# Patient Record
Sex: FEMALE | Race: WHITE | NOT HISPANIC OR LATINO | URBAN - METROPOLITAN AREA
[De-identification: names, ages, dates, MRNs, and addresses within clinical notes are randomized per-mention and may not be internally consistent; named-entity substitution may affect disease eponyms.]

---

## 2017-01-23 ENCOUNTER — EMERGENCY (EMERGENCY)
Facility: HOSPITAL | Age: 82
LOS: 1 days | Discharge: HOME | End: 2017-01-23
Attending: EMERGENCY MEDICINE | Admitting: EMERGENCY MEDICINE
Payer: MEDICARE

## 2017-01-23 VITALS
HEART RATE: 89 BPM | RESPIRATION RATE: 18 BRPM | OXYGEN SATURATION: 100 % | SYSTOLIC BLOOD PRESSURE: 183 MMHG | DIASTOLIC BLOOD PRESSURE: 113 MMHG | TEMPERATURE: 98 F

## 2017-01-23 DIAGNOSIS — Z84.89 FAMILY HISTORY OF OTHER SPECIFIED CONDITIONS: Chronic | ICD-10-CM

## 2017-01-23 DIAGNOSIS — Z95.0 PRESENCE OF CARDIAC PACEMAKER: Chronic | ICD-10-CM

## 2017-01-23 DIAGNOSIS — T14.8 OTHER INJURY OF UNSPECIFIED BODY REGION: Chronic | ICD-10-CM

## 2017-01-23 DIAGNOSIS — H26.9 UNSPECIFIED CATARACT: Chronic | ICD-10-CM

## 2017-01-23 DIAGNOSIS — Z90.710 ACQUIRED ABSENCE OF BOTH CERVIX AND UTERUS: Chronic | ICD-10-CM

## 2017-01-23 DIAGNOSIS — Z82.8 FAMILY HISTORY OF OTHER DISABILITIES AND CHRONIC DISEASES LEADING TO DISABLEMENT, NOT ELSEWHERE CLASSIFIED: Chronic | ICD-10-CM

## 2017-01-23 PROCEDURE — G0168: CPT

## 2017-01-23 PROCEDURE — 70450 CT HEAD/BRAIN W/O DYE: CPT | Mod: 26

## 2017-01-23 PROCEDURE — 99284 EMERGENCY DEPT VISIT MOD MDM: CPT | Mod: 25,GC

## 2017-01-23 PROCEDURE — 70486 CT MAXILLOFACIAL W/O DYE: CPT | Mod: 26

## 2017-01-23 RX ORDER — TETANUS TOXOID, REDUCED DIPHTHERIA TOXOID AND ACELLULAR PERTUSSIS VACCINE, ADSORBED 5; 2.5; 8; 8; 2.5 [IU]/.5ML; [IU]/.5ML; UG/.5ML; UG/.5ML; UG/.5ML
0.5 SUSPENSION INTRAMUSCULAR ONCE
Qty: 0 | Refills: 0 | Status: COMPLETED | OUTPATIENT
Start: 2017-01-23 | End: 2017-01-23

## 2017-01-23 RX ORDER — ACETAMINOPHEN 500 MG
650 TABLET ORAL ONCE
Qty: 0 | Refills: 0 | Status: COMPLETED | OUTPATIENT
Start: 2017-01-23 | End: 2017-01-23

## 2017-01-23 RX ADMIN — Medication 650 MILLIGRAM(S): at 08:22

## 2017-01-23 RX ADMIN — TETANUS TOXOID, REDUCED DIPHTHERIA TOXOID AND ACELLULAR PERTUSSIS VACCINE, ADSORBED 0.5 MILLILITER(S): 5; 2.5; 8; 8; 2.5 SUSPENSION INTRAMUSCULAR at 08:01

## 2017-01-23 NOTE — ED PROVIDER NOTE - PSH
Cardiac pacemaker  placed 2009  Cataract  b/l lense implant  FHx: cholecystectomy  1993 laparoscopic  FHx: total knee replacement  left 2012  Fracture  ORIF right ankle 1986  S/P JAY-BSO  40 years ago

## 2017-01-23 NOTE — ED PROVIDER NOTE - PMH
Atrial fibrillation  dx 7/09 on coumadin  HLD (hyperlipidemia)    HTN (hypertension)    OA (osteoarthritis)    YOVANI on CPAP    Thyroid nodule  followed by endocrinologist  UTI (urinary tract infection)  frequent last was 1/15

## 2017-01-23 NOTE — ED ADULT NURSE NOTE - OBJECTIVE STATEMENT
Patient woke up in the morning and she rolled over from the bed, however she denies hitting the head. Patient hit her nose in the night stand. Denies LOC dizziness. Denies double vision, blurry vision. Patient on Coumadin. Patient has an abrasion on the bridge of her nose active bleeding, able to stop it slowly by applying pressure and ice.

## 2017-01-23 NOTE — ED ADULT TRIAGE NOTE - CHIEF COMPLAINT QUOTE
Pt sp fall. Fell off bed and hit nose on night stand. Denies LOC, dizziness, HA, vision changes. Currently on coumadin. Abrasion with some active bleeding & swelling observed to bridge of nose.

## 2017-01-23 NOTE — ED PROVIDER NOTE - CARE PLAN
Principal Discharge DX:	Head trauma  Instructions for follow-up, activity and diet:	1) Please follow-up with your primary care doctor in the next 5-7 days.  Please call tomorrow for an appointment.  If you cannot follow-up with your primary care doctor please return to the ED for any urgent issues.  2) You were given a copy of the tests performed today.  Please bring the results with you and review them with your primary care doctor.  3) If you have any worsening of symptoms or any other concerns please return to the ED immediately.  4) Please continue taking your home medications as directed.  Secondary Diagnosis:	Abrasion

## 2017-01-23 NOTE — ED PROVIDER NOTE - PLAN OF CARE
1) Please follow-up with your primary care doctor in the next 5-7 days.  Please call tomorrow for an appointment.  If you cannot follow-up with your primary care doctor please return to the ED for any urgent issues.  2) You were given a copy of the tests performed today.  Please bring the results with you and review them with your primary care doctor.  3) If you have any worsening of symptoms or any other concerns please return to the ED immediately.  4) Please continue taking your home medications as directed.

## 2017-01-23 NOTE — ED PROVIDER NOTE - PHYSICAL EXAMINATION
nasal bridge lac, localized swelling/ecchymosis and ttp. No intranasal bleeding. No other facial ttp. no spinal ttp, neck FROM. Steady unassisted gait. FROM all extremities.

## 2017-01-23 NOTE — ED PROVIDER NOTE - ATTENDING CONTRIBUTION TO CARE
83F PMh afib on coumadin, HTN, ppm p/w nasal lac s/p rolling and hitting face in bed, minimal localized pain otherwise asymptomatic. Vitals wnl, exam notable for nasal lac.  Low suspicion for intracranial injury but given age and on coumadin will cth and maxillofacial. Adacel, dermabond. Pt does not want pain med. reassess, likely outpt f/u.

## 2017-01-23 NOTE — ED PROVIDER NOTE - OBJECTIVE STATEMENT
84 yo female w/ pmh of afib (on coumadin) presents after hitting her face.  Pt states that she was rolling over in bed and hit her face on the dresser next to her bed.  did NOT fall off the bed.  Denies neck pain, fevers, chills, blurry vision, headaches, nausea/vomiting, chest pain before, during or after incident. 82 yo female w/ pmh of afib (on coumadin) presents after hitting her face.  Pt states that she was rolling over in bed and hit her face on the dresser next to her bed.  did NOT fall off the bed.  Denies neck pain, fevers, chills, blurry vision, headaches, nausea/vomiting, chest pain before, during or after incident. No LOC. Has minimal nasal pain, no HA, back pain, or other MSK pain. Unknown last tetanus.  Uses nasal cpap at night, occasionally doesn't use it w/o any issues.

## 2017-06-19 ENCOUNTER — APPOINTMENT (OUTPATIENT)
Dept: CT IMAGING | Facility: IMAGING CENTER | Age: 82
End: 2017-06-19

## 2017-06-19 ENCOUNTER — OUTPATIENT (OUTPATIENT)
Dept: OUTPATIENT SERVICES | Facility: HOSPITAL | Age: 82
LOS: 1 days | End: 2017-06-19
Payer: MEDICARE

## 2017-06-19 DIAGNOSIS — H26.9 UNSPECIFIED CATARACT: Chronic | ICD-10-CM

## 2017-06-19 DIAGNOSIS — Z90.710 ACQUIRED ABSENCE OF BOTH CERVIX AND UTERUS: Chronic | ICD-10-CM

## 2017-06-19 DIAGNOSIS — Z95.0 PRESENCE OF CARDIAC PACEMAKER: Chronic | ICD-10-CM

## 2017-06-19 DIAGNOSIS — Z82.8 FAMILY HISTORY OF OTHER DISABILITIES AND CHRONIC DISEASES LEADING TO DISABLEMENT, NOT ELSEWHERE CLASSIFIED: Chronic | ICD-10-CM

## 2017-06-19 DIAGNOSIS — Z84.89 FAMILY HISTORY OF OTHER SPECIFIED CONDITIONS: Chronic | ICD-10-CM

## 2017-06-19 DIAGNOSIS — Z00.8 ENCOUNTER FOR OTHER GENERAL EXAMINATION: ICD-10-CM

## 2017-06-19 DIAGNOSIS — T14.8 OTHER INJURY OF UNSPECIFIED BODY REGION: Chronic | ICD-10-CM

## 2017-06-19 PROCEDURE — 74170 CT ABD WO CNTRST FLWD CNTRST: CPT

## 2017-06-19 PROCEDURE — 82565 ASSAY OF CREATININE: CPT

## 2017-07-17 ENCOUNTER — APPOINTMENT (OUTPATIENT)
Dept: VASCULAR SURGERY | Facility: CLINIC | Age: 82
End: 2017-07-17

## 2017-07-17 VITALS
BODY MASS INDEX: 30.73 KG/M2 | HEIGHT: 64 IN | TEMPERATURE: 98 F | HEART RATE: 92 BPM | SYSTOLIC BLOOD PRESSURE: 146 MMHG | WEIGHT: 180 LBS | DIASTOLIC BLOOD PRESSURE: 90 MMHG

## 2017-07-19 ENCOUNTER — APPOINTMENT (OUTPATIENT)
Dept: CT IMAGING | Facility: IMAGING CENTER | Age: 82
End: 2017-07-19

## 2017-07-19 ENCOUNTER — OUTPATIENT (OUTPATIENT)
Dept: OUTPATIENT SERVICES | Facility: HOSPITAL | Age: 82
LOS: 1 days | End: 2017-07-19
Payer: MEDICARE

## 2017-07-19 DIAGNOSIS — Z84.89 FAMILY HISTORY OF OTHER SPECIFIED CONDITIONS: Chronic | ICD-10-CM

## 2017-07-19 DIAGNOSIS — Z90.710 ACQUIRED ABSENCE OF BOTH CERVIX AND UTERUS: Chronic | ICD-10-CM

## 2017-07-19 DIAGNOSIS — Z00.8 ENCOUNTER FOR OTHER GENERAL EXAMINATION: ICD-10-CM

## 2017-07-19 DIAGNOSIS — T14.8 OTHER INJURY OF UNSPECIFIED BODY REGION: Chronic | ICD-10-CM

## 2017-07-19 DIAGNOSIS — H26.9 UNSPECIFIED CATARACT: Chronic | ICD-10-CM

## 2017-07-19 DIAGNOSIS — Z95.0 PRESENCE OF CARDIAC PACEMAKER: Chronic | ICD-10-CM

## 2017-07-19 DIAGNOSIS — Z82.8 FAMILY HISTORY OF OTHER DISABILITIES AND CHRONIC DISEASES LEADING TO DISABLEMENT, NOT ELSEWHERE CLASSIFIED: Chronic | ICD-10-CM

## 2017-07-19 PROCEDURE — 71250 CT THORAX DX C-: CPT

## 2017-07-21 ENCOUNTER — APPOINTMENT (OUTPATIENT)
Dept: VASCULAR SURGERY | Facility: CLINIC | Age: 82
End: 2017-07-21

## 2017-11-15 ENCOUNTER — OUTPATIENT (OUTPATIENT)
Dept: OUTPATIENT SERVICES | Facility: HOSPITAL | Age: 82
LOS: 1 days | End: 2017-11-15
Payer: MEDICARE

## 2017-11-15 ENCOUNTER — APPOINTMENT (OUTPATIENT)
Dept: CT IMAGING | Facility: IMAGING CENTER | Age: 82
End: 2017-11-15
Payer: MEDICARE

## 2017-11-15 DIAGNOSIS — Z84.89 FAMILY HISTORY OF OTHER SPECIFIED CONDITIONS: Chronic | ICD-10-CM

## 2017-11-15 DIAGNOSIS — Z00.8 ENCOUNTER FOR OTHER GENERAL EXAMINATION: ICD-10-CM

## 2017-11-15 DIAGNOSIS — Z82.8 FAMILY HISTORY OF OTHER DISABILITIES AND CHRONIC DISEASES LEADING TO DISABLEMENT, NOT ELSEWHERE CLASSIFIED: Chronic | ICD-10-CM

## 2017-11-15 DIAGNOSIS — Z90.710 ACQUIRED ABSENCE OF BOTH CERVIX AND UTERUS: Chronic | ICD-10-CM

## 2017-11-15 DIAGNOSIS — H26.9 UNSPECIFIED CATARACT: Chronic | ICD-10-CM

## 2017-11-15 DIAGNOSIS — Z95.0 PRESENCE OF CARDIAC PACEMAKER: Chronic | ICD-10-CM

## 2017-11-15 DIAGNOSIS — T14.8 OTHER INJURY OF UNSPECIFIED BODY REGION: Chronic | ICD-10-CM

## 2017-11-15 PROCEDURE — 71250 CT THORAX DX C-: CPT

## 2017-11-15 PROCEDURE — 71250 CT THORAX DX C-: CPT | Mod: 26

## 2018-02-07 ENCOUNTER — EMERGENCY (EMERGENCY)
Facility: HOSPITAL | Age: 83
LOS: 1 days | Discharge: HOME | End: 2018-02-07
Attending: EMERGENCY MEDICINE | Admitting: EMERGENCY MEDICINE
Payer: MEDICARE

## 2018-02-07 VITALS
DIASTOLIC BLOOD PRESSURE: 100 MMHG | RESPIRATION RATE: 18 BRPM | TEMPERATURE: 98 F | SYSTOLIC BLOOD PRESSURE: 156 MMHG | OXYGEN SATURATION: 100 % | HEART RATE: 60 BPM

## 2018-02-07 DIAGNOSIS — H26.9 UNSPECIFIED CATARACT: Chronic | ICD-10-CM

## 2018-02-07 DIAGNOSIS — T14.8 OTHER INJURY OF UNSPECIFIED BODY REGION: Chronic | ICD-10-CM

## 2018-02-07 DIAGNOSIS — Z95.0 PRESENCE OF CARDIAC PACEMAKER: Chronic | ICD-10-CM

## 2018-02-07 DIAGNOSIS — Z84.89 FAMILY HISTORY OF OTHER SPECIFIED CONDITIONS: Chronic | ICD-10-CM

## 2018-02-07 DIAGNOSIS — Z90.710 ACQUIRED ABSENCE OF BOTH CERVIX AND UTERUS: Chronic | ICD-10-CM

## 2018-02-07 DIAGNOSIS — Z82.8 FAMILY HISTORY OF OTHER DISABILITIES AND CHRONIC DISEASES LEADING TO DISABLEMENT, NOT ELSEWHERE CLASSIFIED: Chronic | ICD-10-CM

## 2018-02-07 LAB
ALBUMIN SERPL ELPH-MCNC: 3.9 G/DL — SIGNIFICANT CHANGE UP (ref 3.3–5)
ALP SERPL-CCNC: 68 U/L — SIGNIFICANT CHANGE UP (ref 40–120)
ALT FLD-CCNC: 15 U/L — SIGNIFICANT CHANGE UP (ref 4–33)
APPEARANCE UR: CLEAR — SIGNIFICANT CHANGE UP
AST SERPL-CCNC: 18 U/L — SIGNIFICANT CHANGE UP (ref 4–32)
BASOPHILS # BLD AUTO: 0.06 K/UL — SIGNIFICANT CHANGE UP (ref 0–0.2)
BASOPHILS NFR BLD AUTO: 0.5 % — SIGNIFICANT CHANGE UP (ref 0–2)
BILIRUB SERPL-MCNC: 1.6 MG/DL — HIGH (ref 0.2–1.2)
BILIRUB UR-MCNC: NEGATIVE — SIGNIFICANT CHANGE UP
BLOOD UR QL VISUAL: NEGATIVE — SIGNIFICANT CHANGE UP
BUN SERPL-MCNC: 17 MG/DL — SIGNIFICANT CHANGE UP (ref 7–23)
CALCIUM SERPL-MCNC: 8.9 MG/DL — SIGNIFICANT CHANGE UP (ref 8.4–10.5)
CHLORIDE SERPL-SCNC: 100 MMOL/L — SIGNIFICANT CHANGE UP (ref 98–107)
CO2 SERPL-SCNC: 26 MMOL/L — SIGNIFICANT CHANGE UP (ref 22–31)
COLOR SPEC: YELLOW — SIGNIFICANT CHANGE UP
CREAT SERPL-MCNC: 0.76 MG/DL — SIGNIFICANT CHANGE UP (ref 0.5–1.3)
EOSINOPHIL # BLD AUTO: 0.06 K/UL — SIGNIFICANT CHANGE UP (ref 0–0.5)
EOSINOPHIL NFR BLD AUTO: 0.5 % — SIGNIFICANT CHANGE UP (ref 0–6)
GLUCOSE SERPL-MCNC: 115 MG/DL — HIGH (ref 70–99)
GLUCOSE UR-MCNC: NEGATIVE — SIGNIFICANT CHANGE UP
HCT VFR BLD CALC: 48.2 % — HIGH (ref 34.5–45)
HGB BLD-MCNC: 15.6 G/DL — HIGH (ref 11.5–15.5)
HYALINE CASTS # UR AUTO: SIGNIFICANT CHANGE UP (ref 0–?)
IMM GRANULOCYTES # BLD AUTO: 0.07 # — SIGNIFICANT CHANGE UP
IMM GRANULOCYTES NFR BLD AUTO: 0.6 % — SIGNIFICANT CHANGE UP (ref 0–1.5)
KETONES UR-MCNC: NEGATIVE — SIGNIFICANT CHANGE UP
LEUKOCYTE ESTERASE UR-ACNC: NEGATIVE — SIGNIFICANT CHANGE UP
LYMPHOCYTES # BLD AUTO: 1.1 K/UL — SIGNIFICANT CHANGE UP (ref 1–3.3)
LYMPHOCYTES # BLD AUTO: 9.7 % — LOW (ref 13–44)
MCHC RBC-ENTMCNC: 27.6 PG — SIGNIFICANT CHANGE UP (ref 27–34)
MCHC RBC-ENTMCNC: 32.4 % — SIGNIFICANT CHANGE UP (ref 32–36)
MCV RBC AUTO: 85.2 FL — SIGNIFICANT CHANGE UP (ref 80–100)
MONOCYTES # BLD AUTO: 0.63 K/UL — SIGNIFICANT CHANGE UP (ref 0–0.9)
MONOCYTES NFR BLD AUTO: 5.6 % — SIGNIFICANT CHANGE UP (ref 2–14)
MUCOUS THREADS # UR AUTO: SIGNIFICANT CHANGE UP
NEUTROPHILS # BLD AUTO: 9.37 K/UL — HIGH (ref 1.8–7.4)
NEUTROPHILS NFR BLD AUTO: 83.1 % — HIGH (ref 43–77)
NITRITE UR-MCNC: NEGATIVE — SIGNIFICANT CHANGE UP
NRBC # FLD: 0 — SIGNIFICANT CHANGE UP
PH UR: 7 — SIGNIFICANT CHANGE UP (ref 4.6–8)
PLATELET # BLD AUTO: 332 K/UL — SIGNIFICANT CHANGE UP (ref 150–400)
PMV BLD: 11.8 FL — SIGNIFICANT CHANGE UP (ref 7–13)
POTASSIUM SERPL-MCNC: 3.8 MMOL/L — SIGNIFICANT CHANGE UP (ref 3.5–5.3)
POTASSIUM SERPL-SCNC: 3.8 MMOL/L — SIGNIFICANT CHANGE UP (ref 3.5–5.3)
PROT SERPL-MCNC: 6.5 G/DL — SIGNIFICANT CHANGE UP (ref 6–8.3)
PROT UR-MCNC: 100 MG/DL — HIGH
RBC # BLD: 5.66 M/UL — HIGH (ref 3.8–5.2)
RBC # FLD: 16.2 % — HIGH (ref 10.3–14.5)
RBC CASTS # UR COMP ASSIST: SIGNIFICANT CHANGE UP (ref 0–?)
SODIUM SERPL-SCNC: 140 MMOL/L — SIGNIFICANT CHANGE UP (ref 135–145)
SP GR SPEC: 1.02 — SIGNIFICANT CHANGE UP (ref 1–1.04)
SQUAMOUS # UR AUTO: SIGNIFICANT CHANGE UP
UROBILINOGEN FLD QL: NORMAL MG/DL — SIGNIFICANT CHANGE UP
WBC # BLD: 11.29 K/UL — HIGH (ref 3.8–10.5)
WBC # FLD AUTO: 11.29 K/UL — HIGH (ref 3.8–10.5)
WBC UR QL: SIGNIFICANT CHANGE UP (ref 0–?)

## 2018-02-07 PROCEDURE — 99283 EMERGENCY DEPT VISIT LOW MDM: CPT

## 2018-02-07 RX ORDER — ACETAMINOPHEN 500 MG
975 TABLET ORAL ONCE
Qty: 0 | Refills: 0 | Status: COMPLETED | OUTPATIENT
Start: 2018-02-07 | End: 2018-02-07

## 2018-02-07 RX ORDER — DIAZEPAM 5 MG
1 TABLET ORAL
Qty: 10 | Refills: 0 | OUTPATIENT
Start: 2018-02-07

## 2018-02-07 RX ADMIN — Medication 975 MILLIGRAM(S): at 11:57

## 2018-02-07 NOTE — ED PROVIDER NOTE - CARE PLAN
Assessment and plan of treatment:	Seen in ED for non-traumatic back pain, no spinal tenderness. Limit further injury, over exertion, and rest affected area. Take Tylenol 650 mg every 4 hours or 975-1000 mg every 6 hours as needed for mild to moderate pain. Take Valium 5mg one tab every 8 hours as needed for severe pain. NEVER COMBINE WITH ALCOHOL. NEVER DRINK AND DRIVE ON VALIUM IT WILL CAUSE ACCIDENTS. Please continue all home medications as directed. See your regular doctor within 72 hours for follow-up care. Return to ER for new or worsening symptoms. Principal Discharge DX:	Back pain  Assessment and plan of treatment:	Seen in ED for non-traumatic back pain, no spinal tenderness. Limit further injury, over exertion, and rest affected area. Take Tylenol 650 mg every 4 hours or 975-1000 mg every 6 hours as needed for mild to moderate pain. Take Valium 5mg one tab every 8 hours as needed for severe pain. NEVER COMBINE WITH ALCOHOL. NEVER DRINK AND DRIVE ON VALIUM IT WILL CAUSE ACCIDENTS. Please continue all home medications as directed. See your regular doctor within 72 hours for follow-up care. Return to ER for new or worsening symptoms.

## 2018-02-07 NOTE — ED ADULT TRIAGE NOTE - CHIEF COMPLAINT QUOTE
patient presents with lower lumbar back pain that began last night, neg numbness, neg CVA tenderness pt does have a hx of back pain. neg radiation to abd or flank. denies urinary s/s.

## 2018-02-07 NOTE — ED PROVIDER NOTE - MUSCULOSKELETAL NECK EXAM
requires 1 person assistance to sit up in bed 2/2 pain/no deformity, pain or tenderness. no restriction of movement

## 2018-02-07 NOTE — ED PROVIDER NOTE - PLAN OF CARE
Seen in ED for non-traumatic back pain, no spinal tenderness. Limit further injury, over exertion, and rest affected area. Take Tylenol 650 mg every 4 hours or 975-1000 mg every 6 hours as needed for mild to moderate pain. Take Valium 5mg one tab every 8 hours as needed for severe pain. NEVER COMBINE WITH ALCOHOL. NEVER DRINK AND DRIVE ON VALIUM IT WILL CAUSE ACCIDENTS. Please continue all home medications as directed. See your regular doctor within 72 hours for follow-up care. Return to ER for new or worsening symptoms.

## 2018-02-07 NOTE — ED PROVIDER NOTE - MUSCULOSKELETAL MINIMAL EXAM
atraumatic/RANGE OF MOTION LIMITED/no muscle tenderness/neck supple/motor intact/neg spinal tenderness, diffuse lower spasm, intact saddle and lower extremity sensation/MUSCLE SPASMS

## 2018-02-07 NOTE — ED PROVIDER NOTE - OBJECTIVE STATEMENT
11:17 Tree att: 84F 11:17 Tree att: 84F h/o htn, hld, afib ppm on coumadin, mac degen c/o atraumatic low back pain x 1 day. Yesterday evening patient noted gradual onset low back pain, diffuse, spasm, non-rad, worse with movement, ok if supine. Patient denies fall, trauma, h/o slipped disc or back surgery. Patient denies fever, dm, hiv, ivdu, invasive spinal procedures, saddle anesthesia, urinary or fecal incontinence. NKDA

## 2018-02-07 NOTE — SOCIAL WORK INITIAL EVALUATION ADULT - PLAN
Met with patient at bedside in ED.   Patient is an 84 year old, White, Gnosticism,  female.  Patient is alert and oriented x4.  Patient lives in  with spouse and daughter.  Patient uses cane to ambulate when she is outside home.  Patient reports she is independent with ADL's and declined need for community resources at this time.  Patient awaiting x-ray, DC plan pending results.  SW to follow.

## 2018-02-07 NOTE — ED PROVIDER NOTE - PROGRESS NOTE DETAILS
Tree att: Patient smiling, "I feel much better." Dispo pending cmp and ua result. Tree att: Patient smiling, "I feel much better." Ambulate to and from bathroom without assistance. Dispo pending cmp and ua result. Tree att: [facility]. Results, dc instructions, return precautions, and need for follow-up reviewed with patient. Stable dc home.

## 2018-02-08 ENCOUNTER — EMERGENCY (EMERGENCY)
Facility: HOSPITAL | Age: 83
LOS: 1 days | Discharge: HOME | End: 2018-02-08
Attending: EMERGENCY MEDICINE | Admitting: EMERGENCY MEDICINE
Payer: MEDICARE

## 2018-02-08 VITALS
DIASTOLIC BLOOD PRESSURE: 82 MMHG | SYSTOLIC BLOOD PRESSURE: 148 MMHG | HEART RATE: 84 BPM | OXYGEN SATURATION: 98 % | RESPIRATION RATE: 18 BRPM | TEMPERATURE: 98 F

## 2018-02-08 VITALS
SYSTOLIC BLOOD PRESSURE: 161 MMHG | HEART RATE: 89 BPM | RESPIRATION RATE: 18 BRPM | DIASTOLIC BLOOD PRESSURE: 85 MMHG | OXYGEN SATURATION: 97 % | TEMPERATURE: 98 F

## 2018-02-08 DIAGNOSIS — Z82.8 FAMILY HISTORY OF OTHER DISABILITIES AND CHRONIC DISEASES LEADING TO DISABLEMENT, NOT ELSEWHERE CLASSIFIED: Chronic | ICD-10-CM

## 2018-02-08 DIAGNOSIS — Z90.710 ACQUIRED ABSENCE OF BOTH CERVIX AND UTERUS: Chronic | ICD-10-CM

## 2018-02-08 DIAGNOSIS — H26.9 UNSPECIFIED CATARACT: Chronic | ICD-10-CM

## 2018-02-08 DIAGNOSIS — Z84.89 FAMILY HISTORY OF OTHER SPECIFIED CONDITIONS: Chronic | ICD-10-CM

## 2018-02-08 DIAGNOSIS — Z95.0 PRESENCE OF CARDIAC PACEMAKER: Chronic | ICD-10-CM

## 2018-02-08 DIAGNOSIS — T14.8 OTHER INJURY OF UNSPECIFIED BODY REGION: Chronic | ICD-10-CM

## 2018-02-08 LAB
ALBUMIN SERPL ELPH-MCNC: 3.9 G/DL — SIGNIFICANT CHANGE UP (ref 3.3–5)
ALP SERPL-CCNC: 71 U/L — SIGNIFICANT CHANGE UP (ref 40–120)
ALT FLD-CCNC: 15 U/L RC — SIGNIFICANT CHANGE UP (ref 10–45)
ANION GAP SERPL CALC-SCNC: 15 MMOL/L — SIGNIFICANT CHANGE UP (ref 5–17)
APTT BLD: 54.2 SEC — HIGH (ref 27.5–37.4)
AST SERPL-CCNC: 37 U/L — SIGNIFICANT CHANGE UP (ref 10–40)
BASOPHILS # BLD AUTO: 0 K/UL — SIGNIFICANT CHANGE UP (ref 0–0.2)
BASOPHILS NFR BLD AUTO: 0.2 % — SIGNIFICANT CHANGE UP (ref 0–2)
BILIRUB SERPL-MCNC: 2 MG/DL — HIGH (ref 0.2–1.2)
BUN SERPL-MCNC: 24 MG/DL — HIGH (ref 7–23)
CALCIUM SERPL-MCNC: 9.8 MG/DL — SIGNIFICANT CHANGE UP (ref 8.4–10.5)
CHLORIDE SERPL-SCNC: 99 MMOL/L — SIGNIFICANT CHANGE UP (ref 96–108)
CO2 SERPL-SCNC: 23 MMOL/L — SIGNIFICANT CHANGE UP (ref 22–31)
CREAT SERPL-MCNC: 0.91 MG/DL — SIGNIFICANT CHANGE UP (ref 0.5–1.3)
EOSINOPHIL # BLD AUTO: 0.2 K/UL — SIGNIFICANT CHANGE UP (ref 0–0.5)
EOSINOPHIL NFR BLD AUTO: 0.9 % — SIGNIFICANT CHANGE UP (ref 0–6)
GLUCOSE SERPL-MCNC: 124 MG/DL — HIGH (ref 70–99)
HCT VFR BLD CALC: 52 % — HIGH (ref 34.5–45)
HGB BLD-MCNC: 17.4 G/DL — HIGH (ref 11.5–15.5)
INR BLD: 3.38 RATIO — HIGH (ref 0.88–1.16)
LYMPHOCYTES # BLD AUTO: 1.7 K/UL — SIGNIFICANT CHANGE UP (ref 1–3.3)
LYMPHOCYTES # BLD AUTO: 9 % — LOW (ref 13–44)
MCHC RBC-ENTMCNC: 29.1 PG — SIGNIFICANT CHANGE UP (ref 27–34)
MCHC RBC-ENTMCNC: 33.4 GM/DL — SIGNIFICANT CHANGE UP (ref 32–36)
MCV RBC AUTO: 87.1 FL — SIGNIFICANT CHANGE UP (ref 80–100)
MONOCYTES # BLD AUTO: 1.2 K/UL — HIGH (ref 0–0.9)
MONOCYTES NFR BLD AUTO: 6.2 % — SIGNIFICANT CHANGE UP (ref 2–14)
NEUTROPHILS # BLD AUTO: 15.8 K/UL — HIGH (ref 1.8–7.4)
NEUTROPHILS NFR BLD AUTO: 83.7 % — HIGH (ref 43–77)
PLATELET # BLD AUTO: 369 K/UL — SIGNIFICANT CHANGE UP (ref 150–400)
POTASSIUM SERPL-MCNC: 5.5 MMOL/L — HIGH (ref 3.5–5.3)
POTASSIUM SERPL-SCNC: 5.5 MMOL/L — HIGH (ref 3.5–5.3)
PROT SERPL-MCNC: 7.4 G/DL — SIGNIFICANT CHANGE UP (ref 6–8.3)
PROTHROM AB SERPL-ACNC: 37.8 SEC — HIGH (ref 9.8–12.7)
RAPID RVP RESULT: SIGNIFICANT CHANGE UP
RBC # BLD: 5.97 M/UL — HIGH (ref 3.8–5.2)
RBC # FLD: 14.6 % — HIGH (ref 10.3–14.5)
SODIUM SERPL-SCNC: 137 MMOL/L — SIGNIFICANT CHANGE UP (ref 135–145)
SPECIMEN SOURCE: SIGNIFICANT CHANGE UP
WBC # BLD: 18.9 K/UL — HIGH (ref 3.8–10.5)
WBC # FLD AUTO: 18.9 K/UL — HIGH (ref 3.8–10.5)

## 2018-02-08 PROCEDURE — 85610 PROTHROMBIN TIME: CPT

## 2018-02-08 PROCEDURE — 87633 RESP VIRUS 12-25 TARGETS: CPT

## 2018-02-08 PROCEDURE — 72131 CT LUMBAR SPINE W/O DYE: CPT | Mod: 26

## 2018-02-08 PROCEDURE — 71045 X-RAY EXAM CHEST 1 VIEW: CPT | Mod: 26

## 2018-02-08 PROCEDURE — 99284 EMERGENCY DEPT VISIT MOD MDM: CPT | Mod: GC

## 2018-02-08 PROCEDURE — 99284 EMERGENCY DEPT VISIT MOD MDM: CPT | Mod: 25

## 2018-02-08 PROCEDURE — 71045 X-RAY EXAM CHEST 1 VIEW: CPT

## 2018-02-08 PROCEDURE — 85652 RBC SED RATE AUTOMATED: CPT

## 2018-02-08 PROCEDURE — 87581 M.PNEUMON DNA AMP PROBE: CPT

## 2018-02-08 PROCEDURE — 74176 CT ABD & PELVIS W/O CONTRAST: CPT

## 2018-02-08 PROCEDURE — 74176 CT ABD & PELVIS W/O CONTRAST: CPT | Mod: 26

## 2018-02-08 PROCEDURE — 87486 CHLMYD PNEUM DNA AMP PROBE: CPT

## 2018-02-08 PROCEDURE — 96374 THER/PROPH/DIAG INJ IV PUSH: CPT

## 2018-02-08 PROCEDURE — 87798 DETECT AGENT NOS DNA AMP: CPT

## 2018-02-08 PROCEDURE — 80053 COMPREHEN METABOLIC PANEL: CPT

## 2018-02-08 PROCEDURE — 85730 THROMBOPLASTIN TIME PARTIAL: CPT

## 2018-02-08 PROCEDURE — 85027 COMPLETE CBC AUTOMATED: CPT

## 2018-02-08 RX ORDER — LIDOCAINE 4 G/100G
1 CREAM TOPICAL ONCE
Qty: 0 | Refills: 0 | Status: COMPLETED | OUTPATIENT
Start: 2018-02-08 | End: 2018-02-08

## 2018-02-08 RX ORDER — ACETAMINOPHEN 500 MG
1000 TABLET ORAL ONCE
Qty: 0 | Refills: 0 | Status: COMPLETED | OUTPATIENT
Start: 2018-02-08 | End: 2018-02-08

## 2018-02-08 RX ORDER — TRAMADOL HYDROCHLORIDE 50 MG/1
50 TABLET ORAL ONCE
Qty: 0 | Refills: 0 | Status: COMPLETED | OUTPATIENT
Start: 2018-02-08 | End: 2018-02-08

## 2018-02-08 RX ORDER — CYCLOBENZAPRINE HYDROCHLORIDE 10 MG/1
10 TABLET, FILM COATED ORAL ONCE
Qty: 0 | Refills: 0 | Status: COMPLETED | OUTPATIENT
Start: 2018-02-08 | End: 2018-02-08

## 2018-02-08 RX ORDER — CYCLOBENZAPRINE HYDROCHLORIDE 10 MG/1
1 TABLET, FILM COATED ORAL
Qty: 10 | Refills: 0 | OUTPATIENT
Start: 2018-02-08 | End: 2018-02-12

## 2018-02-08 RX ORDER — SODIUM CHLORIDE 9 MG/ML
500 INJECTION INTRAMUSCULAR; INTRAVENOUS; SUBCUTANEOUS ONCE
Qty: 0 | Refills: 0 | Status: COMPLETED | OUTPATIENT
Start: 2018-02-08 | End: 2018-02-08

## 2018-02-08 RX ADMIN — Medication 400 MILLIGRAM(S): at 20:14

## 2018-02-08 RX ADMIN — Medication 1000 MILLIGRAM(S): at 21:04

## 2018-02-08 RX ADMIN — LIDOCAINE 1 PATCH: 4 CREAM TOPICAL at 20:14

## 2018-02-08 RX ADMIN — SODIUM CHLORIDE 500 MILLILITER(S): 9 INJECTION INTRAMUSCULAR; INTRAVENOUS; SUBCUTANEOUS at 21:03

## 2018-02-08 RX ADMIN — LIDOCAINE 1 PATCH: 4 CREAM TOPICAL at 21:04

## 2018-02-08 RX ADMIN — CYCLOBENZAPRINE HYDROCHLORIDE 10 MILLIGRAM(S): 10 TABLET, FILM COATED ORAL at 21:04

## 2018-02-08 NOTE — ED ADULT NURSE NOTE - OBJECTIVE STATEMENT
84 y.o female c c/o general back pain. denies injury. pain x 2 days. Pt on coumadin-pacemaker in place. denies pain down legs. denies hematuria/dysuria. family at bedside. No loss or urine/bowels. denies cp/sob.

## 2018-02-08 NOTE — ED PROVIDER NOTE - MEDICAL DECISION MAKING DETAILS
Possible ongoing MSK pain, however history of fluctuating pain possibly concerning for renal colic or occult fracture of lumbar spine, no evidence of acute spinal cord emergency or aortic catastrophe, plan for labs, pain control, CT A/P and re-evaluate.

## 2018-02-08 NOTE — ED ADULT NURSE REASSESSMENT NOTE - NS ED NURSE REASSESS COMMENT FT1
Received pt on way to ct scan. Waiting for pt to return to do blood, line and meds. Will continue to monitor closely. Family aware

## 2018-02-08 NOTE — ED ADULT NURSE REASSESSMENT NOTE - NS ED NURSE REASSESS COMMENT FT1
pt remains stable, no signs of acute distress noted at this moment. Still complains of pain. Barcode not working for scanning, so medication picked up manually.

## 2018-02-08 NOTE — ED PROVIDER NOTE - ATTENDING CONTRIBUTION TO CARE
Patient seen and evaluated with resident/NP/PA, however HPI, ROS, PE and MDM as documented authored by myself unless otherwise noted - Juan Gramajo MD

## 2018-02-08 NOTE — ED PROVIDER NOTE - CONSTITUTIONAL, MLM
normal... Well appearing, well nourished, awake, alert, oriented to person, place, time/situation and in no apparent distress, uncomfortable appearing

## 2018-02-08 NOTE — ED PROVIDER NOTE - PHYSICAL EXAMINATION
MSK:  Diffusely tender to palpation throughout lumbar area, most prominently in L lumbar area, negative SLR testing bilaterally

## 2018-02-08 NOTE — ED PROVIDER NOTE - OBJECTIVE STATEMENT
Patient presenting c/o lower back pain.  Began three days ago, mild, yesterday worsened - seen at [facility] ED and discharged with tylenol and valium.  Today continued to worsen despite medications at home.  Reporting pain is throughout the lower back but worst on L side, non radiating.  Throughout day had episodes of "coming in spasms".  No loss of bowel or bladder continence, no fevers or chills, no radiation to leg, no numbness, tingling and weakness, no trauma.

## 2018-02-08 NOTE — ED ADULT TRIAGE NOTE - CHIEF COMPLAINT QUOTE
middle and lower back pain x Tuesday  seen at [facility] yesterday, told to f/u outpatient with ortho middle and lower back pain x Tuesday, atraumatic  seen at [facility] yesterday, told to f/u outpatient with ortho

## 2018-02-08 NOTE — ED PROVIDER NOTE - PROGRESS NOTE DETAILS
Ji Cheek, PGY2: ESR negative, no midline tenderness. Very low suspicion of epidural abscess. Pt's pain improved, ambulating now without assistance. RVP negative. UA negative yesterday. Pending CXR, if negative, discharge with PCP follow up

## 2018-02-09 LAB — BACTERIA UR CULT: SIGNIFICANT CHANGE UP

## 2018-02-14 ENCOUNTER — EMERGENCY (EMERGENCY)
Facility: HOSPITAL | Age: 83
LOS: 1 days | Discharge: HOME | End: 2018-02-14
Attending: EMERGENCY MEDICINE | Admitting: EMERGENCY MEDICINE
Payer: MEDICARE

## 2018-02-14 VITALS
SYSTOLIC BLOOD PRESSURE: 155 MMHG | DIASTOLIC BLOOD PRESSURE: 72 MMHG | TEMPERATURE: 97 F | RESPIRATION RATE: 18 BRPM | OXYGEN SATURATION: 98 % | HEART RATE: 72 BPM

## 2018-02-14 VITALS
TEMPERATURE: 97 F | SYSTOLIC BLOOD PRESSURE: 178 MMHG | HEART RATE: 103 BPM | DIASTOLIC BLOOD PRESSURE: 89 MMHG | RESPIRATION RATE: 18 BRPM | OXYGEN SATURATION: 95 %

## 2018-02-14 DIAGNOSIS — Z84.89 FAMILY HISTORY OF OTHER SPECIFIED CONDITIONS: Chronic | ICD-10-CM

## 2018-02-14 DIAGNOSIS — Z82.8 FAMILY HISTORY OF OTHER DISABILITIES AND CHRONIC DISEASES LEADING TO DISABLEMENT, NOT ELSEWHERE CLASSIFIED: Chronic | ICD-10-CM

## 2018-02-14 DIAGNOSIS — T14.8 OTHER INJURY OF UNSPECIFIED BODY REGION: Chronic | ICD-10-CM

## 2018-02-14 DIAGNOSIS — Z90.710 ACQUIRED ABSENCE OF BOTH CERVIX AND UTERUS: Chronic | ICD-10-CM

## 2018-02-14 DIAGNOSIS — H26.9 UNSPECIFIED CATARACT: Chronic | ICD-10-CM

## 2018-02-14 DIAGNOSIS — Z95.0 PRESENCE OF CARDIAC PACEMAKER: Chronic | ICD-10-CM

## 2018-02-14 LAB
ALBUMIN SERPL ELPH-MCNC: 3.6 G/DL — SIGNIFICANT CHANGE UP (ref 3.3–5)
ALP SERPL-CCNC: 87 U/L — SIGNIFICANT CHANGE UP (ref 40–120)
ALT FLD-CCNC: 31 U/L RC — SIGNIFICANT CHANGE UP (ref 10–45)
ANION GAP SERPL CALC-SCNC: 13 MMOL/L — SIGNIFICANT CHANGE UP (ref 5–17)
APTT BLD: 53.1 SEC — HIGH (ref 27.5–37.4)
AST SERPL-CCNC: 33 U/L — SIGNIFICANT CHANGE UP (ref 10–40)
BASOPHILS # BLD AUTO: 0.1 K/UL — SIGNIFICANT CHANGE UP (ref 0–0.2)
BASOPHILS NFR BLD AUTO: 0.8 % — SIGNIFICANT CHANGE UP (ref 0–2)
BILIRUB SERPL-MCNC: 0.7 MG/DL — SIGNIFICANT CHANGE UP (ref 0.2–1.2)
BUN SERPL-MCNC: 41 MG/DL — HIGH (ref 7–23)
CALCIUM SERPL-MCNC: 9.2 MG/DL — SIGNIFICANT CHANGE UP (ref 8.4–10.5)
CHLORIDE SERPL-SCNC: 99 MMOL/L — SIGNIFICANT CHANGE UP (ref 96–108)
CO2 SERPL-SCNC: 27 MMOL/L — SIGNIFICANT CHANGE UP (ref 22–31)
CREAT SERPL-MCNC: 0.81 MG/DL — SIGNIFICANT CHANGE UP (ref 0.5–1.3)
EOSINOPHIL # BLD AUTO: 0.3 K/UL — SIGNIFICANT CHANGE UP (ref 0–0.5)
EOSINOPHIL NFR BLD AUTO: 2.6 % — SIGNIFICANT CHANGE UP (ref 0–6)
GLUCOSE SERPL-MCNC: 100 MG/DL — HIGH (ref 70–99)
HCT VFR BLD CALC: 48.7 % — HIGH (ref 34.5–45)
HGB BLD-MCNC: 15.6 G/DL — HIGH (ref 11.5–15.5)
INR BLD: 5.95 RATIO — CRITICAL HIGH (ref 0.88–1.16)
LYMPHOCYTES # BLD AUTO: 17.6 % — SIGNIFICANT CHANGE UP (ref 13–44)
LYMPHOCYTES # BLD AUTO: 2.3 K/UL — SIGNIFICANT CHANGE UP (ref 1–3.3)
MCHC RBC-ENTMCNC: 28.4 PG — SIGNIFICANT CHANGE UP (ref 27–34)
MCHC RBC-ENTMCNC: 32.1 GM/DL — SIGNIFICANT CHANGE UP (ref 32–36)
MCV RBC AUTO: 88.7 FL — SIGNIFICANT CHANGE UP (ref 80–100)
MONOCYTES # BLD AUTO: 1 K/UL — HIGH (ref 0–0.9)
MONOCYTES NFR BLD AUTO: 7.5 % — SIGNIFICANT CHANGE UP (ref 2–14)
NEUTROPHILS # BLD AUTO: 9.2 K/UL — HIGH (ref 1.8–7.4)
NEUTROPHILS NFR BLD AUTO: 71.5 % — SIGNIFICANT CHANGE UP (ref 43–77)
PLATELET # BLD AUTO: 361 K/UL — SIGNIFICANT CHANGE UP (ref 150–400)
POTASSIUM SERPL-MCNC: 4.7 MMOL/L — SIGNIFICANT CHANGE UP (ref 3.5–5.3)
POTASSIUM SERPL-SCNC: 4.7 MMOL/L — SIGNIFICANT CHANGE UP (ref 3.5–5.3)
PROT SERPL-MCNC: 6.8 G/DL — SIGNIFICANT CHANGE UP (ref 6–8.3)
PROTHROM AB SERPL-ACNC: 66.6 SEC — HIGH (ref 9.8–12.7)
RBC # BLD: 5.49 M/UL — HIGH (ref 3.8–5.2)
RBC # FLD: 14.3 % — SIGNIFICANT CHANGE UP (ref 10.3–14.5)
SODIUM SERPL-SCNC: 139 MMOL/L — SIGNIFICANT CHANGE UP (ref 135–145)
WBC # BLD: 12.9 K/UL — HIGH (ref 3.8–10.5)
WBC # FLD AUTO: 12.9 K/UL — HIGH (ref 3.8–10.5)

## 2018-02-14 PROCEDURE — 80053 COMPREHEN METABOLIC PANEL: CPT

## 2018-02-14 PROCEDURE — 99284 EMERGENCY DEPT VISIT MOD MDM: CPT | Mod: GC

## 2018-02-14 PROCEDURE — 85610 PROTHROMBIN TIME: CPT

## 2018-02-14 PROCEDURE — 99283 EMERGENCY DEPT VISIT LOW MDM: CPT

## 2018-02-14 PROCEDURE — 85027 COMPLETE CBC AUTOMATED: CPT

## 2018-02-14 PROCEDURE — 85730 THROMBOPLASTIN TIME PARTIAL: CPT

## 2018-02-14 RX ORDER — OXYCODONE HYDROCHLORIDE 5 MG/1
5 TABLET ORAL ONCE
Qty: 0 | Refills: 0 | Status: DISCONTINUED | OUTPATIENT
Start: 2018-02-14 | End: 2018-02-14

## 2018-02-14 RX ADMIN — OXYCODONE HYDROCHLORIDE 5 MILLIGRAM(S): 5 TABLET ORAL at 06:36

## 2018-02-14 NOTE — ED PROVIDER NOTE - MEDICAL DECISION MAKING DETAILS
84 y.o. female pw supra therapeutic INR 7.5 from blood draw yesterday. No obvious source of bleeding. Sp steroid injection last week but no evidence of epidural hematoma based on history or physical, ambulatory in ED. Will check cbc and compare hb to ED visit 3 days ago, INR check and possible treat with vitamin k. Likely dc home. 84 y.o. female pw supra therapeutic INR 7.5 from blood draw yesterday. No obvious source of bleeding. Sp steroid injection last week but no evidence of epidural hematoma based on history or physical, ambulatory in ED. Will check cbc and compare hb to ED visit 3 days ago, INR check and possible treat with vitamin k. Likely dc home.    Candace ARMSTRONG: 85 y/o female with hx of HTN, A fib on Coumadin and recent diagnosis of T12 vertebral compression fracture sent here for elevated INR. patient was called by her PMD given her INR of 7.5 and was asked to come to the ER. Patient takes 5 mg of Coumadin daily. Denies hematemesis, melena, BRBPR hematuria, lightheaededness, abd pain/distention, HA or truama or syncope. Exam shows a female in NAD with clear lungs and cardiac with irregularly irregular rhythm. Abd soft and nontender. No focal weakness. Normal skin. No midline cervical or lumbar spine tenderness. Consider abnormally elevated INR from medication interaction or noncompliance. No signs or symptoms of acute bleeding. Plan CMP and Coags and reassess.

## 2018-02-14 NOTE — ED PROVIDER NOTE - PROGRESS NOTE DETAILS
Candace ARMSTRONG: Given INR is less than 9 and patient is not bleeding, she will be asked to withhold her next dose and repeat INR with her PMD.

## 2018-02-14 NOTE — ED ADULT TRIAGE NOTE - CHIEF COMPLAINT QUOTE
pt states "my DR just called and told me my INR was 7.2 from blood drawn yesterday. On coumadin for afib/PPM. also on Percocet from recent back sx and he thought it may be an interaction" pt denies any complaints, no bleeding.

## 2018-02-14 NOTE — ED PROVIDER NOTE - NS ED ROS FT
ROS: denies HA, weakness, dizziness, fevers/chills, nausea/vomiting, chest pain, SOB, diaphoresis, abdominal pain, neck pain, dysuria/hematuria, or rash

## 2018-02-14 NOTE — ED PROVIDER NOTE - PHYSICAL EXAMINATION
Gen: Well appearing, NAD  Head: NCAT  HEENT: MMM, Normal conjunctiva  Lung: CTAB, no rales, rhonchi or wheezing  CV: RRR, no murmurs, rubs or gallops  Abd: soft, NTND, no rebound or guarding  MSK: No CVA tenderness. No edema, no visible deformities  Neuro: No focal neurologic deficits.   Skin: Warm and dry, no evidence of rash  Psych: normal mood and affect, A&Ox3

## 2018-02-14 NOTE — ED PROVIDER NOTE - OBJECTIVE STATEMENT
84 y.o. female hx of a-fib on coumadin, recent diagnosis of T12 spinal fracture sp steroid injection last week sent in by PCP for supra therapeutic INR 7.5 from blood drawn yesterday. Pt denies any symptoms including any bleeding. Pt states back pain at baseline, relived by percocet, last dose 8pm. Denies any weakness, ambulatory, no numbness or tingling to legs. No falls or trauma.

## 2018-02-14 NOTE — ED PROVIDER NOTE - ATTENDING CONTRIBUTION TO CARE
Attending MD Maria:  I personally have seen and examined this patient.  Resident note reviewed and agree on plan of care and except where noted.  See MDM for details.

## 2018-02-14 NOTE — ED ADULT NURSE NOTE - OBJECTIVE STATEMENT
84 year old female alert and oriented x 4 came to the ED c/o elevated INR.  Patient is on Coumadin for afib and said she has been taking percocet for a t-12 (non-traumatic) fracture 3 times per day, last dose 1900 on 2/13/18.  Patient said she has not had a BM for the past few days.  Patient said she was called this morning that her INR was 7.2.  Patient denies; chest pain, shortness of breath, nausea, vomiting, blood in stool or urine, epistaxis.  Patient able to ambulate without difficulty and able to move all 4 extremities.  Patient's abdomen is soft and non-tender.  IV established in the left AC #22.  Safety ensured.

## 2018-03-05 ENCOUNTER — APPOINTMENT (OUTPATIENT)
Dept: ORTHOPEDIC SURGERY | Facility: CLINIC | Age: 83
End: 2018-03-05

## 2018-03-17 NOTE — ED PROVIDER NOTE - TOBACCO USE
HISTORY:

chest pain, palpitations  



COMPARISON:

Comparison is made with 05/22/2017



TECHNIQUE:

Chest PA and lateral



FINDINGS:



LUNGS:

No active pulmonary disease.



PLEURA:

No significant pleural effusion identified. No pneumothorax apparent.



CARDIOVASCULAR:

Normal.



OSSEOUS STRUCTURES:

No significant abnormalities.



VISUALIZED UPPER ABDOMEN:

Normal.



OTHER FINDINGS:

None.



IMPRESSION:

No active disease. Never smoker

## 2018-04-13 ENCOUNTER — APPOINTMENT (OUTPATIENT)
Dept: ORTHOPEDIC SURGERY | Facility: CLINIC | Age: 83
End: 2018-04-13
Payer: MEDICARE

## 2018-04-13 VITALS — HEIGHT: 64 IN | BODY MASS INDEX: 30.73 KG/M2 | WEIGHT: 180 LBS

## 2018-04-13 DIAGNOSIS — M47.816 SPONDYLOSIS W/OUT MYELOPATHY OR RADICULOPATHY, LUMBAR REGION: ICD-10-CM

## 2018-04-13 PROCEDURE — 99214 OFFICE O/P EST MOD 30 MIN: CPT

## 2018-04-13 PROCEDURE — 72100 X-RAY EXAM L-S SPINE 2/3 VWS: CPT

## 2018-04-13 PROCEDURE — 72070 X-RAY EXAM THORAC SPINE 2VWS: CPT

## 2018-04-20 ENCOUNTER — OUTPATIENT (OUTPATIENT)
Dept: OUTPATIENT SERVICES | Facility: HOSPITAL | Age: 83
LOS: 1 days | End: 2018-04-20
Payer: MEDICARE

## 2018-04-20 ENCOUNTER — APPOINTMENT (OUTPATIENT)
Dept: RADIOLOGY | Facility: IMAGING CENTER | Age: 83
End: 2018-04-20
Payer: MEDICARE

## 2018-04-20 DIAGNOSIS — H26.9 UNSPECIFIED CATARACT: Chronic | ICD-10-CM

## 2018-04-20 DIAGNOSIS — Z95.0 PRESENCE OF CARDIAC PACEMAKER: Chronic | ICD-10-CM

## 2018-04-20 DIAGNOSIS — Z00.8 ENCOUNTER FOR OTHER GENERAL EXAMINATION: ICD-10-CM

## 2018-04-20 DIAGNOSIS — T14.8 OTHER INJURY OF UNSPECIFIED BODY REGION: Chronic | ICD-10-CM

## 2018-04-20 DIAGNOSIS — Z90.710 ACQUIRED ABSENCE OF BOTH CERVIX AND UTERUS: Chronic | ICD-10-CM

## 2018-04-20 DIAGNOSIS — Z84.89 FAMILY HISTORY OF OTHER SPECIFIED CONDITIONS: Chronic | ICD-10-CM

## 2018-04-20 DIAGNOSIS — Z82.8 FAMILY HISTORY OF OTHER DISABILITIES AND CHRONIC DISEASES LEADING TO DISABLEMENT, NOT ELSEWHERE CLASSIFIED: Chronic | ICD-10-CM

## 2018-04-20 PROCEDURE — 77080 DXA BONE DENSITY AXIAL: CPT | Mod: 26

## 2018-04-20 PROCEDURE — 77080 DXA BONE DENSITY AXIAL: CPT

## 2018-06-22 ENCOUNTER — APPOINTMENT (OUTPATIENT)
Dept: CT IMAGING | Facility: IMAGING CENTER | Age: 83
End: 2018-06-22
Payer: MEDICARE

## 2018-06-22 ENCOUNTER — OUTPATIENT (OUTPATIENT)
Dept: OUTPATIENT SERVICES | Facility: HOSPITAL | Age: 83
LOS: 1 days | End: 2018-06-22
Payer: MEDICARE

## 2018-06-22 DIAGNOSIS — Z95.0 PRESENCE OF CARDIAC PACEMAKER: Chronic | ICD-10-CM

## 2018-06-22 DIAGNOSIS — Z90.710 ACQUIRED ABSENCE OF BOTH CERVIX AND UTERUS: Chronic | ICD-10-CM

## 2018-06-22 DIAGNOSIS — Z82.8 FAMILY HISTORY OF OTHER DISABILITIES AND CHRONIC DISEASES LEADING TO DISABLEMENT, NOT ELSEWHERE CLASSIFIED: Chronic | ICD-10-CM

## 2018-06-22 DIAGNOSIS — Z84.89 FAMILY HISTORY OF OTHER SPECIFIED CONDITIONS: Chronic | ICD-10-CM

## 2018-06-22 DIAGNOSIS — Z00.8 ENCOUNTER FOR OTHER GENERAL EXAMINATION: ICD-10-CM

## 2018-06-22 DIAGNOSIS — H26.9 UNSPECIFIED CATARACT: Chronic | ICD-10-CM

## 2018-06-22 DIAGNOSIS — T14.8 OTHER INJURY OF UNSPECIFIED BODY REGION: Chronic | ICD-10-CM

## 2018-06-22 PROCEDURE — 72192 CT PELVIS W/O DYE: CPT | Mod: 26

## 2018-06-22 PROCEDURE — 72131 CT LUMBAR SPINE W/O DYE: CPT | Mod: 26

## 2018-06-22 PROCEDURE — 72131 CT LUMBAR SPINE W/O DYE: CPT

## 2018-06-22 PROCEDURE — 72192 CT PELVIS W/O DYE: CPT

## 2018-06-29 ENCOUNTER — APPOINTMENT (OUTPATIENT)
Dept: VASCULAR SURGERY | Facility: CLINIC | Age: 83
End: 2018-06-29
Payer: MEDICARE

## 2018-06-29 VITALS
HEART RATE: 83 BPM | SYSTOLIC BLOOD PRESSURE: 138 MMHG | DIASTOLIC BLOOD PRESSURE: 97 MMHG | BODY MASS INDEX: 30.73 KG/M2 | HEIGHT: 64 IN | TEMPERATURE: 97.6 F | WEIGHT: 180 LBS

## 2018-06-29 PROCEDURE — 99213 OFFICE O/P EST LOW 20 MIN: CPT

## 2018-07-13 ENCOUNTER — APPOINTMENT (OUTPATIENT)
Dept: ORTHOPEDIC SURGERY | Facility: CLINIC | Age: 83
End: 2018-07-13

## 2018-07-20 ENCOUNTER — EMERGENCY (EMERGENCY)
Facility: HOSPITAL | Age: 83
LOS: 1 days | Discharge: HOME | End: 2018-07-20
Attending: EMERGENCY MEDICINE | Admitting: EMERGENCY MEDICINE
Payer: MEDICARE

## 2018-07-20 VITALS
SYSTOLIC BLOOD PRESSURE: 153 MMHG | DIASTOLIC BLOOD PRESSURE: 87 MMHG | HEART RATE: 79 BPM | RESPIRATION RATE: 14 BRPM | OXYGEN SATURATION: 98 % | TEMPERATURE: 98 F

## 2018-07-20 DIAGNOSIS — H26.9 UNSPECIFIED CATARACT: Chronic | ICD-10-CM

## 2018-07-20 DIAGNOSIS — Z82.8 FAMILY HISTORY OF OTHER DISABILITIES AND CHRONIC DISEASES LEADING TO DISABLEMENT, NOT ELSEWHERE CLASSIFIED: Chronic | ICD-10-CM

## 2018-07-20 DIAGNOSIS — Z84.89 FAMILY HISTORY OF OTHER SPECIFIED CONDITIONS: Chronic | ICD-10-CM

## 2018-07-20 DIAGNOSIS — Z90.710 ACQUIRED ABSENCE OF BOTH CERVIX AND UTERUS: Chronic | ICD-10-CM

## 2018-07-20 DIAGNOSIS — T14.8 OTHER INJURY OF UNSPECIFIED BODY REGION: Chronic | ICD-10-CM

## 2018-07-20 DIAGNOSIS — Z95.0 PRESENCE OF CARDIAC PACEMAKER: Chronic | ICD-10-CM

## 2018-07-20 LAB
ALBUMIN SERPL ELPH-MCNC: 3.8 G/DL — SIGNIFICANT CHANGE UP (ref 3.3–5)
ALP SERPL-CCNC: 88 U/L — SIGNIFICANT CHANGE UP (ref 40–120)
ALT FLD-CCNC: 16 U/L — SIGNIFICANT CHANGE UP (ref 4–33)
APTT BLD: 58.1 SEC — HIGH (ref 27.5–37.4)
AST SERPL-CCNC: 24 U/L — SIGNIFICANT CHANGE UP (ref 4–32)
BASOPHILS # BLD AUTO: 0.05 K/UL — SIGNIFICANT CHANGE UP (ref 0–0.2)
BASOPHILS NFR BLD AUTO: 0.5 % — SIGNIFICANT CHANGE UP (ref 0–2)
BILIRUB SERPL-MCNC: 1.5 MG/DL — HIGH (ref 0.2–1.2)
BUN SERPL-MCNC: 16 MG/DL — SIGNIFICANT CHANGE UP (ref 7–23)
CALCIUM SERPL-MCNC: 8.5 MG/DL — SIGNIFICANT CHANGE UP (ref 8.4–10.5)
CHLORIDE SERPL-SCNC: 91 MMOL/L — LOW (ref 98–107)
CO2 SERPL-SCNC: 24 MMOL/L — SIGNIFICANT CHANGE UP (ref 22–31)
CREAT SERPL-MCNC: 0.68 MG/DL — SIGNIFICANT CHANGE UP (ref 0.5–1.3)
EOSINOPHIL # BLD AUTO: 0.17 K/UL — SIGNIFICANT CHANGE UP (ref 0–0.5)
EOSINOPHIL NFR BLD AUTO: 1.5 % — SIGNIFICANT CHANGE UP (ref 0–6)
GLUCOSE SERPL-MCNC: 100 MG/DL — HIGH (ref 70–99)
HCT VFR BLD CALC: 47.9 % — HIGH (ref 34.5–45)
HGB BLD-MCNC: 15.7 G/DL — HIGH (ref 11.5–15.5)
IMM GRANULOCYTES # BLD AUTO: 0.08 # — SIGNIFICANT CHANGE UP
IMM GRANULOCYTES NFR BLD AUTO: 0.7 % — SIGNIFICANT CHANGE UP (ref 0–1.5)
INR BLD: 2.86 — HIGH (ref 0.88–1.17)
LYMPHOCYTES # BLD AUTO: 1.23 K/UL — SIGNIFICANT CHANGE UP (ref 1–3.3)
LYMPHOCYTES # BLD AUTO: 11.2 % — LOW (ref 13–44)
MCHC RBC-ENTMCNC: 27.6 PG — SIGNIFICANT CHANGE UP (ref 27–34)
MCHC RBC-ENTMCNC: 32.8 % — SIGNIFICANT CHANGE UP (ref 32–36)
MCV RBC AUTO: 84.2 FL — SIGNIFICANT CHANGE UP (ref 80–100)
MONOCYTES # BLD AUTO: 0.78 K/UL — SIGNIFICANT CHANGE UP (ref 0–0.9)
MONOCYTES NFR BLD AUTO: 7.1 % — SIGNIFICANT CHANGE UP (ref 2–14)
NEUTROPHILS # BLD AUTO: 8.68 K/UL — HIGH (ref 1.8–7.4)
NEUTROPHILS NFR BLD AUTO: 79 % — HIGH (ref 43–77)
NRBC # FLD: 0 — SIGNIFICANT CHANGE UP
PLATELET # BLD AUTO: 325 K/UL — SIGNIFICANT CHANGE UP (ref 150–400)
PMV BLD: 11.6 FL — SIGNIFICANT CHANGE UP (ref 7–13)
POTASSIUM SERPL-MCNC: 3.7 MMOL/L — SIGNIFICANT CHANGE UP (ref 3.5–5.3)
POTASSIUM SERPL-SCNC: 3.7 MMOL/L — SIGNIFICANT CHANGE UP (ref 3.5–5.3)
PROT SERPL-MCNC: 7.1 G/DL — SIGNIFICANT CHANGE UP (ref 6–8.3)
PROTHROM AB SERPL-ACNC: 32.4 SEC — HIGH (ref 9.8–13.1)
RBC # BLD: 5.69 M/UL — HIGH (ref 3.8–5.2)
RBC # FLD: 15.2 % — HIGH (ref 10.3–14.5)
SODIUM SERPL-SCNC: 132 MMOL/L — LOW (ref 135–145)
WBC # BLD: 10.99 K/UL — HIGH (ref 3.8–10.5)
WBC # FLD AUTO: 10.99 K/UL — HIGH (ref 3.8–10.5)

## 2018-07-20 PROCEDURE — 99283 EMERGENCY DEPT VISIT LOW MDM: CPT | Mod: GC

## 2018-07-20 RX ORDER — FUROSEMIDE 40 MG
1 TABLET ORAL
Qty: 7 | Refills: 0
Start: 2018-07-20 | End: 2018-07-26

## 2018-07-20 NOTE — ED PROVIDER NOTE - CARE PLAN
Principal Discharge DX:	Lower extremity edema Principal Discharge DX:	Lower extremity edema  Assessment and plan of treatment:	You were seen in the emergency department for leg swelling. Please follow up with your cardiologist next week, towards the end of the week. Start taking lasix 40 milligrams once a day, instead of 20 milligrams once a day. Return to the emergency department immediately if you experience shortness of breath, fever, swelling of one leg more than the other, or any other concerning symptoms.

## 2018-07-20 NOTE — ED PROVIDER NOTE - OBJECTIVE STATEMENT
84f w hx afib on coumadin, ppm, HTN, HLD, YOVANI, here c/o 1 mo intermittent LE edema. Pt states that has worsened in last week, saw new cardiologist ~1wk ago who started on lasix 20 daily. Has noted mild but not significant improvement since. Denies LE pain, no fever, no recent travel. No SOB or cp. Also c/o gen weakness. States did not have labs prior to being started on lasix, and is unsure whether lasix has been causing more urination.

## 2018-07-20 NOTE — ED ADULT NURSE NOTE - OBJECTIVE STATEMENT
Received pt in bed A and O x 3  in NAD, family at bedside. as per patient " I am having swelling in my legs". denies SOB with swelling, reports chronic back pain with position change. Lung sounds clear, VS confirmed, evaluated by MD and resident at bedside, orders noted and completed.      IV 18G ro right arm. Received pt in bed A and O x 3  in NAD, family at bedside. as per patient " I am having swelling in my legs". denies SOB with swelling, reports chronic back pain with position change. Lung sounds clear, VS confirmed, evaluated by MD and resident at bedside, orders noted and completed.      Pt reports taking Percocet 5-325 1 hour PTA for back pain.     IV 18G ro right arm.

## 2018-07-20 NOTE — ED PROVIDER NOTE - PLAN OF CARE
You were seen in the emergency department for leg swelling. Please follow up with your cardiologist next week, towards the end of the week. Start taking lasix 40 milligrams once a day, instead of 20 milligrams once a day. Return to the emergency department immediately if you experience shortness of breath, fever, swelling of one leg more than the other, or any other concerning symptoms.

## 2018-07-20 NOTE — ED PROVIDER NOTE - PROGRESS NOTE DETAILS
Elizabeth Goldberger PGY-2: labs unrem, pt feeling well. Called cardiologist Gaby who started pt on lasix recently - stated pt has normal ef, believes swelling may be 2/2 venous insufficiency. Agreed w plan to increase dose of lasix to 40 daily, and will follow up w pt next wk. Pt and family comfortable w plan, stable for dc Elizabeth Goldberger PGY-2: labs unrem, pt feeling well. Called cardiologist Gaby who started pt on lasix recently (initiation had been delayed as pt had reported sulfa allergy, so had initially wanted to diurese w ethacrynic acid, however given expense agreed to try lasix, which pt has been tolerating well). Stated pt has normal EF on recent echo, believes swelling may be 2/2 venous insufficiency. Agreed w plan to increase dose of lasix to 40 daily, and will follow up w pt next wk. Pt and family comfortable w plan, stable for dc

## 2018-07-20 NOTE — ED ADULT TRIAGE NOTE - CHIEF COMPLAINT QUOTE
pt c/o b/l leg swelling x 2 weeks worsening today. pt on 20 lasix hx htn, afib on coumadin. denies chest pain

## 2018-07-31 ENCOUNTER — APPOINTMENT (OUTPATIENT)
Dept: CARDIOLOGY | Facility: CLINIC | Age: 83
End: 2018-07-31

## 2018-07-31 ENCOUNTER — APPOINTMENT (OUTPATIENT)
Dept: VASCULAR SURGERY | Facility: CLINIC | Age: 83
End: 2018-07-31
Payer: MEDICARE

## 2018-07-31 VITALS
WEIGHT: 180 LBS | HEART RATE: 81 BPM | HEIGHT: 64 IN | DIASTOLIC BLOOD PRESSURE: 82 MMHG | SYSTOLIC BLOOD PRESSURE: 147 MMHG | BODY MASS INDEX: 30.73 KG/M2 | TEMPERATURE: 97.6 F

## 2018-07-31 PROCEDURE — 99213 OFFICE O/P EST LOW 20 MIN: CPT

## 2018-07-31 RX ORDER — CYCLOBENZAPRINE HYDROCHLORIDE 10 MG/1
10 TABLET, FILM COATED ORAL
Qty: 10 | Refills: 0 | Status: COMPLETED | COMMUNITY
Start: 2018-02-08 | End: 2018-07-31

## 2018-08-07 ENCOUNTER — APPOINTMENT (OUTPATIENT)
Dept: VASCULAR SURGERY | Facility: CLINIC | Age: 83
End: 2018-08-07
Payer: MEDICARE

## 2018-08-07 VITALS
HEART RATE: 88 BPM | WEIGHT: 171 LBS | TEMPERATURE: 97.5 F | BODY MASS INDEX: 29.19 KG/M2 | SYSTOLIC BLOOD PRESSURE: 157 MMHG | DIASTOLIC BLOOD PRESSURE: 75 MMHG | HEIGHT: 64 IN

## 2018-08-07 PROCEDURE — 99213 OFFICE O/P EST LOW 20 MIN: CPT

## 2018-08-07 PROCEDURE — 93979 VASCULAR STUDY: CPT

## 2018-08-27 ENCOUNTER — APPOINTMENT (OUTPATIENT)
Dept: VASCULAR SURGERY | Facility: CLINIC | Age: 83
End: 2018-08-27
Payer: MEDICARE

## 2018-08-27 VITALS
SYSTOLIC BLOOD PRESSURE: 118 MMHG | DIASTOLIC BLOOD PRESSURE: 75 MMHG | BODY MASS INDEX: 29.19 KG/M2 | HEIGHT: 64 IN | WEIGHT: 171 LBS | HEART RATE: 86 BPM

## 2018-08-27 DIAGNOSIS — L85.3 XEROSIS CUTIS: ICD-10-CM

## 2018-08-27 PROCEDURE — 99213 OFFICE O/P EST LOW 20 MIN: CPT

## 2019-02-04 ENCOUNTER — OUTPATIENT (OUTPATIENT)
Dept: OUTPATIENT SERVICES | Facility: HOSPITAL | Age: 84
LOS: 1 days | End: 2019-02-04
Payer: MEDICARE

## 2019-02-04 ENCOUNTER — APPOINTMENT (OUTPATIENT)
Dept: ULTRASOUND IMAGING | Facility: IMAGING CENTER | Age: 84
End: 2019-02-04
Payer: MEDICARE

## 2019-02-04 ENCOUNTER — APPOINTMENT (OUTPATIENT)
Dept: MAMMOGRAPHY | Facility: IMAGING CENTER | Age: 84
End: 2019-02-04
Payer: MEDICARE

## 2019-02-04 DIAGNOSIS — T14.8 OTHER INJURY OF UNSPECIFIED BODY REGION: Chronic | ICD-10-CM

## 2019-02-04 DIAGNOSIS — H26.9 UNSPECIFIED CATARACT: Chronic | ICD-10-CM

## 2019-02-04 DIAGNOSIS — Z84.89 FAMILY HISTORY OF OTHER SPECIFIED CONDITIONS: Chronic | ICD-10-CM

## 2019-02-04 DIAGNOSIS — Z95.0 PRESENCE OF CARDIAC PACEMAKER: Chronic | ICD-10-CM

## 2019-02-04 DIAGNOSIS — Z00.8 ENCOUNTER FOR OTHER GENERAL EXAMINATION: ICD-10-CM

## 2019-02-04 DIAGNOSIS — Z82.8 FAMILY HISTORY OF OTHER DISABILITIES AND CHRONIC DISEASES LEADING TO DISABLEMENT, NOT ELSEWHERE CLASSIFIED: Chronic | ICD-10-CM

## 2019-02-04 DIAGNOSIS — Z90.710 ACQUIRED ABSENCE OF BOTH CERVIX AND UTERUS: Chronic | ICD-10-CM

## 2019-02-04 PROCEDURE — 77067 SCR MAMMO BI INCL CAD: CPT | Mod: 26

## 2019-02-04 PROCEDURE — 77063 BREAST TOMOSYNTHESIS BI: CPT | Mod: 26

## 2019-02-04 PROCEDURE — 77063 BREAST TOMOSYNTHESIS BI: CPT

## 2019-02-04 PROCEDURE — 77067 SCR MAMMO BI INCL CAD: CPT

## 2019-02-04 PROCEDURE — 76641 ULTRASOUND BREAST COMPLETE: CPT | Mod: 26,50

## 2019-02-04 PROCEDURE — 76641 ULTRASOUND BREAST COMPLETE: CPT

## 2019-02-17 ENCOUNTER — INPATIENT (INPATIENT)
Facility: HOSPITAL | Age: 84
LOS: 1 days | Discharge: HOME | End: 2019-02-19
Attending: HOSPITALIST | Admitting: HOSPITALIST
Payer: MEDICARE

## 2019-02-17 VITALS
RESPIRATION RATE: 16 BRPM | TEMPERATURE: 98 F | OXYGEN SATURATION: 100 % | DIASTOLIC BLOOD PRESSURE: 98 MMHG | HEART RATE: 72 BPM | SYSTOLIC BLOOD PRESSURE: 184 MMHG

## 2019-02-17 DIAGNOSIS — Z82.8 FAMILY HISTORY OF OTHER DISABILITIES AND CHRONIC DISEASES LEADING TO DISABLEMENT, NOT ELSEWHERE CLASSIFIED: Chronic | ICD-10-CM

## 2019-02-17 DIAGNOSIS — T14.8 OTHER INJURY OF UNSPECIFIED BODY REGION: Chronic | ICD-10-CM

## 2019-02-17 DIAGNOSIS — H26.9 UNSPECIFIED CATARACT: Chronic | ICD-10-CM

## 2019-02-17 DIAGNOSIS — Z90.710 ACQUIRED ABSENCE OF BOTH CERVIX AND UTERUS: Chronic | ICD-10-CM

## 2019-02-17 DIAGNOSIS — Z84.89 FAMILY HISTORY OF OTHER SPECIFIED CONDITIONS: Chronic | ICD-10-CM

## 2019-02-17 DIAGNOSIS — Z95.0 PRESENCE OF CARDIAC PACEMAKER: Chronic | ICD-10-CM

## 2019-02-17 LAB
ALBUMIN SERPL ELPH-MCNC: 4 G/DL — SIGNIFICANT CHANGE UP (ref 3.3–5)
ALP SERPL-CCNC: 59 U/L — SIGNIFICANT CHANGE UP (ref 40–120)
ALT FLD-CCNC: 10 U/L — SIGNIFICANT CHANGE UP (ref 4–33)
ANION GAP SERPL CALC-SCNC: 15 MMO/L — HIGH (ref 7–14)
APTT BLD: 44.6 SEC — HIGH (ref 27.5–36.3)
AST SERPL-CCNC: 22 U/L — SIGNIFICANT CHANGE UP (ref 4–32)
BASOPHILS # BLD AUTO: 0.09 K/UL — SIGNIFICANT CHANGE UP (ref 0–0.2)
BASOPHILS NFR BLD AUTO: 0.9 % — SIGNIFICANT CHANGE UP (ref 0–2)
BILIRUB SERPL-MCNC: 1.7 MG/DL — HIGH (ref 0.2–1.2)
BUN SERPL-MCNC: 30 MG/DL — HIGH (ref 7–23)
CALCIUM SERPL-MCNC: 8.7 MG/DL — SIGNIFICANT CHANGE UP (ref 8.4–10.5)
CHLORIDE SERPL-SCNC: 101 MMOL/L — SIGNIFICANT CHANGE UP (ref 98–107)
CO2 SERPL-SCNC: 24 MMOL/L — SIGNIFICANT CHANGE UP (ref 22–31)
CREAT SERPL-MCNC: 0.98 MG/DL — SIGNIFICANT CHANGE UP (ref 0.5–1.3)
EOSINOPHIL # BLD AUTO: 0.23 K/UL — SIGNIFICANT CHANGE UP (ref 0–0.5)
EOSINOPHIL NFR BLD AUTO: 2.3 % — SIGNIFICANT CHANGE UP (ref 0–6)
GLUCOSE SERPL-MCNC: 95 MG/DL — SIGNIFICANT CHANGE UP (ref 70–99)
HCT VFR BLD CALC: 48.6 % — HIGH (ref 34.5–45)
HGB BLD-MCNC: 15.2 G/DL — SIGNIFICANT CHANGE UP (ref 11.5–15.5)
IMM GRANULOCYTES NFR BLD AUTO: 0.5 % — SIGNIFICANT CHANGE UP (ref 0–1.5)
INR BLD: 2.31 — HIGH (ref 0.88–1.17)
LYMPHOCYTES # BLD AUTO: 1.84 K/UL — SIGNIFICANT CHANGE UP (ref 1–3.3)
LYMPHOCYTES # BLD AUTO: 18.1 % — SIGNIFICANT CHANGE UP (ref 13–44)
MCHC RBC-ENTMCNC: 27.7 PG — SIGNIFICANT CHANGE UP (ref 27–34)
MCHC RBC-ENTMCNC: 31.3 % — LOW (ref 32–36)
MCV RBC AUTO: 88.5 FL — SIGNIFICANT CHANGE UP (ref 80–100)
MONOCYTES # BLD AUTO: 0.75 K/UL — SIGNIFICANT CHANGE UP (ref 0–0.9)
MONOCYTES NFR BLD AUTO: 7.4 % — SIGNIFICANT CHANGE UP (ref 2–14)
NEUTROPHILS # BLD AUTO: 7.22 K/UL — SIGNIFICANT CHANGE UP (ref 1.8–7.4)
NEUTROPHILS NFR BLD AUTO: 70.8 % — SIGNIFICANT CHANGE UP (ref 43–77)
NRBC # FLD: 0 K/UL — LOW (ref 25–125)
PLATELET # BLD AUTO: 384 K/UL — SIGNIFICANT CHANGE UP (ref 150–400)
PMV BLD: 11.8 FL — SIGNIFICANT CHANGE UP (ref 7–13)
POTASSIUM SERPL-MCNC: 4 MMOL/L — SIGNIFICANT CHANGE UP (ref 3.5–5.3)
POTASSIUM SERPL-SCNC: 4 MMOL/L — SIGNIFICANT CHANGE UP (ref 3.5–5.3)
PROT SERPL-MCNC: 6.6 G/DL — SIGNIFICANT CHANGE UP (ref 6–8.3)
PROTHROM AB SERPL-ACNC: 26.3 SEC — HIGH (ref 9.8–13.1)
RBC # BLD: 5.49 M/UL — HIGH (ref 3.8–5.2)
RBC # FLD: 15.1 % — HIGH (ref 10.3–14.5)
SODIUM SERPL-SCNC: 140 MMOL/L — SIGNIFICANT CHANGE UP (ref 135–145)
TROPONIN T, HIGH SENSITIVITY: 6 NG/L — SIGNIFICANT CHANGE UP (ref ?–14)
TROPONIN T, HIGH SENSITIVITY: 9 NG/L — SIGNIFICANT CHANGE UP (ref ?–14)
WBC # BLD: 10.18 K/UL — SIGNIFICANT CHANGE UP (ref 3.8–10.5)
WBC # FLD AUTO: 10.18 K/UL — SIGNIFICANT CHANGE UP (ref 3.8–10.5)

## 2019-02-17 PROCEDURE — 70496 CT ANGIOGRAPHY HEAD: CPT | Mod: 26

## 2019-02-17 PROCEDURE — 70498 CT ANGIOGRAPHY NECK: CPT | Mod: 26

## 2019-02-17 PROCEDURE — 70450 CT HEAD/BRAIN W/O DYE: CPT | Mod: 26,59

## 2019-02-17 RX ORDER — FUROSEMIDE 40 MG
40 TABLET ORAL DAILY
Qty: 0 | Refills: 0 | Status: DISCONTINUED | OUTPATIENT
Start: 2019-02-17 | End: 2019-02-19

## 2019-02-17 RX ORDER — LOSARTAN POTASSIUM 100 MG/1
100 TABLET, FILM COATED ORAL DAILY
Qty: 0 | Refills: 0 | Status: DISCONTINUED | OUTPATIENT
Start: 2019-02-17 | End: 2019-02-19

## 2019-02-17 RX ORDER — ATORVASTATIN CALCIUM 80 MG/1
10 TABLET, FILM COATED ORAL AT BEDTIME
Qty: 0 | Refills: 0 | Status: DISCONTINUED | OUTPATIENT
Start: 2019-02-17 | End: 2019-02-19

## 2019-02-17 RX ORDER — ATENOLOL 25 MG/1
100 TABLET ORAL DAILY
Qty: 0 | Refills: 0 | Status: DISCONTINUED | OUTPATIENT
Start: 2019-02-17 | End: 2019-02-19

## 2019-02-17 RX ORDER — ACETAMINOPHEN 500 MG
650 TABLET ORAL ONCE
Qty: 0 | Refills: 0 | Status: COMPLETED | OUTPATIENT
Start: 2019-02-17 | End: 2019-02-17

## 2019-02-17 RX ADMIN — Medication 650 MILLIGRAM(S): at 23:13

## 2019-02-17 RX ADMIN — LOSARTAN POTASSIUM 100 MILLIGRAM(S): 100 TABLET, FILM COATED ORAL at 23:12

## 2019-02-17 RX ADMIN — ATORVASTATIN CALCIUM 10 MILLIGRAM(S): 80 TABLET, FILM COATED ORAL at 23:13

## 2019-02-17 NOTE — CONSULT NOTE ADULT - SUBJECTIVE AND OBJECTIVE BOX
DAGMAR [id number] is a 85y old  Female who presents with a chief complaint of     HPI: 84 y/o female with past medical history of YOVANI on CPAP (inconsistent use), HTN, HLD, Cardiac Pacemaker, Atrial fibrillation on Coumadin, prior T12 fracture (2017), EBV infection (2018) presents to the ED with complaints of numbness/tingling. Patient states she woke up on 2/17 AM in her usual state of health when around 11am, she started to experience left arm numbness/tingling which then travelled to her right arm, then both of her feet, and then her lips and nose. Patient states this feeling of numbness/tingling persisted until arrival to the ED and was unaccompanied by any weakness, blurry vision, coordination issue, or slurred speech. Patient rated the initial symptoms as 9/10 which have now improved to 6/10 and only involve the left hand, right foot, lips and nose.       MEDICATIONS  (STANDING):    MEDICATIONS  (PRN):    PAST MEDICAL & SURGICAL HISTORY:  OA (osteoarthritis)  YOVANI on CPAP  Thyroid nodule: followed by endocrinologist  UTI (urinary tract infection): frequent last was 1/15  HLD (hyperlipidemia)  HTN (hypertension)  Atrial fibrillation: dx 7/09 on coumadin  Cardiac pacemaker: placed 2009  Fracture: ORIF right ankle 1986  FHx: cholecystectomy: 1993 laparoscopic  S/P JAY-BSO: 40 years ago  Cataract: b/l lense implant  FHx: total knee replacement: left 2012    FAMILY HISTORY:  No pertinent family history in first degree relatives    Allergies    sulfa drugs (Unknown)    Intolerances    OxyContin (Sedation/Somnol; Drowsiness)      SHx - No smoking, No ETOH, No drug abuse      Review of Systems:  CONSTITUTIONAL:   HEENT:  No visual loss, blurred vision, double vision.  No hearing loss, sneezing, congestion, runny nose or sore throat.  SKIN:  No rash or itching.  CARDIOVASCULAR:  No chest pain, chest pressure or chest discomfort. No palpitations or edema.  RESPIRATORY:  No shortness of breath, cough or sputum.  GASTROINTESTINAL:  No anorexia, nausea, vomiting or diarrhea. No abdominal pain.  GENITOURINARY:  NO dysuria. No increased frequency. No retention. No incontinence.  NEUROLOGICAL:  See HPI  MUSCULOSKELETAL:  No muscle, back pain, joint pain or stiffness.  HEMATOLOGIC:  No anemia, bleeding or bruising.  PSYCHIATRIC:    ENDOCRINOLOGIC:  No reports of sweating, cold or heat intolerance. No polyuria or polydipsia.        Vital Signs Last 24 Hrs  T(C): 36.8 (17 Feb 2019 18:42), Max: 36.8 (17 Feb 2019 17:30)  T(F): 98.2 (17 Feb 2019 18:42), Max: 98.2 (17 Feb 2019 17:30)  HR: 82 (17 Feb 2019 18:42) (72 - 82)  BP: 153/85 (17 Feb 2019 18:42) (153/85 - 184/98)  BP(mean): --  RR: 16 (17 Feb 2019 18:42) (16 - 16)  SpO2: 99% (17 Feb 2019 18:42) (99% - 100%)    General Exam:   General appearance: No acute distress      Neurological Exam:  Mental Status: Orientated to self, date and place.  Attention intact.  No dysarthria. Speech fluent.  Cranial Nerves:   PERRL, EOMI, VFF, no nystagmus.    CN V1-3 intact to light touch .  No facial asymmetry.  Tongue, uvula and palate midline.  Sternocleidomastoid and Trapezius intact bilaterally.    Motor:   Tone: normal.                  Strength:     [] Upper extremity                      Delt       Bicep    Tricep                                                  R         5/5        5/5        5/5       5/5                                               L          5/5        5/5        5/5       5/5  [] Lower extremity                       HF          KE          KF        DF         PF                                               R        5/5        5/5        5/5       5/5       5/5                                               L         5/5        5/5       5/5       5/5        5/5  Pronator drift: +Mild LUE drift              Dysmetria: +Mild dysmetria to L FNF testing   Tremor: No resting, postural or action tremor.  No myoclonus.    Sensation: intact to light touch, pinprick, vibration and proprioception    Deep Tendon Reflexes:     Biceps          Triceps      BR        Patellar        Ankle         Babinski                                         R       2+                   2+           2+            2+               1+           downgoing                                         L        2+                  2+           2+            0               0            Equivocal    Gait: Deferred    Other:    02-17    140  |  101  |  30<H>  ----------------------------<  95  4.0   |  24  |  0.98    Ca    8.7      17 Feb 2019 18:00    TPro  6.6  /  Alb  4.0  /  TBili  1.7<H>  /  DBili  x   /  AST  22  /  ALT  10  /  AlkPhos  59  02-17                            15.2   10.18 )-----------( 384      ( 17 Feb 2019 18:00 )             48.6       Radiology    CT: < from: CT Head No Cont (02.17.19 @ 17:43) >  IMPRESSION:   No acute intracranial hemorrhage, extra-axial fluid collection,   hydrocephalus, mass effect or midline shift.    < end of copied text >    MRI  EKG:  tele:  TTE:  EEG:

## 2019-02-17 NOTE — ED PROVIDER NOTE - ATTENDING CONTRIBUTION TO CARE
Attending MD Jackson.  Agree with above.  PT is an 84 yo female with phx of afib on warfarin, pacer, HTN, HLD, CHF who presents to ED with complaint of L hand and arm ‘numbness’ that began around 10 am today and seemed to progress to L arm, R arm, bilateral feet and nose and chin.  Pt is calm in NAD on arrival.  States that with these sxs she also noted she was having a hard time walking.  When asked if she felt weak she endorses ‘clumsiness’ in LUE/LLE ‘because of the numbness’.  On exam pt is neurologically intact with CNII-XII intact, equal bilateral UE and LE strength with isolated neuro deficits found of LUE pronator drift, mild dysmetria of LUE on finger to nose.  Negative Romberg, mildly tremulous but otherwise steady gait.  Pt’s sxs c/w likely resolving stroke sxs as she agrees her ambulation is more steady in ED and her L hand feels ‘a little less clumsy’.

## 2019-02-17 NOTE — ED ADULT NURSE REASSESSMENT NOTE - NS ED NURSE REASSESS COMMENT FT1
Received report from daytime RN. pt resting comfortably in stretcher, denies pain at this time, appears in nad, vss, neuro at bedside, will ctm

## 2019-02-17 NOTE — ED CDU PROVIDER INITIAL DAY NOTE - OBJECTIVE STATEMENT
86 yo F, nonsmoker with PMH of A-fib on coumadin, s/p pacemaker 2009 , HTN, HLD, presents to ER c/o tingling sensation in upper and lower extremity, nose and lip this morning after eating breakfast, no head injury. Pt states she was sitting down after breakfast, noted tingling sensation in left arm then right arm, then went to both feet, and nose & lip. Reports symptoms improved spontaneously but returned, called her Nursing company and advised to go to the ER. Denies any fever, chills, headache, dizziness, blurry vision, n/v/d/c, neck pain, chest pain, sob, abdominal pain, back pain, dysuria, leg swelling, recent travel, or any other complaints.   -In ED, CT head neg finding, seen by neurology, recommends CT angio of head, and repeat CT head in 24 hours. 84 yo F, nonsmoker with PMH of A-fib on coumadin, s/p pacemaker 2009, CHF, HTN, HLD, presents to ER c/o tingling sensation in upper and lower extremity, nose and lip this morning after eating breakfast, no head injury. Pt states she was sitting down after breakfast, noted tingling sensation in left arm then right arm, then went to both feet, and nose & lip. Reports symptoms improved spontaneously but returned, called her Nursing company and advised to go to the ER. Denies any fever, chills, headache, dizziness, blurry vision, n/v/d/c, neck pain, chest pain, sob, abdominal pain, back pain, dysuria, leg swelling, recent travel, or any other complaints.   -In ED, CT head neg finding, seen by neurology, recommends CT angio of head, and repeat CT head in 24 hours.

## 2019-02-17 NOTE — ED ADULT NURSE NOTE - OBJECTIVE STATEMENT
Pt received in #21, aaox3 with c/o numbness and tingling sensation to the left side of her face, LUE and LLE since ~1000 this morning. Pt without slurred speech, facial droop or arm drift. Pt denies any other complaints including but not limited to cp, sob, nausea, vomiting, dizziness or diaphoresis. Of note, pt is on coumadin for a-fib.

## 2019-02-17 NOTE — ED CDU PROVIDER INITIAL DAY NOTE - ATTENDING CONTRIBUTION TO CARE
Attending MD Jackson.  Agree with above.  PT is an 84 yo female with phx of afib on warfarin, pacer, HTN, HLD, CHF who presents to ED with complaint of L hand and arm ‘numbness’ that began around 10 am today and seemed to progress to L arm, R arm, bilateral feet and nose and chin.  Pt is calm in NAD on arrival.  States that with these sxs she also noted she was having a hard time walking.  When asked if she felt weak she endorses ‘clumsiness’ in LUE/LLE ‘because of the numbness’.  On exam pt is neurologically intact with CNII-XII intact, equal bilateral UE and LE strength with isolated neuro deficits found of LUE pronator drift, mild dysmetria of LUE on finger to nose.  Negative Romberg, mildly tremulous but otherwise steady gait.  Pt’s sxs c/w likely resolving stroke sxs as she agrees her ambulation is more steady in ED and her L hand feels ‘a little less clumsy’.  CT head non-actionable.  Neurology recommending CTA now and CTA in 24 hrs for reassessment given improving stroke-like symptoms and presence of pacer limiting ability to perform MRI.  Stable for transfer of care to CDU observation unit.

## 2019-02-17 NOTE — ED ADULT NURSE NOTE - NSIMPLEMENTINTERV_GEN_ALL_ED
Implemented All Universal Safety Interventions:  [place] to call system. Call bell, personal items and telephone within reach. Instruct patient to call for assistance. Room bathroom lighting operational. Non-slip footwear when patient is off stretcher. Physically safe environment: no spills, clutter or unnecessary equipment. Stretcher in lowest position, wheels locked, appropriate side rails in place.

## 2019-02-17 NOTE — ED ADULT TRIAGE NOTE - CHIEF COMPLAINT QUOTE
pt amb w/ cane to triage c/o L arm numbness on and off since 1pm associated w/ face tingling, and difficulty ambulating, called insurance nurse advised to come to ED for r/o stroke, last known normal as per pt 10am, no neuro deficits noted on presentation, MD Muller called to triage for stroke eval, no code stroke called as per MD

## 2019-02-17 NOTE — CONSULT NOTE ADULT - ATTENDING COMMENTS
Patient examined, chart reviewed and case was presented on rounds. Agree with above Hx and PE. CT shows no acute changes. Unable to have MRI due to PPM.  Sx localize to a lesion at the cranial-cervical junction although there are no objective neurological signs.  Agree with repeat CT head and would add C-spine as well. Will follow clinically. Check serum calcium and magnesium. .

## 2019-02-17 NOTE — ED PROVIDER NOTE - CARDIAC, MLM
Normal rate, regular rhythm.  Heart sounds S1, S2.  No murmurs, rubs or gallops. Normal rate, irregularly irregular rhythm.  Heart sounds S1, S2.  No murmurs, rubs or gallops.

## 2019-02-17 NOTE — ED PROVIDER NOTE - PROGRESS NOTE DETAILS
ge menchaca-called to triage to eval patient for stroke code. Patient states she has had numbness to left arm, left leg, nose and mouth since 10am this morning. no weakness, speech difficulties, ha, cp, sob. Never had this before. Is on coumadin. Exam with ao3 well appearing woman, speech clear, cn2-12 intact, 5/5 strength in all extremities, sensation grossly intact, f-n nl. fsg in progress, no code stroke called given time of onset > 6 hours.

## 2019-02-17 NOTE — ED CDU PROVIDER INITIAL DAY NOTE - PROGRESS NOTE DETAILS
trop 9; repeat trop sent, repeat EKG ordered. Pending CTA head. Will continue to monitor and reassess. repeat trop 6. CTA head performed. Pending read. Will continue to monitor and reassess.

## 2019-02-17 NOTE — CONSULT NOTE ADULT - ASSESSMENT
84 y/o female with past medical history of YOVANI on CPAP (inconsistent use), HTN, HLD, Cardiac pacemaker, Atrial fibrillation on Coumadin, prior T12 fracture (2017), EBV infection (2018) presents to the ED with complaints of numbness/tingling. Patient states she woke up on 2/17 AM in her usual state of health when around 11am, she started to experience left arm numbness/tingling which then travelled to her right arm, then both of her feet, and then her lips and nose. Patient states this feeling of numbness/tingling persisted until arrival to the ED and was unaccompanied by any weakness, blurry vision, coordination issue, or slurred speech. Patient rated the initial symptoms as 9/10 which have now improved to 6/10 and only involve the left hand, right foot, lips and nose. Neurologic exam demonstrates LUE mild drift with mild dysmetria to L FNF. CT head demonstrates no acute pathology. Labs WNL.       Impression: L hemisensory a/w L sided drift possibly 2/2 R brain dysfunction vs L Thalamic dysfunction     Recommendations:  [] Pending discussion with Neurology Attending 86 y/o female with past medical history of YOVANI on CPAP (inconsistent use), HTN, HLD, Cardiac pacemaker, Atrial fibrillation on Coumadin, prior T12 fracture (2017), EBV infection (2018) presents to the ED with complaints of numbness/tingling. Patient states she woke up on 2/17 AM in her usual state of health when around 11am, she started to experience left arm numbness/tingling which then travelled to her right arm, then both of her feet, and then her lips and nose. Patient states this feeling of numbness/tingling persisted until arrival to the ED and was unaccompanied by any weakness, blurry vision, coordination issue, or slurred speech. Patient rated the initial symptoms as 9/10 which have now improved to 6/10 and only involve the left hand, right foot, lips and nose. Neurologic exam demonstrates LUE mild drift with mild dysmetria to L FNF. CT head demonstrates no acute pathology. Labs WNL.       Impression: L hemisensory a/w L sided drift possibly 2/2 R brain dysfunction vs L Thalamic dysfunction     Recommendations:  [] CTA Head/Neck w/ contrast now  [] Repeat CT head in 24 hours to observe for evolution of possible infarct      Plan discussed with Neurology Attending.

## 2019-02-17 NOTE — ED CDU PROVIDER INITIAL DAY NOTE - PMH
Atrial fibrillation  dx 7/09 on coumadin  HLD (hyperlipidemia)    HTN (hypertension)    OA (osteoarthritis)    YOVANI on CPAP    Thyroid nodule  followed by endocrinologist  UTI (urinary tract infection)  frequent last was 1/15 done

## 2019-02-17 NOTE — ED PROVIDER NOTE - OBJECTIVE STATEMENT
85F w hx afib on coumadin, ppm, HTN, HLD, YOVANI p/w tingling and numbness. 85F w hx afib on coumadin, ppm, HTN, HLD, YOVANI p/w tingling and numbness on left side since about 11am. Patient states tingling started in left upper arm and to her b/l feet and around her lips. Patient also noted some gait instability secondary to tingling to her feet. Symptoms have improved but still feeling some tingling of her hand. denies any slurred speech, weakness, dizziness.

## 2019-02-17 NOTE — ED ADULT NURSE NOTE - NSSISCREENINGQ2_ED_A_ED
YOB: 1969  Location: [facility]  Location Address: [address] [place] [address], [facility] [address], [address] [place] [address]  Location Phone: [phone]    Chief Complaint   Patient presents with   • Sinus Problem       History of Present Illness  Maddie Daly is a 49 y.o. male.  Maddie Daly is here for follow up of ENT complaints. The patient has had problems with left facial swelling  The symptoms are localized to the left side. The patient has had mild symptoms. The symptoms have been present for the last several months The symptoms are aggravated by  no identifiable factors. The symptoms are improved by no identifiable factors.  He reports that a few months ago he started having left facial swelling was treated with 3 antibiotics, steroid pack.  He reports sometimes he wakes up and may have fallen or possible hit something while he was sleeping.  CT scan images reviewed                         Past Medical History:   Diagnosis Date   • Allergic rhinitis    • Anxiety    • Back injury    • GERD (gastroesophageal reflux disease)    • High cholesterol    • Hyperlipidemia    • Hypertension    • Panic attack        Past Surgical History:   Procedure Laterality Date   • LUMBAR LAMINECTOMY     • LYMPHADENECTOMY         Outpatient Prescriptions Marked as Taking for the 18 encounter (Office Visit) with BERKLEY Haro   Medication Sig Dispense Refill   • aspirin 325 MG tablet Take 325 mg by mouth.     • cetirizine-pseudoephedrine (ZyrTEC-D) 5-120 MG per 12 hr tablet Take  by mouth.     • cyclobenzaprine (FLEXERIL) 10 MG tablet Take 10 mg by mouth.     • FLUoxetine (PROzac) 20 MG capsule Take 20 mg by mouth Daily.     • fluticasone (FLONASE) 50 MCG/ACT nasal spray into each nostril.     • guaiFENesin (MUCINEX) 600 MG 12 hr tablet Take 600 mg by mouth.     • hydrochlorothiazide (HYDRODIURIL) 12.5 MG tablet Take 12.5 mg by mouth Daily.     • lidocaine (LIDODERM) 5 % Place  on the skin.     • lisinopril  (PRINIVIL,ZESTRIL) 40 MG tablet Take 40 mg by mouth Daily.     • LORazepam (ATIVAN) 0.5 MG tablet Take 0.5 mg by mouth.     • methylphenidate (RITALIN) 20 MG tablet Take 20 mg by mouth.     • pantoprazole (PROTONIX) 40 MG EC tablet Take 40 mg by mouth Daily.     • pravastatin (PRAVACHOL) 40 MG tablet Take 40 mg by mouth Daily.     • propranolol (INDERAL) 20 MG tablet Take 20 mg by mouth.     • traZODone (DESYREL) 150 MG tablet Take 150 mg by mouth Every Night.     • [DISCONTINUED] aspirin 81 MG chewable tablet Chew 81 mg Daily.         Morphine and Morphine and related    Family History   Problem Relation Age of Onset   • No Known Problems Mother    • No Known Problems Father    • Heart attack Maternal Uncle    • Heart attack Paternal Uncle        Social History     Social History   • Marital status:      Spouse name: N/A   • Number of children: N/A   • Years of education: N/A     Occupational History   • Not on file.     Social History Main Topics   • Smoking status: Former Smoker   • Smokeless tobacco: Never Used      Comment: quit in 2008   • Alcohol use 3.6 oz/week     6 Cans of beer per week      Comment: per day   • Drug use: Yes     Special: Marijuana      Comment: HAS USED TODAY   • Sexual activity: Not on file     Other Topics Concern   • Not on file     Social History Narrative       Review of Systems   Constitutional: Negative.    HENT:        SEE HPI   Eyes: Negative.    Respiratory: Negative.    Cardiovascular: Negative.    Gastrointestinal: Negative.    Endocrine: Negative.    Genitourinary: Negative.    Musculoskeletal: Negative.    Skin: Negative.    Allergic/Immunologic: Negative.    Neurological: Negative.    Hematological: Negative.    Psychiatric/Behavioral: Negative.        Vitals:    02/19/18 1441   BP: 141/86   Pulse: 82   Temp: 97.8 °F (36.6 °C)       Body mass index is 33.34 kg/(m^2).    Objective     Physical Exam  CONSTITUTIONAL: well nourished, alert, oriented, in no acute  distress     COMMUNICATION AND VOICE: able to communicate normally, normal voice quality    HEAD: normocephalic, no lesions, atraumatic, no tenderness, no masses     FACE: very mild left cheek fullness - no obvious mass, erythema or nodule     SALIVARY GLANDS: parotid glands with no tenderness, no swelling, no masses, submandibular glands with normal size, nontender    EYES: ocular motility normal, eyelids normal, orbits normal, no proptosis, conjunctiva normal , pupils equal, round     EARS:  Hearing: response to conversational voice normal bilaterally   External Ears: auricles without lesions  Otoscopic: tympanic membrane appearance normal, no lesions, no perforation, normal mobility, no fluid    NOSE:  External Nose: structure normal, no tenderness on palpation, no nasal discharge, no lesions, no evidence of trauma, nostrils patent   Intranasal Exam: nasal mucosa normal, vestibule within normal limits, inferior turbinate normal, nasal septum midline       ORAL:  Lips: upper and lower lips without lesion   Teeth: dentition within normal limits for age   Gums: gingivae healthy   Oral Mucosa: oral mucosa normal, no mucosal lesions   Floor of Mouth: Warthin’s duct patent, mucosa normal  Tongue: lingual mucosa normal without lesions, normal tongue mobility   Palate: soft and hard palates with normal mucosa and structure  Oropharynx: oropharyngeal mucosa normal    NECK: neck appearance normal, no mass,  noted without erythema or tenderness    THYROID: no overt thyromegaly, no tenderness, nodules or mass present on palpation, position midline     LYMPH NODES: no lymphadenopathy    CHEST/RESPIRATORY: respiratory effort normal    CARDIOVASCULAR:  extremities without cyanosis or edema      NEUROLOGIC/PSYCHIATRIC: oriented to time, place and person, mood normal, affect appropriate, CN II-XII intact grossly    Assessment/Plan   Maddie was seen today for sinus problem.    Diagnoses and all orders for this visit:    Left facial  swelling    Allergic rhinitis, unspecified chronicity, unspecified seasonality, unspecified trigger    Mucous retention cyst of maxillary sinus    Other orders  -     MethylPREDNISolone (MEDROL) 4 MG tablet; Take as directed on package instructions.      * Surgery not found *  No orders of the defined types were placed in this encounter.    Return if symptoms worsen or fail to improve.       Patient Instructions   No worrisome etiology appreciated - suspect more of a dependent edema  Do not sleep on affected side.      Call for problems or worsening symptoms         No

## 2019-02-18 DIAGNOSIS — I63.81 OTHER CEREBRAL INFARCTION DUE TO OCCLUSION OR STENOSIS OF SMALL ARTERY: ICD-10-CM

## 2019-02-18 LAB
APTT BLD: 48.3 SEC — HIGH (ref 27.5–36.3)
INR BLD: 2.42 — HIGH (ref 0.88–1.17)
PROTHROM AB SERPL-ACNC: 28.4 SEC — HIGH (ref 9.8–13.1)

## 2019-02-18 PROCEDURE — 99223 1ST HOSP IP/OBS HIGH 75: CPT

## 2019-02-18 PROCEDURE — 70450 CT HEAD/BRAIN W/O DYE: CPT | Mod: 26

## 2019-02-18 PROCEDURE — 72125 CT NECK SPINE W/O DYE: CPT | Mod: 26

## 2019-02-18 RX ORDER — ACETAMINOPHEN 500 MG
650 TABLET ORAL ONCE
Qty: 0 | Refills: 0 | Status: COMPLETED | OUTPATIENT
Start: 2019-02-18 | End: 2019-02-18

## 2019-02-18 RX ORDER — WARFARIN SODIUM 2.5 MG/1
5 TABLET ORAL ONCE
Qty: 0 | Refills: 0 | Status: COMPLETED | OUTPATIENT
Start: 2019-02-18 | End: 2019-02-18

## 2019-02-18 RX ORDER — ONDANSETRON 8 MG/1
4 TABLET, FILM COATED ORAL ONCE
Qty: 0 | Refills: 0 | Status: COMPLETED | OUTPATIENT
Start: 2019-02-18 | End: 2019-02-18

## 2019-02-18 RX ADMIN — WARFARIN SODIUM 5 MILLIGRAM(S): 2.5 TABLET ORAL at 02:07

## 2019-02-18 RX ADMIN — Medication 40 MILLIGRAM(S): at 06:41

## 2019-02-18 RX ADMIN — ATENOLOL 100 MILLIGRAM(S): 25 TABLET ORAL at 06:41

## 2019-02-18 RX ADMIN — Medication 650 MILLIGRAM(S): at 10:30

## 2019-02-18 RX ADMIN — Medication 650 MILLIGRAM(S): at 23:06

## 2019-02-18 RX ADMIN — ONDANSETRON 4 MILLIGRAM(S): 8 TABLET, FILM COATED ORAL at 01:46

## 2019-02-18 RX ADMIN — LOSARTAN POTASSIUM 100 MILLIGRAM(S): 100 TABLET, FILM COATED ORAL at 21:36

## 2019-02-18 RX ADMIN — Medication 650 MILLIGRAM(S): at 09:39

## 2019-02-18 RX ADMIN — ATORVASTATIN CALCIUM 10 MILLIGRAM(S): 80 TABLET, FILM COATED ORAL at 21:36

## 2019-02-18 RX ADMIN — Medication 650 MILLIGRAM(S): at 23:36

## 2019-02-18 RX ADMIN — Medication 650 MILLIGRAM(S): at 00:30

## 2019-02-18 NOTE — ED CDU PROVIDER SUBSEQUENT DAY NOTE - PROGRESS NOTE DETAILS
RASHAUN Mina: Patient seen at bedside in [facility].  VSS.  Patient resting comfortably however does note left hand numbness but otherwise neuro exam intact with no defecits and able to ambulate to bathroom without assistance RASHAUN Mina: Patient seen at bedside in NAD.  VSS.  Patient resting comfortably however does note left hand numbness but otherwise neuro exam intact with no defecits and able to ambulate to bathroom without assistance. Neuro attending Dr. Sheikh evaluated patient at bedside who recommended CT C-Spine with CTH to be done at 5pm. will touch base with neuro after CT results. No acute events over tele. Will continue to monitor Pt stable, family requested RN to evaluate L ear area. Small superspecial appearing nodule behind L ear. No mastoiditis, cellulitis or drainable collection, pt states she is already seeing a dermatologist and is planning to see a surgeon outpatient. No actionable ED intervention. RASHAUN Mina: called CT as patient has not gone for 5pm scheduled CT. CT tech stated she will put in for transport now CT shows lacunar infarct.  Neuro made aware who recommends admission.  Pt accepted to Dr. Liu.  Tele pa text paged at this time.

## 2019-02-18 NOTE — ED CDU PROVIDER SUBSEQUENT DAY NOTE - MEDICAL DECISION MAKING DETAILS
86 yo female with PMH of afib on warfarin, pacer, HTN, HLD, CHF who presents to ED with complaint of tingling sensation in upper and lower extremities, nose, and lip. Seen by neuro, CT head neg acute finding, sent to CDU for CT angio head, and repeat CT head in 24 hours. Plan: CTA head, tele, and repeat CT head in 24 hrs.

## 2019-02-18 NOTE — ED CDU PROVIDER SUBSEQUENT DAY NOTE - HISTORY
86 yo F, nonsmoker with PMH of A-fib on coumadin, s/p pacemaker 2009, CHF, HTN, HLD, presents to ER c/o tingling sensation in upper and lower extremity, nose and lip this morning after eating breakfast, no head injury. CT head, neg acute finding. Sent to CDU for CTA and repeat CT Head in 24 hours.

## 2019-02-18 NOTE — ED CDU PROVIDER DISPOSITION NOTE - CLINICAL COURSE
Patient is a 84 yo F with hx of afib on Coumadin, s/p PM, HTN, hyperlipidemia, YOVANI here for tingling sensation in lower extremities, radiating up. Symptoms improved but returned. Patient seen by neuro, wanted a repeat CT Head in 24 hours. CT Head, CTA Head and Neck negative. Ambulatory with a cane. Repeat CT Head showed a lacunar infarct.  Neuro made aware who recommends admission.  Pt accepted to Dr. Liu.

## 2019-02-19 ENCOUNTER — TRANSCRIPTION ENCOUNTER (OUTPATIENT)
Age: 84
End: 2019-02-19

## 2019-02-19 VITALS
RESPIRATION RATE: 16 BRPM | HEART RATE: 65 BPM | TEMPERATURE: 98 F | DIASTOLIC BLOOD PRESSURE: 58 MMHG | OXYGEN SATURATION: 99 % | SYSTOLIC BLOOD PRESSURE: 145 MMHG

## 2019-02-19 DIAGNOSIS — I48.91 UNSPECIFIED ATRIAL FIBRILLATION: ICD-10-CM

## 2019-02-19 DIAGNOSIS — I10 ESSENTIAL (PRIMARY) HYPERTENSION: ICD-10-CM

## 2019-02-19 DIAGNOSIS — I63.81 OTHER CEREBRAL INFARCTION DUE TO OCCLUSION OR STENOSIS OF SMALL ARTERY: ICD-10-CM

## 2019-02-19 DIAGNOSIS — G47.33 OBSTRUCTIVE SLEEP APNEA (ADULT) (PEDIATRIC): ICD-10-CM

## 2019-02-19 LAB
ALBUMIN SERPL ELPH-MCNC: 3.8 G/DL — SIGNIFICANT CHANGE UP (ref 3.3–5)
ALP SERPL-CCNC: 57 U/L — SIGNIFICANT CHANGE UP (ref 40–120)
ALT FLD-CCNC: 15 U/L — SIGNIFICANT CHANGE UP (ref 4–33)
ANION GAP SERPL CALC-SCNC: 13 MMO/L — SIGNIFICANT CHANGE UP (ref 7–14)
AST SERPL-CCNC: 19 U/L — SIGNIFICANT CHANGE UP (ref 4–32)
BASOPHILS # BLD AUTO: 0.08 K/UL — SIGNIFICANT CHANGE UP (ref 0–0.2)
BASOPHILS NFR BLD AUTO: 0.7 % — SIGNIFICANT CHANGE UP (ref 0–2)
BILIRUB SERPL-MCNC: 2.4 MG/DL — HIGH (ref 0.2–1.2)
BUN SERPL-MCNC: 26 MG/DL — HIGH (ref 7–23)
CALCIUM SERPL-MCNC: 9 MG/DL — SIGNIFICANT CHANGE UP (ref 8.4–10.5)
CHLORIDE SERPL-SCNC: 100 MMOL/L — SIGNIFICANT CHANGE UP (ref 98–107)
CHOLEST SERPL-MCNC: 114 MG/DL — LOW (ref 120–199)
CO2 SERPL-SCNC: 28 MMOL/L — SIGNIFICANT CHANGE UP (ref 22–31)
CREAT SERPL-MCNC: 0.95 MG/DL — SIGNIFICANT CHANGE UP (ref 0.5–1.3)
EOSINOPHIL # BLD AUTO: 0.27 K/UL — SIGNIFICANT CHANGE UP (ref 0–0.5)
EOSINOPHIL NFR BLD AUTO: 2.4 % — SIGNIFICANT CHANGE UP (ref 0–6)
GLUCOSE SERPL-MCNC: 96 MG/DL — SIGNIFICANT CHANGE UP (ref 70–99)
HCT VFR BLD CALC: 49 % — HIGH (ref 34.5–45)
HDLC SERPL-MCNC: 51 MG/DL — SIGNIFICANT CHANGE UP (ref 45–65)
HGB BLD-MCNC: 15.1 G/DL — SIGNIFICANT CHANGE UP (ref 11.5–15.5)
IMM GRANULOCYTES NFR BLD AUTO: 0.5 % — SIGNIFICANT CHANGE UP (ref 0–1.5)
LIPID PNL WITH DIRECT LDL SERPL: 64 MG/DL — SIGNIFICANT CHANGE UP
LYMPHOCYTES # BLD AUTO: 18.3 % — SIGNIFICANT CHANGE UP (ref 13–44)
LYMPHOCYTES # BLD AUTO: 2.09 K/UL — SIGNIFICANT CHANGE UP (ref 1–3.3)
MAGNESIUM SERPL-MCNC: 1.7 MG/DL — SIGNIFICANT CHANGE UP (ref 1.6–2.6)
MCHC RBC-ENTMCNC: 27.3 PG — SIGNIFICANT CHANGE UP (ref 27–34)
MCHC RBC-ENTMCNC: 30.8 % — LOW (ref 32–36)
MCV RBC AUTO: 88.6 FL — SIGNIFICANT CHANGE UP (ref 80–100)
MONOCYTES # BLD AUTO: 0.84 K/UL — SIGNIFICANT CHANGE UP (ref 0–0.9)
MONOCYTES NFR BLD AUTO: 7.4 % — SIGNIFICANT CHANGE UP (ref 2–14)
NEUTROPHILS # BLD AUTO: 8.08 K/UL — HIGH (ref 1.8–7.4)
NEUTROPHILS NFR BLD AUTO: 70.7 % — SIGNIFICANT CHANGE UP (ref 43–77)
NRBC # FLD: 0 K/UL — LOW (ref 25–125)
PHOSPHATE SERPL-MCNC: 3.4 MG/DL — SIGNIFICANT CHANGE UP (ref 2.5–4.5)
PLATELET # BLD AUTO: 347 K/UL — SIGNIFICANT CHANGE UP (ref 150–400)
PMV BLD: 12.2 FL — SIGNIFICANT CHANGE UP (ref 7–13)
POTASSIUM SERPL-MCNC: 3.7 MMOL/L — SIGNIFICANT CHANGE UP (ref 3.5–5.3)
POTASSIUM SERPL-SCNC: 3.7 MMOL/L — SIGNIFICANT CHANGE UP (ref 3.5–5.3)
PROT SERPL-MCNC: 6.5 G/DL — SIGNIFICANT CHANGE UP (ref 6–8.3)
RBC # BLD: 5.53 M/UL — HIGH (ref 3.8–5.2)
RBC # FLD: 14.9 % — HIGH (ref 10.3–14.5)
SODIUM SERPL-SCNC: 141 MMOL/L — SIGNIFICANT CHANGE UP (ref 135–145)
TRIGL SERPL-MCNC: 69 MG/DL — SIGNIFICANT CHANGE UP (ref 10–149)
TSH SERPL-MCNC: 2.99 UIU/ML — SIGNIFICANT CHANGE UP (ref 0.27–4.2)
WBC # BLD: 11.42 K/UL — HIGH (ref 3.8–10.5)
WBC # FLD AUTO: 11.42 K/UL — HIGH (ref 3.8–10.5)

## 2019-02-19 PROCEDURE — 99233 SBSQ HOSP IP/OBS HIGH 50: CPT

## 2019-02-19 PROCEDURE — 99239 HOSP IP/OBS DSCHRG MGMT >30: CPT

## 2019-02-19 RX ORDER — ASPIRIN/CALCIUM CARB/MAGNESIUM 324 MG
81 TABLET ORAL DAILY
Qty: 0 | Refills: 0 | Status: DISCONTINUED | OUTPATIENT
Start: 2019-02-19 | End: 2019-02-19

## 2019-02-19 RX ORDER — ATORVASTATIN CALCIUM 80 MG/1
40 TABLET, FILM COATED ORAL AT BEDTIME
Qty: 0 | Refills: 0 | Status: DISCONTINUED | OUTPATIENT
Start: 2019-02-19 | End: 2019-02-19

## 2019-02-19 RX ORDER — WARFARIN SODIUM 2.5 MG/1
5 TABLET ORAL ONCE
Qty: 0 | Refills: 0 | Status: COMPLETED | OUTPATIENT
Start: 2019-02-19 | End: 2019-02-19

## 2019-02-19 RX ORDER — MULTIVIT-MIN/FERROUS GLUCONATE 9 MG/15 ML
2 LIQUID (ML) ORAL
Qty: 0 | Refills: 0 | COMMUNITY

## 2019-02-19 RX ORDER — ACETAMINOPHEN 500 MG
650 TABLET ORAL EVERY 6 HOURS
Qty: 0 | Refills: 0 | Status: DISCONTINUED | OUTPATIENT
Start: 2019-02-19 | End: 2019-02-19

## 2019-02-19 RX ORDER — ESTRADIOL 4 UG/1
0 INSERT VAGINAL
Qty: 0 | Refills: 0 | COMMUNITY

## 2019-02-19 RX ORDER — WARFARIN SODIUM 2.5 MG/1
5 TABLET ORAL ONCE
Qty: 0 | Refills: 0 | Status: DISCONTINUED | OUTPATIENT
Start: 2019-02-19 | End: 2019-02-19

## 2019-02-19 RX ADMIN — ATENOLOL 100 MILLIGRAM(S): 25 TABLET ORAL at 06:21

## 2019-02-19 RX ADMIN — Medication 81 MILLIGRAM(S): at 12:40

## 2019-02-19 RX ADMIN — Medication 40 MILLIGRAM(S): at 06:21

## 2019-02-19 RX ADMIN — Medication 650 MILLIGRAM(S): at 10:04

## 2019-02-19 RX ADMIN — Medication 650 MILLIGRAM(S): at 09:05

## 2019-02-19 RX ADMIN — WARFARIN SODIUM 5 MILLIGRAM(S): 2.5 TABLET ORAL at 01:38

## 2019-02-19 NOTE — PROGRESS NOTE ADULT - PROBLEM SELECTOR PLAN 1
- CT brain concerning for possible lacunar infarcts in the basal regions, internal capsules and thalami  - Monitor on telemetry, serial EKGs  - need neuro follow up for further recs  -start ASA 81mg, increase lipitor to 40 mg hs  -TTE w/ bubble study  -cont dose coumadin   -A1c 5.3, lipid panel reviewed (LDL 64, HDL 51, chol 114)  -pt can't have MRI due to PPM  - Neuro checks Q4H  - PT/OT consult - CT brain concerning for possible lacunar infarcts in the basal regions, internal capsules and thalami  -CTA brain/neck no hemodynamically significant stenosis   - Monitor on telemetry, serial EKGs  - need neuro follow up for further recs  -start ASA 81mg, increase lipitor to 40 mg hs  -TTE w/ bubble study  -cont dose coumadin   -A1c 5.3, lipid panel reviewed (LDL 64, HDL 51, chol 114)  -pt can't have MRI due to PPM  - Neuro checks Q4H  - PT/OT consult

## 2019-02-19 NOTE — PHYSICAL THERAPY INITIAL EVALUATION ADULT - ADDITIONAL COMMENTS
Pt reports that she lives in a private house with  and daughter with ~3STE; (+)1 handrail; and a flight of stairs to negotiate to bedroom; (+)1 handrail. Prior to hospital admission pt was completely independent and used either no assistive device with ambulation or used single axis cane or rollator PRN. Pt denies any recent falls.    Pt left comfortable in bed, NAD, all lines intact, all precautions maintained, with call bell in reach, daughter @bedside, and RN aware of PT evaluation.

## 2019-02-19 NOTE — PROGRESS NOTE ADULT - ATTENDING COMMENTS
agree with above; ROS otherwise negative
dispo: d/c home today as she's cleared by neurology for discharge home today  Time spent on discharge 32 minutes coordinating discharge plan and discussing with patient and family.

## 2019-02-19 NOTE — PROGRESS NOTE ADULT - ASSESSMENT
86 y/o female with past medical history of YOVANI on CPAP (inconsistent use), HTN, HLD, Cardiac pacemaker, Atrial fibrillation on Coumadin, prior T12 fracture (2017), EBV infection (2018) presents to the ED with complaints of numbness/tingling. Patient states she woke up on 2/17 AM in her usual state of health when around 11am, she started to experience left arm numbness/tingling which then travelled to her right arm, then both of her feet, and then her lips and nose. Patient states this feeling of numbness/tingling persisted until arrival to the ED and was unaccompanied by any weakness, blurry vision, coordination issue, or slurred speech. Patient rated the initial symptoms as 9/10 which have now improved to 6/10 and only involve the left hand, right foot, lips and nose. Neurologic exam demonstrates LUE mild drift with mild dysmetria to L FNF. CT head demonstrates no acute pathology. Labs WNL.   Impression: diagnosis remains uncertain, localization is uncertain due to bilateral nature of symptoms, can assume she may have had small infarct in one side or perhaps bilateral, remains unproven would not benefit from further neurovascular intervention  Follow up with Stroke BP Linda Nam, [address] [facility], [address], [phone] 84 y/o female with past medical history of YOVANI on CPAP (inconsistent use), HTN, HLD, Cardiac pacemaker, Atrial fibrillation on Coumadin, prior T12 fracture (2017), EBV infection (2018) presents to the ED with complaints of numbness/tingling. Patient states she woke up on 2/17 AM in her usual state of health when around 11am, she started to experience left arm numbness/tingling which then travelled to her right arm, then both of her feet, and then her lips and nose. Patient states this feeling of numbness/tingling persisted until arrival to the ED and was unaccompanied by any weakness, blurry vision, coordination issue, or slurred speech. Patient rated the initial symptoms as 9/10 which have now improved to 6/10 and only involve the left hand, right foot, lips and nose. Neurologic exam demonstrates LUE mild drift with mild dysmetria to L FNF, which subsequently resolved. CT head demonstrates no acute pathology. Labs WNL.   Impression: diagnosis remains uncertain, localization is uncertain due to bilateral nature of symptoms, can assume she may have had small infarct on one side or perhaps bilateral thalamus. A peripheral localization can still be considered, such as peripheral nerve entrapments, polyradiculopathy, sensory neuronopathy etc., but all diagnoses are difficult to prove, and given that she's essentially intact, probably there's little benefit to further w/u. From neurovascular standpoint, cleared for discharge. Follow up with Stroke BP Linda Nam, [address] [facility], [address], [phone]

## 2019-02-19 NOTE — DISCHARGE NOTE ADULT - PATIENT PORTAL LINK FT
You can access the [facility] Patient Portal, offered by [facility], by registering with the following website: [email or web address]

## 2019-02-19 NOTE — PROGRESS NOTE ADULT - ASSESSMENT
86 yo F PMH of A-fib on coumadin, s/p pacemaker 2009, CHF, HTN, HLD, YOVANI presents with tingling sensation in upper and lower extremities, nose and lips x 1 day.

## 2019-02-19 NOTE — DISCHARGE NOTE ADULT - HOSPITAL COURSE
84 yo F PMH of A-fib on coumadin, s/p pacemaker 2009, CHF, HTN, HLD, YOVANI presents with tingling sensation in upper and lower extremities, nose and lips x 1 day. Numbness L>R arm. Since presentation her numbness has improved. She denies any focal weakness, no gait abnormality- walks with a cane at baseline and unchanged. No change in her vision. She does complain of headache, posterior and neck region, non throbbing, no nausea.     Hospital Course: CTH: No acute intracranial hemorrhage, extra-axial fluid collection, hydrocephalus, mass effect or midline shift. CTA H/N: CT brain: No abnormal areas of enhancement. CT angiography neck: No evidence of hemodynamically significant stenosis by NASCET criteria. No evidence of vascular dissection. CT angiography brain: No major vessel occlusion or proximal stenosis by NASCET criteria. No evidence of aneurysm or other vascular malformation.    Neurology consult was called: CT shows no acute changes. Unable to have MRI due to PPM.  Sx localize to a lesion at the cranial-cervical junction although there are no objective neurological signs.  Agree with repeat CT head and would add C-spine as well. Will follow clinically. Check serum calcium and magnesium.     Repeat CTH/CSpine: CT Head:  No CT evidence of acute intracranial hemorrhage, mass effect or acute territorial infarct. Vague hypoattenuating foci in the basal regions, internal capsules and thalami which may reflect lacunar infarcts; these could be better characterized with MRI. CT Cervical Spine: CT evidence of cervical spine fracture or traumatic malalignment. Multilevel degenerative changes as discussed above. Mild to moderate spinal canal stenosis at C4-C5 and C5-C6.  Moderate to severe right-sided neural foraminal narrowing at C4-C5 through C6-C7.    Case discussed with Dr. Durant, unable to have MRI due to PPM, per neurology follow up no need for ASA or TTE, outpt follow up in neuro clinic, Pt medically cleared for discharge to home with outpt follow up noted above. 86 yo F PMH of A-fib on coumadin, s/p pacemaker 2009, CHF, HTN, HLD, YOVANI presents with tingling sensation in upper and lower extremities, nose and lips x 1 day. Numbness L>R arm. Since presentation her numbness has improved. She denies any focal weakness, no gait abnormality- walks with a cane at baseline and unchanged. No change in her vision. She does complain of headache, posterior and neck region, non throbbing, no nausea.     Hospital Course: CTH: No acute intracranial hemorrhage, extra-axial fluid collection, hydrocephalus, mass effect or midline shift. CTA H/N: CT brain: No abnormal areas of enhancement. CT angiography neck: No evidence of hemodynamically significant stenosis by NASCET criteria. No evidence of vascular dissection. CT angiography brain: No major vessel occlusion or proximal stenosis by NASCET criteria. No evidence of aneurysm or other vascular malformation.    Neurology consult was called: CT shows no acute changes. Unable to have MRI due to PPM.  Sx localize to a lesion at the cranial-cervical junction although there are no objective neurological signs.  Agree with repeat CT head and would add C-spine as well. Will follow clinically. Check serum calcium and magnesium.     Repeat CTH/CSpine: CT Head:  No CT evidence of acute intracranial hemorrhage, mass effect or acute territorial infarct. Vague hypoattenuating foci in the basal regions, internal capsules and thalami which may reflect lacunar infarcts; these could be better characterized with MRI. CT Cervical Spine: CT evidence of cervical spine fracture or traumatic malalignment. Multilevel degenerative changes as discussed above. Mild to moderate spinal canal stenosis at C4-C5 and C5-C6.  Moderate to severe right-sided neural foraminal narrowing at C4-C5 through C6-C7.    Case discussed with Dr. Durant, unable to have MRI due to PPM, per neurology suspect subacute lacunar infarct, no need for ASA or TTE, outpt follow up in neuro clinic, Pt medically cleared for discharge to home with outpt follow up noted above. 86 yo F PMH of A-fib on coumadin, s/p pacemaker 2009, CHF, HTN, HLD, YOVANI presents with tingling sensation in upper and lower extremities, nose and lips x 1 day. Numbness L>R arm. Since presentation her numbness has improved. She denies any focal weakness, no gait abnormality- walks with a cane at baseline and unchanged. No change in her vision. She does complain of headache, posterior and neck region, non throbbing, no nausea.     Hospital Course: CTH: No acute intracranial hemorrhage, extra-axial fluid collection, hydrocephalus, mass effect or midline shift. CTA H/N: CT brain: No abnormal areas of enhancement. CT angiography neck: No evidence of hemodynamically significant stenosis by NASCET criteria. No evidence of vascular dissection. CT angiography brain: No major vessel occlusion or proximal stenosis by NASCET criteria. No evidence of aneurysm or other vascular malformation.    Neurology consult was called: CT shows no acute changes. Unable to have MRI due to PPM.  Sx localize to a lesion at the cranial-cervical junction although there are no objective neurological signs.  Agree with repeat CT head and would add C-spine as well. Will follow clinically. Check serum calcium and magnesium.     Repeat CTH/CSpine: CT Head:  No CT evidence of acute intracranial hemorrhage, mass effect or acute territorial infarct. Vague hypoattenuating foci in the basal regions, internal capsules and thalami which may reflect lacunar infarcts; these could be better characterized with MRI. CT Cervical Spine: CT evidence of cervical spine fracture or traumatic malalignment. Multilevel degenerative changes as discussed above. Mild to moderate spinal canal stenosis at C4-C5 and C5-C6.  Moderate to severe right-sided neural foraminal narrowing at C4-C5 through C6-C7.    Neuro follow up: diagnosis remains uncertain, localization is uncertain due to bilateral nature of symptoms, can assume she may have had small infarct in one side or perhaps bilateral, remains unproven would not benefit from further neurovascular intervention, Follow up with Stroke BP Linda Nam, [address] [facility], [address], [phone]    Case discussed with Dr. Durant, unable to have MRI due to PPM, per neuro no need for ASA or TTE, outpt follow up in neuro clinic, Pt medically cleared for discharge to home with outpt follow up noted above.

## 2019-02-19 NOTE — PROGRESS NOTE ADULT - SUBJECTIVE AND OBJECTIVE BOX
Neurology Follow up note    Patient is a 85y old  Female who presents with a chief complaint of Numbness, r/o stroke (19 Feb 2019 14:54)  Subjective:Interval History - No events overnight    Objective:   Vital Signs Last 24 Hrs  T(C): 36.4 (19 Feb 2019 13:23), Max: 36.8 (18 Feb 2019 17:14)  T(F): 97.6 (19 Feb 2019 13:23), Max: 98.3 (18 Feb 2019 17:14)  HR: 74 (19 Feb 2019 13:23) (73 - 85)  BP: 137/75 (19 Feb 2019 13:23) (134/95 - 148/79)  BP(mean): --  RR: 15 (19 Feb 2019 13:23) (15 - 18)  SpO2: 100% (19 Feb 2019 13:23) (97% - 100%)    General appearance: No acute distress    Neurological Exam:  Mental Status: Orientated to self, date and place.  Attention intact.  No dysarthria. Speech fluent.  Cranial Nerves:   PERRL, EOMI, VFF, no nystagmus.    CN V1-3 intact to light touch .  No facial asymmetry.  Tongue, uvula and palate midline.  Sternocleidomastoid and Trapezius intact bilaterally.    Motor:   Tone: normal.                  Strength:     [] Upper extremity                      Delt       Bicep    Tricep                                                  R         5/5        5/5        5/5       5/5                                               L          5/5        5/5        5/5       5/5  [] Lower extremity                       HF          KE          KF        DF         PF                                               R        5/5        5/5        5/5       5/5       5/5                                               L         5/5        5/5       5/5       5/5        5/5  Pronator drift: no drift             Dysmetria: none  Tremor: No resting, postural or action tremor.  No myoclonus.    Sensation: intact to light touch  Deep Tendon Reflexes:     Biceps          Triceps      BR        Patellar        Ankle         Babinski                                         R       2+                   2+           2+            2+               1+           downgoing                                         L        2+                  2+           2+            0               0            Equivocal    Gait: Deferred    Other:  02-19    141  |  100  |  26<H>  ----------------------------<  96  3.7   |  28  |  0.95    Ca    9.0      19 Feb 2019 06:30  Phos  3.4     02-19  Mg     1.7     02-19    TPro  6.5  /  Alb  3.8  /  TBili  2.4<H>  /  DBili  x   /  AST  19  /  ALT  15  /  AlkPhos  57  02-19    LIVER FUNCTIONS - ( 19 Feb 2019 06:30 )  Alb: 3.8 g/dL / Pro: 6.5 g/dL / ALK PHOS: 57 u/L / ALT: 15 u/L / AST: 19 u/L / GGT: x                                 15.1   11.42 )-----------( 347      ( 19 Feb 2019 06:30 )             49.0     MEDICATIONS  (STANDING):  ATENolol  Tablet 100 milliGRAM(s) Oral daily  atorvastatin 40 milliGRAM(s) Oral at bedtime  furosemide    Tablet 40 milliGRAM(s) Oral daily  losartan 100 milliGRAM(s) Oral daily  warfarin 5 milliGRAM(s) Oral once    MEDICATIONS  (PRN):  acetaminophen   Tablet .. 650 milliGRAM(s) Oral every 6 hours PRN Mild Pain (1 - 3), Moderate Pain (4 - 6), Severe Pain (7 - 10) Neurology Follow up note    Patient is a 85y old  Female who presents with a chief complaint of Numbness, r/o stroke (19 Feb 2019 14:54)  Subjective:Interval History - No events overnight    Objective:   Vital Signs Last 24 Hrs  T(C): 36.4 (19 Feb 2019 13:23), Max: 36.8 (18 Feb 2019 17:14)  T(F): 97.6 (19 Feb 2019 13:23), Max: 98.3 (18 Feb 2019 17:14)  HR: 74 (19 Feb 2019 13:23) (73 - 85)andhe where he and he is a will be ahe is than it microvolt l day for neck of the nose it is lying  feet he started yelling low multiple in a testing because he didn't want to don and review of a commercial felt and a T.: The commercial presumably readily available probably due to a low multiple and sure you'll find initial C. and he'll see what he has visual images like it actually it's just so he is a has been  BP: 137/75 (19 Feb 2019 13:23) (134/95 - 148/79)  BP(mean): --  RR: 15 (19 Feb 2019 13:23) (15 - 18)  SpO2: 100% (19 Feb 2019 13:23) (97% - 100%)    General appearance: No acute distress    Neurological Exam:  Mental Status: Orientated to self, date and place.  Attention intact.  No dysarthria. Speech fluent.  Cranial Nerves:   PERRL, EOMI, VFF, no nystagmus.    CN V1-3 intact to light touch .  No facial asymmetry.  Tongue, uvula and palate midline.  Sternocleidomastoid and Trapezius intact bilaterally.    Motor:   Tone: normal.                  Strength:     [] Upper extremity                      Delt       Bicep    Tricep                                                  R         5/5        5/5        5/5       5/5                                               L          5/5        5/5        5/5       5/5  [] Lower extremity                       HF          KE          KF        DF         PF                                               R        5/5        5/5        5/5       5/5       5/5                                               L         5/5        5/5       5/5       5/5        5/5  Pronator drift: no drift             Dysmetria: none  Tremor: No resting, postural or action tremor.  No myoclonus.    Sensation: intact to light touch  Deep Tendon Reflexes:     Biceps          Triceps      BR        Patellar        Ankle         Babinski                                         R       2+                   2+           2+            2+               1+           downgoing                                         L        2+                  2+           2+            0               0            Equivocal    Gait: Deferred    Other:  02-19    141  |  100  |  26<H>  ----------------------------<  96  3.7   |  28  |  0.95    Ca    9.0      19 Feb 2019 06:30  Phos  3.4     02-19  Mg     1.7     02-19    TPro  6.5  /  Alb  3.8  /  TBili  2.4<H>  /  DBili  x   /  AST  19  /  ALT  15  /  AlkPhos  57  02-19    LIVER FUNCTIONS - ( 19 Feb 2019 06:30 )  Alb: 3.8 g/dL / Pro: 6.5 g/dL / ALK PHOS: 57 u/L / ALT: 15 u/L / AST: 19 u/L / GGT: x                                 15.1   11.42 )-----------( 347      ( 19 Feb 2019 06:30 )             49.0     MEDICATIONS  (STANDING):  ATENolol  Tablet 100 milliGRAM(s) Oral daily  atorvastatin 40 milliGRAM(s) Oral at bedtime  furosemide    Tablet 40 milliGRAM(s) Oral daily  losartan 100 milliGRAM(s) Oral daily  warfarin 5 milliGRAM(s) Oral once    MEDICATIONS  (PRN):  acetaminophen   Tablet .. 650 milliGRAM(s) Oral every 6 hours PRN Mild Pain (1 - 3), Moderate Pain (4 - 6), Severe Pain (7 - 10)

## 2019-02-19 NOTE — PHYSICAL THERAPY INITIAL EVALUATION ADULT - GAIT DEVIATIONS NOTED, PT EVAL
decreased step length/decreased stride length/decreased jaciel decreased step length/decreased stride length/decreased jaciel/slight left lateral lean

## 2019-02-19 NOTE — H&P ADULT - NSHPLABSRESULTS_GEN_ALL_CORE
.  LABS:                         15.2   10.18 )-----------( 384      ( 17 Feb 2019 18:00 )             48.6     02-17    140  |  101  |  30<H>  ----------------------------<  95  4.0   |  24  |  0.98    Ca    8.7      17 Feb 2019 18:00    TPro  6.6  /  Alb  4.0  /  TBili  1.7<H>  /  DBili  x   /  AST  22  /  ALT  10  /  AlkPhos  59  02-17    PT/INR - ( 18 Feb 2019 23:07 )   PT: 28.4 SEC;   INR: 2.42          PTT - ( 18 Feb 2019 23:07 )  PTT:48.3 SEC      EKG reviewed personally: A fib 79 bpm         RADIOLOGY, EKG & ADDITIONAL TESTS: Reviewed.

## 2019-02-19 NOTE — PHYSICAL THERAPY INITIAL EVALUATION ADULT - PERTINENT HX OF CURRENT PROBLEM, REHAB EVAL
84 yo F PMH of A-fib on coumadin, s/p pacemaker 2009, CHF, HTN, HLD, YOVANI presents with tingling sensation in upper and lower extremities, nose and lips x 1 day.

## 2019-02-19 NOTE — DISCHARGE NOTE ADULT - CARE PROVIDER_API CALL
Neurology Clinic,   Phone: [phone]  Fax: (   )    -  Follow Up Time: Neurology Clinic,   Phone: [phone]  Fax: (   )    -  Follow Up Time:     Saul De La Rosa (MD)  Cardiovascular Disease; Internal Medicine  [address], [address]  [place] [address]  Phone: [phone]  Fax: [phone]  Follow Up Time:

## 2019-02-19 NOTE — DISCHARGE NOTE ADULT - PLAN OF CARE
Prevent recurrent stroke. Ensure compliance with your medications including Coumadin for Atrial Fibrillation, maintain INR 2-3. Follow up with Neurologist / NP Linda Nam [phone]. Please call for an appointment within 1-2 weeks of discharge. Ensure compliance with your medications to reduce your risk of stroke. Continue coumadin to maintain INR between 2-3. Follow up with your Cardiologist Dr. De La Rosa for further monitoring in 1-2 weeks. Please call to arrange appointment. Ensure compliance with Lovastatin at bedtime. Follow up with your primary care physician for further monitoring in 1-2 weeks. Please call to arrange appointment. Low cholesterol diet. Salt restriction. Ensure compliance with your medications to maintain goal /80mmhg. Ensure compliance with your CPAP nightly. Follow up with your primary care physician for further monitoring in 1-2 weeks. Please call to arrange appointment. Follow up with your electrophysiologist Dr. Field, appointment arranged next month. Ensure compliance with your medications. Follow up with Neurologist / NP Linda Nam [phone], [address], [address] Please call for an appointment within 1-2 weeks of discharge. Ensure compliance with your medications to reduce your risk of stroke.

## 2019-02-19 NOTE — DISCHARGE NOTE ADULT - THE PATIENT HAS
Pt was able to tolerate Pressure support trial 16/5 40%, for 3 hours, and tachypnea and agitation, set back to CMV.4/7/2017  Lilly Mauro     no difficulties

## 2019-02-19 NOTE — DISCHARGE NOTE ADULT - MEDICATION SUMMARY - MEDICATIONS TO TAKE
I will START or STAY ON the medications listed below when I get home from the hospital:    losartan 100 mg oral tablet  -- 1 tab(s) by mouth once a day  -- Indication: For High blood pressure    warfarin 5 mg oral tablet  -- 1 tab(s) by mouth once a day (at bedtime)  -- Indication: For Atrial fibrillation, unspecified type    lovastatin 20 mg oral tablet  -- 1 tab(s) by mouth once a day  -- Indication: For High cholesterol    atenolol 100 mg oral tablet  -- 1 tab(s) by mouth once a day  -- Indication: For High blood pressure    alendronate 70 mg oral tablet  -- 1 tab(s) by mouth once a week  -- Indication: For OSteoporosis    Lasix 40 mg oral tablet  -- 1 tab(s) by mouth once a day   -- Avoid prolonged or excessive exposure to direct and/or artificial sunlight while taking this medication.  It is very important that you take or use this exactly as directed.  Do not skip doses or discontinue unless directed by your doctor.  It may be advisable to drink a full glass orange juice or eat a banana daily while taking this medication.    -- Indication: For Heart Failure    potassium chloride 20 mEq oral tablet, extended release  -- 1 tab(s) by mouth 2 times a day  -- Indication: For Potassium supplement while on Lasix    Calcium 600+D 600 mg-200 intl units oral tablet  -- 1 tab(s) by mouth once a day  -- Indication: For Supplement I will START or STAY ON the medications listed below when I get home from the hospital:    losartan 100 mg oral tablet  -- 1 tab(s) by mouth once a day  -- Indication: For High blood pressure    warfarin 5 mg oral tablet  -- 1 tab(s) by mouth once a day (at bedtime)  -- Indication: For Atrial fibrillation, unspecified type    lovastatin 20 mg oral tablet  -- 1 tab(s) by mouth once a day  -- Indication: For High cholesterol    atenolol 100 mg oral tablet  -- 1 tab(s) by mouth once a day  -- Indication: For High blood pressure    alendronate 70 mg oral tablet  -- 1 tab(s) by mouth once a week  -- Indication: For OSteopenia    Lasix 40 mg oral tablet  -- 1 tab(s) by mouth once a day   -- Avoid prolonged or excessive exposure to direct and/or artificial sunlight while taking this medication.  It is very important that you take or use this exactly as directed.  Do not skip doses or discontinue unless directed by your doctor.  It may be advisable to drink a full glass orange juice or eat a banana daily while taking this medication.    -- Indication: For Heart Failure    potassium chloride 20 mEq oral tablet, extended release  -- 1 tab(s) by mouth 2 times a day  -- Indication: For Potassium supplement while on Lasix    Calcium 600+D 600 mg-200 intl units oral tablet  -- 1 tab(s) by mouth once a day  -- Indication: For Supplement

## 2019-02-19 NOTE — H&P ADULT - PROBLEM SELECTOR PLAN 1
- Monitor on telemetry, serial EKGs  - HgbA1C, TSH, lipid profile, CBC, CMP in AM  - House neurology following  - Cannot obtain MRI as pt has ppm   - TTE with bubble study ordered   - Continue with ASA 81 mg PO daily and Atorvastatin 80 mg PO QHS  - Elevate head of the bed to 30 degree  - Neuro checks Q4H  - PT/OT consult - CT concerning for possible lacunar infarcts in the basal regions, internal capsules and thalami  - Monitor on telemetry, serial EKGs  - HgbA1C, TSH, lipid profile, CBC, CMP in AM  - House neurology following  - Cannot obtain MRI as pt has ppm   - TTE with bubble study ordered   - Continue with ASA 81 mg PO daily and Atorvastatin 80 mg PO QHS  - Elevate head of the bed to 30 degree  - Neuro checks Q4H  - PT/OT consult - CT concerning for possible lacunar infarcts in the basal regions, internal capsules and thalami  - Monitor on telemetry, serial EKGs  - HgbA1C, TSH, lipid profile, CBC, CMP in AM  - House neurology following  - Cannot obtain MRI as pt has ppm   - Continue with ASA 81 mg PO daily and Atorvastatin 80 mg PO QHS  - Elevate head of the bed to 30 degree  - Neuro checks Q4H  - PT/OT consult - CT concerning for possible lacunar infarcts in the basal regions, internal capsules and thalami  - Monitor on telemetry, serial EKGs  - HgbA1C, TSH, lipid profile, CBC, CMP in AM  - f/u neurology consult in AM regarding further recs   - Cannot obtain MRI as pt has ppm   - Elevate head of the bed to 30 degree  - Neuro checks Q4H  - PT/OT consult

## 2019-02-19 NOTE — PHYSICAL THERAPY INITIAL EVALUATION ADULT - CRITERIA FOR SKILLED THERAPEUTIC INTERVENTIONS
therapy frequency/anticipated equipment needs at discharge/predicted duration of therapy intervention/anticipated discharge recommendation/impairments found

## 2019-02-19 NOTE — PHYSICAL THERAPY INITIAL EVALUATION ADULT - ACTIVE RANGE OF MOTION EXAMINATION, REHAB EVAL
bilateral lower extremity Active ROM was WNL (within normal limits)/po. upper extremity Active ROM was WNL (within normal limits)

## 2019-02-19 NOTE — DISCHARGE NOTE ADULT - CARE PLAN
Principal Discharge DX:	Lacunar infarction  Goal:	Prevent recurrent stroke. Ensure compliance with your medications including Coumadin for Atrial Fibrillation, maintain INR 2-3.  Assessment and plan of treatment:	Follow up with Neurologist / NP Linda Nam [phone]. Please call for an appointment within 1-2 weeks of discharge. Ensure compliance with your medications to reduce your risk of stroke.  Secondary Diagnosis:	Atrial fibrillation  Goal:	Continue coumadin to maintain INR between 2-3.  Assessment and plan of treatment:	Follow up with your Cardiologist Dr. De La Rosa for further monitoring in 1-2 weeks. Please call to arrange appointment.  Secondary Diagnosis:	HLD (hyperlipidemia)  Goal:	Ensure compliance with Lovastatin at bedtime.  Assessment and plan of treatment:	Follow up with your primary care physician for further monitoring in 1-2 weeks. Please call to arrange appointment. Low cholesterol diet. Salt restriction.  Secondary Diagnosis:	Essential hypertension  Goal:	Ensure compliance with your medications to maintain goal /80mmhg.  Assessment and plan of treatment:	Follow up with your primary care physician for further monitoring in 1-2 weeks. Please call to arrange appointment. Low cholesterol diet. Salt restriction.  Secondary Diagnosis:	YOVANI on CPAP  Goal:	Ensure compliance with your CPAP nightly.  Assessment and plan of treatment:	Follow up with your primary care physician for further monitoring in 1-2 weeks. Please call to arrange appointment.  Secondary Diagnosis:	Pacemaker  Assessment and plan of treatment:	Follow up with your electrophysiologist Dr. Field, appointment arranged next month. Principal Discharge DX:	Lacunar infarction  Goal:	Prevent recurrent stroke. Ensure compliance with your medications including Coumadin for Atrial Fibrillation, maintain INR 2-3.  Assessment and plan of treatment:	Follow up with Neurologist / NP Linda Nam [phone]. Please call for an appointment within 1-2 weeks of discharge. Ensure compliance with your medications to reduce your risk of stroke.  Secondary Diagnosis:	Atrial fibrillation  Goal:	Continue coumadin to maintain INR between 2-3.  Assessment and plan of treatment:	Follow up with your Cardiologist Dr. De La Rosa for further monitoring in 1-2 weeks. Please call to arrange appointment.  Secondary Diagnosis:	HLD (hyperlipidemia)  Goal:	Ensure compliance with Lovastatin at bedtime.  Assessment and plan of treatment:	Follow up with your primary care physician for further monitoring in 1-2 weeks. Please call to arrange appointment. Low cholesterol diet. Salt restriction.  Secondary Diagnosis:	Essential hypertension  Goal:	Ensure compliance with your medications to maintain goal /80mmhg.  Assessment and plan of treatment:	Follow up with your primary care physician for further monitoring in 1-2 weeks. Please call to arrange appointment. Low cholesterol diet. Salt restriction.  Secondary Diagnosis:	YOVANI on CPAP  Goal:	Ensure compliance with your CPAP nightly.  Assessment and plan of treatment:	Follow up with your primary care physician for further monitoring in 1-2 weeks. Please call to arrange appointment.  Secondary Diagnosis:	Pacemaker  Assessment and plan of treatment:	Follow up with your electrophysiologist Dr. Field, appointment arranged next month.  Secondary Diagnosis:	Osteopenia  Goal:	Ensure compliance with your medications.  Assessment and plan of treatment:	Follow up with your primary care physician for further monitoring in 1-2 weeks. Please call to arrange appointment. Principal Discharge DX:	Lacunar infarction  Goal:	Prevent recurrent stroke. Ensure compliance with your medications including Coumadin for Atrial Fibrillation, maintain INR 2-3.  Assessment and plan of treatment:	Follow up with Neurologist / NP Linda Nam [phone], [address] [place] [address], [address] Please call for an appointment within 1-2 weeks of discharge. Ensure compliance with your medications to reduce your risk of stroke.  Secondary Diagnosis:	Atrial fibrillation  Goal:	Continue coumadin to maintain INR between 2-3.  Assessment and plan of treatment:	Follow up with your Cardiologist Dr. De La Rosa for further monitoring in 1-2 weeks. Please call to arrange appointment.  Secondary Diagnosis:	HLD (hyperlipidemia)  Goal:	Ensure compliance with Lovastatin at bedtime.  Assessment and plan of treatment:	Follow up with your primary care physician for further monitoring in 1-2 weeks. Please call to arrange appointment. Low cholesterol diet. Salt restriction.  Secondary Diagnosis:	Essential hypertension  Goal:	Ensure compliance with your medications to maintain goal /80mmhg.  Assessment and plan of treatment:	Follow up with your primary care physician for further monitoring in 1-2 weeks. Please call to arrange appointment. Low cholesterol diet. Salt restriction.  Secondary Diagnosis:	YOVANI on CPAP  Goal:	Ensure compliance with your CPAP nightly.  Assessment and plan of treatment:	Follow up with your primary care physician for further monitoring in 1-2 weeks. Please call to arrange appointment.  Secondary Diagnosis:	Pacemaker  Assessment and plan of treatment:	Follow up with your electrophysiologist Dr. Field, appointment arranged next month.  Secondary Diagnosis:	Osteopenia  Goal:	Ensure compliance with your medications.  Assessment and plan of treatment:	Follow up with your primary care physician for further monitoring in 1-2 weeks. Please call to arrange appointment.

## 2019-02-19 NOTE — PHYSICAL THERAPY INITIAL EVALUATION ADULT - DIAGNOSIS, PT EVAL
Pt admitted to r/o CVA;  No CT evidence of acute intracranial hemorrhage, mass effect or acute territorial infarct.  Vague hypoattenuating foci in the basal  regions, internal capsules and thalami which may reflect lacunar; CT spine (-)fx; pt presents with decreased balance and decreased visual tracking

## 2019-02-19 NOTE — DISCHARGE NOTE ADULT - PROVIDER TOKENS
FREE:[LAST:[[facility]],PHONE:[phone]],FAX:[(   )    -]] FREE:[LAST:[Neurology Clinic],PHONE:[phone]],FAX:[(   )    -]],PROVIDER:[TOKEN:[[id number]]]

## 2019-02-19 NOTE — H&P ADULT - NSHPPHYSICALEXAM_GEN_ALL_CORE
T(C): 36.6 (02-18-19 @ 21:27), Max: 36.8 (02-18-19 @ 17:14)  HR: 73 (02-18-19 @ 21:27) (73 - 92)  BP: 134/95 (02-18-19 @ 21:27) (134/95 - 147/83)  RR: 17 (02-18-19 @ 21:27) (16 - 18)  SpO2: 97% (02-18-19 @ 21:27) (97% - 99%)    GENERAL: No acute distress, well-developed  HEAD:  Atraumatic, Normocephalic  ENT: EOMI, PERRLA, conjunctiva and sclera clear, Neck supple, No JVD, moist mucosa, no pharyngeal erythema, no tonsillar enlargement or exudate  CHEST/LUNG: Clear to auscultation bilaterally; No wheeze, equal breath sounds bilaterally   HEART: Regular rate and rhythm; No murmurs, rubs, or gallops  ABDOMEN: Soft, Nontender, Nondistended; Bowel sounds present, no organomegaly  EXTREMITIES:  2+ Peripheral Pulses, No clubbing, cyanosis, or edema  PSYCH: AAOx3, normal affect, normal behavior   NEUROLOGY: non-focal, cranial nerves intact, 5/5 strength in all extremities, normal sensation throughout   SKIN: Normal color, No rashes or lesions

## 2019-02-19 NOTE — H&P ADULT - NSHPREVIEWOFSYSTEMS_GEN_ALL_CORE
REVIEW OF SYSTEMS:    CONSTITUTIONAL: No weakness, fevers or chills, no weight loss  EYES/ENT: No visual changes;  No dysphagia or odynophagia, no tinnitus  NECK: No pain or stiffness  RESPIRATORY: No cough, wheezing, hemoptysis; No shortness of breath  CARDIOVASCULAR: No chest pain or palpitations; No lower extremity edema  GASTROINTESTINAL: No abdominal or epigastric pain. No nausea, vomiting, or hematemesis; No diarrhea or constipation. No melena or hematochezia.  MUSCULOSKELETAL: No joint pain, swelling, erythema or warmth, no back pain  GENITOURINARY: No dysuria, frequency or hematuria, no suprapubic pain  NEUROLOGICAL: + numbness, no weakness, + headache, no syncope, no gait abnormalities   SKIN: No itching, burning, rashes, or lesions   All other review of systems is negative unless indicated above.

## 2019-02-19 NOTE — H&P ADULT - HISTORY OF PRESENT ILLNESS
86 yo F PMH of A-fib on coumadin, s/p pacemaker 2009, CHF, HTN, HLD, YOVANI presents with tingling sensation in upper and lower extremities, nose and lips x 1 day. Numbness L>R arm. Since presentation her numbness has improved. She denies any focal weakness, no gait abnormality- walks with a cane at baseline and unchanged. No change in her vision. She does complain of headache, posterior and neck region, non throbbing, no nausea. She denies any chest pain, shortness of breath or palpitations. No recent illness ,no fevers or chills, no cough, no GI or  symptoms.    VS: 134/95  73  97.9  17  97% on RA

## 2019-02-19 NOTE — PROGRESS NOTE ADULT - SUBJECTIVE AND OBJECTIVE BOX
Chen Durant MD  Pager [phone]    CHIEF COMPLAINT: Patient is a 85y old  female who presents with a chief complaint of Numbness, r/o stroke (19 Feb 2019 00:18)      SUBJECTIVE / OVERNIGHT EVENTS:  pt still c/o numbness/tingling of her lips and nose, left hand , no focal weakness or dysarthria    MEDICATIONS  (STANDING):  aspirin  chewable 81 milliGRAM(s) Oral daily  ATENolol  Tablet 100 milliGRAM(s) Oral daily  atorvastatin 40 milliGRAM(s) Oral at bedtime  furosemide    Tablet 40 milliGRAM(s) Oral daily  losartan 100 milliGRAM(s) Oral daily  warfarin 5 milliGRAM(s) Oral once    MEDICATIONS  (PRN):  acetaminophen   Tablet .. 650 milliGRAM(s) Oral every 6 hours PRN Mild Pain (1 - 3), Moderate Pain (4 - 6), Severe Pain (7 - 10)      VITALS:  T(F): 97.6 (02-19-19 @ 13:23), Max: 98.3 (02-18-19 @ 17:14)  HR: 74 (02-19-19 @ 13:23) (73 - 85)  BP: 137/75 (02-19-19 @ 13:23) (134/95 - 148/79)  RR: 15 (02-19-19 @ 13:23) (15 - 18)  SpO2: 100% (02-19-19 @ 13:23)      CAPILLARY BLOOD GLUCOSE    Output     I&O's Summary  T(F): 97.6 (02-19-19 @ 13:23), Max: 98.3 (02-18-19 @ 17:14)  HR: 74 (02-19-19 @ 13:23) (73 - 85)  BP: 137/75 (02-19-19 @ 13:23) (134/95 - 148/79)  RR: 15 (02-19-19 @ 13:23) (15 - 18)  SpO2: 100% (02-19-19 @ 13:23)    PHYSICAL EXAM:  GENERAL: NAD, well-developed  HEAD:  Atraumatic, Normocephalic  EYES: EOMI, PERRLA, conjunctiva and sclera clear  NECK: Supple, No JVD  CHEST/LUNG: Clear to auscultation bilaterally; No wheeze  HEART: Regular rate and rhythm; No murmurs, rubs, or gallops  ABDOMEN: Soft, Nontender, Nondistended; Bowel sounds present  EXTREMITIES:  2+ Peripheral Pulses, No clubbing, cyanosis, or edema  PSYCH: AAOx3  NEUROLOGY: strength 5/5 in all extremities, numbness/tingling left hand and lips/nose  SKIN: No rashes or lesions    LABS:              15.1                 141  | 28   | 26           11.42 >-----------< 347     ------------------------< 96                    49.0                 3.7  | 100  | 0.95                                         Ca 9.0   Mg 1.7   Ph 3.4         TPro  6.5  /  Alb  3.8      TBili  2.4  /  DBili  x         AST  19  /  ALT  15            AlkPhos  57      INR: 2.42<H>;    PT: 28.4 SEC<H>;    PTT: 48.3 SEC<H>      MICROBIOLOGY:    RADIOLOGY & ADDITIONAL TESTS:    Imaging Personally Reviewed:  < from: CT Head No Cont (02.18.19 @ 17:56) >  IMPRESSION:   CT Head:  No CT evidence of acute intracranial hemorrhage, mass effect or   acute territorial infarct.  Vague hypoattenuating foci in the basal   regions, internal capsules and thalami which may reflect lacunar   infarcts; these could be better characterized with MRI.    CT Cervical Spine: CT evidence of cervical spine fracture or traumatic   malalignment.  Multilevel degenerative changes as discussed above.  Mild   to moderate spinal canal stenosis at C4-C5 and C5-C6.  Moderate to severe   right-sided neural foraminal narrowing at C4-C5 through C6-C7.    < from: CT Angio Neck w/ IV Cont (02.17.19 @ 23:57) >  IMPRESSION:     CT brain: No abnormal areasof enhancement.    CT angiography neck: No evidence of hemodynamically significant stenosis   by NASCET criteria. No evidence of vascular dissection.    CT angiography brain: No major vessel occlusion or proximal stenosis by   NASCET criteria. No evidence of aneurysm or other vascular malformation.      [ ] Consultant(s) Notes Reviewed:  [x ] Care Discussed with Consultants/Other Providers: neurology resident, TTE w/ bubble study, will review CT imaging and give further input

## 2019-02-20 NOTE — OCCUPATIONAL THERAPY INITIAL EVALUATION ADULT - PERTINENT HX OF CURRENT PROBLEM, REHAB EVAL
Pt is an 85 year old female with hx of AFib s/p pacemaker 2009, CHF, HTN, HLD, & YOVANI, who presented to [facility] on 2/18/19 with tingling sensation in upper and lower extremities, nose and lips x 1 day. CT Head No Contrast on 2/18/19 displayed no evidence of acute intracranial hemorrhage, mass effect or acute territorial infarct.

## 2019-02-22 ENCOUNTER — OTHER (OUTPATIENT)
Age: 84
End: 2019-02-22

## 2019-02-25 ENCOUNTER — OTHER (OUTPATIENT)
Age: 84
End: 2019-02-25

## 2019-02-25 ENCOUNTER — APPOINTMENT (OUTPATIENT)
Dept: NEUROLOGY | Facility: CLINIC | Age: 84
End: 2019-02-25
Payer: MEDICARE

## 2019-02-25 VITALS
HEIGHT: 64 IN | HEART RATE: 87 BPM | DIASTOLIC BLOOD PRESSURE: 84 MMHG | WEIGHT: 170 LBS | BODY MASS INDEX: 29.02 KG/M2 | SYSTOLIC BLOOD PRESSURE: 145 MMHG

## 2019-02-25 DIAGNOSIS — G43.009 MIGRAINE W/OUT AURA, NOT INTRACTABLE, W/OUT STATUS MIGRAINOSUS: ICD-10-CM

## 2019-02-25 PROCEDURE — 99495 TRANSJ CARE MGMT MOD F2F 14D: CPT

## 2019-03-04 ENCOUNTER — APPOINTMENT (OUTPATIENT)
Dept: OPHTHALMOLOGY | Facility: CLINIC | Age: 84
End: 2019-03-04
Payer: MEDICARE

## 2019-03-04 PROCEDURE — 99203 OFFICE O/P NEW LOW 30 MIN: CPT

## 2019-03-13 ENCOUNTER — APPOINTMENT (OUTPATIENT)
Dept: OPHTHALMOLOGY | Facility: CLINIC | Age: 84
End: 2019-03-13

## 2019-04-01 ENCOUNTER — APPOINTMENT (OUTPATIENT)
Dept: OPHTHALMOLOGY | Facility: CLINIC | Age: 84
End: 2019-04-01

## 2019-04-21 ENCOUNTER — EMERGENCY (EMERGENCY)
Facility: HOSPITAL | Age: 84
LOS: 1 days | Discharge: HOME | End: 2019-04-21
Attending: EMERGENCY MEDICINE | Admitting: EMERGENCY MEDICINE
Payer: MEDICARE

## 2019-04-21 VITALS
DIASTOLIC BLOOD PRESSURE: 91 MMHG | HEART RATE: 84 BPM | RESPIRATION RATE: 18 BRPM | OXYGEN SATURATION: 100 % | SYSTOLIC BLOOD PRESSURE: 153 MMHG | TEMPERATURE: 98 F

## 2019-04-21 VITALS
RESPIRATION RATE: 18 BRPM | OXYGEN SATURATION: 100 % | HEART RATE: 58 BPM | TEMPERATURE: 98 F | SYSTOLIC BLOOD PRESSURE: 151 MMHG | DIASTOLIC BLOOD PRESSURE: 101 MMHG

## 2019-04-21 DIAGNOSIS — Z90.710 ACQUIRED ABSENCE OF BOTH CERVIX AND UTERUS: Chronic | ICD-10-CM

## 2019-04-21 DIAGNOSIS — Z95.0 PRESENCE OF CARDIAC PACEMAKER: Chronic | ICD-10-CM

## 2019-04-21 DIAGNOSIS — T14.8 OTHER INJURY OF UNSPECIFIED BODY REGION: Chronic | ICD-10-CM

## 2019-04-21 DIAGNOSIS — Z82.8 FAMILY HISTORY OF OTHER DISABILITIES AND CHRONIC DISEASES LEADING TO DISABLEMENT, NOT ELSEWHERE CLASSIFIED: Chronic | ICD-10-CM

## 2019-04-21 DIAGNOSIS — H26.9 UNSPECIFIED CATARACT: Chronic | ICD-10-CM

## 2019-04-21 DIAGNOSIS — Z84.89 FAMILY HISTORY OF OTHER SPECIFIED CONDITIONS: Chronic | ICD-10-CM

## 2019-04-21 LAB
ALBUMIN SERPL ELPH-MCNC: 4 G/DL — SIGNIFICANT CHANGE UP (ref 3.3–5)
ALP SERPL-CCNC: 58 U/L — SIGNIFICANT CHANGE UP (ref 40–120)
ALT FLD-CCNC: 12 U/L — SIGNIFICANT CHANGE UP (ref 4–33)
ANION GAP SERPL CALC-SCNC: 18 MMO/L — HIGH (ref 7–14)
AST SERPL-CCNC: 23 U/L — SIGNIFICANT CHANGE UP (ref 4–32)
BASE EXCESS BLDV CALC-SCNC: 5.7 MMOL/L — SIGNIFICANT CHANGE UP
BASOPHILS # BLD AUTO: 0.09 K/UL — SIGNIFICANT CHANGE UP (ref 0–0.2)
BASOPHILS NFR BLD AUTO: 0.8 % — SIGNIFICANT CHANGE UP (ref 0–2)
BILIRUB SERPL-MCNC: 1.6 MG/DL — HIGH (ref 0.2–1.2)
BLOOD GAS VENOUS - CREATININE: 0.93 MG/DL — SIGNIFICANT CHANGE UP (ref 0.5–1.3)
BUN SERPL-MCNC: 26 MG/DL — HIGH (ref 7–23)
CALCIUM SERPL-MCNC: 9.1 MG/DL — SIGNIFICANT CHANGE UP (ref 8.4–10.5)
CHLORIDE BLDV-SCNC: 104 MMOL/L — SIGNIFICANT CHANGE UP (ref 96–108)
CHLORIDE SERPL-SCNC: 101 MMOL/L — SIGNIFICANT CHANGE UP (ref 98–107)
CO2 SERPL-SCNC: 24 MMOL/L — SIGNIFICANT CHANGE UP (ref 22–31)
CREAT SERPL-MCNC: 0.97 MG/DL — SIGNIFICANT CHANGE UP (ref 0.5–1.3)
EOSINOPHIL # BLD AUTO: 0.21 K/UL — SIGNIFICANT CHANGE UP (ref 0–0.5)
EOSINOPHIL NFR BLD AUTO: 1.8 % — SIGNIFICANT CHANGE UP (ref 0–6)
GAS PNL BLDV: 139 MMOL/L — SIGNIFICANT CHANGE UP (ref 136–146)
GLUCOSE BLDV-MCNC: 105 — HIGH (ref 70–99)
GLUCOSE SERPL-MCNC: 102 MG/DL — HIGH (ref 70–99)
HCO3 BLDV-SCNC: 27 MMOL/L — SIGNIFICANT CHANGE UP (ref 20–27)
HCT VFR BLD CALC: 48.7 % — HIGH (ref 34.5–45)
HCT VFR BLDV CALC: 47.8 % — HIGH (ref 34.5–45)
HGB BLD-MCNC: 15.5 G/DL — SIGNIFICANT CHANGE UP (ref 11.5–15.5)
HGB BLDV-MCNC: 15.6 G/DL — HIGH (ref 11.5–15.5)
IMM GRANULOCYTES NFR BLD AUTO: 0.6 % — SIGNIFICANT CHANGE UP (ref 0–1.5)
INR BLD: 1.93 — HIGH (ref 0.88–1.17)
LACTATE BLDV-MCNC: 1.6 MMOL/L — SIGNIFICANT CHANGE UP (ref 0.5–2)
LYMPHOCYTES # BLD AUTO: 1.66 K/UL — SIGNIFICANT CHANGE UP (ref 1–3.3)
LYMPHOCYTES # BLD AUTO: 14.2 % — SIGNIFICANT CHANGE UP (ref 13–44)
MAGNESIUM SERPL-MCNC: 1.9 MG/DL — SIGNIFICANT CHANGE UP (ref 1.6–2.6)
MCHC RBC-ENTMCNC: 28.2 PG — SIGNIFICANT CHANGE UP (ref 27–34)
MCHC RBC-ENTMCNC: 31.8 % — LOW (ref 32–36)
MCV RBC AUTO: 88.5 FL — SIGNIFICANT CHANGE UP (ref 80–100)
MONOCYTES # BLD AUTO: 0.81 K/UL — SIGNIFICANT CHANGE UP (ref 0–0.9)
MONOCYTES NFR BLD AUTO: 6.9 % — SIGNIFICANT CHANGE UP (ref 2–14)
NEUTROPHILS # BLD AUTO: 8.82 K/UL — HIGH (ref 1.8–7.4)
NEUTROPHILS NFR BLD AUTO: 75.7 % — SIGNIFICANT CHANGE UP (ref 43–77)
NRBC # FLD: 0 K/UL — SIGNIFICANT CHANGE UP (ref 0–0)
PCO2 BLDV: 52 MMHG — HIGH (ref 41–51)
PH BLDV: 7.39 PH — SIGNIFICANT CHANGE UP (ref 7.32–7.43)
PLATELET # BLD AUTO: 385 K/UL — SIGNIFICANT CHANGE UP (ref 150–400)
PMV BLD: 12.2 FL — SIGNIFICANT CHANGE UP (ref 7–13)
PO2 BLDV: 26 MMHG — LOW (ref 35–40)
POTASSIUM BLDV-SCNC: 3.7 MMOL/L — SIGNIFICANT CHANGE UP (ref 3.4–4.5)
POTASSIUM SERPL-MCNC: 4.1 MMOL/L — SIGNIFICANT CHANGE UP (ref 3.5–5.3)
POTASSIUM SERPL-SCNC: 4.1 MMOL/L — SIGNIFICANT CHANGE UP (ref 3.5–5.3)
PROT SERPL-MCNC: 6.9 G/DL — SIGNIFICANT CHANGE UP (ref 6–8.3)
PROTHROM AB SERPL-ACNC: 21.9 SEC — HIGH (ref 9.8–13.1)
RBC # BLD: 5.5 M/UL — HIGH (ref 3.8–5.2)
RBC # FLD: 15.1 % — HIGH (ref 10.3–14.5)
SAO2 % BLDV: 39.5 % — LOW (ref 60–85)
SODIUM SERPL-SCNC: 143 MMOL/L — SIGNIFICANT CHANGE UP (ref 135–145)
TROPONIN T, HIGH SENSITIVITY: 8 NG/L — SIGNIFICANT CHANGE UP (ref ?–14)
TROPONIN T, HIGH SENSITIVITY: 8 NG/L — SIGNIFICANT CHANGE UP (ref ?–14)
WBC # BLD: 11.66 K/UL — HIGH (ref 3.8–10.5)
WBC # FLD AUTO: 11.66 K/UL — HIGH (ref 3.8–10.5)

## 2019-04-21 PROCEDURE — 93010 ELECTROCARDIOGRAM REPORT: CPT

## 2019-04-21 PROCEDURE — 99284 EMERGENCY DEPT VISIT MOD MDM: CPT | Mod: 25,GC

## 2019-04-21 PROCEDURE — 70450 CT HEAD/BRAIN W/O DYE: CPT | Mod: 26

## 2019-04-21 RX ORDER — ATENOLOL 25 MG/1
100 TABLET ORAL ONCE
Qty: 0 | Refills: 0 | Status: COMPLETED | OUTPATIENT
Start: 2019-04-21 | End: 2019-04-21

## 2019-04-21 RX ADMIN — ATENOLOL 100 MILLIGRAM(S): 25 TABLET ORAL at 13:04

## 2019-04-21 NOTE — ED ADULT TRIAGE NOTE - CHIEF COMPLAINT QUOTE
pt had stroke in feb which started with tingling and numbness and yesterday developed tingling in left hand, pt still having tingling in left hand  pt was switched  to Eliquis mar 22  [facility] doc called for eval of poss. stroke / pt not eligible for stroke study/ requires main

## 2019-04-21 NOTE — CONSULT NOTE ADULT - ASSESSMENT
85y Female PMH YOVANI on CPAP (inconsistent use), HTN, HLD, CHF w/ MRI compatible pacemaker, Atrial fibrillation on eliquis here w/ L sided numbness, tingling. CTA H/N done 2/18 unremarkable, CT head w/o contrast with hypoattenuation in bilateral thalami possibly consistent with infarction. Had similar symptoms on former admission on 2/17/19    Recs: 85y Female PMH YOVANI on CPAP (inconsistent use), HTN, HLD, CHF w/ MRI compatible pacemaker, Atrial fibrillation on eliquis here w/ L sided numbness, tingling. NIHSS 1, MRS 0. CTA H/N done 2/18 unremarkable, CT head w/o contrast with hypoattenuation in bilateral thalami possibly consistent with infarction. Had similar symptoms on former admission on 2/17/19, currently mostly resolved. L arm sensory numbness possibly 2/2 R brain deficit, etiology unknown; possibly 2/2 small vessel disease. Would repeat CT head w/o contrast to assess for bleed/additional infarct; otherwise patient is on maximal medical therapy for stroke and is stable for follow up outpatient.     Recs:  CT head w/o contrast  If negative, follow up with [facility] outpatient for MRI head w/o contrast

## 2019-04-21 NOTE — ED PROVIDER NOTE - ATTENDING CONTRIBUTION TO CARE
84 yo F PMH of A-fib on eliquis, s/p pacemaker 2009, CHF, HTN, HLD, YOVANI presents with tingling sensation in LUE, perioral & L sided toes since yesterday afternoon. now only having pins/needles L forearm & L hand. this last occurred in february when pt was admitted & ct a h/n showed  Vague hypoattenuating foci in the basal regions, internal capsules and thalami which may reflect lacunar infarcts. Seen by Dr Libman at that time. ROS negative for: fever, chest pain, SOB, Nausea, vomiting, diarrhea, abdominal pain, dysuria, focal areas of weakness On exam: VSS lungs, heart, pulses, abdomen, neuro, extremities, skin, all normal on exam. IMP: Paresthesias reminiscent of prior stroke. No focal neuro deficits. Plan: labs EKG CT head neuro consult

## 2019-04-21 NOTE — ED PROVIDER NOTE - CLINICAL SUMMARY MEDICAL DECISION MAKING FREE TEXT BOX
L forearm & hand tingling since yesterday afternnon. CT h from 2months ago showed. Vague hypoattenuating foci in the basal regions, internal capsules and thalami which may reflect lacunar infarcts; these could be better characterized with MRI. will get ct head & consult neuro

## 2019-04-21 NOTE — ED PROVIDER NOTE - PROGRESS NOTE DETAILS
neuro rec outpt mri which should be compatible w/ new ppm. dr [facility] will call pt to make appt neuro discussed case with attending and recommends dc home and outpt mri which should be compatible w/ new ppm. dr [facility] will call pt to make appt

## 2019-04-21 NOTE — CONSULT NOTE ADULT - SUBJECTIVE AND OBJECTIVE BOX
Neurology Consult    85y Female PMH YOVANI on CPAP (inconsistent use), HTN, HLD, CHF w/ MRI compatible pacemaker, Atrial fibrillation on eliquis here w/ L sided numbness, tingling.     Had similar symptoms on former admission on 2/17/19. Patient following with [facility].     REVIEW OF SYSTEMS:  As above    MEDICATIONS    PMH: OA (osteoarthritis)  YOVANI on CPAP  Thyroid nodule  UTI (urinary tract infection)  HLD (hyperlipidemia)  HTN (hypertension)  Atrial fibrillation       PSH: Cardiac pacemaker  Fracture  FHx: cholecystectomy  S/P JAY-BSO  Cataract  FHx: total knee replacement      FAMILY HISTORY:    No history of dementia, strokes, or seizures     SOCIAL HISTORY:  No history of tobacco or alcohol use     Allergies    sulfa drugs (Unknown)    Intolerances    OxyContin (Sedation/Somnol; Drowsiness)      Vital Signs Last 24 Hrs  T(C): 36.4 (21 Apr 2019 11:46), Max: 36.5 (21 Apr 2019 11:21)  T(F): 97.6 (21 Apr 2019 11:46), Max: 97.7 (21 Apr 2019 11:21)  HR: 90 (21 Apr 2019 11:46) (58 - 90)  BP: 161/86 (21 Apr 2019 11:46) (151/101 - 161/86)  BP(mean): --  RR: 18 (21 Apr 2019 11:46) (18 - 18)  SpO2: 99% (21 Apr 2019 11:46) (99% - 100%)    Neurological Examination:    Mental Status: Patient is alert, awake, oriented X3. Patient is fluent, no dysarthria, no aphasia. Follows commands well and able to answer questions appropriately. Mood and affect normal.    Cranial Nerves: PERRL, EOMI, visual field intact, V1-V3 intact, no gross facial asymmetry, tongue/uvula midline    Motor Exam: No drift  Right upper extremity: 5/5  Left upper extremity: 5/5  Right lower extremity: 5/5  Left lower extremity: 5/5    Normal bulk/tone    Sensory: Intact to light touch and pinprick bilaterally. No extinction    Coordination: Finger to nose intact bilaterally     Gait: Normal      Reflexes: Bilateral 2+ Biceps, Brachial, Patellar, Ankle  Plantar: Down bilateral    GENERAL: No acute distress  HEENT:  Normocephalic, atraumatic  EXTREMITIES: No edema, clubbing, cyanosis  MUSCULOSKELETAL: Normal range of motion  SKIN: No rashes    LABS:  CBC Full  -  ( 21 Apr 2019 12:13 )  WBC Count : 11.66 K/uL  RBC Count : 5.50 M/uL  Hemoglobin : 15.5 g/dL  Hematocrit : 48.7 %  Platelet Count - Automated : 385 K/uL  Mean Cell Volume : 88.5 fL  Mean Cell Hemoglobin : 28.2 pg  Mean Cell Hemoglobin Concentration : 31.8 %  Auto Neutrophil # : 8.82 K/uL  Auto Lymphocyte # : 1.66 K/uL  Auto Monocyte # : 0.81 K/uL  Auto Eosinophil # : 0.21 K/uL  Auto Basophil # : 0.09 K/uL  Auto Neutrophil % : 75.7 %  Auto Lymphocyte % : 14.2 %  Auto Monocyte % : 6.9 %  Auto Eosinophil % : 1.8 %  Auto Basophil % : 0.8 % Neurology Consult    85y Female PMH YOVANI on CPAP (inconsistent use), HTN, HLD, CHF w/ MRI compatible pacemaker, Atrial fibrillation on eliquis here w/ L sided numbness, tingling. LKW 4/20 1400. Was sitting and watching TV when she experienced tingling in L arm(fingers to elbow), had similar sensations on 2/17 during prior admission but current event not as severe. Denies weakness, headache, vision changes, dysarthria, dysphagia. No recent illnesses. Currently symptoms feel improved from prior.      Patient following with [facility].     REVIEW OF SYSTEMS:  As above    MEDICATIONS    PMH: OA (osteoarthritis)  YOVANI on CPAP  Thyroid nodule  UTI (urinary tract infection)  HLD (hyperlipidemia)  HTN (hypertension)  Atrial fibrillation       PSH: Cardiac pacemaker  Fracture  FHx: cholecystectomy  S/P JAY-BSO  Cataract  FHx: total knee replacement      FAMILY HISTORY:    No history of dementia, strokes, or seizures     SOCIAL HISTORY:  No history of tobacco or alcohol use     Allergies    sulfa drugs (Unknown)    Intolerances    OxyContin (Sedation/Somnol; Drowsiness)      Vital Signs Last 24 Hrs  T(C): 36.4 (21 Apr 2019 11:46), Max: 36.5 (21 Apr 2019 11:21)  T(F): 97.6 (21 Apr 2019 11:46), Max: 97.7 (21 Apr 2019 11:21)  HR: 90 (21 Apr 2019 11:46) (58 - 90)  BP: 161/86 (21 Apr 2019 11:46) (151/101 - 161/86)  BP(mean): --  RR: 18 (21 Apr 2019 11:46) (18 - 18)  SpO2: 99% (21 Apr 2019 11:46) (99% - 100%)    Neurological Examination:    Mental Status: Patient is alert, awake, oriented X3. Patient is fluent, no dysarthria, no aphasia. Follows commands well and able to answer questions appropriately. Mood and affect normal.    Cranial Nerves: EOMI, visual field intact, V1-V3 intact, no gross facial asymmetry, tongue/uvula midline    Motor Exam: No drift  Right upper extremity: 5/5  Left upper extremity: 5/5  Right lower extremity: 5/5  Left lower extremity: 5/5    Normal bulk/tone    Sensory: Intact to light touch bilaterally. No extinction    Coordination: Finger to nose intact bilaterally     Reflexes: Bilateral 2+ Biceps, Brachial, +0 Patellar, Ankle  Plantar: Down bilateral    GENERAL: No acute distress  HEENT:  Normocephalic, atraumatic  EXTREMITIES: No edema, clubbing, cyanosis  MUSCULOSKELETAL: Normal range of motion  SKIN: No rashes    LABS:  CBC Full  -  ( 21 Apr 2019 12:13 )  WBC Count : 11.66 K/uL  RBC Count : 5.50 M/uL  Hemoglobin : 15.5 g/dL  Hematocrit : 48.7 %  Platelet Count - Automated : 385 K/uL  Mean Cell Volume : 88.5 fL  Mean Cell Hemoglobin : 28.2 pg  Mean Cell Hemoglobin Concentration : 31.8 %  Auto Neutrophil # : 8.82 K/uL  Auto Lymphocyte # : 1.66 K/uL  Auto Monocyte # : 0.81 K/uL  Auto Eosinophil # : 0.21 K/uL  Auto Basophil # : 0.09 K/uL  Auto Neutrophil % : 75.7 %  Auto Lymphocyte % : 14.2 %  Auto Monocyte % : 6.9 %  Auto Eosinophil % : 1.8 %  Auto Basophil % : 0.8 %

## 2019-04-21 NOTE — ED ADULT NURSE NOTE - OBJECTIVE STATEMENT
84 yo c/o of tinling in the left hand, left hand and toes since last night. at present, pt denies tingling. pt states the tingling was the precursor to last stroke on 2/17/19. pmh A-fib, osteoarthritis, stroke, HTN, HLD, UTI. pt has new pacemaker placed on 3/25/19. site is clean, no bleeding, no redness. pt AOx4, ambulatory and uses cane when walking outside. pt appears calm, comfortable. pt denies pain, NVD. physical: PERLLA, sensation intact, no neuro deficits on all extremities. lung sounds clear bilateral.

## 2019-04-21 NOTE — ED PROVIDER NOTE - OBJECTIVE STATEMENT
84 yo F PMH of A-fib on eliquis, s/p pacemaker 2009, CHF, HTN, HLD, YOVANI presents with tingling sensation in LUE, perioral & L sided toes since yesterday afternoon. now only having pins/needles L forearm & L hand. this last occurred in february when pt was admitted & ct a h/n showed  Vague hypoattenuating foci in the basal regions, internal capsules and thalami which may reflect lacunar infarcts. ROS negative for: fever, chest pain, SOB, Nausea, vomiting, diarrhea, abdominal pain, dysuria, focal areas of weakness 84 yo F PMH of A-fib on eliquis, s/p pacemaker 2009, CHF, HTN, HLD, YOVANI presents with tingling sensation in LUE, perioral & L sided toes since yesterday afternoon. now only having pins/needles L forearm & L hand. this last occurred in february when pt was admitted & ct a h/n showed  Vague hypoattenuating foci in the basal regions, internal capsules and thalami which may reflect lacunar infarcts. Seen by Dr Libman at that time. ROS negative for: fever, chest pain, SOB, Nausea, vomiting, diarrhea, abdominal pain, dysuria, focal areas of weakness 86 yo F PMH of A-fib on eliquis, s/p pacemaker 2009, CHF, HTN, HLD, YOVANI presents with tingling sensation in LUE, perioral & L sided toes since yesterday afternoon. now only having pins/needles L forearm & L hand. this last occurred in february when pt was admitted & ct a h/n showed  Vague hypoattenuating foci in the basal regions, internal capsules and thalami which may reflect lacunar infarcts. Seen by Dr Libman at that time and saw Dr Bocanegra after dc.  ROS negative for: fever, chest pain, SOB, Nausea, vomiting, diarrhea, abdominal pain, dysuria, focal areas of weakness

## 2019-04-21 NOTE — ED ADULT NURSE REASSESSMENT NOTE - NS ED NURSE REASSESS COMMENT FT1
pt c/o pf slight tingling in left arm and hand. pt denies pain, nausea, vomiting. pt comfortable, calm. will continue to monitor.

## 2019-04-21 NOTE — ED PROVIDER NOTE - CARE PROVIDER_API CALL
Gordo Quiroz (DO)  Neurology; Vascular Neurology  [address]  [place] [address]  Phone: [phone]  Fax: [phone]  Follow Up Time:     Schuyler Bocanegra)  Neurology  [address], [address]  [place], [place] [id number]  Phone: [phone]  Fax: [phone]  Follow Up Time:

## 2019-04-21 NOTE — ED PROVIDER NOTE - NSFOLLOWUPINSTRUCTIONS_ED_ALL_ED_FT
FOLLOW UP WITH PMD  & NEUROLOGIST DR ANTHONY & LOTTIE WITHIN 1-2DAYS, CALL TO MAKE APPOINTMENT  COME BACK TO ED IF YOUR CONDITION WORSENS OR IF YOU DEVELOP FEVER GREATER THAN 100.4F, CHEST PAIN,  SHORTNESS OF BREATH, AREAS OF WEAKNESS  OR ANY OTHER SYMPTOMS CONCERNING TO YOU  TAKE TYLENOL (ACETAMINOPHEN) 650 MG EVERY 6 HOURS AS NEEDED FOR PAIN

## 2019-04-21 NOTE — ED PROVIDER NOTE - PHYSICAL EXAMINATION
CN 2-12 intact, normal finger to nose & heel to shin; no dysdiadochokinesis; equal & normal strength & sensation in b/l UE/LE; full active & passive ROM in all extremeties,  no pronator drift, normal patellar reflex, normal gait,

## 2019-04-29 ENCOUNTER — INPATIENT (INPATIENT)
Facility: HOSPITAL | Age: 84
LOS: 3 days | Discharge: NURSING, REHAB OR LONG-TERM CARE | End: 2019-05-03
Attending: INTERNAL MEDICINE | Admitting: INTERNAL MEDICINE
Payer: MEDICARE

## 2019-04-29 VITALS
OXYGEN SATURATION: 100 % | DIASTOLIC BLOOD PRESSURE: 92 MMHG | HEART RATE: 63 BPM | RESPIRATION RATE: 16 BRPM | TEMPERATURE: 98 F | SYSTOLIC BLOOD PRESSURE: 140 MMHG

## 2019-04-29 DIAGNOSIS — T14.8 OTHER INJURY OF UNSPECIFIED BODY REGION: Chronic | ICD-10-CM

## 2019-04-29 DIAGNOSIS — H26.9 UNSPECIFIED CATARACT: Chronic | ICD-10-CM

## 2019-04-29 DIAGNOSIS — Z84.89 FAMILY HISTORY OF OTHER SPECIFIED CONDITIONS: Chronic | ICD-10-CM

## 2019-04-29 DIAGNOSIS — Z90.710 ACQUIRED ABSENCE OF BOTH CERVIX AND UTERUS: Chronic | ICD-10-CM

## 2019-04-29 DIAGNOSIS — Z95.0 PRESENCE OF CARDIAC PACEMAKER: Chronic | ICD-10-CM

## 2019-04-29 DIAGNOSIS — Z82.8 FAMILY HISTORY OF OTHER DISABILITIES AND CHRONIC DISEASES LEADING TO DISABLEMENT, NOT ELSEWHERE CLASSIFIED: Chronic | ICD-10-CM

## 2019-04-29 DIAGNOSIS — I48.91 UNSPECIFIED ATRIAL FIBRILLATION: ICD-10-CM

## 2019-04-29 LAB
ALBUMIN SERPL ELPH-MCNC: 3.7 G/DL — SIGNIFICANT CHANGE UP (ref 3.3–5)
ALP SERPL-CCNC: 44 U/L — SIGNIFICANT CHANGE UP (ref 40–120)
ALT FLD-CCNC: 15 U/L — SIGNIFICANT CHANGE UP (ref 4–33)
ANION GAP SERPL CALC-SCNC: 15 MMO/L — HIGH (ref 7–14)
APPEARANCE UR: CLEAR — SIGNIFICANT CHANGE UP
APTT BLD: 40 SEC — HIGH (ref 27.5–36.3)
AST SERPL-CCNC: SIGNIFICANT CHANGE UP U/L (ref 4–32)
BASE EXCESS BLDV CALC-SCNC: -1.2 MMOL/L — SIGNIFICANT CHANGE UP
BASE EXCESS BLDV CALC-SCNC: 2.6 MMOL/L — SIGNIFICANT CHANGE UP
BASOPHILS # BLD AUTO: 0.09 K/UL — SIGNIFICANT CHANGE UP (ref 0–0.2)
BASOPHILS NFR BLD AUTO: 0.4 % — SIGNIFICANT CHANGE UP (ref 0–2)
BILIRUB SERPL-MCNC: 1.8 MG/DL — HIGH (ref 0.2–1.2)
BILIRUB UR-MCNC: NEGATIVE — SIGNIFICANT CHANGE UP
BLOOD GAS VENOUS - CREATININE: 0.74 MG/DL — SIGNIFICANT CHANGE UP (ref 0.5–1.3)
BLOOD GAS VENOUS - CREATININE: 0.81 MG/DL — SIGNIFICANT CHANGE UP (ref 0.5–1.3)
BLOOD UR QL VISUAL: NEGATIVE — SIGNIFICANT CHANGE UP
BUN SERPL-MCNC: 24 MG/DL — HIGH (ref 7–23)
CALCIUM SERPL-MCNC: 9.1 MG/DL — SIGNIFICANT CHANGE UP (ref 8.4–10.5)
CHLORIDE BLDV-SCNC: 103 MMOL/L — SIGNIFICANT CHANGE UP (ref 96–108)
CHLORIDE BLDV-SCNC: 108 MMOL/L — SIGNIFICANT CHANGE UP (ref 96–108)
CHLORIDE SERPL-SCNC: 99 MMOL/L — SIGNIFICANT CHANGE UP (ref 98–107)
CO2 SERPL-SCNC: 20 MMOL/L — LOW (ref 22–31)
COLOR SPEC: SIGNIFICANT CHANGE UP
CREAT SERPL-MCNC: 0.83 MG/DL — SIGNIFICANT CHANGE UP (ref 0.5–1.3)
EOSINOPHIL # BLD AUTO: 0.02 K/UL — SIGNIFICANT CHANGE UP (ref 0–0.5)
EOSINOPHIL NFR BLD AUTO: 0.1 % — SIGNIFICANT CHANGE UP (ref 0–6)
GAS PNL BLDV: 135 MMOL/L — LOW (ref 136–146)
GAS PNL BLDV: 136 MMOL/L — SIGNIFICANT CHANGE UP (ref 136–146)
GLUCOSE BLDV-MCNC: 120 — HIGH (ref 70–99)
GLUCOSE BLDV-MCNC: 122 — HIGH (ref 70–99)
GLUCOSE SERPL-MCNC: 129 MG/DL — HIGH (ref 70–99)
GLUCOSE UR-MCNC: NEGATIVE — SIGNIFICANT CHANGE UP
HCO3 BLDV-SCNC: 24 MMOL/L — SIGNIFICANT CHANGE UP (ref 20–27)
HCO3 BLDV-SCNC: 25 MMOL/L — SIGNIFICANT CHANGE UP (ref 20–27)
HCT VFR BLD CALC: 48.2 % — HIGH (ref 34.5–45)
HCT VFR BLDV CALC: 42.1 % — SIGNIFICANT CHANGE UP (ref 34.5–45)
HCT VFR BLDV CALC: 46.9 % — HIGH (ref 34.5–45)
HGB BLD-MCNC: 15.3 G/DL — SIGNIFICANT CHANGE UP (ref 11.5–15.5)
HGB BLDV-MCNC: 13.7 G/DL — SIGNIFICANT CHANGE UP (ref 11.5–15.5)
HGB BLDV-MCNC: 15.3 G/DL — SIGNIFICANT CHANGE UP (ref 11.5–15.5)
IMM GRANULOCYTES NFR BLD AUTO: 0.6 % — SIGNIFICANT CHANGE UP (ref 0–1.5)
INR BLD: 1.5 — HIGH (ref 0.88–1.17)
KETONES UR-MCNC: NEGATIVE — SIGNIFICANT CHANGE UP
LACTATE BLDV-MCNC: 1.4 MMOL/L — SIGNIFICANT CHANGE UP (ref 0.5–2)
LACTATE BLDV-MCNC: 2.4 MMOL/L — HIGH (ref 0.5–2)
LEUKOCYTE ESTERASE UR-ACNC: NEGATIVE — SIGNIFICANT CHANGE UP
LYMPHOCYTES # BLD AUTO: 1.29 K/UL — SIGNIFICANT CHANGE UP (ref 1–3.3)
LYMPHOCYTES # BLD AUTO: 5.6 % — LOW (ref 13–44)
MCHC RBC-ENTMCNC: 27.8 PG — SIGNIFICANT CHANGE UP (ref 27–34)
MCHC RBC-ENTMCNC: 31.7 % — LOW (ref 32–36)
MCV RBC AUTO: 87.5 FL — SIGNIFICANT CHANGE UP (ref 80–100)
MONOCYTES # BLD AUTO: 1.04 K/UL — HIGH (ref 0–0.9)
MONOCYTES NFR BLD AUTO: 4.5 % — SIGNIFICANT CHANGE UP (ref 2–14)
NEUTROPHILS # BLD AUTO: 20.41 K/UL — HIGH (ref 1.8–7.4)
NEUTROPHILS NFR BLD AUTO: 88.8 % — HIGH (ref 43–77)
NITRITE UR-MCNC: NEGATIVE — SIGNIFICANT CHANGE UP
NRBC # FLD: 0 K/UL — SIGNIFICANT CHANGE UP (ref 0–0)
NT-PROBNP SERPL-SCNC: 3545 PG/ML — SIGNIFICANT CHANGE UP
PCO2 BLDV: 35 MMHG — LOW (ref 41–51)
PCO2 BLDV: 45 MMHG — SIGNIFICANT CHANGE UP (ref 41–51)
PH BLDV: 7.4 PH — SIGNIFICANT CHANGE UP (ref 7.32–7.43)
PH BLDV: 7.42 PH — SIGNIFICANT CHANGE UP (ref 7.32–7.43)
PH UR: 6 — SIGNIFICANT CHANGE UP (ref 5–8)
PLATELET # BLD AUTO: 369 K/UL — SIGNIFICANT CHANGE UP (ref 150–400)
PMV BLD: 12.5 FL — SIGNIFICANT CHANGE UP (ref 7–13)
PO2 BLDV: 24 MMHG — LOW (ref 35–40)
PO2 BLDV: 59 MMHG — HIGH (ref 35–40)
POTASSIUM BLDV-SCNC: 4 MMOL/L — SIGNIFICANT CHANGE UP (ref 3.4–4.5)
POTASSIUM BLDV-SCNC: 4.4 MMOL/L — SIGNIFICANT CHANGE UP (ref 3.4–4.5)
POTASSIUM SERPL-MCNC: SIGNIFICANT CHANGE UP MMOL/L (ref 3.5–5.3)
POTASSIUM SERPL-SCNC: SIGNIFICANT CHANGE UP MMOL/L (ref 3.5–5.3)
PROT SERPL-MCNC: SIGNIFICANT CHANGE UP G/DL (ref 6–8.3)
PROT UR-MCNC: NEGATIVE — SIGNIFICANT CHANGE UP
PROTHROM AB SERPL-ACNC: 16.9 SEC — HIGH (ref 9.8–13.1)
RBC # BLD: 5.51 M/UL — HIGH (ref 3.8–5.2)
RBC # FLD: 15.6 % — HIGH (ref 10.3–14.5)
SAO2 % BLDV: 34.8 % — LOW (ref 60–85)
SAO2 % BLDV: 89.3 % — HIGH (ref 60–85)
SODIUM SERPL-SCNC: 134 MMOL/L — LOW (ref 135–145)
SP GR SPEC: 1.01 — SIGNIFICANT CHANGE UP (ref 1–1.04)
TROPONIN T, HIGH SENSITIVITY: < 6 NG/L — SIGNIFICANT CHANGE UP (ref ?–14)
UROBILINOGEN FLD QL: NORMAL — SIGNIFICANT CHANGE UP
WBC # BLD: 22.98 K/UL — HIGH (ref 3.8–10.5)
WBC # FLD AUTO: 22.98 K/UL — HIGH (ref 3.8–10.5)

## 2019-04-29 PROCEDURE — 70450 CT HEAD/BRAIN W/O DYE: CPT | Mod: 26

## 2019-04-29 PROCEDURE — 73020 X-RAY EXAM OF SHOULDER: CPT | Mod: 26,LT,59

## 2019-04-29 PROCEDURE — 71045 X-RAY EXAM CHEST 1 VIEW: CPT | Mod: 26

## 2019-04-29 PROCEDURE — 73562 X-RAY EXAM OF KNEE 3: CPT | Mod: 26,LT

## 2019-04-29 PROCEDURE — 72125 CT NECK SPINE W/O DYE: CPT | Mod: 26

## 2019-04-29 PROCEDURE — 73070 X-RAY EXAM OF ELBOW: CPT | Mod: 26,LT

## 2019-04-29 PROCEDURE — 73060 X-RAY EXAM OF HUMERUS: CPT | Mod: 26,LT

## 2019-04-29 PROCEDURE — 73030 X-RAY EXAM OF SHOULDER: CPT | Mod: 26,LT

## 2019-04-29 RX ORDER — DILTIAZEM HCL 120 MG
10 CAPSULE, EXT RELEASE 24 HR ORAL ONCE
Qty: 0 | Refills: 0 | Status: COMPLETED | OUTPATIENT
Start: 2019-04-29 | End: 2019-04-29

## 2019-04-29 RX ORDER — SODIUM CHLORIDE 9 MG/ML
1000 INJECTION INTRAMUSCULAR; INTRAVENOUS; SUBCUTANEOUS ONCE
Qty: 0 | Refills: 0 | Status: COMPLETED | OUTPATIENT
Start: 2019-04-29 | End: 2019-04-29

## 2019-04-29 RX ORDER — OXYCODONE AND ACETAMINOPHEN 5; 325 MG/1; MG/1
1 TABLET ORAL ONCE
Qty: 0 | Refills: 0 | Status: DISCONTINUED | OUTPATIENT
Start: 2019-04-29 | End: 2019-04-29

## 2019-04-29 RX ORDER — TETANUS TOXOID, REDUCED DIPHTHERIA TOXOID AND ACELLULAR PERTUSSIS VACCINE, ADSORBED 5; 2.5; 8; 8; 2.5 [IU]/.5ML; [IU]/.5ML; UG/.5ML; UG/.5ML; UG/.5ML
0.5 SUSPENSION INTRAMUSCULAR ONCE
Qty: 0 | Refills: 0 | Status: COMPLETED | OUTPATIENT
Start: 2019-04-29 | End: 2019-04-29

## 2019-04-29 RX ORDER — POTASSIUM CHLORIDE 20 MEQ
1 PACKET (EA) ORAL
Qty: 0 | Refills: 0 | COMMUNITY

## 2019-04-29 RX ORDER — DILTIAZEM HCL 120 MG
30 CAPSULE, EXT RELEASE 24 HR ORAL ONCE
Qty: 0 | Refills: 0 | Status: COMPLETED | OUTPATIENT
Start: 2019-04-29 | End: 2019-04-29

## 2019-04-29 RX ADMIN — Medication 30 MILLIGRAM(S): at 17:41

## 2019-04-29 RX ADMIN — Medication 10 MILLIGRAM(S): at 15:42

## 2019-04-29 RX ADMIN — TETANUS TOXOID, REDUCED DIPHTHERIA TOXOID AND ACELLULAR PERTUSSIS VACCINE, ADSORBED 0.5 MILLILITER(S): 5; 2.5; 8; 8; 2.5 SUSPENSION INTRAMUSCULAR at 12:26

## 2019-04-29 RX ADMIN — SODIUM CHLORIDE 500 MILLILITER(S): 9 INJECTION INTRAMUSCULAR; INTRAVENOUS; SUBCUTANEOUS at 20:00

## 2019-04-29 RX ADMIN — OXYCODONE AND ACETAMINOPHEN 1 TABLET(S): 5; 325 TABLET ORAL at 22:00

## 2019-04-29 RX ADMIN — OXYCODONE AND ACETAMINOPHEN 1 TABLET(S): 5; 325 TABLET ORAL at 10:45

## 2019-04-29 RX ADMIN — OXYCODONE AND ACETAMINOPHEN 1 TABLET(S): 5; 325 TABLET ORAL at 21:28

## 2019-04-29 RX ADMIN — OXYCODONE AND ACETAMINOPHEN 1 TABLET(S): 5; 325 TABLET ORAL at 11:45

## 2019-04-29 NOTE — ED PROVIDER NOTE - PSH
Cardiac pacemaker  placed 2009  Cataract  b/l lense implant  FHx: cholecystectomy  1993 laparoscopic  FHx: total knee replacement  left 2012  Fracture  ORIF right ankle 1986  S/P JYA-BSO  40 years ago

## 2019-04-29 NOTE — ED ADULT NURSE NOTE - INTERVENTIONS DEFINITIONS
Non-slip footwear when patient is off stretcher/Instruct patient to call for assistance/[place] to call system

## 2019-04-29 NOTE — ED ADULT NURSE NOTE - NSIMPLEMENTINTERV_GEN_ALL_ED
Implemented All Universal Safety Interventions:  [place] to call system. Call bell, personal items and telephone within reach. Instruct patient to call for assistance. Room bathroom lighting operational. Non-slip footwear when patient is off stretcher. Physically safe environment: no spills, clutter or unnecessary equipment. Stretcher in lowest position, wheels locked, appropriate side rails in place. Specific interventions were implemented:

## 2019-04-29 NOTE — ED ADULT TRIAGE NOTE - CHIEF COMPLAINT QUOTE
pt comes to ED by EMS for a fall a trip and fall. pt did not hit her head. pt fell on chest pt is having L shoulder pain. pt is on blood thinners. pt has a pacemaker inserted March 25 th. pt denies any CP

## 2019-04-29 NOTE — CONSULT NOTE ADULT - SUBJECTIVE AND OBJECTIVE BOX
HPI: Patient is an 85 year old female with PMH of A. Fib (on Eliquis), HTN, CHF, +pacemaker, spinal compression fracture, and osteopenia presents to the ED complaining of pain to left shoulder s/p mechanical fall prior to arrival. Pt states she was going to restroom, tripped and fell. She landed directly on the left shoulder, now unable to lift it due to pain; denies associated weakness, numbness, or tingling sensation. Patient also reports pain in the left knee. States she is able to ambulate, but with pain. Patient has a hx of left TKA with Dr. Cornell, 2-3 years prior at [facility].  Patient denies pain anywhere else.  Pt denies blunt head injury, headache, dizziness, blurry vision, weakness, numbness or tingling sensation. Last tetanus within the past 10 years.  Ortho consulted after patient was found to have left humeral head fx.       HEALTH ISSUES - PROBLEM Dx:        PAST MEDICAL & SURGICAL HISTORY:  OA (osteoarthritis)  YOVANI on CPAP  Thyroid nodule: followed by endocrinologist  UTI (urinary tract infection): frequent last was 1/15  HLD (hyperlipidemia)  HTN (hypertension)  Atrial fibrillation: dx 7/09 on coumadin  Cardiac pacemaker: placed 2009  Fracture: ORIF right ankle 1986  FHx: cholecystectomy: 1993 laparoscopic  S/P JAY-BSO: 40 years ago  Cataract: b/l lense implant  FHx: total knee replacement: left 2012    MEDICATIONS  (STANDING):    Allergies    sulfa drugs (Unknown)    Intolerances    OxyContin (Sedation/Somnol; Drowsiness)          Vital Signs Last 24 Hrs  T(C): 36.2 (04-29-19 @ 11:01), Max: 36.6 (04-29-19 @ 09:18)  T(F): 97.1 (04-29-19 @ 11:01), Max: 97.8 (04-29-19 @ 09:18)  HR: 109 (04-29-19 @ 14:15) (63 - 109)  BP: 109/72 (04-29-19 @ 14:15) (109/72 - 140/92)  BP(mean): --  RR: 16 (04-29-19 @ 14:15) (16 - 16)  SpO2: 100% (04-29-19 @ 14:15) (100% - 100%)    Imaging: XR  < from: Xray Knee 3 Views, Left (04.29.19 @ 12:10) >  IMPRESSION:  Infrapatellar soft tissue swelling. No tracking gas collections or   inadvertent radiopaque foreign bodies.    Intact and aligned unconstrained left total knee prosthesis. No   periprosthetic fractures. Nonspecific thin crescentic lucent zone at bone   metal interface of lateral femoral component. No additional suspicious   periprosthetic lucencies.    Generalized osteopenia otherwise no discrete lytic or blastic lesions.    No tracking gas collections or gross radiographic evidence for myelitis.      < end of copied text >    < from: Xray Shoulder 2 Views, Left (04.29.19 @ 12:09) >  IMPRESSION:  Acute impacted proximal left humeral surgical neck fracture with   associated internal rotation and slight posterior subluxation of humeral   head relative to glenoid otherwise no mickey glenohumeral dislocation.     Preserved intact and aligned AC joint.     Generalized osteopenia otherwise no discrete lytic or blastic lesions.    Left chest wall dual-lead cardiac pacing device present.      < end of copied text >      Physical Exam  Gen: NAD  Left LE: Abrasion and ecchymosis over left knee, moderate swelling. Able to SLR, Neg log roll, Negative Heel strike, TTP along lateral joint line; PROM 0-90degrees with pain; +EHL/FHL/TA/GS function, DP/PT pulses intact, compartments soft. Calf soft, nontender.  Left shoulder: Left UE in sling, limited ROM secondary to pain                      Median/ Ulnar/Radial nerve function intact motor and sensory                      Radial pulse 2+     Bacitracin and Ace wrap compressive dressing applied to Left knee.

## 2019-04-29 NOTE — ED ADULT NURSE NOTE - OBJECTIVE STATEMENT
Pt arrived to room 9, ambulatory, accompanied by daughter. Pt is A&Ox4, pleasant, calm and cooperative. Pt complain of localized  left shoulder pain caused by a mechanical fall this morning witnessed by pedestrians. Pt has a history A-fib currently on Eliquis, osteopenia, HTN, pacemaker, and gallbladder removal. Pt denies feeling n/v/d, and dizziness. As per MD Urias, no IV access needed or blood drawn at time. Pt placed on cardiac monitor. will continue to monitor. Pt arrived to room 9, ambulatory, accompanied by daughter. Pt is A&Ox4, pleasant, calm and cooperative. Pt complain of localized  left shoulder pain and left knee abrasion caused by a mechanical fall this morning witnessed by pedestrians. Pt has a history A-fib currently on Eliquis, osteopenia, HTN, pacemaker, and gallbladder removal. Pt denies feeling n/v/d, and dizziness. As per MD Urias, no IV access needed or blood drawn at time. Pt placed on cardiac monitor. will continue to monitor.

## 2019-04-29 NOTE — ED PROVIDER NOTE - ATTENDING CONTRIBUTION TO CARE
fabiola: PMH includes pacemaker; on eliquis; macular degeneration; knee replacement.    pt states  she tripped over an object today and fell to left shoulder and left knee. able to get up and walk but c/o severe pain to shoulder. denies head or neck trauma or pain. did not pass out.   exam: good rom to lower ext including knees. there is an abrasion to left knee. good rom rt upper ext. no pain or evidence trauma to head or neck.  RUE: nl wrist, forearm and elbow exam. There is tenderness generalized left shoudler region. pacemaker region not tender.   impression: possible fx left shoulder.   recc: xray. given hx eliquis usage will also scan head even though there is no hx of head trauma. TDAP.

## 2019-04-29 NOTE — ED PROVIDER NOTE - PROGRESS NOTE DETAILS
RASHAUN Higuera: Pt reassessed; returns from radiology, noted to be be in rapid A. Fib on the monitor at a rate of 112-130's. Pt states she now feels short of breath; had been experiencing dyspnea on exertion for the past couple months; denies chest pain. Will control rate with diltiazem, and initiate a cardiac work up. RASHAUN Higuera: stable on monitor. A. Fib, rate controlled in the 80's. fabiola: trip and fall; humeral neck fx. seen by orthopedics and pt was to be discharged. While in ED her afib became rapid with rates 120-150 and pt given diltiazem. On further questioning, family notes 2 months of increasing RIDDLE and new onset orthopnea. CXR pending (unable to tolerate initially due to shoudler pain; will be reattempted). labs drawn and wbc was 23,000. No clinical evidence infection. Serum lactate drawn, us sent. CXR is pending. HR in 80s. To be admitted for CRAO/CHF.

## 2019-04-29 NOTE — ED PROVIDER NOTE - CLINICAL SUMMARY MEDICAL DECISION MAKING FREE TEXT BOX
85 year old female with pain to left shoulder s/p fall, ROM limited due to pain, r/o acute fractures/dislocation. Neck pain without any cervical midline tenderness, on Eliquis, will obtain Head CT/C-spine CT w/o con stat. Right knee abrasion- TUD, wound care, Left knee x-ray stat. Reassess.

## 2019-04-29 NOTE — ED PROVIDER NOTE - OBJECTIVE STATEMENT
85 year old female with PMH of NAZ Du (on Eliquis), HTN, CHF, +pacemaker, spinal compression fracture, and osteopenia presents to the ED complaining of pain to left shoulder s/p mechanical prior to arrival. Pt states she was going to restroom, tripped and 85 year old female with PMH of A. Ana Laura (on Eliquis), HTN, CHF, +pacemaker, spinal compression fracture, and osteopenia presents to the ED complaining of pain to left shoulder s/p mechanical fall prior to arrival. Pt states she was going to restroom, tripped and fell, was feeling physically well prior without any prodromal symptoms. She landed directly on the left shoulder, now unable to lift it due to pain; denies associated weakness, numbness, or tingling sensation. Pt complains of mild neck pain as well, although she related it to her chronic arthritis without any difference today. Also has sore soreness to right knee, although weight bearing. Pt denies blunt head injury, headache, dizziness, blurry vision, weakness, numbness or tingling sensation. Pt denies any other complaints. Last tetanus within the past 10 years.

## 2019-04-30 DIAGNOSIS — Z29.9 ENCOUNTER FOR PROPHYLACTIC MEASURES, UNSPECIFIED: ICD-10-CM

## 2019-04-30 DIAGNOSIS — I10 ESSENTIAL (PRIMARY) HYPERTENSION: ICD-10-CM

## 2019-04-30 DIAGNOSIS — E78.5 HYPERLIPIDEMIA, UNSPECIFIED: ICD-10-CM
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
## 2019-04-30 DIAGNOSIS — I48.91 UNSPECIFIED ATRIAL FIBRILLATION: ICD-10-CM

## 2019-04-30 DIAGNOSIS — I50.9 HEART FAILURE, UNSPECIFIED: ICD-10-CM

## 2019-04-30 DIAGNOSIS — D72.829 ELEVATED WHITE BLOOD CELL COUNT, UNSPECIFIED: ICD-10-CM

## 2019-04-30 DIAGNOSIS — S42.213A UNSPECIFIED DISPLACED FRACTURE OF SURGICAL NECK OF UNSPECIFIED HUMERUS, INITIAL ENCOUNTER FOR CLOSED FRACTURE: ICD-10-CM

## 2019-04-30 LAB
ANION GAP SERPL CALC-SCNC: 14 MMO/L — SIGNIFICANT CHANGE UP (ref 7–14)
ANION GAP SERPL CALC-SCNC: 15 MMO/L — HIGH (ref 7–14)
BASOPHILS # BLD AUTO: 0.06 K/UL — SIGNIFICANT CHANGE UP (ref 0–0.2)
BASOPHILS NFR BLD AUTO: 0.4 % — SIGNIFICANT CHANGE UP (ref 0–2)
BUN SERPL-MCNC: 24 MG/DL — HIGH (ref 7–23)
BUN SERPL-MCNC: 27 MG/DL — HIGH (ref 7–23)
CALCIUM SERPL-MCNC: 8.2 MG/DL — LOW (ref 8.4–10.5)
CALCIUM SERPL-MCNC: 8.5 MG/DL — SIGNIFICANT CHANGE UP (ref 8.4–10.5)
CHLORIDE SERPL-SCNC: 102 MMOL/L — SIGNIFICANT CHANGE UP (ref 98–107)
CHLORIDE SERPL-SCNC: 104 MMOL/L — SIGNIFICANT CHANGE UP (ref 98–107)
CHOLEST SERPL-MCNC: 102 MG/DL — LOW (ref 120–199)
CK MB BLD-MCNC: 1.69 NG/ML — SIGNIFICANT CHANGE UP (ref 1–4.7)
CK MB BLD-MCNC: SIGNIFICANT CHANGE UP (ref 0–2.5)
CK SERPL-CCNC: 29 U/L — SIGNIFICANT CHANGE UP (ref 25–170)
CO2 SERPL-SCNC: 21 MMOL/L — LOW (ref 22–31)
CO2 SERPL-SCNC: 21 MMOL/L — LOW (ref 22–31)
CREAT SERPL-MCNC: 0.88 MG/DL — SIGNIFICANT CHANGE UP (ref 0.5–1.3)
CREAT SERPL-MCNC: 0.95 MG/DL — SIGNIFICANT CHANGE UP (ref 0.5–1.3)
EOSINOPHIL # BLD AUTO: 0.04 K/UL — SIGNIFICANT CHANGE UP (ref 0–0.5)
EOSINOPHIL NFR BLD AUTO: 0.2 % — SIGNIFICANT CHANGE UP (ref 0–6)
GLUCOSE SERPL-MCNC: 114 MG/DL — HIGH (ref 70–99)
GLUCOSE SERPL-MCNC: 129 MG/DL — HIGH (ref 70–99)
HBA1C BLD-MCNC: 5.2 % — SIGNIFICANT CHANGE UP (ref 4–5.6)
HCT VFR BLD CALC: 41.1 % — SIGNIFICANT CHANGE UP (ref 34.5–45)
HDLC SERPL-MCNC: 49 MG/DL — SIGNIFICANT CHANGE UP (ref 45–65)
HGB BLD-MCNC: 13.2 G/DL — SIGNIFICANT CHANGE UP (ref 11.5–15.5)
IMM GRANULOCYTES NFR BLD AUTO: 0.6 % — SIGNIFICANT CHANGE UP (ref 0–1.5)
LIPID PNL WITH DIRECT LDL SERPL: 52 MG/DL — SIGNIFICANT CHANGE UP
LYMPHOCYTES # BLD AUTO: 1.57 K/UL — SIGNIFICANT CHANGE UP (ref 1–3.3)
LYMPHOCYTES # BLD AUTO: 9.2 % — LOW (ref 13–44)
MAGNESIUM SERPL-MCNC: 1.6 MG/DL — SIGNIFICANT CHANGE UP (ref 1.6–2.6)
MAGNESIUM SERPL-MCNC: 1.7 MG/DL — SIGNIFICANT CHANGE UP (ref 1.6–2.6)
MCHC RBC-ENTMCNC: 27.9 PG — SIGNIFICANT CHANGE UP (ref 27–34)
MCHC RBC-ENTMCNC: 32.1 % — SIGNIFICANT CHANGE UP (ref 32–36)
MCV RBC AUTO: 86.9 FL — SIGNIFICANT CHANGE UP (ref 80–100)
MONOCYTES # BLD AUTO: 1.36 K/UL — HIGH (ref 0–0.9)
MONOCYTES NFR BLD AUTO: 8 % — SIGNIFICANT CHANGE UP (ref 2–14)
NEUTROPHILS # BLD AUTO: 13.88 K/UL — HIGH (ref 1.8–7.4)
NEUTROPHILS NFR BLD AUTO: 81.6 % — HIGH (ref 43–77)
NRBC # FLD: 0.02 K/UL — SIGNIFICANT CHANGE UP (ref 0–0)
PHOSPHATE SERPL-MCNC: 4.4 MG/DL — SIGNIFICANT CHANGE UP (ref 2.5–4.5)
PHOSPHATE SERPL-MCNC: 4.5 MG/DL — SIGNIFICANT CHANGE UP (ref 2.5–4.5)
PLATELET # BLD AUTO: 365 K/UL — SIGNIFICANT CHANGE UP (ref 150–400)
PMV BLD: 12.6 FL — SIGNIFICANT CHANGE UP (ref 7–13)
POTASSIUM SERPL-MCNC: 4.3 MMOL/L — SIGNIFICANT CHANGE UP (ref 3.5–5.3)
POTASSIUM SERPL-MCNC: 4.3 MMOL/L — SIGNIFICANT CHANGE UP (ref 3.5–5.3)
POTASSIUM SERPL-SCNC: 4.3 MMOL/L — SIGNIFICANT CHANGE UP (ref 3.5–5.3)
POTASSIUM SERPL-SCNC: 4.3 MMOL/L — SIGNIFICANT CHANGE UP (ref 3.5–5.3)
RBC # BLD: 4.73 M/UL — SIGNIFICANT CHANGE UP (ref 3.8–5.2)
RBC # FLD: 15.5 % — HIGH (ref 10.3–14.5)
SODIUM SERPL-SCNC: 138 MMOL/L — SIGNIFICANT CHANGE UP (ref 135–145)
SODIUM SERPL-SCNC: 139 MMOL/L — SIGNIFICANT CHANGE UP (ref 135–145)
TRIGL SERPL-MCNC: 69 MG/DL — SIGNIFICANT CHANGE UP (ref 10–149)
TROPONIN T, HIGH SENSITIVITY: 9 NG/L — SIGNIFICANT CHANGE UP (ref ?–14)
TSH SERPL-MCNC: 1.76 UIU/ML — SIGNIFICANT CHANGE UP (ref 0.27–4.2)
WBC # BLD: 17.02 K/UL — HIGH (ref 3.8–10.5)
WBC # FLD AUTO: 17.02 K/UL — HIGH (ref 3.8–10.5)

## 2019-04-30 PROCEDURE — 99223 1ST HOSP IP/OBS HIGH 75: CPT

## 2019-04-30 RX ORDER — OXYCODONE HYDROCHLORIDE 5 MG/1
5 TABLET ORAL EVERY 6 HOURS
Qty: 0 | Refills: 0 | Status: DISCONTINUED | OUTPATIENT
Start: 2019-04-30 | End: 2019-05-03

## 2019-04-30 RX ORDER — SODIUM CHLORIDE 9 MG/ML
3 INJECTION INTRAMUSCULAR; INTRAVENOUS; SUBCUTANEOUS EVERY 8 HOURS
Qty: 0 | Refills: 0 | Status: DISCONTINUED | OUTPATIENT
Start: 2019-04-30 | End: 2019-05-03

## 2019-04-30 RX ORDER — SENNA PLUS 8.6 MG/1
2 TABLET ORAL AT BEDTIME
Qty: 0 | Refills: 0 | Status: DISCONTINUED | OUTPATIENT
Start: 2019-04-30 | End: 2019-05-03

## 2019-04-30 RX ORDER — LOSARTAN POTASSIUM 100 MG/1
100 TABLET, FILM COATED ORAL DAILY
Qty: 0 | Refills: 0 | Status: DISCONTINUED | OUTPATIENT
Start: 2019-04-30 | End: 2019-05-03

## 2019-04-30 RX ORDER — ACETAMINOPHEN 500 MG
650 TABLET ORAL EVERY 6 HOURS
Qty: 0 | Refills: 0 | Status: DISCONTINUED | OUTPATIENT
Start: 2019-04-30 | End: 2019-05-03

## 2019-04-30 RX ORDER — FUROSEMIDE 40 MG
40 TABLET ORAL EVERY 24 HOURS
Qty: 0 | Refills: 0 | Status: DISCONTINUED | OUTPATIENT
Start: 2019-04-30 | End: 2019-05-03

## 2019-04-30 RX ORDER — DOCUSATE SODIUM 100 MG
100 CAPSULE ORAL
Qty: 0 | Refills: 0 | Status: DISCONTINUED | OUTPATIENT
Start: 2019-04-30 | End: 2019-05-03

## 2019-04-30 RX ORDER — APIXABAN 2.5 MG/1
5 TABLET, FILM COATED ORAL EVERY 12 HOURS
Qty: 0 | Refills: 0 | Status: DISCONTINUED | OUTPATIENT
Start: 2019-04-30 | End: 2019-05-03

## 2019-04-30 RX ORDER — ATENOLOL 25 MG/1
100 TABLET ORAL DAILY
Qty: 0 | Refills: 0 | Status: DISCONTINUED | OUTPATIENT
Start: 2019-04-30 | End: 2019-05-03

## 2019-04-30 RX ORDER — ATORVASTATIN CALCIUM 80 MG/1
10 TABLET, FILM COATED ORAL AT BEDTIME
Qty: 0 | Refills: 0 | Status: DISCONTINUED | OUTPATIENT
Start: 2019-04-30 | End: 2019-05-03

## 2019-04-30 RX ADMIN — OXYCODONE HYDROCHLORIDE 5 MILLIGRAM(S): 5 TABLET ORAL at 21:07

## 2019-04-30 RX ADMIN — Medication 650 MILLIGRAM(S): at 16:25

## 2019-04-30 RX ADMIN — Medication 650 MILLIGRAM(S): at 15:25

## 2019-04-30 RX ADMIN — SODIUM CHLORIDE 3 MILLILITER(S): 9 INJECTION INTRAMUSCULAR; INTRAVENOUS; SUBCUTANEOUS at 22:18

## 2019-04-30 RX ADMIN — OXYCODONE HYDROCHLORIDE 5 MILLIGRAM(S): 5 TABLET ORAL at 11:39

## 2019-04-30 RX ADMIN — Medication 650 MILLIGRAM(S): at 22:23

## 2019-04-30 RX ADMIN — SODIUM CHLORIDE 3 MILLILITER(S): 9 INJECTION INTRAMUSCULAR; INTRAVENOUS; SUBCUTANEOUS at 05:35

## 2019-04-30 RX ADMIN — ATORVASTATIN CALCIUM 10 MILLIGRAM(S): 80 TABLET, FILM COATED ORAL at 22:23

## 2019-04-30 RX ADMIN — OXYCODONE HYDROCHLORIDE 5 MILLIGRAM(S): 5 TABLET ORAL at 22:00

## 2019-04-30 RX ADMIN — OXYCODONE HYDROCHLORIDE 5 MILLIGRAM(S): 5 TABLET ORAL at 05:30

## 2019-04-30 RX ADMIN — Medication 650 MILLIGRAM(S): at 23:10

## 2019-04-30 RX ADMIN — Medication 1 TABLET(S): at 12:44

## 2019-04-30 RX ADMIN — APIXABAN 5 MILLIGRAM(S): 2.5 TABLET, FILM COATED ORAL at 05:45

## 2019-04-30 RX ADMIN — OXYCODONE HYDROCHLORIDE 5 MILLIGRAM(S): 5 TABLET ORAL at 11:09

## 2019-04-30 RX ADMIN — OXYCODONE HYDROCHLORIDE 5 MILLIGRAM(S): 5 TABLET ORAL at 04:38

## 2019-04-30 RX ADMIN — SODIUM CHLORIDE 3 MILLILITER(S): 9 INJECTION INTRAMUSCULAR; INTRAVENOUS; SUBCUTANEOUS at 15:02

## 2019-04-30 RX ADMIN — ATENOLOL 100 MILLIGRAM(S): 25 TABLET ORAL at 05:35

## 2019-04-30 RX ADMIN — LOSARTAN POTASSIUM 100 MILLIGRAM(S): 100 TABLET, FILM COATED ORAL at 05:45

## 2019-04-30 RX ADMIN — Medication 40 MILLIGRAM(S): at 12:44

## 2019-04-30 RX ADMIN — APIXABAN 5 MILLIGRAM(S): 2.5 TABLET, FILM COATED ORAL at 18:32

## 2019-04-30 NOTE — H&P ADULT - NEGATIVE GASTROINTESTINAL SYMPTOMS
no constipation/no diarrhea/no vomiting/no nausea no abdominal pain/no constipation/no diarrhea/no nausea/no vomiting

## 2019-04-30 NOTE — ED ADULT NURSE REASSESSMENT NOTE - NS ED NURSE REASSESS COMMENT FT1
Pt requested to take home medication Eliquis. RASHAUN Walter made aware and as per provider, ok to self medicate.
Pt states to be feeling weak. Denies dizziness and feeling of faint. Vitals taken and documented, RASHAUN Walter made aware. Diltiazem administered as provider order.
received report from  rigoberto CHAVEZ. Pt is a/o x 3. Pt complaining of left shoulder pain and knee pain. MAR contacted. no complaints of chest pain, headache, nausea, diziness, vomiting, SOB, fever, chills verbilized. Pt has 22g iv placed to top right hand with no redness or swelling noted with NS running. Awaiting further orders. Will continue to monitor.
break RN: pt awake and alert. breathing unlabored. denies new/worsening symptoms. tele PA aware HR intermittently goes up to 120s-130s, at bedside for eval. pt denies any symptoms.

## 2019-04-30 NOTE — H&P ADULT - PROBLEM SELECTOR PLAN 2
PT consult  Pain control - Patient noted to have poorly controlled AFib, required diltiazem 10mg IVP and 30mg PO with improved rate control but now again in the low 100s-110s, likely has elevated rates 2/2 pain from her fracture/knee effusion  - Patient states that she used to be on diltiazem but was taken off recently, unclear why the medication was stopped but she might benefit from restarting diltiazem if her rates remain poorly controlled despite adequate pain control, will need to clarify with cardiology in AM  - c/w apixaban for anticoagulation, CHADSVASC score of 7  - c/w atenolol for now

## 2019-04-30 NOTE — H&P ADULT - NEGATIVE NEUROLOGICAL SYMPTOMS
no hemiparesis/no syncope/no vertigo/no loss of sensation/no headache/no confusion/no weakness/no paresthesias

## 2019-04-30 NOTE — H&P ADULT - NEGATIVE GENERAL GENITOURINARY SYMPTOMS
no hematuria/no renal colic/no flank pain L normal urinary frequency/no flank pain L/no renal colic/no hematuria/no flank pain R

## 2019-04-30 NOTE — H&P ADULT - PROBLEM SELECTOR PLAN 4
cont statin Monitor WBC  no signs of active infection  Blood cultures/Abx if patient spikes a fever or other complaints - Suspect leukocytosis is reactionary 2/2 fracture/pain, no signs or symptoms of an acute infection currently  - Monitor WBC  - Will hold off on abx for now  Blood cultures/Abx if patient spikes a fever or other complaints

## 2019-04-30 NOTE — H&P ADULT - HISTORY OF PRESENT ILLNESS
84 y/o F with hx of afib on eliquis, HTN, CHF, +PPM, spinal fracture, CVA [Feb 2019], osteopenia presents with fall. Patient states she was in a parking lot of [employer] and was trying to walk to the bathroom by herself. She then tripped on concrete that was on the lot and she fell on L knee and L shoulder. She states its difficult raise L arm secondary to pain. denies head trauma. She states she was doing well prior to this episodes. Also endorses SOB, RIDDLE and orthopnea x months which has not worsen lately. Denies fever, chills, cough, LOC, chest pain, abdominal pain, nausea, vomiting, melena, hematochezia, LE edema or dysuria. 86 y/o F with hx of afib on eliquis, HTN, CHF, +PPM, spinal fracture, CVA [Feb 2019], osteopenia presents with fall. Patient states she was in a parking lot of [employer] and was trying to walk to the bathroom by herself. She then tripped on concrete and she fell on L knee and L shoulder. She states its difficult raise L arm secondary to pain. Denies head trauma or LOC. She states she was doing well prior to this episodes. Also endorses SOB, RIDDLE and orthopnea x months which has not worsen lately. Denies fever, chills, cough, LOC, chest pain, abdominal pain, nausea, vomiting, melena, hematochezia, LE edema or dysuria. 86 y/o F with hx of afib on eliquis, HTN, CHF, +PPM, spinal fracture, CVA [Feb 2019, no residual defecits], osteopenia, and macular degeneration of b/l eyes presents with fall. Patient states she was in a parking lot of [employer] and was trying to walk to the bathroom by herself. She then tripped on concrete and she fell on L knee and L shoulder, denies any associated chest pain, palpitations, LOC, or dizziness. She denies any head strike when she fell. She states its difficult raise L arm secondary to pain. She states she was doing well prior to this event. Also states that she has been having SOB, RIDDLE, and orthopnea x months which has not worsen lately, said that these symptoms come and go. Denies fever, chills, cough, LOC, chest pain, abdominal pain, nausea, vomiting, melena, hematochezia, LE edema or dysuria.      On arrival to the ED, her vitals were T 97.8, P 63, /92, RR 16, O2 sat 100% RA. Her lab work was significant for leukocytosis and an elevated pro-BNP (unclear baseline). She had a CXR that showed clear lungs and she had L shoulder xrays that showed a L surgical neck fracture. She also had L knee xray that showed a L infrapatellar soft tissue swelling. She also had a CT Head/Cervical spine that showed a R thalamus subacute vs chronic infarct but otherwise no other acute findings. She was seen by orthopedics and had her L humerus fracture placed in a sling. She was also noted to have AFib with RVR to 140s in the ED and required diltiazem 10 IVP and 30mg PO in the ED. She was then admitted to medicine on telemetry.     Of note, patient's  had a MI about 2 days ago and is currently in the MICU at [facility].

## 2019-04-30 NOTE — H&P ADULT - NSICDXPASTMEDICALHX_GEN_ALL_CORE_FT
PAST MEDICAL HISTORY:  Atrial fibrillation dx 7/09 on coumadin    HLD (hyperlipidemia)     HTN (hypertension)     OA (osteoarthritis)     YOVANI on CPAP     Thyroid nodule followed by endocrinologist    UTI (urinary tract infection) frequent last was 1/15 PAST MEDICAL HISTORY:  Atrial fibrillation dx 7/09 on coumadin    Cerebrovascular accident (CVA)     HLD (hyperlipidemia)     HTN (hypertension)     OA (osteoarthritis)     YOVANI on CPAP     Thyroid nodule followed by endocrinologist    UTI (urinary tract infection) frequent last was 1/15

## 2019-04-30 NOTE — H&P ADULT - NSHPLABSRESULTS_GEN_ALL_CORE
EKG: afibat 92 TWI in AVR                          15.3   22.98 )-----------( 369      ( 29 Apr 2019 15:43 )             48.2     04-29    134<L>  |  99  |  24<H>  ----------------------------<  129<H>  Test not performed SPECIMEN GROSSLY HEMOLYZED   |  20<L>  |  0.83    Ca    9.1      29 Apr 2019 15:43    TPro  Test not performed SPECIMEN GROSSLY HEMOLYZED  /  Alb  3.7  /  TBili  1.8<H>  /  DBili  x   /  AST  Test not performed SPECIMEN GROSSLY HEMOLYZED  /  ALT  15  /  AlkPhos  44  04-29    Troponin T, High Sensitivity: < 6: Hemolysis in this sample could falsely lower troponin T  results. Interpret with caution  ---------------------***PLEASE NOTE***----------------------  Rapid changes upward or downward in high-sensitivity  troponin levels strongly suggest acute myocardial injury.  Hemolysis may falsely lower results. Renal impairment may  increase results.    Normal: <6 - 14 ng/L  Indeterminate: 15 - 51 ng/L  Elevated: >51 ng/L    Please see "[email or web address] on the [employer]  intranet for more information. ng/L (04.29.19 @ 15:43) EKG: afib at 92 TWI in AVR                          15.3   22.98 )-----------( 369      ( 29 Apr 2019 15:43 )             48.2     04-29    134<L>  |  99  |  24<H>  ----------------------------<  129<H>  Test not performed SPECIMEN GROSSLY HEMOLYZED   |  20<L>  |  0.83    Ca    9.1      29 Apr 2019 15:43    TPro  Test not performed SPECIMEN GROSSLY HEMOLYZED  /  Alb  3.7  /  TBili  1.8<H>  /  DBili  x   /  AST  Test not performed SPECIMEN GROSSLY HEMOLYZED  /  ALT  15  /  AlkPhos  44  04-29    Troponin T, High Sensitivity: < 6: Hemolysis in this sample could falsely lower troponin T  results. Interpret with caution  ---------------------***PLEASE NOTE***----------------------  Rapid changes upward or downward in high-sensitivity  troponin levels strongly suggest acute myocardial injury.  Hemolysis may falsely lower results. Renal impairment may  increase results.    Normal: <6 - 14 ng/L  Indeterminate: 15 - 51 ng/L  Elevated: >51 ng/L    Please see "[email or web address]" on the [employer]  intranet for more information. ng/L (04.29.19 @ 15:43) LABS and ADDITIONAL STUDIES:               15.3   22.98 )-----------( 369      ( 29 Apr 2019 15:43 )             48.2     04-29    134<L>  |  99  |  24<H>  ----------------------------<  129<H>  Test not performed SPECIMEN GROSSLY HEMOLYZED   |  20<L>  |  0.83    Ca    9.1      29 Apr 2019 15:43    Blood Gas Venous Comprehensive (04.29.19 @ 17:21)    Blood Gas Venous - Potassium: 4.0 mmol/L    TPro  Test not performed SPECIMEN GROSSLY HEMOLYZED  /  Alb  3.7  /  TBili  1.8<H>  /  DBili  x   /  AST  Test not performed SPECIMEN GROSSLY HEMOLYZED  /  ALT  15  /  AlkPhos  44  04-29    Troponin T, High Sensitivity: < 6: Hemolysis in this sample could falsely lower troponin T  results. Interpret with caution    EKG: afib at 92 with 2 paced beats, nl axis, QTc 487, no significant ST-T wave changes    < from: Xray Chest 1 View AP/PA (04.29.19 @ 17:02) >    ******PRELIMINARY REPORT******            INTERPRETATION:  no emergentfindings    < end of copied text >    < from: Xray Elbow/Shoulder Axillary/Humerus View, Left (04.29.19 @ 14:33) >    IMPRESSION:  Redemonstrated impacted proximal left humeral surgical neck fracture. No   associated glenohumeral dislocation.    No fractures in the remaining imaged regions.    Preserved AC and elbow joint spaces.    Generalized osteopenia otherwise no discrete lytic or blastic lesions.    < end of copied text >    < from: Xray Knee 3 Views, Left (04.29.19 @ 12:10) >    IMPRESSION:  Infrapatellar soft tissue swelling. No tracking gas collections or   inadvertent radiopaque foreign bodies.    Intact and aligned unconstrained left total knee prosthesis. No   periprosthetic fractures. Nonspecific thin crescentic lucent zone at bone   metal interface of lateral femoral component. No additional suspicious   periprosthetic lucencies.    Generalized osteopenia otherwise no discrete lytic or blastic lesions.    No tracking gas collections or gross radiographic evidence for myelitis.    < end of copied text >

## 2019-04-30 NOTE — PHYSICAL THERAPY INITIAL EVALUATION ADULT - PERTINENT HX OF CURRENT PROBLEM, REHAB EVAL
Patient presented with left shoulder pain after sustaining a fall in a parking lot after tripping over an object

## 2019-04-30 NOTE — H&P ADULT - RS GEN PE MLT RESP DETAILS PC
airway patent/good air movement/breath sounds equal breath sounds equal/airway patent/no rales/no rhonchi/good air movement/no wheezes/clear to auscultation bilaterally/respirations non-labored

## 2019-04-30 NOTE — CONSULT NOTE ADULT - SUBJECTIVE AND OBJECTIVE BOX
Patient is a 85y old  Female who presents with a chief complaint of fall (2019 15:40)      HPI:  84 y/o F with hx of afib on eliquis, HTN, CHF, +PPM, spinal fracture, CVA [2019, no residual defecits], osteopenia, and macular degeneration of b/l eyes presents with fall. Patient states she was in a parking lot of [employer] and was trying to walk to the bathroom by herself. She then tripped on concrete and she fell on L knee and L shoulder, denies any associated chest pain, palpitations, LOC, or dizziness. She denies any head strike when she fell. She states its difficult raise L arm secondary to pain. She states she was doing well prior to this event. Also states that she has been having SOB, RIDDLE, and orthopnea x months which has not worsen lately, said that these symptoms come and go. Denies fever, chills, cough, LOC, chest pain, abdominal pain, nausea, vomiting, melena, hematochezia, LE edema or dysuria.      On arrival to the ED, her vitals were T 97.8, P 63, /92, RR 16, O2 sat 100% RA. Her lab work was significant for leukocytosis and an elevated pro-BNP (unclear baseline). She had a CXR that showed clear lungs and she had L shoulder xrays that showed a L surgical neck fracture. She also had L knee xray that showed a L infrapatellar soft tissue swelling. She also had a CT Head/Cervical spine that showed a R thalamus subacute vs chronic infarct but otherwise no other acute findings. She was seen by orthopedics and had her L humerus fracture placed in a sling. She was also noted to have AFib with RVR to 140s in the ED and required diltiazem 10 IVP and 30mg PO in the ED. She was then admitted to medicine on telemetry.     Of note, patient's  had a MI about 2 days ago and is currently in the MICU at [facility]. (2019 01:40)    Pt has been afebrile thus far.  WBC on admission 22.9 --> 17.0.  Off abx currently.       REVIEW OF SYSTEMS:    CONSTITUTIONAL: No fever, weight loss, or fatigue  EYES: No eye pain, visual disturbances, or discharge  ENMT:  No sore throat  NECK: No pain or stiffness  RESPIRATORY: No cough, wheezing, chills or hemoptysis; No shortness of breath  CARDIOVASCULAR: No chest pain, palpitations, dizziness, or leg swelling  GASTROINTESTINAL: No abdominal or epigastric pain. No nausea, vomiting, or hematemesis; No diarrhea or constipation. No melena or hematochezia.  GENITOURINARY: No dysuria, frequency, hematuria, or incontinence  NEUROLOGICAL: No headaches, memory loss, loss of strength, numbness, or tremors  SKIN: No itching, burning, rashes, or lesions   LYMPH NODES: No enlarged glands  MUSCULOSKELETAL: No joint pain or swelling; No muscle, back, or extremity pain      PAST MEDICAL & SURGICAL HISTORY:  Cerebrovascular accident (CVA)  OA (osteoarthritis)  YOVANI on CPAP  Thyroid nodule: followed by endocrinologist  UTI (urinary tract infection): frequent last was 1/15  HLD (hyperlipidemia)  HTN (hypertension)  Atrial fibrillation: dx  on coumadin  Cardiac pacemaker: placed   Fracture: ORIF right ankle   FHx: cholecystectomy:  laparoscopic  S/P JAY-BSO: 40 years ago  Cataract: b/l lense implant  FHx: total knee replacement: left       Allergies    sulfa drugs (Unknown)    Intolerances    OxyContin (Sedation/Somnol; Drowsiness)      FAMILY HISTORY:  No pertinent family history in first degree relatives      SOCIAL HISTORY:        MEDICATIONS  (STANDING):  apixaban 5 milliGRAM(s) Oral every 12 hours  ATENolol  Tablet 100 milliGRAM(s) Oral daily  atorvastatin 10 milliGRAM(s) Oral at bedtime  calcium carbonate 1250 mG  + Vitamin D (OsCal 500 + D) 1 Tablet(s) Oral daily  docusate sodium 100 milliGRAM(s) Oral two times a day  furosemide    Tablet 40 milliGRAM(s) Oral every 24 hours  losartan 100 milliGRAM(s) Oral daily  senna 2 Tablet(s) Oral at bedtime  sodium chloride 0.9% lock flush 3 milliLiter(s) IV Push every 8 hours    MEDICATIONS  (PRN):  acetaminophen   Tablet .. 650 milliGRAM(s) Oral every 6 hours PRN Mild Pain (1 - 3), Moderate Pain (4 - 6), Severe Pain (7 - 10)  oxyCODONE    IR 5 milliGRAM(s) Oral every 6 hours PRN Severe Pain (7 - 10)      Vital Signs Last 24 Hrs  T(C): 36.5 (2019 15:17), Max: 36.7 (2019 16:55)  T(F): 97.7 (2019 15:17), Max: 98 (2019 16:55)  HR: 82 (2019 15:17) (75 - 105)  BP: 146/76 (2019 15:17) (104/54 - 149/70)  BP(mean): --  RR: 17 (2019 15:17) (17 - 21)  SpO2: 100% (2019 15:17) (96% - 100%)    PHYSICAL EXAM:    GENERAL: NAD, well-groomed  HEAD:  Atraumatic, Normocephalic  EYES: EOMI, PERRLA, conjunctiva and sclera clear  ENMT: No tonsillar erythema, exudates, or enlargement; Moist mucous membranes  NECK: Supple, No JVD  CHEST/LUNG: Clear to percussion bilaterally; No rales, rhonchi, wheezing, or rubs  HEART: Regular rate and rhythm; No murmurs, rubs, or gallops  ABDOMEN: Soft, Nontender, Nondistended; Bowel sounds present  EXTREMITIES:  2+ Peripheral Pulses, No clubbing, cyanosis, or edema  LYMPH: No lymphadenopathy noted  SKIN: No rashes or lesions    LABS:  CBC Full  -  ( 2019 06:55 )  WBC Count : 17.02 K/uL  RBC Count : 4.73 M/uL  Hemoglobin : 13.2 g/dL  Hematocrit : 41.1 %  Platelet Count - Automated : 365 K/uL  Mean Cell Volume : 86.9 fL  Mean Cell Hemoglobin : 27.9 pg  Mean Cell Hemoglobin Concentration : 32.1 %  Auto Neutrophil # : 13.88 K/uL  Auto Lymphocyte # : 1.57 K/uL  Auto Monocyte # : 1.36 K/uL  Auto Eosinophil # : 0.04 K/uL  Auto Basophil # : 0.06 K/uL  Auto Neutrophil % : 81.6 %  Auto Lymphocyte % : 9.2 %  Auto Monocyte % : 8.0 %  Auto Eosinophil % : 0.2 %  Auto Basophil % : 0.4 %          138  |  102  |  27<H>  ----------------------------<  129<H>  4.3   |  21<L>  |  0.95    Ca    8.5      2019 06:55  Phos  4.4     -  Mg     1.7         TPro  Test not performed SPECIMEN GROSSLY HEMOLYZED  /  Alb  3.7  /  TBili  1.8<H>  /  DBili  x   /  AST  Test not performed SPECIMEN GROSSLY HEMOLYZED  /  ALT  15  /  AlkPhos  44        LIVER FUNCTIONS - ( 2019 15:43 )  Alb: 3.7 g/dL / Pro: Test not performed SPECIMEN GROSSLY HEMOLYZED g/dL / ALK PHOS: 44 u/L / ALT: 15 u/L / AST: Test not performed SPECIMEN GROSSLY HEMOLYZED u/L / GGT: x                               MICROBIOLOGY:        Urinalysis Basic - ( 2019 18:45 )    Color: LIGHT YELLOW / Appearance: CLEAR / S.011 / pH: 6.0  Gluc: NEGATIVE / Ketone: NEGATIVE  / Bili: NEGATIVE / Urobili: NORMAL   Blood: NEGATIVE / Protein: NEGATIVE / Nitrite: NEGATIVE   Leuk Esterase: NEGATIVE / RBC: x / WBC x   Sq Epi: x / Non Sq Epi: x / Bacteria: x                RADIOLOGY:    < from: Xray Chest 1 View AP/PA (19 @ 17:02) >  FINDINGS:    Left chest wall pacemaker. The heart is enlarged. The lungs are clear.   There is no pleural effusion or congestion to indicate CHF. The   visualized bones and soft tissues show no acute findings.      IMPRESSION: Cardiomegaly with clear lungs.    < end of copied text >          < from: Xray Shoulder Axillary View, Left (19 @ 14:33) >  IMPRESSION:  Redemonstrated impacted proximal left humeral surgical neck fracture. No   associated glenohumeral dislocation.    No fractures in the remaining imaged regions.    Preserved AC and elbow joint spaces.    Generalized osteopenia otherwise no discrete lytic or blastic lesions.    < end of copied text >          < from: Xray Elbow AP + Lateral, Left (19 @ 14:32) >    IMPRESSION:  Redemonstrated impacted proximal left humeral surgical neck fracture. No   associated glenohumeral dislocation.    No fractures in the remaining imaged regions.    Preserved AC and elbow joint spaces.    Generalized osteopenia otherwise no discrete lytic or blastic lesions.    < end of copied text >          < from: Xray Humerus, Left (04.29.19 @ 14:32) >  IMPRESSION:  Redemonstrated impacted proximal left humeral surgical neck fracture. No   associated glenohumeral dislocation.    No fractures in the remaining imaged regions.    Preserved AC and elbow joint spaces.    Generalized osteopenia otherwise no discrete lytic or blastic lesions.    < end of copied text >          < from: Xray Knee 3 Views, Left (19 @ 12:10) >  IMPRESSION:  Infrapatellar soft tissue swelling. No tracking gas collections or   inadvertent radiopaque foreign bodies.    Intact and aligned unconstrained left total knee prosthesis. No   periprosthetic fractures. Nonspecific thin crescentic lucent zone at bone   metal interface of lateral femoral component. No additional suspicious   periprosthetic lucencies.    Generalized osteopenia otherwise no discrete lytic or blastic lesions.    No tracking gas collections or gross radiographic evidence for myelitis.    < end of copied text > Patient is a 85y old  Female who presents with a chief complaint of fall (2019 15:40)      HPI:  84 y/o F with hx of afib on eliquis, HTN, CHF, +PPM, spinal fracture, CVA [2019, no residual defecits], osteopenia, and macular degeneration of b/l eyes presents with fall. Patient states she was in a parking lot of [employer] and was trying to walk to the bathroom by herself. She then tripped on concrete and she fell on L knee and L shoulder, denies any associated chest pain, palpitations, LOC, or dizziness. She denies any head strike when she fell. She states its difficult raise L arm secondary to pain. She states she was doing well prior to this event. Also states that she has been having SOB, RIDDLE, and orthopnea x months which has not worsen lately, said that these symptoms come and go. Denies fever, chills, cough, LOC, chest pain, abdominal pain, nausea, vomiting, melena, hematochezia, LE edema or dysuria.      On arrival to the ED, her vitals were T 97.8, P 63, /92, RR 16, O2 sat 100% RA. Her lab work was significant for leukocytosis and an elevated pro-BNP (unclear baseline). She had a CXR that showed clear lungs and she had L shoulder xrays that showed a L surgical neck fracture. She also had L knee xray that showed a L infrapatellar soft tissue swelling. She also had a CT Head/Cervical spine that showed a R thalamus subacute vs chronic infarct but otherwise no other acute findings. She was seen by orthopedics and had her L humerus fracture placed in a sling. She was also noted to have AFib with RVR to 140s in the ED and required diltiazem 10 IVP and 30mg PO in the ED. She was then admitted to medicine on telemetry.     Of note, patient's  had a MI about 2 days ago and is currently in the MICU at [facility]. (2019 01:40)    Pt has been afebrile thus far.  WBC on admission 22.9 --> 17.0.  Off abx currently.  Denies recent infectious issues, no recent virus, no recent fevers.  Denies cough/congestion/abd pain/HA/diarrhea/dysuria.  C/o pain, bruising and swelling over L knee, where she fell.  ID consult called to r/o underlying infectious issues that may be causing her leukocytosis.        REVIEW OF SYSTEMS:    CONSTITUTIONAL: feeling tired  EYES: No eye pain, visual disturbances, or discharge  ENMT:  No sore throat  NECK: No pain or stiffness  RESPIRATORY: No cough, wheezing, chills or hemoptysis; No shortness of breath  CARDIOVASCULAR: No chest pain, palpitations, dizziness, or leg swelling  GASTROINTESTINAL: No abdominal or epigastric pain. No nausea, vomiting, or hematemesis; No diarrhea or constipation. No melena or hematochezia.  GENITOURINARY: No dysuria, frequency, hematuria, or incontinence  NEUROLOGICAL: No headaches, memory loss, loss of strength, numbness, or tremors  SKIN: No itching, burning, rashes, or lesions   LYMPH NODES: No enlarged glands  MUSCULOSKELETAL: Left knee swelling/bruising.  L arm in a sling      PAST MEDICAL & SURGICAL HISTORY:  Cerebrovascular accident (CVA)  OA (osteoarthritis)  YOVANI on CPAP  Thyroid nodule: followed by endocrinologist  UTI (urinary tract infection): frequent last was 1/15  HLD (hyperlipidemia)  HTN (hypertension)  Atrial fibrillation: dx  on coumadin  Cardiac pacemaker: placed   Fracture: ORIF right ankle   FHx: cholecystectomy:  laparoscopic  S/P JAY-BSO: 40 years ago  Cataract: b/l lense implant  FHx: total knee replacement: left       Allergies    sulfa drugs (Unknown)    Intolerances    OxyContin (Sedation/Somnol; Drowsiness)      FAMILY HISTORY:  No pertinent family history in first degree relatives      SOCIAL HISTORY:  Denies smoking, alcohol or drug use    	Lives with daughter and     	Ambulates with the assistance of a cane      MEDICATIONS  (STANDING):  apixaban 5 milliGRAM(s) Oral every 12 hours  ATENolol  Tablet 100 milliGRAM(s) Oral daily  atorvastatin 10 milliGRAM(s) Oral at bedtime  calcium carbonate 1250 mG  + Vitamin D (OsCal 500 + D) 1 Tablet(s) Oral daily  docusate sodium 100 milliGRAM(s) Oral two times a day  furosemide    Tablet 40 milliGRAM(s) Oral every 24 hours  losartan 100 milliGRAM(s) Oral daily  senna 2 Tablet(s) Oral at bedtime  sodium chloride 0.9% lock flush 3 milliLiter(s) IV Push every 8 hours    MEDICATIONS  (PRN):  acetaminophen   Tablet .. 650 milliGRAM(s) Oral every 6 hours PRN Mild Pain (1 - 3), Moderate Pain (4 - 6), Severe Pain (7 - 10)  oxyCODONE    IR 5 milliGRAM(s) Oral every 6 hours PRN Severe Pain (7 - 10)      Vital Signs Last 24 Hrs  T(C): 36.5 (2019 15:17), Max: 36.7 (2019 16:55)  T(F): 97.7 (2019 15:17), Max: 98 (2019 16:55)  HR: 82 (2019 15:17) (75 - 105)  BP: 146/76 (2019 15:17) (104/54 - 149/70)  BP(mean): --  RR: 17 (2019 15:17) (17 - 21)  SpO2: 100% (2019 15:17) (96% - 100%)    PHYSICAL EXAM:    GENERAL: NAD, well-groomed  HEAD:  Atraumatic, Normocephalic  EYES: EOMI, PERRLA, conjunctiva and sclera clear  ENMT: No tonsillar erythema, exudates, or enlargement; Moist mucous membranes  NECK: Supple, No JVD  CHEST/LUNG: Clear to percussion bilaterally; No rales, rhonchi, wheezing, or rubs  HEART: Regular rate and rhythm; No murmurs, rubs, or gallops  ABDOMEN: Soft, Nontender, Nondistended; Bowel sounds present  EXTREMITIES:  2+ Peripheral Pulses, No clubbing, cyanosis, or edema  LYMPH: No lymphadenopathy noted  SKIN: No rashes or lesions    LABS:  CBC Full  -  ( 2019 06:55 )  WBC Count : 17.02 K/uL  RBC Count : 4.73 M/uL  Hemoglobin : 13.2 g/dL  Hematocrit : 41.1 %  Platelet Count - Automated : 365 K/uL  Mean Cell Volume : 86.9 fL  Mean Cell Hemoglobin : 27.9 pg  Mean Cell Hemoglobin Concentration : 32.1 %  Auto Neutrophil # : 13.88 K/uL  Auto Lymphocyte # : 1.57 K/uL  Auto Monocyte # : 1.36 K/uL  Auto Eosinophil # : 0.04 K/uL  Auto Basophil # : 0.06 K/uL  Auto Neutrophil % : 81.6 %  Auto Lymphocyte % : 9.2 %  Auto Monocyte % : 8.0 %  Auto Eosinophil % : 0.2 %  Auto Basophil % : 0.4 %          138  |  102  |  27<H>  ----------------------------<  129<H>  4.3   |  21<L>  |  0.95    Ca    8.5      2019 06:55  Phos  4.4       Mg     1.7         TPro  Test not performed SPECIMEN GROSSLY HEMOLYZED  /  Alb  3.7  /  TBili  1.8<H>  /  DBili  x   /  AST  Test not performed SPECIMEN GROSSLY HEMOLYZED  /  ALT  15  /  AlkPhos  44        LIVER FUNCTIONS - ( 2019 15:43 )  Alb: 3.7 g/dL / Pro: Test not performed SPECIMEN GROSSLY HEMOLYZED g/dL / ALK PHOS: 44 u/L / ALT: 15 u/L / AST: Test not performed SPECIMEN GROSSLY HEMOLYZED u/L / GGT: x                               MICROBIOLOGY:        Urinalysis Basic - ( 2019 18:45 )    Color: LIGHT YELLOW / Appearance: CLEAR / S.011 / pH: 6.0  Gluc: NEGATIVE / Ketone: NEGATIVE  / Bili: NEGATIVE / Urobili: NORMAL   Blood: NEGATIVE / Protein: NEGATIVE / Nitrite: NEGATIVE   Leuk Esterase: NEGATIVE / RBC: x / WBC x   Sq Epi: x / Non Sq Epi: x / Bacteria: x                RADIOLOGY:    < from: Xray Chest 1 View AP/PA (19 @ 17:02) >  FINDINGS:    Left chest wall pacemaker. The heart is enlarged. The lungs are clear.   There is no pleural effusion or congestion to indicate CHF. The   visualized bones and soft tissues show no acute findings.      IMPRESSION: Cardiomegaly with clear lungs.    < end of copied text >          < from: Xray Shoulder Axillary View, Left (19 @ 14:33) >  IMPRESSION:  Redemonstrated impacted proximal left humeral surgical neck fracture. No   associated glenohumeral dislocation.    No fractures in the remaining imaged regions.    Preserved AC and elbow joint spaces.    Generalized osteopenia otherwise no discrete lytic or blastic lesions.    < end of copied text >          < from: Xray Elbow AP + Lateral, Left (19 @ 14:32) >    IMPRESSION:  Redemonstrated impacted proximal left humeral surgical neck fracture. No   associated glenohumeral dislocation.    No fractures in the remaining imaged regions.    Preserved AC and elbow joint spaces.    Generalized osteopenia otherwise no discrete lytic or blastic lesions.    < end of copied text >          < from: Xray Humerus, Left (19 @ 14:32) >  IMPRESSION:  Redemonstrated impacted proximal left humeral surgical neck fracture. No   associated glenohumeral dislocation.    No fractures in the remaining imaged regions.    Preserved AC and elbow joint spaces.    Generalized osteopenia otherwise no discrete lytic or blastic lesions.    < end of copied text >          < from: Xray Knee 3 Views, Left (19 @ 12:10) >  IMPRESSION:  Infrapatellar soft tissue swelling. No tracking gas collections or   inadvertent radiopaque foreign bodies.    Intact and aligned unconstrained left total knee prosthesis. No   periprosthetic fractures. Nonspecific thin crescentic lucent zone at bone   metal interface of lateral femoral component. No additional suspicious   periprosthetic lucencies.    Generalized osteopenia otherwise no discrete lytic or blastic lesions.    No tracking gas collections or gross radiographic evidence for myelitis.    < end of copied text >

## 2019-04-30 NOTE — H&P ADULT - NSICDXPASTSURGICALHX_GEN_ALL_CORE_FT
PAST SURGICAL HISTORY:  Cardiac pacemaker placed 2009    Cataract b/l lense implant    FHx: cholecystectomy 1993 laparoscopic    FHx: total knee replacement left 2012    Fracture ORIF right ankle 1986    S/P JAY-BSO 40 years ago

## 2019-04-30 NOTE — CONSULT NOTE ADULT - SUBJECTIVE AND OBJECTIVE BOX
HISTORY OF PRESENT ILLNESS: HPI:    85 y.o. female, know to our office, with PMHX of afib on eliquis, HTN, CHF, +PPM, spinal fracture, CVA [Feb 2019, no residual defecits], osteopenia, and macular degeneration of b/l eyes presents with fall. Patient states she was in a parking lot of [employer] and was trying to walk across the parking lot, did not see bumper and fell. On fall, she kit L knee and L shoulder. On recent work up in our office, her echo 12/14/19 noted with severe b/l enlargement, normal lv fx and moderate pericardial effusion, not changed from echo on 6/29/18    he denies any head strike when she fell. She states its difficult raise L arm secondary to pain. She states she was doing well prior to this event. Also states that she has been having SOB, RIDDLE, and orthopnea x months which has not worsen lately, said that these symptoms come and go. Denies fever, chills, cough, LOC, chest pain, abdominal pain, nausea, vomiting, melena, hematochezia, LE edema or dysuria.      On arrival to the ED, her vitals were T 97.8, P 63, /92, RR 16, O2 sat 100% RA. Her lab work was significant for leukocytosis and an elevated pro-BNP (unclear baseline). She had a CXR that showed clear lungs and she had L shoulder xrays that showed a L surgical neck fracture. She also had L knee xray that showed a L infrapatellar soft tissue swelling. She also had a CT Head/Cervical spine that showed a R thalamus subacute vs chronic infarct but otherwise no other acute findings. She was seen by orthopedics and had her L humerus fracture placed in a sling. She was also noted to have AFib with RVR to 140s in the ED and required diltiazem 10 IVP and 30mg PO in the ED. She was then admitted to medicine on telemetry.     Of note, patient's  had a MI about 2 days ago and is currently in the MICU at [facility]. (30 Apr 2019 01:40)      PAST MEDICAL & SURGICAL HISTORY:  Cerebrovascular accident (CVA)  OA (osteoarthritis)  YOVANI on CPAP  Thyroid nodule: followed by endocrinologist  UTI (urinary tract infection): frequent last was 1/15  HLD (hyperlipidemia)  HTN (hypertension)  Atrial fibrillation: dx 7/09 on coumadin  Cardiac pacemaker: placed 2009  Fracture: ORIF right ankle 1986  FHx: cholecystectomy: 1993 laparoscopic  S/P JAY-BSO: 40 years ago  Cataract: b/l lense implant  FHx: total knee replacement: left 2012          MEDICATIONS:  MEDICATIONS  (STANDING):  apixaban 5 milliGRAM(s) Oral every 12 hours  ATENolol  Tablet 100 milliGRAM(s) Oral daily  atorvastatin 10 milliGRAM(s) Oral at bedtime  calcium carbonate 1250 mG  + Vitamin D (OsCal 500 + D) 1 Tablet(s) Oral daily  docusate sodium 100 milliGRAM(s) Oral two times a day  furosemide    Tablet 40 milliGRAM(s) Oral every 24 hours  losartan 100 milliGRAM(s) Oral daily  senna 2 Tablet(s) Oral at bedtime  sodium chloride 0.9% lock flush 3 milliLiter(s) IV Push every 8 hours      Allergies    sulfa drugs (Unknown)    Intolerances    OxyContin (Sedation/Somnol; Drowsiness)      FAMILY HISTORY:  No pertinent family history in first degree relatives    Non-contributary for premature coronary disease or sudden cardiac death    SOCIAL HISTORY:    [ ] Non-smoker  [ ] Smoker  [ ] Alcohol      REVIEW OF SYSTEMS:  [ ]chest pain  [  ]shortness of breath  [  ]palpitations  [  ]syncope  [ ]near syncope [ ]upper extremity weakness   [ ] lower extremity weakness  [  ]diplopia  [  ]altered mental status   [  ]fevers  [ ]chills [ ]nausea  [ ]vomitting  [  ]dysphagia    [ ]abdominal pain  [ ]melena  [ ]BRBPR    [  ]epistaxis  [  ]rash    [ ]lower extremity edema        [ ] All others negative	  [ ] Unable to obtain      LABS:	 	    CARDIAC MARKERS:  CARDIAC MARKERS ( 30 Apr 2019 01:56 )  x     / x     / 29 u/L / 1.69 ng/mL / x                                  13.2   17.02 )-----------( 365      ( 30 Apr 2019 06:55 )             41.1     Hb Trend: 13.2<--, 15.3<--    04-30    138  |  102  |  27<H>  ----------------------------<  129<H>  4.3   |  21<L>  |  0.95    Ca    8.5      30 Apr 2019 06:55  Phos  4.4     04-30  Mg     1.7     04-30    TPro  Test not performed SPECIMEN GROSSLY HEMOLYZED  /  Alb  3.7  /  TBili  1.8<H>  /  DBili  x   /  AST  Test not performed SPECIMEN GROSSLY HEMOLYZED  /  ALT  15  /  AlkPhos  44  04-29    Creatinine Trend: 0.95<--, 0.88<--, 0.83<--, 0.97<--    Coags:      proBNP: Serum Pro-Brain Natriuretic Peptide: 3545 pg/mL (04-29 @ 15:43)    Lipid Profile:   HgA1c: Hemoglobin A1C, Whole Blood: 5.2 % (04-30 @ 06:55)    TSH: Thyroid Stimulating Hormone, Serum: 1.76 uIU/mL (04-30 @ 06:55)    PHYSICAL EXAM:  T(C): 36.4 (04-30-19 @ 10:06), Max: 36.7 (04-29-19 @ 16:55)  HR: 75 (04-30-19 @ 10:06) (75 - 137)  BP: 104/54 (04-30-19 @ 10:06) (104/54 - 149/70)  RR: 18 (04-30-19 @ 10:06) (16 - 22)  SpO2: 99% (04-30-19 @ 10:06) (96% - 100%)  Wt(kg): --  77.2    I&O's Summary      Gen: Appears well in NAD  HEENT:  (-)icterus (-)pallor  CV: N S1 S2 1/6 DMITRIY (+)2 Pulses B/l  Resp:  Clear to auscultation B/L, normal effort  GI: (+) BS Soft, NT, ND  Lymph:  (-)Edema, (-)obvious lymphadenopathy  Skin: Warm to touch, Normal turgor  Psych: Appropriate mood and affect    TELEMETRY: 	NSR       ECG:  	Afib     RADIOLOGY:         CXR: < from: Xray Chest 1 View AP/PA (04.29.19 @ 17:02) >  IMPRESSION: Cardiomegaly with clear lungs.    MELODY COLMENARES M.D., RADIOLOGIST RESIDENT  This document has been electronically signed.  DOROTHEA GONZALES M.D., ATTENDING RADIOLOGIST  This document has been electronically signed. Apr 30 2019  7:47AM      < end of copied text >    < from: Xray Humerus, Left (04.29.19 @ 14:32) >  IMPRESSION:  Redemonstrated impacted proximal left humeral surgical neck fracture. No   associated glenohumeral dislocation.    No fractures in the remaining imaged regions.    Preserved AC and elbow joint spaces.    Generalized osteopenia otherwise no discrete lytic or blastic lesions.    JESSICA QUILES M.D., ATTENDING RADIOLOGIST  This document has been electronically signed. Apr 29 2019  4:51PM    < end of copied text >        ASSESSMENT/PLAN: 	    85 y.o. female, known to our office, with PMHX HISTORY OF PRESENT ILLNESS: HPI:    85 y.o. female, know to our office, with PMHX of afib on eliquis, HTN, CHF, +PPM w/generator change in November 2018 , spinal fracture, CVA [Feb 2019, no residual deficits], osteopenia, and macular degeneration of b/l eyes presents with fall. Patient states she was in a parking lot of [employer] and was trying to walk across the parking lot, did not see bumper and fell. On fall, she kit L knee and L shoulder. On recent work up in our office, her echo 12/14/19 noted with severe b/l enlargement, normal lv fx and moderate pericardial effusion without evidence of tamponade, not changed from echo on 6/29/18. She denies syncope or hitting her head. She endorses progressive SOB, RIDDLE, and orthopnea x months which has not worsen lately, said that these symptoms come and go. Diagnostics in ED included CXR with clear lungs, L shoulder xrays that showed a L surgical neck fracture. She also had L knee xray that showed a L infrapatellar soft tissue swelling. She also had a CT Head/Cervical spine that showed a R thalamus subacute vs chronic infarct but otherwise no other acute findings. Noted to have Afib w/RVR up to 140, given Cardizem 10 mg IV x1, and po 30 mg, HR now 70-90. Denies chest pain, fever, chills, cough, LOC, abdominal pain, N/V, melena, hematochezia, LE edema or dysuria.        PAST MEDICAL & SURGICAL HISTORY:  Cerebrovascular accident (CVA)  OA (osteoarthritis)  YOVANI on CPAP  Thyroid nodule: followed by endocrinologist  UTI (urinary tract infection): frequent last was 1/15  HLD (hyperlipidemia)  HTN (hypertension)  Atrial fibrillation: dx 7/09 on coumadin  Cardiac pacemaker: placed 2009  Fracture: ORIF right ankle 1986  FHx: cholecystectomy: 1993 laparoscopic  S/P JAY-BSO: 40 years ago  Cataract: b/l lense implant  FHx: total knee replacement: left 2012      MEDICATIONS:  MEDICATIONS  (STANDING):  apixaban 5 milliGRAM(s) Oral every 12 hours  ATENolol  Tablet 100 milliGRAM(s) Oral daily  atorvastatin 10 milliGRAM(s) Oral at bedtime  calcium carbonate 1250 mG  + Vitamin D (OsCal 500 + D) 1 Tablet(s) Oral daily  docusate sodium 100 milliGRAM(s) Oral two times a day  furosemide    Tablet 40 milliGRAM(s) Oral every 24 hours  losartan 100 milliGRAM(s) Oral daily  senna 2 Tablet(s) Oral at bedtime  sodium chloride 0.9% lock flush 3 milliLiter(s) IV Push every 8 hours      Allergies    sulfa drugs (Unknown)    Intolerances    OxyContin (Sedation/Somnol; Drowsiness)      FAMILY HISTORY:  No pertinent family history in first degree relatives    Non-contributary for premature coronary disease or sudden cardiac death    SOCIAL HISTORY:    [X ] Non-smoker  [ ] Smoker  [ ] Alcohol      REVIEW OF SYSTEMS:  [ ]chest pain  [  ]shortness of breath  [  ]palpitations  [ X ]syncope without LOC   [ ]near syncope [ ]upper extremity weakness   [ ] lower extremity weakness  [  ]diplopia  [  ]altered mental status   [  ]fevers  [ ]chills [ ]nausea  [ ]vomitting  [  ]dysphagia    [ ]abdominal pain  [ ]melena  [ ]BRBPR    [  ]epistaxis  [  ]rash    [ ]lower extremity edema        [ ] All others negative	  [ ] Unable to obtain      LABS:	 	    CARDIAC MARKERS:  CARDIAC MARKERS ( 30 Apr 2019 01:56 )  x     / x     / 29 u/L / 1.69 ng/mL / x                                  13.2   17.02 )-----------( 365      ( 30 Apr 2019 06:55 )             41.1     Hb Trend: 13.2<--, 15.3<--    04-30    138  |  102  |  27<H>  ----------------------------<  129<H>  4.3   |  21<L>  |  0.95    Ca    8.5      30 Apr 2019 06:55  Phos  4.4     04-30  Mg     1.7     04-30    TPro  Test not performed SPECIMEN GROSSLY HEMOLYZED  /  Alb  3.7  /  TBili  1.8<H>  /  DBili  x   /  AST  Test not performed SPECIMEN GROSSLY HEMOLYZED  /  ALT  15  /  AlkPhos  44  04-29    Creatinine Trend: 0.95<--, 0.88<--, 0.83<--, 0.97<--    Coags:      proBNP: Serum Pro-Brain Natriuretic Peptide: 3545 pg/mL (04-29 @ 15:43)    Lipid Profile:   HgA1c: Hemoglobin A1C, Whole Blood: 5.2 % (04-30 @ 06:55)    TSH: Thyroid Stimulating Hormone, Serum: 1.76 uIU/mL (04-30 @ 06:55)    PHYSICAL EXAM:  T(C): 36.4 (04-30-19 @ 10:06), Max: 36.7 (04-29-19 @ 16:55)  HR: 75 (04-30-19 @ 10:06) (75 - 137)  BP: 104/54 (04-30-19 @ 10:06) (104/54 - 149/70)  RR: 18 (04-30-19 @ 10:06) (16 - 22)  SpO2: 99% (04-30-19 @ 10:06) (96% - 100%)  Wt(kg): --  77.2    I&O's Summary      Gen: Appears well in NAD  HEENT:  (-)icterus (-)pallor  CV: N S1 S2 1/6 DMITRIY (+)2 Pulses B/l  Resp:  Clear to auscultation B/L, normal effort  GI: (+) BS Soft, NT, ND  Lymph:  (-)Edema, (-)obvious lymphadenopathy  Skin: Warm to touch, Normal turgor  Psych: Appropriate mood and affect    TELEMETRY: 	NSR       ECG:  	Afib     RADIOLOGY:         CXR: < from: Xray Chest 1 View AP/PA (04.29.19 @ 17:02) >  IMPRESSION: Cardiomegaly with clear lungs.    MELODY COLMENARES M.D., RADIOLOGIST RESIDENT  This document has been electronically signed.  DOROTHEA GONZALES M.D., ATTENDING RADIOLOGIST  This document has been electronically signed. Apr 30 2019  7:47AM      < end of copied text >    < from: Xray Humerus, Left (04.29.19 @ 14:32) >  IMPRESSION:  Redemonstrated impacted proximal left humeral surgical neck fracture. No   associated glenohumeral dislocation.    No fractures in the remaining imaged regions.    Preserved AC and elbow joint spaces.    Generalized osteopenia otherwise no discrete lytic or blastic lesions.    JESSICA QUILES M.D., ATTENDING RADIOLOGIST  This document has been electronically signed. Apr 29 2019  4:51PM    < end of copied text >        ASSESSMENT/PLAN: 	    85 y.o. female, known to our office, with PMHX HISTORY OF PRESENT ILLNESS: HPI:    85 y.o. female, know to our office, with PMHX of afib on eliquis, HTN, CHF, +PPM w/generator change in November 2018 , spinal fracture, CVA [Feb 2019, no residual deficits], osteopenia, and macular degeneration of b/l eyes presents with fall. Patient states she was in a parking lot of [employer] and was trying to walk across the parking lot, did not see bumper and fell. On fall, she kit L knee and L shoulder. On recent work up in our office, her echo 12/14/19 noted with severe b/l enlargement, normal lv fx and moderate pericardial effusion without evidence of tamponade, not changed from echo on 6/29/18. She denies syncope or hitting her head. She endorses progressive SOB, RIDDLE, and orthopnea x months which has not worsen lately, said that these symptoms come and go. Diagnostics in ED included CXR with clear lungs, L shoulder xrays that showed a L surgical neck fracture. She also had L knee xray that showed a L infrapatellar soft tissue swelling. She also had a CT Head/Cervical spine that showed a R thalamus subacute vs chronic infarct but otherwise no other acute findings. Noted to have Afib w/RVR up to 140, given Cardizem 10 mg IV x1, and po 30 mg, HR now 70-90. Denies chest pain, fever, chills, cough, LOC, abdominal pain, N/V, melena, hematochezia, LE edema or dysuria, sick contacts or recent travel.       PAST MEDICAL & SURGICAL HISTORY:  Cerebrovascular accident (CVA)  OA (osteoarthritis)  YOVANI on CPAP  Thyroid nodule: followed by endocrinologist  UTI (urinary tract infection): frequent last was 1/15  HLD (hyperlipidemia)  HTN (hypertension)  Atrial fibrillation: dx 7/09 on coumadin  Cardiac pacemaker: placed 2009  Fracture: ORIF right ankle 1986  FHx: cholecystectomy: 1993 laparoscopic  S/P JAY-BSO: 40 years ago  Cataract: b/l lense implant  FHx: total knee replacement: left 2012      MEDICATIONS:  MEDICATIONS  (STANDING):  apixaban 5 milliGRAM(s) Oral every 12 hours  ATENolol  Tablet 100 milliGRAM(s) Oral daily  atorvastatin 10 milliGRAM(s) Oral at bedtime  calcium carbonate 1250 mG  + Vitamin D (OsCal 500 + D) 1 Tablet(s) Oral daily  docusate sodium 100 milliGRAM(s) Oral two times a day  furosemide    Tablet 40 milliGRAM(s) Oral every 24 hours  losartan 100 milliGRAM(s) Oral daily  senna 2 Tablet(s) Oral at bedtime  sodium chloride 0.9% lock flush 3 milliLiter(s) IV Push every 8 hours    Allergies    sulfa drugs (Unknown)    Intolerances    OxyContin (Sedation/Somnol; Drowsiness)    FAMILY HISTORY:  No pertinent family history in first degree relatives    Non-contributary for premature coronary disease or sudden cardiac death    SOCIAL HISTORY:    [X ] Non-smoker  [ ] Smoker  [ ] Alcohol      REVIEW OF SYSTEMS:  [ ]chest pain  [  ]shortness of breath  [  ]palpitations  [ X ]syncope/Fall without LOC   [ ]near syncope [X ] Left upper extremity weakness / pain   [ ] lower extremity weakness  [  ]diplopia  [  ]altered mental status   [  ]fevers  [ ]chills [ ]nausea  [ ]vomitting  [  ]dysphagia    [ ]abdominal pain  [ ]melena  [ ]BRBPR    [  ]epistaxis  [  ]rash    [ ] lower extremity edema      [X ] All others negative	  [ ] Unable to obtain    LABS:	 	    CARDIAC MARKERS:  CARDIAC MARKERS ( 30 Apr 2019 01:56 )  x     / x     / 29 u/L / 1.69 ng/mL / x                            13.2   17.02 )-----------( 365      ( 30 Apr 2019 06:55 )             41.1     Hb Trend: 13.2<--, 15.3<--    04-30    138  |  102  |  27<H>  ----------------------------<  129<H>  4.3   |  21<L>  |  0.95    Ca    8.5      30 Apr 2019 06:55  Phos  4.4     04-30  Mg     1.7     04-30    TPro  Test not performed SPECIMEN GROSSLY HEMOLYZED  /  Alb  3.7  /  TBili  1.8<H>  /  DBili  x   /  AST  Test not performed SPECIMEN GROSSLY HEMOLYZED  /  ALT  15  /  AlkPhos  44  04-29    Creatinine Trend: 0.95<--, 0.88<--, 0.83<--, 0.97<--    Coags:      proBNP: Serum Pro-Brain Natriuretic Peptide: 3545 pg/mL (04-29 @ 15:43)    Lipid Profile:   HgA1c: Hemoglobin A1C, Whole Blood: 5.2 % (04-30 @ 06:55)    TSH: Thyroid Stimulating Hormone, Serum: 1.76 uIU/mL (04-30 @ 06:55)    PHYSICAL EXAM:  T(C): 36.4 (04-30-19 @ 10:06), Max: 36.7 (04-29-19 @ 16:55)  HR: 75 (04-30-19 @ 10:06) (75 - 137)  BP: 104/54 (04-30-19 @ 10:06) (104/54 - 149/70)  RR: 18 (04-30-19 @ 10:06) (16 - 22)  SpO2: 99% (04-30-19 @ 10:06) (96% - 100%)  Wt(kg): --  77.2    I&O's Summary      Gen: Appears well in NAD  HEENT:  (-)icterus (-)pallor  CV: N S1 S2 1/6 DMITRIY (+)2 Pulses B/l  Resp:  Clear to auscultation B/L, normal effort  GI: (+) BS Soft, NT, ND  Lymph:  (-)Edema, (-)obvious lymphadenopathy, LUE in sling   Skin: Warm to touch, Normal turgor  Psych: Appropriate mood and affect    TELEMETRY: 	NSR       ECG:  	Afib     RADIOLOGY:         CXR: < from: Xray Chest 1 View AP/PA (04.29.19 @ 17:02) >  IMPRESSION: Cardiomegaly with clear lungs.    MELODY COLMENARES M.D., RADIOLOGIST RESIDENT  This document has been electronically signed.  DOROTHEA GONZALES M.D., ATTENDING RADIOLOGIST  This document has been electronically signed. Apr 30 2019  7:47AM      < end of copied text >    < from: Xray Humerus, Left (04.29.19 @ 14:32) >  IMPRESSION:  Redemonstrated impacted proximal left humeral surgical neck fracture. No   associated glenohumeral dislocation.    No fractures in the remaining imaged regions.    Preserved AC and elbow joint spaces.    Generalized osteopenia otherwise no discrete lytic or blastic lesions.    JESSICA QUILES M.D., ATTENDING RADIOLOGIST  This document has been electronically signed. Apr 29 2019  4:51PM    < end of copied text >        ASSESSMENT/PLAN: 	    85 y.o. female, know to our office, with PMHX of afib on eliquis, HTN, CHF, +PPM w/generator change in November 2018 , spinal fracture, CVA [Feb 2019, no residual deficits], osteopenia, and macular degeneration of b/l eyes presents with fall. Consult is for syncope.    -- Doubt ACS 2/ negative Trop HS 6 --> 9 HISTORY OF PRESENT ILLNESS: HPI:    85 y.o. female, know to our office, with PMHX of afib on eliquis, HTN, CHF, +PPM w/generator change in November 2018 , spinal fracture, CVA [Feb 2019, no residual deficits], osteopenia, and macular degeneration of b/l eyes presents with fall. Patient states she was in a parking lot of [employer] and was trying to walk across the parking lot, did not see bumper and fell. On fall, she hit her L knee and L shoulder. On recent work up in our office, her echo 12/14/19 noted with severe b/l enlargement, normal lv fx and moderate pericardial effusion without evidence of tamponade, not changed from echo on 6/29/18. She denies syncope or hitting her head. She endorses progressive SOB, RIDDLE, and orthopnea x months which has not worsen lately, said that these symptoms come and go. Diagnostics in ED included CXR with clear lungs, L shoulder xrays that showed a L surgical neck fracture. She also had L knee xray that showed a L infrapatellar soft tissue swelling. She also had a CT Head/Cervical spine that showed a R thalamus subacute vs chronic infarct but otherwise no other acute findings. Noted to have Afib w/RVR up to 140, given Cardizem 10 mg IV x1, and po 30 mg, HR now 70-90. Denies chest pain, fever, chills, cough, LOC, abdominal pain, N/V, melena, hematochezia, LE edema or dysuria, sick contacts or recent travel.       PAST MEDICAL & SURGICAL HISTORY:  Cerebrovascular accident (CVA)  OA (osteoarthritis)  YOVANI on CPAP  Thyroid nodule: followed by endocrinologist  UTI (urinary tract infection): frequent last was 1/15  HLD (hyperlipidemia)  HTN (hypertension)  Atrial fibrillation: dx 7/09 on coumadin  Cardiac pacemaker: placed 2009  Fracture: ORIF right ankle 1986  FHx: cholecystectomy: 1993 laparoscopic  S/P JAY-BSO: 40 years ago  Cataract: b/l lense implant  FHx: total knee replacement: left 2012      MEDICATIONS:  MEDICATIONS  (STANDING):  apixaban 5 milliGRAM(s) Oral every 12 hours  ATENolol  Tablet 100 milliGRAM(s) Oral daily  atorvastatin 10 milliGRAM(s) Oral at bedtime  calcium carbonate 1250 mG  + Vitamin D (OsCal 500 + D) 1 Tablet(s) Oral daily  docusate sodium 100 milliGRAM(s) Oral two times a day  furosemide    Tablet 40 milliGRAM(s) Oral every 24 hours  losartan 100 milliGRAM(s) Oral daily  senna 2 Tablet(s) Oral at bedtime  sodium chloride 0.9% lock flush 3 milliLiter(s) IV Push every 8 hours    Allergies    sulfa drugs (Unknown)    Intolerances    OxyContin (Sedation/Somnol; Drowsiness)    FAMILY HISTORY:  No pertinent family history in first degree relatives    Non-contributary for premature coronary disease or sudden cardiac death    SOCIAL HISTORY:    [X ] Non-smoker  [ ] Smoker  [ ] Alcohol      REVIEW OF SYSTEMS:  [ ]chest pain  [  ]shortness of breath  [  ]palpitations  [ X ]syncope/Fall without LOC   [ ]near syncope [X ] Left upper extremity weakness / pain   [ ] lower extremity weakness  [  ]diplopia  [  ]altered mental status   [  ]fevers  [ ]chills [ ]nausea  [ ]vomitting  [  ]dysphagia    [ ]abdominal pain  [ ]melena  [ ]BRBPR    [  ]epistaxis  [  ]rash    [ ] lower extremity edema      [X ] All others negative	  [ ] Unable to obtain    LABS:	 	    CARDIAC MARKERS:  CARDIAC MARKERS ( 30 Apr 2019 01:56 )  x     / x     / 29 u/L / 1.69 ng/mL / x                            13.2   17.02 )-----------( 365      ( 30 Apr 2019 06:55 )             41.1     Hb Trend: 13.2<--, 15.3<--    04-30    138  |  102  |  27<H>  ----------------------------<  129<H>  4.3   |  21<L>  |  0.95    Ca    8.5      30 Apr 2019 06:55  Phos  4.4     04-30  Mg     1.7     04-30    TPro  Test not performed SPECIMEN GROSSLY HEMOLYZED  /  Alb  3.7  /  TBili  1.8<H>  /  DBili  x   /  AST  Test not performed SPECIMEN GROSSLY HEMOLYZED  /  ALT  15  /  AlkPhos  44  04-29    Creatinine Trend: 0.95<--, 0.88<--, 0.83<--, 0.97<--    Coags:      proBNP: Serum Pro-Brain Natriuretic Peptide: 3545 pg/mL (04-29 @ 15:43)    Lipid Profile:   HgA1c: Hemoglobin A1C, Whole Blood: 5.2 % (04-30 @ 06:55)    TSH: Thyroid Stimulating Hormone, Serum: 1.76 uIU/mL (04-30 @ 06:55)    PHYSICAL EXAM:  T(C): 36.4 (04-30-19 @ 10:06), Max: 36.7 (04-29-19 @ 16:55)  HR: 75 (04-30-19 @ 10:06) (75 - 137)  BP: 104/54 (04-30-19 @ 10:06) (104/54 - 149/70)  RR: 18 (04-30-19 @ 10:06) (16 - 22)  SpO2: 99% (04-30-19 @ 10:06) (96% - 100%)  Wt(kg): --  77.2    I&O's Summary      Gen: Appears well in NAD  HEENT:  (-)icterus (-)pallor  CV: N S1 S2 1/6 DMITRIY (+)2 Pulses B/l  Resp:  Clear to auscultation B/L, normal effort  GI: (+) BS Soft, NT, ND  Lymph:  (-)Edema, (-)obvious lymphadenopathy, LUE in sling   Skin: Warm to touch, Normal turgor  Psych: Appropriate mood and affect    TELEMETRY: 	NSR       ECG:  	Afib     RADIOLOGY:         CXR: < from: Xray Chest 1 View AP/PA (04.29.19 @ 17:02) >  IMPRESSION: Cardiomegaly with clear lungs.    MELODY COLMENARES M.D., RADIOLOGIST RESIDENT  This document has been electronically signed.  DOROTHEA GONZALES M.D., ATTENDING RADIOLOGIST  This document has been electronically signed. Apr 30 2019  7:47AM      < end of copied text >    < from: Xray Shoulder Axillary View, Left (04.29.19 @ 14:33) >  IMPRESSION:  Redemonstrated impacted proximal left humeral surgical neck fracture. No   associated glenohumeral dislocation.    No fractures in the remaining imaged regions.    Preserved AC and elbow joint spaces.    Generalized osteopenia otherwise no discrete lytic or blastic lesions.      JESSICA QUILES M.D., ATTENDING RADIOLOGIST  This document has been electronically signed. Apr 29 2019  4:51PM      < end of copied text >    < from: Xray Humerus, Left (04.29.19 @ 14:32) >  IMPRESSION:  Redemonstrated impacted proximal left humeral surgical neck fracture. No   associated glenohumeral dislocation.    No fractures in the remaining imaged regions.    Preserved AC and elbow joint spaces.    Generalized osteopenia otherwise no discrete lytic or blastic lesions.    JESSICA QUILES M.D., ATTENDING RADIOLOGIST  This document has been electronically signed. Apr 29 2019  4:51PM    < end of copied text >    < from: CT Cervical Spine No Cont (04.29.19 @ 11:22) >  FINDINGS:  Head CT:  There has been no interval change.    The ventricles and sulci are prominent, consistent with age-appropriate   line loss. Right thalamic subacute versus chronic infarction is seen.   There is no intraparenchymal hematoma, mass effect or midline shift. No   abnormal extra-axial fluid collections or hemorrhages are present.   Chronic right medial parietal and occipital lobe gliosis and volume loss   is unchanged.    The calvarium is intact. The visualized intraorbital compartment,   paranasal sinuses and tympanomastoid cavities appear free of acute   disease.      Cervical spine CT:  Alignment is maintained. Vertebral bodies are normal in height, without   evidence of fracture or dislocation. Prevertebral soft tissues are within   normal limits without soft tissue swelling or hematoma.    There is multilevel degenerative spondylosis.    Evaluation of the soft tissues demonstrates mild bilateral   atherosclerotic disease at the carotid bifurcations. Heterogeneity of the   thyroid gland is noted. Please clinically correlate and consider   dedicated additional imaging for further evaluation if clinically   indicated.    The visualized lung apices are within normal limits.    IMPRESSION:  No intracranial hemorrhage.  No cervical spine fracture.    If the patient has neurologic symptoms which are new and persistent,   consider follow up MRI if there are no contraindications for such.    IRWIN SLOAN M.D., ATTENDING RADIOLOGIST  This document has been electronically signed. Apr 29 2019 11:37AM    < end of copied text >      ASSESSMENT/PLAN: 	    85 y.o. female, know to our office, with PMHX of afib on eliquis, HTN, CHF, +PPM w/generator change in November 2018 , spinal fracture, CVA [Feb 2019, no residual deficits], osteopenia, and macular degeneration of b/l eyes presents with fall. Consult is for syncope.    -- Doubt ACS 2/ negative Trop HS 6 --> 9   -- Recent echo in my office as noted above  -- f/u Repeat echo eval LV function, r/o valve abnormality, eval mod pericardial effusion   -- Continue Eliquis for Afib  -- Xray Shoulder/Humerus/Knee as noted above   -- CT H/C/S as noted above, negative for bleed or cervical spine fx   -- Ortho note appreciated, continue care per team, sling to Left UE   -- Further cardiac work up pending above HISTORY OF PRESENT ILLNESS: HPI:    85 y.o. female, know to our office, with PMHX of afib on eliquis, HTN, CHF, +PPM w/generator change in November 2018 , spinal fracture, CVA [Feb 2019, no residual deficits], osteopenia, and macular degeneration of b/l eyes presents with fall. Patient states she was in a parking lot of [employer] and was trying to walk across the parking lot, did not see bumper and fell. On fall, she hit her L knee and L shoulder. On recent work up in our office, her echo 12/14/19 noted with severe b/l enlargement, normal lv fx and moderate pericardial effusion without evidence of tamponade, not changed from echo on 6/29/18. She denies syncope or hitting her head. She endorses progressive SOB, RIDDLE, and orthopnea x months which has not worsen lately, said that these symptoms come and go. Diagnostics in ED included CXR with clear lungs, L shoulder xrays that showed a L surgical neck fracture. She also had L knee xray that showed a L infrapatellar soft tissue swelling. She also had a CT Head/Cervical spine that showed a R thalamus subacute vs chronic infarct but otherwise no other acute findings. Noted to have Afib w/RVR up to 140, given Cardizem 10 mg IV x1, and po 30 mg, HR now 70-90. Denies chest pain, fever, chills, cough, LOC, abdominal pain, N/V, melena, hematochezia, LE edema or dysuria, sick contacts or recent travel.       PAST MEDICAL & SURGICAL HISTORY:  Cerebrovascular accident (CVA)  OA (osteoarthritis)  YOVANI on CPAP  Thyroid nodule: followed by endocrinologist  UTI (urinary tract infection): frequent last was 1/15  HLD (hyperlipidemia)  HTN (hypertension)  Atrial fibrillation: dx 7/09 on coumadin  Cardiac pacemaker: placed 2009  Fracture: ORIF right ankle 1986  FHx: cholecystectomy: 1993 laparoscopic  S/P JAY-BSO: 40 years ago  Cataract: b/l lense implant  FHx: total knee replacement: left 2012      MEDICATIONS:  MEDICATIONS  (STANDING):  apixaban 5 milliGRAM(s) Oral every 12 hours  ATENolol  Tablet 100 milliGRAM(s) Oral daily  atorvastatin 10 milliGRAM(s) Oral at bedtime  calcium carbonate 1250 mG  + Vitamin D (OsCal 500 + D) 1 Tablet(s) Oral daily  docusate sodium 100 milliGRAM(s) Oral two times a day  furosemide    Tablet 40 milliGRAM(s) Oral every 24 hours  losartan 100 milliGRAM(s) Oral daily  senna 2 Tablet(s) Oral at bedtime  sodium chloride 0.9% lock flush 3 milliLiter(s) IV Push every 8 hours    Allergies    sulfa drugs (Unknown)    Intolerances    OxyContin (Sedation/Somnol; Drowsiness)    FAMILY HISTORY:  No pertinent family history in first degree relatives    Non-contributary for premature coronary disease or sudden cardiac death    SOCIAL HISTORY:    [X ] Non-smoker  [ ] Smoker  [ ] Alcohol      REVIEW OF SYSTEMS:  [ ]chest pain  [  ]shortness of breath  [  ]palpitations  [ X ]syncope/Fall without LOC   [ ]near syncope [X ] Left upper extremity weakness / pain   [ ] lower extremity weakness  [  ]diplopia  [  ]altered mental status   [  ]fevers  [ ]chills [ ]nausea  [ ]vomitting  [  ]dysphagia    [ ]abdominal pain  [ ]melena  [ ]BRBPR    [  ]epistaxis  [  ]rash    [ ] lower extremity edema      [X ] All others negative	  [ ] Unable to obtain    LABS:	 	    CARDIAC MARKERS:  CARDIAC MARKERS ( 30 Apr 2019 01:56 )  x     / x     / 29 u/L / 1.69 ng/mL / x                            13.2   17.02 )-----------( 365      ( 30 Apr 2019 06:55 )             41.1     Hb Trend: 13.2<--, 15.3<--    04-30    138  |  102  |  27<H>  ----------------------------<  129<H>  4.3   |  21<L>  |  0.95    Ca    8.5      30 Apr 2019 06:55  Phos  4.4     04-30  Mg     1.7     04-30    TPro  Test not performed SPECIMEN GROSSLY HEMOLYZED  /  Alb  3.7  /  TBili  1.8<H>  /  DBili  x   /  AST  Test not performed SPECIMEN GROSSLY HEMOLYZED  /  ALT  15  /  AlkPhos  44  04-29    Creatinine Trend: 0.95<--, 0.88<--, 0.83<--, 0.97<--    Coags:      proBNP: Serum Pro-Brain Natriuretic Peptide: 3545 pg/mL (04-29 @ 15:43)    Lipid Profile:   HgA1c: Hemoglobin A1C, Whole Blood: 5.2 % (04-30 @ 06:55)    TSH: Thyroid Stimulating Hormone, Serum: 1.76 uIU/mL (04-30 @ 06:55)    PHYSICAL EXAM:  T(C): 36.4 (04-30-19 @ 10:06), Max: 36.7 (04-29-19 @ 16:55)  HR: 75 (04-30-19 @ 10:06) (75 - 137)  BP: 104/54 (04-30-19 @ 10:06) (104/54 - 149/70)  RR: 18 (04-30-19 @ 10:06) (16 - 22)  SpO2: 99% (04-30-19 @ 10:06) (96% - 100%)  Wt(kg): --  77.2    I&O's Summary      Gen: Appears well in NAD  HEENT:  (-)icterus (-)pallor  CV: N S1 S2 1/6 DMITRIY (+)2 Pulses B/l  Resp:  Clear to auscultation B/L, normal effort  GI: (+) BS Soft, NT, ND  Lymph:  (-)Edema, (-)obvious lymphadenopathy, LUE in sling   Skin: Warm to touch, Normal turgor  Psych: Appropriate mood and affect    TELEMETRY: 	NSR       ECG:  	Afib     RADIOLOGY:         CXR: < from: Xray Chest 1 View AP/PA (04.29.19 @ 17:02) >  IMPRESSION: Cardiomegaly with clear lungs.    MELODY COLMENARES M.D., RADIOLOGIST RESIDENT  This document has been electronically signed.  DOROTHEA GONZALES M.D., ATTENDING RADIOLOGIST  This document has been electronically signed. Apr 30 2019  7:47AM      < end of copied text >    < from: Xray Shoulder Axillary View, Left (04.29.19 @ 14:33) >  IMPRESSION:  Redemonstrated impacted proximal left humeral surgical neck fracture. No   associated glenohumeral dislocation.    No fractures in the remaining imaged regions.    Preserved AC and elbow joint spaces.    Generalized osteopenia otherwise no discrete lytic or blastic lesions.      JESSICA QUILES M.D., ATTENDING RADIOLOGIST  This document has been electronically signed. Apr 29 2019  4:51PM      < end of copied text >    < from: Xray Humerus, Left (04.29.19 @ 14:32) >  IMPRESSION:  Redemonstrated impacted proximal left humeral surgical neck fracture. No   associated glenohumeral dislocation.    No fractures in the remaining imaged regions.    Preserved AC and elbow joint spaces.    Generalized osteopenia otherwise no discrete lytic or blastic lesions.    JESSICA QUILES M.D., ATTENDING RADIOLOGIST  This document has been electronically signed. Apr 29 2019  4:51PM    < end of copied text >    < from: CT Cervical Spine No Cont (04.29.19 @ 11:22) >  FINDINGS:  Head CT:  There has been no interval change.    The ventricles and sulci are prominent, consistent with age-appropriate   line loss. Right thalamic subacute versus chronic infarction is seen.   There is no intraparenchymal hematoma, mass effect or midline shift. No   abnormal extra-axial fluid collections or hemorrhages are present.   Chronic right medial parietal and occipital lobe gliosis and volume loss   is unchanged.    The calvarium is intact. The visualized intraorbital compartment,   paranasal sinuses and tympanomastoid cavities appear free of acute   disease.      Cervical spine CT:  Alignment is maintained. Vertebral bodies are normal in height, without   evidence of fracture or dislocation. Prevertebral soft tissues are within   normal limits without soft tissue swelling or hematoma.    There is multilevel degenerative spondylosis.    Evaluation of the soft tissues demonstrates mild bilateral   atherosclerotic disease at the carotid bifurcations. Heterogeneity of the   thyroid gland is noted. Please clinically correlate and consider   dedicated additional imaging for further evaluation if clinically   indicated.    The visualized lung apices are within normal limits.    IMPRESSION:  No intracranial hemorrhage.  No cervical spine fracture.    If the patient has neurologic symptoms which are new and persistent,   consider follow up MRI if there are no contraindications for such.    IRWIN SLOAN M.D., ATTENDING RADIOLOGIST  This document has been electronically signed. Apr 29 2019 11:37AM    < end of copied text >      ASSESSMENT/PLAN: 	    85 y.o. female, know to our office, with PMHX of afib on eliquis, HTN, CHF, +PPM w/generator change in November 2018 , spinal fracture, CVA [Feb 2019, no residual deficits], osteopenia, and macular degeneration of b/l eyes presents with fall. Consult is for syncope.    -- Doubt ACS 2/2 negative Trop HS 6 --> 9   -- Recent echo in my office as noted above  -- f/u Repeat echo eval LV function, r/o valve abnormality, eval mod pericardial effusion   -- Continue Eliquis for Afib  -- Xray Shoulder/Humerus/Knee as noted above   -- CT H/C/S as noted above, negative for bleed or cervical spine fx   -- Ortho note appreciated, continue care per team, sling to Left UE   -- Further cardiac work up pending above

## 2019-04-30 NOTE — PATIENT PROFILE ADULT - VISION (WITH CORRECTIVE LENSES IF THE PATIENT USUALLY WEARS THEM):
Pt. has macular degeneration/Partially impaired: cannot see medication labels or newsprint, but can see obstacles in path, and the surrounding layout; can count fingers at arm's length

## 2019-04-30 NOTE — H&P ADULT - PROBLEM SELECTOR PLAN 5
cont atenolol symptoms are chronic and symptoms has not worsen recently  cards consult in AM  Echo ordered - Patient with c/o RIDDLE/SOB and orthopnea with physical examination showing her to have 1+ pitting edema, states that her symptoms have been chronic and are not progressing  - likely symptoms of chronic HF, has elevated pro-BNP but might be chronically elevated  - Chest clear to auscultation and CXR clear  - Will c/w home lasix 40mg PO daily, will check a TTE  - Consider cards consult in AM

## 2019-04-30 NOTE — H&P ADULT - NSHPSOCIALHISTORY_GEN_ALL_CORE
Denies smoking, alcohol or drug use    Lives with daughter and  Denies smoking, alcohol or drug use    Lives with daughter and     Ambulates with the assistance of a cane

## 2019-04-30 NOTE — H&P ADULT - NSHPPHYSICALEXAM_GEN_ALL_CORE
Vital Signs Last 24 Hrs  T(C): 36.6 (30 Apr 2019 04:06), Max: 36.7 (29 Apr 2019 16:55)  T(F): 97.8 (30 Apr 2019 04:06), Max: 98 (29 Apr 2019 16:55)  HR: 96 (30 Apr 2019 04:06) (63 - 137)  BP: 149/70 (30 Apr 2019 04:06) (105/69 - 149/70)  BP(mean): --  RR: 18 (30 Apr 2019 04:06) (16 - 22)  SpO2: 98% (30 Apr 2019 04:06) (96% - 100%)

## 2019-04-30 NOTE — H&P ADULT - NEGATIVE MUSCULOSKELETAL SYMPTOMS
no arthralgia/no joint swelling no arthritis/no myalgia/no stiffness/no neck pain/no joint swelling/no back pain

## 2019-04-30 NOTE — H&P ADULT - PROBLEM SELECTOR PLAN 3
Ortho consult appreciated. Patient to remain in sling, nonweightbearing LUE.    Follow up with Dr. Delaney in office in 1-2 weeks. - Mechanical fall 2/2 tripping on a concrete embarkment due to poor eyesight from her macular degeneration  - PT consult  - Pain control  - Fall precautions

## 2019-04-30 NOTE — CONSULT NOTE ADULT - ASSESSMENT
A/P: 85y Female with Left proximal humerus fx s/p mechanical fall    1. XR left shoulder reviewed- no dislocation   2. Patient to remain in sling, nonweightbearing LUE.   3. XR left knee reviewed- no periprosthetic fx; Patient to follow up with Dr. Cornell in 1-2 weeks if pain persists. Compressive Ace dressing applied for swelling. WBAT LLE.   4. Pain control  5. Above discussed with patient who is in agreement with the plan.   6. Patient advised to follow up with Dr. Delaney in office in 1-2 weeks.
84 y/o F with hx of afib on eliquis, HTN, CHF, +PPM, spinal fracture, CVA [Feb 2019, no residual defecits], osteopenia, and macular degeneration of b/l eyes presents with fall. Patient states she was in a parking lot of [employer] and was trying to walk to the bathroom by herself. She then tripped on concrete and she fell on L knee and L shoulder, denies any associated chest pain, palpitations, LOC, or dizziness. She denies any head strike when she fell. She states its difficult raise L arm secondary to pain. She states she was doing well prior to this event. Also states that she has been having SOB, RIDDLE, and orthopnea x months which has not worsen lately, said that these symptoms come and go. Denies fever, chills, cough, LOC, chest pain, abdominal pain, nausea, vomiting, melena, hematochezia, LE edema or dysuria.      On arrival to the ED, her vitals were T 97.8, P 63, /92, RR 16, O2 sat 100% RA. Her lab work was significant for leukocytosis and an elevated pro-BNP (unclear baseline). She had a CXR that showed clear lungs and she had L shoulder xrays that showed a L surgical neck fracture. She also had L knee xray that showed a L infrapatellar soft tissue swelling. She also had a CT Head/Cervical spine that showed a R thalamus subacute vs chronic infarct but otherwise no other acute findings. She was seen by orthopedics and had her L humerus fracture placed in a sling. She was also noted to have AFib with RVR to 140s in the ED and required diltiazem 10 IVP and 30mg PO in the ED. She was then admitted to medicine on telemetry.     Of note, patient's  had a MI about 2 days ago and is currently in the MICU at [facility]. (30 Apr 2019 01:40)    Pt has been afebrile thus far.  WBC on admission 22.9 --> 17.0.  Off abx currently.  Denies recent infectious issues, no recent virus, no recent fevers.  Denies cough/congestion/abd pain/HA/diarrhea/dysuria.  C/o pain, bruising and swelling over L knee, where she fell.  ID consult called to r/o underlying infectious issues that may be causing her leukocytosis.        Recommend:    Leukocytosis:    -  Suspect reactive from recent fall.  Repeat WBC trending down.  No cough/dysuria/abd pain or other localizing symptoms.  UA (-) nit/LE, cxr without evidence for pna.  She does have bruising and swelling over L knee, appears superficial.  Will continue to monitor.  Currently no erythema, cellulitis or SOI.      - Monitor off abx.      - Trend WBC.  If pt spikes fever > 100.4, send blood cx x 2      Will follow,    Noreen Charles  [phone]

## 2019-04-30 NOTE — H&P ADULT - MUSCULOSKELETAL COMMENTS
L shoulder pain and L knee pain L arm in a sling. L knee ROM limited secondary to pain L arm in a sling. L knee ROM limited secondary to pain, L knee slightly swollen when compared to R knee

## 2019-04-30 NOTE — H&P ADULT - NEUROLOGICAL DETAILS
responds to verbal commands/cranial nerves intact/alert and oriented x 3 alert and oriented x 3/sensation intact/normal strength/responds to verbal commands

## 2019-04-30 NOTE — H&P ADULT - PROBLEM SELECTOR PLAN 1
Admit to tele  check cbc, bmp, a1c, flp, tsh,t rend CE  Restart atenolol  Echo ordered  [id number] score of   cont eliquis Admit to tele  check cbc, bmp, a1c, flp, tsh,t rend CE  cont atenolol   Echo ordered  [id number] score of 7  cont eliquis Admit to tele  check cbc, bmp, a1c, flp, tsh,t rend CE  cont atenolol   Echo ordered  [id number] score of 7  cont [place]  Discussed with Dr. Steele - L humerus surgical neck fracture 2/2 fall, currently in a sling  - Ortho consult appreciated. Patient to remain in sling, nonweightbearing LUE.    Follow up with Dr. Delaney in office in 1-2 weeks.  - Pain control with tylenol/oxy prn, bowel regimen while on opioids  - PT eval

## 2019-04-30 NOTE — H&P ADULT - ASSESSMENT
86 y/o F with hx of afib on eliquis, HTN, CHF, +PPM, spinal fracture, CVA [Feb 2019], osteopenia presents with fall. 86 y/o F with hx of afib on eliquis, HTN, CHF, +PPM, spinal fracture, CVA [Feb 2019, no residual defecits], osteopenia, and macular degeneration of b/l eyes presents with fall. Now with a L humerus fracture and AFib with RVR.

## 2019-05-01 LAB
ANION GAP SERPL CALC-SCNC: 12 MMO/L — SIGNIFICANT CHANGE UP (ref 7–14)
BACTERIA UR CULT: SIGNIFICANT CHANGE UP
BASOPHILS # BLD AUTO: 0.09 K/UL — SIGNIFICANT CHANGE UP (ref 0–0.2)
BASOPHILS NFR BLD AUTO: 0.8 % — SIGNIFICANT CHANGE UP (ref 0–2)
BUN SERPL-MCNC: 27 MG/DL — HIGH (ref 7–23)
CALCIUM SERPL-MCNC: 8.5 MG/DL — SIGNIFICANT CHANGE UP (ref 8.4–10.5)
CHLORIDE SERPL-SCNC: 102 MMOL/L — SIGNIFICANT CHANGE UP (ref 98–107)
CO2 SERPL-SCNC: 25 MMOL/L — SIGNIFICANT CHANGE UP (ref 22–31)
CREAT SERPL-MCNC: 1.03 MG/DL — SIGNIFICANT CHANGE UP (ref 0.5–1.3)
EOSINOPHIL # BLD AUTO: 0.23 K/UL — SIGNIFICANT CHANGE UP (ref 0–0.5)
EOSINOPHIL NFR BLD AUTO: 2 % — SIGNIFICANT CHANGE UP (ref 0–6)
GLUCOSE SERPL-MCNC: 104 MG/DL — HIGH (ref 70–99)
HCT VFR BLD CALC: 37.8 % — SIGNIFICANT CHANGE UP (ref 34.5–45)
HGB BLD-MCNC: 12.2 G/DL — SIGNIFICANT CHANGE UP (ref 11.5–15.5)
IMM GRANULOCYTES NFR BLD AUTO: 0.8 % — SIGNIFICANT CHANGE UP (ref 0–1.5)
LYMPHOCYTES # BLD AUTO: 1.68 K/UL — SIGNIFICANT CHANGE UP (ref 1–3.3)
LYMPHOCYTES # BLD AUTO: 14.7 % — SIGNIFICANT CHANGE UP (ref 13–44)
MAGNESIUM SERPL-MCNC: 1.6 MG/DL — SIGNIFICANT CHANGE UP (ref 1.6–2.6)
MCHC RBC-ENTMCNC: 28.2 PG — SIGNIFICANT CHANGE UP (ref 27–34)
MCHC RBC-ENTMCNC: 32.3 % — SIGNIFICANT CHANGE UP (ref 32–36)
MCV RBC AUTO: 87.5 FL — SIGNIFICANT CHANGE UP (ref 80–100)
MONOCYTES # BLD AUTO: 0.91 K/UL — HIGH (ref 0–0.9)
MONOCYTES NFR BLD AUTO: 8 % — SIGNIFICANT CHANGE UP (ref 2–14)
NEUTROPHILS # BLD AUTO: 8.42 K/UL — HIGH (ref 1.8–7.4)
NEUTROPHILS NFR BLD AUTO: 73.7 % — SIGNIFICANT CHANGE UP (ref 43–77)
NRBC # FLD: 0 K/UL — SIGNIFICANT CHANGE UP (ref 0–0)
PLATELET # BLD AUTO: 307 K/UL — SIGNIFICANT CHANGE UP (ref 150–400)
PMV BLD: 12.3 FL — SIGNIFICANT CHANGE UP (ref 7–13)
POTASSIUM SERPL-MCNC: 3.6 MMOL/L — SIGNIFICANT CHANGE UP (ref 3.5–5.3)
POTASSIUM SERPL-SCNC: 3.6 MMOL/L — SIGNIFICANT CHANGE UP (ref 3.5–5.3)
RBC # BLD: 4.32 M/UL — SIGNIFICANT CHANGE UP (ref 3.8–5.2)
RBC # FLD: 15.2 % — HIGH (ref 10.3–14.5)
SODIUM SERPL-SCNC: 139 MMOL/L — SIGNIFICANT CHANGE UP (ref 135–145)
SPECIMEN SOURCE: SIGNIFICANT CHANGE UP
SPECIMEN SOURCE: SIGNIFICANT CHANGE UP
WBC # BLD: 11.42 K/UL — HIGH (ref 3.8–10.5)
WBC # FLD AUTO: 11.42 K/UL — HIGH (ref 3.8–10.5)

## 2019-05-01 PROCEDURE — 93280 PM DEVICE PROGR EVAL DUAL: CPT | Mod: 26

## 2019-05-01 RX ADMIN — Medication 40 MILLIGRAM(S): at 12:59

## 2019-05-01 RX ADMIN — Medication 650 MILLIGRAM(S): at 22:50

## 2019-05-01 RX ADMIN — SODIUM CHLORIDE 3 MILLILITER(S): 9 INJECTION INTRAMUSCULAR; INTRAVENOUS; SUBCUTANEOUS at 06:29

## 2019-05-01 RX ADMIN — OXYCODONE HYDROCHLORIDE 5 MILLIGRAM(S): 5 TABLET ORAL at 19:45

## 2019-05-01 RX ADMIN — ATORVASTATIN CALCIUM 10 MILLIGRAM(S): 80 TABLET, FILM COATED ORAL at 21:59

## 2019-05-01 RX ADMIN — Medication 650 MILLIGRAM(S): at 15:49

## 2019-05-01 RX ADMIN — APIXABAN 5 MILLIGRAM(S): 2.5 TABLET, FILM COATED ORAL at 18:48

## 2019-05-01 RX ADMIN — Medication 650 MILLIGRAM(S): at 07:21

## 2019-05-01 RX ADMIN — SODIUM CHLORIDE 3 MILLILITER(S): 9 INJECTION INTRAMUSCULAR; INTRAVENOUS; SUBCUTANEOUS at 14:42

## 2019-05-01 RX ADMIN — OXYCODONE HYDROCHLORIDE 5 MILLIGRAM(S): 5 TABLET ORAL at 13:10

## 2019-05-01 RX ADMIN — ATENOLOL 100 MILLIGRAM(S): 25 TABLET ORAL at 06:27

## 2019-05-01 RX ADMIN — OXYCODONE HYDROCHLORIDE 5 MILLIGRAM(S): 5 TABLET ORAL at 06:26

## 2019-05-01 RX ADMIN — OXYCODONE HYDROCHLORIDE 5 MILLIGRAM(S): 5 TABLET ORAL at 07:21

## 2019-05-01 RX ADMIN — LOSARTAN POTASSIUM 100 MILLIGRAM(S): 100 TABLET, FILM COATED ORAL at 06:27

## 2019-05-01 RX ADMIN — SODIUM CHLORIDE 3 MILLILITER(S): 9 INJECTION INTRAMUSCULAR; INTRAVENOUS; SUBCUTANEOUS at 22:00

## 2019-05-01 RX ADMIN — Medication 1 TABLET(S): at 12:59

## 2019-05-01 RX ADMIN — Medication 650 MILLIGRAM(S): at 06:26

## 2019-05-01 RX ADMIN — APIXABAN 5 MILLIGRAM(S): 2.5 TABLET, FILM COATED ORAL at 06:26

## 2019-05-01 RX ADMIN — Medication 650 MILLIGRAM(S): at 22:00

## 2019-05-01 RX ADMIN — Medication 650 MILLIGRAM(S): at 16:49

## 2019-05-01 RX ADMIN — Medication 100 MILLIGRAM(S): at 06:27

## 2019-05-01 RX ADMIN — OXYCODONE HYDROCHLORIDE 5 MILLIGRAM(S): 5 TABLET ORAL at 12:31

## 2019-05-01 RX ADMIN — OXYCODONE HYDROCHLORIDE 5 MILLIGRAM(S): 5 TABLET ORAL at 18:46

## 2019-05-01 NOTE — PROGRESS NOTE ADULT - SUBJECTIVE AND OBJECTIVE BOX
INTERVAL HPI/OVERNIGHT EVENTS: Daughter and Son IN Law in room . I feel fine.   Vital Signs Last 24 Hrs  T(C): 36.6 (01 May 2019 13:00), Max: 36.8 (01 May 2019 06:25)  T(F): 97.8 (01 May 2019 13:00), Max: 98.3 (01 May 2019 06:25)  HR: 89 (01 May 2019 13:00) (80 - 89)  BP: 132/69 (01 May 2019 13:00) (90/48 - 139/70)  BP(mean): --  RR: 18 (01 May 2019 13:00) (18 - 18)  SpO2: 97% (01 May 2019 13:00) (96% - 98%)  I&O's Summary    MEDICATIONS  (STANDING):  apixaban 5 milliGRAM(s) Oral every 12 hours  ATENolol  Tablet 100 milliGRAM(s) Oral daily  atorvastatin 10 milliGRAM(s) Oral at bedtime  calcium carbonate 1250 mG  + Vitamin D (OsCal 500 + D) 1 Tablet(s) Oral daily  docusate sodium 100 milliGRAM(s) Oral two times a day  furosemide    Tablet 40 milliGRAM(s) Oral every 24 hours  losartan 100 milliGRAM(s) Oral daily  senna 2 Tablet(s) Oral at bedtime  sodium chloride 0.9% lock flush 3 milliLiter(s) IV Push every 8 hours    MEDICATIONS  (PRN):  acetaminophen   Tablet .. 650 milliGRAM(s) Oral every 6 hours PRN Mild Pain (1 - 3), Moderate Pain (4 - 6), Severe Pain (7 - 10)  oxyCODONE    IR 5 milliGRAM(s) Oral every 6 hours PRN Severe Pain (7 - 10)    LABS:                        12.2   11.42 )-----------( 307      ( 01 May 2019 06:51 )             37.8     05-    139  |  102  |  27<H>  ----------------------------<  104<H>  3.6   |  25  |  1.03    Ca    8.5      01 May 2019 06:51  Phos  4.4     04-30  Mg     1.6     05-        Urinalysis Basic - ( 2019 18:45 )    Color: LIGHT YELLOW / Appearance: CLEAR / S.011 / pH: 6.0  Gluc: NEGATIVE / Ketone: NEGATIVE  / Bili: NEGATIVE / Urobili: NORMAL   Blood: NEGATIVE / Protein: NEGATIVE / Nitrite: NEGATIVE   Leuk Esterase: NEGATIVE / RBC: x / WBC x   Sq Epi: x / Non Sq Epi: x / Bacteria: x      CAPILLARY BLOOD GLUCOSE            Urinalysis Basic - ( 2019 18:45 )    Color: LIGHT YELLOW / Appearance: CLEAR / S.011 / pH: 6.0  Gluc: NEGATIVE / Ketone: NEGATIVE  / Bili: NEGATIVE / Urobili: NORMAL   Blood: NEGATIVE / Protein: NEGATIVE / Nitrite: NEGATIVE   Leuk Esterase: NEGATIVE / RBC: x / WBC x   Sq Epi: x / Non Sq Epi: x / Bacteria: x      REVIEW OF SYSTEMS:  CONSTITUTIONAL: No fever, weight loss, or fatigue  EYES: No eye pain, visual disturbances, or discharge  ENMT:  No difficulty hearing, tinnitus, vertigo; No sinus or throat pain    RESPIRATORY: No cough, wheezing, chills or hemoptysis; No shortness of breath  CARDIOVASCULAR: No chest pain, palpitations, dizziness, or leg swelling  GASTROINTESTINAL: No abdominal or epigastric pain. No nausea, vomiting, or hematemesis; No diarrhea or constipation. No melena or hematochezia.  GENITOURINARY: No dysuria, frequency, hematuria, or incontinence  NEUROLOGICAL: No headaches, memory loss, loss of strength, numbness, or tremors    Consultant(s) Notes Reviewed:  [x ] YES  [ ] NO    PHYSICAL EXAM:  GENERAL: NAD, well-groomed, well-developed,not in any distress ,  HEAD:  Atraumatic, Normocephalic  EYES: EOMI, PERRLA, conjunctiva and sclera clear  ENMT: No tonsillar erythema, exudates, or enlargement; Moist mucous membranes, Good dentition, No lesions  NECK: Supple, No JVD, Normal thyroid  NERVOUS SYSTEM:  Alert & Oriented X3, No focal deficit   CHEST/LUNG: Good air entry bilateral with no  rales, rhonchi, wheezing, or rubs  HEART: Regular rate and rhythm; No murmurs, rubs, or gallops  ABDOMEN: Soft, Nontender, Nondistended; Bowel sounds present  EXTREMITIES:  2+ Peripheral Pulses, No clubbing, cyanosis, or edema  left arm in sling     Care Discussed with Consultants/Other Providers [ x] YES  [ ] NO

## 2019-05-01 NOTE — PROGRESS NOTE ADULT - ASSESSMENT
86 y/o F with hx of afib on eliquis, HTN, CHF, +PPM, spinal fracture, CVA [Feb 2019, no residual defecits], osteopenia, and macular degeneration of b/l eyes presents with fall. Now with a L humerus fracture and AFib with RVR.     Problem/Plan - 1:  ·  Problem: Humerus surgical neck fracture. Plan: - L humerus surgical neck fracture 2/2 fall, currently in a sling  - Ortho consult appreciated. Patient to remain in sling, nonweightbearing LUE.    Follow up with Dr. Delaney in office in 1-2 weeks.  - Pain control with tylenol/oxy prn, bowel regimen while on opioids  - PT eval.     Problem/Plan - 2:  ·  Problem: Atrial fibrillation with rapid ventricular response. Plan: - PPM interrogated . EP helping .  -Patient noted to have poorly controlled AFib, required diltiazem 10mg IVP and 30mg PO with improved rate control but now again in the low 100s-110s, likely has elevated rates 2/2 pain from her fracture/knee effusion  - Patient states that she used to be on diltiazem but was taken off recently, unclear why the medication was stopped but she might benefit from restarting diltiazem if her rates remain poorly controlled despite adequate pain control, will need to clarify with cardiology in AM  - c/w apixaban for anticoagulation, CHADSVASC score of 7  - c/w atenolol for no     Problem/Plan - 3:  ·  Problem: Fall. Plan: - Mechanical fall 2/2 tripping on a concrete embarkment due to poor eyesight from her macular degeneration  - PT consult  - Pain control  - Fall precautions.     Problem/Plan - 4:  ·  Problem: Leukocytosis, unspecified type. Plan: - Resolving . iD helping.   -Suspect leukocytosis is reactionary 2/2 fracture/pain, no signs or symptoms of an acute infection currently  - Monitor WBC  - Will hold off on abx for now  Blood cultures/Abx if patient spikes a fever or other complaints.     Problem/Plan - 5:  ·  Problem: Congestive heart failure.  Plan: - Patient with c/o RIDDLE/SOB and orthopnea with physical examination showing her to have 1+ pitting edema, states that her symptoms have been chronic and are not progressing  - likely symptoms of chronic HF, has elevated pro-BNP but might be chronically elevated  - Chest clear to auscultation and CXR clear  - Will c/w home lasix 40mg PO daily, will check a TTE-Cardiology helping.      Problem/Plan - 6:  Problem: HLD (hyperlipidemia). Plan: - cont statin.     Problem/Plan - 7:  ·  Problem: HTN (hypertension).  Plan: - cont atenolol and losartan with hold parameters.     Problem/Plan - 8:  ·  Problem: Need for prophylactic measure.  Plan: - cont eliquis  - Fall Precautions.

## 2019-05-01 NOTE — PROGRESS NOTE ADULT - SUBJECTIVE AND OBJECTIVE BOX
ELECTROPHYSIOLOGY  Device Interrogation Performed                                  Date/Time: 19 4:40 pm  :  DORIS dual chamber PM     Model: Karina XT DR             Mode: AAI-DDD   Rate:                                                                   Total V pacin.5%                                                           Atrial Lead:  P wave amplitude: 0.6        mv          Impedence:  513     Ohms      Threshold:  not done    V@    ms      Ventricular Lead(s):  RV Lead: R wave amplitude: 10.6    mv          Impedence:  665    Ohms      Threshold:  2.25   V@0.40    ms   LV Lead:  R wave amplitude:      mv          Impedence:      Ohms      Threshold:     V@     ms     Battery Status: X                    Good                MARA                     EOL    Underlying Rhythm:     Atrial fibrillation with VR 60s-110s    Events/Observation:  5 episodes of afib with RVR since 19 with VR up to 170s-180s     Impression/Plan:  Normal PPM function.   Normal sensing and pacing via iterative testing. Good battery status. Excellent threshold capture.  No reprogramming. No events corresponding to this admission

## 2019-05-01 NOTE — PROGRESS NOTE ADULT - SUBJECTIVE AND OBJECTIVE BOX
S: Patient denies chest pain or shortness of breath.   Review of systems otherwise (-)  	    MEDICATIONS  (STANDING):  apixaban 5 milliGRAM(s) Oral every 12 hours  ATENolol  Tablet 100 milliGRAM(s) Oral daily  atorvastatin 10 milliGRAM(s) Oral at bedtime  calcium carbonate 1250 mG  + Vitamin D (OsCal 500 + D) 1 Tablet(s) Oral daily  docusate sodium 100 milliGRAM(s) Oral two times a day  furosemide    Tablet 40 milliGRAM(s) Oral every 24 hours  losartan 100 milliGRAM(s) Oral daily  senna 2 Tablet(s) Oral at bedtime  sodium chloride 0.9% lock flush 3 milliLiter(s) IV Push every 8 hours      LABS:	 	                          12.2   11.42 )-----------( 307      ( 01 May 2019 06:51 )             37.8     Hemoglobin: 12.2 g/dL (05-01 @ 06:51)  Hemoglobin: 13.2 g/dL (04-30 @ 06:55)  Hemoglobin: 15.3 g/dL (04-29 @ 15:43)    05-01    139  |  102  |  27<H>  ----------------------------<  104<H>  3.6   |  25  |  1.03    Ca    8.5      01 May 2019 06:51  Phos  4.4     04-30  Mg     1.6     05-01    TPro  Test not performed SPECIMEN GROSSLY HEMOLYZED  /  Alb  3.7  /  TBili  1.8<H>  /  DBili  x   /  AST  Test not performed SPECIMEN GROSSLY HEMOLYZED  /  ALT  15  /  AlkPhos  44  04-29    Creatinine Trend: 1.03<--, 0.95<--, 0.88<--, 0.83<--, 0.97<--    PHYSICAL EXAM:  T(C): 36.6 (05-01-19 @ 13:00), Max: 36.9 (04-30-19 @ 16:33)  HR: 89 (05-01-19 @ 13:00) (76 - 89)  BP: 132/69 (05-01-19 @ 13:00) (90/48 - 146/76)  RR: 18 (05-01-19 @ 13:00) (17 - 18)  SpO2: 97% (05-01-19 @ 13:00) (96% - 100%)  Wt(kg): --  I&O's Summary        Gen: Appears well in NAD  HEENT:  (-)icterus (-)pallor  CV: N S1 S2 1/6 DMITRIY (+)2 Pulses B/l  Resp:  Clear to auscultation B/L, normal effort  GI: (+) BS Soft, NT, ND  Lymph:  Trace B/L LE pitting edema, (-)obvious lymphadenopathy  Skin: Warm to touch, Normal turgor  Psych: Appropriate mood and affect      TELEMETRY: Afib 80s,  on demand	    ECG:  	Afib     RADIOLOGY:         CXR: < from: Xray Chest 1 View AP/PA (04.29.19 @ 17:02) >  IMPRESSION: Cardiomegaly with clear lungs.    MELODY COLMENARES M.D., RADIOLOGIST RESIDENT  This document has been electronically signed.  DOROTHEA GONZALES M.D., ATTENDING RADIOLOGIST  This document has been electronically signed. Apr 30 2019  7:47AM      < end of copied text >    < from: Xray Shoulder Axillary View, Left (04.29.19 @ 14:33) >  IMPRESSION:  Redemonstrated impacted proximal left humeral surgical neck fracture. No   associated glenohumeral dislocation.    No fractures in the remaining imaged regions.    Preserved AC and elbow joint spaces.    Generalized osteopenia otherwise no discrete lytic or blastic lesions.      JESSICA QUILES M.D., ATTENDING RADIOLOGIST  This document has been electronically signed. Apr 29 2019  4:51PM      < end of copied text >    < from: Xray Humerus, Left (04.29.19 @ 14:32) >  IMPRESSION:  Redemonstrated impacted proximal left humeral surgical neck fracture. No   associated glenohumeral dislocation.    No fractures in the remaining imaged regions.    Preserved AC and elbow joint spaces.    Generalized osteopenia otherwise no discrete lytic or blastic lesions.    JESSICA QUILES M.D., ATTENDING RADIOLOGIST  This document has been electronically signed. Apr 29 2019  4:51PM    < end of copied text >    < from: CT Cervical Spine No Cont (04.29.19 @ 11:22) >  FINDINGS:  Head CT:  There has been no interval change.    The ventricles and sulci are prominent, consistent with age-appropriate   line loss. Right thalamic subacute versus chronic infarction is seen.   There is no intraparenchymal hematoma, mass effect or midline shift. No   abnormal extra-axial fluid collections or hemorrhages are present.   Chronic right medial parietal and occipital lobe gliosis and volume loss   is unchanged.    The calvarium is intact. The visualized intraorbital compartment,   paranasal sinuses and tympanomastoid cavities appear free of acute   disease.      Cervical spine CT:  Alignment is maintained. Vertebral bodies are normal in height, without   evidence of fracture or dislocation. Prevertebral soft tissues are within   normal limits without soft tissue swelling or hematoma.    There is multilevel degenerative spondylosis.    Evaluation of the soft tissues demonstrates mild bilateral   atherosclerotic disease at the carotid bifurcations. Heterogeneity of the   thyroid gland is noted. Please clinically correlate and consider   dedicated additional imaging for further evaluation if clinically   indicated.    The visualized lung apices are within normal limits.    IMPRESSION:  No intracranial hemorrhage.  No cervical spine fracture.    If the patient has neurologic symptoms which are new and persistent,   consider follow up MRI if there are no contraindications for such.        ASSESSMENT/PLAN: 85 y.o. female, know to our office, with PMHX of afib on eliquis, HTN, CHF, +PPM w/generator change in November 2018 , spinal fracture, CVA [Feb 2019, no residual deficits], osteopenia, and macular degeneration of b/l eyes admitted with fall, no syncope.    -- Doubt ACS 2/2 negative Trop HS 6 --> 9   -- Recent echo in my office as noted above  -- Awaiting Repeat echo to monitor previous moderate pericardial effusion  -- Continue Eliquis for Afib  -- CT H/C/S as noted above, negative for bleed or cervical spine fx   -- Ortho note appreciated, continue care per team, sling to Left UE for Left proximal humerus fx  -- EP to interrogate PPM  -- Will check BLE dopplers for noted swelling  -- Further cardiac work up pending above

## 2019-05-01 NOTE — CONSULT NOTE ADULT - SUBJECTIVE AND OBJECTIVE BOX
HPI:  85 year old female with PMH AF on Eliquis, PPM (recent generator change last month at [facility]), HTN, 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  She denies CP, SOB, Palps, dizziness, or LOC.  She is found with a humerus fracture.     PAST MEDICAL & SURGICAL HISTORY:  Cerebrovascular accident (CVA)  OA (osteoarthritis)  YOVANI on CPAP  Thyroid nodule: followed by endocrinologist  UTI (urinary tract infection): frequent last was 1/15  HLD (hyperlipidemia)  HTN (hypertension)  Atrial fibrillation: dx 7/09 on coumadin  Cardiac pacemaker: placed 2009  Fracture: ORIF right ankle 1986  FHx: cholecystectomy: 1993 laparoscopic  S/P JAY-BSO: 40 years ago  Cataract: b/l lense implant  FHx: total knee replacement: left 2012      MEDICATIONS  (STANDING):  apixaban 5 milliGRAM(s) Oral every 12 hours  ATENolol  Tablet 100 milliGRAM(s) Oral daily  atorvastatin 10 milliGRAM(s) Oral at bedtime  calcium carbonate 1250 mG  + Vitamin D (OsCal 500 + D) 1 Tablet(s) Oral daily  docusate sodium 100 milliGRAM(s) Oral two times a day  furosemide    Tablet 40 milliGRAM(s) Oral every 24 hours  losartan 100 milliGRAM(s) Oral daily  senna 2 Tablet(s) Oral at bedtime  sodium chloride 0.9% lock flush 3 milliLiter(s) IV Push every 8 hours    Allergies  sulfa drugs (Unknown)    Intolerances  OxyContin (Sedation/Somnol; Drowsiness)    FAMILY HISTORY:  No pertinent family history in first degree relatives  Noncontributory for premature coronary disease or sudden cardiac death    SOCIAL HISTORY:    [x ] Non-smoker  [ ] Smoker  [ ] Alcohol      REVIEW OF SYSTEMS:  [ ]chest pain  [  ]shortness of breath  [  ]palpitations  [  ]syncope  [ ]near syncope [ ]upper extremity weakness   [ ] lower extremity weakness  [  ]diplopia  [  ]altered mental status   [  ]fevers  [ ]chills [ ]nausea  [ ]vomitting  [  ]dysphagia    [ ]abdominal pain  [ ]melena  [ ]BRBPR    [  ]epistaxis  [  ]rash  [ ]lower extremity edema      [x ] All others negative	  [ ] Unable to obtain    PHYSICAL EXAM:  T(C): 36.6 (05-01-19 @ 13:00), Max: 36.9 (04-30-19 @ 16:33)  HR: 89 (05-01-19 @ 13:00) (76 - 89)  BP: 132/69 (05-01-19 @ 13:00) (90/48 - 139/70)  RR: 18 (05-01-19 @ 13:00) (18 - 18)  SpO2: 97% (05-01-19 @ 13:00) (96% - 98%)  	  HEENT:   Normal oral mucosa, PERRL, EOMI	  Lymphatic: No lymphadenopathy , no edema  Cardiovascular:S1S2 Irreg No JVD, No murmurs , Peripheral pulses palpable 2+ bilaterally  Respiratory: Lungs clear to auscultation, normal effort 	  Gastrointestinal:  Soft, Non-tender, + BS	  Skin: No rashes, No ecchymoses, No cyanosis, warm to touch    DIAGNOSTIC DATA:    TELEMETRY:  Afib 80s,  on demand    ECG:  	AF 76 NS ST-T changes    Echo: < from: Transthoracic Echocardiogram w/ Doppler (06.30.08 @ 11:00) >  Conclusions:  1. Endocardium not well visualized; grossly normal left  ventricular function.  Normal LV internal dimensions and  wall thicknesses.  2. Normal right ventricular sizeand systolic function.  3. Mild mitral regurgitation.  4. Moderate tricuspid regurgitation. Estimated pulmonary  artery systolic pressure equals 52 mm Hg, assuming right  atrial pressure equals 10  mm Hg, consistent with moderate  pulmonary hypertension.  ------------------------------------------------------------------------  Confirmed on  6/30/2008 - 10:49:04 by Sherif Garvin M.D.    < end of copied text >      	  LABS:	 	                      12.2   11.42 )-----------( 307      ( 01 May 2019 06:51 )             37.8     139  |  102  |  27<H>  ----------------------------<  104<H>  3.6   |  25  |  1.03    Ca    8.5      01 May 2019 06:51  Phos  4.4     04-30  Mg     1.6     05-01    TPro  Test not performed SPECIMEN GROSSLY HEMOLYZED  /  Alb  3.7  /  TBili  1.8<H>  /  DBili  x   /  AST  Test not performed SPECIMEN GROSSLY HEMOLYZED  /  ALT  15  /  AlkPhos  44  04-29      ASSESSMENT/PLAN: 85 year old female with PMH AF on Eliquis, PPM (recent generator change last month at [facility]), HTN, 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     --Interrogate PPM  --check orthostatics  --f/u Echo  --Cont lifelong AC for chronic AF  --Vrates stable on Atenolol HPI:  85 year old female with PMH AF on Eliquis, PPM (recent generator change last month at [facility]), HTN, 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  She denies CP, SOB, Palps, dizziness, or LOC.  She is found with a humerus fracture.     PAST MEDICAL & SURGICAL HISTORY:  Cerebrovascular accident (CVA)  OA (osteoarthritis)  YOVANI on CPAP  Thyroid nodule: followed by endocrinologist  UTI (urinary tract infection): frequent last was 1/15  HLD (hyperlipidemia)  HTN (hypertension)  Atrial fibrillation: dx 7/09 on coumadin  Cardiac pacemaker: placed 2009  Fracture: ORIF right ankle 1986  FHx: cholecystectomy: 1993 laparoscopic  S/P JAY-BSO: 40 years ago  Cataract: b/l lense implant  FHx: total knee replacement: left 2012      MEDICATIONS  (STANDING):  apixaban 5 milliGRAM(s) Oral every 12 hours  ATENolol  Tablet 100 milliGRAM(s) Oral daily  atorvastatin 10 milliGRAM(s) Oral at bedtime  calcium carbonate 1250 mG  + Vitamin D (OsCal 500 + D) 1 Tablet(s) Oral daily  docusate sodium 100 milliGRAM(s) Oral two times a day  furosemide    Tablet 40 milliGRAM(s) Oral every 24 hours  losartan 100 milliGRAM(s) Oral daily  senna 2 Tablet(s) Oral at bedtime  sodium chloride 0.9% lock flush 3 milliLiter(s) IV Push every 8 hours    Allergies  sulfa drugs (Unknown)    Intolerances  OxyContin (Sedation/Somnol; Drowsiness)    FAMILY HISTORY:  No pertinent family history in first degree relatives  Noncontributory for premature coronary disease or sudden cardiac death    SOCIAL HISTORY:    [x ] Non-smoker  [ ] Smoker  [ ] Alcohol      REVIEW OF SYSTEMS:  [ ]chest pain  [  ]shortness of breath  [  ]palpitations  [  ]syncope  [ ]near syncope [ ]upper extremity weakness   [ ] lower extremity weakness  [  ]diplopia  [  ]altered mental status   [  ]fevers  [ ]chills [ ]nausea  [ ]vomitting  [  ]dysphagia    [ ]abdominal pain  [ ]melena  [ ]BRBPR    [  ]epistaxis  [  ]rash  [ ]lower extremity edema      [x ] All others negative	  [ ] Unable to obtain    PHYSICAL EXAM:  T(C): 36.6 (05-01-19 @ 13:00), Max: 36.9 (04-30-19 @ 16:33)  HR: 89 (05-01-19 @ 13:00) (76 - 89)  BP: 132/69 (05-01-19 @ 13:00) (90/48 - 139/70)  RR: 18 (05-01-19 @ 13:00) (18 - 18)  SpO2: 97% (05-01-19 @ 13:00) (96% - 98%)  	  HEENT:   Normal oral mucosa, PERRL, EOMI	  Lymphatic: No lymphadenopathy , no edema  Cardiovascular:S1S2 Irreg No JVD, No murmurs , Peripheral pulses palpable 2+ bilaterally  Respiratory: Lungs clear to auscultation, normal effort 	  Gastrointestinal:  Soft, Non-tender, + BS	  Skin: No rashes, No ecchymoses, No cyanosis, warm to touch    DIAGNOSTIC DATA:    TELEMETRY:  Afib 80s,  on demand    ECG:  	AF 76 NS ST-T changes    Echo: < from: Transthoracic Echocardiogram w/ Doppler (06.30.08 @ 11:00) >  Conclusions:  1. Endocardium not well visualized; grossly normal left  ventricular function.  Normal LV internal dimensions and  wall thicknesses.  2. Normal right ventricular sizeand systolic function.  3. Mild mitral regurgitation.  4. Moderate tricuspid regurgitation. Estimated pulmonary  artery systolic pressure equals 52 mm Hg, assuming right  atrial pressure equals 10  mm Hg, consistent with moderate  pulmonary hypertension.  ------------------------------------------------------------------------  Confirmed on  6/30/2008 - 10:49:04 by Sherif Garvin M.D.    < end of copied text >      	  LABS:	 	                      12.2   11.42 )-----------( 307      ( 01 May 2019 06:51 )             37.8     139  |  102  |  27<H>  ----------------------------<  104<H>  3.6   |  25  |  1.03    Ca    8.5      01 May 2019 06:51  Phos  4.4     04-30  Mg     1.6     05-01    TPro  Test not performed SPECIMEN GROSSLY HEMOLYZED  /  Alb  3.7  /  TBili  1.8<H>  /  DBili  x   /  AST  Test not performed SPECIMEN GROSSLY HEMOLYZED  /  ALT  15  /  AlkPhos  44  04-29      ASSESSMENT/PLAN: 85 year old female with PMH AF on Eliquis, PPM (recent generator change last month at [facility]), HTN, 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     --Interrogate PPM (normal)  --check orthostatics  --f/u Echo (normal LVEF in 12/2018)  --Cont lifelong AC for chronic AF  --Vrates stable on Atenolol

## 2019-05-01 NOTE — PROGRESS NOTE ADULT - ASSESSMENT
86 y/o F with hx of afib on eliquis, HTN, CHF, +PPM, spinal fracture, CVA [Feb 2019, no residual defecits], osteopenia, and macular degeneration of b/l eyes presents with fall. Patient states she was in a parking lot of [employer] and was trying to walk to the bathroom by herself. She then tripped on concrete and she fell on L knee and L shoulder, denies any associated chest pain, palpitations, LOC, or dizziness. She denies any head strike when she fell. She states its difficult raise L arm secondary to pain. She states she was doing well prior to this event. Also states that she has been having SOB, RIDDLE, and orthopnea x months which has not worsen lately, said that these symptoms come and go. Denies fever, chills, cough, LOC, chest pain, abdominal pain, nausea, vomiting, melena, hematochezia, LE edema or dysuria.      On arrival to the ED, her vitals were T 97.8, P 63, /92, RR 16, O2 sat 100% RA. Her lab work was significant for leukocytosis and an elevated pro-BNP (unclear baseline). She had a CXR that showed clear lungs and she had L shoulder xrays that showed a L surgical neck fracture. She also had L knee xray that showed a L infrapatellar soft tissue swelling. She also had a CT Head/Cervical spine that showed a R thalamus subacute vs chronic infarct but otherwise no other acute findings. She was seen by orthopedics and had her L humerus fracture placed in a sling. She was also noted to have AFib with RVR to 140s in the ED and required diltiazem 10 IVP and 30mg PO in the ED. She was then admitted to medicine on telemetry.     Of note, patient's  had a MI about 2 days ago and is currently in the MICU at [facility]. (30 Apr 2019 01:40)    Pt has been afebrile thus far.  WBC on admission 22.9 --> 17.0.  Off abx currently.  Denies recent infectious issues, no recent virus, no recent fevers.  Denies cough/congestion/abd pain/HA/diarrhea/dysuria.  C/o pain, bruising and swelling over L knee, where she fell.  ID consult called to r/o underlying infectious issues that may be causing her leukocytosis.        Recommend:    Leukocytosis:    -  Suspect reactive from recent fall.  Repeat WBC trending down.  No cough/dysuria/abd pain or other localizing symptoms.  UA (-) nit/LE, cxr without evidence for pna.  She does have bruising and swelling over L knee, appears superficial.  Will continue to monitor.  Currently no erythema, cellulitis or SOI.      - Monitor off abx.      - WBC is trending down, likely reactive response.   If pt spikes fever > 100.4, send blood cx x 2      Will follow,    Noreen Charles  [phone]

## 2019-05-01 NOTE — CONSULT NOTE ADULT - ATTENDING COMMENTS
EP ATTENDING    Patient seen and examined. Agree with above. Given normal PPM function and normal LVEF in 12/2018 no further inpatient EP workup needed. May repeat echo to assess for degree of pericardial effusion - but I will defer this to cardiology. F/U with [facility] after discharge as scheduled.
Patient seen and examined.  Agree with above.   -admitted with fall, no syncope  -check tte to evaluate known moderate pericardial effusion    Leroy Rice MD

## 2019-05-01 NOTE — PROGRESS NOTE ADULT - ATTENDING COMMENTS
Patient seen and examined.  Agree with above.   -check tte to eval pericardial effusion    Leroy Rice MD

## 2019-05-02 PROBLEM — I63.9 CEREBRAL INFARCTION, UNSPECIFIED: Chronic | Status: ACTIVE | Noted: 2019-04-30

## 2019-05-02 LAB
ANION GAP SERPL CALC-SCNC: 13 MMO/L — SIGNIFICANT CHANGE UP (ref 7–14)
BUN SERPL-MCNC: 31 MG/DL — HIGH (ref 7–23)
CALCIUM SERPL-MCNC: 8.6 MG/DL — SIGNIFICANT CHANGE UP (ref 8.4–10.5)
CHLORIDE SERPL-SCNC: 101 MMOL/L — SIGNIFICANT CHANGE UP (ref 98–107)
CO2 SERPL-SCNC: 25 MMOL/L — SIGNIFICANT CHANGE UP (ref 22–31)
CREAT SERPL-MCNC: 1.11 MG/DL — SIGNIFICANT CHANGE UP (ref 0.5–1.3)
GLUCOSE SERPL-MCNC: 97 MG/DL — SIGNIFICANT CHANGE UP (ref 70–99)
HCT VFR BLD CALC: 38.5 % — SIGNIFICANT CHANGE UP (ref 34.5–45)
HGB BLD-MCNC: 11.9 G/DL — SIGNIFICANT CHANGE UP (ref 11.5–15.5)
MAGNESIUM SERPL-MCNC: 1.8 MG/DL — SIGNIFICANT CHANGE UP (ref 1.6–2.6)
MCHC RBC-ENTMCNC: 27.5 PG — SIGNIFICANT CHANGE UP (ref 27–34)
MCHC RBC-ENTMCNC: 30.9 % — LOW (ref 32–36)
MCV RBC AUTO: 88.9 FL — SIGNIFICANT CHANGE UP (ref 80–100)
NRBC # FLD: 0 K/UL — SIGNIFICANT CHANGE UP (ref 0–0)
PLATELET # BLD AUTO: 345 K/UL — SIGNIFICANT CHANGE UP (ref 150–400)
PMV BLD: 12.2 FL — SIGNIFICANT CHANGE UP (ref 7–13)
POTASSIUM SERPL-MCNC: 3.4 MMOL/L — LOW (ref 3.5–5.3)
POTASSIUM SERPL-SCNC: 3.4 MMOL/L — LOW (ref 3.5–5.3)
RBC # BLD: 4.33 M/UL — SIGNIFICANT CHANGE UP (ref 3.8–5.2)
RBC # FLD: 15.2 % — HIGH (ref 10.3–14.5)
SODIUM SERPL-SCNC: 139 MMOL/L — SIGNIFICANT CHANGE UP (ref 135–145)
SPECIMEN SOURCE: SIGNIFICANT CHANGE UP
WBC # BLD: 12.96 K/UL — HIGH (ref 3.8–10.5)
WBC # FLD AUTO: 12.96 K/UL — HIGH (ref 3.8–10.5)

## 2019-05-02 PROCEDURE — 93970 EXTREMITY STUDY: CPT | Mod: 26

## 2019-05-02 PROCEDURE — 93306 TTE W/DOPPLER COMPLETE: CPT | Mod: 26

## 2019-05-02 RX ORDER — POTASSIUM CHLORIDE 20 MEQ
40 PACKET (EA) ORAL ONCE
Qty: 0 | Refills: 0 | Status: COMPLETED | OUTPATIENT
Start: 2019-05-02 | End: 2019-05-02

## 2019-05-02 RX ADMIN — Medication 650 MILLIGRAM(S): at 13:50

## 2019-05-02 RX ADMIN — Medication 100 MILLIGRAM(S): at 06:51

## 2019-05-02 RX ADMIN — OXYCODONE HYDROCHLORIDE 5 MILLIGRAM(S): 5 TABLET ORAL at 18:15

## 2019-05-02 RX ADMIN — APIXABAN 5 MILLIGRAM(S): 2.5 TABLET, FILM COATED ORAL at 17:19

## 2019-05-02 RX ADMIN — LOSARTAN POTASSIUM 100 MILLIGRAM(S): 100 TABLET, FILM COATED ORAL at 06:50

## 2019-05-02 RX ADMIN — OXYCODONE HYDROCHLORIDE 5 MILLIGRAM(S): 5 TABLET ORAL at 09:56

## 2019-05-02 RX ADMIN — APIXABAN 5 MILLIGRAM(S): 2.5 TABLET, FILM COATED ORAL at 06:50

## 2019-05-02 RX ADMIN — Medication 650 MILLIGRAM(S): at 21:18

## 2019-05-02 RX ADMIN — Medication 40 MILLIGRAM(S): at 13:28

## 2019-05-02 RX ADMIN — ATORVASTATIN CALCIUM 10 MILLIGRAM(S): 80 TABLET, FILM COATED ORAL at 21:18

## 2019-05-02 RX ADMIN — Medication 100 MILLIGRAM(S): at 17:19

## 2019-05-02 RX ADMIN — OXYCODONE HYDROCHLORIDE 5 MILLIGRAM(S): 5 TABLET ORAL at 17:19

## 2019-05-02 RX ADMIN — Medication 1 TABLET(S): at 13:28

## 2019-05-02 RX ADMIN — Medication 40 MILLIEQUIVALENT(S): at 13:28

## 2019-05-02 RX ADMIN — OXYCODONE HYDROCHLORIDE 5 MILLIGRAM(S): 5 TABLET ORAL at 01:26

## 2019-05-02 RX ADMIN — OXYCODONE HYDROCHLORIDE 5 MILLIGRAM(S): 5 TABLET ORAL at 12:31

## 2019-05-02 RX ADMIN — SENNA PLUS 2 TABLET(S): 8.6 TABLET ORAL at 21:18

## 2019-05-02 RX ADMIN — SODIUM CHLORIDE 3 MILLILITER(S): 9 INJECTION INTRAMUSCULAR; INTRAVENOUS; SUBCUTANEOUS at 14:10

## 2019-05-02 RX ADMIN — Medication 650 MILLIGRAM(S): at 12:54

## 2019-05-02 RX ADMIN — Medication 650 MILLIGRAM(S): at 22:16

## 2019-05-02 RX ADMIN — ATENOLOL 100 MILLIGRAM(S): 25 TABLET ORAL at 06:50

## 2019-05-02 RX ADMIN — SODIUM CHLORIDE 3 MILLILITER(S): 9 INJECTION INTRAMUSCULAR; INTRAVENOUS; SUBCUTANEOUS at 21:11

## 2019-05-02 RX ADMIN — SODIUM CHLORIDE 3 MILLILITER(S): 9 INJECTION INTRAMUSCULAR; INTRAVENOUS; SUBCUTANEOUS at 06:50

## 2019-05-02 RX ADMIN — OXYCODONE HYDROCHLORIDE 5 MILLIGRAM(S): 5 TABLET ORAL at 02:15

## 2019-05-02 NOTE — PROGRESS NOTE ADULT - SUBJECTIVE AND OBJECTIVE BOX
S: Still with left arm pain. Denies chest pain or shortness of breath.   Review of systems otherwise (-)  	    MEDICATIONS  (STANDING):  apixaban 5 milliGRAM(s) Oral every 12 hours  ATENolol  Tablet 100 milliGRAM(s) Oral daily  atorvastatin 10 milliGRAM(s) Oral at bedtime  calcium carbonate 1250 mG  + Vitamin D (OsCal 500 + D) 1 Tablet(s) Oral daily  docusate sodium 100 milliGRAM(s) Oral two times a day  furosemide    Tablet 40 milliGRAM(s) Oral every 24 hours  losartan 100 milliGRAM(s) Oral daily  senna 2 Tablet(s) Oral at bedtime  sodium chloride 0.9% lock flush 3 milliLiter(s) IV Push every 8 hours    MEDICATIONS  (PRN):  acetaminophen   Tablet .. 650 milliGRAM(s) Oral every 6 hours PRN Mild Pain (1 - 3), Moderate Pain (4 - 6), Severe Pain (7 - 10)  oxyCODONE    IR 5 milliGRAM(s) Oral every 6 hours PRN Severe Pain (7 - 10)      LABS:                            11.9   12.96 )-----------( 345      ( 02 May 2019 07:00 )             38.5     Hemoglobin: 11.9 g/dL (05-02 @ 07:00)  Hemoglobin: 12.2 g/dL (05-01 @ 06:51)  Hemoglobin: 13.2 g/dL (04-30 @ 06:55)  Hemoglobin: 15.3 g/dL (04-29 @ 15:43)    05-02    139  |  101  |  31<H>  ----------------------------<  97  3.4<L>   |  25  |  1.11    Ca    8.6      02 May 2019 07:00  Mg     1.8     05-02      Creatinine Trend: 1.11<--, 1.03<--, 0.95<--, 0.88<--, 0.83<--, 0.97<--     CARDIAC MARKERS ( 30 Apr 2019 01:56 )  x     / x     / 29 u/L / 1.69 ng/mL / x            PHYSICAL EXAM  Vital Signs Last 24 Hrs  T(C): 36.7 (02 May 2019 06:49), Max: 36.7 (01 May 2019 18:42)  T(F): 98 (02 May 2019 06:49), Max: 98 (01 May 2019 18:42)  HR: 88 (02 May 2019 06:49) (82 - 89)  BP: 129/89 (02 May 2019 06:49) (129/89 - 149/66)  BP(mean): --  RR: 18 (02 May 2019 06:49) (18 - 18)  SpO2: 99% (02 May 2019 06:49) (97% - 99%)      Gen: Appears well in NAD  HEENT:  (-)icterus (-)pallor  CV: N S1 S2 1/6 DMITRIY (+)2 Pulses B/l  Resp:  Clear to auscultation B/L, normal effort  GI: (+) BS Soft, NT, ND  Lymph:  Trace B/L LE pitting edema, (-)obvious lymphadenopathy  Skin: Warm to touch, Normal turgor  Psych: Appropriate mood and affect      TELEMETRY: Afib 80-90,  on demand	    ECG:  	Afib     RADIOLOGY:         CXR: < from: Xray Chest 1 View AP/PA (04.29.19 @ 17:02) >  IMPRESSION: Cardiomegaly with clear lungs.    MELODY COLMENARES M.D., RADIOLOGIST RESIDENT  This document has been electronically signed.  DOROTHEA GONZALES M.D., ATTENDING RADIOLOGIST  This document has been electronically signed. Apr 30 2019  7:47AM      < end of copied text >    < from: Xray Shoulder Axillary View, Left (04.29.19 @ 14:33) >  IMPRESSION:  Redemonstrated impacted proximal left humeral surgical neck fracture. No   associated glenohumeral dislocation.    No fractures in the remaining imaged regions.    Preserved AC and elbow joint spaces.    Generalized osteopenia otherwise no discrete lytic or blastic lesions.      JESSICA QUILES M.D., ATTENDING RADIOLOGIST  This document has been electronically signed. Apr 29 2019  4:51PM      < end of copied text >    < from: Xray Humerus, Left (04.29.19 @ 14:32) >  IMPRESSION:  Redemonstrated impacted proximal left humeral surgical neck fracture. No   associated glenohumeral dislocation.    No fractures in the remaining imaged regions.    Preserved AC and elbow joint spaces.    Generalized osteopenia otherwise no discrete lytic or blastic lesions.    JESSICA QUILES M.D., ATTENDING RADIOLOGIST  This document has been electronically signed. Apr 29 2019  4:51PM    < end of copied text >    < from: CT Cervical Spine No Cont (04.29.19 @ 11:22) >  FINDINGS:  Head CT:  There has been no interval change.    The ventricles and sulci are prominent, consistent with age-appropriate   line loss. Right thalamic subacute versus chronic infarction is seen.   There is no intraparenchymal hematoma, mass effect or midline shift. No   abnormal extra-axial fluid collections or hemorrhages are present.   Chronic right medial parietal and occipital lobe gliosis and volume loss   is unchanged.    The calvarium is intact. The visualized intraorbital compartment,   paranasal sinuses and tympanomastoid cavities appear free of acute   disease.      Cervical spine CT:  Alignment is maintained. Vertebral bodies are normal in height, without   evidence of fracture or dislocation. Prevertebral soft tissues are within   normal limits without soft tissue swelling or hematoma.    There is multilevel degenerative spondylosis.    Evaluation of the soft tissues demonstrates mild bilateral   atherosclerotic disease at the carotid bifurcations. Heterogeneity of the   thyroid gland is noted. Please clinically correlate and consider   dedicated additional imaging for further evaluation if clinically   indicated.    The visualized lung apices are within normal limits.    IMPRESSION:  No intracranial hemorrhage.  No cervical spine fracture.    If the patient has neurologic symptoms which are new and persistent,   consider follow up MRI if there are no contraindications for such.        ASSESSMENT/PLAN: 85 y.o. female, know to our office, with PMHX of afib on eliquis, HTN, CHF, +PPM w/generator change in November 2018 , spinal fracture, CVA [Feb 2019, no residual deficits], osteopenia, and macular degeneration of b/l eyes admitted with fall, no syncope.    -- Doubt ACS 2/2 negative Trop HS 6 --> 9   -- Recent echo in my office as noted above  -- Continue Eliquis for Afib  -- CT H/C/S as noted above, negative for bleed or cervical spine fx   -- Ortho note appreciated, continue care per team, sling to Left UE for Left proximal humerus fx  -- S/p PPM interrogation - normal function, underlying afib with rvr only events noted  -- EP note appreciated  -- LE dopplers pending  -- Awaiting Repeat echo to monitor previous moderate pericardial effusion  -- Further cardiac work up pending above

## 2019-05-02 NOTE — PROGRESS NOTE ADULT - SUBJECTIVE AND OBJECTIVE BOX
INTERVAL HPI/OVERNIGHT EVENTS: Seen and examined with daughter in Vascular room. Going to have Echo now.   Vital Signs Last 24 Hrs  T(C): 36.7 (02 May 2019 06:49), Max: 36.7 (01 May 2019 18:42)  T(F): 98 (02 May 2019 06:49), Max: 98 (01 May 2019 18:42)  HR: 88 (02 May 2019 06:49) (82 - 89)  BP: 129/89 (02 May 2019 06:49) (129/89 - 149/66)  BP(mean): --  RR: 18 (02 May 2019 06:49) (18 - 18)  SpO2: 99% (02 May 2019 06:49) (97% - 99%)  I&O's Summary    MEDICATIONS  (STANDING):  apixaban 5 milliGRAM(s) Oral every 12 hours  ATENolol  Tablet 100 milliGRAM(s) Oral daily  atorvastatin 10 milliGRAM(s) Oral at bedtime  calcium carbonate 1250 mG  + Vitamin D (OsCal 500 + D) 1 Tablet(s) Oral daily  docusate sodium 100 milliGRAM(s) Oral two times a day  furosemide    Tablet 40 milliGRAM(s) Oral every 24 hours  losartan 100 milliGRAM(s) Oral daily  potassium chloride    Tablet ER 40 milliEquivalent(s) Oral once  senna 2 Tablet(s) Oral at bedtime  sodium chloride 0.9% lock flush 3 milliLiter(s) IV Push every 8 hours    MEDICATIONS  (PRN):  acetaminophen   Tablet .. 650 milliGRAM(s) Oral every 6 hours PRN Mild Pain (1 - 3), Moderate Pain (4 - 6), Severe Pain (7 - 10)  oxyCODONE    IR 5 milliGRAM(s) Oral every 6 hours PRN Severe Pain (7 - 10)    LABS:                        11.9   12.96 )-----------( 345      ( 02 May 2019 07:00 )             38.5     05-02    139  |  101  |  31<H>  ----------------------------<  97  3.4<L>   |  25  |  1.11    Ca    8.6      02 May 2019 07:00  Mg     1.8     05-02          CAPILLARY BLOOD GLUCOSE              REVIEW OF SYSTEMS:  CONSTITUTIONAL: No fever, weight loss, or fatigue  EYES: No eye pain, visual disturbances, or discharge  RESPIRATORY: No cough, wheezing, chills or hemoptysis; No shortness of breath  CARDIOVASCULAR: No chest pain, palpitations, dizziness, or leg swelling  GASTROINTESTINAL: No abdominal or epigastric pain. No nausea, vomiting, or hematemesis; No diarrhea or constipation. No melena or hematochezia.  GENITOURINARY: No dysuria, frequency, hematuria, or incontinence  NEUROLOGICAL: No headaches, memory loss, loss of strength, numbness, or tremors    Consultant(s) Notes Reviewed:  [x ] YES  [ ] NO    PHYSICAL EXAM:  GENERAL: NAD, well-groomed, well-developed,not in any distress ,  HEAD:  Atraumatic, Normocephalic  EYES: EOMI, PERRLA, conjunctiva and sclera clear  ENMT: No tonsillar erythema, exudates, or enlargement; Moist mucous membranes, Good dentition, No lesions  NECK: Supple, No JVD, Normal thyroid  NERVOUS SYSTEM:  Alert & Oriented X3, No focal deficit   CHEST/LUNG: Good air entry bilateral with no  rales, rhonchi, wheezing, or rubs  HEART: Regular rate and rhythm; No murmurs, rubs, or gallops  ABDOMEN: Soft, Nontender, Nondistended; Bowel sounds present  EXTREMITIES:  2+ Peripheral Pulses, No clubbing, cyanosis, or edema  left arm in sling.     Care Discussed with Consultants/Other Providers [ x] YES  [ ] NO

## 2019-05-02 NOTE — PROGRESS NOTE ADULT - ASSESSMENT
86 y/o F with hx of afib on eliquis, HTN, CHF, +PPM, spinal fracture, CVA [Feb 2019, no residual defecits], osteopenia, and macular degeneration of b/l eyes presents with fall. Patient states she was in a parking lot of [employer] and was trying to walk to the bathroom by herself. She then tripped on concrete and she fell on L knee and L shoulder, denies any associated chest pain, palpitations, LOC, or dizziness. She denies any head strike when she fell. She states its difficult raise L arm secondary to pain. She states she was doing well prior to this event. Also states that she has been having SOB, RIDDLE, and orthopnea x months which has not worsen lately, said that these symptoms come and go. Denies fever, chills, cough, LOC, chest pain, abdominal pain, nausea, vomiting, melena, hematochezia, LE edema or dysuria.      On arrival to the ED, her vitals were T 97.8, P 63, /92, RR 16, O2 sat 100% RA. Her lab work was significant for leukocytosis and an elevated pro-BNP (unclear baseline). She had a CXR that showed clear lungs and she had L shoulder xrays that showed a L surgical neck fracture. She also had L knee xray that showed a L infrapatellar soft tissue swelling. She also had a CT Head/Cervical spine that showed a R thalamus subacute vs chronic infarct but otherwise no other acute findings. She was seen by orthopedics and had her L humerus fracture placed in a sling. She was also noted to have AFib with RVR to 140s in the ED and required diltiazem 10 IVP and 30mg PO in the ED. She was then admitted to medicine on telemetry.     Of note, patient's  had a MI about 2 days ago and is currently in the MICU at [facility]. (30 Apr 2019 01:40)    Pt has been afebrile thus far.  WBC on admission 22.9 --> 17.0.  Off abx currently.  Denies recent infectious issues, no recent virus, no recent fevers.  Denies cough/congestion/abd pain/HA/diarrhea/dysuria.  C/o pain, bruising and swelling over L knee, where she fell.  ID consult called to r/o underlying infectious issues that may be causing her leukocytosis.        Recommend:    Leukocytosis:    -  Suspect reactive from recent fall.  Repeat WBC trending down.  No cough/dysuria/abd pain or other localizing symptoms.  UA (-) nit/LE, cxr without evidence for pna.  She does have bruising and swelling over L knee, appears superficial.  Will continue to monitor.  Currently no erythema, cellulitis or SOI.      - Monitor off abx.      - WBC is trending down, likely reactive response.   If pt spikes fever > 100.4, send blood cx x 2      Will follow,    Noreen Charles  [phone] 84 y/o F with hx of afib on eliquis, HTN, CHF, +PPM, spinal fracture, CVA [Feb 2019, no residual defecits], osteopenia, and macular degeneration of b/l eyes presents with fall. Patient states she was in a parking lot of [employer] and was trying to walk to the bathroom by herself. She then tripped on concrete and she fell on L knee and L shoulder, denies any associated chest pain, palpitations, LOC, or dizziness. She denies any head strike when she fell. She states its difficult raise L arm secondary to pain. She states she was doing well prior to this event. Also states that she has been having SOB, RIDDLE, and orthopnea x months which has not worsen lately, said that these symptoms come and go. Denies fever, chills, cough, LOC, chest pain, abdominal pain, nausea, vomiting, melena, hematochezia, LE edema or dysuria.      On arrival to the ED, her vitals were T 97.8, P 63, /92, RR 16, O2 sat 100% RA. Her lab work was significant for leukocytosis and an elevated pro-BNP (unclear baseline). She had a CXR that showed clear lungs and she had L shoulder xrays that showed a L surgical neck fracture. She also had L knee xray that showed a L infrapatellar soft tissue swelling. She also had a CT Head/Cervical spine that showed a R thalamus subacute vs chronic infarct but otherwise no other acute findings. She was seen by orthopedics and had her L humerus fracture placed in a sling. She was also noted to have AFib with RVR to 140s in the ED and required diltiazem 10 IVP and 30mg PO in the ED. She was then admitted to medicine on telemetry.     Of note, patient's  had a MI about 2 days ago and is currently in the MICU at [facility]. (30 Apr 2019 01:40)    Pt has been afebrile thus far.  WBC on admission 22.9 --> 17.0.  Off abx currently.  Denies recent infectious issues, no recent virus, no recent fevers.  Denies cough/congestion/abd pain/HA/diarrhea/dysuria.  C/o pain, bruising and swelling over L knee, where she fell.  ID consult called to r/o underlying infectious issues that may be causing her leukocytosis.        Recommend:    Leukocytosis:    -  Suspect reactive from recent fall.  Repeat WBC trending down.  No cough/dysuria/abd pain or other localizing symptoms.  UA (-) nit/LE, cxr without evidence for pna.  She does have bruising and swelling over L knee, appears superficial and is improving.   Will continue to monitor.  Currently no erythema, cellulitis or SOI.      - Monitor off abx.      - WBC overall improved, likely reactive response.   If pt spikes fever > 100.4, send blood cx x 2      Will follow,    Noreen Charles  [phone]

## 2019-05-02 NOTE — PROGRESS NOTE ADULT - SUBJECTIVE AND OBJECTIVE BOX
Infectious Diseases progress note:    Subjective: NAD, Afebrile.  Feels better.  No cough/sore throat/cp/sob/abd pain/diarrhea/dysuria.      ROS:  CONSTITUTIONAL:  No fever, chills, rigors  CARDIOVASCULAR:  No chest pain or palpitations  RESPIRATORY:   No SOB, cough, dyspnea on exertion.  No wheezing  GASTROINTESTINAL:  No abd pain, N/V, diarrhea/constipation  EXTREMITIES:  No swelling or joint pain  GENITOURINARY:  No burning on urination, increased frequency or urgency.  No flank pain  NEUROLOGIC:  No HA, visual disturbances  SKIN: No rashes    Allergies    sulfa drugs (Unknown)    Intolerances    OxyContin (Sedation/Somnol; Drowsiness)      ANTIBIOTICS/RELEVANT:  antimicrobials    immunologic:    OTHER:  acetaminophen   Tablet .. 650 milliGRAM(s) Oral every 6 hours PRN  apixaban 5 milliGRAM(s) Oral every 12 hours  ATENolol  Tablet 100 milliGRAM(s) Oral daily  atorvastatin 10 milliGRAM(s) Oral at bedtime  calcium carbonate 1250 mG  + Vitamin D (OsCal 500 + D) 1 Tablet(s) Oral daily  docusate sodium 100 milliGRAM(s) Oral two times a day  furosemide    Tablet 40 milliGRAM(s) Oral every 24 hours  losartan 100 milliGRAM(s) Oral daily  oxyCODONE    IR 5 milliGRAM(s) Oral every 6 hours PRN  senna 2 Tablet(s) Oral at bedtime  sodium chloride 0.9% lock flush 3 milliLiter(s) IV Push every 8 hours      Objective:  Vital Signs Last 24 Hrs  T(C): 36.7 (02 May 2019 13:27), Max: 36.7 (01 May 2019 18:42)  T(F): 98 (02 May 2019 13:27), Max: 98 (01 May 2019 18:42)  HR: 80 (02 May 2019 13:27) (80 - 89)  BP: 111/91 (02 May 2019 13:27) (111/91 - 149/66)  BP(mean): --  RR: 18 (02 May 2019 13:27) (18 - 18)  SpO2: 100% (02 May 2019 13:27) (97% - 100%)    PHYSICAL EXAM:  Constitutional:NAD  Eyes:SHAYNA, EOMI  Ear/Nose/Throat: no thrush, mucositis.  Moist mucous membranes	  Neck:no JVD, no lymphadenopathy, supple  Respiratory: CTA po  Cardiovascular: S1S2 RRR, no murmurs  Gastrointestinal:soft, nontender,  nondistended (+) BS  Extremities:no e/e/c.  L knee with bruising.  No SOI  Skin:  no rashes, open wounds or ulcerations        LABS:                        11.9   12.96 )-----------( 345      ( 02 May 2019 07:00 )             38.5     05-02    139  |  101  |  31<H>  ----------------------------<  97  3.4<L>   |  25  |  1.11    Ca    8.6      02 May 2019 07:00  Mg     1.8     05-02              MICROBIOLOGY:    Culture - Blood (05.01.19 @ 06:40)    Culture - Blood:   NO ORGANISMS ISOLATED  NO ORGANISMS ISOLATED AT 24 HOURS    Specimen Source: BLOOD    Culture - Blood (04.30.19 @ 20:03)    Culture - Blood:   NO ORGANISMS ISOLATED  NO ORGANISMS ISOLATED AT 24 HOURS    Specimen Source: BLOOD    Culture - Urine (04.29.19 @ 20:41)    Culture - Urine:   NO GROWTH AT 24 HOURS    Specimen Source: URINE MIDSTREAM          RADIOLOGY & ADDITIONAL STUDIES:      < from: Xray Chest 1 View AP/PA (04.29.19 @ 17:02) >  FINDINGS:    Left chest wall pacemaker. The heart is enlarged. The lungs are clear.   There is no pleural effusion or congestion to indicate CHF. The   visualized bones and soft tissues show no acute findings.      IMPRESSION: Cardiomegaly with clear lungs.    < end of copied text >

## 2019-05-02 NOTE — PROGRESS NOTE ADULT - ASSESSMENT
86 y/o F with hx of afib on eliquis, HTN, CHF, +PPM, spinal fracture, CVA [Feb 2019, no residual defecits], osteopenia, and macular degeneration of b/l eyes presents with fall. Now with a L humerus fracture and AFib with RVR.     Problem/Plan - 1:  ·  Problem: Humerus surgical neck fracture. Plan: - L humerus surgical neck fracture 2/2 fall, currently in a sling  - Ortho consult appreciated. Patient to remain in sling, nonweightbearing LUE.    Follow up with Dr. Delaney in office in 1-2 weeks.  - Pain control with tylenol/oxy prn, bowel regimen while on opioids  - PT eval.     Problem/Plan - 2:  ·  Problem: Atrial fibrillation with rapid ventricular response. Plan: - PPM interrogated . EP helping .  -Patient noted to have poorly controlled AFib, required diltiazem 10mg IVP and 30mg PO with improved rate control but now again in the low 100s-110s, likely has elevated rates 2/2 pain from her fracture/knee effusion  - Patient states that she used to be on diltiazem but was taken off recently, unclear why the medication was stopped but she might benefit from restarting diltiazem if her rates remain poorly controlled despite adequate pain control, will need to clarify with cardiology in AM  - c/w apixaban for anticoagulation, CHADSVASC score of 7  - c/w atenolol for no     Problem/Plan - 3:  ·  Problem: Fall. Plan: - Mechanical fall 2/2 tripping on a concrete embarkment due to poor eyesight from her macular degeneration  - PT consult  - Pain control  - Fall precautions.     Problem/Plan - 4:  ·  Problem: Leukocytosis, unspecified type. Plan: - Resolving . iD helping.   -Suspect leukocytosis is reactionary 2/2 fracture/pain, no signs or symptoms of an acute infection currently  - Monitor WBC  - Will hold off on abx for now  Blood cultures/Abx if patient spikes a fever or other complaints.     Problem/Plan - 5:  ·  Problem: Congestive heart failure.  Plan: - Patient with c/o RIDDLE/SOB and orthopnea with physical examination showing her to have 1+ pitting edema, states that her symptoms have been chronic and are not progressing  - likely symptoms of chronic HF, has elevated pro-BNP but might be chronically elevated  - Chest clear to auscultation and CXR clear  - Will c/w home lasix 40mg PO daily, Awaiting TTE-Cardiology helping.      Problem/Plan - 6:  Problem: HLD (hyperlipidemia). Plan: - cont statin.     Problem/Plan - 7:  ·  Problem: HTN (hypertension).  Plan: - cont atenolol and losartan with hold parameters.     Problem/Plan - 8:  ·  Problem: Need for prophylactic measure.  Plan: - cont eliquis  - Fall Precautions.    PT consulted and daughter/Pt  wants her to go to [facility] .

## 2019-05-02 NOTE — PROGRESS NOTE ADULT - SUBJECTIVE AND OBJECTIVE BOX
SUBJECTIVE:  no cp or sob    MEDICATIONS  (STANDING):  apixaban 5 milliGRAM(s) Oral every 12 hours  ATENolol  Tablet 100 milliGRAM(s) Oral daily  atorvastatin 10 milliGRAM(s) Oral at bedtime  calcium carbonate 1250 mG  + Vitamin D (OsCal 500 + D) 1 Tablet(s) Oral daily  docusate sodium 100 milliGRAM(s) Oral two times a day  furosemide    Tablet 40 milliGRAM(s) Oral every 24 hours  losartan 100 milliGRAM(s) Oral daily  senna 2 Tablet(s) Oral at bedtime  sodium chloride 0.9% lock flush 3 milliLiter(s) IV Push every 8 hours    MEDICATIONS  (PRN):  acetaminophen   Tablet .. 650 milliGRAM(s) Oral every 6 hours PRN Mild Pain (1 - 3), Moderate Pain (4 - 6), Severe Pain (7 - 10)  oxyCODONE    IR 5 milliGRAM(s) Oral every 6 hours PRN Severe Pain (7 - 10)      LABS:                            11.9   12.96 )-----------( 345      ( 02 May 2019 07:00 )             38.5       05-02    139  |  101  |  31<H>  ----------------------------<  97  3.4<L>   |  25  |  1.11    Ca    8.6      02 May 2019 07:00  Mg     1.8     05-02      PHYSICAL EXAM:  Vital Signs Last 24 Hrs  T(C): 36.7 (02 May 2019 13:27), Max: 36.7 (01 May 2019 18:42)  T(F): 98 (02 May 2019 13:27), Max: 98 (01 May 2019 18:42)  HR: 80 (02 May 2019 13:27) (80 - 89)  BP: 111/91 (02 May 2019 13:27) (111/91 - 149/66)  BP(mean): --  RR: 18 (02 May 2019 13:27) (18 - 18)  SpO2: 100% (02 May 2019 13:27) (97% - 100%)    Cardiovascular:  S1S2 RRR, No JVD  Respiratory: Lungs clear to auscultation, normal effort  Gastrointestinal: Abdomen soft, ND, NT, +BS  Skin: Warm, dry, intact. No rash.  Musculoskeletal: Normal ROM, normal strength  Ext: No C/C/E B/L LE    DIAGNOSTIC DATA  TELEMETRY: AF       ASSESSMENT AND PLAN:  85 year old female with PMH AF on Eliquis, PPM (recent generator change last month at [place]), HTN, 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     --Cont lifelong AC for chronic AF  --Vrates stable on Atenolol  --Given normal PPM function and normal LVEF in 12/2018 no further inpatient EP workup needed. May repeat echo to assess for degree of pericardial effusion - but I will defer this to cardiology. F/U with Gaby after discharge as scheduled.

## 2019-05-03 ENCOUNTER — TRANSCRIPTION ENCOUNTER (OUTPATIENT)
Age: 84
End: 2019-05-03

## 2019-05-03 VITALS
HEART RATE: 87 BPM | SYSTOLIC BLOOD PRESSURE: 139 MMHG | RESPIRATION RATE: 17 BRPM | DIASTOLIC BLOOD PRESSURE: 64 MMHG | OXYGEN SATURATION: 98 % | TEMPERATURE: 98 F

## 2019-05-03 LAB
ANION GAP SERPL CALC-SCNC: 11 MMO/L — SIGNIFICANT CHANGE UP (ref 7–14)
BUN SERPL-MCNC: 33 MG/DL — HIGH (ref 7–23)
CALCIUM SERPL-MCNC: 8.5 MG/DL — SIGNIFICANT CHANGE UP (ref 8.4–10.5)
CHLORIDE SERPL-SCNC: 102 MMOL/L — SIGNIFICANT CHANGE UP (ref 98–107)
CO2 SERPL-SCNC: 28 MMOL/L — SIGNIFICANT CHANGE UP (ref 22–31)
CREAT SERPL-MCNC: 1.16 MG/DL — SIGNIFICANT CHANGE UP (ref 0.5–1.3)
GLUCOSE SERPL-MCNC: 97 MG/DL — SIGNIFICANT CHANGE UP (ref 70–99)
HCT VFR BLD CALC: 37.5 % — SIGNIFICANT CHANGE UP (ref 34.5–45)
HGB BLD-MCNC: 11.9 G/DL — SIGNIFICANT CHANGE UP (ref 11.5–15.5)
MAGNESIUM SERPL-MCNC: 1.8 MG/DL — SIGNIFICANT CHANGE UP (ref 1.6–2.6)
MCHC RBC-ENTMCNC: 28.1 PG — SIGNIFICANT CHANGE UP (ref 27–34)
MCHC RBC-ENTMCNC: 31.7 % — LOW (ref 32–36)
MCV RBC AUTO: 88.4 FL — SIGNIFICANT CHANGE UP (ref 80–100)
NRBC # FLD: 0 K/UL — SIGNIFICANT CHANGE UP (ref 0–0)
PLATELET # BLD AUTO: 338 K/UL — SIGNIFICANT CHANGE UP (ref 150–400)
PMV BLD: 12.3 FL — SIGNIFICANT CHANGE UP (ref 7–13)
POTASSIUM SERPL-MCNC: 3.7 MMOL/L — SIGNIFICANT CHANGE UP (ref 3.5–5.3)
POTASSIUM SERPL-SCNC: 3.7 MMOL/L — SIGNIFICANT CHANGE UP (ref 3.5–5.3)
RBC # BLD: 4.24 M/UL — SIGNIFICANT CHANGE UP (ref 3.8–5.2)
RBC # FLD: 15.2 % — HIGH (ref 10.3–14.5)
SODIUM SERPL-SCNC: 141 MMOL/L — SIGNIFICANT CHANGE UP (ref 135–145)
WBC # BLD: 12.17 K/UL — HIGH (ref 3.8–10.5)
WBC # FLD AUTO: 12.17 K/UL — HIGH (ref 3.8–10.5)

## 2019-05-03 RX ADMIN — SODIUM CHLORIDE 3 MILLILITER(S): 9 INJECTION INTRAMUSCULAR; INTRAVENOUS; SUBCUTANEOUS at 06:14

## 2019-05-03 RX ADMIN — ATENOLOL 100 MILLIGRAM(S): 25 TABLET ORAL at 06:47

## 2019-05-03 RX ADMIN — APIXABAN 5 MILLIGRAM(S): 2.5 TABLET, FILM COATED ORAL at 06:47

## 2019-05-03 RX ADMIN — OXYCODONE HYDROCHLORIDE 5 MILLIGRAM(S): 5 TABLET ORAL at 02:19

## 2019-05-03 RX ADMIN — Medication 100 MILLIGRAM(S): at 06:47

## 2019-05-03 RX ADMIN — Medication 650 MILLIGRAM(S): at 11:44

## 2019-05-03 RX ADMIN — Medication 1 TABLET(S): at 13:11

## 2019-05-03 RX ADMIN — LOSARTAN POTASSIUM 100 MILLIGRAM(S): 100 TABLET, FILM COATED ORAL at 06:47

## 2019-05-03 RX ADMIN — OXYCODONE HYDROCHLORIDE 5 MILLIGRAM(S): 5 TABLET ORAL at 00:21

## 2019-05-03 NOTE — PROGRESS NOTE ADULT - SUBJECTIVE AND OBJECTIVE BOX
INTERVAL HPI/OVERNIGHT EVENTS: Seen and examined with daughter in room . No new concerns.   Vital Signs Last 24 Hrs  T(C): 36.7 (03 May 2019 05:30), Max: 36.8 (02 May 2019 17:18)  T(F): 98 (03 May 2019 05:30), Max: 98.2 (02 May 2019 17:18)  HR: 87 (03 May 2019 05:30) (71 - 87)  BP: 139/64 (03 May 2019 05:30) (111/91 - 139/64)  BP(mean): --  RR: 17 (03 May 2019 05:30) (16 - 18)  SpO2: 98% (03 May 2019 05:30) (97% - 100%)  I&O's Summary    MEDICATIONS  (STANDING):  apixaban 5 milliGRAM(s) Oral every 12 hours  ATENolol  Tablet 100 milliGRAM(s) Oral daily  atorvastatin 10 milliGRAM(s) Oral at bedtime  calcium carbonate 1250 mG  + Vitamin D (OsCal 500 + D) 1 Tablet(s) Oral daily  docusate sodium 100 milliGRAM(s) Oral two times a day  furosemide    Tablet 40 milliGRAM(s) Oral every 24 hours  losartan 100 milliGRAM(s) Oral daily  senna 2 Tablet(s) Oral at bedtime  sodium chloride 0.9% lock flush 3 milliLiter(s) IV Push every 8 hours    MEDICATIONS  (PRN):  acetaminophen   Tablet .. 650 milliGRAM(s) Oral every 6 hours PRN Mild Pain (1 - 3), Moderate Pain (4 - 6), Severe Pain (7 - 10)  oxyCODONE    IR 5 milliGRAM(s) Oral every 6 hours PRN Severe Pain (7 - 10)    LABS:                        11.9   12.17 )-----------( 338      ( 03 May 2019 05:48 )             37.5     05-03    141  |  102  |  33<H>  ----------------------------<  97  3.7   |  28  |  1.16    Ca    8.5      03 May 2019 05:46  Mg     1.8     05-03          CAPILLARY BLOOD GLUCOSE              REVIEW OF SYSTEMS:  CONSTITUTIONAL: No fever, weight loss, or fatigue  EYES: No eye pain, visual disturbances, or discharge  ENMT:  No difficulty hearing, tinnitus, vertigo; No sinus or throat pain  NECK: No pain or stiffness  RESPIRATORY: No cough, wheezing, chills or hemoptysis; No shortness of breath  CARDIOVASCULAR: No chest pain, palpitations, dizziness, or leg swelling  GASTROINTESTINAL: No abdominal or epigastric pain. No nausea, vomiting, or hematemesis; No diarrhea or constipation. No melena or hematochezia.  GENITOURINARY: No dysuria, frequency, hematuria, or incontinence  NEUROLOGICAL: No headaches, memory loss, loss of strength, numbness, or tremors    Consultant(s) Notes Reviewed:  [x ] YES  [ ] NO    PHYSICAL EXAM:  GENERAL: NAD, well-groomed, well-developed,not in any distress ,  HEAD:  Atraumatic, Normocephalic  EYES: EOMI, PERRLA, conjunctiva and sclera clear  NECK: Supple, No JVD, Normal thyroid  NERVOUS SYSTEM:  Alert & Oriented X3, No focal deficit   CHEST/LUNG: Good air entry bilateral with no  rales, rhonchi, wheezing, or rubs  HEART: Regular rate and rhythm; No murmurs, rubs, or gallops  ABDOMEN: Soft, Nontender, Nondistended; Bowel sounds present  EXTREMITIES:  2+ Peripheral Pulses, No clubbing, cyanosis, or edema but left arm in sling     Care Discussed with Consultants/Other Providers [ x] YES  [ ] NO

## 2019-05-03 NOTE — DISCHARGE NOTE PROVIDER - CARE PROVIDER_API CALL
Leroy Rice (MD)  Cardiovascular Disease; Internal Medicine; Interventional Cardiology  [address] [place] [address]  Phone: [phone]  Fax: [phone]  Follow Up Time:     Appointment:,   Follow up with Dr. Delaney in office in 1-2 weeks.  Phone: (   )    -  Fax: (   )    -  Follow Up Time:

## 2019-05-03 NOTE — DISCHARGE NOTE NURSING/CASE MANAGEMENT/SOCIAL WORK - NSDCPEPT PROEDHF_GEN_ALL_CORE
Low salt diet/Activities as tolerated/Monitor weight daily/Call primary care provider for follow up after discharge/Report signs and symptoms to primary care provider

## 2019-05-03 NOTE — PROGRESS NOTE ADULT - SUBJECTIVE AND OBJECTIVE BOX
EP ATTENDING    tele: stable AF, no events    no palpitations, no syncope, no angina    acetaminophen   Tablet .. 650 milliGRAM(s) Oral every 6 hours PRN  apixaban 5 milliGRAM(s) Oral every 12 hours  ATENolol  Tablet 100 milliGRAM(s) Oral daily  atorvastatin 10 milliGRAM(s) Oral at bedtime  calcium carbonate 1250 mG  + Vitamin D (OsCal 500 + D) 1 Tablet(s) Oral daily  docusate sodium 100 milliGRAM(s) Oral two times a day  furosemide    Tablet 40 milliGRAM(s) Oral every 24 hours  losartan 100 milliGRAM(s) Oral daily  oxyCODONE    IR 5 milliGRAM(s) Oral every 6 hours PRN  senna 2 Tablet(s) Oral at bedtime  sodium chloride 0.9% lock flush 3 milliLiter(s) IV Push every 8 hours                            11.9   12.17 )-----------( 338      ( 03 May 2019 05:48 )             37.5       05-03    141  |  102  |  33<H>  ----------------------------<  97  3.7   |  28  |  1.16    Ca    8.5      03 May 2019 05:46  Mg     1.8     05-03      T(C): 36.7 (05-03-19 @ 05:30), Max: 36.8 (05-02-19 @ 17:18)  HR: 87 (05-03-19 @ 05:30) (71 - 87)  BP: 139/64 (05-03-19 @ 05:30) (111/91 - 139/64)  RR: 17 (05-03-19 @ 05:30) (16 - 18)  SpO2: 98% (05-03-19 @ 05:30) (97% - 100%)  Wt(kg): --    no JVD  IRR, no murmurs  CTAB  soft nt/nd  no c/c/e    device interrogation: normal, no events      A/P) 85 year old female with PMH AF on Eliquis, PPM (recent generator change last month at [employer]), HTN, 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     -continue lifelong a/c  -no further inpatient EP workup needed  -f/u with Gaby on May 15th at 12pm

## 2019-05-03 NOTE — DISCHARGE NOTE NURSING/CASE MANAGEMENT/SOCIAL WORK - NSDCPEPTSTRK_GEN_ALL_CORE
Stroke education booklet/Prescribed medications/Risk factors for stroke/Stroke support groups for patients, families, and friends/Stroke warning signs and symptoms/Signs and symptoms of stroke/Call 911 for stroke/Need for follow up after discharge

## 2019-05-03 NOTE — DISCHARGE NOTE NURSING/CASE MANAGEMENT/SOCIAL WORK - NSDCDPATPORTLINK_GEN_ALL_CORE
You can access the [facility] Patient Portal, offered by [facility], by registering with the following website: [email or web address]

## 2019-05-03 NOTE — DISCHARGE NOTE PROVIDER - NSDCCPCAREPLAN_GEN_ALL_CORE_FT
PRINCIPAL DISCHARGE DIAGNOSIS  Diagnosis: Atrial fibrillation, unspecified type  Assessment and Plan of Treatment: PPM interrogated.   -Patient noted to have poorly controlled AFib, required diltiazem 10mg IVP and 30mg PO with improved rate control but now again in the low 100s-110s, likely has elevated rates 2/2 pain from her fracture/knee effusion  - Patient states that she used to be on diltiazem but was taken off recently, unclear why the medication was stopped but she might benefit from restarting diltiazem if her rates remain poorly controlled despite adequate pain control,   - c/w atenolol for now

## 2019-05-03 NOTE — DISCHARGE NOTE PROVIDER - HOSPITAL COURSE
86 y/o F with hx of afib on eliquis, HTN, CHF, +PPM, spinal fracture, CVA [Feb 2019, no residual defecits], osteopenia, and macular degeneration of b/l eyes presents with fall. Now with a L humerus fracture and AFib with RVR.         Problem/Plan - 1:    ·  Problem: Humerus surgical neck fracture. Plan: - L humerus surgical neck fracture secondary to fall, currently in a sling    -  Ortho consult appreciated. Patient to remain in sling, nonweightbearing LUE. Follow up with Dr. Delaney in office in 1-2 weeks.    - Pain control with tylenol/oxy prn, bowel regimen while on opioids         Problem/Plan - 2:    ·  Problem: Atrial fibrillation with rapid ventricular response. Plan: PPM interrogated.     -Patient noted to have poorly controlled AFib, required diltiazem 10mg IVP and 30mg PO with improved rate control but now again in the low 100s-110s, likely has elevated rates 2/2 pain from her fracture/knee effusion    - Patient states that she used to be on diltiazem but was taken off recently, unclear why the medication was stopped but she might benefit from restarting diltiazem if her rates remain poorly controlled despite adequate pain control,     - c/w atenolol for no         Problem/Plan - 3:    ·  Problem: Fall. Plan: - Mechanical fall secondary to tripping on a concrete embarkment due to poor eyesight from her macular degeneration    Rehab placement          Problem/Plan - 4:    ·  Problem: Leukocytosis, unspecified type. Plan: - Resolving . iD helping.     -Suspect leukocytosis is reactionary 2/2 fracture/pain, no signs or symptoms of an acute infection currently    - Monitor WBC    - Will hold off on abx for now    -Blood cultures/Abx if patient spikes a fever or other complaints.         Problem/Plan - 5:    ·  Problem: Congestive heart failure.  Plan: - Patient with c/o RIDDLE/SOB and orthopnea with physical examination showing her to have 1+ pitting edema, states that her symptoms have been chronic and are not progressing    - likely symptoms of chronic HF, has elevated pro-BNP but might be chronically elevated    - Chest clear to auscultation and CXR clear    - Will c/w home lasix 40mg PO daily.          Problem/Plan - 6:    Problem: HLD (hyperlipidemia). Plan: - cont statin.         Problem/Plan - 7:    ·  Problem: HTN (hypertension).  Plan: - cont atenolol and losartan with hold parameters.         Problem/Plan - 8:    ·  Problem: Need for prophylactic measure.  Plan: - cont eliquis    - Fall Precautions.        PT consulted and daughter/Pt  wants her to go to [facility]        5/3: Patient stable for discharge as per Dr. Luis 84 y/o F with hx of afib on eliquis, HTN, CHF, +PPM, spinal fracture, CVA [Feb 2019, no residual defecits], osteopenia, and macular degeneration of b/l eyes presents with fall. Now with a L humerus fracture and AFib with RVR.         Problem/Plan - 1:    ·  Problem: Humerus surgical neck fracture. Plan: - L humerus surgical neck fracture secondary to fall, currently in a sling    -  Ortho consult appreciated. Patient to remain in sling, nonweightbearing LUE. Follow up with Dr. Delaney in office in 1-2 weeks.    - Pain control with tylenol/oxy prn, bowel regimen while on opioids         Problem/Plan - 2:    ·  Problem: Atrial fibrillation with rapid ventricular response. Plan: PPM interrogated.     -Patient noted to have poorly controlled AFib, required diltiazem 10mg IVP and 30mg PO with improved rate control but now again in the low 100s-110s, likely has elevated rates 2/2 pain from her fracture/knee effusion    - Patient states that she used to be on diltiazem but was taken off recently, unclear why the medication was stopped but she might benefit from restarting diltiazem if her rates remain poorly controlled despite adequate pain control,     - c/w atenolol for no         Problem/Plan - 3:    ·  Problem: Fall. Plan: - Mechanical fall secondary to tripping on a concrete embarkment due to poor eyesight from her macular degeneration    Rehab placement          Problem/Plan - 4:    ·  Problem: Leukocytosis, unspecified type. Plan: - Resolving . iD helping.     -Suspect leukocytosis is reactionary 2/2 fracture/pain, no signs or symptoms of an acute infection currently    - Monitor WBC    - Will hold off on abx for now    -Blood cultures/Abx if patient spikes a fever or other complaints.         Problem/Plan - 5:    ·  Problem: Congestive heart failure.  Plan: - Patient with c/o RIDDLE/SOB and orthopnea with physical examination showing her to have 1+ pitting edema, states that her symptoms have been chronic and are not progressing    - likely symptoms of chronic HF, has elevated pro-BNP but might be chronically elevated    - Chest clear to auscultation and CXR clear    - Will c/w home lasix 40mg PO daily.          Problem/Plan - 6:    Problem: HLD (hyperlipidemia). Plan: - cont statin.         Problem/Plan - 7:    ·  Problem: HTN (hypertension).  Plan: - cont atenolol and losartan with hold parameters.         Problem/Plan - 8:    ·  Problem: Need for prophylactic measure.  Plan: - cont eliquis    - Fall Precautions.        Serial TTE as putpatient every few months to monitor her knonw moderate pericardial effusion with outpatient cardiology         PT consulted and daughter/Pt  wants her to go to [facility]        5/3: Patient stable for discharge as per Dr. Luis

## 2019-05-03 NOTE — DISCHARGE NOTE PROVIDER - PROVIDER TOKENS
PROVIDER:[TOKEN:[[id number]]],FREE:[LAST:[Appointment:],PHONE:[(   )    -],FAX:[(   )    -],ADDRESS:[Follow up with Dr. Delaney in office in 1-2 weeks.]]

## 2019-05-03 NOTE — PROGRESS NOTE ADULT - ASSESSMENT
86 y/o F with hx of afib on eliquis, HTN, CHF, +PPM, spinal fracture, CVA [Feb 2019, no residual defecits], osteopenia, and macular degeneration of b/l eyes presents with fall. Now with a L humerus fracture and AFib with RVR.     Problem/Plan - 1:  ·  Problem: Humerus surgical neck fracture. Plan: - L humerus surgical neck fracture 2/2 fall, currently in a sling  - Ortho consult appreciated. Patient to remain in sling, nonweightbearing LUE.    -Follow up with Dr. Delaney in office in 1-2 weeks.  - Pain control with tylenol/oxy prn, bowel regimen while on opioids  - PT eval.     Problem/Plan - 2:  ·  Problem: Atrial fibrillation with rapid ventricular response. Plan: - PPM interrogated . EP helping .  - c/w apixaban for anticoagulation, CHADSVASC score of 7  - c/w atenolol .     Problem/Plan - 3:  ·  Problem: Fall. Plan: - Mechanical fall 2/2 tripping on a concrete embarkment due to poor eyesight from her macular degeneration  - PT consult  - Pain control  - Fall precautions.     Problem/Plan - 4:  ·  Problem: Leukocytosis, unspecified type. Plan: - Resolving . iD helping.   -Suspect leukocytosis is reactionary 2/2 fracture/pain, no signs or symptoms of an acute infection currently  - Monitor WBC  - Will hold off on abx for now  Blood cultures/Abx if patient spikes a fever or other complaints.     Problem/Plan - 5:  ·  Problem: Chronic Diastolic Congestive heart failure.  Plan: - TTE < from: Transthoracic Echocardiogram (05.02.19 @ 12:39) >  CONCLUSIONS:  1. Mitral annular calcification, otherwise normal mitral  valve.  2. Calcified trileaflet aortic valve with normal opening.  3. Normal left ventricular internal dimensions and wall  thicknesses.  4. Endocardium not well visualized; grossly normal left  ventricular systolic function.  5. Normal right ventricular size and function. Device wire  is noted in the right heart.  6. Thickened pericardium with circumferential pericardial  effusion which is  moderate in size posterior to the left  ventricle and lateral to the left heart, the effusion is  small otherwise. No cardiac tamponade.    < end of copied text >  Patient with c/o RIDDLE/SOB and orthopnea with physical examination showing her to have 1+ pitting edema, states that her symptoms have been chronic and are not progressing  - likely symptoms of chronic HF, has elevated pro-BNP but might be chronically elevated  - Chest clear to auscultation and CXR clear  - Will c/w home lasix 40mg PO daily,Cardiology helping.      Problem/Plan - 6:  Problem: HLD (hyperlipidemia). Plan: - cont statin.     Problem/Plan - 7:  ·  Problem: HTN (hypertension).  Plan: - cont atenolol and losartan with hold parameters.     Problem/Plan - 8:  ·  Problem: Chronic Pericardial Effusion .  Plan: - Serial TTE per cardiology.       PT consulted and daughter/Pt  wants her to go to [facility] .

## 2019-05-03 NOTE — PROGRESS NOTE ADULT - ATTENDING COMMENTS
Patient seen and examined.  Agree with above.   -TTE with mild to moderate pericardial effusion  -pt. with no clinical or echo evidence of tamponade  -no further inpatient cardiac workup needed at this time.   -dc planning per primary team    Leroy Rice MD

## 2019-05-03 NOTE — PROGRESS NOTE ADULT - SUBJECTIVE AND OBJECTIVE BOX
S: Denies chest pain or shortness of breath.   Review of systems otherwise (-)  	    MEDICATIONS  (STANDING):  apixaban 5 milliGRAM(s) Oral every 12 hours  ATENolol  Tablet 100 milliGRAM(s) Oral daily  atorvastatin 10 milliGRAM(s) Oral at bedtime  calcium carbonate 1250 mG  + Vitamin D (OsCal 500 + D) 1 Tablet(s) Oral daily  docusate sodium 100 milliGRAM(s) Oral two times a day  furosemide    Tablet 40 milliGRAM(s) Oral every 24 hours  losartan 100 milliGRAM(s) Oral daily  senna 2 Tablet(s) Oral at bedtime  sodium chloride 0.9% lock flush 3 milliLiter(s) IV Push every 8 hours    MEDICATIONS  (PRN):  acetaminophen   Tablet .. 650 milliGRAM(s) Oral every 6 hours PRN Mild Pain (1 - 3), Moderate Pain (4 - 6), Severe Pain (7 - 10)  oxyCODONE    IR 5 milliGRAM(s) Oral every 6 hours PRN Severe Pain (7 - 10)      LABS:                            11.9   12.17 )-----------( 338      ( 03 May 2019 05:48 )             37.5     Hemoglobin: 11.9 g/dL (05-03 @ 05:48)  Hemoglobin: 11.9 g/dL (05-02 @ 07:00)  Hemoglobin: 12.2 g/dL (05-01 @ 06:51)  Hemoglobin: 13.2 g/dL (04-30 @ 06:55)  Hemoglobin: 15.3 g/dL (04-29 @ 15:43)    05-03    141  |  102  |  33<H>  ----------------------------<  97  3.7   |  28  |  1.16    Ca    8.5      03 May 2019 05:46  Mg     1.8     05-03      Creatinine Trend: 1.16<--, 1.11<--, 1.03<--, 0.95<--, 0.88<--, 0.83<--           PHYSICAL EXAM  Vital Signs Last 24 Hrs  T(C): 36.7 (03 May 2019 05:30), Max: 36.8 (02 May 2019 17:18)  T(F): 98 (03 May 2019 05:30), Max: 98.2 (02 May 2019 17:18)  HR: 87 (03 May 2019 05:30) (71 - 87)  BP: 139/64 (03 May 2019 05:30) (111/91 - 139/64)  BP(mean): --  RR: 17 (03 May 2019 05:30) (16 - 18)  SpO2: 98% (03 May 2019 05:30) (97% - 100%)      Gen: Appears well in NAD  HEENT:  (-)icterus (-)pallor  CV: N S1 S2 1/6 DMITRIY (+)2 Pulses B/l  Resp:  Clear to auscultation B/L, normal effort  GI: (+) BS Soft, NT, ND  Lymph:  Trace B/L LE edema, (-)obvious lymphadenopathy  Skin: Warm to touch, Normal turgor  Psych: Appropriate mood and affect      TELEMETRY: Afib 70s-80s,  on demand	    ECG:  	Afib     RADIOLOGY:         CXR: < from: Xray Chest 1 View AP/PA (04.29.19 @ 17:02) >  IMPRESSION: Cardiomegaly with clear lungs.    MLEODY COLMENARES M.D., RADIOLOGIST RESIDENT  This document has been electronically signed.  DOROTHEA GONZALES M.D., ATTENDING RADIOLOGIST  This document has been electronically signed. Apr 30 2019  7:47AM      < end of copied text >    < from: Xray Shoulder Axillary View, Left (04.29.19 @ 14:33) >  IMPRESSION:  Redemonstrated impacted proximal left humeral surgical neck fracture. No   associated glenohumeral dislocation.    No fractures in the remaining imaged regions.    Preserved AC and elbow joint spaces.    Generalized osteopenia otherwise no discrete lytic or blastic lesions.      JESSICA QUILES M.D., ATTENDING RADIOLOGIST  This document has been electronically signed. Apr 29 2019  4:51PM      < end of copied text >    < from: Xray Humerus, Left (04.29.19 @ 14:32) >  IMPRESSION:  Redemonstrated impacted proximal left humeral surgical neck fracture. No   associated glenohumeral dislocation.    No fractures in the remaining imaged regions.    Preserved AC and elbow joint spaces.    Generalized osteopenia otherwise no discrete lytic or blastic lesions.    JESSICA QUILES M.D., ATTENDING RADIOLOGIST  This document has been electronically signed. Apr 29 2019  4:51PM    < end of copied text >    < from: CT Cervical Spine No Cont (04.29.19 @ 11:22) >  FINDINGS:  Head CT:  There has been no interval change.    The ventricles and sulci are prominent, consistent with age-appropriate   line loss. Right thalamic subacute versus chronic infarction is seen.   There is no intraparenchymal hematoma, mass effect or midline shift. No   abnormal extra-axial fluid collections or hemorrhages are present.   Chronic right medial parietal and occipital lobe gliosis and volume loss   is unchanged.    The calvarium is intact. The visualized intraorbital compartment,   paranasal sinuses and tympanomastoid cavities appear free of acute   disease.      Cervical spine CT:  Alignment is maintained. Vertebral bodies are normal in height, without   evidence of fracture or dislocation. Prevertebral soft tissues are within   normal limits without soft tissue swelling or hematoma.    There is multilevel degenerative spondylosis.    Evaluation of the soft tissues demonstrates mild bilateral   atherosclerotic disease at the carotid bifurcations. Heterogeneity of the   thyroid gland is noted. Please clinically correlate and consider   dedicated additional imaging for further evaluation if clinically   indicated.    The visualized lung apices are within normal limits.    IMPRESSION:  No intracranial hemorrhage.  No cervical spine fracture.    If the patient has neurologic symptoms which are new and persistent,   consider follow up MRI if there are no contraindications for such.    < from: Transthoracic Echocardiogram (05.02.19 @ 12:39) >  CONCLUSIONS:  1. Mitral annular calcification, otherwise normal mitral  valve.  2. Calcified trileaflet aortic valve with normal opening.  3. Normal left ventricular internal dimensions and wall  thicknesses.  4. Endocardium not well visualized; grossly normal left  ventricular systolic function.  5. Normal right ventricular size and function. Device wire  is noted in the right heart.  6. Thickened pericardium with circumferential pericardial  effusion which is  moderate in size posterior to the left  ventricle and lateral to the left heart, the effusion is  small otherwise. No cardiac tamponade.    < end of copied text >      ASSESSMENT/PLAN: 85 y.o. female, know to our office, with PMHX of afib on eliquis, HTN, CHF, +PPM w/generator change in November 2018 , spinal fracture, CVA [Feb 2019, no residual deficits], osteopenia, and macular degeneration of b/l eyes admitted with fall, no syncope.    -- Doubt ACS 2/2 negative Trop HS 6 --> 9   -- Recent echo in my office as noted above  -- Continue Eliquis for Afib  -- CT H/C/S as noted above, negative for bleed or cervical spine fx   -- Ortho note appreciated, continue care per team, sling to Left UE for Left proximal humerus fx  -- S/p PPM interrogation - normal function, underlying afib with rvr only events noted  -- EP note appreciated  -- LE dopplers neg DVT  -- TTE noted above - normal LV function, moderate pericardial effusion (as noted before on previous office echo) without echo/clinical evidence of tamponade  -- No further cardiac w/u needed   -- Follow up with outpatient cardiologist for serial TTE to monitor pericardial effusion

## 2019-05-05 LAB — BACTERIA BLD CULT: SIGNIFICANT CHANGE UP

## 2019-05-06 LAB — BACTERIA BLD CULT: SIGNIFICANT CHANGE UP

## 2019-05-09 ENCOUNTER — APPOINTMENT (OUTPATIENT)
Dept: OPHTHALMOLOGY | Facility: CLINIC | Age: 84
End: 2019-05-09

## 2019-05-21 ENCOUNTER — APPOINTMENT (OUTPATIENT)
Dept: NEUROLOGY | Facility: CLINIC | Age: 84
End: 2019-05-21

## 2019-05-23 ENCOUNTER — APPOINTMENT (OUTPATIENT)
Dept: ORTHOPEDIC SURGERY | Facility: CLINIC | Age: 84
End: 2019-05-23
Payer: MEDICARE

## 2019-05-23 PROCEDURE — 99213 OFFICE O/P EST LOW 20 MIN: CPT

## 2019-05-23 PROCEDURE — 73030 X-RAY EXAM OF SHOULDER: CPT | Mod: LT

## 2019-05-23 NOTE — PHYSICAL EXAM
[de-identified] : Patient is WDWN, alert, and in no acute distress. Breathing is unlabored. She is grossly oriented to person, place, and time. \par \par Left Shoulder: \par Inspection/ Palpation: Arm is immobilized in a sling for examination.\par Range of Motion: \par Strength:\par Stability: no joint instability on provocative testing.  [de-identified] : AP, transcapula, and axillary views of the left shoulder were obtained and revealed an impacted proximal left humeral surgical neck fracture with associated internal rotation and slight posterior subluxation of humeral head.

## 2019-05-23 NOTE — HISTORY OF PRESENT ILLNESS
[de-identified] : Pt is an 86 y/o female c/o left arm pain after a fall that occurred on 04/29/2019.  She tripped and fell over a curb in a parking lot.  She fell on her left side.  She was seen at [facility] ED on the same day where xrays were taken and reveled an impacted proximal left humeral surgical neck fracture. She was admitted for 4 days and then discharged to [facility] where she is currently recovering. She had swelling and ecchymosis immediately.  She has mild pain at rest.  It hurts to move her arm.  She is not currently taking anything for pain. Patient has kept the arm immobilized in a sling since the accident. \par \par Patient has a hx of Huseyin-Barr.

## 2019-05-23 NOTE — END OF VISIT
[FreeTextEntry3] : I, Arsalan Higginbotham MD, ordering physician, have read and attest that all the information, medical decision making and discharge instructions within are true and accurate.

## 2019-05-23 NOTE — DISCUSSION/SUMMARY
[de-identified] : Continue with sling immobilization for comfort. \par Gentle range of motion exercises were encouraged, as tolerated. \par NSAIDs as tolerated.\par Follow up in 3 weeks. Xrays upon return.

## 2019-05-23 NOTE — ADDENDUM
[FreeTextEntry1] : I, Marily Bowers wrote this note acting as a scribe for Dr. Arsalan Higginbotham on May 23, 2019.

## 2019-05-24 ENCOUNTER — OUTPATIENT (OUTPATIENT)
Dept: OUTPATIENT SERVICES | Facility: HOSPITAL | Age: 84
LOS: 1 days | End: 2019-05-24
Payer: MEDICARE

## 2019-05-24 DIAGNOSIS — I82.401 ACUTE EMBOLISM AND THROMBOSIS OF UNSPECIFIED DEEP VEINS OF RIGHT LOWER EXTREMITY: ICD-10-CM

## 2019-05-24 DIAGNOSIS — Z84.89 FAMILY HISTORY OF OTHER SPECIFIED CONDITIONS: Chronic | ICD-10-CM

## 2019-05-24 DIAGNOSIS — Z82.8 FAMILY HISTORY OF OTHER DISABILITIES AND CHRONIC DISEASES LEADING TO DISABLEMENT, NOT ELSEWHERE CLASSIFIED: Chronic | ICD-10-CM

## 2019-05-24 DIAGNOSIS — Z95.0 PRESENCE OF CARDIAC PACEMAKER: Chronic | ICD-10-CM

## 2019-05-24 DIAGNOSIS — Z90.710 ACQUIRED ABSENCE OF BOTH CERVIX AND UTERUS: Chronic | ICD-10-CM

## 2019-05-24 DIAGNOSIS — T14.8 OTHER INJURY OF UNSPECIFIED BODY REGION: Chronic | ICD-10-CM

## 2019-05-24 DIAGNOSIS — H26.9 UNSPECIFIED CATARACT: Chronic | ICD-10-CM

## 2019-05-24 PROCEDURE — 93970 EXTREMITY STUDY: CPT | Mod: 26

## 2019-05-24 PROCEDURE — 93970 EXTREMITY STUDY: CPT

## 2019-05-29 ENCOUNTER — EMERGENCY (EMERGENCY)
Facility: HOSPITAL | Age: 84
LOS: 1 days | Discharge: HOME | End: 2019-05-29
Attending: EMERGENCY MEDICINE
Payer: MEDICARE

## 2019-05-29 VITALS
DIASTOLIC BLOOD PRESSURE: 102 MMHG | SYSTOLIC BLOOD PRESSURE: 166 MMHG | HEART RATE: 80 BPM | RESPIRATION RATE: 16 BRPM | OXYGEN SATURATION: 98 %

## 2019-05-29 DIAGNOSIS — T14.8 OTHER INJURY OF UNSPECIFIED BODY REGION: Chronic | ICD-10-CM

## 2019-05-29 DIAGNOSIS — Z95.0 PRESENCE OF CARDIAC PACEMAKER: Chronic | ICD-10-CM

## 2019-05-29 DIAGNOSIS — Z82.8 FAMILY HISTORY OF OTHER DISABILITIES AND CHRONIC DISEASES LEADING TO DISABLEMENT, NOT ELSEWHERE CLASSIFIED: Chronic | ICD-10-CM

## 2019-05-29 DIAGNOSIS — Z84.89 FAMILY HISTORY OF OTHER SPECIFIED CONDITIONS: Chronic | ICD-10-CM

## 2019-05-29 DIAGNOSIS — H26.9 UNSPECIFIED CATARACT: Chronic | ICD-10-CM

## 2019-05-29 DIAGNOSIS — Z90.710 ACQUIRED ABSENCE OF BOTH CERVIX AND UTERUS: Chronic | ICD-10-CM

## 2019-05-29 PROCEDURE — 72125 CT NECK SPINE W/O DYE: CPT | Mod: 26

## 2019-05-29 PROCEDURE — 70450 CT HEAD/BRAIN W/O DYE: CPT | Mod: 26

## 2019-05-29 PROCEDURE — 93010 ELECTROCARDIOGRAM REPORT: CPT

## 2019-05-29 PROCEDURE — 99285 EMERGENCY DEPT VISIT HI MDM: CPT | Mod: GC,25

## 2019-05-29 NOTE — ED PROVIDER NOTE - PHYSICAL EXAMINATION
*GEN:   comfortable, in no acute distress, AOx3  *EYES:   pupils equally round and reactive to light, extra-occular movements intact  *HEENT:   airway patent, moist mucosal membranes  *CV:   regular rate and rhythm  *RESP:   clear to auscultation bilaterally, non-labored  *ABD:   soft, non-tender  *:   no cva/flank tenderness  *MSK:   no MSK tenderness or limited ROM  *SKIN:   dry, intact  *NEURO:   AOx3, cranial nerves intact throughout, strength 5/5, decr sensation L hand, no pronator drift, finger/nose normal, ambulating w/ normal gait

## 2019-05-29 NOTE — ED PROVIDER NOTE - OBJECTIVE STATEMENT
85F w/ afib on eliquis, HTN, CHF, +PPM, spinal fracture, CVA [Feb 2019, no residual defecits], osteopenia, and macular degeneration - sent from rehab for L hand numbness / tingling which is better now that she is in the ED. Has L arm in sling for fracture. Had one episode chest pain several days ago, no other episodes since then.

## 2019-05-29 NOTE — ED PROVIDER NOTE - PROGRESS NOTE DETAILS
Saul Santos PGY2: numbness resolved, pending repeat trop and then will dc back to [facility] rehab Saul Santos PGY2: rpt trop wnl will dc

## 2019-05-29 NOTE — ED ADULT NURSE NOTE - INTEGUMENTARY WDL
"Problem: Depression (Adult,Obstetrics,Pediatric)  Intervention: Monitor/Manage Signs of Depression  Patient visible in milieu, making phone calls. Patient denies SI or SIB. Affect flat, but brightened with interaction. Mood is anxious.Patient endorses much anxiety related to current situation. Patient reports her  cutting off her phone and financial resources has been hard. Patient reports her  has become increasingly \"mean.\" Reports making racial and hurtful comments towards her, as he is . Patient had prn seroquel 50 mg times one and prn trazodone for sleep. No other concerns. Patient discusses returning to treatment facility to get well and not relapse.         " Color consistent with ethnicity/race, warm, dry intact, resilient.

## 2019-05-29 NOTE — ED PROVIDER NOTE - PMH
Atrial fibrillation  dx 7/09 on coumadin  Cerebrovascular accident (CVA)    HLD (hyperlipidemia)    HTN (hypertension)    OA (osteoarthritis)    YOVANI on CPAP    Thyroid nodule  followed by endocrinologist  UTI (urinary tract infection)  frequent last was 1/15

## 2019-05-29 NOTE — ED ADULT NURSE NOTE - NSIMPLEMENTINTERV_GEN_ALL_ED
Implemented All Fall with Harm Risk Interventions:  [place] to call system. Call bell, personal items and telephone within reach. Instruct patient to call for assistance. Room bathroom lighting operational. Non-slip footwear when patient is off stretcher. Physically safe environment: no spills, clutter or unnecessary equipment. Stretcher in lowest position, wheels locked, appropriate side rails in place. Provide visual cue, wrist band, yellow gown, etc. Monitor gait and stability. Monitor for mental status changes and reorient to person, place, and time. Review medications for side effects contributing to fall risk. Reinforce activity limits and safety measures with patient and family. Provide visual clues: red socks.

## 2019-05-29 NOTE — ED PROVIDER NOTE - NSFOLLOWUPCLINICS_GEN_ALL_ED_FT
[facility]  Neurology  [address] [place] [address] [address] [place] [address]  Phone: [phone]  Fax:   Follow Up Time:

## 2019-05-29 NOTE — ED PROVIDER NOTE - NSFOLLOWUPINSTRUCTIONS_ED_ALL_ED_FT
Follow up with your primary care doctor within the next 3-5 days for re-evaluation and continued care.  Follow up with a neurologist within the next week for re-evaluation and continued care.

## 2019-05-29 NOTE — ED ADULT NURSE NOTE - NS ED NURSE LEVEL OF CONSCIOUSNESS ORIENTATION
Spine appears normal, range of motion is not limited, no muscle or joint tenderness.  No reproducible back pain Oriented - self; Oriented - place; Oriented - time

## 2019-05-29 NOTE — ED PROVIDER NOTE - ATTENDING CONTRIBUTION TO CARE
Afebrile. Awake and Alert. Lungs CTA. Heart RRR. Abdomen soft NTND. Neurologic exam: A&O x3, speech clear, GARCÍA, CN II-XII intact, motor strength +5/5 in all extremities excpet LUE which is immobilized in shoulder immobilizer, sensation equal bilaterally to light touch, finger-to-nose normal RUE. LUE in shoulder immobilizer, ecchymosis of mid upper arm, fingers warm to touch, radial pulse +2.    CT Head r/o CVA: Tingling limited to left hand and spares upper arm is not reproducible on exam. Pt is on statin, beta-blocker and Eliquis. Has plans for outpt MRI already.    Chest pain transient a few days ago, non-exertional, will check EKG and troponin, f/u cards outpt.

## 2019-05-29 NOTE — ED ADULT NURSE NOTE - OBJECTIVE STATEMENT
86 yo F arrived to the ed by ambulance from [facility] rehab r/t fx of the l arm; no cast in place; reports "tingling" to the left hand; gross neuro intact on assessment; BEFAST negative; family @ bedside; denies numbness/weakness/pain

## 2019-05-30 VITALS
DIASTOLIC BLOOD PRESSURE: 84 MMHG | RESPIRATION RATE: 18 BRPM | SYSTOLIC BLOOD PRESSURE: 158 MMHG | TEMPERATURE: 98 F | HEART RATE: 83 BPM | OXYGEN SATURATION: 99 %

## 2019-05-30 LAB
ALBUMIN SERPL ELPH-MCNC: 3.4 G/DL — SIGNIFICANT CHANGE UP (ref 3.3–5)
ALP SERPL-CCNC: 77 U/L — SIGNIFICANT CHANGE UP (ref 40–120)
ALT FLD-CCNC: 6 U/L — LOW (ref 10–45)
ANION GAP SERPL CALC-SCNC: 15 MMOL/L — SIGNIFICANT CHANGE UP (ref 5–17)
APTT BLD: 39.4 SEC — HIGH (ref 27.5–36.3)
AST SERPL-CCNC: 12 U/L — SIGNIFICANT CHANGE UP (ref 10–40)
BASOPHILS # BLD AUTO: 0 K/UL — SIGNIFICANT CHANGE UP (ref 0–0.2)
BASOPHILS NFR BLD AUTO: 0.5 % — SIGNIFICANT CHANGE UP (ref 0–2)
BILIRUB SERPL-MCNC: 0.9 MG/DL — SIGNIFICANT CHANGE UP (ref 0.2–1.2)
BUN SERPL-MCNC: 28 MG/DL — HIGH (ref 7–23)
CALCIUM SERPL-MCNC: 8.6 MG/DL — SIGNIFICANT CHANGE UP (ref 8.4–10.5)
CHLORIDE SERPL-SCNC: 101 MMOL/L — SIGNIFICANT CHANGE UP (ref 96–108)
CO2 SERPL-SCNC: 26 MMOL/L — SIGNIFICANT CHANGE UP (ref 22–31)
CREAT SERPL-MCNC: 0.87 MG/DL — SIGNIFICANT CHANGE UP (ref 0.5–1.3)
EOSINOPHIL # BLD AUTO: 0.2 K/UL — SIGNIFICANT CHANGE UP (ref 0–0.5)
EOSINOPHIL NFR BLD AUTO: 2.6 % — SIGNIFICANT CHANGE UP (ref 0–6)
GLUCOSE SERPL-MCNC: 100 MG/DL — HIGH (ref 70–99)
HCT VFR BLD CALC: 39.6 % — SIGNIFICANT CHANGE UP (ref 34.5–45)
HGB BLD-MCNC: 12.8 G/DL — SIGNIFICANT CHANGE UP (ref 11.5–15.5)
INR BLD: 1.62 RATIO — HIGH (ref 0.88–1.16)
LYMPHOCYTES # BLD AUTO: 1.3 K/UL — SIGNIFICANT CHANGE UP (ref 1–3.3)
LYMPHOCYTES # BLD AUTO: 15.5 % — SIGNIFICANT CHANGE UP (ref 13–44)
MAGNESIUM SERPL-MCNC: 1.4 MG/DL — LOW (ref 1.6–2.6)
MCHC RBC-ENTMCNC: 28.7 PG — SIGNIFICANT CHANGE UP (ref 27–34)
MCHC RBC-ENTMCNC: 32.3 GM/DL — SIGNIFICANT CHANGE UP (ref 32–36)
MCV RBC AUTO: 88.9 FL — SIGNIFICANT CHANGE UP (ref 80–100)
MONOCYTES # BLD AUTO: 0.7 K/UL — SIGNIFICANT CHANGE UP (ref 0–0.9)
MONOCYTES NFR BLD AUTO: 8.3 % — SIGNIFICANT CHANGE UP (ref 2–14)
NEUTROPHILS # BLD AUTO: 6.1 K/UL — SIGNIFICANT CHANGE UP (ref 1.8–7.4)
NEUTROPHILS NFR BLD AUTO: 73.1 % — SIGNIFICANT CHANGE UP (ref 43–77)
PLATELET # BLD AUTO: 268 K/UL — SIGNIFICANT CHANGE UP (ref 150–400)
POTASSIUM SERPL-MCNC: 3.3 MMOL/L — LOW (ref 3.5–5.3)
POTASSIUM SERPL-SCNC: 3.3 MMOL/L — LOW (ref 3.5–5.3)
PROT SERPL-MCNC: 5.8 G/DL — LOW (ref 6–8.3)
PROTHROM AB SERPL-ACNC: 18.8 SEC — HIGH (ref 10–12.9)
RBC # BLD: 4.45 M/UL — SIGNIFICANT CHANGE UP (ref 3.8–5.2)
RBC # FLD: 13.7 % — SIGNIFICANT CHANGE UP (ref 10.3–14.5)
SODIUM SERPL-SCNC: 142 MMOL/L — SIGNIFICANT CHANGE UP (ref 135–145)
TROPONIN T, HIGH SENSITIVITY RESULT: 10 NG/L — SIGNIFICANT CHANGE UP (ref 0–51)
TROPONIN T, HIGH SENSITIVITY RESULT: 10 NG/L — SIGNIFICANT CHANGE UP (ref 0–51)
WBC # BLD: 8.4 K/UL — SIGNIFICANT CHANGE UP (ref 3.8–10.5)
WBC # FLD AUTO: 8.4 K/UL — SIGNIFICANT CHANGE UP (ref 3.8–10.5)

## 2019-05-30 PROCEDURE — 83735 ASSAY OF MAGNESIUM: CPT

## 2019-05-30 PROCEDURE — 70450 CT HEAD/BRAIN W/O DYE: CPT

## 2019-05-30 PROCEDURE — 71045 X-RAY EXAM CHEST 1 VIEW: CPT | Mod: 26

## 2019-05-30 PROCEDURE — 85730 THROMBOPLASTIN TIME PARTIAL: CPT

## 2019-05-30 PROCEDURE — 80053 COMPREHEN METABOLIC PANEL: CPT

## 2019-05-30 PROCEDURE — 85027 COMPLETE CBC AUTOMATED: CPT

## 2019-05-30 PROCEDURE — 85610 PROTHROMBIN TIME: CPT

## 2019-05-30 PROCEDURE — 84484 ASSAY OF TROPONIN QUANT: CPT

## 2019-05-30 PROCEDURE — 71045 X-RAY EXAM CHEST 1 VIEW: CPT

## 2019-05-30 PROCEDURE — 99284 EMERGENCY DEPT VISIT MOD MDM: CPT | Mod: 25

## 2019-05-30 PROCEDURE — 93005 ELECTROCARDIOGRAM TRACING: CPT

## 2019-05-30 PROCEDURE — 72125 CT NECK SPINE W/O DYE: CPT

## 2019-05-30 RX ORDER — POTASSIUM CHLORIDE 20 MEQ
40 PACKET (EA) ORAL ONCE
Refills: 0 | Status: COMPLETED | OUTPATIENT
Start: 2019-05-30 | End: 2019-05-30

## 2019-05-30 RX ADMIN — Medication 40 MILLIEQUIVALENT(S): at 02:17

## 2019-06-10 NOTE — ED ADULT NURSE NOTE - NSFALLRSKHRMRISKTYPE_ED_ALL_ED
Incoming refill request for: Tramadol  Per PDMP last dispensed on: 05/10/19  Last seen by: Valdo Bradshaw D.O. on:10/30/18  Future appointment to see PCP on: 11/01/2019  Active medication agreement updated on: 12/13/19  Last Drug Screen Collected on: 12/13/18    Script pended, with a start date of: 06/10/19    PDMP review:    Criteria met. Forwarded to Physician/SAMIR for signature. Dr.Poulson Patiño on call for . coagulation(Bleeding disorder R/T clinical cond/anti-coags)

## 2019-06-11 ENCOUNTER — INPATIENT (INPATIENT)
Facility: HOSPITAL | Age: 84
LOS: 2 days | Discharge: HOME HEALTH | End: 2019-06-14
Attending: INTERNAL MEDICINE | Admitting: INTERNAL MEDICINE
Payer: MEDICARE

## 2019-06-11 VITALS
TEMPERATURE: 98 F | SYSTOLIC BLOOD PRESSURE: 150 MMHG | RESPIRATION RATE: 20 BRPM | HEART RATE: 95 BPM | DIASTOLIC BLOOD PRESSURE: 80 MMHG | OXYGEN SATURATION: 98 %

## 2019-06-11 DIAGNOSIS — Z84.89 FAMILY HISTORY OF OTHER SPECIFIED CONDITIONS: Chronic | ICD-10-CM

## 2019-06-11 DIAGNOSIS — Z90.710 ACQUIRED ABSENCE OF BOTH CERVIX AND UTERUS: Chronic | ICD-10-CM

## 2019-06-11 DIAGNOSIS — I48.91 UNSPECIFIED ATRIAL FIBRILLATION: ICD-10-CM

## 2019-06-11 DIAGNOSIS — H26.9 UNSPECIFIED CATARACT: Chronic | ICD-10-CM

## 2019-06-11 DIAGNOSIS — Z95.0 PRESENCE OF CARDIAC PACEMAKER: Chronic | ICD-10-CM

## 2019-06-11 DIAGNOSIS — Z82.8 FAMILY HISTORY OF OTHER DISABILITIES AND CHRONIC DISEASES LEADING TO DISABLEMENT, NOT ELSEWHERE CLASSIFIED: Chronic | ICD-10-CM

## 2019-06-11 DIAGNOSIS — T14.8 OTHER INJURY OF UNSPECIFIED BODY REGION: Chronic | ICD-10-CM

## 2019-06-11 LAB
ALBUMIN SERPL ELPH-MCNC: 3.7 G/DL — SIGNIFICANT CHANGE UP (ref 3.3–5)
ALP SERPL-CCNC: 78 U/L — SIGNIFICANT CHANGE UP (ref 40–120)
ALT FLD-CCNC: 15 U/L — SIGNIFICANT CHANGE UP (ref 4–33)
ANION GAP SERPL CALC-SCNC: 12 MMO/L — SIGNIFICANT CHANGE UP (ref 7–14)
ANION GAP SERPL CALC-SCNC: 13 MMO/L — SIGNIFICANT CHANGE UP (ref 7–14)
APPEARANCE UR: CLEAR — SIGNIFICANT CHANGE UP
APTT BLD: 38.7 SEC — HIGH (ref 27.5–36.3)
AST SERPL-CCNC: 34 U/L — HIGH (ref 4–32)
BACTERIA # UR AUTO: NEGATIVE — SIGNIFICANT CHANGE UP
BASOPHILS # BLD AUTO: 0.08 K/UL — SIGNIFICANT CHANGE UP (ref 0–0.2)
BASOPHILS NFR BLD AUTO: 0.7 % — SIGNIFICANT CHANGE UP (ref 0–2)
BILIRUB SERPL-MCNC: 0.6 MG/DL — SIGNIFICANT CHANGE UP (ref 0.2–1.2)
BILIRUB UR-MCNC: NEGATIVE — SIGNIFICANT CHANGE UP
BLOOD UR QL VISUAL: NEGATIVE — SIGNIFICANT CHANGE UP
BUN SERPL-MCNC: 26 MG/DL — HIGH (ref 7–23)
BUN SERPL-MCNC: 28 MG/DL — HIGH (ref 7–23)
CALCIUM SERPL-MCNC: 8.9 MG/DL — SIGNIFICANT CHANGE UP (ref 8.4–10.5)
CALCIUM SERPL-MCNC: 9.4 MG/DL — SIGNIFICANT CHANGE UP (ref 8.4–10.5)
CHLORIDE SERPL-SCNC: 100 MMOL/L — SIGNIFICANT CHANGE UP (ref 98–107)
CHLORIDE SERPL-SCNC: 102 MMOL/L — SIGNIFICANT CHANGE UP (ref 98–107)
CO2 SERPL-SCNC: 24 MMOL/L — SIGNIFICANT CHANGE UP (ref 22–31)
CO2 SERPL-SCNC: 29 MMOL/L — SIGNIFICANT CHANGE UP (ref 22–31)
COLOR SPEC: YELLOW — SIGNIFICANT CHANGE UP
CREAT SERPL-MCNC: 0.81 MG/DL — SIGNIFICANT CHANGE UP (ref 0.5–1.3)
CREAT SERPL-MCNC: 0.88 MG/DL — SIGNIFICANT CHANGE UP (ref 0.5–1.3)
EOSINOPHIL # BLD AUTO: 0.2 K/UL — SIGNIFICANT CHANGE UP (ref 0–0.5)
EOSINOPHIL NFR BLD AUTO: 1.7 % — SIGNIFICANT CHANGE UP (ref 0–6)
GLUCOSE SERPL-MCNC: 107 MG/DL — HIGH (ref 70–99)
GLUCOSE SERPL-MCNC: 99 MG/DL — SIGNIFICANT CHANGE UP (ref 70–99)
GLUCOSE UR-MCNC: NEGATIVE — SIGNIFICANT CHANGE UP
HCT VFR BLD CALC: 43.5 % — SIGNIFICANT CHANGE UP (ref 34.5–45)
HGB BLD-MCNC: 13.6 G/DL — SIGNIFICANT CHANGE UP (ref 11.5–15.5)
HYALINE CASTS # UR AUTO: NEGATIVE — SIGNIFICANT CHANGE UP
IMM GRANULOCYTES NFR BLD AUTO: 0.4 % — SIGNIFICANT CHANGE UP (ref 0–1.5)
INR BLD: 1.59 — HIGH (ref 0.88–1.17)
KETONES UR-MCNC: NEGATIVE — SIGNIFICANT CHANGE UP
LEUKOCYTE ESTERASE UR-ACNC: NEGATIVE — SIGNIFICANT CHANGE UP
LYMPHOCYTES # BLD AUTO: 1.41 K/UL — SIGNIFICANT CHANGE UP (ref 1–3.3)
LYMPHOCYTES # BLD AUTO: 12.1 % — LOW (ref 13–44)
MAGNESIUM SERPL-MCNC: 1.6 MG/DL — SIGNIFICANT CHANGE UP (ref 1.6–2.6)
MCHC RBC-ENTMCNC: 27.8 PG — SIGNIFICANT CHANGE UP (ref 27–34)
MCHC RBC-ENTMCNC: 31.3 % — LOW (ref 32–36)
MCV RBC AUTO: 88.8 FL — SIGNIFICANT CHANGE UP (ref 80–100)
MONOCYTES # BLD AUTO: 0.91 K/UL — HIGH (ref 0–0.9)
MONOCYTES NFR BLD AUTO: 7.8 % — SIGNIFICANT CHANGE UP (ref 2–14)
NEUTROPHILS # BLD AUTO: 9.04 K/UL — HIGH (ref 1.8–7.4)
NEUTROPHILS NFR BLD AUTO: 77.3 % — HIGH (ref 43–77)
NITRITE UR-MCNC: NEGATIVE — SIGNIFICANT CHANGE UP
NRBC # FLD: 0 K/UL — SIGNIFICANT CHANGE UP (ref 0–0)
NT-PROBNP SERPL-SCNC: 2343 PG/ML — SIGNIFICANT CHANGE UP
NT-PROBNP SERPL-SCNC: 2782 PG/ML — SIGNIFICANT CHANGE UP
PH UR: 6.5 — SIGNIFICANT CHANGE UP (ref 5–8)
PHOSPHATE SERPL-MCNC: 3.2 MG/DL — SIGNIFICANT CHANGE UP (ref 2.5–4.5)
PLATELET # BLD AUTO: 337 K/UL — SIGNIFICANT CHANGE UP (ref 150–400)
PMV BLD: 12.6 FL — SIGNIFICANT CHANGE UP (ref 7–13)
POTASSIUM SERPL-MCNC: 3.9 MMOL/L — SIGNIFICANT CHANGE UP (ref 3.5–5.3)
POTASSIUM SERPL-MCNC: 4.9 MMOL/L — SIGNIFICANT CHANGE UP (ref 3.5–5.3)
POTASSIUM SERPL-SCNC: 3.9 MMOL/L — SIGNIFICANT CHANGE UP (ref 3.5–5.3)
POTASSIUM SERPL-SCNC: 4.9 MMOL/L — SIGNIFICANT CHANGE UP (ref 3.5–5.3)
PROT SERPL-MCNC: 6.5 G/DL — SIGNIFICANT CHANGE UP (ref 6–8.3)
PROT UR-MCNC: 50 — SIGNIFICANT CHANGE UP
PROTHROM AB SERPL-ACNC: 17.9 SEC — HIGH (ref 9.8–13.1)
RBC # BLD: 4.9 M/UL — SIGNIFICANT CHANGE UP (ref 3.8–5.2)
RBC # FLD: 14.9 % — HIGH (ref 10.3–14.5)
RBC CASTS # UR COMP ASSIST: SIGNIFICANT CHANGE UP (ref 0–?)
SODIUM SERPL-SCNC: 139 MMOL/L — SIGNIFICANT CHANGE UP (ref 135–145)
SODIUM SERPL-SCNC: 141 MMOL/L — SIGNIFICANT CHANGE UP (ref 135–145)
SP GR SPEC: 1.03 — SIGNIFICANT CHANGE UP (ref 1–1.04)
SQUAMOUS # UR AUTO: SIGNIFICANT CHANGE UP
TROPONIN T, HIGH SENSITIVITY: 10 NG/L — SIGNIFICANT CHANGE UP (ref ?–14)
TROPONIN T, HIGH SENSITIVITY: 9 NG/L — SIGNIFICANT CHANGE UP (ref ?–14)
UROBILINOGEN FLD QL: NORMAL — SIGNIFICANT CHANGE UP
WBC # BLD: 11.69 K/UL — HIGH (ref 3.8–10.5)
WBC # FLD AUTO: 11.69 K/UL — HIGH (ref 3.8–10.5)
WBC UR QL: SIGNIFICANT CHANGE UP (ref 0–?)

## 2019-06-11 PROCEDURE — 71045 X-RAY EXAM CHEST 1 VIEW: CPT | Mod: 26

## 2019-06-11 PROCEDURE — 93280 PM DEVICE PROGR EVAL DUAL: CPT | Mod: 26

## 2019-06-11 RX ORDER — ATENOLOL 25 MG/1
100 TABLET ORAL DAILY
Refills: 0 | Status: DISCONTINUED | OUTPATIENT
Start: 2019-06-11 | End: 2019-06-14

## 2019-06-11 RX ORDER — SODIUM CHLORIDE 9 MG/ML
3 INJECTION INTRAMUSCULAR; INTRAVENOUS; SUBCUTANEOUS EVERY 8 HOURS
Refills: 0 | Status: DISCONTINUED | OUTPATIENT
Start: 2019-06-11 | End: 2019-06-14

## 2019-06-11 RX ORDER — DILTIAZEM HCL 120 MG
10 CAPSULE, EXT RELEASE 24 HR ORAL ONCE
Refills: 0 | Status: COMPLETED | OUTPATIENT
Start: 2019-06-11 | End: 2019-06-11

## 2019-06-11 RX ORDER — POTASSIUM CHLORIDE 20 MEQ
20 PACKET (EA) ORAL DAILY
Refills: 0 | Status: DISCONTINUED | OUTPATIENT
Start: 2019-06-11 | End: 2019-06-14

## 2019-06-11 RX ORDER — DOCUSATE SODIUM 100 MG
100 CAPSULE ORAL
Refills: 0 | Status: DISCONTINUED | OUTPATIENT
Start: 2019-06-11 | End: 2019-06-14

## 2019-06-11 RX ORDER — DILTIAZEM HCL 120 MG
60 CAPSULE, EXT RELEASE 24 HR ORAL ONCE
Refills: 0 | Status: COMPLETED | OUTPATIENT
Start: 2019-06-11 | End: 2019-06-11

## 2019-06-11 RX ORDER — ONDANSETRON 8 MG/1
4 TABLET, FILM COATED ORAL ONCE
Refills: 0 | Status: COMPLETED | OUTPATIENT
Start: 2019-06-11 | End: 2019-06-14

## 2019-06-11 RX ORDER — APIXABAN 2.5 MG/1
5 TABLET, FILM COATED ORAL EVERY 12 HOURS
Refills: 0 | Status: DISCONTINUED | OUTPATIENT
Start: 2019-06-11 | End: 2019-06-14

## 2019-06-11 RX ORDER — OXYCODONE AND ACETAMINOPHEN 5; 325 MG/1; MG/1
1 TABLET ORAL ONCE
Refills: 0 | Status: DISCONTINUED | OUTPATIENT
Start: 2019-06-11 | End: 2019-06-11

## 2019-06-11 RX ORDER — FUROSEMIDE 40 MG
40 TABLET ORAL DAILY
Refills: 0 | Status: DISCONTINUED | OUTPATIENT
Start: 2019-06-11 | End: 2019-06-12

## 2019-06-11 RX ORDER — LOSARTAN POTASSIUM 100 MG/1
100 TABLET, FILM COATED ORAL DAILY
Refills: 0 | Status: DISCONTINUED | OUTPATIENT
Start: 2019-06-11 | End: 2019-06-14

## 2019-06-11 RX ORDER — ATORVASTATIN CALCIUM 80 MG/1
10 TABLET, FILM COATED ORAL AT BEDTIME
Refills: 0 | Status: DISCONTINUED | OUTPATIENT
Start: 2019-06-11 | End: 2019-06-14

## 2019-06-11 RX ADMIN — LOSARTAN POTASSIUM 100 MILLIGRAM(S): 100 TABLET, FILM COATED ORAL at 14:33

## 2019-06-11 RX ADMIN — SODIUM CHLORIDE 3 MILLILITER(S): 9 INJECTION INTRAMUSCULAR; INTRAVENOUS; SUBCUTANEOUS at 22:00

## 2019-06-11 RX ADMIN — Medication 20 MILLIEQUIVALENT(S): at 14:32

## 2019-06-11 RX ADMIN — Medication 10 MILLIGRAM(S): at 02:55

## 2019-06-11 RX ADMIN — OXYCODONE AND ACETAMINOPHEN 1 TABLET(S): 5; 325 TABLET ORAL at 23:54

## 2019-06-11 RX ADMIN — Medication 1 TABLET(S): at 14:33

## 2019-06-11 RX ADMIN — ATORVASTATIN CALCIUM 10 MILLIGRAM(S): 80 TABLET, FILM COATED ORAL at 22:02

## 2019-06-11 RX ADMIN — APIXABAN 5 MILLIGRAM(S): 2.5 TABLET, FILM COATED ORAL at 18:06

## 2019-06-11 RX ADMIN — Medication 100 MILLIGRAM(S): at 18:06

## 2019-06-11 RX ADMIN — SODIUM CHLORIDE 3 MILLILITER(S): 9 INJECTION INTRAMUSCULAR; INTRAVENOUS; SUBCUTANEOUS at 14:32

## 2019-06-11 RX ADMIN — Medication 40 MILLIGRAM(S): at 14:33

## 2019-06-11 RX ADMIN — Medication 60 MILLIGRAM(S): at 03:16

## 2019-06-11 NOTE — CONSULT NOTE ADULT - SUBJECTIVE AND OBJECTIVE BOX
85 y.o. female, know to our office, with PMHX of afib on eliquis, HTN, CHF, +PPM w/generator change in 2018 , spinal fracture, CVA [2019, no residual deficits], osteopenia, and macular degeneration of b/l eyes. On recent work up in our office, her echo 19 noted with severe b/l enlargement, normal lv fx and moderate pericardial effusion without evidence of tamponade, not changed from echo on 18.  Recent admission on 2019 for fall s/p syncope. PPM interrogated, no events noted. Repeat echo with preserved LV function, noted effusions small, no evidence of tamponade. Syncope work up otherwise negative, pt was discharged home with outpatient orthopedic follow up. She now presents to ED, with progressive SOB, RIDDLE, for the past couple of days. She states these symptoms come and go. Noted to have Afib w/RVR and 5 to 7 beats of NSVT. Pt given IV Cardizem IV and p.o. in ED.  Denies chest pain, fever, chills, cough, LOC, abdominal pain, N/V, melena, hematochezia, LE edema or dysuria, sick contacts or recent travel. Admitted for management of acute on chronic heart failure and AFib with RVR.     Allergies sulfa and oxycontin    REVIEW OF SYSTEMS:    CONSTITUTIONAL: No weakness, fevers or chills  EYES/ENT: No visual changes;  No vertigo or throat pain   NECK: No pain or stiffness  RESPIRATORY: No cough, wheezing, hemoptysis; No shortness of breath  CARDIOVASCULAR: No chest pain or palpitations  GASTROINTESTINAL: No abdominal or epigastric pain. No nausea, vomiting, or hematemesis; No diarrhea or constipation. No melena or hematochezia.  GENITOURINARY: No dysuria, frequency or hematuria  NEUROLOGICAL: No numbness or weakness  SKIN: No itching, burning, rashes, or lesions   All other review of systems is negative unless indicated above.    Home Medications:   * Patient Currently Takes Medications as of 2019 12:54 documented in Structured Notes  · 	Lasix 40 mg oral tablet: 1 tab(s) orally once a day   · 	atenolol 100 mg oral tablet: 1 tab(s) orally once a day  · 	alendronate 70 mg oral tablet: 1 tab(s) orally once a week  · 	losartan 100 mg oral tablet: 1 tab(s) orally once a day  · 	potassium chloride 20 mEq oral tablet, extended release: 1 tab(s) orally once a day  · 	Eliquis 5 mg oral tablet: 1 tab(s) orally 2 times a day  · 	PreserVision AREDS 2 oral capsule: 1 cap(s) orally once a day  · 	lovastatin 20 mg oral tablet: 1 tab(s) orally once a day  · 	Calcium 600+D 600 mg-200 intl units oral tablet: 1 tab(s) orally once a day    Patient History: Past Medical, Past Surgical, and Family History:  PAST MEDICAL HISTORY:  Atrial fibrillation dx  on coumadin    Cerebrovascular accident (CVA)     HLD (hyperlipidemia)     HTN (hypertension)     OA (osteoarthritis)     YOVANI on CPAP   Thyroid nodule followed by endocrinologist    UTI (urinary tract infection) frequent last was 1/15.     PAST SURGICAL HISTORY:  Cardiac pacemaker placed     Cataract b/l lense implant    FHx: cholecystectomy  laparoscopic    FHx: total knee replacement left 2012    Fracture ORIF right ankle     S/P JAY-BSO 40 years ago.     FAMILY HISTORY:  No pertinent family history in first degree relatives.   No Pertinent Family History in first degree relatives of: Patient.    Tobacco Screening:  · Core Measure Site	No	  · Has the patient used tobacco in the past 30 days?	Patient declined to answer	      pHYSICAL EXAM    General: WN/WD NAD  PERRLA  Neurology: A&Ox3, nonfocal, VANCE x 4  Respiratory: CTA B/L  CV: RRR, S1S2, no murmurs, rubs or gallops  Abdominal: Soft, NT, ND +BS, Last BM  Extremities: EDEMA+    Labs    Lab Results:  CBC  CBC Full  -  ( 2019 02:43 )  WBC Count : 11.69 K/uL  RBC Count : 4.90 M/uL  Hemoglobin : 13.6 g/dL  Hematocrit : 43.5 %  Platelet Count - Automated : 337 K/uL  Mean Cell Volume : 88.8 fL  Mean Cell Hemoglobin : 27.8 pg  Mean Cell Hemoglobin Concentration : 31.3 %  Auto Neutrophil # : 9.04 K/uL  Auto Lymphocyte # : 1.41 K/uL  Auto Monocyte # : 0.91 K/uL  Auto Eosinophil # : 0.20 K/uL  Auto Basophil # : 0.08 K/uL  Auto Neutrophil % : 77.3 %  Auto Lymphocyte % : 12.1 %  Auto Monocyte % : 7.8 %  Auto Eosinophil % : 1.7 %  Auto Basophil % : 0.7 %    .		Differential:	[] Automated		[] Manual  Chemistry                        13.6   11.69 )-----------( 337      ( 2019 02:43 )             43.5     06-    139  |  102  |  28<H>  ----------------------------<  107<H>  4.9   |  24  |  0.88    Ca    8.9      2019 02:43    TPro  6.5  /  Alb  3.7  /  TBili  0.6  /  DBili  x   /  AST  34<H>  /  ALT  15  /  AlkPhos  78  06-11    LIVER FUNCTIONS - ( 2019 02:43 )  Alb: 3.7 g/dL / Pro: 6.5 g/dL / ALK PHOS: 78 u/L / ALT: 15 u/L / AST: 34 u/L / GGT: x           PT/INR - ( 2019 02:43 )   PT: 17.9 SEC;   INR: 1.59          PTT - ( 2019 02:43 )  PTT:38.7 SEC  Urinalysis Basic - ( 2019 15:02 )    Color: YELLOW / Appearance: CLEAR / S.026 / pH: 6.5  Gluc: NEGATIVE / Ketone: NEGATIVE  / Bili: NEGATIVE / Urobili: NORMAL   Blood: NEGATIVE / Protein: 50 / Nitrite: NEGATIVE   Leuk Esterase: NEGATIVE / RBC: 3-5 / WBC 3-5   Sq Epi: FEW / Non Sq Epi: x / Bacteria: NEGATIVE            MICROBIOLOGY/CULTURES:      RADIOLOGY RESULTS: reviewed

## 2019-06-11 NOTE — ED PROVIDER NOTE - CARE PLAN
Principal Discharge DX:	Atrial fibrillation Principal Discharge DX:	Atrial fibrillation  Goal:	Admit

## 2019-06-11 NOTE — H&P ADULT - NSICDXPASTMEDICALHX_GEN_ALL_CORE_FT
PAST MEDICAL HISTORY:  Atrial fibrillation dx 7/09 on coumadin    Cerebrovascular accident (CVA)     HLD (hyperlipidemia)     HTN (hypertension)     OA (osteoarthritis)     YOVANI on CPAP     Thyroid nodule followed by endocrinologist    UTI (urinary tract infection) frequent last was 1/15

## 2019-06-11 NOTE — ED PROVIDER NOTE - PROGRESS NOTE DETAILS
TREVOR: Pt labs unremarkable but on cardiac monitor she has freq PVCs and is experiencing palpitations. Her cardiologist Dr Saul De La Rosa is with [facility]. Has been seen and evald by our cards in past. Given these findings, will admit to hospital for further care and mgmt. Spoke to Dr. leija service and will admit on Tele for 5-7 beats of NSVT ut otherwise stable. Text paged Tele PA.  -Geoff Penn PGY4 EMIM [employer]#[phone]

## 2019-06-11 NOTE — H&P ADULT - NSHPPHYSICALEXAM_GEN_ALL_CORE
Gen: Appears well in NAD  HEENT:  (-)icterus (-)pallor  CV: N S1 S2 1/6 DMITRIY (+)2 Pulses B/l  Resp:  B/L crackles at bases, normal effort  GI: (+) BS Soft, NT, ND  Lymph:  (+)Edema  +2, (-)obvious lymphadenopathy  Skin: Warm to touch, Normal turgor  Psych: Appropriate mood and affect

## 2019-06-11 NOTE — ED ADULT NURSE NOTE - NSIMPLEMENTINTERV_GEN_ALL_ED
Implemented All Fall Risk Interventions:  [place] to call system. Call bell, personal items and telephone within reach. Instruct patient to call for assistance. Room bathroom lighting operational. Non-slip footwear when patient is off stretcher. Physically safe environment: no spills, clutter or unnecessary equipment. Stretcher in lowest position, wheels locked, appropriate side rails in place. Provide visual cue, wrist band, yellow gown, etc. Monitor gait and stability. Monitor for mental status changes and reorient to person, place, and time. Review medications for side effects contributing to fall risk. Reinforce activity limits and safety measures with patient and family.

## 2019-06-11 NOTE — H&P ADULT - ATTENDING COMMENTS
Patient seen and examined with NP Lizzie on 06/11/19.  Agree with above.   -Admitted with signs and symptoms of heart failure  -IV diuresis to keep O > I  -TTE to evaluate LV function  -AC for AF and cva prevention    Leroy Rice MD

## 2019-06-11 NOTE — PROCEDURE NOTE - INTERROGATION NOTE: COMMENTS
Normal sensing pacing excellent threshold capture no reprogramming.  Few episodes of AFib with RVR up to 182 on 6/11 longest duration 8minutes

## 2019-06-11 NOTE — ED PROVIDER NOTE - CLINICAL SUMMARY MEDICAL DECISION MAKING FREE TEXT BOX
Pt is an 86 y/o F w/ a PMHx of a-fib on eliquis and atenolol (D/C of cardizem in past), HFrEF w/ PPM, HTN who p/w shortness of breath intermittent likely 2/2 a-fib with RVR with low suspicion for ACS and low suspicion for PE or heart failure given history and exam and will give cardizem and get CBC, CMP, Trop, proBNP, PT/INR, aPTT, CXR, no ischemic changes on EKG. If labs normal and patient HR controlled with Cardizem will d/c with cardiology follow-up and cardizem prescription.

## 2019-06-11 NOTE — ED ADULT NURSE NOTE - OBJECTIVE STATEMENT
pt a&Ox3, c/o intermittent sob since 12 pm with no relief. pt denies n/v/d, dizziness, headache, cp, abd pain, lightheadedness, fevers, dizziness. pt was discharged from rehab this month post a mechanical fall with a L shoulder fracture. pt has +1 pitting edema in LE bilat, lung sounds clear. pt has hx of CVA, no residual deficits, afib, pacemaker. pt ambulates at home with a cane. pt in rapid afib, MD at bedside, awaiting further orders. 22G Ivplaced in R forearm, labs sent, will continue to monitor.

## 2019-06-11 NOTE — ED ADULT TRIAGE NOTE - CHIEF COMPLAINT QUOTE
"PAOLO York  [facility]  Office:  [phone] call if needed after 5PM  Cell:  [phone], not available after 5PM at this number     Pt. brought to [facility] ED by [facility]. Pt. felt that she was short of breath and was hypertensive at home. Pt. had  lucunar infaction in february, 2019. Pt. has 2 fractures in left arm from a mechanical fall in April 28th, 2019. Pt. appears well.

## 2019-06-11 NOTE — ED PROVIDER NOTE - OBJECTIVE STATEMENT
Pt is an 86 y/o F w/ a PMHx of a-fib on eliquis and atenolol (D/C of cardizem in past), HFrEF w/ PPM, HTN who p/w shortness of breath that started yesterday afternoon while at rest. Patient states intermittent, denies chest pain or discomfort, +associated nausea, but no vomiting, recently discharged from rehab on saturday for a left arm Fx after a fall but no new worsening LE edema, no histroy of DVT/PE.     Patient denies cough, nasal congestion, abdominal pain, nausea, vomiting, recent antibiotic use, diarrhea, dysuria, urinary frequency, new rashes or injuries, headaches, neck stiffness, photophobia, and sick contacts.

## 2019-06-11 NOTE — H&P ADULT - HISTORY OF PRESENT ILLNESS
85 y.o. 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 On recent work up in our office, her echo 12/14/19 noted with severe b/l enlargement, normal lv fx and moderate pericardial effusion without evidence of tamponade, not changed from echo on 6/29/18.  Recent admission on April 2019 for fall s/p syncope. PPM interrogated, no events noted. Repeat echo with preserved LV function, noted effusions small, no evidence of tamponade. Syncope work up otherwise negative, pt was discharged home with outpatient orthopedic follow up. She now presents to ED, with progressive SOB, RIDDLE, for the past couple of days. She states these symptoms come and go. Noted to have Afib w/RVR and 5 to 7 beats of NSVT. Pt given IV Cardizem IV and p.o. in ED.  Denies chest pain, fever, chills, cough, LOC, abdominal pain, N/V, melena, hematochezia, LE edema or dysuria, sick contacts or recent travel. Admitted for management of acute on chronic heart failure and AFib with RVR.

## 2019-06-11 NOTE — ED PROVIDER NOTE - MUSCULOSKELETAL, MLM
Spine appears normal, range of motion is not limited, no muscle or joint tenderness. Patient in left arm sling

## 2019-06-11 NOTE — ED ADULT NURSE NOTE - CHIEF COMPLAINT QUOTE
Pt. brought to [facility] ED by [facility]. Pt. felt that she was short of breath and was hypertensive at home. Pt. had  lucunar infaction in february, 2019. Pt. has 2 fractures in left arm from a mechanical fall in April 28th, 2019. Pt. appears well.

## 2019-06-11 NOTE — H&P ADULT - NSHPLABSRESULTS_GEN_ALL_CORE
IMPRESSION:   Clearlungs.    KEIRA TRAN M.D., RADIOLOGY RESIDENT  This document has been electronically signed.  DOROTHEA GONZALES M.D., ATTENDING RADIOLOGIST  This document has been electronically signed. Jun 11 2019  5:44AM    < end of copied text >    < from: Transthoracic Echocardiogram (05.02.19 @ 12:39) >    CONCLUSIONS:  1. Mitral annular calcification, otherwise normal mitral  valve.  2. Calcified trileaflet aortic valve with normal opening.  3. Normal left ventricular internal dimensions and wall  thicknesses.  4. Endocardium not well visualized; grossly normal left  ventricular systolic function.  5. Normal right ventricular size and function. Device wire  is noted in the right heart.  6. Thickened pericardium with circumferential pericardial  effusion which is  moderate in size posterior to the left  ventricle and lateral to the left heart, the effusion is  small otherwise. No cardiac tamponade.  ------------------------------------------------------------------------  Confirmed on  5/2/2019 - 16:42:15 by Juan Luis Kimble M.D. RPVI    < end of copied text >

## 2019-06-11 NOTE — CONSULT NOTE ADULT - ASSESSMENT
85 y.o. 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 Recently admitted for fall 2/2 syncope. Presents to ED c/o SOB, RIDDLE, LE edema for the past few days. Admitted now for management of acute on chronic heart failure, Afib with RVR.       1 CHF exacerbation  - cw diuresis  - telemonitor  - cards fu  - cw current meds    2 A fib  - cw eliquis  - will monitor    3 HTN  - cw current meds

## 2019-06-11 NOTE — H&P ADULT - ASSESSMENT
85 y.o. 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 Recently admitted for fall 2/2/ syncope. Now presents to ED c/o SOB, RIDDLE, LE edema for the past few days. Admitted now with acute on chronic heart failure.     -- Doubt ACS 2/2 negative Trop HS 10 -- >9  -- Recent echo as noted above   -- Continue diuresis po Lasix daily    -- Continue Eliquis for Afib  -- Keep O>I, trend BMP, replete lytes PRN  -- EP to interrogate PPM for eval of RVR   -- Sling to Left UE for Left proximal humerus fx, pain management PRN   -- further cardiac work up depending on patients clinical status 85 y.o. 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 Recently admitted for fall 2/2/ syncope. Now presents to ED c/o SOB, RIDDLE, LE edema for the past few days. Admitted now with acute on chronic heart failure.     -- Doubt ACS 2/2 negative Trop HS 10 -- >9  -- Recent echo as noted above   -- Continue diuresis po Lasix daily    -- Continue Eliquis for Afib  -- Keep O>I, trend BMP, replete lytes PRN  -- EP to interrogate PPM for eval of RVR, called    -- Sling to Left UE for Left proximal humerus fx, pain management PRN   -- further cardiac work up depending on patients clinical status 85 y.o. 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 Recently admitted for fall 2/2 syncope. Presents to ED c/o SOB, RIDDLE, LE edema for the past few days. Admitted now for management of acute on chronic heart failure, Afib with RVR.     -- Doubt ACS 2/2 negative Trop HS 10 -- >9  -- Recent echo as noted above   -- Continue diuresis po Lasix daily    -- Continue Eliquis for Afib  -- Keep O>I, trend BMP, replete lytes PRN  -- EP to interrogate PPM for eval of RVR, called    -- Sling to Left UE for Left proximal humerus fx, pain management PRN   -- further cardiac work up depending on patients clinical status 85 y.o. 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 Recently admitted for fall 2/2 syncope. Presents to ED c/o SOB, RIDDLE, LE edema for the past few days. Admitted now for management of acute on chronic heart failure, Afib with RVR.     -- Doubt ACS 2/2 negative Trop HS 10 -- >9  -- Recent echo as noted above   -- Continue diuresis po Lasix daily    -- Continue Eliquis for Afib, Atenolol for rate control   -- Keep O>I, trend BMP, replete lytes PRN  -- EP to interrogate PPM for eval of RVR, called    -- Sling to Left UE for Left proximal humerus fx, pain management PRN   -- further cardiac work up depending on patients clinical status

## 2019-06-11 NOTE — ED PROVIDER NOTE - ATTENDING CONTRIBUTION TO CARE
HPI: 86 y/o F w/ a PMHx of a-fib on eliquis and atenolol (D/C of cardizem in past), HFrEF w/ PPM, HTN who p/w shortness of breath that started yesterday afternoon while at rest. Patient states intermittent, denies chest pain or discomfort, +associated nausea, but no vomiting, recently discharged from rehab on saturday for a left arm Fx after a fall but no new worsening LE edema, no histroy of DVT/PE. denies cough, nasal congestion, abdominal pain, nausea, vomiting, recent antibiotic use, diarrhea, dysuria, urinary frequency, new rashes or injuries, headaches, neck stiffness, photophobia, and sick contacts.  EXAM: NAD, heart IRReg rate rhythm, lungs ctab. abd soft nontender, 1+ pitting edema BLE. Pulses palpable, all ext warm and well perfused.   MDM: concern for Afib leading to SOB and weakness. Not well controlled. Denies chest pain. Will obtain labs, imaging, provide meds and reassess. Unlikely PE as pt has been I rehab and has not been immobile and already on eliquis.

## 2019-06-11 NOTE — CHART NOTE - NSCHARTNOTEFT_GEN_A_CORE
PA note  This a 86 yo f PMH CVA 2/19 , FX l 12 , s/p  fall 4/29/19 L arm humeral Fx ,HTN , CHF s/p PPM , a fib on Eliquis, HLD, sleep apnea PW increasing SOB x several hours starting yesterday.  Pt was found to be in a fib with frequent PVC and 5 to 7 beats of NSVT. Pt given IV Cardizem in ED . Pt currently resting comfortably without any complaints. Denies SOB , CP or palpitations .  /67 P 88 R 18 T 97.8   CM a fib rate 80's   PE     Allergies Sulfa Hives    Hrt irregular RR  lungs clear   ext trace edema   TROPS 10, 10 ,SERUM PRO bnp [id number]   Dr Rice aware and to see pt today.    Elisabeth Jenkins PAC

## 2019-06-12 LAB
HCT VFR BLD CALC: 40.4 % — SIGNIFICANT CHANGE UP (ref 34.5–45)
HGB BLD-MCNC: 12.6 G/DL — SIGNIFICANT CHANGE UP (ref 11.5–15.5)
MCHC RBC-ENTMCNC: 27.7 PG — SIGNIFICANT CHANGE UP (ref 27–34)
MCHC RBC-ENTMCNC: 31.2 % — LOW (ref 32–36)
MCV RBC AUTO: 88.8 FL — SIGNIFICANT CHANGE UP (ref 80–100)
NRBC # FLD: 0 K/UL — SIGNIFICANT CHANGE UP (ref 0–0)
PLATELET # BLD AUTO: 304 K/UL — SIGNIFICANT CHANGE UP (ref 150–400)
PMV BLD: 12.2 FL — SIGNIFICANT CHANGE UP (ref 7–13)
RBC # BLD: 4.55 M/UL — SIGNIFICANT CHANGE UP (ref 3.8–5.2)
RBC # FLD: 14.6 % — HIGH (ref 10.3–14.5)
WBC # BLD: 9.11 K/UL — SIGNIFICANT CHANGE UP (ref 3.8–10.5)
WBC # FLD AUTO: 9.11 K/UL — SIGNIFICANT CHANGE UP (ref 3.8–10.5)

## 2019-06-12 PROCEDURE — 73060 X-RAY EXAM OF HUMERUS: CPT | Mod: 26,LT

## 2019-06-12 PROCEDURE — 93010 ELECTROCARDIOGRAM REPORT: CPT

## 2019-06-12 PROCEDURE — 73030 X-RAY EXAM OF SHOULDER: CPT | Mod: 26,LT

## 2019-06-12 RX ORDER — DIBUCAINE 1 %
1 OINTMENT (GRAM) RECTAL THREE TIMES A DAY
Refills: 0 | Status: DISCONTINUED | OUTPATIENT
Start: 2019-06-12 | End: 2019-06-14

## 2019-06-12 RX ORDER — NEOMYCIN/POLYMYXIN B/DEXAMETHA 0.1 %
1 SUSPENSION, DROPS(FINAL DOSAGE FORM)(ML) OPHTHALMIC (EYE)
Refills: 0 | Status: DISCONTINUED | OUTPATIENT
Start: 2019-06-12 | End: 2019-06-14

## 2019-06-12 RX ORDER — KETOTIFEN FUMARATE 0.34 MG/ML
1 SOLUTION OPHTHALMIC
Refills: 0 | Status: COMPLETED | OUTPATIENT
Start: 2019-06-12 | End: 2019-06-13

## 2019-06-12 RX ORDER — PHENYLEPHRINE-SHARK LIVER OIL-MINERAL OIL-PETROLATUM RECTAL OINTMENT
1 OINTMENT (GRAM) RECTAL
Refills: 0 | Status: DISCONTINUED | OUTPATIENT
Start: 2019-06-12 | End: 2019-06-12

## 2019-06-12 RX ORDER — FUROSEMIDE 40 MG
40 TABLET ORAL DAILY
Refills: 0 | Status: DISCONTINUED | OUTPATIENT
Start: 2019-06-12 | End: 2019-06-14

## 2019-06-12 RX ORDER — FUROSEMIDE 40 MG
40 TABLET ORAL DAILY
Refills: 0 | Status: DISCONTINUED | OUTPATIENT
Start: 2019-06-12 | End: 2019-06-12

## 2019-06-12 RX ORDER — OXYCODONE AND ACETAMINOPHEN 5; 325 MG/1; MG/1
1 TABLET ORAL ONCE
Refills: 0 | Status: DISCONTINUED | OUTPATIENT
Start: 2019-06-12 | End: 2019-06-12

## 2019-06-12 RX ADMIN — APIXABAN 5 MILLIGRAM(S): 2.5 TABLET, FILM COATED ORAL at 06:13

## 2019-06-12 RX ADMIN — Medication 1 APPLICATION(S): at 17:45

## 2019-06-12 RX ADMIN — OXYCODONE AND ACETAMINOPHEN 1 TABLET(S): 5; 325 TABLET ORAL at 00:45

## 2019-06-12 RX ADMIN — ATORVASTATIN CALCIUM 10 MILLIGRAM(S): 80 TABLET, FILM COATED ORAL at 22:16

## 2019-06-12 RX ADMIN — OXYCODONE AND ACETAMINOPHEN 1 TABLET(S): 5; 325 TABLET ORAL at 23:00

## 2019-06-12 RX ADMIN — Medication 1 TABLET(S): at 12:12

## 2019-06-12 RX ADMIN — KETOTIFEN FUMARATE 1 DROP(S): 0.34 SOLUTION OPHTHALMIC at 06:34

## 2019-06-12 RX ADMIN — Medication 40 MILLIGRAM(S): at 17:43

## 2019-06-12 RX ADMIN — Medication 100 MILLIGRAM(S): at 17:44

## 2019-06-12 RX ADMIN — Medication 1 APPLICATION(S): at 06:33

## 2019-06-12 RX ADMIN — SODIUM CHLORIDE 3 MILLILITER(S): 9 INJECTION INTRAMUSCULAR; INTRAVENOUS; SUBCUTANEOUS at 15:20

## 2019-06-12 RX ADMIN — SODIUM CHLORIDE 3 MILLILITER(S): 9 INJECTION INTRAMUSCULAR; INTRAVENOUS; SUBCUTANEOUS at 22:08

## 2019-06-12 RX ADMIN — SODIUM CHLORIDE 3 MILLILITER(S): 9 INJECTION INTRAMUSCULAR; INTRAVENOUS; SUBCUTANEOUS at 06:13

## 2019-06-12 RX ADMIN — Medication 40 MILLIGRAM(S): at 06:13

## 2019-06-12 RX ADMIN — OXYCODONE AND ACETAMINOPHEN 1 TABLET(S): 5; 325 TABLET ORAL at 22:16

## 2019-06-12 RX ADMIN — Medication 1 APPLICATION(S): at 12:12

## 2019-06-12 RX ADMIN — Medication 100 MILLIGRAM(S): at 06:13

## 2019-06-12 RX ADMIN — ATENOLOL 100 MILLIGRAM(S): 25 TABLET ORAL at 06:13

## 2019-06-12 RX ADMIN — LOSARTAN POTASSIUM 100 MILLIGRAM(S): 100 TABLET, FILM COATED ORAL at 06:13

## 2019-06-12 RX ADMIN — Medication 20 MILLIEQUIVALENT(S): at 12:12

## 2019-06-12 RX ADMIN — APIXABAN 5 MILLIGRAM(S): 2.5 TABLET, FILM COATED ORAL at 17:44

## 2019-06-12 RX ADMIN — KETOTIFEN FUMARATE 1 DROP(S): 0.34 SOLUTION OPHTHALMIC at 17:45

## 2019-06-12 NOTE — CHART NOTE - NSCHARTNOTEFT_GEN_A_CORE
Pt was previously admitted in April after mechanical fall and sustained L proximal humeral fracture. As per daughter pt supposed to follow up with Ortho Dr. Higginbotham on 6/13 for healing of proximal L humeral fracture. Pt was seen initially on Apr 29th by Dr. Delaney during last admission. As per ortho, films can be repeated either during admission or pt can follow up as outpatient since there would be no surgical intervention indicated for L humeral fracture. Repeat xrays ordered, call ortho once xray films are performed and resulted and they will review films. Pt was previously admitted in April after mechanical fall and sustained L proximal humeral fracture. As per daughter pt supposed to follow up with Ortho Dr. Higginbotham on 6/13 for healing of proximal L humeral fracture. Pt was seen initially on Apr 29th by Dr. Delaney during last admission. As per ortho, films can be repeated either during admission or pt can follow up as outpatient since there would be no surgical intervention indicated for proximal L humeral fracture. Repeat xrays ordered, call ortho once xray films are performed and resulted and they will review films.

## 2019-06-12 NOTE — CHART NOTE - NSCHARTNOTEFT_GEN_A_CORE
Pt has a history of chronic back pain d/t a fracture and states the pain has been getting worse.  She denies any acute changes in her pain or symptoms.  She and her family would like it evaluated on this admission.

## 2019-06-12 NOTE — PROGRESS NOTE ADULT - ASSESSMENT
1 CHF exacerbation  - cw diuresis  - telemonitor  - cards fu  - cw current meds    2 A fib  - cw eliquis  - will monitor    3 HTN- cw current meds

## 2019-06-12 NOTE — PROGRESS NOTE ADULT - ATTENDING COMMENTS
CARDIOLOGY ATTENDING    Patient seen and examined. Agree with above. Recommend lifelong a/c for afib. Continue IV diuresis, and f/u repeat echo to evaluate recent pericardial effusion. If repeat echo confirms LVEF still normal, and that pericardial effusion is no larger, then no further inpatient cardiac workup needed. She can be d/c home once euvolemic.

## 2019-06-12 NOTE — CHART NOTE - NSCHARTNOTEFT_GEN_A_CORE
Pt has documented sulfa allergy in sunrise, confirmed with patient as well as with daughter that pt has been taking her PO lasix at home without any reaction. Will continue with IV lasix as ordered.

## 2019-06-12 NOTE — PROGRESS NOTE ADULT - SUBJECTIVE AND OBJECTIVE BOX
Patient is a 85y old  Female who presents with a chief complaint of Acute on Chronic CHF (2019 16:48)      INTERVAL HPI/OVERNIGHT EVENTS:  T(C): 36.9 (19 @ 17:36), Max: 36.9 (19 @ 17:36)  HR: 78 (19 @ 17:36) (69 - 88)  BP: 169/82 (19 @ 17:36) (146/92 - 169/82)  RR: 16 (19 @ 17:36) (16 - 17)  SpO2: 99% (19 @ 17:36) (97% - 100%)  Wt(kg): --  I&O's Summary      LABS:                        12.6   9.11  )-----------( 304      ( 2019 07:25 )             40.4     -11    141  |  100  |  26<H>  ----------------------------<  99  3.9   |  29  |  0.81    Ca    9.4      2019 18:23  Phos  3.2     -  Mg     1.6     -    TPro  6.5  /  Alb  3.7  /  TBili  0.6  /  DBili  x   /  AST  34<H>  /  ALT  15  /  AlkPhos  78  06-11    PT/INR - ( 2019 02:43 )   PT: 17.9 SEC;   INR: 1.59          PTT - ( 2019 02:43 )  PTT:38.7 SEC  Urinalysis Basic - ( 2019 15:02 )    Color: YELLOW / Appearance: CLEAR / S.026 / pH: 6.5  Gluc: NEGATIVE / Ketone: NEGATIVE  / Bili: NEGATIVE / Urobili: NORMAL   Blood: NEGATIVE / Protein: 50 / Nitrite: NEGATIVE   Leuk Esterase: NEGATIVE / RBC: 3-5 / WBC 3-5   Sq Epi: FEW / Non Sq Epi: x / Bacteria: NEGATIVE      CAPILLARY BLOOD GLUCOSE            Urinalysis Basic - ( 2019 15:02 )    Color: YELLOW / Appearance: CLEAR / S.026 / pH: 6.5  Gluc: NEGATIVE / Ketone: NEGATIVE  / Bili: NEGATIVE / Urobili: NORMAL   Blood: NEGATIVE / Protein: 50 / Nitrite: NEGATIVE   Leuk Esterase: NEGATIVE / RBC: 3-5 / WBC 3-5   Sq Epi: FEW / Non Sq Epi: x / Bacteria: NEGATIVE        MEDICATIONS  (STANDING):  apixaban 5 milliGRAM(s) Oral every 12 hours  ATENolol  Tablet 100 milliGRAM(s) Oral daily  atorvastatin 10 milliGRAM(s) Oral at bedtime  calcium carbonate 1250 mG  + Vitamin D (OsCal 500 + D) 1 Tablet(s) Oral daily  dexamethasone/neomycin/polymyxin Ointment 1 Application(s) Left EYE four times a day  docusate sodium 100 milliGRAM(s) Oral two times a day  furosemide   Injectable 40 milliGRAM(s) IV Push daily  hemorrhoidal Ointment 1 Application(s) Rectal two times a day  ketotifen 0.025% Ophthalmic Solution 1 Drop(s) Both EYES two times a day  losartan 100 milliGRAM(s) Oral daily  ondansetron   Disintegrating Tablet 4 milliGRAM(s) Oral once  potassium chloride    Tablet ER 20 milliEquivalent(s) Oral daily  sodium chloride 0.9% lock flush 3 milliLiter(s) IV Push every 8 hours    MEDICATIONS  (PRN):          PHYSICAL EXAM:  GENERAL: NAD, well-groomed, well-developed  HEAD:  Atraumatic, Normocephalic  CHEST/LUNG: Clear to percussion bilaterally; No rales, rhonchi, wheezing, or rubs  HEART: Regular rate and rhythm; No murmurs, rubs, or gallops  ABDOMEN: Soft, Nontender, Nondistended; Bowel sounds present  EXTREMITIES:  2+ Peripheral Pulses, No clubbing, cyanosis, or edema  LYMPH: No lymphadenopathy noted  SKIN: No rashes or lesions    Care Discussed with Consultants/Other Providers [ ] YES  [ ] NO

## 2019-06-12 NOTE — PROGRESS NOTE ADULT - SUBJECTIVE AND OBJECTIVE BOX
SUBJECTIVE: no pain or SOB      MEDICATIONS  (STANDING):  apixaban 5 milliGRAM(s) Oral every 12 hours  ATENolol  Tablet 100 milliGRAM(s) Oral daily  atorvastatin 10 milliGRAM(s) Oral at bedtime  calcium carbonate 1250 mG  + Vitamin D (OsCal 500 + D) 1 Tablet(s) Oral daily  dexamethasone/neomycin/polymyxin Ointment 1 Application(s) Left EYE four times a day  docusate sodium 100 milliGRAM(s) Oral two times a day  furosemide   Injectable 40 milliGRAM(s) IV Push daily  ketotifen 0.025% Ophthalmic Solution 1 Drop(s) Both EYES two times a day  losartan 100 milliGRAM(s) Oral daily  ondansetron   Disintegrating Tablet 4 milliGRAM(s) Oral once  potassium chloride    Tablet ER 20 milliEquivalent(s) Oral daily  sodium chloride 0.9% lock flush 3 milliLiter(s) IV Push every 8 hours    MEDICATIONS  (PRN):      LABS:                            12.6   9.11  )-----------( 304      ( 12 Jun 2019 07:25 )             40.4     141  |  100  |  26<H>  ----------------------------<  99  3.9   |  29  |  0.81    Ca    9.4      11 Jun 2019 18:23  Phos  3.2     06-11  Mg     1.6     06-11    TPro  6.5  /  Alb  3.7  /  TBili  0.6  /  DBili  x   /  AST  34<H>  /  ALT  15  /  AlkPhos  78  06-11    Serum Pro-Brain Natriuretic Peptide: 2782 pg/mL (06-11 @ 05:19)  Serum Pro-Brain Natriuretic Peptide: 2343 pg/mL (06-11 @ 02:43)      PHYSICAL EXAM:  Vital Signs Last 24 Hrs  T(C): 36.5 (12 Jun 2019 12:06), Max: 36.8 (11 Jun 2019 19:02)  T(F): 97.7 (12 Jun 2019 12:06), Max: 98.2 (11 Jun 2019 19:02)  HR: 69 (12 Jun 2019 12:06) (69 - 88)  BP: 157/74 (12 Jun 2019 12:06) (146/92 - 158/81)--  RR: 17 (12 Jun 2019 12:06) (16 - 17)  SpO2: 99% (12 Jun 2019 12:06) (97% - 100%)    Cardiovascular:  S1S2 RRR, No JVD  Respiratory: Lungs clear to auscultation, normal effort  Gastrointestinal: Abdomen soft, ND, NT, +BS  Skin: Warm, dry, intact. No rash.  Musculoskeletal: Normal ROM, normal strength  Ext: No C/C/E B/L LE    DIAGNOSTIC DATA  TELEMETRY: AF 70s    < from: Transthoracic Echocardiogram (05.02.19 @ 12:39) >  CONCLUSIONS:  1. Mitral annular calcification, otherwise normal mitral  valve.  2. Calcified trileaflet aortic valve with normal opening.  3. Normal left ventricular internal dimensions and wall  thicknesses.  4. Endocardium not well visualized; grossly normal left  ventricular systolic function.  5. Normal right ventricular size and function. Device wire  is noted in the right heart.  6. Thickened pericardium with circumferential pericardial  effusion which is  moderate in size posterior to the left  ventricle and lateral to the left heart, the effusion is  small otherwise. No cardiac tamponade.  ------------------------------------------------------------------------  Confirmed on  5/2/2019 - 16:42:15 by Juan Luis Kimble M.D. RPVI  ------------------------------    < end of copied text >      ASSESSMENT AND PLAN:    85 y.o. 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 Recently admitted for fall 2/2 syncope. Presents to ED c/o SOB, RIDDLE, LE edema for the past few days. Admitted now for management of acute on chronic heart failure, Afib with RVR.     -- ACS ruled out with serial CE  -- Recent echo as noted above   -- Continue diuresis, change to po Lasix daily    --repeat TTE pending  --PPM check with few episodes of AF with RVR  -- Continue lifelong AC with Eliquis  --cont Atenolol  -- Sling to Left UE for Left proximal humerus fx, pain management PRN   -- further cardiac work up depending on patients clinical status

## 2019-06-13 ENCOUNTER — APPOINTMENT (OUTPATIENT)
Dept: ORTHOPEDIC SURGERY | Facility: CLINIC | Age: 84
End: 2019-06-13

## 2019-06-13 LAB
ANION GAP SERPL CALC-SCNC: 16 MMO/L — HIGH (ref 7–14)
BUN SERPL-MCNC: 22 MG/DL — SIGNIFICANT CHANGE UP (ref 7–23)
CALCIUM SERPL-MCNC: 8.4 MG/DL — SIGNIFICANT CHANGE UP (ref 8.4–10.5)
CHLORIDE SERPL-SCNC: 101 MMOL/L — SIGNIFICANT CHANGE UP (ref 98–107)
CO2 SERPL-SCNC: 25 MMOL/L — SIGNIFICANT CHANGE UP (ref 22–31)
CREAT SERPL-MCNC: 0.8 MG/DL — SIGNIFICANT CHANGE UP (ref 0.5–1.3)
GLUCOSE SERPL-MCNC: 93 MG/DL — SIGNIFICANT CHANGE UP (ref 70–99)
HCT VFR BLD CALC: 43.8 % — SIGNIFICANT CHANGE UP (ref 34.5–45)
HGB BLD-MCNC: 13.8 G/DL — SIGNIFICANT CHANGE UP (ref 11.5–15.5)
MAGNESIUM SERPL-MCNC: 1.6 MG/DL — SIGNIFICANT CHANGE UP (ref 1.6–2.6)
MCHC RBC-ENTMCNC: 27.5 PG — SIGNIFICANT CHANGE UP (ref 27–34)
MCHC RBC-ENTMCNC: 31.5 % — LOW (ref 32–36)
MCV RBC AUTO: 87.3 FL — SIGNIFICANT CHANGE UP (ref 80–100)
NRBC # FLD: 0 K/UL — SIGNIFICANT CHANGE UP (ref 0–0)
PLATELET # BLD AUTO: 313 K/UL — SIGNIFICANT CHANGE UP (ref 150–400)
PMV BLD: 12.5 FL — SIGNIFICANT CHANGE UP (ref 7–13)
POTASSIUM SERPL-MCNC: 3.4 MMOL/L — LOW (ref 3.5–5.3)
POTASSIUM SERPL-SCNC: 3.4 MMOL/L — LOW (ref 3.5–5.3)
RBC # BLD: 5.02 M/UL — SIGNIFICANT CHANGE UP (ref 3.8–5.2)
RBC # FLD: 14.9 % — HIGH (ref 10.3–14.5)
SODIUM SERPL-SCNC: 142 MMOL/L — SIGNIFICANT CHANGE UP (ref 135–145)
WBC # BLD: 9.94 K/UL — SIGNIFICANT CHANGE UP (ref 3.8–10.5)
WBC # FLD AUTO: 9.94 K/UL — SIGNIFICANT CHANGE UP (ref 3.8–10.5)

## 2019-06-13 RX ORDER — ONDANSETRON 8 MG/1
4 TABLET, FILM COATED ORAL ONCE
Refills: 0 | Status: COMPLETED | OUTPATIENT
Start: 2019-06-13 | End: 2019-06-13

## 2019-06-13 RX ORDER — DILTIAZEM HCL 120 MG
10 CAPSULE, EXT RELEASE 24 HR ORAL ONCE
Refills: 0 | Status: DISCONTINUED | OUTPATIENT
Start: 2019-06-13 | End: 2019-06-13

## 2019-06-13 RX ORDER — OXYCODONE AND ACETAMINOPHEN 5; 325 MG/1; MG/1
1 TABLET ORAL ONCE
Refills: 0 | Status: DISCONTINUED | OUTPATIENT
Start: 2019-06-13 | End: 2019-06-13

## 2019-06-13 RX ORDER — DIPHENHYDRAMINE HCL 50 MG
25 CAPSULE ORAL ONCE
Refills: 0 | Status: COMPLETED | OUTPATIENT
Start: 2019-06-13 | End: 2019-06-13

## 2019-06-13 RX ORDER — POTASSIUM CHLORIDE 20 MEQ
20 PACKET (EA) ORAL ONCE
Refills: 0 | Status: COMPLETED | OUTPATIENT
Start: 2019-06-13 | End: 2019-06-13

## 2019-06-13 RX ADMIN — SODIUM CHLORIDE 3 MILLILITER(S): 9 INJECTION INTRAMUSCULAR; INTRAVENOUS; SUBCUTANEOUS at 06:20

## 2019-06-13 RX ADMIN — Medication 20 MILLIEQUIVALENT(S): at 11:27

## 2019-06-13 RX ADMIN — Medication 1 APPLICATION(S): at 06:19

## 2019-06-13 RX ADMIN — Medication 20 MILLIEQUIVALENT(S): at 11:28

## 2019-06-13 RX ADMIN — APIXABAN 5 MILLIGRAM(S): 2.5 TABLET, FILM COATED ORAL at 17:10

## 2019-06-13 RX ADMIN — APIXABAN 5 MILLIGRAM(S): 2.5 TABLET, FILM COATED ORAL at 06:20

## 2019-06-13 RX ADMIN — Medication 1 APPLICATION(S): at 00:11

## 2019-06-13 RX ADMIN — ATORVASTATIN CALCIUM 10 MILLIGRAM(S): 80 TABLET, FILM COATED ORAL at 21:18

## 2019-06-13 RX ADMIN — Medication 100 MILLIGRAM(S): at 06:20

## 2019-06-13 RX ADMIN — LOSARTAN POTASSIUM 100 MILLIGRAM(S): 100 TABLET, FILM COATED ORAL at 06:20

## 2019-06-13 RX ADMIN — Medication 1 TABLET(S): at 11:28

## 2019-06-13 RX ADMIN — OXYCODONE AND ACETAMINOPHEN 1 TABLET(S): 5; 325 TABLET ORAL at 05:30

## 2019-06-13 RX ADMIN — Medication 40 MILLIGRAM(S): at 06:19

## 2019-06-13 RX ADMIN — SODIUM CHLORIDE 3 MILLILITER(S): 9 INJECTION INTRAMUSCULAR; INTRAVENOUS; SUBCUTANEOUS at 13:20

## 2019-06-13 RX ADMIN — OXYCODONE AND ACETAMINOPHEN 1 TABLET(S): 5; 325 TABLET ORAL at 04:37

## 2019-06-13 RX ADMIN — KETOTIFEN FUMARATE 1 DROP(S): 0.34 SOLUTION OPHTHALMIC at 17:10

## 2019-06-13 RX ADMIN — Medication 100 MILLIGRAM(S): at 17:10

## 2019-06-13 RX ADMIN — SODIUM CHLORIDE 3 MILLILITER(S): 9 INJECTION INTRAMUSCULAR; INTRAVENOUS; SUBCUTANEOUS at 21:18

## 2019-06-13 RX ADMIN — Medication 25 MILLIGRAM(S): at 21:38

## 2019-06-13 RX ADMIN — Medication 1 APPLICATION(S): at 17:10

## 2019-06-13 RX ADMIN — Medication 1 APPLICATION(S): at 11:28

## 2019-06-13 RX ADMIN — KETOTIFEN FUMARATE 1 DROP(S): 0.34 SOLUTION OPHTHALMIC at 06:19

## 2019-06-13 RX ADMIN — ATENOLOL 100 MILLIGRAM(S): 25 TABLET ORAL at 06:20

## 2019-06-13 NOTE — PROGRESS NOTE ADULT - SUBJECTIVE AND OBJECTIVE BOX
SUBJECTIVE: no pain or SOB. All other review of systems negative.       apixaban 5 milliGRAM(s) Oral every 12 hours  ATENolol  Tablet 100 milliGRAM(s) Oral daily  atorvastatin 10 milliGRAM(s) Oral at bedtime  calcium carbonate 1250 mG  + Vitamin D (OsCal 500 + D) 1 Tablet(s) Oral daily  dexamethasone/neomycin/polymyxin Ointment 1 Application(s) Left EYE four times a day  dibucaine 1% Ointment 1 Application(s) Topical three times a day PRN  docusate sodium 100 milliGRAM(s) Oral two times a day  furosemide   Injectable 40 milliGRAM(s) IV Push daily  ketotifen 0.025% Ophthalmic Solution 1 Drop(s) Both EYES two times a day  losartan 100 milliGRAM(s) Oral daily  ondansetron   Disintegrating Tablet 4 milliGRAM(s) Oral once  potassium chloride    Tablet ER 20 milliEquivalent(s) Oral once  potassium chloride    Tablet ER 20 milliEquivalent(s) Oral daily  sodium chloride 0.9% lock flush 3 milliLiter(s) IV Push every 8 hours                        13.8   9.94  )-----------( 313      ( 13 Jun 2019 06:41 )             43.8     Hemoglobin: 13.8 g/dL (06-13 @ 06:41)  Hemoglobin: 12.6 g/dL (06-12 @ 07:25)  Hemoglobin: 13.6 g/dL (06-11 @ 02:43)    06-13    142  |  101  |  22  ----------------------------<  93  3.4<L>   |  25  |  0.80    Ca    8.4      13 Jun 2019 06:41  Phos  3.2     06-11  Mg     1.6     06-13      Creatinine Trend: 0.80<--, 0.81<--, 0.88<--, 0.91<--, 0.78<--, 0.82<--    T(C): 36.9 (06-13-19 @ 06:15), Max: 36.9 (06-12-19 @ 17:36)  HR: 84 (06-13-19 @ 06:15) (69 - 110)  BP: 177/84 (06-13-19 @ 06:15) (139/68 - 177/84)  RR: 16 (06-13-19 @ 06:15) (16 - 17)  SpO2: 99% (06-13-19 @ 06:15) (98% - 99%)  Wt(kg): --      I&O's Summary    Cardiovascular:  S1S2 RRR, No JVD  Respiratory: Lungs clear to auscultation, normal effort  Gastrointestinal: Abdomen soft, ND, NT, +BS  Skin: Warm, dry, intact. No rash.  Musculoskeletal: Normal ROM, normal strength  Ext: No C/C/E B/L LE    DIAGNOSTIC DATA    TELEMETRY: AF 90's     < from: Transthoracic Echocardiogram (05.02.19 @ 12:39) >  CONCLUSIONS:  1. Mitral annular calcification, otherwise normal mitral  valve.  2. Calcified trileaflet aortic valve with normal opening.  3. Normal left ventricular internal dimensions and wall  thicknesses.  4. Endocardium not well visualized; grossly normal left  ventricular systolic function.  5. Normal right ventricular size and function. Device wire  is noted in the right heart.  6. Thickened pericardium with circumferential pericardial  effusion which is  moderate in size posterior to the left  ventricle and lateral to the left heart, the effusion is  small otherwise. No cardiac tamponade.  ------------------------------------------------------------------------  Confirmed on  5/2/2019 - 16:42:15 by FAY Valdes  ------------------------------    < end of copied text >      ASSESSMENT AND PLAN:    85 y.o. 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 Recently admitted for fall 2/2 syncope. Presents to ED c/o SOB, RIDDLE, LE edema for the past few days. Admitted now for management of acute on chronic heart failure, Afib with RVR.     -- ACS ruled out with serial CE  -- Recent echo as noted above   -- Continue diuresis, change to po Lasix daily starting tomorrow    -- Previous echo as noted above, repeat TTE pending  -- PPM - Few episodes of AFib with RVR up to 182 on 6/11 longest duration 8minutes  -- Continue lifelong AC with Eliquis, cont Atenolol  -- Sling to Left UE for Left proximal humerus fx, pain management PRN   -- further cardiac work up pending above

## 2019-06-13 NOTE — CONSULT NOTE ADULT - SUBJECTIVE AND OBJECTIVE BOX
85y Female consult for chronic L prox humerus fracture. Patient sustained the fracture 6 weeks ago and follows with Dr. Higginbotham who has been managing the fracture nonop in a sling. Patient was admitted for HF exacerbation and AFib with RVR. Denies issues with sling, denies any recent trauma. Denies numbness/tingling of the affected extremity. No other bone or joint complaints.    PAST MEDICAL & SURGICAL HISTORY:  Cerebrovascular accident (CVA)  OA (osteoarthritis)  YOVANI on CPAP  Thyroid nodule: followed by endocrinologist  UTI (urinary tract infection): frequent last was 1/15  HLD (hyperlipidemia)  HTN (hypertension)  Atrial fibrillation: dx 7/09 on coumadin  Cardiac pacemaker: placed 2009  Fracture: ORIF right ankle 1986  FHx: cholecystectomy: 1993 laparoscopic  S/P JAY-BSO: 40 years ago  Cataract: b/l lense implant  FHx: total knee replacement: left 2012    MEDICATIONS  (STANDING):  apixaban 5 milliGRAM(s) Oral every 12 hours  ATENolol  Tablet 100 milliGRAM(s) Oral daily  atorvastatin 10 milliGRAM(s) Oral at bedtime  calcium carbonate 1250 mG  + Vitamin D (OsCal 500 + D) 1 Tablet(s) Oral daily  dexamethasone/neomycin/polymyxin Ointment 1 Application(s) Left EYE four times a day  docusate sodium 100 milliGRAM(s) Oral two times a day  furosemide   Injectable 40 milliGRAM(s) IV Push daily  losartan 100 milliGRAM(s) Oral daily  ondansetron   Disintegrating Tablet 4 milliGRAM(s) Oral once  potassium chloride    Tablet ER 20 milliEquivalent(s) Oral daily  sodium chloride 0.9% lock flush 3 milliLiter(s) IV Push every 8 hours    MEDICATIONS  (PRN):  dibucaine 1% Ointment 1 Application(s) Topical three times a day PRN Hemorrhoid discomfort    OxyContin (Sedation/Somnol; Drowsiness)  sulfa drugs (Hives)      Physical Exam  T(C): 36.7 (06-13-19 @ 18:56), Max: 36.9 (06-13-19 @ 06:15)  HR: 84 (06-13-19 @ 18:56) (80 - 110)  BP: 146/80 (06-13-19 @ 18:56) (113/50 - 177/84)  RR: 16 (06-13-19 @ 18:56) (16 - 18)  SpO2: 99% (06-13-19 @ 18:56) (98% - 99%)  Wt(kg): --    Gen: NAD   LUE: skin intact, sling in place  AIN/PIN/U intact  SILT M/U/R  2+ radial pulses, cap refill < 2s    Imaging  X-ray: L shoulder: minimally displaced proximal humerus fracture     A/P: 84yo Female with 6 week old L proximal humerus fracture  - pain control  - elevate affected extremity  - NWB LUE in sling   - No acute orthopedic intervention  - Can resume management per Dr. Higginbotham on DC

## 2019-06-13 NOTE — CHART NOTE - NSCHARTNOTEFT_GEN_A_CORE
Pt c/o chest tightness with associated HR up to 130's at rest and 150's with ambulation.  Hx Afib on Eliquis and Atenolol.  Pt states she felt like this in the ER and her symptoms were relieved with Cardizem.      ICU Vital Signs Last 24 Hrs  T(C): 36.7 (12 Jun 2019 23:47), Max: 36.9 (12 Jun 2019 17:36)  T(F): 98.1 (12 Jun 2019 23:47), Max: 98.4 (12 Jun 2019 17:36)  HR: 110 (12 Jun 2019 23:47) (69 - 110)  BP: 155/72 (12 Jun 2019 23:47) (147/74 - 169/82)  BP(mean): --  ABP: --  ABP(mean): --  RR: 16 (12 Jun 2019 23:47) (16 - 17)  SpO2: 99% (12 Jun 2019 23:47) (98% - 100%)    A/P:  Oxygen NC PRN  Cardizem 10mg IV now x 1  Continue to monitor HR and symptoms

## 2019-06-13 NOTE — PROGRESS NOTE ADULT - ATTENDING COMMENTS
Patient seen and examined.  Agree with above.   -TTE with normal LV function and moderate pericardial effusion (with no echo or clinical evidence of tamponade)  -no further cardiac workup needed at this time  -dc home when euvolemic    Leroy Rice MD

## 2019-06-14 ENCOUNTER — TRANSCRIPTION ENCOUNTER (OUTPATIENT)
Age: 84
End: 2019-06-14

## 2019-06-14 VITALS
TEMPERATURE: 98 F | HEART RATE: 81 BPM | RESPIRATION RATE: 18 BRPM | DIASTOLIC BLOOD PRESSURE: 74 MMHG | OXYGEN SATURATION: 97 % | SYSTOLIC BLOOD PRESSURE: 119 MMHG

## 2019-06-14 LAB
ANION GAP SERPL CALC-SCNC: 13 MMO/L — SIGNIFICANT CHANGE UP (ref 7–14)
BUN SERPL-MCNC: 24 MG/DL — HIGH (ref 7–23)
CALCIUM SERPL-MCNC: 8.8 MG/DL — SIGNIFICANT CHANGE UP (ref 8.4–10.5)
CHLORIDE SERPL-SCNC: 100 MMOL/L — SIGNIFICANT CHANGE UP (ref 98–107)
CO2 SERPL-SCNC: 27 MMOL/L — SIGNIFICANT CHANGE UP (ref 22–31)
CREAT SERPL-MCNC: 0.81 MG/DL — SIGNIFICANT CHANGE UP (ref 0.5–1.3)
GLUCOSE SERPL-MCNC: 97 MG/DL — SIGNIFICANT CHANGE UP (ref 70–99)
HCT VFR BLD CALC: 45.9 % — HIGH (ref 34.5–45)
HGB BLD-MCNC: 14.1 G/DL — SIGNIFICANT CHANGE UP (ref 11.5–15.5)
MAGNESIUM SERPL-MCNC: 1.6 MG/DL — SIGNIFICANT CHANGE UP (ref 1.6–2.6)
MCHC RBC-ENTMCNC: 26.9 PG — LOW (ref 27–34)
MCHC RBC-ENTMCNC: 30.7 % — LOW (ref 32–36)
MCV RBC AUTO: 87.6 FL — SIGNIFICANT CHANGE UP (ref 80–100)
NRBC # FLD: 0 K/UL — SIGNIFICANT CHANGE UP (ref 0–0)
PLATELET # BLD AUTO: 346 K/UL — SIGNIFICANT CHANGE UP (ref 150–400)
PMV BLD: 12.1 FL — SIGNIFICANT CHANGE UP (ref 7–13)
POTASSIUM SERPL-MCNC: 3.7 MMOL/L — SIGNIFICANT CHANGE UP (ref 3.5–5.3)
POTASSIUM SERPL-SCNC: 3.7 MMOL/L — SIGNIFICANT CHANGE UP (ref 3.5–5.3)
RBC # BLD: 5.24 M/UL — HIGH (ref 3.8–5.2)
RBC # FLD: 14.7 % — HIGH (ref 10.3–14.5)
SODIUM SERPL-SCNC: 140 MMOL/L — SIGNIFICANT CHANGE UP (ref 135–145)
WBC # BLD: 12.07 K/UL — HIGH (ref 3.8–10.5)
WBC # FLD AUTO: 12.07 K/UL — HIGH (ref 3.8–10.5)

## 2019-06-14 PROCEDURE — 93306 TTE W/DOPPLER COMPLETE: CPT | Mod: 26

## 2019-06-14 RX ORDER — ONDANSETRON 8 MG/1
4 TABLET, FILM COATED ORAL EVERY 8 HOURS
Refills: 0 | Status: DISCONTINUED | OUTPATIENT
Start: 2019-06-14 | End: 2019-06-14

## 2019-06-14 RX ORDER — OXYCODONE AND ACETAMINOPHEN 5; 325 MG/1; MG/1
1 TABLET ORAL EVERY 6 HOURS
Refills: 0 | Status: DISCONTINUED | OUTPATIENT
Start: 2019-06-14 | End: 2019-06-14

## 2019-06-14 RX ORDER — POTASSIUM CHLORIDE 20 MEQ
20 PACKET (EA) ORAL ONCE
Refills: 0 | Status: COMPLETED | OUTPATIENT
Start: 2019-06-14 | End: 2019-06-14

## 2019-06-14 RX ORDER — FUROSEMIDE 40 MG
40 TABLET ORAL
Refills: 0 | Status: DISCONTINUED | OUTPATIENT
Start: 2019-06-14 | End: 2019-06-14

## 2019-06-14 RX ORDER — FUROSEMIDE 40 MG
1 TABLET ORAL
Qty: 60 | Refills: 0
Start: 2019-06-14 | End: 2019-07-13

## 2019-06-14 RX ORDER — CALAMINE AND ZINC OXIDE AND PHENOL 160; 10 MG/ML; MG/ML
1 LOTION TOPICAL ONCE
Refills: 0 | Status: COMPLETED | OUTPATIENT
Start: 2019-06-14 | End: 2019-06-14

## 2019-06-14 RX ORDER — DILTIAZEM HCL 120 MG
5 CAPSULE, EXT RELEASE 24 HR ORAL ONCE
Refills: 0 | Status: COMPLETED | OUTPATIENT
Start: 2019-06-14 | End: 2019-06-14

## 2019-06-14 RX ORDER — NEOMYCIN/POLYMYXIN B/DEXAMETHA 0.1 %
1 SUSPENSION, DROPS(FINAL DOSAGE FORM)(ML) OPHTHALMIC (EYE)
Qty: 0 | Refills: 0 | DISCHARGE
Start: 2019-06-14

## 2019-06-14 RX ORDER — MAGNESIUM OXIDE 400 MG ORAL TABLET 241.3 MG
400 TABLET ORAL
Refills: 0 | Status: DISCONTINUED | OUTPATIENT
Start: 2019-06-14 | End: 2019-06-14

## 2019-06-14 RX ORDER — DIBUCAINE 1 %
1 OINTMENT (GRAM) RECTAL
Qty: 0 | Refills: 0 | DISCHARGE
Start: 2019-06-14

## 2019-06-14 RX ADMIN — SODIUM CHLORIDE 3 MILLILITER(S): 9 INJECTION INTRAMUSCULAR; INTRAVENOUS; SUBCUTANEOUS at 05:25

## 2019-06-14 RX ADMIN — CALAMINE AND ZINC OXIDE AND PHENOL 1 APPLICATION(S): 160; 10 LOTION TOPICAL at 01:51

## 2019-06-14 RX ADMIN — OXYCODONE AND ACETAMINOPHEN 1 TABLET(S): 5; 325 TABLET ORAL at 12:40

## 2019-06-14 RX ADMIN — Medication 1 TABLET(S): at 13:04

## 2019-06-14 RX ADMIN — Medication 5 MILLIGRAM(S): at 01:50

## 2019-06-14 RX ADMIN — LOSARTAN POTASSIUM 100 MILLIGRAM(S): 100 TABLET, FILM COATED ORAL at 05:27

## 2019-06-14 RX ADMIN — ONDANSETRON 4 MILLIGRAM(S): 8 TABLET, FILM COATED ORAL at 09:58

## 2019-06-14 RX ADMIN — Medication 100 MILLIGRAM(S): at 05:27

## 2019-06-14 RX ADMIN — ONDANSETRON 4 MILLIGRAM(S): 8 TABLET, FILM COATED ORAL at 00:26

## 2019-06-14 RX ADMIN — MAGNESIUM OXIDE 400 MG ORAL TABLET 400 MILLIGRAM(S): 241.3 TABLET ORAL at 13:04

## 2019-06-14 RX ADMIN — MAGNESIUM OXIDE 400 MG ORAL TABLET 400 MILLIGRAM(S): 241.3 TABLET ORAL at 11:48

## 2019-06-14 RX ADMIN — APIXABAN 5 MILLIGRAM(S): 2.5 TABLET, FILM COATED ORAL at 05:27

## 2019-06-14 RX ADMIN — Medication 20 MILLIEQUIVALENT(S): at 13:05

## 2019-06-14 RX ADMIN — ATENOLOL 100 MILLIGRAM(S): 25 TABLET ORAL at 05:27

## 2019-06-14 RX ADMIN — Medication 1 APPLICATION(S): at 05:27

## 2019-06-14 RX ADMIN — OXYCODONE AND ACETAMINOPHEN 1 TABLET(S): 5; 325 TABLET ORAL at 11:47

## 2019-06-14 RX ADMIN — SODIUM CHLORIDE 3 MILLILITER(S): 9 INJECTION INTRAMUSCULAR; INTRAVENOUS; SUBCUTANEOUS at 13:05

## 2019-06-14 RX ADMIN — Medication 20 MILLIEQUIVALENT(S): at 11:49

## 2019-06-14 RX ADMIN — Medication 1 APPLICATION(S): at 11:49

## 2019-06-14 NOTE — CHART NOTE - NSCHARTNOTEFT_GEN_A_CORE
Notified by nurse that patient's heart rate was 118. Patient was given Cardizem 5 mg IVP. Heart rate decreased to 82. BP before Cardizem was administered was 140/73. After Cardizem administer blood pressure 169/82. Will continue to monitor and will recheck in half an hour.

## 2019-06-14 NOTE — PROGRESS NOTE ADULT - ATTENDING COMMENTS
Patient seen and examined, agree with above assessment and plan as transcribed above.    - cont eliquis  - S/P episode of acute on chronic diastolic CHF, increase to lasix 40 mg PO BID  - Instructed pt for daily weights  - D/C today f/u with Dr. De La Rosa Primary cardio    Herman Shoemaker MD, [facility]  BEEPER [phone]

## 2019-06-14 NOTE — DISCHARGE NOTE NURSING/CASE MANAGEMENT/SOCIAL WORK - NSDCDPATPORTLINK_GEN_ALL_CORE
You can access the Apps [facility] Patient Portal, offered by [facility], by registering with the following website: [email or web address] [place]

## 2019-06-14 NOTE — PROGRESS NOTE ADULT - SUBJECTIVE AND OBJECTIVE BOX
SUBJECTIVE: no pain or SOB. All other review of systems negative.       apixaban 5 milliGRAM(s) Oral every 12 hours  ATENolol  Tablet 100 milliGRAM(s) Oral daily  atorvastatin 10 milliGRAM(s) Oral at bedtime  calcium carbonate 1250 mG  + Vitamin D (OsCal 500 + D) 1 Tablet(s) Oral daily  dexamethasone/neomycin/polymyxin Ointment 1 Application(s) Left EYE four times a day  dibucaine 1% Ointment 1 Application(s) Topical three times a day PRN  docusate sodium 100 milliGRAM(s) Oral two times a day  furosemide    Tablet 40 milliGRAM(s) Oral two times a day  losartan 100 milliGRAM(s) Oral daily  magnesium oxide 400 milliGRAM(s) Oral three times a day with meals  ondansetron   Disintegrating Tablet 4 milliGRAM(s) Oral every 8 hours PRN  oxyCODONE    5 mG/acetaminophen 325 mG 1 Tablet(s) Oral every 6 hours PRN  potassium chloride    Tablet ER 20 milliEquivalent(s) Oral daily  sodium chloride 0.9% lock flush 3 milliLiter(s) IV Push every 8 hours                            14.1   12.07 )-----------( 346      ( 14 Jun 2019 06:00 )             45.9     Hemoglobin: 14.1 g/dL (06-14 @ 06:00)  Hemoglobin: 13.8 g/dL (06-13 @ 06:41)  Hemoglobin: 12.6 g/dL (06-12 @ 07:25)  Hemoglobin: 13.6 g/dL (06-11 @ 02:43)    06-14    140  |  100  |  24<H>  ----------------------------<  97  3.7   |  27  |  0.81    Ca    8.8      14 Jun 2019 06:00  Mg     1.6     06-14      Creatinine Trend: 0.81<--, 0.80<--, 0.81<--, 0.88<--, 0.91<--, 0.78<--    COAGS:           T(C): 36.4 (06-14-19 @ 14:00), Max: 36.7 (06-13-19 @ 18:56)  HR: 81 (06-14-19 @ 14:00) (81 - 115)  BP: 119/74 (06-14-19 @ 14:00) (119/74 - 169/82)  RR: 18 (06-14-19 @ 14:00) (16 - 18)  SpO2: 97% (06-14-19 @ 14:00) (97% - 100%)  Wt(kg): --    I&O's Summary    Cardiovascular:  S1S2 RRR, No JVD  Respiratory: Lungs clear to auscultation, normal effort  Gastrointestinal: Abdomen soft, ND, NT, +BS  Skin: Warm, dry, intact. No rash.  Musculoskeletal: Normal ROM, normal strength  Ext: No C/C/E B/L LE    DIAGNOSTIC DATA    TELEMETRY: AF 90's     < from: Transthoracic Echocardiogram (05.02.19 @ 12:39) >  CONCLUSIONS:  1. Mitral annular calcification, otherwise normal mitral  valve.  2. Calcified trileaflet aortic valve with normal opening.  3. Normal left ventricular internal dimensions and wall  thicknesses.  4. Endocardium not well visualized; grossly normal left  ventricular systolic function.  5. Normal right ventricular size and function. Device wire  is noted in the right heart.  6. Thickened pericardium with circumferential pericardial  effusion which is  moderate in size posterior to the left  ventricle and lateral to the left heart, the effusion is  small otherwise. No cardiac tamponade.  ------------------------------------------------------------------------  Confirmed on  5/2/2019 - 16:42:15 by FAY Valdes  ------------------------------    < end of copied text >      ASSESSMENT AND PLAN:    85 y.o. 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 Recently admitted for fall 2/2 syncope. Presents to ED c/o SOB, RIDDLE, LE edema for the past few days. Admitted now for management of acute on chronic heart failure, Afib with RVR.     -- ACS ruled out with serial CE, recent echo as noted above   -- Continue diuresis, lasix changed to po w/increase in dose to lasix 40 mg BID  -- PPM - Few episodes of AFib with RVR up to 182 on 6/11 longest duration 8minutes  -- Continue lifelong AC with Eliquis, cont Atenolol  -- Sling to Left UE for Left proximal humerus fx, pain management PRN   -- no further cardiac work at this time, pt to be d/c home with outpatient follow up with cardiology Dr. Saul De La Rosa

## 2019-06-14 NOTE — DISCHARGE NOTE PROVIDER - NSDCCPCAREPLAN_GEN_ALL_CORE_FT
PRINCIPAL DISCHARGE DIAGNOSIS  Diagnosis: Acute on chronic diastolic heart failure  Assessment and Plan of Treatment: Low salt diet, fluid restriction to 1500 ml daily, monitor your fluid intake and weight daily, follow up with your Cardiologist in 1 week         SECONDARY DISCHARGE DIAGNOSES  Diagnosis: Atrial fibrillation  Assessment and Plan of Treatment: Continue to take Eliquis for anticoagulation and atenolol for rate control       Diagnosis: Hypertension  Assessment and Plan of Treatment: Low sodium and fat diet, continue anti-hypertensive medications, and follow up with primary care physician.      Diagnosis: Humeral fracture  Assessment and Plan of Treatment: nonweight bearing left upper extremity, continue sling, follwo up with your outpatient orthopedic

## 2019-06-14 NOTE — DISCHARGE NOTE PROVIDER - CARE PROVIDER_API CALL
Saul De La Rosa)  Cardiovascular Disease; Internal Medicine  [address] [facility] [address], [address]  [place] [address]  Phone: [phone]  Fax: [phone]  Follow Up Time: 1 week    Arabella Parra)  Internal Medicine  [address], [place] [id number]  Phone: [phone]  Fax: [phone]  Follow Up Time:

## 2019-06-14 NOTE — DISCHARGE NOTE PROVIDER - HOSPITAL COURSE
85 y.o. 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 Recently admitted for fall 2/2 syncope. Presents to ED c/o SOB, RIDDLE, LE edema for the past few days. Admitted now for management of acute on chronic heart failure, Afib with RVR.         1. ACS ruled out with serial CE    TTE shows grossly normal left ventricular systolic function. Normal right ventricular size and function.  Thickened pericardium with circumferential pericardial    effusion which is  moderate in size posterior to the left ventricle and lateral to the left heart, the effusion is small otherwise. No cardiac tamponade.    -Patient was diuresis and now, lasix changed to po w/increase in dose to lasix 40 mg BID    -PPM interrogation- Few episodes of AFib with RVR up to 182 on 6/11 longest duration 8minutes        2. Afib     -Continue lifelong AC with Eliquis, cont Atenolol    -no further cardiac work at this time, pt to be d/c home with outpatient follow up with cardiology Dr. Saul De La Rosa         3. Left Humeral Fx     -Sling to Left UE for Left proximal humerus fx, pain management PRN, no acute ortho intervention         Patient stable and cleared for d/c home d/w Dr. Shoemaker.

## 2019-06-17 RX ORDER — SILVER SULFADIAZINE 10 MG/G
1 CREAM TOPICAL
Qty: 50 | Refills: 0 | Status: DISCONTINUED | COMMUNITY
Start: 2018-07-31 | End: 2019-06-17

## 2019-06-17 RX ORDER — CEPHALEXIN 250 MG/1
250 CAPSULE ORAL
Qty: 20 | Refills: 0 | Status: DISCONTINUED | COMMUNITY
Start: 2019-03-25 | End: 2019-06-17

## 2019-06-20 RX ORDER — POTASSIUM CHLORIDE 1500 MG/1
20 TABLET, EXTENDED RELEASE ORAL
Qty: 90 | Refills: 0 | Status: DISCONTINUED | COMMUNITY
Start: 2019-02-26 | End: 2019-06-20

## 2019-06-20 RX ORDER — POTASSIUM CHLORIDE 1500 MG/1
20 TABLET, FILM COATED, EXTENDED RELEASE ORAL
Qty: 90 | Refills: 0 | Status: DISCONTINUED | COMMUNITY
Start: 2018-08-17 | End: 2019-06-20

## 2019-06-20 RX ORDER — FUROSEMIDE 40 MG/1
40 TABLET ORAL TWICE DAILY
Refills: 0 | Status: DISCONTINUED | COMMUNITY
Start: 2019-01-30 | End: 2019-06-20

## 2019-06-21 ENCOUNTER — APPOINTMENT (OUTPATIENT)
Age: 84
End: 2019-06-21
Payer: MEDICARE

## 2019-06-21 VITALS
BODY MASS INDEX: 29.3 KG/M2 | RESPIRATION RATE: 16 BRPM | DIASTOLIC BLOOD PRESSURE: 70 MMHG | HEART RATE: 82 BPM | SYSTOLIC BLOOD PRESSURE: 130 MMHG

## 2019-06-21 DIAGNOSIS — I48.0 PAROXYSMAL ATRIAL FIBRILLATION: ICD-10-CM

## 2019-06-21 DIAGNOSIS — M43.16 SPONDYLOLISTHESIS, LUMBAR REGION: ICD-10-CM

## 2019-06-21 PROCEDURE — 99349 HOME/RES VST EST MOD MDM 40: CPT

## 2019-06-21 RX ORDER — POTASSIUM CHLORIDE 750 MG/1
10 TABLET, FILM COATED, EXTENDED RELEASE ORAL
Qty: 30 | Refills: 0 | Status: DISCONTINUED | COMMUNITY
Start: 2019-06-08 | End: 2019-06-21

## 2019-06-21 RX ORDER — ATENOLOL 100 MG/1
100 TABLET ORAL
Qty: 30 | Refills: 0 | Status: DISCONTINUED | COMMUNITY
Start: 2019-06-08 | End: 2019-06-21

## 2019-06-21 NOTE — COUNSELING
[Fall prevention counseling provided] : fall prevention  [Low Salt Diet] : Low salt diet [Adequate lighting] : Adequate lighting [Use recommended devices] : Use recommended devices [None] : None [Needs reinforcement, provided] : Patient needs reinforcement on understanding lifestyle changes and  the steps needed to achieve self management goals and reinforcement was provided

## 2019-06-21 NOTE — HISTORY OF PRESENT ILLNESS
[Family Member] : family member [FreeTextEntry1] : "Hospital Follow up for CHF: Pt is homebound due to weakness, limited endurance. Seen in home for f/u TCM services.  Pt has been off lasix x 2 days, started aldactone  by PCP ^ high costs of ethacrynic acid. Pt d/c from lasix ^ suspecte allergy. Spoke with Dr. Parra on 6/20 re. bloodwork, no underlying pathology per labs. Pt can trial aldactone and monitor complaints of itchiness. Pt with gradual weight gain since d/c off lasix 169.8 at discharge --> 164.8 -->170 lbs today.  Daughter reports some difficulty adhering to low sodium diet-  MOW application in process. Has some SOB with exertion. Denies chest pain, palpitations, N/V, orthopnea.  On percocet for lower back pain which she was given for humerus fracture.   Diagnosed with lumbar spondylosis last year and referred for outpt therapy which patient has been unable to consistently attend ^ new CVA, humerus fx, and recent hospitalization for CHF this year per daughter. [de-identified] : \par Hospital Course: 85 y.o. 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 Recently admitted for fall 2/2 syncope. Presents to ED c/o SOB, RIDDLE, LE edema for the past few days. Admitted now for management of acute on chronic heart failure, Afib with RVR. \par 1. ACS ruled out with serial CE\par TTE shows grossly normal left ventricular systolic function. Normal right ventricular size and function. Thickened pericardium with circumferential pericardial\par effusion which is moderate in size posterior to the left ventricle and lateral to the left heart, the effusion is small otherwise. No cardiac tamponade.\par -Patient was diuresis and now, lasix changed to po w/increase in dose to lasix 40 mg BID\par -PPM interrogation- Few episodes of AFib with RVR up to 182 on 6/11 longest duration 8minutes\par 2. Afib \par -Continue lifelong AC with Eliquis, cont Atenolol\par -no further cardiac work at this time, pt to be d/c home with outpatient follow up with cardiology Dr. Saul De La Rosa \par 3. Left Humeral Fx \par -Sling to Left UE for Left proximal humerus fx, pain management PRN, no acute ortho intervention

## 2019-06-21 NOTE — PHYSICAL EXAM
[No Acute Distress] : no acute distress [Normal Sclera/Conjunctiva] : normal sclera/conjunctiva [PERRL] : pupils equal round and reactive to light [No JVD] : no jugular venous distention [Supple] : supple [No Respiratory Distress] : no respiratory distress  [Clear to Auscultation] : lungs were clear to auscultation bilaterally [No Accessory Muscle Use] : no accessory muscle use [Normal Rate] : normal rate  [Normal S1, S2] : normal S1 and S2 [Regular Rhythm] : with a regular rhythm [No Murmur] : no murmur heard [Pedal Pulses Present] : the pedal pulses are present [Soft] : abdomen soft [Non Tender] : non-tender [Normal Bowel Sounds] : normal bowel sounds [Non-distended] : non-distended [No Focal Deficits] : no focal deficits [Alert and Oriented x3] : oriented to person, place, and time [de-identified] : +1 pedal edema [de-identified] : limited ROM LUE ^ humerus fx

## 2019-06-24 RX ORDER — SPIRONOLACTONE 25 MG/1
25 TABLET ORAL
Qty: 30 | Refills: 0 | Status: DISCONTINUED | COMMUNITY
Start: 2019-06-18 | End: 2019-06-24

## 2019-07-01 ENCOUNTER — APPOINTMENT (OUTPATIENT)
Dept: ORTHOPEDIC SURGERY | Facility: CLINIC | Age: 84
End: 2019-07-01
Payer: MEDICARE

## 2019-07-01 PROCEDURE — 73030 X-RAY EXAM OF SHOULDER: CPT | Mod: LT

## 2019-07-01 PROCEDURE — 99213 OFFICE O/P EST LOW 20 MIN: CPT

## 2019-07-01 NOTE — DISCUSSION/SUMMARY
[de-identified] : The patient wishes to proceed with at-home physical therapy. A script was given. \par Gentle range of motion exercises were encouraged, as tolerated. \par NSAIDs as tolerated.\par Follow up in 3 months. Xrays upon return.

## 2019-07-01 NOTE — ADDENDUM
[FreeTextEntry1] : I, Marily Bowers wrote this note acting as a scribe for Dr. Arsalan Higginbotham on Jul 01, 2019.

## 2019-07-01 NOTE — PHYSICAL EXAM
[de-identified] : Patient is WDWN, alert, and in no acute distress. Breathing is unlabored. She is grossly oriented to person, place, and time. \par \par Left Shoulder: \par Inspection/ Palpation: No tenderness to palpation. \par Range of Motion: Active flexion and extension without pain. \par Strength: forward elevation, internal rotation, external rotation, adduction, and abduction are 5/5. \par Stability: no joint instability on provocative testing.  [de-identified] : AP, transcapula, and axillary views of the left shoulder were obtained and revealed an impacted proximal left humeral surgical neck fracture with associated internal rotation and slight posterior subluxation of humeral head. Fracture is fully healed.

## 2019-07-01 NOTE — HISTORY OF PRESENT ILLNESS
[de-identified] : Pt is an 86 y/o female who presents for a followup visit involving the left arm after a fall that occurred on 04/29/2019.  She tripped and fell over a curb in a parking lot.  She fell on her left side.  She was seen at [facility] ED on the same day where xrays were taken and reveled an impacted proximal left humeral surgical neck fracture. She was admitted for 4 days and then discharged to [facility]. She has since been discharged and is now home. She was treated with a sling which she has d/c use of 1 week ago. It does not hurt to move her arm.  She is not currently taking anything for pain. She would like to be cleared for at-home PT due to ambulatory difficulties from other medical conditions. She presents today with her daughter for repeat xrays. \par \par Patient has a hx of Huseyin-Barr.

## 2019-07-02 ENCOUNTER — APPOINTMENT (OUTPATIENT)
Dept: VASCULAR SURGERY | Facility: CLINIC | Age: 84
End: 2019-07-02
Payer: MEDICARE

## 2019-07-02 VITALS
HEIGHT: 64 IN | HEART RATE: 80 BPM | TEMPERATURE: 97.5 F | SYSTOLIC BLOOD PRESSURE: 137 MMHG | BODY MASS INDEX: 29.02 KG/M2 | DIASTOLIC BLOOD PRESSURE: 76 MMHG | WEIGHT: 170 LBS | OXYGEN SATURATION: 97 %

## 2019-07-02 DIAGNOSIS — R60.0 LOCALIZED EDEMA: ICD-10-CM

## 2019-07-02 PROCEDURE — 99213 OFFICE O/P EST LOW 20 MIN: CPT

## 2019-07-03 ENCOUNTER — APPOINTMENT (OUTPATIENT)
Age: 84
End: 2019-07-03
Payer: MEDICARE

## 2019-07-03 VITALS
OXYGEN SATURATION: 98 % | RESPIRATION RATE: 16 BRPM | SYSTOLIC BLOOD PRESSURE: 130 MMHG | HEART RATE: 80 BPM | DIASTOLIC BLOOD PRESSURE: 90 MMHG

## 2019-07-03 PROCEDURE — 99348 HOME/RES VST EST LOW MDM 30: CPT

## 2019-07-03 RX ORDER — OXYCODONE AND ACETAMINOPHEN 5; 325 MG/1; MG/1
5-325 TABLET ORAL
Refills: 0 | Status: DISCONTINUED | COMMUNITY
Start: 2019-06-20 | End: 2019-07-03

## 2019-07-03 RX ORDER — ETHACRYNIC ACID 25 MG/1
25 TABLET ORAL DAILY
Refills: 0 | Status: DISCONTINUED | COMMUNITY
Start: 2019-06-24 | End: 2019-07-03

## 2019-07-03 RX ORDER — DILTIAZEM HYDROCHLORIDE 180 MG/1
180 CAPSULE, EXTENDED RELEASE ORAL DAILY
Qty: 0 | Refills: 0 | Status: DISCONTINUED | COMMUNITY
Start: 2019-06-21 | End: 2019-07-03

## 2019-07-03 NOTE — HISTORY OF PRESENT ILLNESS
[Family Member] : family member [FreeTextEntry1] : "Follow up for CHF/ weakness: Pt is homebound due to weakness, limited endurance. Seen in home for f/u c/o weakness- daughter unsure if pt should go to ED. Was seen by vascular, ortho and allergist yesterday. Pt felt fine but had difficulty sleeping, had episode of SOB but resolved with no recurrance. Weight has been stable and unchanged on triamterene but remains with pedal edema. Pt is A& O x3,  smiling, comfortable in recliner.  Denies Dizziness, headache, SOB, chest pain, palpitations, N/V/D/dysuria. No changes to PO intake/appetite.  [de-identified] : \par Hospital Course: 85 y.o. 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 Recently admitted for fall 2/2 syncope. Presents to ED c/o SOB, RIDDLE, LE edema for the past few days. Admitted now for management of acute on chronic heart failure, Afib with RVR. \par 1. ACS ruled out with serial CE\par TTE shows grossly normal left ventricular systolic function. Normal right ventricular size and function. Thickened pericardium with circumferential pericardial\par effusion which is moderate in size posterior to the left ventricle and lateral to the left heart, the effusion is small otherwise. No cardiac tamponade.\par -Patient was diuresis and now, lasix changed to po w/increase in dose to lasix 40 mg BID\par -PPM interrogation- Few episodes of AFib with RVR up to 182 on 6/11 longest duration 8minutes\par 2. Afib \par -Continue lifelong AC with Eliquis, cont Atenolol\par -no further cardiac work at this time, pt to be d/c home with outpatient follow up with cardiology Dr. Saul De La Rosa \par 3. Left Humeral Fx \par -Sling to Left UE for Left proximal humerus fx, pain management PRN, no acute ortho intervention

## 2019-07-03 NOTE — PHYSICAL EXAM
[Well-Appearing] : well-appearing [No Acute Distress] : no acute distress [No JVD] : no jugular venous distention [Supple] : supple [No Respiratory Distress] : no respiratory distress  [No Accessory Muscle Use] : no accessory muscle use [Regular Rhythm] : with a regular rhythm [Clear to Auscultation] : lungs were clear to auscultation bilaterally [Normal Rate] : normal rate  [Normal S1, S2] : normal S1 and S2 [No Murmur] : no murmur heard [Pedal Pulses Present] : the pedal pulses are present [Soft] : abdomen soft [Non Tender] : non-tender [Non-distended] : non-distended [Normal Bowel Sounds] : normal bowel sounds [No Rash] : no rash [Alert and Oriented x3] : oriented to person, place, and time [de-identified] : b/l +1 pedal edema [No Focal Deficits] : no focal deficits

## 2019-07-22 ENCOUNTER — NON-APPOINTMENT (OUTPATIENT)
Age: 84
End: 2019-07-22

## 2019-07-22 ENCOUNTER — APPOINTMENT (OUTPATIENT)
Dept: OPHTHALMOLOGY | Facility: CLINIC | Age: 84
End: 2019-07-22
Payer: MEDICARE

## 2019-07-22 PROCEDURE — 66821 AFTER CATARACT LASER SURGERY: CPT | Mod: RT

## 2019-07-25 ENCOUNTER — RESULT CHARGE (OUTPATIENT)
Age: 84
End: 2019-07-25

## 2019-07-26 ENCOUNTER — APPOINTMENT (OUTPATIENT)
Dept: PULMONOLOGY | Facility: CLINIC | Age: 84
End: 2019-07-26
Payer: MEDICARE

## 2019-07-26 VITALS
BODY MASS INDEX: 28 KG/M2 | SYSTOLIC BLOOD PRESSURE: 138 MMHG | DIASTOLIC BLOOD PRESSURE: 79 MMHG | HEART RATE: 79 BPM | WEIGHT: 164 LBS | HEIGHT: 64 IN | OXYGEN SATURATION: 96 % | RESPIRATION RATE: 15 BRPM | TEMPERATURE: 97.3 F

## 2019-07-26 DIAGNOSIS — E78.5 HYPERLIPIDEMIA, UNSPECIFIED: ICD-10-CM

## 2019-07-26 PROCEDURE — 88738 HGB QUANT TRANSCUTANEOUS: CPT

## 2019-07-26 PROCEDURE — 99205 OFFICE O/P NEW HI 60 MIN: CPT | Mod: 25

## 2019-07-26 PROCEDURE — 94060 EVALUATION OF WHEEZING: CPT

## 2019-07-26 PROCEDURE — 94729 DIFFUSING CAPACITY: CPT

## 2019-07-26 PROCEDURE — 94727 GAS DIL/WSHOT DETER LNG VOL: CPT

## 2019-07-28 NOTE — DISCUSSION/SUMMARY
[FreeTextEntry1] : Jesusita is a patient with dyspnea of unclear etiology at this point, rule out deconditioning, rule out stress/anxiety, etc.

## 2019-07-28 NOTE — PHYSICAL EXAM
[General Appearance - Well Developed] : well developed [General Appearance - Well Nourished] : well nourished [Normal Appearance] : normal appearance [Well Groomed] : well groomed [General Appearance - In No Acute Distress] : no acute distress [No Deformities] : no deformities [Normal Conjunctiva] : the conjunctiva exhibited no abnormalities [Eyelids - No Xanthelasma] : the eyelids demonstrated no xanthelasmas [Normal Oropharynx] : normal oropharynx [Heart Sounds] : normal S1 and S2 [Heart Rate And Rhythm] : heart rate and rhythm were normal [Murmurs] : no murmurs present [Respiration, Rhythm And Depth] : normal respiratory rhythm and effort [Exaggerated Use Of Accessory Muscles For Inspiration] : no accessory muscle use [Auscultation Breath Sounds / Voice Sounds] : lungs were clear to auscultation bilaterally [Abdomen Tenderness] : non-tender [Abdomen Soft] : soft [Abdomen Mass (___ Cm)] : no abdominal mass palpated [Petechial Hemorrhages (___cm)] : no petechial hemorrhages [Cyanosis, Localized] : no localized cyanosis [Nail Clubbing] : no clubbing of the fingernails [] : no ischemic changes

## 2019-07-28 NOTE — PROCEDURE
[FreeTextEntry1] : CT of coronary arteries showed areas of basilar atelectasis. Moderate pericardial effusion. Liver cysts.\par \par Lower extremity Doppler showed no evidence of deep venous thrombosis.\par \par Pulmonary function test: Lung volume showed moderate restrictive defect; spirometry showed suggestive evidence of moderate restrictive defect with no improvement post bronchodilator diffusion showed moderate impairment

## 2019-07-28 NOTE — ASSESSMENT
[FreeTextEntry1] : I ordered cardiopulmonary exercise test to elucidate the etiology of her dyspnea. I advised her to return to the office for follow up visit after the cardiopulmonary stress test has been performed.

## 2019-08-08 ENCOUNTER — APPOINTMENT (OUTPATIENT)
Dept: OPHTHALMOLOGY | Facility: CLINIC | Age: 84
End: 2019-08-08
Payer: MEDICARE

## 2019-08-08 ENCOUNTER — NON-APPOINTMENT (OUTPATIENT)
Age: 84
End: 2019-08-08

## 2019-08-08 PROCEDURE — 99024 POSTOP FOLLOW-UP VISIT: CPT

## 2019-08-27 ENCOUNTER — EMERGENCY (EMERGENCY)
Facility: HOSPITAL | Age: 84
LOS: 1 days | Discharge: HOME | End: 2019-08-27
Attending: EMERGENCY MEDICINE | Admitting: EMERGENCY MEDICINE
Payer: MEDICARE

## 2019-08-27 VITALS
OXYGEN SATURATION: 96 % | DIASTOLIC BLOOD PRESSURE: 65 MMHG | HEART RATE: 80 BPM | TEMPERATURE: 98 F | RESPIRATION RATE: 17 BRPM | SYSTOLIC BLOOD PRESSURE: 113 MMHG

## 2019-08-27 DIAGNOSIS — Z82.8 FAMILY HISTORY OF OTHER DISABILITIES AND CHRONIC DISEASES LEADING TO DISABLEMENT, NOT ELSEWHERE CLASSIFIED: Chronic | ICD-10-CM

## 2019-08-27 DIAGNOSIS — T14.8 OTHER INJURY OF UNSPECIFIED BODY REGION: Chronic | ICD-10-CM

## 2019-08-27 DIAGNOSIS — Z90.710 ACQUIRED ABSENCE OF BOTH CERVIX AND UTERUS: Chronic | ICD-10-CM

## 2019-08-27 DIAGNOSIS — Z84.89 FAMILY HISTORY OF OTHER SPECIFIED CONDITIONS: Chronic | ICD-10-CM

## 2019-08-27 DIAGNOSIS — H26.9 UNSPECIFIED CATARACT: Chronic | ICD-10-CM

## 2019-08-27 DIAGNOSIS — Z95.0 PRESENCE OF CARDIAC PACEMAKER: Chronic | ICD-10-CM

## 2019-08-27 PROCEDURE — 93010 ELECTROCARDIOGRAM REPORT: CPT

## 2019-08-27 PROCEDURE — 99285 EMERGENCY DEPT VISIT HI MDM: CPT | Mod: 25,GC

## 2019-08-27 NOTE — ED ADULT TRIAGE NOTE - CHIEF COMPLAINT QUOTE
Pt arrives to ED via EMS from home c/o palpitations and SOB starting at 9pm.   Upon arrival of EMS symptoms had subsided.  Pt has hx of CHF.  Pt denies CP.  EKG in triage.

## 2019-08-28 ENCOUNTER — APPOINTMENT (OUTPATIENT)
Dept: OPHTHALMOLOGY | Facility: CLINIC | Age: 84
End: 2019-08-28

## 2019-08-28 VITALS
RESPIRATION RATE: 16 BRPM | SYSTOLIC BLOOD PRESSURE: 118 MMHG | HEART RATE: 81 BPM | DIASTOLIC BLOOD PRESSURE: 76 MMHG | OXYGEN SATURATION: 98 % | TEMPERATURE: 98 F

## 2019-08-28 LAB
ALBUMIN SERPL ELPH-MCNC: 3.6 G/DL — SIGNIFICANT CHANGE UP (ref 3.3–5)
ALP SERPL-CCNC: 56 U/L — SIGNIFICANT CHANGE UP (ref 40–120)
ALT FLD-CCNC: 22 U/L — SIGNIFICANT CHANGE UP (ref 4–33)
ANION GAP SERPL CALC-SCNC: 16 MMO/L — HIGH (ref 7–14)
AST SERPL-CCNC: 23 U/L — SIGNIFICANT CHANGE UP (ref 4–32)
BASOPHILS # BLD AUTO: 0.09 K/UL — SIGNIFICANT CHANGE UP (ref 0–0.2)
BASOPHILS NFR BLD AUTO: 0.7 % — SIGNIFICANT CHANGE UP (ref 0–2)
BILIRUB SERPL-MCNC: 1.2 MG/DL — SIGNIFICANT CHANGE UP (ref 0.2–1.2)
BUN SERPL-MCNC: 44 MG/DL — HIGH (ref 7–23)
CALCIUM SERPL-MCNC: 9.6 MG/DL — SIGNIFICANT CHANGE UP (ref 8.4–10.5)
CHLORIDE SERPL-SCNC: 99 MMOL/L — SIGNIFICANT CHANGE UP (ref 98–107)
CO2 SERPL-SCNC: 26 MMOL/L — SIGNIFICANT CHANGE UP (ref 22–31)
CREAT SERPL-MCNC: 1.13 MG/DL — SIGNIFICANT CHANGE UP (ref 0.5–1.3)
EOSINOPHIL # BLD AUTO: 0.25 K/UL — SIGNIFICANT CHANGE UP (ref 0–0.5)
EOSINOPHIL NFR BLD AUTO: 1.9 % — SIGNIFICANT CHANGE UP (ref 0–6)
GLUCOSE SERPL-MCNC: 109 MG/DL — HIGH (ref 70–99)
HCT VFR BLD CALC: 42.8 % — SIGNIFICANT CHANGE UP (ref 34.5–45)
HGB BLD-MCNC: 13.9 G/DL — SIGNIFICANT CHANGE UP (ref 11.5–15.5)
IMM GRANULOCYTES NFR BLD AUTO: 0.6 % — SIGNIFICANT CHANGE UP (ref 0–1.5)
LYMPHOCYTES # BLD AUTO: 17.8 % — SIGNIFICANT CHANGE UP (ref 13–44)
LYMPHOCYTES # BLD AUTO: 2.29 K/UL — SIGNIFICANT CHANGE UP (ref 1–3.3)
MAGNESIUM SERPL-MCNC: 1.7 MG/DL — SIGNIFICANT CHANGE UP (ref 1.6–2.6)
MCHC RBC-ENTMCNC: 27.3 PG — SIGNIFICANT CHANGE UP (ref 27–34)
MCHC RBC-ENTMCNC: 32.5 % — SIGNIFICANT CHANGE UP (ref 32–36)
MCV RBC AUTO: 83.9 FL — SIGNIFICANT CHANGE UP (ref 80–100)
MONOCYTES # BLD AUTO: 0.99 K/UL — HIGH (ref 0–0.9)
MONOCYTES NFR BLD AUTO: 7.7 % — SIGNIFICANT CHANGE UP (ref 2–14)
NEUTROPHILS # BLD AUTO: 9.16 K/UL — HIGH (ref 1.8–7.4)
NEUTROPHILS NFR BLD AUTO: 71.3 % — SIGNIFICANT CHANGE UP (ref 43–77)
NRBC # FLD: 0 K/UL — SIGNIFICANT CHANGE UP (ref 0–0)
NT-PROBNP SERPL-SCNC: 716.5 PG/ML — SIGNIFICANT CHANGE UP
PHOSPHATE SERPL-MCNC: 4.7 MG/DL — HIGH (ref 2.5–4.5)
PLATELET # BLD AUTO: 306 K/UL — SIGNIFICANT CHANGE UP (ref 150–400)
PMV BLD: 12 FL — SIGNIFICANT CHANGE UP (ref 7–13)
POTASSIUM SERPL-MCNC: 4.1 MMOL/L — SIGNIFICANT CHANGE UP (ref 3.5–5.3)
POTASSIUM SERPL-SCNC: 4.1 MMOL/L — SIGNIFICANT CHANGE UP (ref 3.5–5.3)
PROT SERPL-MCNC: 6.3 G/DL — SIGNIFICANT CHANGE UP (ref 6–8.3)
RBC # BLD: 5.1 M/UL — SIGNIFICANT CHANGE UP (ref 3.8–5.2)
RBC # FLD: 16 % — HIGH (ref 10.3–14.5)
SODIUM SERPL-SCNC: 141 MMOL/L — SIGNIFICANT CHANGE UP (ref 135–145)
TROPONIN T, HIGH SENSITIVITY: 9 NG/L — SIGNIFICANT CHANGE UP (ref ?–14)
TROPONIN T, HIGH SENSITIVITY: 9 NG/L — SIGNIFICANT CHANGE UP (ref ?–14)
TSH SERPL-MCNC: 2.77 UIU/ML — SIGNIFICANT CHANGE UP (ref 0.27–4.2)
WBC # BLD: 12.86 K/UL — HIGH (ref 3.8–10.5)
WBC # FLD AUTO: 12.86 K/UL — HIGH (ref 3.8–10.5)

## 2019-08-28 PROCEDURE — 71046 X-RAY EXAM CHEST 2 VIEWS: CPT | Mod: 26

## 2019-08-28 RX ORDER — ONDANSETRON 8 MG/1
4 TABLET, FILM COATED ORAL ONCE
Refills: 0 | Status: COMPLETED | OUTPATIENT
Start: 2019-08-28 | End: 2019-08-28

## 2019-08-28 RX ADMIN — ONDANSETRON 4 MILLIGRAM(S): 8 TABLET, FILM COATED ORAL at 05:50

## 2019-08-28 NOTE — ED PROVIDER NOTE - PROGRESS NOTE DETAILS
Spoke with On call cardiologist for Dr Rendon, advised no further inpatient w/u necessary. Ok to d/c and f/u o/p

## 2019-08-28 NOTE — ED PROVIDER NOTE - ATTENDING CONTRIBUTION TO CARE
Afebrile. Awake and Alert. Lungs CTA. Heart RRR. Abdomen soft NTND. CN II-XII grossly intact. Moves all extremities without lateralization.    85 F PMHX of afib on eliquis, HTN, CHF, PPM, CVA c/o palpitations a/w CP and SOB for several months. Pt had cardiac catheterization last week at [place].    r/o ACS  r/o CHF  r/o arrhythmia: afib  r/o electrolyte disturbance

## 2019-08-28 NOTE — ED ADULT NURSE NOTE - NSSUHOSCREENINGYN_ED_ALL_ED
Pt deferred subconj lido injection. Pt was instructed to purchase artificial tears but pt declined. Yes - the patient is able to be screened

## 2019-08-28 NOTE — ED PROVIDER NOTE - PATIENT PORTAL LINK FT
You can access the FollowMyHealth Patient Portal offered by [facility] by registering at the following website: [email or web address]. By joining [facility]’s FollowMyHealth portal, you will also be able to view your health information using other applications (apps) compatible with our system.

## 2019-08-28 NOTE — ED ADULT NURSE NOTE - OBJECTIVE STATEMENT
Break RN: pt received in rm 18, 85Y F Aox3, ambulatory. Pmhx Afib - on eliquis, HTN, pacemaker. Pt endorsing chest pressure associated with sob. Pt reports worse when laying down. Pt daughter reports mothers abd appears more distended than usual. Upon arrival to room pt breathing even and unlabored, pt VS as charted, afib on monitor, pt appears in NAD, MD at bedside for eval, pt IV placed, labs sent as ordered. daughter at bedside, will continue to monitor.

## 2019-08-28 NOTE — ED PROVIDER NOTE - NSFOLLOWUPINSTRUCTIONS_ED_ALL_ED_FT
You were evaluated for palpitations. Please follow up with your Cardiologist for follow up evaluation.

## 2019-08-28 NOTE — ED ADULT NURSE NOTE - NSIMPLEMENTINTERV_GEN_ALL_ED
Implemented All Fall with Harm Risk Interventions:  [place] to call system. Call bell, personal items and telephone within reach. Instruct patient to call for assistance. Room bathroom lighting operational. Non-slip footwear when patient is off stretcher. Physically safe environment: no spills, clutter or unnecessary equipment. Stretcher in lowest position, wheels locked, appropriate side rails in place. Provide visual cue, wrist band, yellow gown, etc. Monitor gait and stability. Monitor for mental status changes and reorient to person, place, and time. Review medications for side effects contributing to fall risk. Reinforce activity limits and safety measures with patient and family. Provide visual clues: red socks.

## 2019-08-28 NOTE — ED PROVIDER NOTE - OBJECTIVE STATEMENT
85 F PMHX of afib on eliquis, HTN, CHF, PPM, CVA [Feb 2019, no residual deficits], osteopenia, and macular degeneration p/w palpitations 85 F PMHX of afib on eliquis, HTN, CHF, PPM, CVA [Feb 2019, no residual deficits], osteopenia, and macular degeneration p/w palpitations. Reports onset of palpitations after recent discharge on sunday from [facility] for heart failure exacerbation. reports she had a cath, didn't need stent, and diuretics were increased. Discharged home on sunday. Reports palpitations have been increasing in frequency and duration. She called her Cardiologist Dr Elsy Bee and was advised to take imdur. Palpitations returned and she presented to the Hospital.   PCP - Dr Easton 85 F PMHX of afib on eliquis, HTN, CHF, PPM, CVA [Feb 2019, no residual deficits], osteopenia, and macular degeneration p/w palpitations. Reports onset of palpitations after recent discharge on sunday from [facility] for heart failure exacerbation. reports she had a cath, didn't need stent, and diuretics were increased. Discharged home on sunday. Reports palpitations have been increasing in frequency and duration. Has been going for months prior to recent hospitalization. She called her Cardiologist Dr Elsy Bee and was advised to take imdur. Palpitations returned and she presented to the Hospital.   PCP - Dr Easton 85 F PMHX of afib on eliquis, HTN, CHF, PPM, CVA [Feb 2019, no residual deficits], osteopenia, and macular degeneration p/w palpitations. Reports onset of palpitations with associated sob and dizziness. Recently discharge on sunday from [facility] for heart failure exacerbation. reports she had a cath, didn't need stent, and diuretics were increased. Discharged home on sunday. Reports palpitations have been increasing in frequency and duration. Has been going for months prior to recent hospitalization. She called her Cardiologist Dr Elsy Bee and was advised to take imdur. Palpitations returned and she presented to the Hospital.   PCP - Dr Easton

## 2019-08-28 NOTE — ED PROVIDER NOTE - CLINICAL SUMMARY MEDICAL DECISION MAKING FREE TEXT BOX
85 F PMHX of afib on eliquis, HTN, CHF, PPM, CVA [Feb 2019, no residual deficits], osteopenia, and macular degeneration p/w palpitations concerning for ACS vs arrhythmia vs electrolyte abnormality

## 2019-08-28 NOTE — ED ADULT NURSE NOTE - CAS EDN DISCHARGE ASSESSMENT
Alert and oriented to person, place and time/No adverse reaction to first time med in ED/Awake/Symptoms improved

## 2019-08-31 ENCOUNTER — EMERGENCY (EMERGENCY)
Facility: HOSPITAL | Age: 84
LOS: 1 days | Discharge: HOME | End: 2019-08-31
Attending: EMERGENCY MEDICINE
Payer: MEDICARE

## 2019-08-31 VITALS
SYSTOLIC BLOOD PRESSURE: 121 MMHG | WEIGHT: 158.07 LBS | OXYGEN SATURATION: 97 % | HEART RATE: 90 BPM | HEIGHT: 63 IN | RESPIRATION RATE: 16 BRPM | TEMPERATURE: 98 F | DIASTOLIC BLOOD PRESSURE: 80 MMHG

## 2019-08-31 DIAGNOSIS — Z95.0 PRESENCE OF CARDIAC PACEMAKER: Chronic | ICD-10-CM

## 2019-08-31 DIAGNOSIS — H26.9 UNSPECIFIED CATARACT: Chronic | ICD-10-CM

## 2019-08-31 DIAGNOSIS — T14.8 OTHER INJURY OF UNSPECIFIED BODY REGION: Chronic | ICD-10-CM

## 2019-08-31 DIAGNOSIS — Z90.710 ACQUIRED ABSENCE OF BOTH CERVIX AND UTERUS: Chronic | ICD-10-CM

## 2019-08-31 DIAGNOSIS — Z82.8 FAMILY HISTORY OF OTHER DISABILITIES AND CHRONIC DISEASES LEADING TO DISABLEMENT, NOT ELSEWHERE CLASSIFIED: Chronic | ICD-10-CM

## 2019-08-31 DIAGNOSIS — Z84.89 FAMILY HISTORY OF OTHER SPECIFIED CONDITIONS: Chronic | ICD-10-CM

## 2019-08-31 LAB
ALBUMIN SERPL ELPH-MCNC: 4 G/DL — SIGNIFICANT CHANGE UP (ref 3.3–5)
ALP SERPL-CCNC: 64 U/L — SIGNIFICANT CHANGE UP (ref 40–120)
ALT FLD-CCNC: 17 U/L — SIGNIFICANT CHANGE UP (ref 10–45)
ANION GAP SERPL CALC-SCNC: 13 MMOL/L — SIGNIFICANT CHANGE UP (ref 5–17)
APTT BLD: 36.5 SEC — HIGH (ref 27.5–36.3)
AST SERPL-CCNC: 16 U/L — SIGNIFICANT CHANGE UP (ref 10–40)
BILIRUB SERPL-MCNC: 1.1 MG/DL — SIGNIFICANT CHANGE UP (ref 0.2–1.2)
BUN SERPL-MCNC: 27 MG/DL — HIGH (ref 7–23)
CALCIUM SERPL-MCNC: 9.8 MG/DL — SIGNIFICANT CHANGE UP (ref 8.4–10.5)
CHLORIDE SERPL-SCNC: 99 MMOL/L — SIGNIFICANT CHANGE UP (ref 96–108)
CO2 SERPL-SCNC: 27 MMOL/L — SIGNIFICANT CHANGE UP (ref 22–31)
CREAT SERPL-MCNC: 0.98 MG/DL — SIGNIFICANT CHANGE UP (ref 0.5–1.3)
GLUCOSE SERPL-MCNC: 95 MG/DL — SIGNIFICANT CHANGE UP (ref 70–99)
HCT VFR BLD CALC: 47.3 % — HIGH (ref 34.5–45)
HGB BLD-MCNC: 15.1 G/DL — SIGNIFICANT CHANGE UP (ref 11.5–15.5)
INR BLD: 1.31 RATIO — HIGH (ref 0.88–1.16)
MAGNESIUM SERPL-MCNC: 2.1 MG/DL — SIGNIFICANT CHANGE UP (ref 1.6–2.6)
MCHC RBC-ENTMCNC: 27.6 PG — SIGNIFICANT CHANGE UP (ref 27–34)
MCHC RBC-ENTMCNC: 31.8 GM/DL — LOW (ref 32–36)
MCV RBC AUTO: 86.7 FL — SIGNIFICANT CHANGE UP (ref 80–100)
NT-PROBNP SERPL-SCNC: 2122 PG/ML — HIGH (ref 0–300)
PLATELET # BLD AUTO: 318 K/UL — SIGNIFICANT CHANGE UP (ref 150–400)
POTASSIUM SERPL-MCNC: 4.2 MMOL/L — SIGNIFICANT CHANGE UP (ref 3.5–5.3)
POTASSIUM SERPL-SCNC: 4.2 MMOL/L — SIGNIFICANT CHANGE UP (ref 3.5–5.3)
PROT SERPL-MCNC: 6.6 G/DL — SIGNIFICANT CHANGE UP (ref 6–8.3)
PROTHROM AB SERPL-ACNC: 15 SEC — HIGH (ref 10–12.9)
RBC # BLD: 5.46 M/UL — HIGH (ref 3.8–5.2)
RBC # FLD: 14.8 % — HIGH (ref 10.3–14.5)
SODIUM SERPL-SCNC: 139 MMOL/L — SIGNIFICANT CHANGE UP (ref 135–145)
TROPONIN T, HIGH SENSITIVITY RESULT: 7 NG/L — SIGNIFICANT CHANGE UP (ref 0–51)
TROPONIN T, HIGH SENSITIVITY RESULT: 7 NG/L — SIGNIFICANT CHANGE UP (ref 0–51)
WBC # BLD: 10.9 K/UL — HIGH (ref 3.8–10.5)
WBC # FLD AUTO: 10.9 K/UL — HIGH (ref 3.8–10.5)

## 2019-08-31 PROCEDURE — 71045 X-RAY EXAM CHEST 1 VIEW: CPT

## 2019-08-31 PROCEDURE — 76942 ECHO GUIDE FOR BIOPSY: CPT | Mod: 26

## 2019-08-31 PROCEDURE — 84484 ASSAY OF TROPONIN QUANT: CPT

## 2019-08-31 PROCEDURE — 85610 PROTHROMBIN TIME: CPT

## 2019-08-31 PROCEDURE — 93308 TTE F-UP OR LMTD: CPT | Mod: 26

## 2019-08-31 PROCEDURE — 76942 ECHO GUIDE FOR BIOPSY: CPT

## 2019-08-31 PROCEDURE — 83735 ASSAY OF MAGNESIUM: CPT

## 2019-08-31 PROCEDURE — 93005 ELECTROCARDIOGRAM TRACING: CPT

## 2019-08-31 PROCEDURE — 93010 ELECTROCARDIOGRAM REPORT: CPT | Mod: GC

## 2019-08-31 PROCEDURE — 36415 COLL VENOUS BLD VENIPUNCTURE: CPT

## 2019-08-31 PROCEDURE — 99285 EMERGENCY DEPT VISIT HI MDM: CPT | Mod: GC

## 2019-08-31 PROCEDURE — 93308 TTE F-UP OR LMTD: CPT

## 2019-08-31 PROCEDURE — 80053 COMPREHEN METABOLIC PANEL: CPT

## 2019-08-31 PROCEDURE — 99284 EMERGENCY DEPT VISIT MOD MDM: CPT | Mod: 25

## 2019-08-31 PROCEDURE — 85027 COMPLETE CBC AUTOMATED: CPT

## 2019-08-31 PROCEDURE — 85730 THROMBOPLASTIN TIME PARTIAL: CPT

## 2019-08-31 PROCEDURE — 71045 X-RAY EXAM CHEST 1 VIEW: CPT | Mod: 26

## 2019-08-31 PROCEDURE — 85379 FIBRIN DEGRADATION QUANT: CPT

## 2019-08-31 PROCEDURE — 83880 ASSAY OF NATRIURETIC PEPTIDE: CPT

## 2019-08-31 NOTE — ED PROCEDURE NOTE - CARDIAC WINDOWS
Parasternal Long/Parasternal Short/Apical 4-Chamber/Sub-Xyphoid View
Patient/Caregiver provided printed discharge information.

## 2019-08-31 NOTE — ED PROVIDER NOTE - PROGRESS NOTE DETAILS
Attempted to contact Dr. Harper for device interrogation as per EP recommendation with no success. Will contact [facility] EP consult for device interrogation.  -Rohit Pa, PGY1 No events on device interrogation. Trop delta unremarkable. Discussed follow up with patient and family who agreed with outpatient follow up Patient informed of ED visit findings, understands plan.  Patient provided with written and further verbal instructions not included in discharge paperwork.  Patient instructed to follow up with their primary care physician in 2-3 days and return for new, worsened, or persistent symptoms

## 2019-08-31 NOTE — ED PROVIDER NOTE - CARE PLAN
Principal Discharge DX:	Dyspnea  Secondary Diagnosis:	Chest pressure Principal Discharge DX:	Dyspnea  Secondary Diagnosis:	Chest pressure  Secondary Diagnosis:	Pericardial effusion

## 2019-08-31 NOTE — ED PROVIDER NOTE - ATTENDING CONTRIBUTION TO CARE
84yo female pmh CHF with PPM, pericardial effusion, HTN, HLD p/w dyspnea and chest pressure at rest lasting hours, self-resolves daily for 8 months. Cath unremarkable 1-2w ago. Has cards appt this week. Exam with irregular rhythm, lungs clear. POC TTE. Labs with trop. CXR. Device interrogation. Pt with no dyspnea or chest pressure in ED. Continue tele monitoring.

## 2019-08-31 NOTE — ED PROVIDER NOTE - PATIENT PORTAL LINK FT
You can access the FollowMyHealth Patient Portal offered by [facility] by registering at the following website: [email or web address]. By joining [facility]’s FollowMyHealth portal, you will also be able to view your health information using other applications (apps) compatible with our system.

## 2019-08-31 NOTE — ED PROVIDER NOTE - NSFOLLOWUPINSTRUCTIONS_ED_ALL_ED_FT
Congestive Heart Failure (CHF)    Congestive heart failure is a chronic condition in which the heart has trouble pumping blood. In some cases of heart failure, fluid may back up into your lungs or you may have swelling (edema) in your lower legs. There are many causes of heart failure including high blood pressure, coronary artery disease, abnormal heart valves, heart muscle disease, lung disease, diabetes, etc. Symptoms include shortness of breath with activity or when lying flat, cough, swelling of the legs, fatigue, or increased urination during the night.     Treatment is aimed at managing the symptoms of heart failure and may include lifestyle changes, medications, or surgical procedures. Take medicines only as directed by your health care provider and do not stop unless instructed to do so. Eat heart-healthy foods with low or no trans/saturated fats, cholesterol and salt. Weigh yourself every day for early recognition of fluid accumulation.    SEEK IMMEDIATE MEDICAL CARE IF YOU HAVE ANY OF THE FOLLOWING SYMPTOMS: shortness of breath, change in mental status, chest pain, lightheadedness/dizziness/fainting, or worsening of symptoms including not being able to conduct normal physical activity.    -Please continue to take current medications as instructed  -Please follow up with your PCP and cardiologist within 3 days

## 2019-08-31 NOTE — ED PROVIDER NOTE - NS ED ROS FT
GENERAL: denies fever, chills  HEENT: denies congestion, dysphagia, lightheadedness   CARDIAC: +chest heaviness, denies chest pain, palpitations  PULM: +SOB both exertional and at rest, denies wheezing, cough  GI: denies abdominal pain, nausea, vomiting, diarrhea, constipation, melena, hematochezia  : denies dysuria, frequency, incontinence, hematuria  NEURO: denies headache, motor weakness, sensory changes  MSK: denies joint or muscle pain  SKIN: denies new rashes, hives  HEME: denies active bleeding, bruising GENERAL: denies fever, chills  HEENT: denies congestion, dysphagia, lightheadedness   CARDIAC: +chest pressure, denies chest pain, palpitations  PULM: +SOB both exertional and at rest, denies wheezing, cough  GI: denies abdominal pain, nausea, vomiting, diarrhea, constipation, melena, hematochezia  : denies dysuria, frequency, incontinence, hematuria  NEURO: denies headache, motor weakness, sensory changes  MSK: denies joint or muscle pain  SKIN: denies new rashes, hives  HEME: denies active bleeding, bruising

## 2019-08-31 NOTE — ED PROVIDER NOTE - OBJECTIVE STATEMENT
84y/o F w/ h/o 86y/o F w/ h/o pericardial effusion, pacemaker, CHF presents to the ED with SOB. Patient states that over the past 8 months she has had shortness of breath which has worsened for the past week and is now at rest. She was at [facility] weeks ago for cardiac cath which was clean. She visited the ED Tuesday for the same complaint but was discharged with instruction for f/u with personal cardiologist Dr. Leyva at [place]. Denies fevers, chills, nausea, vomiting, numbness, tingling, abdominal pain. 84y/o F w/ h/o pericardial effusion, pacemaker, CHF, afib on Eliquis, HTN, HLD presents to the ED with SOB. Patient states that over the past 8 months she has had shortness of breath a/w chest pressure which has worsened for the past week and is now at rest. She was at [facility] 2 weeks ago for cardiac cath that required no intervention. She visited the ED Tuesday for the same complaint but was discharged with instruction for f/u with personal cardiologist Dr. Leyva at [place]. Denies fevers, chills, nausea, vomiting, numbness, tingling, abdominal pain, LE swelling or pain, recent travel. 84y/o F w/ h/o pericardial effusion, pacemaker ([employer]; placed March 25,2019, Serial#[id number], Model [id number]), CHF, Afib on Eliquis, HTN, HLD presents to the ED with SOB. Patient states that over the past 8 months she has had shortness of breath a/w chest pressure which has worsened for the past week and is now at rest. She was at [facility] 2 weeks ago for cardiac cath that required no intervention. She visited the ED Tuesday for the same complaint but was discharged with instruction for f/u with personal cardiologist Dr. Leyva at [place]. Denies fevers, chills, nausea, vomiting, numbness, tingling, abdominal pain, LE swelling or pain, recent travel.

## 2019-08-31 NOTE — ED ADULT NURSE REASSESSMENT NOTE - NS ED NURSE REASSESS COMMENT FT1
report received from day RN Yancy. Pt found resting in semi-calix position, non-labored respirations. Pt on CM with spo2 on room air. MD Cheek at bedside to perform ECHO. MD placed US guided IV, labs obtained and sent. VS documented. Pt left in position of comfort, will reassess

## 2019-08-31 NOTE — ED PROVIDER NOTE - CLINICAL SUMMARY MEDICAL DECISION MAKING FREE TEXT BOX
84y/o F w/ h/o pericardial effusion, pacemaker, CHF presents to the ED with SOB for the past week. Physical exam within normal limits. Will do CXR to r/o pleural effisuions. proBNP, troponin, EKG for cardiac work up and basic labs. 86y/o F w/ h/o pericardial effusion, pacemaker, CHF presents to the ED with SOB for the past week. Physical exam within normal limits. Will do CXR to r/o pleural effusions. proBNP, troponin, EKG for cardiac work up and basic labs.

## 2019-08-31 NOTE — ED PROVIDER NOTE - PHYSICAL EXAMINATION
GENERAL: no acute distress, non-toxic appearing  HEAD: normocephalic, atraumatic  HEENT: normal conjunctiva, oral mucosa moist, neck supple  CARDIAC: regular rate and rhythm, normal S1 and S2,  no appreciable murmurs  PULM: clear to ascultation bilaterally, no crackles, rales, rhonchi, or wheezing  GI: abdomen nondistended, soft, nontender, no guarding or rebound tenderness  : no CVA tenderness  NEURO: alert and oriented x 3, normal speech, PERRLA, EOMI, no focal motor or sensory deficits  MSK: no peripheral edema, no calf tenderness/redness/swelling, no visible deformities  SKIN: well-perfused, extremities warm, no visible rashes  PSYCH: appropriate mood and affect GENERAL: no acute distress, non-toxic appearing  HEAD: normocephalic, atraumatic  HEENT: normal conjunctiva, oral mucosa moist, neck supple  CARDIAC: irregular rhythm, regular rate normal S1 and S2,  no appreciable murmurs  PULM: clear to ascultation bilaterally, no crackles, rales, rhonchi, or wheezing  GI: abdomen nondistended, soft, nontender, no guarding or rebound tenderness  : no CVA tenderness  NEURO: alert and oriented x 3, normal speech, PERRLA, EOMI, no focal motor or sensory deficits  MSK: no peripheral edema, no calf tenderness/redness/swelling, no visible deformities  SKIN: well-perfused, extremities warm, no visible rashes  PSYCH: appropriate mood and affect

## 2019-09-01 VITALS
DIASTOLIC BLOOD PRESSURE: 88 MMHG | SYSTOLIC BLOOD PRESSURE: 141 MMHG | RESPIRATION RATE: 18 BRPM | HEART RATE: 77 BPM | OXYGEN SATURATION: 100 %

## 2019-10-08 ENCOUNTER — OUTPATIENT (OUTPATIENT)
Dept: OUTPATIENT SERVICES | Facility: HOSPITAL | Age: 84
LOS: 1 days | Discharge: HOME | End: 2019-10-08

## 2019-10-08 DIAGNOSIS — Z82.8 FAMILY HISTORY OF OTHER DISABILITIES AND CHRONIC DISEASES LEADING TO DISABLEMENT, NOT ELSEWHERE CLASSIFIED: Chronic | ICD-10-CM

## 2019-10-08 DIAGNOSIS — T14.8 OTHER INJURY OF UNSPECIFIED BODY REGION: Chronic | ICD-10-CM

## 2019-10-08 DIAGNOSIS — H26.9 UNSPECIFIED CATARACT: Chronic | ICD-10-CM

## 2019-10-08 DIAGNOSIS — Z90.710 ACQUIRED ABSENCE OF BOTH CERVIX AND UTERUS: Chronic | ICD-10-CM

## 2019-10-08 DIAGNOSIS — Z95.0 PRESENCE OF CARDIAC PACEMAKER: Chronic | ICD-10-CM

## 2019-10-08 DIAGNOSIS — Z84.89 FAMILY HISTORY OF OTHER SPECIFIED CONDITIONS: Chronic | ICD-10-CM

## 2019-10-10 ENCOUNTER — APPOINTMENT (OUTPATIENT)
Dept: GERIATRICS | Facility: CLINIC | Age: 84
End: 2019-10-10
Payer: MEDICARE

## 2019-10-10 VITALS
DIASTOLIC BLOOD PRESSURE: 64 MMHG | HEART RATE: 60 BPM | BODY MASS INDEX: 27.9 KG/M2 | TEMPERATURE: 97.7 F | WEIGHT: 163.4 LBS | HEIGHT: 64 IN | SYSTOLIC BLOOD PRESSURE: 132 MMHG | OXYGEN SATURATION: 98 % | RESPIRATION RATE: 15 BRPM

## 2019-10-10 DIAGNOSIS — Z79.01 LONG TERM (CURRENT) USE OF ANTICOAGULANTS: ICD-10-CM

## 2019-10-10 DIAGNOSIS — R09.81 NASAL CONGESTION: ICD-10-CM

## 2019-10-10 DIAGNOSIS — R26.81 UNSTEADINESS ON FEET: ICD-10-CM

## 2019-10-10 DIAGNOSIS — M25.551 PAIN IN RIGHT HIP: ICD-10-CM

## 2019-10-10 DIAGNOSIS — K59.09 OTHER CONSTIPATION: ICD-10-CM

## 2019-10-10 DIAGNOSIS — Z96.651 PRESENCE OF RIGHT ARTIFICIAL KNEE JOINT: ICD-10-CM

## 2019-10-10 DIAGNOSIS — Z79.899 OTHER LONG TERM (CURRENT) DRUG THERAPY: ICD-10-CM

## 2019-10-10 DIAGNOSIS — H35.30 UNSPECIFIED MACULAR DEGENERATION: ICD-10-CM

## 2019-10-10 DIAGNOSIS — L97.209 NON-PRESSURE CHRONIC ULCER OF UNSPECIFIED CALF WITH UNSPECIFIED SEVERITY: ICD-10-CM

## 2019-10-10 DIAGNOSIS — N95.2 POSTMENOPAUSAL ATROPHIC VAGINITIS: ICD-10-CM

## 2019-10-10 DIAGNOSIS — N39.0 URINARY TRACT INFECTION, SITE NOT SPECIFIED: ICD-10-CM

## 2019-10-10 DIAGNOSIS — Z87.81 PERSONAL HISTORY OF (HEALED) TRAUMATIC FRACTURE: ICD-10-CM

## 2019-10-10 DIAGNOSIS — S42.212A UNSPECIFIED DISPLACED FRACTURE OF SURGICAL NECK OF LEFT HUMERUS, INITIAL ENCOUNTER FOR CLOSED FRACTURE: ICD-10-CM

## 2019-10-10 DIAGNOSIS — Z96.659 AFTERCARE FOLLOWING JOINT REPLACEMENT SURGERY: ICD-10-CM

## 2019-10-10 DIAGNOSIS — M25.561 PAIN IN RIGHT KNEE: ICD-10-CM

## 2019-10-10 DIAGNOSIS — Z47.1 AFTERCARE FOLLOWING JOINT REPLACEMENT SURGERY: ICD-10-CM

## 2019-10-10 DIAGNOSIS — Z96.652 PRESENCE OF LEFT ARTIFICIAL KNEE JOINT: ICD-10-CM

## 2019-10-10 DIAGNOSIS — M75.42 IMPINGEMENT SYNDROME OF LEFT SHOULDER: ICD-10-CM

## 2019-10-10 DIAGNOSIS — Z74.1 NEED FOR ASSISTANCE WITH PERSONAL CARE: ICD-10-CM

## 2019-10-10 DIAGNOSIS — Z96.659 PRESENCE OF UNSPECIFIED ARTIFICIAL KNEE JOINT: ICD-10-CM

## 2019-10-10 DIAGNOSIS — Z87.440 PERSONAL HISTORY OF URINARY (TRACT) INFECTIONS: ICD-10-CM

## 2019-10-10 PROCEDURE — G0444 DEPRESSION SCREEN ANNUAL: CPT | Mod: 59

## 2019-10-10 PROCEDURE — 99205 OFFICE O/P NEW HI 60 MIN: CPT

## 2019-10-10 RX ORDER — SENNOSIDES 8.6 MG TABLETS 8.6 MG/1
8.6 TABLET ORAL
Qty: 90 | Refills: 3 | Status: ACTIVE | COMMUNITY
Start: 2019-10-10

## 2019-10-10 RX ORDER — ESTRADIOL 0.1 MG/G
0.1 CREAM VAGINAL
Refills: 0 | Status: ACTIVE | COMMUNITY
Start: 2019-10-10

## 2019-10-14 ENCOUNTER — MOBILE ON CALL (OUTPATIENT)
Age: 84
End: 2019-10-14

## 2019-10-19 ENCOUNTER — MOBILE ON CALL (OUTPATIENT)
Age: 84
End: 2019-10-19

## 2019-10-21 ENCOUNTER — APPOINTMENT (OUTPATIENT)
Dept: ORTHOPEDIC SURGERY | Facility: CLINIC | Age: 84
End: 2019-10-21
Payer: MEDICARE

## 2019-10-21 VITALS
WEIGHT: 159 LBS | BODY MASS INDEX: 29.26 KG/M2 | SYSTOLIC BLOOD PRESSURE: 132 MMHG | DIASTOLIC BLOOD PRESSURE: 70 MMHG | HEART RATE: 74 BPM | HEIGHT: 62 IN

## 2019-10-21 DIAGNOSIS — M47.812 SPONDYLOSIS W/OUT MYELOPATHY OR RADICULOPATHY, CERVICAL REGION: ICD-10-CM

## 2019-10-21 DIAGNOSIS — M54.2 CERVICALGIA: ICD-10-CM

## 2019-10-21 PROCEDURE — 72040 X-RAY EXAM NECK SPINE 2-3 VW: CPT

## 2019-10-21 PROCEDURE — 99214 OFFICE O/P EST MOD 30 MIN: CPT

## 2019-10-21 NOTE — DISCUSSION/SUMMARY
[Medication Risks Reviewed] : Medication risks reviewed [de-identified] : She will use moist heat and we discussed activities she needs to avoid which can aggravate her symptoms.  She will increase the dose of Tylenol.  If she has not seen improvement in 2 or 3 weeks we discussed the use of the Medrol dose pack but it has some risks of tachycardia.

## 2019-10-21 NOTE — HISTORY OF PRESENT ILLNESS
[de-identified] : She has a 5-year history of mild intermittent neck pain.  The symptoms became worse a week ago and particularly worse 2 days ago.  She has neck pain only without associated arm pain or neurologic symptoms.  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  She is on Eliquis for that and for atrial fibrillation.  She sustained a fall.  She has not had associated neurologic symptoms and there is no Valsalva effect with this pain.  There have been no changes in her gait or balance.  She is taking Tylenol thousand milligrams twice a day with relief.

## 2019-10-21 NOTE — PHYSICAL EXAM
[de-identified] : On examination her lower extremity reflexes are 1+ and symmetrical with normal motor power and sensation.  Upper extremity neurological exam reveals normal motor power and sensation as well. [de-identified] : AP and lateral x-rays of the cervical spine reveal multilevel degenerative changes.  There is a mild anterior subluxation at the C3-4 level.  There are no destructive changes.

## 2019-10-22 ENCOUNTER — APPOINTMENT (OUTPATIENT)
Dept: GERIATRICS | Facility: CLINIC | Age: 84
End: 2019-10-22
Payer: MEDICARE

## 2019-10-22 VITALS
RESPIRATION RATE: 6 BRPM | HEIGHT: 62 IN | WEIGHT: 162.8 LBS | BODY MASS INDEX: 29.96 KG/M2 | OXYGEN SATURATION: 98 % | SYSTOLIC BLOOD PRESSURE: 162 MMHG | TEMPERATURE: 97.7 F | DIASTOLIC BLOOD PRESSURE: 82 MMHG | HEART RATE: 61 BPM

## 2019-10-22 DIAGNOSIS — R41.89 OTHER SYMPTOMS AND SIGNS INVOLVING COGNITIVE FUNCTIONS AND AWARENESS: ICD-10-CM

## 2019-10-22 DIAGNOSIS — Z86.73 PERSONAL HISTORY OF TRANSIENT ISCHEMIC ATTACK (TIA), AND CEREBRAL INFARCTION W/OUT RESIDUAL DEFICITS: ICD-10-CM

## 2019-10-22 DIAGNOSIS — Z71.89 OTHER SPECIFIED COUNSELING: ICD-10-CM

## 2019-10-22 DIAGNOSIS — Z13.21 ENCOUNTER FOR SCREENING FOR NUTRITIONAL DISORDER: ICD-10-CM

## 2019-10-22 DIAGNOSIS — Z13.29 ENCOUNTER FOR SCREENING FOR OTHER SUSPECTED ENDOCRINE DISORDER: ICD-10-CM

## 2019-10-22 DIAGNOSIS — Z60.2 PROBLEMS RELATED TO LIVING ALONE: ICD-10-CM

## 2019-10-22 PROCEDURE — 99483 ASSMT & CARE PLN PT COG IMP: CPT

## 2019-10-22 RX ORDER — SERTRALINE 25 MG/1
25 TABLET, FILM COATED ORAL
Qty: 30 | Refills: 0 | Status: DISCONTINUED | COMMUNITY
Start: 2019-10-08 | End: 2019-10-22

## 2019-10-22 RX ORDER — TRIAMTERENE 50 MG/1
50 CAPSULE ORAL DAILY
Refills: 0 | Status: DISCONTINUED | COMMUNITY
Start: 2019-07-03 | End: 2019-10-22

## 2019-10-22 RX ORDER — LORAZEPAM 0.5 MG/1
0.5 TABLET ORAL
Qty: 15 | Refills: 0 | Status: DISCONTINUED | COMMUNITY
Start: 2019-10-08 | End: 2019-10-22

## 2019-10-22 SDOH — SOCIAL STABILITY - SOCIAL INSECURITY: PROBLEMS RELATED TO LIVING ALONE: Z60.2

## 2019-11-06 ENCOUNTER — APPOINTMENT (OUTPATIENT)
Dept: PULMONOLOGY | Facility: CLINIC | Age: 84
End: 2019-11-06
Payer: MEDICARE

## 2019-11-06 VITALS
SYSTOLIC BLOOD PRESSURE: 165 MMHG | TEMPERATURE: 98.1 F | OXYGEN SATURATION: 97 % | RESPIRATION RATE: 15 BRPM | DIASTOLIC BLOOD PRESSURE: 82 MMHG | HEART RATE: 60 BPM | HEIGHT: 62 IN | WEIGHT: 161 LBS | BODY MASS INDEX: 29.63 KG/M2

## 2019-11-06 PROCEDURE — 99205 OFFICE O/P NEW HI 60 MIN: CPT

## 2019-11-06 RX ORDER — AMMONIUM LACTATE 12 %
12 CREAM (GRAM) TOPICAL TWICE DAILY
Qty: 280 | Refills: 1 | Status: DISCONTINUED | COMMUNITY
Start: 2018-08-27 | End: 2019-11-06

## 2019-11-06 RX ORDER — L.ACIDOPH/L.BULG/B.BIF/S.THERM 1B-250 MG
TABLET ORAL AT BEDTIME
Refills: 0 | Status: COMPLETED | COMMUNITY
Start: 2019-10-10 | End: 2019-10-10

## 2019-11-06 RX ORDER — DOCUSATE SODIUM - SENNOSIDES 50; 8.6 MG/1; MG/1
TABLET, FILM COATED ORAL
Refills: 0 | Status: ACTIVE | COMMUNITY

## 2019-11-06 NOTE — REVIEW OF SYSTEMS
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  Cervical arthritis [de-identified] : Recently diagnosed, not well controlled

## 2019-11-06 NOTE — ASSESSMENT
[FreeTextEntry1] :  85 year old DAGMAR CONNORS with CVA, CHF, AF, HTN, PPM, anxiety, depression: was referred by geriatric psychiatrist for management of untreated severe obstructive sleep apnea and insomnia. \par \par YOVANI\par The patient has multiple signs and symptoms of sleep-disordered breathing including crowded oropharynx,  fragmented sleep, snoring, and nonrestorative sleep.  She discontinue CPAP therapy 3 years ago due to intolerance after a 5 year trial, and remains untreated.  \par \par The ramifications of YOVANI and its potential therapeutic modalities were discussed with the patient and her daughter.  She was referred to the [facility] for a CPAP titration polysomnographic sleep study. She will follow up by phone 2-weeks after the sleep study if she has not been contacted by then.\par \par INSOMNIA\par After resuming PAP therapy for YOVANI, we may refer patient for cognitive behavioral therapy if her insomnia still persists.

## 2019-11-06 NOTE — REASON FOR VISIT
[Initial Eval - Existing Diagnosis] : an initial evaluation of an existing diagnosis [Sleep Apnea] : sleep apnea [Family Member] : family member [FreeTextEntry2] : insomnia

## 2019-11-06 NOTE — HISTORY OF PRESENT ILLNESS
[Snoring] : snoring [Frequent Nocturnal Awakening] : frequent nocturnal awakening [Awakes Unrefreshed] : awakening unrefreshed [Awakening With Dry Mouth] : awakening with dry mouth [DIS] : DIS [DMS] : DMS [Sleep Onset Latency: ___ minutes] : sleep onset latency of [unfilled] minutes reported [Nocturnal Awakenings: ___] : ~he/she~ typically has [unfilled] nocturnal awakenings [WASO: ___] : Wake time after sleep onset is [unfilled] [ESS: ___] : ESS score [unfilled] [AHI: ___ per hour] : Apnea-hypopnea index:  [unfilled] per hour [Jose L desatuation%: ___] : Jose L desaturation:  [unfilled]% [Date: ___] : the most recent therapeutic polysomnogram was completed [unfilled] [FreeTextEntry1] :  85 year old DAGMAR CONNORS was referred by Dr. Saavedra (geriatric psychiatrist) for management of chronic insomnia and untreated severe obstructive apnea. \par \par COMORBID:  CVA, CHF, AF, HTN, PPM, pericardial window, anxiety, depression, macular degeneration. \par \par INSOMNIA:  Onset of sleep initiation and maintenance insomnia 5-6 months ago when she had a stroke, but worse for 2 months..  She tried several sleep aids which lost effectiveness including:  diphenhydramine, 5-HTP, tryptophan, tylenol PM.  Clonazepam and melatonin were ineffective.  Adverse effect of high blood pressure, involuntary leg movements, and itch with Remeron.  She lies in bed when unable to sleep, and gazes at the clock.\par \par SLEEP:  She typically sleeps 2-3 hours between 11pm-4am, without daytime sleep or unintentional sleep.  She states it takes "hours" to fall asleep, possibly 3-4 hours.  Nocturia awakens her ~ 3 times, and daughter states the patient has reported dyspnea awakening her.  \par \par YOVANI:  The patient brings sleep study reports.  She was diagnosed with severe YOVANI in 2011, utilized CPAP from 2011 until 2016 with AirSense 10 AutoSet set to 8 cmH2O from [facility].  She discontinued CPAP due to intolerance.\par \par Patient is retired, walks around the house with an aide, has a PT visit once weekly. [Witnessed Apneas] : no witnessed sleep apnea [Daytime Somnolence] : no daytime somnolence [Unintentional Sleep while Active] : no unintentional sleep while active [Unintentional Sleep While Inactive] : no unintentional sleep while inactive [Awakes with Headache] : no headache upon awakening [Recent  Weight Gain] : no recent weight gain [Unusual Sleep Behavior] : no unusual sleep behavior [Lower Extremity Discomfort] : no lower extremity discomfort in evening or at bedtime [Nocturnal Oxygen] : The patient does not use nocturnal oxygen

## 2019-11-11 ENCOUNTER — FORM ENCOUNTER (OUTPATIENT)
Age: 84
End: 2019-11-11

## 2019-12-12 ENCOUNTER — APPOINTMENT (OUTPATIENT)
Dept: PULMONOLOGY | Facility: CLINIC | Age: 84
End: 2019-12-12
Payer: MEDICARE

## 2019-12-12 PROCEDURE — 90791 PSYCH DIAGNOSTIC EVALUATION: CPT

## 2019-12-16 ENCOUNTER — APPOINTMENT (OUTPATIENT)
Dept: PULMONOLOGY | Facility: CLINIC | Age: 84
End: 2019-12-16
Payer: MEDICARE

## 2019-12-16 VITALS
SYSTOLIC BLOOD PRESSURE: 120 MMHG | RESPIRATION RATE: 16 BRPM | TEMPERATURE: 97.6 F | OXYGEN SATURATION: 95 % | DIASTOLIC BLOOD PRESSURE: 77 MMHG | HEART RATE: 55 BPM

## 2019-12-16 DIAGNOSIS — G47.33 OBSTRUCTIVE SLEEP APNEA (ADULT) (PEDIATRIC): ICD-10-CM

## 2019-12-16 DIAGNOSIS — G47.37 CENTRAL SLEEP APNEA IN CONDITIONS CLASSIFIED ELSEWHERE: ICD-10-CM

## 2019-12-16 PROCEDURE — 99215 OFFICE O/P EST HI 40 MIN: CPT | Mod: GC

## 2019-12-16 NOTE — ASSESSMENT
[FreeTextEntry1] : 85 year old female with a history of CVA, HFpEF, AF on AC, HTN, PPM, anxiety, depression: was referred by geriatric psychiatrist for management of untreated severe obstructive sleep apnea and insomnia. The patient has a history of severe YOVANI but no diagnostic study available for some time. Given her history of HFpEF, afib, she is also at risk for CSA. The CPAP titration study obtained after the last visit (11/2019) showed very poor sleep efficiency; optimal CPAP level of 8 improved her AHI but given poor sleep efficiency and limited sleep time the study is of limited utility. \par \par #YOVANI\par -Previously diagnosed with severe YOVANI but has been off PAP therapy for several years and is denying any current symptoms of daytime somnolence, nonrestorative sleep at this time. She continues to snore. Her CPAP titration was limited as noted above. Further, there is concern for CSA given her cardiac comorbidities\par -CPAP mask being delivered to patient from last titration study\par -Will arrange home diagnostic study to evaluate severity of YOVANI and/or presence of CSA (given HFpEF, Afib) to determine further therapy-- while an in lab psg would be optimal for this purpose, pt had difficulty going to the lab for the last study and would prefer HST-- \par -If there is significant YOVANI with CSA will restart PAP therapy \par -Daughter will bring in report from last TTE \par -Directions/number to [facility] sleep center provided for patient to  equipment for home study\par \par #INSOMNIA\par -Saw Dr. Ross for CBT and endorses compliance with sleep hygiene techniques\par -Her symptoms have improved with the use Benadryl 50mg recently -- she reports she noted improvement in sleep quality with this medication and reports that she has not experienced any adverse effects. \par -Discussed reducing Benadryl due to potential adverse effects particularly falling while on blood thinner\par -Alternative sleeping aids at this time would also increase risk of falling and she is hesitant to reduce the dose of medication since she reports it is the only measure that has been successful to improve sleep quality.\par -Will readdress the Benadryl at follow up\par \par will f/u after hst for reassessment of sleep disordered breathing.\par \par Follow up after home diagnostic sleep study [Central sleep apnea due to medical condition (G47.37)] : complicated

## 2019-12-16 NOTE — PHYSICAL EXAM
[General Appearance - Well Developed] : well developed [General Appearance - Well Nourished] : well nourished [Normal Conjunctiva] : the conjunctiva exhibited no abnormalities [Normal Oropharynx] : normal oropharynx [Neck Appearance] : the appearance of the neck was normal [Heart Sounds] : normal S1 and S2 [] : no respiratory distress [Respiration, Rhythm And Depth] : normal respiratory rhythm and effort [Auscultation Breath Sounds / Voice Sounds] : lungs were clear to auscultation bilaterally [Abdomen Soft] : soft [Abdomen Tenderness] : non-tender [1+ Pitting] : 1+  pitting [FreeTextEntry1] : irregular, no murmurs

## 2019-12-16 NOTE — REVIEW OF SYSTEMS
[Snoring] : snoring [Edema] : ~T edema was present [Obesity] : obesity [Arthralgias] : arthralgias [Nasal Congestion] : no nasal congestion [Shortness Of Breath] : no shortness of breath [Chest Pain] : no chest pain [Palpitations] : no palpitations [Leg Dysesthesias] : no leg dysesthesias [Anemia] : no anemia [A.M. Headache] : no headache present upon awakening [Thyroid Disease] : no thyroid disease [Sleep Paralysis] : no sleep paralysis [Heartburn] : no heartburn [Nocturia] : no nocturia [de-identified] : as noted in the HPI [FreeTextEntry3] : as noted in the HPI

## 2020-01-10 ENCOUNTER — OUTPATIENT (OUTPATIENT)
Dept: OUTPATIENT SERVICES | Facility: HOSPITAL | Age: 85
LOS: 1 days | End: 2020-01-10
Payer: MEDICARE

## 2020-01-10 ENCOUNTER — APPOINTMENT (OUTPATIENT)
Dept: SLEEP CENTER | Facility: CLINIC | Age: 85
End: 2020-01-10
Payer: MEDICARE

## 2020-01-10 DIAGNOSIS — Z82.8 FAMILY HISTORY OF OTHER DISABILITIES AND CHRONIC DISEASES LEADING TO DISABLEMENT, NOT ELSEWHERE CLASSIFIED: Chronic | ICD-10-CM

## 2020-01-10 DIAGNOSIS — Z95.0 PRESENCE OF CARDIAC PACEMAKER: Chronic | ICD-10-CM

## 2020-01-10 DIAGNOSIS — T14.8 OTHER INJURY OF UNSPECIFIED BODY REGION: Chronic | ICD-10-CM

## 2020-01-10 DIAGNOSIS — Z90.710 ACQUIRED ABSENCE OF BOTH CERVIX AND UTERUS: Chronic | ICD-10-CM

## 2020-01-10 DIAGNOSIS — H26.9 UNSPECIFIED CATARACT: Chronic | ICD-10-CM

## 2020-01-10 DIAGNOSIS — Z84.89 FAMILY HISTORY OF OTHER SPECIFIED CONDITIONS: Chronic | ICD-10-CM

## 2020-01-10 PROCEDURE — 95806 SLEEP STUDY UNATT&RESP EFFT: CPT

## 2020-01-10 PROCEDURE — 95806 SLEEP STUDY UNATT&RESP EFFT: CPT | Mod: 26

## 2020-01-13 ENCOUNTER — APPOINTMENT (OUTPATIENT)
Dept: ORTHOPEDIC SURGERY | Facility: CLINIC | Age: 85
End: 2020-01-13
Payer: MEDICARE

## 2020-01-13 DIAGNOSIS — S42.212D UNSPECIFIED DISPLACED FRACTURE OF SURGICAL NECK OF LEFT HUMERUS, SUBSEQUENT ENCOUNTER FOR FRACTURE WITH ROUTINE HEALING: ICD-10-CM

## 2020-01-13 PROCEDURE — 73030 X-RAY EXAM OF SHOULDER: CPT | Mod: LT

## 2020-01-13 PROCEDURE — 99213 OFFICE O/P EST LOW 20 MIN: CPT

## 2020-01-13 NOTE — ADDENDUM
[FreeTextEntry1] : I, Marily Bowers wrote this note acting as a scribe for Dr. Arsalan Higginbotham on Jan 13, 2020.

## 2020-01-13 NOTE — HISTORY OF PRESENT ILLNESS
[de-identified] : Pt is an 84 y/o female who presents for a followup visit involving the left arm after a fall that occurred on 04/29/2019.  She tripped and fell over a curb in a parking lot.  She fell on her left side.  She was seen at [facility] ED on the same day where xrays were taken and reveled an impacted proximal left humeral surgical neck fracture. She was admitted for 4 days and then discharged to [facility]. She has since been discharged and is now home. She was treated with a sling which she has d/c use of 1 week ago. She presents today at the 9 month cristo. She does not have pain when she tries to move the arm.  She is not currently taking anything for pain.  She presents today with her daughter for repeat xrays. \par Patient has a hx of Huseyin-Barr.

## 2020-01-13 NOTE — PHYSICAL EXAM
[de-identified] : Patient is WDWN, alert, and in no acute distress. Breathing is unlabored. She is grossly oriented to person, place, and time. \par \par Left Shoulder: \par Inspection/ Palpation: No tenderness to palpation. \par Range of Motion: Active flexion and extension without pain. \par Strength: forward elevation, internal rotation, external rotation, adduction, and abduction are 5/5. \par Stability: no joint instability on provocative testing.  [de-identified] : AP, transcapula, and axillary views of the left shoulder were obtained and revealed an impacted proximal left humeral surgical neck fracture with associated internal rotation and slight posterior subluxation of humeral head. Fracture is fully healed.

## 2020-01-13 NOTE — DISCUSSION/SUMMARY
[de-identified] : The patient wishes to proceed with physical therapy. A script was given. \par A script for a walker was provided. \par Topical analgesics as needed. \par NSAIDs as tolerated.\par Follow up in 3-6 months. Xrays upon return.

## 2020-01-15 DIAGNOSIS — G47.33 OBSTRUCTIVE SLEEP APNEA (ADULT) (PEDIATRIC): ICD-10-CM

## 2020-01-16 ENCOUNTER — APPOINTMENT (OUTPATIENT)
Dept: PULMONOLOGY | Facility: CLINIC | Age: 85
End: 2020-01-16

## 2020-01-27 ENCOUNTER — APPOINTMENT (OUTPATIENT)
Dept: GERIATRICS | Facility: CLINIC | Age: 85
End: 2020-01-27
Payer: MEDICARE

## 2020-01-27 VITALS
HEART RATE: 61 BPM | SYSTOLIC BLOOD PRESSURE: 100 MMHG | DIASTOLIC BLOOD PRESSURE: 60 MMHG | BODY MASS INDEX: 30.18 KG/M2 | WEIGHT: 164 LBS | RESPIRATION RATE: 14 BRPM | TEMPERATURE: 97.6 F | OXYGEN SATURATION: 95 % | HEIGHT: 62 IN

## 2020-01-27 DIAGNOSIS — I10 ESSENTIAL (PRIMARY) HYPERTENSION: ICD-10-CM

## 2020-01-27 PROCEDURE — 99214 OFFICE O/P EST MOD 30 MIN: CPT | Mod: GC

## 2020-01-27 RX ORDER — LOSARTAN POTASSIUM 100 MG/1
100 TABLET, FILM COATED ORAL
Qty: 90 | Refills: 0 | Status: DISCONTINUED | COMMUNITY
Start: 2019-01-23 | End: 2020-01-27

## 2020-01-27 RX ORDER — UREA 10 %
50 LOTION (ML) TOPICAL DAILY
Refills: 0 | Status: ACTIVE | COMMUNITY
Start: 2020-01-27

## 2020-01-27 RX ORDER — POTASSIUM CHLORIDE 750 MG/1
10 TABLET, EXTENDED RELEASE ORAL EVERY MORNING
Refills: 0 | Status: DISCONTINUED | COMMUNITY
Start: 2019-08-25 | End: 2020-01-27

## 2020-01-27 RX ORDER — CAMPHOR 0.45 %
25 GEL (GRAM) TOPICAL
Refills: 0 | Status: ACTIVE | COMMUNITY
Start: 2020-01-27

## 2020-01-27 NOTE — HISTORY OF PRESENT ILLNESS
[FreeTextEntry1] : 86 year old female with hx of anxiety/ depression/ chronic insomnia, CHF/ afib, hx of lacunar CVA. \par Accompanied by daughter Karmen\par \par Two weeks ago, she had severe dyspnea and chest pressure and was given IV lasix at [facility]. \par Over the phone recommendations from cardiologist CHF Dr. Warren last Tuesday due to gasping for air and increasing shortness of breath not related to exertion: recommended to increase from lasix 40mg to lasix 80mg; eplerenone has been increased from 25mg to 50mg daily. \par Labs done with pcp last week showing kidney abnormality? Will follow up with PCP to recheck labs. \par \par Overall, reports dyspnea has improved but today complaining of dyspnea while in patient room and stating  "Aware of every breath she takes". No chest pain today.\par \par \par Has been to psychiatrist : had tried lexapro, sertraline, mirtazapine but had bad side effects to medications, and not open to retrying them. \par No longer seeing psychiatrist currently. \par Still grieving over loss of  a year ago because she was at rehab when he passed. family friend passed away a few months ago from CHF. Another family member passed away recently. \par Social work visits once a month. \par Spends day talking to friends on phone mostly; occasionally visits friends. tries to watch television but has macular degeneration which makes it difficult. \par Started Buspirone about 8 months ago. Buspirone 5mg now taking three times a day prn (prescribed by \par PCP: Dr. Arabella Parra) . patient and daughter want to have xanax prescribed, as it has helped in past. \par Has thoughts that she would be better not waking up. Denies suicidal ideation. \par \par Chronic insomnia: takes benadryl  every night along with sleepy time tea. tried melatonin 12 mg dose?? but not effective \par \par Lives with daughter Karmen\par Has aide about 5 days a week x 7 hours. \par Daughter and Aide helps with cooking, cleaning,  laundry. Daughter helps with medication administration \par Patient can toilet self, feeds, and groom self.

## 2020-01-27 NOTE — PHYSICAL EXAM
[General Appearance - Alert] : alert [General Appearance - In No Acute Distress] : in no acute distress [General Appearance - Well-Appearing] : healthy appearing [Sclera] : the sclera and conjunctiva were normal [Extraocular Movements] : extraocular movements were intact [Normal Oral Mucosa] : normal oral mucosa [No Oral Pallor] : no oral pallor [Outer Ear] : the ears and nose were normal in appearance [Hearing Threshold Finger Rub Not Nobles] : hearing was normal [Examination Of The Oral Cavity] : the lips and gums were normal [Oropharynx] : The oropharynx was normal [Neck Appearance] : the appearance of the neck was normal [Jugular Venous Distention Increased] : there was no jugular-venous distention [Respiration, Rhythm And Depth] : normal respiratory rhythm and effort [Exaggerated Use Of Accessory Muscles For Inspiration] : no accessory muscle use [Auscultation Breath Sounds / Voice Sounds] : lungs were clear to auscultation bilaterally [Heart Rate And Rhythm] : heart rate was normal and rhythm regular [Heart Sounds] : normal S1 and S2 [Edema] : there was no peripheral edema [Abdomen Soft] : soft [Abdomen Tenderness] : non-tender [No CVA Tenderness] : no ~M costovertebral angle tenderness [No Spinal Tenderness] : no spinal tenderness [Nail Clubbing] : no clubbing  or cyanosis of the fingernails [Involuntary Movements] : no involuntary movements were seen [] : no rash [No Focal Deficits] : no focal deficits [FreeTextEntry1] : labile mood with tearing easily

## 2020-01-27 NOTE — REVIEW OF SYSTEMS
[Feeling Poorly] : feeling poorly [Shortness Of Breath] : shortness of breath [As Noted in HPI] : as noted in HPI [Anxiety] : anxiety [Depression] : depression [Negative] : Heme/Lymph [FreeTextEntry3] : macular degeneration

## 2020-01-29 ENCOUNTER — APPOINTMENT (OUTPATIENT)
Dept: PULMONOLOGY | Facility: CLINIC | Age: 85
End: 2020-01-29

## 2020-02-05 ENCOUNTER — APPOINTMENT (OUTPATIENT)
Dept: MAMMOGRAPHY | Facility: IMAGING CENTER | Age: 85
End: 2020-02-05
Payer: MEDICARE

## 2020-02-05 ENCOUNTER — OUTPATIENT (OUTPATIENT)
Dept: OUTPATIENT SERVICES | Facility: HOSPITAL | Age: 85
LOS: 1 days | End: 2020-02-05
Payer: MEDICARE

## 2020-02-05 ENCOUNTER — APPOINTMENT (OUTPATIENT)
Dept: ULTRASOUND IMAGING | Facility: IMAGING CENTER | Age: 85
End: 2020-02-05
Payer: MEDICARE

## 2020-02-05 DIAGNOSIS — H26.9 UNSPECIFIED CATARACT: Chronic | ICD-10-CM

## 2020-02-05 DIAGNOSIS — T14.8 OTHER INJURY OF UNSPECIFIED BODY REGION: Chronic | ICD-10-CM

## 2020-02-05 DIAGNOSIS — Z95.0 PRESENCE OF CARDIAC PACEMAKER: Chronic | ICD-10-CM

## 2020-02-05 DIAGNOSIS — Z00.00 ENCOUNTER FOR GENERAL ADULT MEDICAL EXAMINATION WITHOUT ABNORMAL FINDINGS: ICD-10-CM

## 2020-02-05 DIAGNOSIS — Z82.8 FAMILY HISTORY OF OTHER DISABILITIES AND CHRONIC DISEASES LEADING TO DISABLEMENT, NOT ELSEWHERE CLASSIFIED: Chronic | ICD-10-CM

## 2020-02-05 DIAGNOSIS — R92.2 INCONCLUSIVE MAMMOGRAM: ICD-10-CM

## 2020-02-05 DIAGNOSIS — Z90.710 ACQUIRED ABSENCE OF BOTH CERVIX AND UTERUS: Chronic | ICD-10-CM

## 2020-02-05 DIAGNOSIS — Z84.89 FAMILY HISTORY OF OTHER SPECIFIED CONDITIONS: Chronic | ICD-10-CM

## 2020-02-05 PROCEDURE — 76641 ULTRASOUND BREAST COMPLETE: CPT

## 2020-02-05 PROCEDURE — 77063 BREAST TOMOSYNTHESIS BI: CPT

## 2020-02-05 PROCEDURE — 77067 SCR MAMMO BI INCL CAD: CPT | Mod: 26

## 2020-02-05 PROCEDURE — 77063 BREAST TOMOSYNTHESIS BI: CPT | Mod: 26

## 2020-02-05 PROCEDURE — 76641 ULTRASOUND BREAST COMPLETE: CPT | Mod: 26,50

## 2020-02-05 PROCEDURE — 77067 SCR MAMMO BI INCL CAD: CPT

## 2020-02-27 ENCOUNTER — APPOINTMENT (OUTPATIENT)
Dept: PSYCHIATRY | Facility: CLINIC | Age: 85
End: 2020-02-27
Payer: MEDICARE

## 2020-02-27 PROCEDURE — 99205 OFFICE O/P NEW HI 60 MIN: CPT

## 2020-03-04 ENCOUNTER — APPOINTMENT (OUTPATIENT)
Dept: PSYCHIATRY | Facility: CLINIC | Age: 85
End: 2020-03-04
Payer: MEDICARE

## 2020-03-04 PROCEDURE — 99214 OFFICE O/P EST MOD 30 MIN: CPT

## 2020-03-20 NOTE — HISTORY OF PRESENT ILLNESS
[FreeTextEntry1] : 85 year old DAGMAR CONNORS was referred by Dr. Saavedra (geriatric psychiatrist) for management of chronic insomnia and untreated severe obstructive apnea. \par \par COMORBID: CVA, HFpEF, AF, HTN, PPM, pericardial window, anxiety, depression, macular degeneration. \par \par Initial history: Patient previously followed at [facility] for insomnia and YOVANI.  \par INSOMNIA: Onset of sleep initiation and maintenance insomnia 5-6 months ago when she had a stroke, but worse for 2 months. She tried several sleep aids which lost effectiveness including: diphenhydramine, 5-HTP, tryptophan, tylenol PM. Clonazepam and melatonin were ineffective. Adverse effect of high blood pressure, involuntary leg movements, and itch with Remeron. She lies in bed when unable to sleep, and gazes at the clock.\par \par YOVANI: She was diagnosed with severe YOVANI in 2011, utilized CPAP from 2011 until 2016 with AirSense 10 AutoSet set to 8 cmH2O from [facility]. She discontinued CPAP due to intolerance and has not used since. \par \par INTERVAL HISTORY\par During last visit she was referred for CPAP titration study however SE was very poor. AHI improved at 8 but SE was severely reduced; and it was noted that she had difficulty tolerating CPAP. However, she reports the masked used during the study was the most comfortable she has had before. \par \par Her insomnia symptoms have significantly improved since last visit. She has been sleeping well the last week; mostly short latency of less than 20 mins and sleeping mostly through the night. Occasionally waking up once a night but able to fall back asleep. She has been going to sleep later and waking up later (9-11AM). She is sleeping 7-8 hours a night with minimal nighttime awakenings; occasional latency of around 30 minutes. She has been using 50mg Benadryl over the last week which she believes has helped her sleep better. She reports that she has no side effects related to benadryl, including anticholinergic symptoms, unsteadiness or other symptoms. \par \par Sleep related symptoms: snoring, frequent nocturnal awakening, no witnessed sleep apnea, no daytime somnolence, no unintentional sleep while active, no unintentional sleep while inactive, no headache upon awakening, no recent weight gain. \par Insomnia symptoms: DIS, DMS. \par Parasomnia symptoms: no unusual sleep behavior. \par RLS/PMLS symptoms: no lower extremity discomfort in evening or at bedtime. \par    \par Diagnostic Polysomnogram:. Date of most recent diagnostic polysomnogram: 4/12/2011 at outside facility. Apnea-hypopnea index: 32.5 per hour. Jose L desaturation: 82%. \par    \par Therapeutic Polysomnogram:. the most recent therapeutic polysomnogram was completed 7/27/2016 at [facility]. CPAP AHI 6.5 on pressure of 8 cmH2O using [employer] [place] medium nasal pillows mask with chin strap. \par  
day(s)

## 2020-03-25 ENCOUNTER — APPOINTMENT (OUTPATIENT)
Dept: PSYCHIATRY | Facility: CLINIC | Age: 85
End: 2020-03-25
Payer: MEDICARE

## 2020-03-25 PROCEDURE — 99442: CPT

## 2020-04-08 ENCOUNTER — APPOINTMENT (OUTPATIENT)
Dept: PSYCHIATRY | Facility: CLINIC | Age: 85
End: 2020-04-08
Payer: MEDICARE

## 2020-04-08 PROCEDURE — 99442: CPT

## 2020-05-20 ENCOUNTER — APPOINTMENT (OUTPATIENT)
Dept: PSYCHIATRY | Facility: CLINIC | Age: 85
End: 2020-05-20
Payer: MEDICARE

## 2020-05-20 PROCEDURE — 99214 OFFICE O/P EST MOD 30 MIN: CPT | Mod: 95

## 2020-05-20 PROCEDURE — 99442: CPT | Mod: 95

## 2020-05-20 RX ORDER — BUSPIRONE HYDROCHLORIDE 5 MG/1
5 TABLET ORAL
Qty: 30 | Refills: 1 | Status: DISCONTINUED | COMMUNITY
Start: 2020-03-25 | End: 2020-05-20

## 2020-06-03 ENCOUNTER — APPOINTMENT (OUTPATIENT)
Dept: PSYCHIATRY | Facility: CLINIC | Age: 85
End: 2020-06-03

## 2020-06-24 ENCOUNTER — APPOINTMENT (OUTPATIENT)
Dept: PULMONOLOGY | Facility: CLINIC | Age: 85
End: 2020-06-24
Payer: MEDICARE

## 2020-06-24 VITALS
HEART RATE: 60 BPM | TEMPERATURE: 98.2 F | DIASTOLIC BLOOD PRESSURE: 77 MMHG | SYSTOLIC BLOOD PRESSURE: 129 MMHG | OXYGEN SATURATION: 97 %

## 2020-06-24 DIAGNOSIS — F32.9 ANXIETY DISORDER, UNSPECIFIED: ICD-10-CM

## 2020-06-24 DIAGNOSIS — R06.89 OTHER ABNORMALITIES OF BREATHING: ICD-10-CM

## 2020-06-24 DIAGNOSIS — F41.9 ANXIETY DISORDER, UNSPECIFIED: ICD-10-CM

## 2020-06-24 PROCEDURE — 99215 OFFICE O/P EST HI 40 MIN: CPT | Mod: 25

## 2020-06-24 PROCEDURE — 71046 X-RAY EXAM CHEST 2 VIEWS: CPT

## 2020-06-24 NOTE — PHYSICAL EXAM
[No Acute Distress] : no acute distress [Normal Oropharynx] : normal oropharynx [Normal Appearance] : normal appearance [Normal Rate/Rhythm] : normal rate/rhythm [No Neck Mass] : no neck mass [Normal S1, S2] : normal s1, s2 [No Murmurs] : no murmurs [No Resp Distress] : no resp distress [Clear to Auscultation Bilaterally] : clear to auscultation bilaterally [No Abnormalities] : no abnormalities [No Clubbing] : no clubbing [Normal Gait] : normal gait [Benign] : benign [No Edema] : no edema [No Cyanosis] : no cyanosis [FROM] : FROM [No Focal Deficits] : no focal deficits [Normal Color/ Pigmentation] : normal color/ pigmentation [Oriented x3] : oriented x3 [Normal Affect] : normal affect

## 2020-06-25 NOTE — HISTORY OF PRESENT ILLNESS
[TextBox_4] : Jesusita complains of shortness of breath, no chest pain, cough, fever or chills. She would like to get home oxygen

## 2020-06-25 NOTE — REASON FOR VISIT
[Follow-Up] : a follow-up visit [Shortness of Breath] : shortness of breath [TextBox_44] : CHF has been under control, less SOB

## 2020-06-25 NOTE — DISCUSSION/SUMMARY
[FreeTextEntry1] : Jesusita is a patient with shortness of breath of unclear etiology at this point, could be secondary  to deconditioning, rule out anxiety

## 2020-06-25 NOTE — ASSESSMENT
[FreeTextEntry1] : I advised her that her pulmonary evaluation and chest x-ray are completely within normal limits today, and her pulse oximeter is well within normal limits, so there is no indication for starting her on home oxygen at this point. I advised her to followup with her cardiologist.

## 2020-06-25 NOTE — PROCEDURE
[FreeTextEntry1] : Chest x-ray PA and lateral views performed in my office today showed s/p PPM, clear lungs, no evidence of infiltrates or pleural effusions.\par

## 2020-08-12 ENCOUNTER — APPOINTMENT (OUTPATIENT)
Dept: PSYCHIATRY | Facility: CLINIC | Age: 85
End: 2020-08-12
Payer: MEDICARE

## 2020-08-12 PROCEDURE — 99442: CPT | Mod: 95

## 2020-08-24 ENCOUNTER — APPOINTMENT (OUTPATIENT)
Dept: DISASTER EMERGENCY | Facility: CLINIC | Age: 85
End: 2020-08-24

## 2020-08-24 DIAGNOSIS — Z01.818 ENCOUNTER FOR OTHER PREPROCEDURAL EXAMINATION: ICD-10-CM

## 2020-08-25 ENCOUNTER — APPOINTMENT (OUTPATIENT)
Dept: DISASTER EMERGENCY | Facility: CLINIC | Age: 85
End: 2020-08-25

## 2020-08-25 LAB — SARS-COV-2 N GENE NPH QL NAA+PROBE: NOT DETECTED

## 2020-08-28 ENCOUNTER — OUTPATIENT (OUTPATIENT)
Dept: OUTPATIENT SERVICES | Facility: HOSPITAL | Age: 85
LOS: 1 days | End: 2020-08-28
Payer: MEDICARE

## 2020-08-28 ENCOUNTER — APPOINTMENT (OUTPATIENT)
Dept: SLEEP CENTER | Facility: CLINIC | Age: 85
End: 2020-08-28
Payer: MEDICARE

## 2020-08-28 DIAGNOSIS — T14.8 OTHER INJURY OF UNSPECIFIED BODY REGION: Chronic | ICD-10-CM

## 2020-08-28 DIAGNOSIS — Z82.8 FAMILY HISTORY OF OTHER DISABILITIES AND CHRONIC DISEASES LEADING TO DISABLEMENT, NOT ELSEWHERE CLASSIFIED: Chronic | ICD-10-CM

## 2020-08-28 DIAGNOSIS — Z90.710 ACQUIRED ABSENCE OF BOTH CERVIX AND UTERUS: Chronic | ICD-10-CM

## 2020-08-28 DIAGNOSIS — Z95.0 PRESENCE OF CARDIAC PACEMAKER: Chronic | ICD-10-CM

## 2020-08-28 DIAGNOSIS — H26.9 UNSPECIFIED CATARACT: Chronic | ICD-10-CM

## 2020-08-28 DIAGNOSIS — Z84.89 FAMILY HISTORY OF OTHER SPECIFIED CONDITIONS: Chronic | ICD-10-CM

## 2020-08-28 PROCEDURE — 95810 POLYSOM 6/> YRS 4/> PARAM: CPT | Mod: 26

## 2020-08-28 PROCEDURE — 95810 POLYSOM 6/> YRS 4/> PARAM: CPT

## 2020-08-31 DIAGNOSIS — G47.33 OBSTRUCTIVE SLEEP APNEA (ADULT) (PEDIATRIC): ICD-10-CM

## 2020-09-08 ENCOUNTER — APPOINTMENT (OUTPATIENT)
Dept: PSYCHIATRY | Facility: CLINIC | Age: 85
End: 2020-09-08
Payer: MEDICARE

## 2020-09-08 PROCEDURE — 99442: CPT | Mod: 95

## 2020-10-01 NOTE — PATIENT PROFILE ADULT - NSPROSLEEPAIDS_GEN_A_NUR
Called requesting xarelto samples  Lot [id number] bottles placed at the  for pt.  vm left for the pt that samples are ready to   Lida Penn RN  Triage nurse     CPAP

## 2020-12-10 NOTE — OCCUPATIONAL THERAPY INITIAL EVALUATION ADULT - MD ORDER
Occupational Therapy (OT) to evaluate and treat. Bilobed Flap Text: The defect edges were debeveled with a #15 scalpel blade.  Given the location of the defect and the proximity to free margins a bilobe flap was deemed most appropriate.  Using a sterile surgical marker, an appropriate bilobe flap drawn around the defect.    The area thus outlined was incised deep to adipose tissue with a #15 scalpel blade.  The skin margins were undermined to an appropriate distance in all directions utilizing iris scissors.

## 2020-12-14 ENCOUNTER — APPOINTMENT (OUTPATIENT)
Dept: DISASTER EMERGENCY | Facility: CLINIC | Age: 85
End: 2020-12-14

## 2020-12-15 ENCOUNTER — APPOINTMENT (OUTPATIENT)
Dept: PSYCHIATRY | Facility: CLINIC | Age: 85
End: 2020-12-15
Payer: MEDICARE

## 2020-12-15 ENCOUNTER — APPOINTMENT (OUTPATIENT)
Dept: CT IMAGING | Facility: IMAGING CENTER | Age: 85
End: 2020-12-15
Payer: MEDICARE

## 2020-12-15 ENCOUNTER — OUTPATIENT (OUTPATIENT)
Dept: OUTPATIENT SERVICES | Facility: HOSPITAL | Age: 85
LOS: 1 days | End: 2020-12-15
Payer: MEDICARE

## 2020-12-15 DIAGNOSIS — R51.9 HEADACHE, UNSPECIFIED: ICD-10-CM

## 2020-12-15 DIAGNOSIS — Z82.8 FAMILY HISTORY OF OTHER DISABILITIES AND CHRONIC DISEASES LEADING TO DISABLEMENT, NOT ELSEWHERE CLASSIFIED: Chronic | ICD-10-CM

## 2020-12-15 DIAGNOSIS — Z90.710 ACQUIRED ABSENCE OF BOTH CERVIX AND UTERUS: Chronic | ICD-10-CM

## 2020-12-15 DIAGNOSIS — T14.8 OTHER INJURY OF UNSPECIFIED BODY REGION: Chronic | ICD-10-CM

## 2020-12-15 DIAGNOSIS — Z95.0 PRESENCE OF CARDIAC PACEMAKER: Chronic | ICD-10-CM

## 2020-12-15 DIAGNOSIS — Z84.89 FAMILY HISTORY OF OTHER SPECIFIED CONDITIONS: Chronic | ICD-10-CM

## 2020-12-15 DIAGNOSIS — H26.9 UNSPECIFIED CATARACT: Chronic | ICD-10-CM

## 2020-12-15 DIAGNOSIS — R42 DIZZINESS AND GIDDINESS: ICD-10-CM

## 2020-12-15 PROCEDURE — 70450 CT HEAD/BRAIN W/O DYE: CPT

## 2020-12-15 PROCEDURE — 70450 CT HEAD/BRAIN W/O DYE: CPT | Mod: 26

## 2020-12-15 PROCEDURE — 99214 OFFICE O/P EST MOD 30 MIN: CPT | Mod: 95

## 2020-12-18 ENCOUNTER — APPOINTMENT (OUTPATIENT)
Dept: SLEEP CENTER | Facility: CLINIC | Age: 85
End: 2020-12-18

## 2021-01-21 NOTE — ED PROVIDER NOTE - CPE EDP CARDIAC NORM
Returned call and left message for Lita to also double check and make sure that Anjel is keeping the Dexcom maikel open at all times in the background on her phone. Even if she is not looking at the Dexcom, the maikel has to be open in the background to allow for signal transmission continuously. Asked that Lita please check this as part of troubleshooting and if this does not fix the issue, then call back for nurse visit to change sensor and transmitter as otherwise planned.     KARL Puentes   Endocrinology Nurse Practitioner   [address]     - - -

## 2021-03-26 NOTE — ED PROVIDER NOTE - CARDIOVASCULAR NEGATIVE STATEMENT, MLM
Bed: 20  Expected date:   Expected time:   Means of arrival:   Comments:  LANES- Fever/AMS     Malinda Mckeon RN  03/26/21 [id number] no chest pain and no edema.

## 2021-04-06 ENCOUNTER — APPOINTMENT (OUTPATIENT)
Dept: PSYCHIATRY | Facility: CLINIC | Age: 86
End: 2021-04-06

## 2021-05-04 ENCOUNTER — APPOINTMENT (OUTPATIENT)
Dept: PSYCHIATRY | Facility: CLINIC | Age: 86
End: 2021-05-04

## 2021-05-11 ENCOUNTER — APPOINTMENT (OUTPATIENT)
Dept: PSYCHIATRY | Facility: CLINIC | Age: 86
End: 2021-05-11

## 2021-05-12 ENCOUNTER — APPOINTMENT (OUTPATIENT)
Dept: PSYCHIATRY | Facility: CLINIC | Age: 86
End: 2021-05-12
Payer: MEDICARE

## 2021-05-12 DIAGNOSIS — F43.21 ADJUSTMENT DISORDER WITH DEPRESSED MOOD: ICD-10-CM

## 2021-05-12 PROCEDURE — 99442: CPT | Mod: 95

## 2021-05-12 RX ORDER — TRAZODONE HYDROCHLORIDE 50 MG/1
50 TABLET ORAL
Qty: 30 | Refills: 2 | Status: DISCONTINUED | COMMUNITY
Start: 2020-12-15 | End: 2021-05-12

## 2021-06-23 ENCOUNTER — OUTPATIENT (OUTPATIENT)
Dept: OUTPATIENT SERVICES | Facility: HOSPITAL | Age: 86
LOS: 1 days | End: 2021-06-23
Payer: MEDICARE

## 2021-06-23 ENCOUNTER — APPOINTMENT (OUTPATIENT)
Dept: RADIOLOGY | Facility: IMAGING CENTER | Age: 86
End: 2021-06-23
Payer: MEDICARE

## 2021-06-23 DIAGNOSIS — Z82.8 FAMILY HISTORY OF OTHER DISABILITIES AND CHRONIC DISEASES LEADING TO DISABLEMENT, NOT ELSEWHERE CLASSIFIED: Chronic | ICD-10-CM

## 2021-06-23 DIAGNOSIS — H26.9 UNSPECIFIED CATARACT: Chronic | ICD-10-CM

## 2021-06-23 DIAGNOSIS — Z95.0 PRESENCE OF CARDIAC PACEMAKER: Chronic | ICD-10-CM

## 2021-06-23 DIAGNOSIS — Z84.89 FAMILY HISTORY OF OTHER SPECIFIED CONDITIONS: Chronic | ICD-10-CM

## 2021-06-23 DIAGNOSIS — Z90.710 ACQUIRED ABSENCE OF BOTH CERVIX AND UTERUS: Chronic | ICD-10-CM

## 2021-06-23 DIAGNOSIS — T14.8 OTHER INJURY OF UNSPECIFIED BODY REGION: Chronic | ICD-10-CM

## 2021-06-23 DIAGNOSIS — Z00.8 ENCOUNTER FOR OTHER GENERAL EXAMINATION: ICD-10-CM

## 2021-06-23 PROCEDURE — 77080 DXA BONE DENSITY AXIAL: CPT

## 2021-06-23 PROCEDURE — 77080 DXA BONE DENSITY AXIAL: CPT | Mod: 26

## 2021-07-01 NOTE — H&P ADULT - MS EXT PE MLT D E PC
[Normal Growth] : growth [Normal Development] : development  [No Elimination Concerns] : elimination [Continue Regimen] : feeding [No Skin Concerns] : skin [Normal Sleep Pattern] : sleep [None] : no medical problems [Anticipatory Guidance Given] : Anticipatory guidance addressed as per the history of present illness section [Physical Growth and Development] : physical growth and development [Social and Academic Competence] : social and academic competence [Emotional Well-Being] : emotional well-being [Risk Reduction] : risk reduction [Violence and Injury Prevention] : violence and injury prevention [No Vaccines] : no vaccines needed [No Medications] : ~He/She~ is not on any medications [Patient] : patient [Parent/Guardian] : Parent/Guardian [] : The components of the vaccine(s) to be administered today are listed in the plan of care. The disease(s) for which the vaccine(s) are intended to prevent and the risks have been discussed with the caretaker.  The risks are also included in the appropriate vaccination information statements which have been provided to the patient's caregiver.  The caregiver has given consent to vaccinate. [FreeTextEntry1] : Reinforced healthy diet and exercise no cyanosis/no pedal edema/no clubbing no clubbing/no cyanosis/pedal edema

## 2021-08-30 ENCOUNTER — EMERGENCY (EMERGENCY)
Facility: HOSPITAL | Age: 86
LOS: 1 days | Discharge: HOME | End: 2021-08-30
Attending: EMERGENCY MEDICINE
Payer: MEDICARE

## 2021-08-30 VITALS
HEIGHT: 63 IN | OXYGEN SATURATION: 97 % | HEART RATE: 80 BPM | SYSTOLIC BLOOD PRESSURE: 123 MMHG | DIASTOLIC BLOOD PRESSURE: 86 MMHG | RESPIRATION RATE: 18 BRPM | TEMPERATURE: 98 F

## 2021-08-30 DIAGNOSIS — Z82.8 FAMILY HISTORY OF OTHER DISABILITIES AND CHRONIC DISEASES LEADING TO DISABLEMENT, NOT ELSEWHERE CLASSIFIED: Chronic | ICD-10-CM

## 2021-08-30 DIAGNOSIS — Z95.0 PRESENCE OF CARDIAC PACEMAKER: Chronic | ICD-10-CM

## 2021-08-30 DIAGNOSIS — Z84.89 FAMILY HISTORY OF OTHER SPECIFIED CONDITIONS: Chronic | ICD-10-CM

## 2021-08-30 DIAGNOSIS — Z90.710 ACQUIRED ABSENCE OF BOTH CERVIX AND UTERUS: Chronic | ICD-10-CM

## 2021-08-30 DIAGNOSIS — H26.9 UNSPECIFIED CATARACT: Chronic | ICD-10-CM

## 2021-08-30 DIAGNOSIS — T14.8 OTHER INJURY OF UNSPECIFIED BODY REGION: Chronic | ICD-10-CM

## 2021-08-30 PROCEDURE — 99285 EMERGENCY DEPT VISIT HI MDM: CPT

## 2021-08-30 RX ORDER — ACETAMINOPHEN 500 MG
650 TABLET ORAL ONCE
Refills: 0 | Status: COMPLETED | OUTPATIENT
Start: 2021-08-30 | End: 2021-08-30

## 2021-08-30 RX ADMIN — Medication 650 MILLIGRAM(S): at 23:48

## 2021-08-30 RX ADMIN — Medication 30 MILLILITER(S): at 23:49

## 2021-08-30 NOTE — ED PROVIDER NOTE - OBJECTIVE STATEMENT
88 yo F pmhx CVA, CHF, afib on eliquis, pacemaker, cholecystectomy, HTN, HLD, p/w 2 days of worsening abdominal pain. Pain is located in her left upper quadrant of her abdomen with no radiation. She says the pain is constant and worse when laying on her side. The pain is not a/w eating or drinking. She tried gas X with no relief. She denies n/v and her last BM was today and was regular with no blood. She denies f/c cp sob dysuria. 88 yo F pmhx CVA, CHF, afib on eliquis, pacemaker, cholecystectomy, HTN, HLD, p/w 2 days of worsening abdominal pain. Pain is located in her left upper quadrant of her abdomen with no radiation. She says the pain is constant and worse when laying on her side. The pain is not a/w eating or drinking. She tried gas X with no relief. She denies n/v and her last BM was today and was regular with no blood. She denies f/c cp sob dysuria.    Attendinyo female presents with 2 days of epigastric and left upper quadrant pain.  has belching with this.  no exertional symptoms.  no chest pain.  no shortness of breath.  tolerating po.  saw pcp yesterday who gave her pantoprazole.

## 2021-08-30 NOTE — ED PROVIDER NOTE - PATIENT PORTAL LINK FT
You can access the FollowMyHealth Patient Portal offered by [facility] by registering at the following website: [email or web address]. By joining [facility]’s FollowMyHealth portal, you will also be able to view your health information using other applications (apps) compatible with our system.

## 2021-08-30 NOTE — ED PROVIDER NOTE - NS ED ROS FT
GENERAL: No fever, no chills  EYES: no change in vision  HEENT: no trouble swallowing, no trouble speaking  CARDIAC: no chest pain, no palpitations  PULMONARY: no cough, no SOB  GI: + abdominal pain, no nausea, no vomiting, no diarrhea, no constipation  : no dysuria, no frequency, no change in appearance, no odor of urine  SKIN: no rashes  NEURO: no headache, no weakness  MSK: no joint pain

## 2021-08-30 NOTE — ED PROVIDER NOTE - CARE PROVIDER_API CALL
Hope Mccoy)  Urology  [address] [place] [address]  Phone: [phone]  Fax: [phone]  Follow Up Time: 1-3 Days

## 2021-08-31 VITALS
DIASTOLIC BLOOD PRESSURE: 81 MMHG | OXYGEN SATURATION: 99 % | HEART RATE: 80 BPM | SYSTOLIC BLOOD PRESSURE: 134 MMHG | RESPIRATION RATE: 18 BRPM | TEMPERATURE: 98 F

## 2021-08-31 LAB
ALBUMIN SERPL ELPH-MCNC: 3.9 G/DL — SIGNIFICANT CHANGE UP (ref 3.3–5)
ALP SERPL-CCNC: 57 U/L — SIGNIFICANT CHANGE UP (ref 40–120)
ALT FLD-CCNC: 12 U/L — SIGNIFICANT CHANGE UP (ref 10–45)
ANION GAP SERPL CALC-SCNC: 13 MMOL/L — SIGNIFICANT CHANGE UP (ref 5–17)
AST SERPL-CCNC: 16 U/L — SIGNIFICANT CHANGE UP (ref 10–40)
BASOPHILS # BLD AUTO: 0.08 K/UL — SIGNIFICANT CHANGE UP (ref 0–0.2)
BASOPHILS NFR BLD AUTO: 0.6 % — SIGNIFICANT CHANGE UP (ref 0–2)
BILIRUB SERPL-MCNC: 1.5 MG/DL — HIGH (ref 0.2–1.2)
BUN SERPL-MCNC: 30 MG/DL — HIGH (ref 7–23)
CALCIUM SERPL-MCNC: 9.1 MG/DL — SIGNIFICANT CHANGE UP (ref 8.4–10.5)
CHLORIDE SERPL-SCNC: 99 MMOL/L — SIGNIFICANT CHANGE UP (ref 96–108)
CO2 SERPL-SCNC: 23 MMOL/L — SIGNIFICANT CHANGE UP (ref 22–31)
CREAT SERPL-MCNC: 1.06 MG/DL — SIGNIFICANT CHANGE UP (ref 0.5–1.3)
EOSINOPHIL # BLD AUTO: 0.27 K/UL — SIGNIFICANT CHANGE UP (ref 0–0.5)
EOSINOPHIL NFR BLD AUTO: 2.1 % — SIGNIFICANT CHANGE UP (ref 0–6)
GLUCOSE SERPL-MCNC: 104 MG/DL — HIGH (ref 70–99)
HCT VFR BLD CALC: 49.2 % — HIGH (ref 34.5–45)
HGB BLD-MCNC: 15.5 G/DL — SIGNIFICANT CHANGE UP (ref 11.5–15.5)
IMM GRANULOCYTES NFR BLD AUTO: 0.8 % — SIGNIFICANT CHANGE UP (ref 0–1.5)
LIDOCAIN IGE QN: 25 U/L — SIGNIFICANT CHANGE UP (ref 7–60)
LYMPHOCYTES # BLD AUTO: 1.89 K/UL — SIGNIFICANT CHANGE UP (ref 1–3.3)
LYMPHOCYTES # BLD AUTO: 14.5 % — SIGNIFICANT CHANGE UP (ref 13–44)
MCHC RBC-ENTMCNC: 26.7 PG — LOW (ref 27–34)
MCHC RBC-ENTMCNC: 31.5 GM/DL — LOW (ref 32–36)
MCV RBC AUTO: 84.8 FL — SIGNIFICANT CHANGE UP (ref 80–100)
MONOCYTES # BLD AUTO: 0.96 K/UL — HIGH (ref 0–0.9)
MONOCYTES NFR BLD AUTO: 7.4 % — SIGNIFICANT CHANGE UP (ref 2–14)
NEUTROPHILS # BLD AUTO: 9.74 K/UL — HIGH (ref 1.8–7.4)
NEUTROPHILS NFR BLD AUTO: 74.6 % — SIGNIFICANT CHANGE UP (ref 43–77)
NRBC # BLD: 0 /100 WBCS — SIGNIFICANT CHANGE UP (ref 0–0)
PLATELET # BLD AUTO: 300 K/UL — SIGNIFICANT CHANGE UP (ref 150–400)
POTASSIUM SERPL-MCNC: 4.8 MMOL/L — SIGNIFICANT CHANGE UP (ref 3.5–5.3)
POTASSIUM SERPL-SCNC: 4.8 MMOL/L — SIGNIFICANT CHANGE UP (ref 3.5–5.3)
PROT SERPL-MCNC: 6.7 G/DL — SIGNIFICANT CHANGE UP (ref 6–8.3)
RBC # BLD: 5.8 M/UL — HIGH (ref 3.8–5.2)
RBC # FLD: 15.5 % — HIGH (ref 10.3–14.5)
SODIUM SERPL-SCNC: 135 MMOL/L — SIGNIFICANT CHANGE UP (ref 135–145)
TROPONIN T, HIGH SENSITIVITY RESULT: 11 NG/L — SIGNIFICANT CHANGE UP (ref 0–51)
TROPONIN T, HIGH SENSITIVITY RESULT: 12 NG/L — SIGNIFICANT CHANGE UP (ref 0–51)
WBC # BLD: 13.05 K/UL — HIGH (ref 3.8–10.5)
WBC # FLD AUTO: 13.05 K/UL — HIGH (ref 3.8–10.5)

## 2021-08-31 PROCEDURE — 83690 ASSAY OF LIPASE: CPT

## 2021-08-31 PROCEDURE — 85025 COMPLETE CBC W/AUTO DIFF WBC: CPT

## 2021-08-31 PROCEDURE — 99284 EMERGENCY DEPT VISIT MOD MDM: CPT | Mod: 25

## 2021-08-31 PROCEDURE — 80053 COMPREHEN METABOLIC PANEL: CPT

## 2021-08-31 PROCEDURE — 74177 CT ABD & PELVIS W/CONTRAST: CPT | Mod: 26,MA

## 2021-08-31 PROCEDURE — 84484 ASSAY OF TROPONIN QUANT: CPT

## 2021-08-31 PROCEDURE — 74177 CT ABD & PELVIS W/CONTRAST: CPT | Mod: MA

## 2021-08-31 NOTE — ED ADULT NURSE NOTE - NSIMPLEMENTINTERV_GEN_ALL_ED
Implemented All Universal Safety Interventions:  [place] to call system. Call bell, personal items and telephone within reach. Instruct patient to call for assistance. Room bathroom lighting operational. Non-slip footwear when patient is off stretcher. Physically safe environment: no spills, clutter or unnecessary equipment. Stretcher in lowest position, wheels locked, appropriate side rails in place.

## 2021-08-31 NOTE — ED ADULT NURSE NOTE - OBJECTIVE STATEMENT
87 year old female with a PMH CHF, HTN, HLD, afib on Eliquis, comes to the ED c/o LUQ abdominal pain beginning 2 days prior. Pt states pain began acutely, worse with laying on side and deep inspiration. Pt denies n/v/d, f/c, urinary symptoms, denies pain is a/w eating or drinking. Endorses taking gasX with no relief. Pt is a/ox4, VSS, sensory/motor function intact, speaking coherently, follows commands and in NAD at this time. lung sounds are clear and equal b/l with no labored breathing noted. Abdomen is soft, nontender and nondistended. Peripheral pulses are strong and equal b/l with no edema noted. Skin is warm, dry and intact. Pt denies f/c, n/v/d, dizziness/lightheadedness, numbness/tingling/weakness, abdominal pain, back pain, hematuria/dysuria/frequency at this time.

## 2021-09-02 ENCOUNTER — NON-APPOINTMENT (OUTPATIENT)
Age: 86
End: 2021-09-02

## 2021-09-02 ENCOUNTER — APPOINTMENT (OUTPATIENT)
Dept: UROLOGY | Facility: CLINIC | Age: 86
End: 2021-09-02
Payer: MEDICARE

## 2021-09-02 VITALS
WEIGHT: 164 LBS | SYSTOLIC BLOOD PRESSURE: 134 MMHG | OXYGEN SATURATION: 96 % | HEIGHT: 62 IN | TEMPERATURE: 98.1 F | DIASTOLIC BLOOD PRESSURE: 82 MMHG | HEART RATE: 68 BPM | RESPIRATION RATE: 14 BRPM | BODY MASS INDEX: 30.18 KG/M2

## 2021-09-02 DIAGNOSIS — Z86.79 PERSONAL HISTORY OF OTHER DISEASES OF THE CIRCULATORY SYSTEM: ICD-10-CM

## 2021-09-02 PROCEDURE — 99204 OFFICE O/P NEW MOD 45 MIN: CPT

## 2021-09-02 NOTE — HISTORY OF PRESENT ILLNESS
[FreeTextEntry1] : Ms. Dozier is an 87 y.o. F with a history of anxiety, depression, congestive heart failure, atrial fibrillation, and history of lacunar stroke who presents today after abnormal finding on a CT scan.\par \par A CT scan was performed on August 31, 2021 for abdominal pain, which demonstrated a 1.2 cm enhancing soft tissue nodule involving the anterior bladder.  Additionally, there was some thickening in the right posterior lateral bladder wall.  She presents today with her daughter.\par \par Denies gross hematuria. No personal or family history of  malignancy. She is a never smoker. She worked as a  mostly in an office setting, without exposure to rubber products / dyes. She has a history of basal cell CA, but no other personal primary malignancies. \par \par Regarding her urologic history, she reports she did have a history of recurrent UTIs and used to follow with another urologist approximately 8 years ago.  She was started on vaginal estrogen, and since that time has been doing very well and has not had a UTI in several years.  She continues on this twice a week.  She denies any other urinary symptoms, including frequency, urgency, incomplete emptying.  She does report very occasional and mild stress urinary incontinence with heavy coughing.  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  She is on Lasix 20 mg and 40 mg which she alternates daily.  She reports that this is very well controlled.  She has a history of a laparoscopic cholecystectomy, and an open hysterectomy for fibroids.

## 2021-09-02 NOTE — PHYSICAL EXAM
[Normal Appearance] : normal appearance [] : no respiratory distress [Bowel Sounds] : normal bowel sounds [Mood] : the mood was normal [No Palpable Adenopathy] : no palpable adenopathy [FreeTextEntry1] : grossly normal

## 2021-09-02 NOTE — REVIEW OF SYSTEMS
[Eyesight Problems] : eyesight problems [Heart Rate Is Slow] : slow heart rate [Heart Rate Is Fast] : fast heart rate [Negative] : Heme/Lymph [FreeTextEntry6] : bladder mass on CT scan

## 2021-09-06 LAB
APPEARANCE: CLEAR
BACTERIA UR CULT: NORMAL
BACTERIA: NEGATIVE
BILIRUBIN URINE: NEGATIVE
BLOOD URINE: NEGATIVE
COLOR: NORMAL
GLUCOSE QUALITATIVE U: NEGATIVE
HYALINE CASTS: 0 /LPF
KETONES URINE: NEGATIVE
LEUKOCYTE ESTERASE URINE: NEGATIVE
MICROSCOPIC-UA: NORMAL
NITRITE URINE: NEGATIVE
PH URINE: 6
PROTEIN URINE: NEGATIVE
RED BLOOD CELLS URINE: 0 /HPF
SPECIFIC GRAVITY URINE: 1.01
SQUAMOUS EPITHELIAL CELLS: 15 /HPF
UROBILINOGEN URINE: NORMAL
WHITE BLOOD CELLS URINE: 0 /HPF

## 2021-09-07 ENCOUNTER — TRANSCRIPTION ENCOUNTER (OUTPATIENT)
Age: 86
End: 2021-09-07

## 2021-09-08 LAB — URINE CYTOLOGY: NORMAL

## 2021-09-09 ENCOUNTER — APPOINTMENT (OUTPATIENT)
Dept: UROLOGY | Facility: CLINIC | Age: 86
End: 2021-09-09
Payer: MEDICARE

## 2021-09-09 VITALS
HEART RATE: 79 BPM | SYSTOLIC BLOOD PRESSURE: 137 MMHG | OXYGEN SATURATION: 93 % | HEIGHT: 62 IN | DIASTOLIC BLOOD PRESSURE: 85 MMHG | RESPIRATION RATE: 15 BRPM | BODY MASS INDEX: 30.18 KG/M2 | WEIGHT: 164 LBS | TEMPERATURE: 97.8 F

## 2021-09-09 PROCEDURE — 52000 CYSTOURETHROSCOPY: CPT

## 2021-09-10 ENCOUNTER — TRANSCRIPTION ENCOUNTER (OUTPATIENT)
Age: 86
End: 2021-09-10

## 2021-10-01 ENCOUNTER — APPOINTMENT (OUTPATIENT)
Dept: PSYCHIATRY | Facility: CLINIC | Age: 86
End: 2021-10-01

## 2021-10-06 ENCOUNTER — TRANSCRIPTION ENCOUNTER (OUTPATIENT)
Age: 86
End: 2021-10-06

## 2021-10-13 ENCOUNTER — TRANSCRIPTION ENCOUNTER (OUTPATIENT)
Age: 86
End: 2021-10-13

## 2021-11-01 ENCOUNTER — APPOINTMENT (OUTPATIENT)
Dept: PSYCHIATRY | Facility: CLINIC | Age: 86
End: 2021-11-01
Payer: MEDICARE

## 2021-11-01 PROCEDURE — 99214 OFFICE O/P EST MOD 30 MIN: CPT

## 2021-11-01 RX ORDER — LOSARTAN POTASSIUM 100 MG/1
100 TABLET, FILM COATED ORAL DAILY
Refills: 0 | Status: DISCONTINUED | COMMUNITY
Start: 2020-01-27 | End: 2021-11-01

## 2021-11-01 RX ORDER — FUROSEMIDE 20 MG/1
20 TABLET ORAL
Qty: 45 | Refills: 0 | Status: DISCONTINUED | COMMUNITY
Start: 2021-10-13 | End: 2021-11-01

## 2021-11-01 RX ORDER — ATENOLOL 50 MG/1
50 TABLET ORAL DAILY
Refills: 0 | Status: DISCONTINUED | COMMUNITY
Start: 2018-11-05 | End: 2021-11-01

## 2021-11-12 ENCOUNTER — APPOINTMENT (OUTPATIENT)
Dept: UROLOGY | Facility: CLINIC | Age: 86
End: 2021-11-12

## 2021-12-09 NOTE — ED ADULT NURSE NOTE - NSFALLRSKHRMRISKTYPE_ED_ALL_ED
[facility] updated on pt results and plan to return.       Argenis Zheng RN  12/08/21 1953
Pt resting on cot. RR even and unlabored. Family at bedside. Pt given warm blanket. Family asking how much longer for results.      Peter Foss RN  12/08/21 [id number]
bones(Osteoporosis,prev fx,steroid use,metastatic bone ca)/coagulation(Bleeding disorder R/T clinical cond/anti-coags)

## 2022-01-13 ENCOUNTER — APPOINTMENT (OUTPATIENT)
Dept: UROLOGY | Facility: CLINIC | Age: 87
End: 2022-01-13
Payer: MEDICARE

## 2022-01-13 PROCEDURE — 52000 CYSTOURETHROSCOPY: CPT

## 2022-01-14 ENCOUNTER — APPOINTMENT (OUTPATIENT)
Dept: UROLOGY | Facility: CLINIC | Age: 87
End: 2022-01-14

## 2022-02-02 ENCOUNTER — APPOINTMENT (OUTPATIENT)
Dept: PSYCHIATRY | Facility: CLINIC | Age: 87
End: 2022-02-02
Payer: MEDICARE

## 2022-02-02 PROCEDURE — 99442: CPT | Mod: 95

## 2022-02-16 ENCOUNTER — APPOINTMENT (OUTPATIENT)
Dept: RADIOLOGY | Facility: IMAGING CENTER | Age: 87
End: 2022-02-16
Payer: MEDICARE

## 2022-02-16 ENCOUNTER — OUTPATIENT (OUTPATIENT)
Dept: OUTPATIENT SERVICES | Facility: HOSPITAL | Age: 87
LOS: 1 days | End: 2022-02-16
Payer: MEDICARE

## 2022-02-16 ENCOUNTER — APPOINTMENT (OUTPATIENT)
Dept: MAMMOGRAPHY | Facility: IMAGING CENTER | Age: 87
End: 2022-02-16
Payer: MEDICARE

## 2022-02-16 ENCOUNTER — APPOINTMENT (OUTPATIENT)
Dept: ULTRASOUND IMAGING | Facility: IMAGING CENTER | Age: 87
End: 2022-02-16
Payer: MEDICARE

## 2022-02-16 DIAGNOSIS — H26.9 UNSPECIFIED CATARACT: Chronic | ICD-10-CM

## 2022-02-16 DIAGNOSIS — Z90.710 ACQUIRED ABSENCE OF BOTH CERVIX AND UTERUS: Chronic | ICD-10-CM

## 2022-02-16 DIAGNOSIS — Z82.8 FAMILY HISTORY OF OTHER DISABILITIES AND CHRONIC DISEASES LEADING TO DISABLEMENT, NOT ELSEWHERE CLASSIFIED: Chronic | ICD-10-CM

## 2022-02-16 DIAGNOSIS — M81.0 AGE-RELATED OSTEOPOROSIS WITHOUT CURRENT PATHOLOGICAL FRACTURE: ICD-10-CM

## 2022-02-16 DIAGNOSIS — Z95.0 PRESENCE OF CARDIAC PACEMAKER: Chronic | ICD-10-CM

## 2022-02-16 DIAGNOSIS — T14.8 OTHER INJURY OF UNSPECIFIED BODY REGION: Chronic | ICD-10-CM

## 2022-02-16 DIAGNOSIS — Z84.89 FAMILY HISTORY OF OTHER SPECIFIED CONDITIONS: Chronic | ICD-10-CM

## 2022-02-16 DIAGNOSIS — Z00.8 ENCOUNTER FOR OTHER GENERAL EXAMINATION: ICD-10-CM

## 2022-02-16 PROCEDURE — 77063 BREAST TOMOSYNTHESIS BI: CPT

## 2022-02-16 PROCEDURE — 77063 BREAST TOMOSYNTHESIS BI: CPT | Mod: 26

## 2022-02-16 PROCEDURE — 77067 SCR MAMMO BI INCL CAD: CPT | Mod: 26

## 2022-02-16 PROCEDURE — 76641 ULTRASOUND BREAST COMPLETE: CPT | Mod: 26,50

## 2022-02-16 PROCEDURE — 77067 SCR MAMMO BI INCL CAD: CPT

## 2022-02-16 PROCEDURE — 76641 ULTRASOUND BREAST COMPLETE: CPT

## 2022-03-17 ENCOUNTER — APPOINTMENT (OUTPATIENT)
Dept: PLASTIC SURGERY | Facility: CLINIC | Age: 87
End: 2022-03-17
Payer: MEDICARE

## 2022-03-17 DIAGNOSIS — D17.0 BENIGN LIPOMATOUS NEOPLASM OF SKIN AND SUBCUTANEOUS TISSUE OF HEAD, FACE AND NECK: ICD-10-CM

## 2022-03-17 PROCEDURE — 99203 OFFICE O/P NEW LOW 30 MIN: CPT

## 2022-03-17 PROCEDURE — 99213 OFFICE O/P EST LOW 20 MIN: CPT

## 2022-03-17 NOTE — HISTORY OF PRESENT ILLNESS
[FreeTextEntry1] : Patient is an 88-year-old female with PMH CHF, HTN, HLD, Afib on eliquis, macular degeneration, and osteopenia presenting for consultation for right forehead mass. Patient first noticed the mass 10 years ago and reports it has grown significantly in the last year. It has never fluctuated in size and has never fully disappeared. No redness, drainage, itching, pain/tenderness, or ulceration noted. She has not noticed other skin abnormalities on her body, but reports a hx of keloids at surgical sites. She also has a history of skin cancer s/p Mohs on her face, unsure of exact location or year.

## 2022-04-28 ENCOUNTER — NON-APPOINTMENT (OUTPATIENT)
Age: 87
End: 2022-04-28

## 2022-06-17 NOTE — DISCHARGE NOTE NURSING/CASE MANAGEMENT/SOCIAL WORK - NSDCPEFALRISK_GEN_ALL_CORE
Patient information on fall and injury prevention Patient: Lorena Hayden    Procedure: * No procedures listed *       Anesthesia Type:  General    Note:  Disposition: Inpatient   Postop Pain Control: Uneventful            Sign Out: Well controlled pain   PONV: No   Neuro/Psych: Uneventful            Sign Out: Acceptable/Baseline neuro status   Airway/Respiratory: Uneventful            Sign Out: Acceptable/Baseline resp. status   CV/Hemodynamics: Uneventful            Sign Out: Acceptable CV status   Other NRE: NONE   DID A NON-ROUTINE EVENT OCCUR? No           Last vitals:  Vitals:    06/17/22 0605 06/17/22 0821 06/17/22 0830   BP: (!) 142/80 118/79    Pulse: 92  113   Resp: 16 18 21   Temp: 36.3  C (97.4  F) 36.6  C (97.8  F) 36.8  C (98.2  F)   SpO2: 98% 98% 96%       Electronically Signed By: Stefani Rizo MD  June 17, 2022  8:40 AM

## 2022-07-29 ENCOUNTER — APPOINTMENT (OUTPATIENT)
Dept: PSYCHIATRY | Facility: CLINIC | Age: 87
End: 2022-07-29

## 2022-07-29 DIAGNOSIS — G47.00 INSOMNIA, UNSPECIFIED: ICD-10-CM

## 2022-07-29 PROCEDURE — 99214 OFFICE O/P EST MOD 30 MIN: CPT | Mod: 95

## 2022-08-04 PROBLEM — G47.00 INSOMNIA: Status: ACTIVE | Noted: 2020-01-27

## 2022-08-17 NOTE — ED PROVIDER NOTE - MUSCULOSKELETAL NEGATIVE STATEMENT, MLM
"Former patient of Daniel & has not established care with another provider.  Please assign refill request to covering provider per clinic standard process.    Routing refill request to provider for review/approval because:  Patient needs to be seen because it has been more than 1 year since last office visit.  BP not current  Early refill requested.  No PCP    Last Written Prescription Date:  6/6/22  Last Fill Quantity: 180,  # refills: 0   Last office visit provider:  4/9/21     Requested Prescriptions   Pending Prescriptions Disp Refills     carvedilol (COREG) 25 MG tablet [Pharmacy Med Name: CARVEDILOL TABS 25MG] 180 tablet 3     Sig: TAKE 1 TABLET TWICE A DAY WITH MEALS (NEED APPOINTMENT)       Beta-Blockers Protocol Failed - 8/17/2022  3:02 PM        Failed - Blood pressure under 140/90 in past 12 months     BP Readings from Last 3 Encounters:   04/09/21 138/88                 Failed - Recent (12 mo) or future (30 days) visit within the authorizing provider's specialty     Patient has had an office visit with the authorizing provider or a provider within the authorizing providers department within the previous 12 mos or has a future within next 30 days. See \"Patient Info\" tab in inbasket, or \"Choose Columns\" in Meds & Orders section of the refill encounter.              Passed - Patient is age 6 or older        Passed - Medication is active on med list             Armando Del Valle RN 08/17/22 3:02 PM  " no back pain, no gout, no musculoskeletal pain, no neck pain, and no weakness.

## 2022-10-12 ENCOUNTER — EMERGENCY (EMERGENCY)
Facility: HOSPITAL | Age: 87
LOS: 1 days | Discharge: HOME | End: 2022-10-12
Attending: EMERGENCY MEDICINE | Admitting: EMERGENCY MEDICINE

## 2022-10-12 ENCOUNTER — APPOINTMENT (OUTPATIENT)
Dept: CT IMAGING | Facility: IMAGING CENTER | Age: 87
End: 2022-10-12

## 2022-10-12 ENCOUNTER — OUTPATIENT (OUTPATIENT)
Dept: OUTPATIENT SERVICES | Facility: HOSPITAL | Age: 87
LOS: 1 days | End: 2022-10-12
Payer: MEDICARE

## 2022-10-12 VITALS
SYSTOLIC BLOOD PRESSURE: 108 MMHG | TEMPERATURE: 98 F | OXYGEN SATURATION: 100 % | HEART RATE: 74 BPM | DIASTOLIC BLOOD PRESSURE: 50 MMHG | RESPIRATION RATE: 17 BRPM

## 2022-10-12 VITALS
TEMPERATURE: 98 F | DIASTOLIC BLOOD PRESSURE: 77 MMHG | RESPIRATION RATE: 16 BRPM | HEIGHT: 63 IN | OXYGEN SATURATION: 100 % | HEART RATE: 86 BPM | SYSTOLIC BLOOD PRESSURE: 120 MMHG

## 2022-10-12 DIAGNOSIS — Z95.0 PRESENCE OF CARDIAC PACEMAKER: Chronic | ICD-10-CM

## 2022-10-12 DIAGNOSIS — Z84.89 FAMILY HISTORY OF OTHER SPECIFIED CONDITIONS: Chronic | ICD-10-CM

## 2022-10-12 DIAGNOSIS — T14.8 OTHER INJURY OF UNSPECIFIED BODY REGION: Chronic | ICD-10-CM

## 2022-10-12 DIAGNOSIS — N32.89 OTHER SPECIFIED DISORDERS OF BLADDER: ICD-10-CM

## 2022-10-12 DIAGNOSIS — Z82.8 FAMILY HISTORY OF OTHER DISABILITIES AND CHRONIC DISEASES LEADING TO DISABLEMENT, NOT ELSEWHERE CLASSIFIED: Chronic | ICD-10-CM

## 2022-10-12 DIAGNOSIS — Z90.710 ACQUIRED ABSENCE OF BOTH CERVIX AND UTERUS: Chronic | ICD-10-CM

## 2022-10-12 DIAGNOSIS — H26.9 UNSPECIFIED CATARACT: Chronic | ICD-10-CM

## 2022-10-12 PROCEDURE — 74178 CT ABD&PLV WO CNTR FLWD CNTR: CPT | Mod: ME

## 2022-10-12 PROCEDURE — G1004: CPT

## 2022-10-12 PROCEDURE — 99284 EMERGENCY DEPT VISIT MOD MDM: CPT | Mod: GC

## 2022-10-12 PROCEDURE — 93010 ELECTROCARDIOGRAM REPORT: CPT

## 2022-10-12 PROCEDURE — 74178 CT ABD&PLV WO CNTR FLWD CNTR: CPT | Mod: 26,ME

## 2022-10-12 RX ORDER — APIXABAN 2.5 MG/1
5 TABLET, FILM COATED ORAL ONCE
Refills: 0 | Status: COMPLETED | OUTPATIENT
Start: 2022-10-12 | End: 2022-10-12

## 2022-10-12 RX ADMIN — APIXABAN 5 MILLIGRAM(S): 2.5 TABLET, FILM COATED ORAL at 23:48

## 2022-10-12 NOTE — ED PROVIDER NOTE - NSFOLLOWUPINSTRUCTIONS_ED_ALL_ED_FT
You presented to the emergency room today with swelling and discoloration of your left hand, where you had an IV with contrast. It is likely that the swelling was due to extravasation of the contrast into the tissue of your hand. We administered a cold pack and compression, which helped decrease the swelling and discoloration. Please follow up with your PCP about the results of your CT scan that you had performed. If your symptoms worsens, you have increasingly discoloration of your hand, increasing swelling, numbness/weakness of your hand or fingers, decreased strength in your hand, please return back to the emergency department.

## 2022-10-12 NOTE — ED PROVIDER NOTE - PATIENT PORTAL LINK FT
You can access the FollowMyHealth Patient Portal offered by [facility] by registering at the following website: [email or web address]. By joining [employer]’s FollowMyHealth portal, you will also be able to view your health information using other applications (apps) compatible with our system.

## 2022-10-12 NOTE — ED ADULT NURSE NOTE - OBJECTIVE STATEMENT
Pt alert and awake AOx4. Pt came in c/o lower right abd pain and swelling to her L hand as a result of IV Contrast from a CT scan from earlier today. Pt stated that she had has IV cont arts in the past and has never has any issues. Able to palpate radial pules strong and equal. Denies numbness and tingling in the hand. Pt also mentioned the lower right abd pain that began about one month ago, Pt was sent for a CT from PCP to see if diverticulitis was causing the abd pain. Pt denies N/V and stated that she is able to pass gas and stool normally. Upon palpation of the abd pt grimaced in pain when pushing on the LRQ. States that pain is a 5/10 upon palpation. Awaiting plan.

## 2022-10-12 NOTE — ED PROVIDER NOTE - PHYSICAL EXAMINATION
Const: not in acute distress  Eyes: no conjunctival injection  HEENT: Head NCAT, Moist MM.  Neck: Trachea midline.   CVS: +S1/S2, Peripheral pulses 2+ and equal in all extremities.  RESP: Unlabored respiratory effort. Clear to auscultation bilaterally.  GI: Nontender/Nondistended, No CVA tenderness b/l.   MSK: Normocephalic/Atraumatic, No Lower Extremities edema b/l. R hand neurovascularly intact. Swelling with discoloration of skin on R hand.  Skin: Intact.   Neuro: CNs II-XII grossly intact. Motor & Sensation grossly intact.  Psych: Awake, Alert, & Oriented (AAO) x3.

## 2022-10-12 NOTE — ED PROVIDER NOTE - DATE/TIME 3
Lucia GARIBAY [address] [facility] [id number] [phone] Patient: Tamiko Vernon MRN: [id number] :1967 Visit Information Date & Time Provider Department Dept. Phone Encounter #  
 2018  3:40 PM Alejandro Núñez MD [facility] [phone] [id number] Follow-up Instructions Return if symptoms worsen or fail to improve. Upcoming Health Maintenance Date Due  
 BREAST CANCER SCRN MAMMOGRAM 3/2/2017 FOBT Q 1 YEAR AGE 50-75 3/2/2017 PAP AKA CERVICAL CYTOLOGY 7/15/2018 DTaP/Tdap/Td series (2 - Td) 2022 Allergies as of 2018  Review Complete On: 2018 By: Alejandro Núñez MD  
  
 Severity Noted Reaction Type Reactions Imitrex [Sumatriptan Succinate]  2010    Rash Lodine [Etodolac]  2010    Unknown (comments) Maxalt [Rizatriptan]  2010    Rash Current Immunizations  Reviewed on 10/16/2015 Name Date Influenza Vaccine 2013 Influenza Vaccine Janice Nissen) 2017 Influenza Vaccine (Quad) PF 2016, 10/16/2015  1:39 PM  
 Influenza Vaccine PF 2014 Pneumococcal Polysaccharide (PPSV-23) 2015 TDAP Vaccine 2012 Not reviewed this visit You Were Diagnosed With   
  
 Codes Comments Visual changes    -  Primary ICD-10-CM: H53.9 ICD-9-CM: 368.9 Tremor     ICD-10-CM: R25.1 ICD-9-CM: 145. 0 Vitals BP Pulse Temp Resp Height(growth percentile) Weight(growth percentile) 124/74 92 98.7 °F (37.1 °C) (Oral) 18 5' 4\" (1.626 m) 190 lb 6.4 oz (86.4 kg) LMP SpO2 BMI OB Status Smoking Status (LMP Unknown) 98% 32.68 kg/m2 Postmenopausal Former Smoker Vitals History BMI and BSA Data Body Mass Index Body Surface Area  
 32.68 kg/m 2 1.98 m 2 Preferred Pharmacy Pharmacy Name Phone [id number] [address], [address] [phone] Your Updated Medication List  
  
   
This list is accurate as of: 1/29/18  4:24 PM.  Always use your most recent med list.  
  
  
  
  
 ABILIFY PO Take  by mouth. albuterol 90 mcg/actuation inhaler Commonly known as:  PROAIR HFA INHALE 2 PUFFS BY MOUTH EVERY 6 HOURS AS NEEDED FOR WHEEZING  
  
 amitriptyline 75 mg tablet Commonly known as:  ELAVIL Take 150 mg by mouth nightly. buPROPion  mg SR tablet Commonly known as:  Graciela Pilsner Take 150 mg by mouth three (3) times daily. CYMBALTA 60 mg capsule Generic drug:  DULoxetine Take 60 mg by mouth daily. TAKES IN AM  
  
 FLONASE ALLERGY RELIEF 50 mcg/actuation nasal spray Generic drug:  fluticasone INHALE 2 SPRAYS INTO EACH NOSTRIL EVERY DAY  
  
 furosemide 20 mg tablet Commonly known as:  LASIX TAKE 1 TABLET BY MOUTH EVERY OTHER DAY IF NEEDED (NOT DAILY)  
  
 gabapentin 800 mg tablet Commonly known as:  NEURONTIN  
TAKE 1 TABLET THREE TIMES A DAY  
  
 haloperidol 1 mg tablet Commonly known as:  HALDOL Take 2 mg by mouth nightly. metoprolol succinate 100 mg tablet Commonly known as:  TOPROL-XL  
TAKE 1 TABLET BY MOUTH EVERY DAY  
  
 mometasone-formoterol 100-5 mcg/actuation HFA inhaler Commonly known as:  Michael Mayerton Take 2 Puffs by inhalation two (2) times a day. montelukast 10 mg tablet Commonly known as:  SINGULAIR  
TAKE 1 TABLET BY MOUTH EVERY DAY  
  
 * morphine IR 15 mg tablet Commonly known as:  MS IR Take  by mouth three (3) times daily. * morphine CR 30 mg CR tablet Commonly known as:  MS CONTIN  
30 mg three (3) times daily. NexIUM 24HR 20 mg Tbec Generic drug:  esomeprazole magnesium TAKE 20 MG BY MOUTH DAILY (BEFORE BREAKFAST). * Notice: This list has 2 medication(s) that are the same as other medications prescribed for you. Read the directions carefully, and ask your doctor or other care provider to review them with you. Follow-up Instructions Return if symptoms worsen or fail to improve. Patient Instructions PRESCRIPTION REFILL POLICY [facility] Statement to Patients April 1, 2014 In an effort to ensure the large volume of patient prescription refills is processed in the most efficient and expeditious manner, we are asking our patients to assist us by calling your Pharmacy for all prescription refills, this will include also your  Mail Order Pharmacy. The pharmacy will contact our office electronically to continue the refill process. Please do not wait until the last minute to call your pharmacy. We need at least 48 hours (2days) to fill prescriptions. We also encourage you to call your pharmacy before going to  your prescription to make sure it is ready. With regard to controlled substance prescription refill requests (narcotic refills) that need to be picked up at our office, we ask your cooperation by providing us with at least 72 hours (3days) notice that you will need a refill. We will not refill narcotic prescription refill requests after 4:00pm on any weekday, Monday through Thursday, or after 2:00pm on Fridays, or on the weekends. We encourage everyone to explore another way of getting your prescription refill request processed using [employer], our patient web portal through our electronic medical record system. [employer] is an efficient and effective way to communicate your medication request directly to the office and  downloadable as an ozzie on your smart phone . [employer] also features a review functionality that allows you to view your medication list as well as leave messages for your physician. Are you ready to get connected? If so please review the attatched instructions or speak to any of our staff to get you set up right away! Thank you so much for your cooperation. Should you have any questions please contact our Practice Administrator. The Physicians and Staff,  [address] [facility] [address] What is a living will? A living will is a legal form you use to write down the kind of care you want at the end of your life. It is used by the health professionals who will treat you if you aren't able to decide for yourself. If you put your wishes in writing, your loved ones and others will know what kind of care you want. They won't need to guess. This can ease your mind and be helpful to others. A living will is not the same as an estate or property will. An estate will explains what you want to happen with your money and property after you die. Is a living will a legal document? A living will is a legal document. Each state has its own laws about living vickers. If you move to another state, make sure that your living will is legal in the state where you now live. Or you might use a universal form that has been approved by many states. This kind of form can sometimes be completed and stored online. Your electronic copy will then be available wherever you have a connection to the Internet. In most cases, doctors will respect your wishes even if you have a form from a different state. · You don't need an  to complete a living will. But legal advice can be helpful if your state's laws are unclear, your health history is complicated, or your family can't agree on what should be in your living will. · You can change your living will at any time. Some people find that their wishes about end-of-life care change as their health changes. · In addition to making a living will, think about completing a medical power of  form. This form lets you name the person you want to make end-of-life treatment decisions for you (your \"health care agent\") if you're not able to. Many hospitals and nursing homes will give you the forms you need to complete a living will and a medical power of . · Your living will is used only if you can't make or communicate decisions for yourself anymore. If you become able to make decisions again, you can accept or refuse any treatment, no matter what you wrote in your living will. · Your state may offer an online registry. This is a place where you can store your living will online so the doctors and nurses who need to treat you can find it right away. What should you think about when creating a living will? Talk about your end-of-life wishes with your family members and your doctor. Let them know what you want. That way the people making decisions for you won't be surprised by your choices. Think about these questions as you make your living will: · Do you know enough about life support methods that might be used? If not, talk to your doctor so you know what might be done if you can't breathe on your own, your heart stops, or you're unable to swallow. · What things would you still want to be able to do after you receive life-support methods? Would you want to be able to walk? To speak? To eat on your own? To live without the help of machines? · If you have a choice, where do you want to be cared for? In your home? At a hospital or nursing home? · Do you want certain Spiritism practices performed if you become very ill? · If you have a choice at the end of your life, where would you prefer to die? At home? In a hospital or nursing home? Somewhere else? · Would you prefer to be buried or cremated? · Do you want your organs to be donated after you die? What should you do with your living will? · Make sure that your family members and your health care agent have copies of your living will. · Give your doctor a copy of your living will to keep in your medical record. If you have more than one doctor, make sure that each one has a copy. · You may want to put a copy of your living will where it can be easily found. Where can you learn more? Go to http://lane-ashly.info/. Enter C719 in the search box to learn more about \"Learning About Living Kofi. \" Current as of: September 24, 2016 Content Version: 11.4 © 3015-7086 [employer]. Care instructions adapted under license by [employer] (which disclaims liability or warranty for this information). If you have questions about a medical condition or this instruction, always ask your healthcare professional. [address] any warranty or liability for your use of this information. Patient testing was shared on this visit. She will be following up with her eye doctor tomorrow but I found no reasonable links to any visual impairment and the labs and the imaging of her anterior to posterior visual pathway. Is welcome to use her camera phone to record any inadvertent involuntary movements she may have but I did not see any on today's visit. We will pend the revisit. Introducing [facility] & HEALTH SERVICES! Dear Yolanda Amado: 
Thank you for requesting a [employer] account. Our records indicate that you already have an active [employer] account. You can access your account anytime at [email or web address] Did you know that you can access your hospital and ER discharge instructions at any time in [employer]? You can also review all of your test results from your hospital stay or ER visit. Additional Information If you have questions, please visit the Frequently Asked Questions section of the [employer] website at [email or web address] Remember, [employer] is NOT to be used for urgent needs. For medical emergencies, dial 911. Now available from your iPhone and Android! Please provide this summary of care documentation to your next provider. Your primary care clinician is listed as David Wu. If you have any questions after today's visit, please call [phone]. 12-Oct-2022 23:23

## 2022-10-12 NOTE — ED PROVIDER NOTE - CLINICAL SUMMARY MEDICAL DECISION MAKING FREE TEXT BOX
87 yo Female Pmhx Atrial fibrillation dx 7/09 on coumadin, cerebrovascular accident (CVA), HLD (hyperlipidemia), HTN (hypertension), OA (osteoarthritis), YOVANI on CPAP, Thyroid nodule followed by endocrinologist, UTI (urinary tract infection) frequent last was 1/15, presenting with swelling in left hand where IV was placed for CAT scan. Likely 2/2 extravasation. Low suspicion for infectious, compartment syndrome. Will get screening EKG 87 yo Female Pmhx Atrial fibrillation dx 7/09 on coumadin, cerebrovascular accident (CVA), HLD (hyperlipidemia), HTN (hypertension), OA (osteoarthritis), YOVANI on CPAP, Thyroid nodule followed by endocrinologist, UTI (urinary tract infection) frequent last was 1/15, presenting with swelling in left hand where IV was placed for CAT scan. Likely 2/2 extravasation. Will use compression and cold packs to decrease the swelling to ensure no mottling. Low suspicion for infectious, compartment syndrome. Will get screening EKG.

## 2022-10-12 NOTE — ED ADULT TRIAGE NOTE - CHIEF COMPLAINT QUOTE
Pt presents from clinic, c/o abdominal pain for the past month, had CT scan with IV contrast, pt presents with swelling to Lt hand where IV was placed sent to ED for further eval. Pt denies any numbness/tingling to Lt hand, pulses palpable, no further complaints.

## 2022-10-12 NOTE — ED ADULT NURSE NOTE - NSIMPLEMENTINTERV_GEN_ALL_ED
Implemented All Fall with Harm Risk Interventions:  [place] to call system. Call bell, personal items and telephone within reach. Instruct patient to call for assistance. Room bathroom lighting operational. Non-slip footwear when patient is off stretcher. Physically safe environment: no spills, clutter or unnecessary equipment. Stretcher in lowest position, wheels locked, appropriate side rails in place. Provide visual cue, wrist band, yellow gown, etc. Monitor gait and stability. Monitor for mental status changes and reorient to person, place, and time. Review medications for side effects contributing to fall risk. Reinforce activity limits and safety measures with patient and family. Provide visual clues: red socks.

## 2022-10-12 NOTE — ED PROVIDER NOTE - OBJECTIVE STATEMENT
89 yo Female Pmhx Atrial fibrillation dx 7/09 on coumadin, cerebrovascular accident (CVA), HLD (hyperlipidemia), HTN (hypertension), OA (osteoarthritis), YOVANI on CPAP, Thyroid nodule followed by endocrinologist, UTI (urinary tract infection) frequent last was 1/15, presenting with swelling in left hand where IV was placed for CAT scan. Patient received CT scan of abdomen today for concern for RLQ abdominal pain to evaluate for diverticulitis. After patient noticed swelling on soreness in left hand where IV was placed for contrast. Patient denies fever, chills, CP, SOB, and dysuria.

## 2022-10-12 NOTE — ED PROVIDER NOTE - PROGRESS NOTE DETAILS
MD Kary (PGY-1) ACE wrapped hand with cold pack to improve swelling. Will reassess in 20-30 minutes. MD Kary (PGY-1) Patient reassessed. Color discoloration and swelling mostly resolved. MD Kary (PGY-1) Patient reassessed. Still have some blue color to hand. Will continue to administer cold pack.

## 2022-10-12 NOTE — ED PROVIDER NOTE - ATTENDING CONTRIBUTION TO CARE
agree with resident note    "89 yo Female Pmhx Atrial fibrillation dx 7/09 on coumadin, cerebrovascular accident (CVA), HLD (hyperlipidemia), HTN (hypertension), OA (osteoarthritis), YOVANI on CPAP, Thyroid nodule followed by endocrinologist, UTI (urinary tract infection) frequent last was 1/15, presenting with swelling in left hand where IV was placed for CAT scan. Patient received CT scan of abdomen today for concern for RLQ abdominal pain to evaluate for diverticulitis. After patient noticed swelling on soreness in left hand where IV was placed for contrast. Patient denies fever, chills, CP, SOB, and dysuria."    PE:  well appearing; VSS: CTAB/L; s1 s2 no m/r/g ext: left hand: mildly swollen; swelling from hand to proximal to wrist; vasc: 2+ radial pulse    Imp: infilatration of IV; ice packs, elevation, continue to monitor for worsening, extension of swelling

## 2022-10-13 NOTE — PROGRESS NOTE ADULT - PROBLEM/PLAN-1
She is improving with Wellbutrin but she is struggling in the afternoon.  We will increase Wellbutrin XL dose to 300 mg every morning.  Advised her to call if she has any side effects to the increased dose.  She will follow-up in 3 months or sooner if needed.   DISPLAY PLAN FREE TEXT

## 2022-10-18 ENCOUNTER — APPOINTMENT (OUTPATIENT)
Dept: UROLOGY | Facility: CLINIC | Age: 87
End: 2022-10-18

## 2022-10-18 VITALS
WEIGHT: 164 LBS | DIASTOLIC BLOOD PRESSURE: 84 MMHG | HEIGHT: 62 IN | TEMPERATURE: 97.7 F | SYSTOLIC BLOOD PRESSURE: 128 MMHG | HEART RATE: 79 BPM | RESPIRATION RATE: 17 BRPM | OXYGEN SATURATION: 94 % | BODY MASS INDEX: 30.18 KG/M2

## 2022-10-18 DIAGNOSIS — N32.89 OTHER SPECIFIED DISORDERS OF BLADDER: ICD-10-CM

## 2022-10-18 PROCEDURE — 52000 CYSTOURETHROSCOPY: CPT

## 2022-10-27 ENCOUNTER — APPOINTMENT (OUTPATIENT)
Dept: INTERVENTIONAL RADIOLOGY/VASCULAR | Facility: CLINIC | Age: 87
End: 2022-10-27

## 2022-10-27 PROCEDURE — 99202 OFFICE O/P NEW SF 15 MIN: CPT | Mod: 95

## 2022-10-29 ENCOUNTER — RESULT REVIEW (OUTPATIENT)
Age: 87
End: 2022-10-29

## 2022-10-31 NOTE — ED PROVIDER NOTE - CPE EDP SKIN NORM
F/u after MRI  MRI R knee- R knee pain/injury/fall/XR possible lateral tibial plateau fx  Begin wearing knee immobilizer  STAT- US- R calf TTP  R/o DVT  No weight bearing on R leg  Wheelchair  normal...

## 2022-11-02 ENCOUNTER — APPOINTMENT (OUTPATIENT)
Dept: CT IMAGING | Facility: IMAGING CENTER | Age: 87
End: 2022-11-02

## 2022-11-02 ENCOUNTER — OUTPATIENT (OUTPATIENT)
Dept: OUTPATIENT SERVICES | Facility: HOSPITAL | Age: 87
LOS: 1 days | End: 2022-11-02
Payer: MEDICARE

## 2022-11-02 DIAGNOSIS — T14.8 OTHER INJURY OF UNSPECIFIED BODY REGION: Chronic | ICD-10-CM

## 2022-11-02 DIAGNOSIS — Z84.89 FAMILY HISTORY OF OTHER SPECIFIED CONDITIONS: Chronic | ICD-10-CM

## 2022-11-02 DIAGNOSIS — H26.9 UNSPECIFIED CATARACT: Chronic | ICD-10-CM

## 2022-11-02 DIAGNOSIS — Z95.0 PRESENCE OF CARDIAC PACEMAKER: Chronic | ICD-10-CM

## 2022-11-02 DIAGNOSIS — Z90.710 ACQUIRED ABSENCE OF BOTH CERVIX AND UTERUS: Chronic | ICD-10-CM

## 2022-11-02 DIAGNOSIS — M47.817 SPONDYLOSIS WITHOUT MYELOPATHY OR RADICULOPATHY, LUMBOSACRAL REGION: ICD-10-CM

## 2022-11-02 DIAGNOSIS — Z82.8 FAMILY HISTORY OF OTHER DISABILITIES AND CHRONIC DISEASES LEADING TO DISABLEMENT, NOT ELSEWHERE CLASSIFIED: Chronic | ICD-10-CM

## 2022-11-02 PROCEDURE — 72131 CT LUMBAR SPINE W/O DYE: CPT

## 2022-11-02 PROCEDURE — 72131 CT LUMBAR SPINE W/O DYE: CPT | Mod: 26,MH

## 2022-11-12 ENCOUNTER — OUTPATIENT (OUTPATIENT)
Dept: OUTPATIENT SERVICES | Facility: HOSPITAL | Age: 87
LOS: 1 days | End: 2022-11-12
Payer: MEDICARE

## 2022-11-12 DIAGNOSIS — Z11.52 ENCOUNTER FOR SCREENING FOR COVID-19: ICD-10-CM

## 2022-11-12 DIAGNOSIS — Z82.8 FAMILY HISTORY OF OTHER DISABILITIES AND CHRONIC DISEASES LEADING TO DISABLEMENT, NOT ELSEWHERE CLASSIFIED: Chronic | ICD-10-CM

## 2022-11-12 DIAGNOSIS — Z84.89 FAMILY HISTORY OF OTHER SPECIFIED CONDITIONS: Chronic | ICD-10-CM

## 2022-11-12 DIAGNOSIS — T14.8 OTHER INJURY OF UNSPECIFIED BODY REGION: Chronic | ICD-10-CM

## 2022-11-12 DIAGNOSIS — Z90.710 ACQUIRED ABSENCE OF BOTH CERVIX AND UTERUS: Chronic | ICD-10-CM

## 2022-11-12 DIAGNOSIS — H26.9 UNSPECIFIED CATARACT: Chronic | ICD-10-CM

## 2022-11-12 DIAGNOSIS — Z95.0 PRESENCE OF CARDIAC PACEMAKER: Chronic | ICD-10-CM

## 2022-11-12 LAB — SARS-COV-2 RNA SPEC QL NAA+PROBE: SIGNIFICANT CHANGE UP

## 2022-11-12 PROCEDURE — U0005: CPT

## 2022-11-12 PROCEDURE — C9803: CPT

## 2022-11-12 PROCEDURE — U0003: CPT

## 2022-11-15 ENCOUNTER — RESULT REVIEW (OUTPATIENT)
Age: 87
End: 2022-11-15

## 2022-11-15 ENCOUNTER — OUTPATIENT (OUTPATIENT)
Dept: OUTPATIENT SERVICES | Facility: HOSPITAL | Age: 87
LOS: 1 days | End: 2022-11-15
Payer: MEDICARE

## 2022-11-15 ENCOUNTER — TRANSCRIPTION ENCOUNTER (OUTPATIENT)
Age: 87
End: 2022-11-15

## 2022-11-15 VITALS
WEIGHT: 179.9 LBS | DIASTOLIC BLOOD PRESSURE: 71 MMHG | SYSTOLIC BLOOD PRESSURE: 120 MMHG | HEIGHT: 64 IN | TEMPERATURE: 98 F | HEART RATE: 75 BPM | RESPIRATION RATE: 18 BRPM | OXYGEN SATURATION: 98 %

## 2022-11-15 DIAGNOSIS — Z90.710 ACQUIRED ABSENCE OF BOTH CERVIX AND UTERUS: Chronic | ICD-10-CM

## 2022-11-15 DIAGNOSIS — Z84.89 FAMILY HISTORY OF OTHER SPECIFIED CONDITIONS: Chronic | ICD-10-CM

## 2022-11-15 DIAGNOSIS — Z82.8 FAMILY HISTORY OF OTHER DISABILITIES AND CHRONIC DISEASES LEADING TO DISABLEMENT, NOT ELSEWHERE CLASSIFIED: Chronic | ICD-10-CM

## 2022-11-15 DIAGNOSIS — H26.9 UNSPECIFIED CATARACT: Chronic | ICD-10-CM

## 2022-11-15 DIAGNOSIS — Z95.0 PRESENCE OF CARDIAC PACEMAKER: Chronic | ICD-10-CM

## 2022-11-15 DIAGNOSIS — R59.0 LOCALIZED ENLARGED LYMPH NODES: ICD-10-CM

## 2022-11-15 DIAGNOSIS — T14.8 OTHER INJURY OF UNSPECIFIED BODY REGION: Chronic | ICD-10-CM

## 2022-11-15 DIAGNOSIS — R59.1 GENERALIZED ENLARGED LYMPH NODES: ICD-10-CM

## 2022-11-15 LAB
APTT BLD: 30.6 SEC — SIGNIFICANT CHANGE UP (ref 27.5–35.5)
HCT VFR BLD CALC: 44.5 % — SIGNIFICANT CHANGE UP (ref 34.5–45)
HGB BLD-MCNC: 14.5 G/DL — SIGNIFICANT CHANGE UP (ref 11.5–15.5)
INR BLD: 1.14 RATIO — SIGNIFICANT CHANGE UP (ref 0.88–1.16)
MCHC RBC-ENTMCNC: 28.8 PG — SIGNIFICANT CHANGE UP (ref 27–34)
MCHC RBC-ENTMCNC: 32.6 GM/DL — SIGNIFICANT CHANGE UP (ref 32–36)
MCV RBC AUTO: 88.5 FL — SIGNIFICANT CHANGE UP (ref 80–100)
NRBC # BLD: 0 /100 WBCS — SIGNIFICANT CHANGE UP (ref 0–0)
PLATELET # BLD AUTO: 275 K/UL — SIGNIFICANT CHANGE UP (ref 150–400)
PROTHROM AB SERPL-ACNC: 13.3 SEC — SIGNIFICANT CHANGE UP (ref 10.5–13.4)
RBC # BLD: 5.03 M/UL — SIGNIFICANT CHANGE UP (ref 3.8–5.2)
RBC # FLD: 15.1 % — HIGH (ref 10.3–14.5)
WBC # BLD: 20.97 K/UL — HIGH (ref 3.8–10.5)
WBC # FLD AUTO: 20.97 K/UL — HIGH (ref 3.8–10.5)

## 2022-11-15 PROCEDURE — 88305 TISSUE EXAM BY PATHOLOGIST: CPT | Mod: 26

## 2022-11-15 PROCEDURE — 88173 CYTOPATH EVAL FNA REPORT: CPT | Mod: 26

## 2022-11-15 PROCEDURE — 87205 SMEAR GRAM STAIN: CPT

## 2022-11-15 PROCEDURE — 76942 ECHO GUIDE FOR BIOPSY: CPT

## 2022-11-15 PROCEDURE — 88172 CYTP DX EVAL FNA 1ST EA SITE: CPT

## 2022-11-15 PROCEDURE — 88189 FLOWCYTOMETRY/READ 16 & >: CPT

## 2022-11-15 PROCEDURE — 38505 NEEDLE BIOPSY LYMPH NODES: CPT

## 2022-11-15 PROCEDURE — 88185 FLOWCYTOMETRY/TC ADD-ON: CPT

## 2022-11-15 PROCEDURE — 88305 TISSUE EXAM BY PATHOLOGIST: CPT

## 2022-11-15 PROCEDURE — 85610 PROTHROMBIN TIME: CPT

## 2022-11-15 PROCEDURE — 76942 ECHO GUIDE FOR BIOPSY: CPT | Mod: 26

## 2022-11-15 PROCEDURE — 85027 COMPLETE CBC AUTOMATED: CPT

## 2022-11-15 PROCEDURE — 85730 THROMBOPLASTIN TIME PARTIAL: CPT

## 2022-11-15 PROCEDURE — 88173 CYTOPATH EVAL FNA REPORT: CPT

## 2022-11-15 PROCEDURE — 88108 CYTOPATH CONCENTRATE TECH: CPT | Mod: 26,59

## 2022-11-15 RX ORDER — POTASSIUM CHLORIDE 20 MEQ
1 PACKET (EA) ORAL
Qty: 0 | Refills: 0 | DISCHARGE

## 2022-11-15 RX ORDER — ALENDRONATE SODIUM 70 MG/1
1 TABLET ORAL
Qty: 0 | Refills: 0 | DISCHARGE

## 2022-11-15 RX ORDER — ATENOLOL 25 MG/1
1 TABLET ORAL
Qty: 0 | Refills: 0 | DISCHARGE

## 2022-11-15 NOTE — ASU DISCHARGE PLAN (ADULT/PEDIATRIC) - NURSING INSTRUCTIONS
Please feel free to contact us at [phone] if any problems arise. After 6PM, Monday through Friday, on weekends and on holidays, please call [phone] and ask for the radiology resident on call to be paged..

## 2022-11-15 NOTE — ASU DISCHARGE PLAN (ADULT/PEDIATRIC) - NS MD DC FALL RISK RISK
For information on Fall & Injury Prevention, visit: [email or web address] OR  [email or web address] OR  https://www.cdc.gov/steadi/patient.html

## 2022-11-15 NOTE — ASU DISCHARGE PLAN (ADULT/PEDIATRIC) - ASU DC SPECIAL INSTRUCTIONSFT
Biopsy Discharge    Discharge Instructions  - You have had a biopsy of inguinal lymph node.   - You may shower in 24 hours. No soaking or swimming until the site is completely healed.  - Keep the area covered and dry for the next 24 hours.  - Do not perform any heavy lifting for the next few days or until the site is healed.  - You may resume your normal diet.  - You may resume your normal medications   - It is normal to experience some pain over the site for the next few days. You may take Tylenol for that pain. Do not take more frequently than every 6 hours and do not exceed more than 3000mg of Tylenol in a 24 hour period.    Notify your primary physician and/or Interventional Radiology IMMEDIATELY if you experience any of the following       - Fever of 101F or 38C       - Chills or Rigors/ Shakes       - Swelling and/or Redness in the area around the biopsy site       - Worsening Pain       - Blood soaked bandages or worsening bleeding       - Lightheadedness and/or dizziness upon standing       - Chest Pain/ Tightness       - Shortness of Breath       - Difficulty walking    If you have a problem that you believe requires IMMEDIATE attention, please go to your NEAREST Emergency Room. If you believe your problem can safely wait until you speak to a physician, please call Interventional Radiology for any concerns.    Please feel free to contact us at [phone] if any problems arise. After 6PM, Monday through Friday, on weekends and on holidays, please call [phone] and ask for the radiology resident on call to be paged.

## 2022-11-15 NOTE — ASU PATIENT PROFILE, ADULT - VISION (WITH CORRECTIVE LENSES IF THE PATIENT USUALLY WEARS THEM):
patient legally blind/Severely impaired: cannot locate objects without hearing or touching them or patient nonresponsive.

## 2022-11-15 NOTE — PRE PROCEDURE NOTE - PRE PROCEDURE EVALUATION
Interventional Radiology    HPI: 88y Female with PMH of bladder mass, afib, YOVANI, CHF, HTN, CVA found to have lymphadenopathy referred to IR for lynph node biopsy. Patient seen in telehealth visit with Dr. Patten on 10/27/22. Will plan for inguinal lymph node biopsy.     Allergies: Cardizem (Urticaria)  sulfa drugs (Hives)    Medications (Abx/Cardiac/Anticoagulation/Blood Products)      Data:    T(C): --  HR: --  BP: --  RR: --  SpO2: --    Exam  General: No acute distress  Chest: Non labored breathing    Plan: 88y Female presents for Inguinal lymph node biopsy  -Risks/Benefits/alternatives explained with the patient and/or healthcare proxy and witnessed informed consent obtained.

## 2022-11-17 LAB — TM INTERPRETATION: SIGNIFICANT CHANGE UP

## 2022-11-18 LAB — NON-GYNECOLOGICAL CYTOLOGY STUDY: SIGNIFICANT CHANGE UP

## 2022-11-18 NOTE — ED PROVIDER NOTE - CPE EDP EYES NORM
Please follow up with your primary care physician within 2 days. Ensure that you review all lab work results and/or imaging results. If you have any questions about your discharge paperwork please call the Emergency Department.     Return to the Emergency Department for any numbness, tingling, weakness, worsening pain, fever, numbness to your groin, urinary or bowel incontinence or retention, or any new or worsening symptoms.     Thank you for visiting [facility], Department of Emergency Medicine. Please see the entirety of the educational materials provided. Please note that a visit to the emergency department does not substitute ongoing care from a primary medical provider or specialist. Please ensure to follow up as recommended. However, please return to the emergency department immediately if symptoms do not improve as discussed, symptoms worsen, new symptoms develop, difficulty in following up or for any of your concerns or issues. Please note on discharge you are acknowledging understanding and agreement on medical evaluation, management recommendations and follow up recommendations.      normal...

## 2022-11-29 ENCOUNTER — TRANSCRIPTION ENCOUNTER (OUTPATIENT)
Age: 87
End: 2022-11-29

## 2022-12-07 ENCOUNTER — OUTPATIENT (OUTPATIENT)
Dept: OUTPATIENT SERVICES | Facility: HOSPITAL | Age: 87
LOS: 1 days | Discharge: HOME | End: 2022-12-07

## 2022-12-07 DIAGNOSIS — Z82.8 FAMILY HISTORY OF OTHER DISABILITIES AND CHRONIC DISEASES LEADING TO DISABLEMENT, NOT ELSEWHERE CLASSIFIED: Chronic | ICD-10-CM

## 2022-12-07 DIAGNOSIS — T14.8 OTHER INJURY OF UNSPECIFIED BODY REGION: Chronic | ICD-10-CM

## 2022-12-07 DIAGNOSIS — Z90.710 ACQUIRED ABSENCE OF BOTH CERVIX AND UTERUS: Chronic | ICD-10-CM

## 2022-12-07 DIAGNOSIS — H26.9 UNSPECIFIED CATARACT: Chronic | ICD-10-CM

## 2022-12-07 DIAGNOSIS — C85.10 UNSPECIFIED B-CELL LYMPHOMA, UNSPECIFIED SITE: ICD-10-CM

## 2022-12-07 DIAGNOSIS — Z95.0 PRESENCE OF CARDIAC PACEMAKER: Chronic | ICD-10-CM

## 2022-12-07 DIAGNOSIS — Z84.89 FAMILY HISTORY OF OTHER SPECIFIED CONDITIONS: Chronic | ICD-10-CM

## 2022-12-16 ENCOUNTER — APPOINTMENT (OUTPATIENT)
Dept: HEMATOLOGY ONCOLOGY | Facility: CLINIC | Age: 87
End: 2022-12-16

## 2022-12-20 ENCOUNTER — APPOINTMENT (OUTPATIENT)
Dept: HEMATOLOGY ONCOLOGY | Facility: CLINIC | Age: 87
End: 2022-12-20

## 2022-12-20 ENCOUNTER — RESULT REVIEW (OUTPATIENT)
Age: 87
End: 2022-12-20

## 2022-12-20 ENCOUNTER — NON-APPOINTMENT (OUTPATIENT)
Age: 87
End: 2022-12-20

## 2022-12-20 VITALS
HEIGHT: 62.01 IN | BODY MASS INDEX: 33.05 KG/M2 | HEART RATE: 83 BPM | TEMPERATURE: 97.2 F | SYSTOLIC BLOOD PRESSURE: 129 MMHG | DIASTOLIC BLOOD PRESSURE: 78 MMHG | OXYGEN SATURATION: 96 % | WEIGHT: 181.88 LBS | RESPIRATION RATE: 16 BRPM

## 2022-12-20 LAB
BASOPHILS # BLD AUTO: 0.1 K/UL — SIGNIFICANT CHANGE UP (ref 0–0.2)
BASOPHILS NFR BLD AUTO: 1 % — SIGNIFICANT CHANGE UP (ref 0–2)
EOSINOPHIL # BLD AUTO: 0.19 K/UL — SIGNIFICANT CHANGE UP (ref 0–0.5)
EOSINOPHIL NFR BLD AUTO: 1.9 % — SIGNIFICANT CHANGE UP (ref 0–6)
HCT VFR BLD CALC: 45.4 % — HIGH (ref 34.5–45)
HGB BLD-MCNC: 14.5 G/DL — SIGNIFICANT CHANGE UP (ref 11.5–15.5)
IMM GRANULOCYTES NFR BLD AUTO: 0.7 % — SIGNIFICANT CHANGE UP (ref 0–0.9)
LYMPHOCYTES # BLD AUTO: 1.28 K/UL — SIGNIFICANT CHANGE UP (ref 1–3.3)
LYMPHOCYTES # BLD AUTO: 12.7 % — LOW (ref 13–44)
MCHC RBC-ENTMCNC: 28.9 PG — SIGNIFICANT CHANGE UP (ref 27–34)
MCHC RBC-ENTMCNC: 31.9 G/DL — LOW (ref 32–36)
MCV RBC AUTO: 90.6 FL — SIGNIFICANT CHANGE UP (ref 80–100)
MONOCYTES # BLD AUTO: 0.71 K/UL — SIGNIFICANT CHANGE UP (ref 0–0.9)
MONOCYTES NFR BLD AUTO: 7.1 % — SIGNIFICANT CHANGE UP (ref 2–14)
NEUTROPHILS # BLD AUTO: 7.69 K/UL — HIGH (ref 1.8–7.4)
NEUTROPHILS NFR BLD AUTO: 76.6 % — SIGNIFICANT CHANGE UP (ref 43–77)
NRBC # BLD: 0 /100 WBCS — SIGNIFICANT CHANGE UP (ref 0–0)
PLATELET # BLD AUTO: 267 K/UL — SIGNIFICANT CHANGE UP (ref 150–400)
RBC # BLD: 5.01 M/UL — SIGNIFICANT CHANGE UP (ref 3.8–5.2)
RBC # FLD: 14.8 % — HIGH (ref 10.3–14.5)
WBC # BLD: 10.04 K/UL — SIGNIFICANT CHANGE UP (ref 3.8–10.5)
WBC # FLD AUTO: 10.04 K/UL — SIGNIFICANT CHANGE UP (ref 3.8–10.5)

## 2022-12-20 PROCEDURE — 99205 OFFICE O/P NEW HI 60 MIN: CPT | Mod: GC

## 2022-12-21 LAB
ALBUMIN SERPL ELPH-MCNC: 4 G/DL
ALP BLD-CCNC: 60 U/L
ALT SERPL-CCNC: 11 U/L
ANION GAP SERPL CALC-SCNC: 13 MMOL/L
AST SERPL-CCNC: 13 U/L
B2 MICROGLOB SERPL-MCNC: 4.6 MG/L
BILIRUB SERPL-MCNC: 0.8 MG/DL
BUN SERPL-MCNC: 30 MG/DL
CALCIUM SERPL-MCNC: 8.8 MG/DL
CHLORIDE SERPL-SCNC: 101 MMOL/L
CO2 SERPL-SCNC: 26 MMOL/L
CREAT SERPL-MCNC: 0.98 MG/DL
EGFR: 55 ML/MIN/1.73M2
GLUCOSE SERPL-MCNC: 101 MG/DL
HBV CORE IGG+IGM SER QL: NONREACTIVE
HBV SURFACE AB SER QL: NONREACTIVE
HBV SURFACE AG SER QL: NONREACTIVE
HCV AB SER QL: NONREACTIVE
HCV S/CO RATIO: 0.06 S/CO
HIV1+2 AB SPEC QL IA.RAPID: NONREACTIVE
INR PPP: 1.52 RATIO
LDH SERPL-CCNC: 274 U/L
MAGNESIUM SERPL-MCNC: 1.7 MG/DL
PHOSPHATE SERPL-MCNC: 3.8 MG/DL
POTASSIUM SERPL-SCNC: 3.8 MMOL/L
PROT SERPL-MCNC: 6.1 G/DL
PT BLD: 18 SEC
SODIUM SERPL-SCNC: 140 MMOL/L
URATE SERPL-MCNC: 6.3 MG/DL

## 2022-12-22 LAB
EBV DNA SERPL NAA+PROBE-ACNC: NOT DETECTED IU/ML
EBVPCR LOG: NOT DETECTED LOG10IU/ML

## 2022-12-27 NOTE — ASSESSMENT
[FreeTextEntry1] : 88 y/o presents today of initial consultation re  US guided core bx and FNA revealing atypical B cell population\par \par -will plan for PET/CT for staging to aid in biopsy planning\par -given patient's extensive cardiac history will weight risk/benefits after PET/CT to determine safe excisional biopsy to differentiate type of b-cell lymphoma and determine treatment plan, given cardiac history will attempt further diagnosis with core biopsy\par -check cbc, cmp, mag, phos, ldh, uric acid, hepatitis serologies, hiv, immunoglobulins and PB flow cytometry \par -plan d/w patient and patient's daughter \par \par RTO in 2 weeks via TEB

## 2022-12-27 NOTE — END OF VISIT
[FreeTextEntry3] : Patient seen and case discussed with RASHAUN Victoria. I agree with above and have edited the note where needed.\par  [Time Spent: ___ minutes] : I have spent [unfilled] minutes of time on the encounter.

## 2022-12-27 NOTE — PHYSICAL EXAM
[Ambulatory and capable of all self care but unable to carry out any work activities] : Status 2- Ambulatory and capable of all self care but unable to carry out any work activities. Up and about more than 50% of waking hours [Normal] : affect appropriate [de-identified] : mild edema of b/l LE

## 2022-12-27 NOTE — HISTORY OF PRESENT ILLNESS
[de-identified] : Ms. Dozier is a 89 year old female with PMHx of afib, YOVANI, anxiety, HTN, CHF, and CVA presents today for recent inguinal US guided core bx and FNA revealing atypical B cell population. Patient was referred by urologist Dr. Hartmann who has been following patient for bladder mass that is thought to be a benign process\par Patient had an annual CT scan to evaluate bladder mass and had incidental finding of retroperitoneal, aortal caval  adenopathy measuring 1.4 x 1.6 cm, left inguinal adenopathy measuring 1.9 cm x 3.0 cm and inguinal adenopathy measuring 1.4 x 1.8 cm as well as  left eternal iliac lymph node measuring 1.2 x 2.2 cm. Patient was reported to have tenderness to palpation of right abd as well prompting referral to IR for biopsy, s/p inguinal US guided core bx and FNA revealing atypical B cell population.\par \par Currently patient feels well, she denies any pain to abdomen. No fevers/chills. No drenching night sweats. No enlarged/bothersome LN's. No CP/SOB. Normal BM's. No CP/SOB. No recent/recurrent infections. She ambulates at home with no issues.

## 2023-01-01 NOTE — PHYSICAL THERAPY INITIAL EVALUATION ADULT - MANUAL MUSCLE TESTING RESULTS, REHAB EVAL
[facility]    Intensive Care Unit  Daily Progress Note                                     Name: yDlon Tate (Female-Alyson Vizcarra) Gian MRN# [id number]   Parents: Alyson Springer   Date/Time of Birth: 2023 4:53 AM  Date of Admission:   2023         History of Present Illness   , Gestational Age: 30w0d, appropriate for gestational age, 3 lb 2.8 oz (1440 g), female infant born by  due to concern for placental abruption.  Asked by Dr. Duran to care for this infant born at [facility].    The infant was transported to [facility] for further evaluation, monitoring and management of prematurity and respiratory distress and then transferred back to [facility] to ongoing care of issues related to prematurity and respiratory distress. Please see transport notes for further details.     Patient Active Problem List   Diagnosis     infant of 30 completed weeks of gestation    Apnea of prematurity    Respiratory distress syndrome in     VLBW baby (very low birth-weight baby)    Slow feeding in     Prematurity    Placental abruption affecting delivery     Interval History   Stable       Assessment & Plan     Overall Status:    38 day old,  female infant, now at 35w3d PMA with RDS likely related to prematurity which may be exacerbated by placental abruption, With anemia consistent with abruption.     This patient is critically ill with respiratory failure requiring  HFNC to provide CPAP.     Vascular Access:  UVC -    FEN:  Hypoglycemia, hx of CGM study participation    Vitals:    23 0018 23 0030 23 0030   Weight: 2.28 kg (5 lb 0.4 oz) 2.275 kg (5 lb 0.3 oz) 2.345 kg (5 lb 2.7 oz)      Weight change: 0.07 kg (2.5 oz)     IN: 154 mL/kg/day (Goal:160 )  130 kCal/kg/day  Adequate urine and stool    - TF goal 150-160  - MBM/DBM + HMF26 - 150 mL/kg/day over 30 minutes   - NaCl supplementation (2) started 9/10  -  Recheck tonio   - Vit D enough in feeding  - Zn supplementation  - glycerin supps BID    Respiratory: Failure requiring CPAP. CXR c/w RDS. S/P surfactant (LMA, intubated surf x2). Extubated . CPAP 5 decreased to HFNC on . Returned to CPAP -.    Currently:  HFNC 3->2 LPM 21 % (From 4 to 3 lpm on  and weaned to 2 lpm on )    - Diruil started   - Follow serial Xray and gas as needed  - Xray on  with air bronchogram. Xray  - improved expansion  - Intermittent Lasix doses on , , ,     Apnea of prematurity: Intermittent spells, last on  with desat needing mild stim.  - Continue Caffeine until 36 weeks (ongoing spells and respiratory support needed)    Cardiovascular: Hemodynamically stable. Soft murmur.  - Echo - normal with stretch PFO vs ASD - expect follow up prior to discharge (~10/11)  - Obtain CCHD screen per protocol.     Hematology: anemia of prematurity/phlebotomy/abruption. pRBCx1 on admission.  - FeSO4(5) - increase to (7) on 9/10.    - Monitor Hg/ferritin per RD recs ()    Hemoglobin   Date Value Ref Range Status   2023 10.7 (L) 11.1 - 19.6 g/dL Final   2023 11.1 - 19.6 g/dL Final   2023 (L) 15.0 - 24.0 g/dL Final       ID: bilateral eye drainage noted  (left eye 1+ gram + cocci and 3+ WBC)  - h/o Polytrim   - Continue lacrimal duct massage    CNS: Normal HUS   - HUS at 35-36 wks PMA ()  - weekly OFC measurements.      Toxicology: Elevated Lead Level  Elevated maternal lead levels during pregnancy.  Infant lead level 7.1-> 6.3 -> 3.2->2 on  (Normalized).     Ophthalmology: ductal obstruction  - ROP exam :  zone 3, stage 0 follow up 10/9  - Ductal compresses/massages  - Polytrim started 9/10    HCM:  - Screening tests indicated  - [place]- sent prior to pRBC transfusion, 24 hour-borderline aa and abnml hgb after transfusion.  Needs 90 day post transfusion screen  - repeat NMS at  14 days (FA and barts) and 30 days (Less than 2 kg at birth) NORMAL  - Between 4 and 6 months of age, collect the following labs: complete blood count, reticulocyte count, and hemoglobin electrophoresis. Consult with a pediatric hematologist for clinically abnormal results.    - CCHD screen at 24-48 hr and in room air.  - Hearing test at/after 35 weeks corrected gestational age.  - Carseat trial (for infants less 37 weeks or less than 1500 grams)  - OT input.  - Continue standard NICU cares and family education plan.    Immunizations   Up to date  Immunization History   Administered Date(s) Administered    Hepatitis B, Peds 2023        Medications   Current Facility-Administered Medications   Medication    Breast Milk label for barcode scanning 1 Bottle    caffeine citrate (CAFCIT) solution 24 mg    chlorothiazide (DIURIL) suspension 47.5 mg    cyclopentolate (CYCLODRYL) 0.5 % ophthalmic solution 1 drop    ferrous sulfate (RADHA-IN-SOL) oral drops 8.4 mg    glycerin (PEDI-LAX) Suppository 0.25 suppository    sodium chloride ORAL solution 1.1 mEq    sucrose (SWEET-EASE) solution 0.2-2 mL    tetracaine (PONTOCAINE) 0.5 % ophthalmic solution 1 drop    zinc sulfate solution 20.24 mg        Physical Exam   General:  appearing infant in NAD  HEENT: HFNC. Anterior fontanelle soft/open/flat. Respiratory: No increased work of breathing. Normal Respiratory Rate. Lung clear to auscultation bilaterally.   Cardiovascular: Regular Rate and Rhythm. Soft murmur. Capillary refill ~ 2 seconds.  Abdomen: Soft, non-tender.    Musculoskeletal: Active moving all extremities equally   Skin: Pink, well perfused, no skin lesions noted.         Communications   Parents:  Name Home Phone Work Phone Mobile Phone Relationship Lgl MILTON Degroot [phone] Mother       Family lives at  [facility] [facility]   [place] [address]   needed Zee      PCPs:  Infant PCP: Physician No  Ref-Primary    Maternal OB PCP:   Information for the patient's mother:  GianAlyson Saskia Zackery [[id number]]   Aliza Phan       Delivering Provider:  Dr. Leon Diaz    Admission note routed to all.       Health Care Team:  Patient discussed with the care team. A/P, imaging studies, laboratory data, medications and family situation reviewed.    YANI STYLES MD   grossly assessed due to/cardiac issues

## 2023-01-14 ENCOUNTER — APPOINTMENT (OUTPATIENT)
Dept: NUCLEAR MEDICINE | Facility: IMAGING CENTER | Age: 88
End: 2023-01-14
Payer: MEDICARE

## 2023-01-14 ENCOUNTER — OUTPATIENT (OUTPATIENT)
Dept: OUTPATIENT SERVICES | Facility: HOSPITAL | Age: 88
LOS: 1 days | End: 2023-01-14
Payer: MEDICARE

## 2023-01-14 DIAGNOSIS — H26.9 UNSPECIFIED CATARACT: Chronic | ICD-10-CM

## 2023-01-14 DIAGNOSIS — Z82.8 FAMILY HISTORY OF OTHER DISABILITIES AND CHRONIC DISEASES LEADING TO DISABLEMENT, NOT ELSEWHERE CLASSIFIED: Chronic | ICD-10-CM

## 2023-01-14 DIAGNOSIS — D47.9 NEOPLASM OF UNCERTAIN BEHAVIOR OF LYMPHOID, HEMATOPOIETIC AND RELATED TISSUE, UNSPECIFIED: ICD-10-CM

## 2023-01-14 DIAGNOSIS — Z84.89 FAMILY HISTORY OF OTHER SPECIFIED CONDITIONS: Chronic | ICD-10-CM

## 2023-01-14 DIAGNOSIS — Z95.0 PRESENCE OF CARDIAC PACEMAKER: Chronic | ICD-10-CM

## 2023-01-14 DIAGNOSIS — Z90.710 ACQUIRED ABSENCE OF BOTH CERVIX AND UTERUS: Chronic | ICD-10-CM

## 2023-01-14 DIAGNOSIS — T14.8 OTHER INJURY OF UNSPECIFIED BODY REGION: Chronic | ICD-10-CM

## 2023-01-14 PROCEDURE — 78815 PET IMAGE W/CT SKULL-THIGH: CPT | Mod: 26,PI,MH

## 2023-01-14 PROCEDURE — A9552: CPT

## 2023-01-14 PROCEDURE — 78815 PET IMAGE W/CT SKULL-THIGH: CPT

## 2023-01-16 NOTE — ED PROVIDER NOTE - PMH
Atrial fibrillation  dx 7/09 on coumadin  HLD (hyperlipidemia)    HTN (hypertension)    OA (osteoarthritis)    YOVANI on CPAP    Thyroid nodule  followed by endocrinologist  UTI (urinary tract infection)  frequent last was 1/15 Gabapentin Counseling: I discussed with the patient the risks of gabapentin including but not limited to dizziness, somnolence, fatigue and ataxia.

## 2023-01-17 ENCOUNTER — APPOINTMENT (OUTPATIENT)
Dept: HEMATOLOGY ONCOLOGY | Facility: CLINIC | Age: 88
End: 2023-01-17
Payer: MEDICARE

## 2023-01-17 PROCEDURE — 99213 OFFICE O/P EST LOW 20 MIN: CPT | Mod: 95

## 2023-01-18 NOTE — ASSESSMENT
[FreeTextEntry1] : 88 y/o presents today of initial consultation re  US guided core bx and FNA revealing atypical B cell population\par \par -PET/CT read pending, but FDG avid axillary LN, amendable to core biopsy, will repeat to avoid excisional if possible \par -labs WNL, no evidence of involvement by PB flow cytometry \par -plan d/w patient and patient's daughter \par \par RTO in 2 -3 weeks

## 2023-01-18 NOTE — PHYSICAL EXAM
[Ambulatory and capable of all self care but unable to carry out any work activities] : Status 2- Ambulatory and capable of all self care but unable to carry out any work activities. Up and about more than 50% of waking hours [Normal] : affect appropriate [de-identified] : mild edema of b/l LE

## 2023-01-18 NOTE — HISTORY OF PRESENT ILLNESS
[Home] : at home, [unfilled] , at the time of the visit. [Medical Office: ([facility])___] : at the medical office located in  [de-identified] : Ms. Dozier is a 89 year old female with PMHx of afib, YOVANI, anxiety, HTN, CHF, and CVA presents today for recent inguinal US guided core bx and FNA revealing atypical B cell population. Patient was referred by urologist Dr. Hartmann who has been following patient for bladder mass that is thought to be a benign process\par Patient had an annual CT scan to evaluate bladder mass and had incidental finding of retroperitoneal, aortal caval  adenopathy measuring 1.4 x 1.6 cm, left inguinal adenopathy measuring 1.9 cm x 3.0 cm and inguinal adenopathy measuring 1.4 x 1.8 cm as well as  left eternal iliac lymph node measuring 1.2 x 2.2 cm. Patient was reported to have tenderness to palpation of right abd as well prompting referral to IR for biopsy, s/p inguinal US guided core bx and FNA revealing atypical B cell population.\par \par Currently patient feels well, she denies any pain to abdomen. No fevers/chills. No drenching night sweats. No enlarged/bothersome LN's. No CP/SOB. Normal BM's. No CP/SOB. No recent/recurrent infections. She ambulates at home with no issues.  [de-identified] : 1/17/22: Since her last visit, she continues to feel well and has no fevers, chills or night sweats. No weight loss, No noticeable lymphadenopathy.

## 2023-01-23 ENCOUNTER — APPOINTMENT (OUTPATIENT)
Dept: PSYCHIATRY | Facility: CLINIC | Age: 88
End: 2023-01-23
Payer: MEDICARE

## 2023-01-23 DIAGNOSIS — F32.A DEPRESSION, UNSPECIFIED: ICD-10-CM

## 2023-01-23 PROCEDURE — 99214 OFFICE O/P EST MOD 30 MIN: CPT | Mod: 95

## 2023-01-23 NOTE — DISCUSSION/SUMMARY
[FreeTextEntry1] : The patient is a 87 yo woman with anxious predisposition and likely social anxiety which she was able to manage without interference with daily functioning, and was in her usual state of health until about one year when she started feeling anxious and worried about her deteriorating vision and she was hospitalized and was in rehab for arm fracture in 2019 and in May 2019  , and she is experiencing worsening anxiety about health, depressed that she is losing her vision and grieving 's death, and since 2019 she had about 5 hospitalization for shortness of breath, exacerbation of CHF and last hospitalization was in 2020. She also reports chronic insomnia for past one year. She had a couple of med trial, short, did not tolerate and for past 2 months taking Buspar 5 mg TID with slight reduction of sx of anxiety and depression and she is also taking Benadryl for insomnia with god effect.  \par \par 3/4/2020: Patient is tolerating increased dose of buspar, and considering risk of polypharmacy outweighs benefits and also given we have not maximized buspirone dose, discussed patient and daughter not to add Trintillex at this time and instead we will further titrate Buspar, and if either patient is not able to titrate dose adequately due to side effects and of if there is not improvement of sx, may consider switching meds. \par \par 3/25/2020: Patient did not increase Buspar dose as recommended. She reports she is still anxious and depressed and unhappy about losing her vision. Appetite is good, sleep- good with Benadryl and sleepy time tea.  \par \par 2020: Patient reports compliance with Buspar, on 25 mg/day for one week, no side effects, and still taking Benadryl for sleep, sadness and crying is situational- when she thinks about her medical problems, and when she talks to people and listens to music she enjoys and feels better. \par \par 2020: Patient states she was doing well and was happy to have reconnected with an old BF and they were talking regularly for 2 months and 2 days ago he told her he could no longer continue to talk to her and she states she is "feeling devastated", and very depressed and feels "Buspar is not doing anything" and wants something stronger, and both daughter and patient asked for med change,however consider risk of switching to different med and that she had side effects from several meds the daughter was worried about making that change, and patient agrees to increase the dose, and per daughter patient is often forgetting to take HS dose. \par \par 2020: Patient reports feeling better than before, mild sx of depression, related to physical disability- legally blind and continues to take Benadryl 25 mg HS for slee, denies side effects, and reports good effect. \par \par 20: Patient reports she feels Buspar is not helping, daughter states she was doing well overall and she does feel Buspar is helping because she sees her mother "perk up" when she takes Buspar as scheduled, and recent depression is after she felt awkward at a social gathering due to social distancing and her poor eye sight. \par \par 2020: Patient and daughter report patient is doing better with Buspar, continues to have trouble sleeping, patient's daughter called back to report they checked with cardiologist who had no objection to patient trying trazodone. \par \par 2021: Patient and daughter reported patient is doing better and stable with no severe depression or anxiety.  She did not tolerate trazodone, had felt dizzy/had a bout of vertigo.  Patient reportedly is taking Benadryl a few days a week and 5 hydroxytryptophan a few days a week for sleep with good effect for several months without any reported symptoms of serotonin syndrome.\par \par 2021: Patient is in good spirits today and reports she is feeling much better and denies symptoms of depression and anxiety continues to have insomnia but much better than before and reports she is only taking Benadryl occasionally.\par \par 2022: Patient sounded to be in good spirits, denies symptoms of depression and anxiety, states she is taking Benadryl with good effect on sleep, no adverse effects \par \par 2022: Patient remains stable, not depressed or anxious and sleep remains a chronic problem, taking Benadryl for many years, had a few other med trials without benefit. Advised patient about adverse effects of taking Benadryl long term and advise not to take more than what she is already taking. \par \par 2023: Patient's daughter reported that the patient sometimes complains about not feeling so good or feeling depressed and was wondering if patient could take this buspirone 15 mg half a tablet as needed.  Patient's daughter reported that her primary care physician advised patient to only take buspirone 15 mg twice a day.  Patient's daughter reported that they have reduced the dose long time ago.  Patient reported that occasionally she feels down and about the patient and the daughter anticipates she may not need to take extra dose once or twice a week. \par Patient has history of vertigo also the dizziness she is experiencing from buspirone may or may not be directly related to the medication however for most part she has remained stable active and not severely depressed or anxious so we can continue lower dose and monitor how often she is requiring the extra half a tablet and adjust the dose accordingly.

## 2023-01-23 NOTE — HISTORY OF PRESENT ILLNESS
[FreeTextEntry1] : Patient seen with her daughter today.  Patient reported that she is "not too bad".  She states that she is trying to fill things to do in the day and that she is gone to the [employer] and some activities in the neighborhood.  Patient's daughter reports that because the weather is not good that she has not been going out as much.  Patient has an aide 5 days a week and sometimes on weekend when the daughter needs time for herself.  Patient reported that heat can sometimes be annoying but that she is the best so far among the aids that they have had.  Patient's daughter reported that the patient sometimes complains about not feeling so good or feeling depressed and was wondering if patient could take this buspirone 15 mg half a tablet as needed.  Patient's daughter reported that her primary care physician advised patient to only take buspirone 15 mg twice a day.  Patient's daughter reported that they have reduced the dose long time ago.  Patient reported that occasionally she feels down and about the patient and the daughter anticipates she may not need to take extra dose once or twice a week.  Patient is continuing to take Benadryl 25 mg at bedtime.  Patient states a rare occasion she has trouble falling asleep but otherwise she is sleeping better with Benadryl.  Patient denied side effects from Benadryl.  \par Patient has history of vertigo also the dizziness she is experiencing from buspirone may or may not be directly related to the medication however for most part she has remained stable active and not severely depressed or anxious so we can continue lower dose and monitor how often she is requiring the extra half a tablet and adjust the dose accordingly.\par Patient has a bladder mass which she reported was being monitored and recently she has developed some lymph nodes and they are doing a needle biopsy to rule out a lymphoma.

## 2023-01-23 NOTE — PLAN
[Medication education provided] : Medication education provided. [Rationale for medication choices, possible risks/precautions, benefits, alternative treatment choices, and consequences of non-treatment discussed] : Rationale for medication choices, possible risks/precautions, benefits, alternative treatment choices, and consequences of non-treatment discussed with patient/family/caregiver  [FreeTextEntry5] : Psychoeducation and supportive therapy provided discussed rationale for recommended meds, \par Medication: Continue Buspar 15 mg BID, may take 1/2 tab once ad ay as needed (monitor use) \par Patient continues to use over-the-counter Benadryl 25 mg at bedtime for insomnia with good effect and no adverse effects reported.\par Educated patient of importance of being abstinent from drugs and alcohol. \par  Emergency procedures were discussed: pt. educated to call 911 or go to nearest ER for worsening of symptoms/suicidal/homicidal ideation.  \par Continue individual psychotherapy to learn coping skills  \par RTC in 3 months or earlier as needed \par Patient and daughter given opportunity to ask questions and her/his questions were answered and she/ he expressed understanding and agreement with recommendations.  \par \par

## 2023-01-23 NOTE — PHYSICAL EXAM
[Euthymic] : euthymic [Full] : full [Clear] : clear [Linear/Goal Directed] : linear/goal directed [None Reported] : none reported [None] : none [WNL] : within normal limits [Cooperative] : cooperative [de-identified] : Patient legally blind  [de-identified] : No SI/HI

## 2023-01-23 NOTE — REASON FOR VISIT
[Patient preference] : as per patient preference [Telehealth (audio & video) - Individual/Group] : This visit was provided via telehealth using real-time 2-way audio visual technology. [Medical Office: ([facility])___] : The provider was located at the medical office in [unfilled]. [Home] : The patient, [unfilled], was located at home, [unfilled], at the time of the visit. [Family Member] : family member [Verbal consent obtained from patient/other participant(s)] : Verbal consent for telehealth/telephonic services obtained from patient/other participant(s) [Patient with collateral] : Patient with collateral  [FreeTextEntry1] : depression and anxiety

## 2023-01-24 ENCOUNTER — APPOINTMENT (OUTPATIENT)
Dept: ULTRASOUND IMAGING | Facility: IMAGING CENTER | Age: 88
End: 2023-01-24
Payer: MEDICARE

## 2023-01-24 ENCOUNTER — OUTPATIENT (OUTPATIENT)
Dept: OUTPATIENT SERVICES | Facility: HOSPITAL | Age: 88
LOS: 1 days | End: 2023-01-24
Payer: MEDICARE

## 2023-01-24 ENCOUNTER — RESULT REVIEW (OUTPATIENT)
Age: 88
End: 2023-01-24

## 2023-01-24 DIAGNOSIS — T14.8 OTHER INJURY OF UNSPECIFIED BODY REGION: Chronic | ICD-10-CM

## 2023-01-24 DIAGNOSIS — Z82.8 FAMILY HISTORY OF OTHER DISABILITIES AND CHRONIC DISEASES LEADING TO DISABLEMENT, NOT ELSEWHERE CLASSIFIED: Chronic | ICD-10-CM

## 2023-01-24 DIAGNOSIS — N32.89 OTHER SPECIFIED DISORDERS OF BLADDER: ICD-10-CM

## 2023-01-24 DIAGNOSIS — H26.9 UNSPECIFIED CATARACT: Chronic | ICD-10-CM

## 2023-01-24 DIAGNOSIS — Z90.710 ACQUIRED ABSENCE OF BOTH CERVIX AND UTERUS: Chronic | ICD-10-CM

## 2023-01-24 DIAGNOSIS — Z95.0 PRESENCE OF CARDIAC PACEMAKER: Chronic | ICD-10-CM

## 2023-01-24 DIAGNOSIS — Z84.89 FAMILY HISTORY OF OTHER SPECIFIED CONDITIONS: Chronic | ICD-10-CM

## 2023-01-24 PROCEDURE — 88365 INSITU HYBRIDIZATION (FISH): CPT

## 2023-01-24 PROCEDURE — 88172 CYTP DX EVAL FNA 1ST EA SITE: CPT

## 2023-01-24 PROCEDURE — 81264 IGK REARRANGEABN CLONAL POP: CPT

## 2023-01-24 PROCEDURE — 88360 TUMOR IMMUNOHISTOCHEM/MANUAL: CPT

## 2023-01-24 PROCEDURE — 38505 NEEDLE BIOPSY LYMPH NODES: CPT

## 2023-01-24 PROCEDURE — 88307 TISSUE EXAM BY PATHOLOGIST: CPT | Mod: 26

## 2023-01-24 PROCEDURE — 81261 IGH GENE REARRANGE AMP METH: CPT

## 2023-01-24 PROCEDURE — 88341 IMHCHEM/IMCYTCHM EA ADD ANTB: CPT | Mod: 26,59

## 2023-01-24 PROCEDURE — 87205 SMEAR GRAM STAIN: CPT

## 2023-01-24 PROCEDURE — 88305 TISSUE EXAM BY PATHOLOGIST: CPT

## 2023-01-24 PROCEDURE — 88360 TUMOR IMMUNOHISTOCHEM/MANUAL: CPT | Mod: 26

## 2023-01-24 PROCEDURE — 88365 INSITU HYBRIDIZATION (FISH): CPT | Mod: 26,59

## 2023-01-24 PROCEDURE — 76942 ECHO GUIDE FOR BIOPSY: CPT

## 2023-01-24 PROCEDURE — 88341 IMHCHEM/IMCYTCHM EA ADD ANTB: CPT

## 2023-01-24 PROCEDURE — 88184 FLOWCYTOMETRY/ TC 1 MARKER: CPT

## 2023-01-24 PROCEDURE — 88342 IMHCHEM/IMCYTCHM 1ST ANTB: CPT | Mod: 26,59

## 2023-01-24 PROCEDURE — 88189 FLOWCYTOMETRY/READ 16 & >: CPT

## 2023-01-24 PROCEDURE — 88364 INSITU HYBRIDIZATION (FISH): CPT | Mod: 26

## 2023-01-24 PROCEDURE — 88185 FLOWCYTOMETRY/TC ADD-ON: CPT

## 2023-01-24 PROCEDURE — 88275 CYTOGENETICS 100-300: CPT

## 2023-01-24 PROCEDURE — 88342 IMHCHEM/IMCYTCHM 1ST ANTB: CPT

## 2023-01-24 PROCEDURE — 88173 CYTOPATH EVAL FNA REPORT: CPT

## 2023-01-24 PROCEDURE — 38505 NEEDLE BIOPSY LYMPH NODES: CPT | Mod: LT

## 2023-01-24 PROCEDURE — 88173 CYTOPATH EVAL FNA REPORT: CPT | Mod: 26

## 2023-01-24 PROCEDURE — 88273 CYTOGENETICS 10-30: CPT

## 2023-01-24 PROCEDURE — 88108 CYTOPATH CONCENTRATE TECH: CPT | Mod: 26,59

## 2023-01-24 PROCEDURE — 88271 CYTOGENETICS DNA PROBE: CPT

## 2023-01-24 PROCEDURE — 88305 TISSUE EXAM BY PATHOLOGIST: CPT | Mod: 26

## 2023-01-24 PROCEDURE — 88307 TISSUE EXAM BY PATHOLOGIST: CPT

## 2023-01-24 PROCEDURE — 76942 ECHO GUIDE FOR BIOPSY: CPT | Mod: 26

## 2023-01-26 LAB — TM INTERPRETATION: SIGNIFICANT CHANGE UP

## 2023-01-27 LAB — NON-GYNECOLOGICAL CYTOLOGY STUDY: SIGNIFICANT CHANGE UP

## 2023-02-02 ENCOUNTER — RESULT REVIEW (OUTPATIENT)
Age: 88
End: 2023-02-02

## 2023-02-02 ENCOUNTER — APPOINTMENT (OUTPATIENT)
Dept: HEMATOLOGY ONCOLOGY | Facility: CLINIC | Age: 88
End: 2023-02-02
Payer: MEDICARE

## 2023-02-02 VITALS
WEIGHT: 182.98 LBS | SYSTOLIC BLOOD PRESSURE: 122 MMHG | OXYGEN SATURATION: 99 % | HEART RATE: 57 BPM | TEMPERATURE: 96.9 F | DIASTOLIC BLOOD PRESSURE: 84 MMHG | RESPIRATION RATE: 16 BRPM | BODY MASS INDEX: 33.46 KG/M2

## 2023-02-02 LAB
BASOPHILS # BLD AUTO: 0.1 K/UL — SIGNIFICANT CHANGE UP (ref 0–0.2)
BASOPHILS NFR BLD AUTO: 1 % — SIGNIFICANT CHANGE UP (ref 0–2)
EOSINOPHIL # BLD AUTO: 0.24 K/UL — SIGNIFICANT CHANGE UP (ref 0–0.5)
EOSINOPHIL NFR BLD AUTO: 2.3 % — SIGNIFICANT CHANGE UP (ref 0–6)
HCT VFR BLD CALC: 44.3 % — SIGNIFICANT CHANGE UP (ref 34.5–45)
HGB BLD-MCNC: 14.5 G/DL — SIGNIFICANT CHANGE UP (ref 11.5–15.5)
IMM GRANULOCYTES NFR BLD AUTO: 0.8 % — SIGNIFICANT CHANGE UP (ref 0–0.9)
LYMPHOCYTES # BLD AUTO: 1.22 K/UL — SIGNIFICANT CHANGE UP (ref 1–3.3)
LYMPHOCYTES # BLD AUTO: 11.7 % — LOW (ref 13–44)
MCHC RBC-ENTMCNC: 29 PG — SIGNIFICANT CHANGE UP (ref 27–34)
MCHC RBC-ENTMCNC: 32.7 G/DL — SIGNIFICANT CHANGE UP (ref 32–36)
MCV RBC AUTO: 88.6 FL — SIGNIFICANT CHANGE UP (ref 80–100)
MONOCYTES # BLD AUTO: 0.75 K/UL — SIGNIFICANT CHANGE UP (ref 0–0.9)
MONOCYTES NFR BLD AUTO: 7.2 % — SIGNIFICANT CHANGE UP (ref 2–14)
NEUTROPHILS # BLD AUTO: 8.05 K/UL — HIGH (ref 1.8–7.4)
NEUTROPHILS NFR BLD AUTO: 77 % — SIGNIFICANT CHANGE UP (ref 43–77)
NRBC # BLD: 0 /100 WBCS — SIGNIFICANT CHANGE UP (ref 0–0)
PLATELET # BLD AUTO: 256 K/UL — SIGNIFICANT CHANGE UP (ref 150–400)
RBC # BLD: 5 M/UL — SIGNIFICANT CHANGE UP (ref 3.8–5.2)
RBC # FLD: 14 % — SIGNIFICANT CHANGE UP (ref 10.3–14.5)
WBC # BLD: 10.44 K/UL — SIGNIFICANT CHANGE UP (ref 3.8–10.5)
WBC # FLD AUTO: 10.44 K/UL — SIGNIFICANT CHANGE UP (ref 3.8–10.5)

## 2023-02-02 PROCEDURE — 99215 OFFICE O/P EST HI 40 MIN: CPT | Mod: GC

## 2023-02-02 PROCEDURE — G0452: CPT | Mod: 26

## 2023-02-03 LAB
ALBUMIN SERPL ELPH-MCNC: 3.9 G/DL
ALP BLD-CCNC: 57 U/L
ALT SERPL-CCNC: 14 U/L
ANION GAP SERPL CALC-SCNC: 13 MMOL/L
AST SERPL-CCNC: 19 U/L
BILIRUB SERPL-MCNC: 0.8 MG/DL
BUN SERPL-MCNC: 27 MG/DL
CALCIUM SERPL-MCNC: 9.5 MG/DL
CHLORIDE SERPL-SCNC: 100 MMOL/L
CO2 SERPL-SCNC: 26 MMOL/L
CREAT SERPL-MCNC: 1.08 MG/DL
EGFR: 49 ML/MIN/1.73M2
GLUCOSE SERPL-MCNC: 98 MG/DL
LDH SERPL-CCNC: 356 U/L
MAGNESIUM SERPL-MCNC: 1.6 MG/DL
PHOSPHATE SERPL-MCNC: 5.4 MG/DL
POTASSIUM SERPL-SCNC: 4.8 MMOL/L
PROT SERPL-MCNC: 6 G/DL
SODIUM SERPL-SCNC: 140 MMOL/L
URATE SERPL-MCNC: 6.9 MG/DL

## 2023-02-06 NOTE — ASSESSMENT
[FreeTextEntry1] : 89 y/o presents with newly diagnosed follicular lymphoma. Previously US guided core bx and FNA revealing atypical B cell population. s/p  FNA core bx of left axillary lymph node on 1/24/23 , path consistent with follicular lymphoma, grade 1-2, follicular pattern with high proliferation index, Ki67 approx. 60-70%. \par \par Follicular Lymphoma\par -PET/CT 1/14/23: \par Multiple FDG avid lymph nodes above and below the diaphragm, some of which are enlarged. Findings are suspicious for malignancy. Differential diagnosis includes lymphoma and metastatic disease.\par Interval increase in size of the FDG avid soft tissue density extending from the right renal collecting system to the proximal ureter. Limited evaluation of the bladder secondary to physiologic excreted FDG activity and lack of intravenous contrast. These findings may reflect urinary tract lymphoma or urothelial carcinoma.\par Several hypermetabolic foci in the bone marrow, without corresponding abnormalities on CT, are suspicious for malignancy. Differential diagnosis includes bone marrow involvement by lymphoma and metastatic disease.\par - s/p  FNA core bx of left axillary lymph node on 1/24/23 , path consistent with follicular lymphoma, grade 1-2, follicular pattern with high proliferation index, Ki67 approx. 60-70%. \par -labs WNL, no evidence of involvement by PB flow cytometry \par -discussed single agent rituximab given high Ki67 vs. observation- she has had progression of size of LN in 3 months, so will likely need treatment in the future \par -informed consent for rituximab signed, discussed use of rituximab hycela if issues with venous access long term \par \par \par \par RTO in 2 -3 weeks after patient has discussed diagnosis with family and etermined treatment

## 2023-02-06 NOTE — HISTORY OF PRESENT ILLNESS
[de-identified] : Ms. Dozier is a 89 year old female with PMHx of afib, YOVANI, anxiety, HTN, CHF, and CVA presents today for recent inguinal US guided core bx and FNA revealing atypical B cell population. Patient was referred by urologist Dr. Hartmann who has been following patient for bladder mass that is thought to be a benign process\par Patient had an annual CT scan to evaluate bladder mass and had incidental finding of retroperitoneal, aortal caval  adenopathy measuring 1.4 x 1.6 cm, left inguinal adenopathy measuring 1.9 cm x 3.0 cm and inguinal adenopathy measuring 1.4 x 1.8 cm as well as  left eternal iliac lymph node measuring 1.2 x 2.2 cm. Patient was reported to have tenderness to palpation of right abd as well prompting referral to IR for biopsy, s/p inguinal US guided core bx and FNA revealing atypical B cell population.\par \par Currently patient feels well, she denies any pain to abdomen. No fevers/chills. No drenching night sweats. No enlarged/bothersome LN's. No CP/SOB. Normal BM's. No CP/SOB. No recent/recurrent infections. She ambulates at home with no issues.  [de-identified] : 2/2/23: Since her last visit, pt is s/p FNA core bx of left axillary lymph node, path consistent with follicular lymphoma, grade 1-2, follicular pattern with high proliferation index, Ki67 approx. 60-70%.  She continues to feel well and has no fevers, chills or night sweats. No weight loss, No noticeable lymphadenopathy.

## 2023-02-20 NOTE — ED ADULT NURSE NOTE - CHIEF COMPLAINT
Population health record for cervical cancer screening      The patient is a 85y Female complaining of numbness.

## 2023-02-24 ENCOUNTER — OUTPATIENT (OUTPATIENT)
Dept: OUTPATIENT SERVICES | Facility: HOSPITAL | Age: 88
LOS: 1 days | Discharge: HOME | End: 2023-02-24

## 2023-02-24 DIAGNOSIS — C85.10 UNSPECIFIED B-CELL LYMPHOMA, UNSPECIFIED SITE: ICD-10-CM

## 2023-02-24 DIAGNOSIS — Z84.89 FAMILY HISTORY OF OTHER SPECIFIED CONDITIONS: Chronic | ICD-10-CM

## 2023-02-24 DIAGNOSIS — H26.9 UNSPECIFIED CATARACT: Chronic | ICD-10-CM

## 2023-02-24 DIAGNOSIS — Z95.0 PRESENCE OF CARDIAC PACEMAKER: Chronic | ICD-10-CM

## 2023-02-24 DIAGNOSIS — T14.8 OTHER INJURY OF UNSPECIFIED BODY REGION: Chronic | ICD-10-CM

## 2023-02-24 DIAGNOSIS — Z82.8 FAMILY HISTORY OF OTHER DISABILITIES AND CHRONIC DISEASES LEADING TO DISABLEMENT, NOT ELSEWHERE CLASSIFIED: Chronic | ICD-10-CM

## 2023-02-24 DIAGNOSIS — Z90.710 ACQUIRED ABSENCE OF BOTH CERVIX AND UTERUS: Chronic | ICD-10-CM

## 2023-03-08 ENCOUNTER — NON-APPOINTMENT (OUTPATIENT)
Age: 88
End: 2023-03-08

## 2023-03-13 ENCOUNTER — RESULT REVIEW (OUTPATIENT)
Age: 88
End: 2023-03-13

## 2023-03-13 ENCOUNTER — APPOINTMENT (OUTPATIENT)
Dept: HEMATOLOGY ONCOLOGY | Facility: CLINIC | Age: 88
End: 2023-03-13
Payer: MEDICARE

## 2023-03-13 ENCOUNTER — APPOINTMENT (OUTPATIENT)
Dept: HEMATOLOGY ONCOLOGY | Facility: CLINIC | Age: 88
End: 2023-03-13

## 2023-03-13 VITALS
BODY MASS INDEX: 32.82 KG/M2 | WEIGHT: 179.5 LBS | OXYGEN SATURATION: 98 % | RESPIRATION RATE: 16 BRPM | TEMPERATURE: 96.7 F | HEART RATE: 81 BPM

## 2023-03-13 LAB
BASOPHILS # BLD AUTO: 0.09 K/UL — SIGNIFICANT CHANGE UP (ref 0–0.2)
BASOPHILS NFR BLD AUTO: 0.7 % — SIGNIFICANT CHANGE UP (ref 0–2)
EOSINOPHIL # BLD AUTO: 0.22 K/UL — SIGNIFICANT CHANGE UP (ref 0–0.5)
EOSINOPHIL NFR BLD AUTO: 1.8 % — SIGNIFICANT CHANGE UP (ref 0–6)
HCT VFR BLD CALC: 44.4 % — SIGNIFICANT CHANGE UP (ref 34.5–45)
HGB BLD-MCNC: 14.5 G/DL — SIGNIFICANT CHANGE UP (ref 11.5–15.5)
IMM GRANULOCYTES NFR BLD AUTO: 0.4 % — SIGNIFICANT CHANGE UP (ref 0–0.9)
LYMPHOCYTES # BLD AUTO: 1.14 K/UL — SIGNIFICANT CHANGE UP (ref 1–3.3)
LYMPHOCYTES # BLD AUTO: 9.1 % — LOW (ref 13–44)
MCHC RBC-ENTMCNC: 28.8 PG — SIGNIFICANT CHANGE UP (ref 27–34)
MCHC RBC-ENTMCNC: 32.7 G/DL — SIGNIFICANT CHANGE UP (ref 32–36)
MCV RBC AUTO: 88.3 FL — SIGNIFICANT CHANGE UP (ref 80–100)
MONOCYTES # BLD AUTO: 0.72 K/UL — SIGNIFICANT CHANGE UP (ref 0–0.9)
MONOCYTES NFR BLD AUTO: 5.7 % — SIGNIFICANT CHANGE UP (ref 2–14)
NEUTROPHILS # BLD AUTO: 10.35 K/UL — HIGH (ref 1.8–7.4)
NEUTROPHILS NFR BLD AUTO: 82.3 % — HIGH (ref 43–77)
NRBC # BLD: 0 /100 WBCS — SIGNIFICANT CHANGE UP (ref 0–0)
PLATELET # BLD AUTO: 260 K/UL — SIGNIFICANT CHANGE UP (ref 150–400)
RBC # BLD: 5.03 M/UL — SIGNIFICANT CHANGE UP (ref 3.8–5.2)
RBC # FLD: 14.4 % — SIGNIFICANT CHANGE UP (ref 10.3–14.5)
WBC # BLD: 12.57 K/UL — HIGH (ref 3.8–10.5)
WBC # FLD AUTO: 12.57 K/UL — HIGH (ref 3.8–10.5)

## 2023-03-13 PROCEDURE — 99214 OFFICE O/P EST MOD 30 MIN: CPT | Mod: GC

## 2023-03-13 RX ORDER — AMOXICILLIN AND CLAVULANATE POTASSIUM 500; 125 MG/1; MG/1
500-125 TABLET, FILM COATED ORAL
Qty: 14 | Refills: 0 | Status: COMPLETED | COMMUNITY
Start: 2022-11-28

## 2023-03-14 LAB
ALBUMIN SERPL ELPH-MCNC: 3.9 G/DL
ALP BLD-CCNC: 56 U/L
ALT SERPL-CCNC: 11 U/L
ANION GAP SERPL CALC-SCNC: 14 MMOL/L
AST SERPL-CCNC: 15 U/L
BILIRUB SERPL-MCNC: 0.8 MG/DL
BUN SERPL-MCNC: 28 MG/DL
CALCIUM SERPL-MCNC: 9 MG/DL
CHLORIDE SERPL-SCNC: 101 MMOL/L
CO2 SERPL-SCNC: 24 MMOL/L
CREAT SERPL-MCNC: 1.1 MG/DL
EGFR: 48 ML/MIN/1.73M2
GLUCOSE SERPL-MCNC: 123 MG/DL
LDH SERPL-CCNC: 280 U/L
MAGNESIUM SERPL-MCNC: 1.8 MG/DL
PHOSPHATE SERPL-MCNC: 4 MG/DL
POTASSIUM SERPL-SCNC: 4 MMOL/L
PROT SERPL-MCNC: 5.8 G/DL
SODIUM SERPL-SCNC: 139 MMOL/L
URATE SERPL-MCNC: 6.8 MG/DL

## 2023-03-14 NOTE — PHYSICAL EXAM
[Ambulatory and capable of all self care but unable to carry out any work activities] : Status 2- Ambulatory and capable of all self care but unable to carry out any work activities. Up and about more than 50% of waking hours [Normal] : affect appropriate [de-identified] : mild edema of b/l LE

## 2023-03-14 NOTE — ASSESSMENT
[FreeTextEntry1] : 88 y/o presents with newly diagnosed follicular lymphoma. Previously US guided core bx and FNA revealing atypical B cell population. s/p  FNA core bx of left axillary lymph node on 1/24/23 , path consistent with follicular lymphoma, grade 1-2, follicular pattern with high proliferation index, Ki67 approx. 60-70%. \par \par Follicular Lymphoma\par -PET/CT 1/14/23: \par Multiple FDG avid lymph nodes above and below the diaphragm, some of which are enlarged. Findings are suspicious for malignancy. Differential diagnosis includes lymphoma and metastatic disease.\par Interval increase in size of the FDG avid soft tissue density extending from the right renal collecting system to the proximal ureter. Limited evaluation of the bladder secondary to physiologic excreted FDG activity and lack of intravenous contrast. These findings may reflect urinary tract lymphoma or urothelial carcinoma.\par Several hypermetabolic foci in the bone marrow, without corresponding abnormalities on CT, are suspicious for malignancy. Differential diagnosis includes bone marrow involvement by lymphoma and metastatic disease.\par - s/p  FNA core bx of left axillary lymph node on 1/24/23 , path consistent with follicular lymphoma, grade 1-2, follicular pattern with high proliferation index, Ki67 approx. 60-70%. \par -labs WNL, no evidence of involvement by PB flow cytometry \par -discussed single agent rituximab given high Ki67 vs. observation- she has had progression of size of LN in 3 months, so will likely need treatment in the future \par -informed consent for rituximab signed, discussed use of rituximab hycela if issues with venous access long term\par -[place] [facility], is in favor Rituxan weekly x 4  \par -will plan for rituxan x 4 starting in 2 weeks\par \par \par \par RTO in 3 weeks, sooner PRN

## 2023-03-14 NOTE — END OF VISIT
[FreeTextEntry3] : Patient seen and case discussed with RASHAUN Victoria. I agree with above and have edited the note where needed.\par

## 2023-03-14 NOTE — HISTORY OF PRESENT ILLNESS
[de-identified] : Ms. Dozier is a 89 year old female with PMHx of afib, YOVANI, anxiety, HTN, CHF, and CVA presents today for recent inguinal US guided core bx and FNA revealing atypical B cell population. Patient was referred by urologist Dr. Hartmann who has been following patient for bladder mass that is thought to be a benign process\par Patient had an annual CT scan to evaluate bladder mass and had incidental finding of retroperitoneal, aortal caval  adenopathy measuring 1.4 x 1.6 cm, left inguinal adenopathy measuring 1.9 cm x 3.0 cm and inguinal adenopathy measuring 1.4 x 1.8 cm as well as  left eternal iliac lymph node measuring 1.2 x 2.2 cm. Patient was reported to have tenderness to palpation of right abd as well prompting referral to IR for biopsy, s/p inguinal US guided core bx and FNA revealing atypical B cell population.\par \par Currently patient feels well, she denies any pain to abdomen. No fevers/chills. No drenching night sweats. No enlarged/bothersome LN's. No CP/SOB. Normal BM's. No CP/SOB. No recent/recurrent infections. She ambulates at home with no issues.  [de-identified] : 3/13/23:Pt is s/p FNA core bx of left axillary lymph node, path consistent with follicular lymphoma, grade 1-2, follicular pattern with high proliferation index, Ki67 approx. 60-70%.  She continues to feel well and has no fevers, chills or night sweats. No weight loss, No noticeable lymphadenopathy. Pt had a 2nd opinion at [facility] [place] with Dr. Ruiz. Patient is in favor to start treatment.

## 2023-03-27 ENCOUNTER — APPOINTMENT (OUTPATIENT)
Dept: INFUSION THERAPY | Facility: HOSPITAL | Age: 88
End: 2023-03-27

## 2023-03-28 ENCOUNTER — TRANSCRIPTION ENCOUNTER (OUTPATIENT)
Age: 88
End: 2023-03-28

## 2023-04-03 ENCOUNTER — APPOINTMENT (OUTPATIENT)
Dept: HEMATOLOGY ONCOLOGY | Facility: CLINIC | Age: 88
End: 2023-04-03

## 2023-04-03 ENCOUNTER — APPOINTMENT (OUTPATIENT)
Dept: INFUSION THERAPY | Facility: HOSPITAL | Age: 88
End: 2023-04-03

## 2023-04-10 ENCOUNTER — APPOINTMENT (OUTPATIENT)
Dept: INFUSION THERAPY | Facility: HOSPITAL | Age: 88
End: 2023-04-10

## 2023-04-17 ENCOUNTER — NON-APPOINTMENT (OUTPATIENT)
Age: 88
End: 2023-04-17

## 2023-04-17 ENCOUNTER — APPOINTMENT (OUTPATIENT)
Dept: HEMATOLOGY ONCOLOGY | Facility: CLINIC | Age: 88
End: 2023-04-17

## 2023-04-17 ENCOUNTER — RESULT REVIEW (OUTPATIENT)
Age: 88
End: 2023-04-17

## 2023-04-17 ENCOUNTER — APPOINTMENT (OUTPATIENT)
Dept: INFUSION THERAPY | Facility: HOSPITAL | Age: 88
End: 2023-04-17

## 2023-04-17 DIAGNOSIS — Z51.11 ENCOUNTER FOR ANTINEOPLASTIC CHEMOTHERAPY: ICD-10-CM

## 2023-04-17 DIAGNOSIS — R11.2 NAUSEA WITH VOMITING, UNSPECIFIED: ICD-10-CM

## 2023-04-17 LAB
BASOPHILS # BLD AUTO: 0.12 K/UL — SIGNIFICANT CHANGE UP (ref 0–0.2)
BASOPHILS NFR BLD AUTO: 1 % — SIGNIFICANT CHANGE UP (ref 0–2)
EOSINOPHIL # BLD AUTO: 0.3 K/UL — SIGNIFICANT CHANGE UP (ref 0–0.5)
EOSINOPHIL NFR BLD AUTO: 2.6 % — SIGNIFICANT CHANGE UP (ref 0–6)
HCT VFR BLD CALC: 44.2 % — SIGNIFICANT CHANGE UP (ref 34.5–45)
HGB BLD-MCNC: 14.7 G/DL — SIGNIFICANT CHANGE UP (ref 11.5–15.5)
IMM GRANULOCYTES NFR BLD AUTO: 0.7 % — SIGNIFICANT CHANGE UP (ref 0–0.9)
LYMPHOCYTES # BLD AUTO: 1.66 K/UL — SIGNIFICANT CHANGE UP (ref 1–3.3)
LYMPHOCYTES # BLD AUTO: 14.4 % — SIGNIFICANT CHANGE UP (ref 13–44)
MCHC RBC-ENTMCNC: 29 PG — SIGNIFICANT CHANGE UP (ref 27–34)
MCHC RBC-ENTMCNC: 33.3 G/DL — SIGNIFICANT CHANGE UP (ref 32–36)
MCV RBC AUTO: 87.2 FL — SIGNIFICANT CHANGE UP (ref 80–100)
MONOCYTES # BLD AUTO: 0.62 K/UL — SIGNIFICANT CHANGE UP (ref 0–0.9)
MONOCYTES NFR BLD AUTO: 5.4 % — SIGNIFICANT CHANGE UP (ref 2–14)
NEUTROPHILS # BLD AUTO: 8.78 K/UL — HIGH (ref 1.8–7.4)
NEUTROPHILS NFR BLD AUTO: 75.9 % — SIGNIFICANT CHANGE UP (ref 43–77)
NRBC # BLD: 0 /100 WBCS — SIGNIFICANT CHANGE UP (ref 0–0)
PLATELET # BLD AUTO: 253 K/UL — SIGNIFICANT CHANGE UP (ref 150–400)
RBC # BLD: 5.07 M/UL — SIGNIFICANT CHANGE UP (ref 3.8–5.2)
RBC # FLD: 14.9 % — HIGH (ref 10.3–14.5)
WBC # BLD: 11.56 K/UL — HIGH (ref 3.8–10.5)
WBC # FLD AUTO: 11.56 K/UL — HIGH (ref 3.8–10.5)

## 2023-04-24 ENCOUNTER — RESULT REVIEW (OUTPATIENT)
Age: 88
End: 2023-04-24

## 2023-04-24 ENCOUNTER — OUTPATIENT (OUTPATIENT)
Dept: OUTPATIENT SERVICES | Facility: HOSPITAL | Age: 88
LOS: 1 days | Discharge: HOME | End: 2023-04-24

## 2023-04-24 ENCOUNTER — APPOINTMENT (OUTPATIENT)
Dept: HEMATOLOGY ONCOLOGY | Facility: CLINIC | Age: 88
End: 2023-04-24

## 2023-04-24 ENCOUNTER — APPOINTMENT (OUTPATIENT)
Dept: HEMATOLOGY ONCOLOGY | Facility: CLINIC | Age: 88
End: 2023-04-24
Payer: MEDICARE

## 2023-04-24 ENCOUNTER — NON-APPOINTMENT (OUTPATIENT)
Age: 88
End: 2023-04-24

## 2023-04-24 ENCOUNTER — APPOINTMENT (OUTPATIENT)
Dept: INFUSION THERAPY | Facility: HOSPITAL | Age: 88
End: 2023-04-24

## 2023-04-24 DIAGNOSIS — T14.8 OTHER INJURY OF UNSPECIFIED BODY REGION: Chronic | ICD-10-CM

## 2023-04-24 DIAGNOSIS — Z90.710 ACQUIRED ABSENCE OF BOTH CERVIX AND UTERUS: Chronic | ICD-10-CM

## 2023-04-24 DIAGNOSIS — C85.10 UNSPECIFIED B-CELL LYMPHOMA, UNSPECIFIED SITE: ICD-10-CM

## 2023-04-24 DIAGNOSIS — Z82.8 FAMILY HISTORY OF OTHER DISABILITIES AND CHRONIC DISEASES LEADING TO DISABLEMENT, NOT ELSEWHERE CLASSIFIED: Chronic | ICD-10-CM

## 2023-04-24 DIAGNOSIS — Z84.89 FAMILY HISTORY OF OTHER SPECIFIED CONDITIONS: Chronic | ICD-10-CM

## 2023-04-24 DIAGNOSIS — Z95.0 PRESENCE OF CARDIAC PACEMAKER: Chronic | ICD-10-CM

## 2023-04-24 DIAGNOSIS — H26.9 UNSPECIFIED CATARACT: Chronic | ICD-10-CM

## 2023-04-24 LAB
BASOPHILS # BLD AUTO: 0.14 K/UL — SIGNIFICANT CHANGE UP (ref 0–0.2)
BASOPHILS NFR BLD AUTO: 0.9 % — SIGNIFICANT CHANGE UP (ref 0–2)
EOSINOPHIL # BLD AUTO: 0.18 K/UL — SIGNIFICANT CHANGE UP (ref 0–0.5)
EOSINOPHIL NFR BLD AUTO: 1.2 % — SIGNIFICANT CHANGE UP (ref 0–6)
HCT VFR BLD CALC: 46.5 % — HIGH (ref 34.5–45)
HGB BLD-MCNC: 15.4 G/DL — SIGNIFICANT CHANGE UP (ref 11.5–15.5)
IMM GRANULOCYTES NFR BLD AUTO: 1.1 % — HIGH (ref 0–0.9)
LYMPHOCYTES # BLD AUTO: 1.79 K/UL — SIGNIFICANT CHANGE UP (ref 1–3.3)
LYMPHOCYTES # BLD AUTO: 12 % — LOW (ref 13–44)
MCHC RBC-ENTMCNC: 28.6 PG — SIGNIFICANT CHANGE UP (ref 27–34)
MCHC RBC-ENTMCNC: 33.1 G/DL — SIGNIFICANT CHANGE UP (ref 32–36)
MCV RBC AUTO: 86.3 FL — SIGNIFICANT CHANGE UP (ref 80–100)
MONOCYTES # BLD AUTO: 0.86 K/UL — SIGNIFICANT CHANGE UP (ref 0–0.9)
MONOCYTES NFR BLD AUTO: 5.8 % — SIGNIFICANT CHANGE UP (ref 2–14)
NEUTROPHILS # BLD AUTO: 11.74 K/UL — HIGH (ref 1.8–7.4)
NEUTROPHILS NFR BLD AUTO: 79 % — HIGH (ref 43–77)
NRBC # BLD: 0 /100 WBCS — SIGNIFICANT CHANGE UP (ref 0–0)
PLATELET # BLD AUTO: 263 K/UL — SIGNIFICANT CHANGE UP (ref 150–400)
RBC # BLD: 5.39 M/UL — HIGH (ref 3.8–5.2)
RBC # FLD: 14.9 % — HIGH (ref 10.3–14.5)
WBC # BLD: 14.88 K/UL — HIGH (ref 3.8–10.5)
WBC # FLD AUTO: 14.88 K/UL — HIGH (ref 3.8–10.5)

## 2023-04-24 PROCEDURE — 99214 OFFICE O/P EST MOD 30 MIN: CPT | Mod: GC

## 2023-04-24 NOTE — HISTORY OF PRESENT ILLNESS
[de-identified] : Ms. Dozier is a 89 year old female with PMHx of afib, YOVANI, anxiety, HTN, CHF, and CVA presents today for recent inguinal US guided core bx and FNA revealing atypical B cell population. Patient was referred by urologist Dr. Hartmann who has been following patient for bladder mass that is thought to be a benign process\par Patient had an annual CT scan to evaluate bladder mass and had incidental finding of retroperitoneal, aortal caval  adenopathy measuring 1.4 x 1.6 cm, left inguinal adenopathy measuring 1.9 cm x 3.0 cm and inguinal adenopathy measuring 1.4 x 1.8 cm as well as  left eternal iliac lymph node measuring 1.2 x 2.2 cm. Patient was reported to have tenderness to palpation of right abd as well prompting referral to IR for biopsy, s/p inguinal US guided core bx and FNA revealing atypical B cell population.\par \par Currently patient feels well, she denies any pain to abdomen. No fevers/chills. No drenching night sweats. No enlarged/bothersome LN's. No CP/SOB. Normal BM's. No CP/SOB. No recent/recurrent infections. She ambulates at home with no issues.  [de-identified] : 3/13/23:Pt is s/p FNA core bx of left axillary lymph node, path consistent with follicular lymphoma, grade 1-2, follicular pattern with high proliferation index, Ki67 approx. 60-70%.  She continues to feel well and has no fevers, chills or night sweats. No weight loss, No noticeable lymphadenopathy. [place] [facility] [place]. Patient is in favor to start treatment. \par \par 4/24/23: Patient seen in treatment room. She received C1D1 rituximab monotherapy on 4/17 which was overall well tolerated. Deveoped diarrhea 1-2 days ago without any significant abdominal pain. Feels fatigued today. Was noted to have BP 78/50s. She is not dizzy or lightheaded.

## 2023-04-24 NOTE — PHYSICAL EXAM
[Ambulatory and capable of all self care but unable to carry out any work activities] : Status 2- Ambulatory and capable of all self care but unable to carry out any work activities. Up and about more than 50% of waking hours [Normal] : affect appropriate [de-identified] : mild edema of b/l LE

## 2023-04-24 NOTE — ASSESSMENT
[FreeTextEntry1] : 89 y/o presents with newly diagnosed follicular lymphoma. Previously US guided core bx and FNA revealing atypical B cell population. s/p  FNA core bx of left axillary lymph node on 1/24/23 , path consistent with follicular lymphoma, grade 1-2, follicular pattern with high proliferation index, Ki67 approx. 60-70%. \par \par Follicular Lymphoma\par -PET/CT 1/14/23: \par Multiple FDG avid lymph nodes above and below the diaphragm, some of which are enlarged. Findings are suspicious for malignancy. Differential diagnosis includes lymphoma and metastatic disease.\par Interval increase in size of the FDG avid soft tissue density extending from the right renal collecting system to the proximal ureter. Limited evaluation of the bladder secondary to physiologic excreted FDG activity and lack of intravenous contrast. These findings may reflect urinary tract lymphoma or urothelial carcinoma.\par Several hypermetabolic foci in the bone marrow, without corresponding abnormalities on CT, are suspicious for malignancy. Differential diagnosis includes bone marrow involvement by lymphoma and metastatic disease.\par - s/p  FNA core bx of left axillary lymph node on 1/24/23 , path consistent with follicular lymphoma, grade 1-2, follicular pattern with high proliferation index, Ki67 approx. 60-70%. \par -labs WNL, no evidence of involvement by PB flow cytometry \par -discussed single agent rituximab given high Ki67 vs. observation- she has had progression of size of LN in 3 months, so will likely need treatment in the future \par -informed consent for rituximab signed, discussed use of rituximab hycela if issues with venous access long term\par -patient sought 2nd opinion at [facility] [place] with [place]. Ruiz, is in favor Rituxan weekly x 4  \par -will plan for rituxan x 4, first dose received on 4/17/23, will hold today's dose in setting of diarrhea and mild hypotension, will give 250 cc bolus slowly to improve BP\par \par #CHF\par -will follow up with cardiologist \par \par \par \par RTO in 3 weeks, sooner PRN

## 2023-05-01 ENCOUNTER — RESULT REVIEW (OUTPATIENT)
Age: 88
End: 2023-05-01

## 2023-05-01 ENCOUNTER — APPOINTMENT (OUTPATIENT)
Dept: HEMATOLOGY ONCOLOGY | Facility: CLINIC | Age: 88
End: 2023-05-01

## 2023-05-01 ENCOUNTER — APPOINTMENT (OUTPATIENT)
Dept: INFUSION THERAPY | Facility: HOSPITAL | Age: 88
End: 2023-05-01

## 2023-05-01 DIAGNOSIS — Z51.11 ENCOUNTER FOR ANTINEOPLASTIC CHEMOTHERAPY: ICD-10-CM

## 2023-05-01 DIAGNOSIS — R11.2 NAUSEA WITH VOMITING, UNSPECIFIED: ICD-10-CM

## 2023-05-01 LAB
BASOPHILS # BLD AUTO: 0.11 K/UL — SIGNIFICANT CHANGE UP (ref 0–0.2)
BASOPHILS NFR BLD AUTO: 0.8 % — SIGNIFICANT CHANGE UP (ref 0–2)
EOSINOPHIL # BLD AUTO: 0.17 K/UL — SIGNIFICANT CHANGE UP (ref 0–0.5)
EOSINOPHIL NFR BLD AUTO: 1.3 % — SIGNIFICANT CHANGE UP (ref 0–6)
HCT VFR BLD CALC: 44.9 % — SIGNIFICANT CHANGE UP (ref 34.5–45)
HGB BLD-MCNC: 14.9 G/DL — SIGNIFICANT CHANGE UP (ref 11.5–15.5)
IMM GRANULOCYTES NFR BLD AUTO: 0.9 % — SIGNIFICANT CHANGE UP (ref 0–0.9)
LYMPHOCYTES # BLD AUTO: 1.32 K/UL — SIGNIFICANT CHANGE UP (ref 1–3.3)
LYMPHOCYTES # BLD AUTO: 9.9 % — LOW (ref 13–44)
MCHC RBC-ENTMCNC: 28.9 PG — SIGNIFICANT CHANGE UP (ref 27–34)
MCHC RBC-ENTMCNC: 33.2 G/DL — SIGNIFICANT CHANGE UP (ref 32–36)
MCV RBC AUTO: 87 FL — SIGNIFICANT CHANGE UP (ref 80–100)
MONOCYTES # BLD AUTO: 0.76 K/UL — SIGNIFICANT CHANGE UP (ref 0–0.9)
MONOCYTES NFR BLD AUTO: 5.7 % — SIGNIFICANT CHANGE UP (ref 2–14)
NEUTROPHILS # BLD AUTO: 10.9 K/UL — HIGH (ref 1.8–7.4)
NEUTROPHILS NFR BLD AUTO: 81.4 % — HIGH (ref 43–77)
NRBC # BLD: 0 /100 WBCS — SIGNIFICANT CHANGE UP (ref 0–0)
PLATELET # BLD AUTO: 237 K/UL — SIGNIFICANT CHANGE UP (ref 150–400)
RBC # BLD: 5.16 M/UL — SIGNIFICANT CHANGE UP (ref 3.8–5.2)
RBC # FLD: 14.6 % — HIGH (ref 10.3–14.5)
WBC # BLD: 13.38 K/UL — HIGH (ref 3.8–10.5)
WBC # FLD AUTO: 13.38 K/UL — HIGH (ref 3.8–10.5)

## 2023-05-02 ENCOUNTER — NON-APPOINTMENT (OUTPATIENT)
Age: 88
End: 2023-05-02

## 2023-05-08 ENCOUNTER — RESULT REVIEW (OUTPATIENT)
Age: 88
End: 2023-05-08

## 2023-05-08 ENCOUNTER — APPOINTMENT (OUTPATIENT)
Dept: HEMATOLOGY ONCOLOGY | Facility: CLINIC | Age: 88
End: 2023-05-08

## 2023-05-08 ENCOUNTER — APPOINTMENT (OUTPATIENT)
Dept: INFUSION THERAPY | Facility: HOSPITAL | Age: 88
End: 2023-05-08

## 2023-05-08 LAB
BASOPHILS # BLD AUTO: 0.12 K/UL — SIGNIFICANT CHANGE UP (ref 0–0.2)
BASOPHILS NFR BLD AUTO: 0.8 % — SIGNIFICANT CHANGE UP (ref 0–2)
EOSINOPHIL # BLD AUTO: 0.28 K/UL — SIGNIFICANT CHANGE UP (ref 0–0.5)
EOSINOPHIL NFR BLD AUTO: 1.9 % — SIGNIFICANT CHANGE UP (ref 0–6)
HCT VFR BLD CALC: 45.6 % — HIGH (ref 34.5–45)
HGB BLD-MCNC: 15.2 G/DL — SIGNIFICANT CHANGE UP (ref 11.5–15.5)
IMM GRANULOCYTES NFR BLD AUTO: 0.8 % — SIGNIFICANT CHANGE UP (ref 0–0.9)
LYMPHOCYTES # BLD AUTO: 1.56 K/UL — SIGNIFICANT CHANGE UP (ref 1–3.3)
LYMPHOCYTES # BLD AUTO: 10.6 % — LOW (ref 13–44)
MCHC RBC-ENTMCNC: 29 PG — SIGNIFICANT CHANGE UP (ref 27–34)
MCHC RBC-ENTMCNC: 33.3 G/DL — SIGNIFICANT CHANGE UP (ref 32–36)
MCV RBC AUTO: 86.9 FL — SIGNIFICANT CHANGE UP (ref 80–100)
MONOCYTES # BLD AUTO: 1.03 K/UL — HIGH (ref 0–0.9)
MONOCYTES NFR BLD AUTO: 7 % — SIGNIFICANT CHANGE UP (ref 2–14)
NEUTROPHILS # BLD AUTO: 11.59 K/UL — HIGH (ref 1.8–7.4)
NEUTROPHILS NFR BLD AUTO: 78.9 % — HIGH (ref 43–77)
NRBC # BLD: 0 /100 WBCS — SIGNIFICANT CHANGE UP (ref 0–0)
PLATELET # BLD AUTO: 239 K/UL — SIGNIFICANT CHANGE UP (ref 150–400)
RBC # BLD: 5.25 M/UL — HIGH (ref 3.8–5.2)
RBC # FLD: 14.8 % — HIGH (ref 10.3–14.5)
WBC # BLD: 14.7 K/UL — HIGH (ref 3.8–10.5)
WBC # FLD AUTO: 14.7 K/UL — HIGH (ref 3.8–10.5)

## 2023-05-12 ENCOUNTER — APPOINTMENT (OUTPATIENT)
Dept: CARDIOLOGY | Facility: CLINIC | Age: 88
End: 2023-05-12

## 2023-05-15 ENCOUNTER — RESULT REVIEW (OUTPATIENT)
Age: 88
End: 2023-05-15

## 2023-05-15 ENCOUNTER — APPOINTMENT (OUTPATIENT)
Dept: INFUSION THERAPY | Facility: HOSPITAL | Age: 88
End: 2023-05-15

## 2023-05-15 ENCOUNTER — APPOINTMENT (OUTPATIENT)
Dept: HEMATOLOGY ONCOLOGY | Facility: CLINIC | Age: 88
End: 2023-05-15

## 2023-05-15 LAB
BASOPHILS # BLD AUTO: 0 K/UL — SIGNIFICANT CHANGE UP (ref 0–0.2)
BASOPHILS NFR BLD AUTO: 0 % — SIGNIFICANT CHANGE UP (ref 0–2)
EOSINOPHIL # BLD AUTO: 0.39 K/UL — SIGNIFICANT CHANGE UP (ref 0–0.5)
EOSINOPHIL NFR BLD AUTO: 2 % — SIGNIFICANT CHANGE UP (ref 0–6)
HCT VFR BLD CALC: 47.1 % — HIGH (ref 34.5–45)
HGB BLD-MCNC: 15.9 G/DL — HIGH (ref 11.5–15.5)
LYMPHOCYTES # BLD AUTO: 11 % — LOW (ref 13–44)
LYMPHOCYTES # BLD AUTO: 2.15 K/UL — SIGNIFICANT CHANGE UP (ref 1–3.3)
MCHC RBC-ENTMCNC: 29.4 PG — SIGNIFICANT CHANGE UP (ref 27–34)
MCHC RBC-ENTMCNC: 33.8 G/DL — SIGNIFICANT CHANGE UP (ref 32–36)
MCV RBC AUTO: 87.2 FL — SIGNIFICANT CHANGE UP (ref 80–100)
MONOCYTES # BLD AUTO: 1.17 K/UL — HIGH (ref 0–0.9)
MONOCYTES NFR BLD AUTO: 6 % — SIGNIFICANT CHANGE UP (ref 2–14)
MYELOCYTES NFR BLD: 1 % — HIGH (ref 0–0)
NEUTROPHILS # BLD AUTO: 15.41 K/UL — HIGH (ref 1.8–7.4)
NEUTROPHILS NFR BLD AUTO: 79 % — HIGH (ref 43–77)
NRBC # BLD: 0 /100 — SIGNIFICANT CHANGE UP (ref 0–0)
NRBC # BLD: SIGNIFICANT CHANGE UP /100 WBCS (ref 0–0)
PLAT MORPH BLD: NORMAL — SIGNIFICANT CHANGE UP
PLATELET # BLD AUTO: 218 K/UL — SIGNIFICANT CHANGE UP (ref 150–400)
RBC # BLD: 5.4 M/UL — HIGH (ref 3.8–5.2)
RBC # FLD: 14.9 % — HIGH (ref 10.3–14.5)
RBC BLD AUTO: SIGNIFICANT CHANGE UP
VARIANT LYMPHS # BLD: 1 % — SIGNIFICANT CHANGE UP (ref 0–6)
WBC # BLD: 19.5 K/UL — HIGH (ref 3.8–10.5)
WBC # FLD AUTO: 19.5 K/UL — HIGH (ref 3.8–10.5)

## 2023-05-16 NOTE — PATIENT PROFILE ADULT - BRADEN SCORE
05/16/2023  Elenita Ha is a 45 y.o., female.      Pre-op Assessment    I have reviewed the Patient Summary Reports.     I have reviewed the Nursing Notes.       Review of Systems  Anesthesia Hx:  No problems with previous Anesthesia  Denies Family Hx of Anesthesia complications.   Denies Personal Hx of Anesthesia complications.   Social:  Non-Smoker    Cardiovascular:   Exercise tolerance: good Hypertension Denies MI.   Denies Dysrhythmias.     Pulmonary:  Pulmonary Normal    Renal/:  Renal/ Normal     Hepatic/GI:  Hepatic/GI Normal    Neurological:  Neurology Normal    Endocrine:  Endocrine Normal        Physical Exam  General: Well nourished, Cooperative, Alert and Oriented    Airway:  Mallampati: II   Mouth Opening: Normal  TM Distance: 4 - 6 cm  Tongue: Normal  Neck ROM: Normal ROM    Dental:  Intact    Chest/Lungs:  Clear to auscultation, Normal Respiratory Rate    Heart:  Rate: Normal  Rhythm: Regular Rhythm      Wt Readings from Last 3 Encounters:   03/14/23 74.4 kg (164 lb)   12/19/22 77.4 kg (170 lb 10.2 oz)   05/17/22 80.5 kg (177 lb 7.5 oz)     Temp Readings from Last 3 Encounters:   09/28/21 36.8 °C (98.3 °F) (Oral)   09/27/21 37.2 °C (98.9 °F) (Oral)   08/16/21 37.1 °C (98.7 °F)     BP Readings from Last 3 Encounters:   12/19/22 136/84   05/17/22 116/72   11/09/21 116/70     Pulse Readings from Last 3 Encounters:   12/19/22 86   05/17/22 76   11/09/21 94     05/16/2023 UPT negative    Anesthesia Plan  Type of Anesthesia, risks & benefits discussed:    Anesthesia Type: Gen Natural Airway, MAC  Intra-op Monitoring Plan: Standard ASA Monitors  Post Op Pain Control Plan: multimodal analgesia  Induction:  IV  Informed Consent: Informed consent signed with the Patient and all parties understand the risks and agree with anesthesia plan.  All questions answered.   ASA Score: 2  Day of Surgery  Review of History & Physical: H&P Update referred to the surgeon/provider.    Ready For Surgery From Anesthesia Perspective.     .       20

## 2023-05-22 NOTE — H&P ADULT - NSHPSOCIALHISTORY_GEN_ALL_CORE
Lives with her  and daughter Mart-1 - Positive Histology Text: MART-1 staining demonstrates areas of higher density and clustering of melanocytes with Pagetoid spread upwards within the epidermis. The surgical margins are positive for tumor cells.

## 2023-05-23 ENCOUNTER — RESULT REVIEW (OUTPATIENT)
Age: 88
End: 2023-05-23

## 2023-05-23 ENCOUNTER — APPOINTMENT (OUTPATIENT)
Dept: HEMATOLOGY ONCOLOGY | Facility: CLINIC | Age: 88
End: 2023-05-23
Payer: MEDICARE

## 2023-05-23 VITALS
RESPIRATION RATE: 16 BRPM | BODY MASS INDEX: 33.14 KG/M2 | WEIGHT: 181.22 LBS | TEMPERATURE: 97.7 F | HEART RATE: 74 BPM | OXYGEN SATURATION: 99 % | DIASTOLIC BLOOD PRESSURE: 65 MMHG | SYSTOLIC BLOOD PRESSURE: 89 MMHG

## 2023-05-23 VITALS — SYSTOLIC BLOOD PRESSURE: 104 MMHG | DIASTOLIC BLOOD PRESSURE: 72 MMHG

## 2023-05-23 DIAGNOSIS — M81.0 AGE-RELATED OSTEOPOROSIS W/OUT CURRENT PATHOLOGICAL FRACTURE: ICD-10-CM

## 2023-05-23 LAB
BASOPHILS # BLD AUTO: 0.08 K/UL — SIGNIFICANT CHANGE UP (ref 0–0.2)
BASOPHILS NFR BLD AUTO: 0.6 % — SIGNIFICANT CHANGE UP (ref 0–2)
EOSINOPHIL # BLD AUTO: 0.22 K/UL — SIGNIFICANT CHANGE UP (ref 0–0.5)
EOSINOPHIL NFR BLD AUTO: 1.7 % — SIGNIFICANT CHANGE UP (ref 0–6)
HCT VFR BLD CALC: 43.6 % — SIGNIFICANT CHANGE UP (ref 34.5–45)
HGB BLD-MCNC: 14.3 G/DL — SIGNIFICANT CHANGE UP (ref 11.5–15.5)
IMM GRANULOCYTES NFR BLD AUTO: 0.9 % — SIGNIFICANT CHANGE UP (ref 0–0.9)
LYMPHOCYTES # BLD AUTO: 1.4 K/UL — SIGNIFICANT CHANGE UP (ref 1–3.3)
LYMPHOCYTES # BLD AUTO: 10.7 % — LOW (ref 13–44)
MCHC RBC-ENTMCNC: 28.9 PG — SIGNIFICANT CHANGE UP (ref 27–34)
MCHC RBC-ENTMCNC: 32.8 G/DL — SIGNIFICANT CHANGE UP (ref 32–36)
MCV RBC AUTO: 88.3 FL — SIGNIFICANT CHANGE UP (ref 80–100)
MONOCYTES # BLD AUTO: 0.85 K/UL — SIGNIFICANT CHANGE UP (ref 0–0.9)
MONOCYTES NFR BLD AUTO: 6.5 % — SIGNIFICANT CHANGE UP (ref 2–14)
NEUTROPHILS # BLD AUTO: 10.41 K/UL — HIGH (ref 1.8–7.4)
NEUTROPHILS NFR BLD AUTO: 79.6 % — HIGH (ref 43–77)
NRBC # BLD: 0 /100 WBCS — SIGNIFICANT CHANGE UP (ref 0–0)
PLATELET # BLD AUTO: 226 K/UL — SIGNIFICANT CHANGE UP (ref 150–400)
RBC # BLD: 4.94 M/UL — SIGNIFICANT CHANGE UP (ref 3.8–5.2)
RBC # FLD: 14.6 % — HIGH (ref 10.3–14.5)
WBC # BLD: 13.08 K/UL — HIGH (ref 3.8–10.5)
WBC # FLD AUTO: 13.08 K/UL — HIGH (ref 3.8–10.5)

## 2023-05-23 PROCEDURE — 99213 OFFICE O/P EST LOW 20 MIN: CPT

## 2023-05-23 RX ORDER — DENOSUMAB 60 MG/ML
60 INJECTION SUBCUTANEOUS
Refills: 0 | Status: ACTIVE | COMMUNITY

## 2023-05-23 RX ORDER — ALENDRONATE SODIUM 70 MG/1
70 TABLET ORAL
Refills: 0 | Status: DISCONTINUED | COMMUNITY
Start: 2019-02-05 | End: 2023-05-23

## 2023-05-23 RX ORDER — LOSARTAN POTASSIUM 50 MG/1
50 TABLET, FILM COATED ORAL
Qty: 90 | Refills: 0 | Status: DISCONTINUED | COMMUNITY
Start: 2021-10-13 | End: 2023-05-23

## 2023-05-23 RX ORDER — GABAPENTIN 100 MG/1
100 CAPSULE ORAL
Qty: 30 | Refills: 0 | Status: DISCONTINUED | COMMUNITY
Start: 2023-02-28 | End: 2023-05-23

## 2023-05-24 LAB
ALBUMIN SERPL ELPH-MCNC: 3.8 G/DL
ALP BLD-CCNC: 55 U/L
ALT SERPL-CCNC: 8 U/L
ANION GAP SERPL CALC-SCNC: 13 MMOL/L
AST SERPL-CCNC: 15 U/L
BILIRUB SERPL-MCNC: 1.3 MG/DL
BUN SERPL-MCNC: 22 MG/DL
CALCIUM SERPL-MCNC: 8.7 MG/DL
CHLORIDE SERPL-SCNC: 98 MMOL/L
CO2 SERPL-SCNC: 26 MMOL/L
CREAT SERPL-MCNC: 1.04 MG/DL
EGFR: 51 ML/MIN/1.73M2
GLUCOSE SERPL-MCNC: 99 MG/DL
LDH SERPL-CCNC: 429 U/L
POTASSIUM SERPL-SCNC: 4.4 MMOL/L
PROT SERPL-MCNC: 5.9 G/DL
SODIUM SERPL-SCNC: 137 MMOL/L
URATE SERPL-MCNC: 7.2 MG/DL

## 2023-05-26 NOTE — ASSESSMENT
[FreeTextEntry1] : 89 y/o presents with newly diagnosed follicular lymphoma. Previously US guided core bx and FNA revealing atypical B cell population. s/p  FNA core bx of left axillary lymph node on 1/24/23 , path consistent with follicular lymphoma, grade 1-2, follicular pattern with high proliferation index, Ki67 approx. 60-70%. \par \par Follicular Lymphoma\par Pt has completed 4 weeks of Rituxan.  Tolerated treatment well.  \par \par Plan\par FU in one month.\par CBC, CMP, LDH, Uric Acid\par CT scan chest, abd, pelvis.\par Call with any problems. \par \par \par

## 2023-05-26 NOTE — HISTORY OF PRESENT ILLNESS
[de-identified] : Ms. Dozier is a 89 year old female with PMHx of afib, YOVANI, anxiety, HTN, CHF, and CVA presents today for recent inguinal US guided core bx and FNA revealing atypical B cell population. Patient was referred by urologist Dr. Hartmann who has been following patient for bladder mass that is thought to be a benign process\par Patient had an annual CT scan to evaluate bladder mass and had incidental finding of retroperitoneal, aortal caval  adenopathy measuring 1.4 x 1.6 cm, left inguinal adenopathy measuring 1.9 cm x 3.0 cm and inguinal adenopathy measuring 1.4 x 1.8 cm as well as  left eternal iliac lymph node measuring 1.2 x 2.2 cm. Patient was reported to have tenderness to palpation of right abd as well prompting referral to IR for biopsy, s/p inguinal US guided core bx and FNA revealing atypical B cell population.\par \par Currently patient feels well, she denies any pain to abdomen. No fevers/chills. No drenching night sweats. No enlarged/bothersome LN's. No CP/SOB. Normal BM's. No CP/SOB. No recent/recurrent infections. She ambulates at home with no issues.  [de-identified] : :Pt is s/p FNA core bx of left axillary lymph node, path consistent with follicular lymphoma, grade 1-2, follicular pattern with high proliferation index, Ki67 approx. 60-70%.  \par \par Pt here for follow up post Rituxan treatment.  Has had intermittent pruritus on arms and legs for a long time. No other complaints.  Denies fatigue, dyspnea, fevers, chills, night sweats, pain, bleeding, bruising, N/V, diarrhea, constipation.

## 2023-05-26 NOTE — PHYSICAL EXAM
[Ambulatory and capable of all self care but unable to carry out any work activities] : Status 2- Ambulatory and capable of all self care but unable to carry out any work activities. Up and about more than 50% of waking hours [Normal] : affect appropriate [de-identified] : Abdomen rotund, no adenopathy appreciated [de-identified] : mild edema of b/l LE

## 2023-06-09 NOTE — ED ADULT TRIAGE NOTE - AS TEMP SITE
[No Acute Distress] : no acute distress [Well Nourished] : well nourished [Well Developed] : well developed [Well-Appearing] : well-appearing [Normal Voice/Communication] : normal voice/communication [Normal Sclera/Conjunctiva] : normal sclera/conjunctiva [PERRL] : pupils equal round and reactive to light [Normal Outer Ear/Nose] : the outer ears and nose were normal in appearance [Normal Oropharynx] : the oropharynx was normal oral [No JVD] : no jugular venous distention [No Respiratory Distress] : no respiratory distress  [Clear to Auscultation] : lungs were clear to auscultation bilaterally [Normal Rate] : normal rate  [No Murmur] : no murmur heard [Soft] : abdomen soft [Non Tender] : non-tender [Non-distended] : non-distended [No Masses] : no abdominal mass palpated [No HSM] : no HSM [Normal Bowel Sounds] : normal bowel sounds [No Hernias] : no hernias [Normal Supraclavicular Nodes] : no supraclavicular lymphadenopathy [Normal Posterior Cervical Nodes] : no posterior cervical lymphadenopathy [Normal Anterior Cervical Nodes] : no anterior cervical lymphadenopathy [No CVA Tenderness] : no CVA  tenderness [No Spinal Tenderness] : no spinal tenderness [No Rash] : no rash

## 2023-06-18 ENCOUNTER — EMERGENCY (EMERGENCY)
Facility: HOSPITAL | Age: 88
LOS: 1 days | Discharge: HOME | End: 2023-06-18
Attending: EMERGENCY MEDICINE | Admitting: EMERGENCY MEDICINE
Payer: MEDICARE

## 2023-06-18 VITALS
DIASTOLIC BLOOD PRESSURE: 88 MMHG | HEIGHT: 62.99 IN | RESPIRATION RATE: 15 BRPM | TEMPERATURE: 98 F | HEART RATE: 156 BPM | OXYGEN SATURATION: 100 % | SYSTOLIC BLOOD PRESSURE: 149 MMHG

## 2023-06-18 VITALS
SYSTOLIC BLOOD PRESSURE: 135 MMHG | RESPIRATION RATE: 17 BRPM | DIASTOLIC BLOOD PRESSURE: 98 MMHG | OXYGEN SATURATION: 99 % | HEART RATE: 80 BPM | TEMPERATURE: 97 F

## 2023-06-18 DIAGNOSIS — Z95.0 PRESENCE OF CARDIAC PACEMAKER: Chronic | ICD-10-CM

## 2023-06-18 DIAGNOSIS — Z90.710 ACQUIRED ABSENCE OF BOTH CERVIX AND UTERUS: Chronic | ICD-10-CM

## 2023-06-18 DIAGNOSIS — Z84.89 FAMILY HISTORY OF OTHER SPECIFIED CONDITIONS: Chronic | ICD-10-CM

## 2023-06-18 DIAGNOSIS — Z82.8 FAMILY HISTORY OF OTHER DISABILITIES AND CHRONIC DISEASES LEADING TO DISABLEMENT, NOT ELSEWHERE CLASSIFIED: Chronic | ICD-10-CM

## 2023-06-18 DIAGNOSIS — T14.8 OTHER INJURY OF UNSPECIFIED BODY REGION: Chronic | ICD-10-CM

## 2023-06-18 DIAGNOSIS — H26.9 UNSPECIFIED CATARACT: Chronic | ICD-10-CM

## 2023-06-18 LAB
ALBUMIN SERPL ELPH-MCNC: 3.9 G/DL — SIGNIFICANT CHANGE UP (ref 3.3–5)
ALP SERPL-CCNC: 58 U/L — SIGNIFICANT CHANGE UP (ref 40–120)
ALT FLD-CCNC: 10 U/L — SIGNIFICANT CHANGE UP (ref 4–33)
ANION GAP SERPL CALC-SCNC: 16 MMOL/L — HIGH (ref 7–14)
APPEARANCE UR: CLEAR — SIGNIFICANT CHANGE UP
AST SERPL-CCNC: 17 U/L — SIGNIFICANT CHANGE UP (ref 4–32)
B PERT DNA SPEC QL NAA+PROBE: SIGNIFICANT CHANGE UP
B PERT+PARAPERT DNA PNL SPEC NAA+PROBE: SIGNIFICANT CHANGE UP
BACTERIA # UR AUTO: NEGATIVE — SIGNIFICANT CHANGE UP
BASE EXCESS BLDV CALC-SCNC: 1 MMOL/L — SIGNIFICANT CHANGE UP (ref -2–3)
BASOPHILS # BLD AUTO: 0.11 K/UL — SIGNIFICANT CHANGE UP (ref 0–0.2)
BASOPHILS NFR BLD AUTO: 0.9 % — SIGNIFICANT CHANGE UP (ref 0–2)
BILIRUB SERPL-MCNC: 0.7 MG/DL — SIGNIFICANT CHANGE UP (ref 0.2–1.2)
BILIRUB UR-MCNC: NEGATIVE — SIGNIFICANT CHANGE UP
BLOOD GAS VENOUS COMPREHENSIVE RESULT: SIGNIFICANT CHANGE UP
BORDETELLA PARAPERTUSSIS (RAPRVP): SIGNIFICANT CHANGE UP
BUN SERPL-MCNC: 21 MG/DL — SIGNIFICANT CHANGE UP (ref 7–23)
C PNEUM DNA SPEC QL NAA+PROBE: SIGNIFICANT CHANGE UP
CALCIUM SERPL-MCNC: 9.5 MG/DL — SIGNIFICANT CHANGE UP (ref 8.4–10.5)
CHLORIDE BLDV-SCNC: 100 MMOL/L — SIGNIFICANT CHANGE UP (ref 96–108)
CHLORIDE SERPL-SCNC: 100 MMOL/L — SIGNIFICANT CHANGE UP (ref 98–107)
CO2 BLDV-SCNC: 28 MMOL/L — HIGH (ref 22–26)
CO2 SERPL-SCNC: 20 MMOL/L — LOW (ref 22–31)
COLOR SPEC: YELLOW — SIGNIFICANT CHANGE UP
CREAT SERPL-MCNC: 1 MG/DL — SIGNIFICANT CHANGE UP (ref 0.5–1.3)
DIFF PNL FLD: NEGATIVE — SIGNIFICANT CHANGE UP
EGFR: 54 ML/MIN/1.73M2 — LOW
EOSINOPHIL # BLD AUTO: 0.38 K/UL — SIGNIFICANT CHANGE UP (ref 0–0.5)
EOSINOPHIL NFR BLD AUTO: 3 % — SIGNIFICANT CHANGE UP (ref 0–6)
EPI CELLS # UR: 13 /HPF — HIGH (ref 0–5)
FLUAV SUBTYP SPEC NAA+PROBE: SIGNIFICANT CHANGE UP
FLUBV RNA SPEC QL NAA+PROBE: SIGNIFICANT CHANGE UP
GAS PNL BLDV: 132 MMOL/L — LOW (ref 136–145)
GLUCOSE BLDV-MCNC: 105 MG/DL — HIGH (ref 70–99)
GLUCOSE SERPL-MCNC: 109 MG/DL — HIGH (ref 70–99)
GLUCOSE UR QL: NEGATIVE — SIGNIFICANT CHANGE UP
HADV DNA SPEC QL NAA+PROBE: SIGNIFICANT CHANGE UP
HCO3 BLDV-SCNC: 27 MMOL/L — SIGNIFICANT CHANGE UP (ref 22–29)
HCOV 229E RNA SPEC QL NAA+PROBE: SIGNIFICANT CHANGE UP
HCOV HKU1 RNA SPEC QL NAA+PROBE: SIGNIFICANT CHANGE UP
HCOV NL63 RNA SPEC QL NAA+PROBE: SIGNIFICANT CHANGE UP
HCOV OC43 RNA SPEC QL NAA+PROBE: SIGNIFICANT CHANGE UP
HCT VFR BLD CALC: 46.2 % — HIGH (ref 34.5–45)
HCT VFR BLDA CALC: 46 % — SIGNIFICANT CHANGE UP (ref 34.5–46.5)
HGB BLD CALC-MCNC: 15.2 G/DL — SIGNIFICANT CHANGE UP (ref 11.7–16.1)
HGB BLD-MCNC: 15.2 G/DL — SIGNIFICANT CHANGE UP (ref 11.5–15.5)
HMPV RNA SPEC QL NAA+PROBE: SIGNIFICANT CHANGE UP
HPIV1 RNA SPEC QL NAA+PROBE: SIGNIFICANT CHANGE UP
HPIV2 RNA SPEC QL NAA+PROBE: SIGNIFICANT CHANGE UP
HPIV3 RNA SPEC QL NAA+PROBE: SIGNIFICANT CHANGE UP
HPIV4 RNA SPEC QL NAA+PROBE: SIGNIFICANT CHANGE UP
HYALINE CASTS # UR AUTO: 3 /LPF — SIGNIFICANT CHANGE UP (ref 0–7)
IANC: 9.11 K/UL — HIGH (ref 1.8–7.4)
IMM GRANULOCYTES NFR BLD AUTO: 0.6 % — SIGNIFICANT CHANGE UP (ref 0–0.9)
KETONES UR-MCNC: NEGATIVE — SIGNIFICANT CHANGE UP
LACTATE BLDV-MCNC: 2 MMOL/L — SIGNIFICANT CHANGE UP (ref 0.5–2)
LEUKOCYTE ESTERASE UR-ACNC: ABNORMAL
LIDOCAIN IGE QN: 32 U/L — SIGNIFICANT CHANGE UP (ref 7–60)
LYMPHOCYTES # BLD AUTO: 16.3 % — SIGNIFICANT CHANGE UP (ref 13–44)
LYMPHOCYTES # BLD AUTO: 2.06 K/UL — SIGNIFICANT CHANGE UP (ref 1–3.3)
M PNEUMO DNA SPEC QL NAA+PROBE: SIGNIFICANT CHANGE UP
MCHC RBC-ENTMCNC: 29 PG — SIGNIFICANT CHANGE UP (ref 27–34)
MCHC RBC-ENTMCNC: 32.9 GM/DL — SIGNIFICANT CHANGE UP (ref 32–36)
MCV RBC AUTO: 88.2 FL — SIGNIFICANT CHANGE UP (ref 80–100)
MONOCYTES # BLD AUTO: 0.86 K/UL — SIGNIFICANT CHANGE UP (ref 0–0.9)
MONOCYTES NFR BLD AUTO: 6.8 % — SIGNIFICANT CHANGE UP (ref 2–14)
NEUTROPHILS # BLD AUTO: 9.11 K/UL — HIGH (ref 1.8–7.4)
NEUTROPHILS NFR BLD AUTO: 72.4 % — SIGNIFICANT CHANGE UP (ref 43–77)
NITRITE UR-MCNC: NEGATIVE — SIGNIFICANT CHANGE UP
NRBC # BLD: 0 /100 WBCS — SIGNIFICANT CHANGE UP (ref 0–0)
NRBC # FLD: 0 K/UL — SIGNIFICANT CHANGE UP (ref 0–0)
NT-PROBNP SERPL-SCNC: 1883 PG/ML — HIGH
PCO2 BLDV: 45 MMHG — SIGNIFICANT CHANGE UP (ref 39–52)
PH BLDV: 7.38 — SIGNIFICANT CHANGE UP (ref 7.32–7.43)
PH UR: 6 — SIGNIFICANT CHANGE UP (ref 5–8)
PLATELET # BLD AUTO: 286 K/UL — SIGNIFICANT CHANGE UP (ref 150–400)
PO2 BLDV: 54 MMHG — HIGH (ref 25–45)
POTASSIUM BLDV-SCNC: 3.9 MMOL/L — SIGNIFICANT CHANGE UP (ref 3.5–5.1)
POTASSIUM SERPL-MCNC: 3.9 MMOL/L — SIGNIFICANT CHANGE UP (ref 3.5–5.3)
POTASSIUM SERPL-SCNC: 3.9 MMOL/L — SIGNIFICANT CHANGE UP (ref 3.5–5.3)
PROT SERPL-MCNC: 6.3 G/DL — SIGNIFICANT CHANGE UP (ref 6–8.3)
PROT UR-MCNC: ABNORMAL
RAPID RVP RESULT: SIGNIFICANT CHANGE UP
RBC # BLD: 5.24 M/UL — HIGH (ref 3.8–5.2)
RBC # FLD: 13.9 % — SIGNIFICANT CHANGE UP (ref 10.3–14.5)
RBC CASTS # UR COMP ASSIST: 3 /HPF — SIGNIFICANT CHANGE UP (ref 0–4)
RSV RNA SPEC QL NAA+PROBE: SIGNIFICANT CHANGE UP
RV+EV RNA SPEC QL NAA+PROBE: SIGNIFICANT CHANGE UP
SAO2 % BLDV: 80.4 % — SIGNIFICANT CHANGE UP (ref 67–88)
SARS-COV-2 RNA SPEC QL NAA+PROBE: SIGNIFICANT CHANGE UP
SODIUM SERPL-SCNC: 136 MMOL/L — SIGNIFICANT CHANGE UP (ref 135–145)
SP GR SPEC: 1.03 — SIGNIFICANT CHANGE UP (ref 1.01–1.05)
TROPONIN T, HIGH SENSITIVITY RESULT: 12 NG/L — SIGNIFICANT CHANGE UP
TROPONIN T, HIGH SENSITIVITY RESULT: 16 NG/L — SIGNIFICANT CHANGE UP
UROBILINOGEN FLD QL: SIGNIFICANT CHANGE UP
WBC # BLD: 12.6 K/UL — HIGH (ref 3.8–10.5)
WBC # FLD AUTO: 12.6 K/UL — HIGH (ref 3.8–10.5)
WBC UR QL: 9 /HPF — HIGH (ref 0–5)

## 2023-06-18 PROCEDURE — 93010 ELECTROCARDIOGRAM REPORT: CPT

## 2023-06-18 PROCEDURE — 99285 EMERGENCY DEPT VISIT HI MDM: CPT | Mod: GC

## 2023-06-18 PROCEDURE — 71045 X-RAY EXAM CHEST 1 VIEW: CPT | Mod: 26

## 2023-06-18 PROCEDURE — 70450 CT HEAD/BRAIN W/O DYE: CPT | Mod: 26,MA

## 2023-06-18 PROCEDURE — 74177 CT ABD & PELVIS W/CONTRAST: CPT | Mod: 26,MA

## 2023-06-18 RX ORDER — ACETAMINOPHEN 500 MG
1000 TABLET ORAL ONCE
Refills: 0 | Status: COMPLETED | OUTPATIENT
Start: 2023-06-18 | End: 2023-06-18

## 2023-06-18 RX ADMIN — Medication 1000 MILLIGRAM(S): at 08:28

## 2023-06-18 RX ADMIN — Medication 400 MILLIGRAM(S): at 07:58

## 2023-06-18 NOTE — ED PROVIDER NOTE - PROGRESS NOTE DETAILS
Elaine Collins, PGY1, MD: Patient presenting for lightheadedness, found to be in A-fib with RVR improved upon arrival, hemodynamically stable at this time, with diffuse abdominal pain. Patient signed out to me pending work-up and EP consult for pacemaker interrogation. Elaine Collins, PGY1, MD: pt reports that she follows with Dr. Jenkins, scheduled for an EEG in 2 weeks, reports that she frequently gets peripheral vertigo and undergoes Clayton-Hallpike maneuvers for management. No focal neuro deficits on exam, no cerebellar sx, room spinning dizziness resolved at this time, sx not elicited on reexam, pt can ambulate.     Concern for poss ICH vs infarct, will add on CT head. Elaine Collins, PGY1, MD:  Patient found to have meningioma in the parietal lobe that is slightly increased in size compared to prior CT head.  Slight localized mass effect, radiology recommends correlation with MRI.   Patient reassessed, no focal neurologic deficits to suggest symptomatic compression from meningioma.  Discussed all findings with patient and daughter at bedside, daughter reports that they were aware of the meningioma, patient is without any headaches, vision changes, weakness.  Likely patient's lightheadedness and dizziness was either related to her peripheral vertigo versus episode of A-fib with RVR.  EP evaluation of patient's pacemaker showed no abnormalities. Unlikely that patient symptoms were related to the meningioma.  Discussed management options with patient and daughter, patient and daughter will follow-up with Dr. Jenkins and PCP this week, will give referral for neurosurgery for evaluation, patient and daughter will also follow-up with cardiology.  Labs nonactionable, UA contaminated likely does not reflect UTI.  Patient's elevated white blood cell count likely in the setting of lymphoma, CT abdomen shows interval resolution of lymphadenopathy.  Return precautions given to patient and daughter, verbalized understanding.  Ready for discharge at this time.

## 2023-06-18 NOTE — ED ADULT NURSE REASSESSMENT NOTE - NS ED NURSE REASSESS COMMENT FT1
AAOx4, no signs of distress, denies lightheadedness, denies ab pain, pending CT, fall precautions maintained

## 2023-06-18 NOTE — ED PROVIDER NOTE - NS ED ROS FT
Gen: Denies fever, weight loss; +lightheadedness/dizziness  HEENT: Denies vision changes, ear pain, epistaxis, sore throat  CV: Denies chest pain, palpitations  Skin: Denies rash, erythema, color changes  Resp: Denies SOB, cough  Endo: Denies sensitivity to heat, cold, increased urination  GI: Denies abdominal pain, constipation, nausea, vomiting, diarrhea  Msk: Denies back pain, LE swelling, extremity pain  : Denies dysuria, increased frequency  Neuro: Denies LOC, weakness, numbness, tingling;  Psych: Denies hx of psych, hallucinations  ROS statement: all other ROS negative except as per HPI

## 2023-06-18 NOTE — ED PROVIDER NOTE - NSFOLLOWUPCLINICS_GEN_ALL_ED_FT
[facility] Neurosurgery  Neurosurgery  Referral Assistance Program  [place]   Phone:   Fax:

## 2023-06-18 NOTE — ED PROVIDER NOTE - PATIENT PORTAL LINK FT
You can access the FollowMyHealth Patient Portal offered by [facility] by registering at the following website: [email or web address]. By joining [employer]s FollowMyHealth portal, you will also be able to view your health information using other applications (apps) compatible with our system.

## 2023-06-18 NOTE — ED ADULT NURSE NOTE - NSFALLRISKINTERV_ED_ALL_ED
Assistance OOB with selected safe patient handling equipment if applicable/Assistance with ambulation/Communicate fall risk and risk factors to all staff, patient, and family/Monitor gait and stability/Provide visual cue: yellow wristband, yellow gown, etc/Reinforce activity limits and safety measures with patient and family/Call bell, personal items and telephone in reach/Instruct patient to call for assistance before getting out of bed/chair/stretcher/Non-slip footwear applied when patient is off stretcher/[place] to call system/Physically safe environment - no spills, clutter or unnecessary equipment/Purposeful Proactive Rounding/Room/bathroom lighting operational, light cord in reach

## 2023-06-18 NOTE — ED PROVIDER NOTE - PHYSICAL EXAMINATION
PHYSICAL EXAM:  GENERAL: non-toxic appearing; in no respiratory distress  HEENT: Atraumatic, Normocephalic, PERRL, EOMs intact b/l w/out deficits, no conjunctival pallor, DMM  NECK: No JVD; FROM  CHEST/LUNG: CTAB no wheezes/rhonchi/rales  HEART: irregularly irregular, no murmur/gallops/rubs  ABDOMEN: +BS, soft, ND, mild diffuse abd ttp w/o rebound/guarding, no CVAT  EXTREMITIES: No LE edema, +2 radial pulses b/l, +2 DP/PT pulses b/l  MUSCULOSKELETAL: FROM of all 4 extremities  NERVOUS SYSTEM:  A&Ox3, No motor deficits or sensory deficits; CNII-XII intact; nml gait; neg rhomberg, nml finger to nose; no focal neurologic deficits  SKIN:  No new rashes

## 2023-06-18 NOTE — ED PROVIDER NOTE - OBJECTIVE STATEMENT
89 y/o F, PMH of AFib, CVA, HLD, HTN, YOVANI, non-Hodgkin's lymphoma (chemo 3 weeks ago), and s/p PPM, presents to ED c/o lightheadedness/dizziness since 8pm last night. Per daughter, pt is supposed to have her PPM interrogated this week to ensure it is working properly, since has been many years since looked at. Denies recent travel/sick contacts, f/c, CP, SOB, HA, vision changes, nausea/vomiting, urirnay sx, weakness/numbness, or any other symptoms. 89 y/o F, PMH of AFib, CVA, HLD, HTN, YOVANI, non-Hodgkin's lymphoma (chemo 3 weeks ago), and s/p PPM, presents to ED c/o lightheadedness/ room spinning dizziness since 8pm last night. Per daughter, pt is supposed to have her PPM interrogated this week to ensure it is working properly, since has been many years since looked at. Denies recent travel/sick contacts, f/c, CP, SOB, HA, vision changes, nausea/vomiting, urirnay sx, weakness/numbness, or any other symptoms.

## 2023-06-18 NOTE — ED PROVIDER NOTE - ATTENDING CONTRIBUTION TO CARE
Katie Sarmiento MD attending physician patient is an 89-year-old woman who had some episodes of dizziness she says she wakes up very early in the morning and she was very dizzy on presentation to the emergency department her heart rate was in the 150 range she does have a history of A-fib history of stroke high lipids hypertension osteoarthritis YOVANI on CPAP.  She was previously on Coumadin for her A-fib.  In asking the patient what medication she takes she says she does not know and that her daughter will be back shortly to be able to share with us what her medications are.  Of note she also has B-cell lymphoma and a bladder mass that is being cared for by heme-onc she has received Rituxan    Patient here is awake and alert and appropriate my exam in the emergency department her heart rate is A-fib but it is going more at a rate of 87 patient is comfortable lungs are clear good breath sounds bilaterally.    We need to evaluate what her medications are.  She did not take any of her medications this morning and we should give her what ever she is taking for rate control once we find out what that is from the daughter.    Signing out to oncoming attending Dr. Murray at 8 AM    I performed a history and physical exam of the patient and discussed their management with the resident and /or advanced care provider. I reviewed the resident and /or ACP's note and agree with the documented findings and plan of care. My medical decison making and observations are found above.

## 2023-06-18 NOTE — ED ADULT NURSE NOTE - CHIEF COMPLAINT QUOTE
Pt c/o intermittent dizziness since 8pm last night. No facial droop observed. Equal strength and sensation noted to b/l upper and lower extremities. Hx non-Hodgkin's lymphoma (last chemo 3 weeks ago), CVA, HTN, Afib (on Xarelto), Pacemaker, legally blind. Pt denies cp, sob, palpitations, n/v/d, fevers/chills. Pt tachycardiac at triage. MD Jackson present for stroke eval. No code stoke called.

## 2023-06-18 NOTE — ED ADULT TRIAGE NOTE - CHIEF COMPLAINT QUOTE
Pt c/o intermittent dizziness since 8pm last night. No facial droop observed. Equal strength and sensation noted to b/l upper and lower extremities. Hx non-Hodgkin's lymphoma (last chemo 3 weeks ago), CVA, HTN, Afib, Pacemaker, illegally blind. Pt denies cp, sob, palpitations, n/v/d, fevers/chills. Pt tachycardiac at triage. MD Jackson present for stroke eval. No code stoke called. Pt c/o intermittent dizziness since 8pm last night. No facial droop observed. Equal strength and sensation noted to b/l upper and lower extremities. Hx non-Hodgkin's lymphoma (last chemo 3 weeks ago), CVA, HTN, Afib (on Xarelto), Pacemaker, illegally blind. Pt denies cp, sob, palpitations, n/v/d, fevers/chills. MD Jackson present for stroke eval. No code stoke called. Pt c/o intermittent dizziness since 8pm last night. No facial droop observed. Equal strength and sensation noted to b/l upper and lower extremities. Hx non-Hodgkin's lymphoma (last chemo 3 weeks ago), CVA, HTN, Afib (on Xarelto), Pacemaker, legally blind. Pt denies cp, sob, palpitations, n/v/d, fevers/chills. MD Jackson present for stroke eval. No code stoke called. Pt c/o intermittent dizziness since 8pm last night. No facial droop observed. Equal strength and sensation noted to b/l upper and lower extremities. Hx non-Hodgkin's lymphoma (last chemo 3 weeks ago), CVA, HTN, Afib (on Xarelto), Pacemaker, legally blind. Pt denies cp, sob, palpitations, n/v/d, fevers/chills. Pt tachycardiac at triage. MD Jackson present for stroke eval. No code stoke called.

## 2023-06-18 NOTE — ED ADULT NURSE NOTE - OBJECTIVE STATEMENT
oc RN: pt A&Ox4 RR even unlabored completing full sentences. pt c/o intermittent dizziness since 8pm last night. pt states was difficult to sleep due to symptoms. No facial droop observed. Equal strength and sensation noted to b/l upper and lower extremities. Hx non-Hodgkin's lymphoma (last chemo 3 weeks ago), CVA, HTN, Afib (on Xarelto), Pacemaker, legally blind. Pt denies cp, sob, palpitations, n/v/d, fevers/chills. 20g placed to L hand, labs drawn and sent. covid swab sent. pt placed on CM HR noted to be 83. pt endorses abd pain, pt abd soft nondistended tender upon assessment. stretcher lowest position siderails up call bell in reach. report to be given to primary RN oc RN: pt A&Ox4 RR even unlabored completing full sentences. pt c/o intermittent dizziness since 8pm last night. pt states was difficult to sleep due to symptoms. No facial droop observed. Equal strength and sensation noted to b/l upper and lower extremities. Hx non-Hodgkin's lymphoma (last chemo 3 weeks ago), CVA, HTN, Afib (on Xarelto), Pacemaker, legally blind. pt states pacemaker is supposed to be interrogated this week to ensure working properly. Pt denies cp, sob, palpitations, n/v/d, fevers/chills. 20g placed to L hand, labs drawn and sent. covid swab sent. pt placed on CM HR noted to be 83. pt endorses abd pain, pt abd soft nondistended tender upon assessment. stretcher lowest position siderails up call bell in reach. report to be given to primary RN

## 2023-06-18 NOTE — ED PROVIDER NOTE - NS_EDPROVIDERDISPOUSERTYPE_ED_A_ED
Attending Attestation (For Attendings USE Only)... Cyclosporine Pregnancy And Lactation Text: This medication is Pregnancy Category C and it isn't know if it is safe during pregnancy. This medication is excreted in breast milk.

## 2023-06-18 NOTE — PROCEDURE NOTE - ADDITIONAL PROCEDURE DETAILS
Interrogation indication: dizziness, lightheadedness    Implanted 3/2019  Device [place] XT DR MRI [id number]; serial number [id number]  Remaining longevity: 10.5 years    Lead impedances Atrial 627Ohms,  Ohms  Capture threshold RV: 1.125 V @ 0.40ms    Mode: VVI, lower rate 60  Underlying rhythm: atrial fibrillation    Detection:  (all therapies off)  AT/AF monitor > 171bpm  VT monitor > 150bpm     AP < 0.1%,  9.2%  Time in AT/AF: 24h/day, no pace terminated episodes  Events:   AF w/ RVR with rates 160s-180s on May 1st    Conclusions:  - No events to correlate with patient's dizziness spells x 1 day  - No device setting changes

## 2023-06-18 NOTE — ED PROVIDER NOTE - CLINICAL SUMMARY MEDICAL DECISION MAKING FREE TEXT BOX
88 y/o F, PMH of AFib, CVA, HLD, HTN, YOVANI, non-Hodgkin's lymphoma (chemo 3 weeks ago), and s/p PPM, presents to ED c/o lightheadedness/dizziness since 8pm last night. Pt currently denies any other symptoms.    Pt found to be tachycardic to 150s in triage, improved to 105 here in ED. Otherwise other vitals wnl. Physical exam significant for mild diffuse abd ttp w/o peritoneal signs. Neuro exam is unremarkable. EKG showing AFib w/ RVR w/o ischemic changes.    Plan to do metabolic/infectious w/u to assess. Will check basic labs, trop, UA, CXR, and CT A/P. Will consult EP for PPM interrogation. Reassess for dispo. 88 y/o F, PMH of AFib, CVA, HLD, HTN, YOVANI, non-Hodgkin's lymphoma (chemo 3 weeks ago), and s/p PPM, presents to ED c/o lightheadedness/dizziness since 8pm last night. Pt currently denies any other symptoms.    Pt found to be tachycardic to 150s in triage, improved to 105 here in ED. Otherwise other vitals wnl. Physical exam significant for mild diffuse abd ttp w/o peritoneal signs. Neuro exam is unremarkable. EKG showing AFib w/ RVR w/o ischemic changes.    Plan to do metabolic/infectious w/u to assess. Will check basic labs, trop, UA, CXR, and CT A/P. Will consult EP for PPM interrogation. Reassess for dispo.    Katie Sarmiento MD attending physician patient is an 89-year-old woman who had some episodes of dizziness she says she wakes up very early in the morning and she was very dizzy on presentation to the emergency department her heart rate was in the 150 range she does have a history of A-fib history of stroke high lipids hypertension osteoarthritis YOVANI on CPAP.  She was previously on Coumadin for her A-fib.  In asking the patient what medication she takes she says she does not know and that her daughter will be back shortly to be able to share with us what her medications are.  Of note she also has B-cell lymphoma and a bladder mass that is being cared for by heme-onc she has received Rituxan    Patient here is awake and alert and appropriate my exam in the emergency department her heart rate is A-fib but it is going more at a rate of 87 patient is comfortable lungs are clear good breath sounds bilaterally.    We need to evaluate what her medications are.  She did not take any of her medications this morning and we should give her what ever she is taking for rate control once we find out what that is from the daughter.    Signing out to oncoming attending Dr. Murray at 8 AM

## 2023-06-18 NOTE — ED PROVIDER NOTE - NSFOLLOWUPINSTRUCTIONS_ED_ALL_ED_FT
You were evaluated in the emergency department for room spinning dizziness and lightheadedness, your heart rate was found to be very fast concerning for an episode of atrial fibrillation.  Your work-up showed that you have a meningioma that has slightly increased in size compared to your prior CAT scan of your head.  It is unlikely that your meningioma has contributed to your symptoms today however you need to follow-up with your primary care physician and your neurologist within this week for further evaluation.  Please see your cardiologist as well as soon as possible.  Our referral coordinator will help you with a neurosurgery referral           If you develop headaches, vision changes, dizziness, nausea, vomiting, weakness, worsening abdominal pain, chest pain, shortness of breath please return to the emergency department.    Dizziness  WHAT YOU NEED TO KNOW   Dizziness is a common problem. It makes you feel unsteady or light-headed. You may feel like you are about to pass out (faint). Dizziness can lead to getting hurt if you stumble or fall. Dizziness can be caused by many things, including:    Medicines.  Not having enough water in your body (dehydration).  Illness.    Follow these instructions at home:  Eating and drinking    Drink enough fluid to keep your pee (urine) clear or pale yellow. This helps to keep you from getting dehydrated. Try to drink more clear fluids, such as water.  Do not drink alcohol.  Limit how much caffeine you drink or eat, if your doctor tells you to do that.  Limit how much salt (sodium) you drink or eat, if your doctor tells you to do that.     Activity    Avoid making quick movements.  When you stand up from sitting in a chair, steady yourself until you feel okay.  In the morning, first sit up on the side of the bed. When you feel okay, stand slowly while you hold onto something. Do this until you know that your balance is fine.  If you need to  one place for a long time, move your legs often. Tighten and relax the muscles in your legs while you are standing.  Do not drive or use heavy machinery if you feel dizzy.  Avoid bending down if you feel dizzy. Place items in your home so you can reach them easily without leaning over.     Lifestyle    Do not use any products that contain nicotine or tobacco, such as cigarettes and e-cigarettes. If you need help quitting, ask your doctor.  Try to lower your stress level. You can do this by using methods such as yoga or meditation. Talk with your doctor if you need help.    General instructions    Watch your dizziness for any changes.  Take over-the-counter and prescription medicines only as told by your doctor. Talk with your doctor if you think that you are dizzy because of a medicine that you are taking.  Tell a friend or a family member that you are feeling dizzy. If he or she notices any changes in your behavior, have this person call your doctor.  Keep all follow-up visits as told by your doctor. This is important.    Contact a doctor if:  Your dizziness does not go away.  Your dizziness or light-headedness gets worse.  You feel sick to your stomach (nauseous).  You have trouble hearing.  You have new symptoms.  You are unsteady on your feet.  You feel like the room is spinning.    Get help right away if:  You throw up (vomit) or have watery poop (diarrhea), and you cannot eat or drink anything.  You have trouble:  Talking.  Walking.  Swallowing.  Using your arms, hands, or legs.  You feel generally weak.  You are not thinking clearly, or you have trouble forming sentences. A friend or family member may notice this.  You have:  Chest pain.  Pain in your belly (abdomen).  Shortness of breath.  Sweating.  Your vision changes.  You are bleeding.  You have a very bad headache.  You have neck pain or a stiff neck.  You have a fever.    These symptoms may be an emergency. Do not wait to see if the symptoms will go away. Get medical help right away. Call your local emergency services (911 in the [place] Do not drive yourself to the hospital.    Summary  Dizziness makes you feel unsteady or light-headed. You may feel like you are about to pass out (faint).  Drink enough fluid to keep your pee (urine) clear or pale yellow. Do not drink alcohol.  Avoid making quick movements if you feel dizzy.  Watch your dizziness for any changes.    ADDITIONAL NOTES AND INSTRUCTIONS    Please follow up with your Primary MD in 24-48 hr.  Seek immediate medical care for any new/worsening signs or symptoms.

## 2023-06-20 LAB
CULTURE RESULTS: SIGNIFICANT CHANGE UP
SPECIMEN SOURCE: SIGNIFICANT CHANGE UP

## 2023-06-21 NOTE — ED POST DISCHARGE NOTE - RESULT SUMMARY
culture grew 3 or more types of organisms  which indicate collection contamination, consider recollection only if clinically indicated. No antibiotic listed in ED provider note or prescription writer at time of discharge. Patient elderly message left with Call Back  P.A. number and hours for return call back.

## 2023-06-27 ENCOUNTER — RESULT REVIEW (OUTPATIENT)
Age: 88
End: 2023-06-27

## 2023-07-03 NOTE — ED ADULT NURSE NOTE - OBJECTIVE STATEMENT
86 y/o female PMH macular degeneration, CHF, pacemaker, pericardial effusion, lacunar infarct presents to ED reporting SOB. Pt reports having SOB for two weeks. Pt reports having SOB when waking up in the AM, accompanied with mild CP. Pt reports going to [facility] Tuesday and was discharged from ED. Pt to follow-up with cardiologist and HF specialist this week. On exam, AOx3, speaking in complete sentences. Lung sounds CTA, NAD. Abdomen soft, non-tender, non-distended, normoactive bowel sounds in all 4 quadrants. Mild swelling noted to bilateral feet. Pt denies CP, SOB, n/v/d, fever/chills at this time. MD at bedside for evaluation. Fannie Muse

## 2023-07-18 ENCOUNTER — APPOINTMENT (OUTPATIENT)
Dept: PHYSICAL MEDICINE AND REHAB | Facility: CLINIC | Age: 88
End: 2023-07-18
Payer: MEDICARE

## 2023-07-18 ENCOUNTER — NON-APPOINTMENT (OUTPATIENT)
Age: 88
End: 2023-07-18

## 2023-07-18 VITALS — OXYGEN SATURATION: 97 % | HEART RATE: 81 BPM

## 2023-07-18 DIAGNOSIS — G89.29 CERVICALGIA: ICD-10-CM

## 2023-07-18 DIAGNOSIS — M54.50 LOW BACK PAIN, UNSPECIFIED: ICD-10-CM

## 2023-07-18 DIAGNOSIS — S22.000G WEDGE COMPRESSION FRACTURE OF UNSPECIFIED THORACIC VERTEBRA, SUBSEQUENT ENCOUNTER FOR FRACTURE WITH DELAYED HEALING: ICD-10-CM

## 2023-07-18 DIAGNOSIS — G89.29 LOW BACK PAIN, UNSPECIFIED: ICD-10-CM

## 2023-07-18 DIAGNOSIS — M54.2 CERVICALGIA: ICD-10-CM

## 2023-07-18 PROCEDURE — 99204 OFFICE O/P NEW MOD 45 MIN: CPT

## 2023-07-19 NOTE — HISTORY OF PRESENT ILLNESS
[FreeTextEntry1] : DAGMAR CONNORS is an 89 year old F here for initial evaluation of neck and back pains. Ms. CONNORS was sent for consultation by Dr. Parra.. Has been under the care of Dr. Valencia - last visit in February 2023 and has been getting injections from him. Patient is schedule for PET scan in a few weeks to follow up on her cancer progression or remission.\par \par From [facility] Dr. Pierce visit on 5/23/23: " 89 year old female with PMHx of afib, YOVANI, anxiety, HTN, CHF, and CVA presents today for recent inguinal US guided core bx and FNA revealing atypical B cell population. Patient was referred by urologist Dr. Hartmann who has been following patient for bladder mass that is thought to be a benign process\par Patient had an annual CT scan to evaluate bladder mass and had incidental finding of retroperitoneal, aortal caval adenopathy measuring 1.4 x 1.6 cm, left inguinal adenopathy measuring 1.9 cm x 3.0 cm and inguinal adenopathy measuring 1.4 x 1.8 cm as well as left eternal iliac lymph node measuring 1.2 x 2.2 cm. Patient was reported to have tenderness to palpation of right abd as well prompting referral to IR for biopsy, s/p inguinal US guided core bx and FNA revealing atypical B cell population."\par \par Patient has as pacemaker and atib on Eliquis. Patient is also legally blind.\par \par Pain location: neck, midback, lower back\par Quality: aching, nagging\par Radiation: not sure\par Severity: 10/10\par Onset: a few years ago\par Associated symptoms: difficulty sleeping\par Numbness: denies\par Weakness: overall debility\par Exacerbated by: movement, at times worst at night\par Improved by: medications, position changes\par Bowel or bladder involvement: no changes\par \par Denies bowel/bladder dysfunction, saddle anesthesia, fevers, chills, weight loss, night pain, or night sweats at this time.\par \par The pain interferes with function, ADLs and quality of life.\par Patient had tried Acetaminophen, NSAIDs, prescription pain medications, muscle relaxants without any lasting relief of pain.

## 2023-07-19 NOTE — REVIEW OF SYSTEMS
[Earache] : no earache [Chest Pain] : no chest pain [Shortness Of Breath] : no shortness of breath [Abdominal Pain] : no abdominal pain [Dysuria] : no dysuria [Negative] : Constitutional [FreeTextEntry3] : legally blind [FreeTextEntry6] : hx of afib on eliquis [FreeTextEntry9] : neck and back pains

## 2023-07-19 NOTE — ASSESSMENT
[FreeTextEntry1] : Ms. DAGMAR CONNORS is a 89 year F with pain in the neck, midback and lower back across She reports chronic long standing pain and is noting an acute on chronic exacerbation of this pain due to cervical, thoracic and lumbar spondylosis without myelopathy. However due to history of malignancy, metastatic process is high on the differential. There are no myelopathic signs on today's exam.\par \par Patient reassured and educated on the diagnosis and treatment options. Risks and benefits of treatment and of delaying treatment discussed with patient. Risks discussed include but not limited to: progression of symptoms, worsening pain and functional status, etc.\par \par This note was generated using Dragon medical dictation software. A reasonable effort had been made for proofreading its contents, but spelling mistakes or grammatical errors may still remain. If there are any questions or points of clarification needed please notify my office.\par \par Sending for XR C, T, L spine to eval for any DJD vs infiltrative bony proces\par Encouraged to keep and go for the PET scan and follow up close with hemeonc\par \par DAGMAR CONNORS is taking blood thinners: Eliquis at this time. Risks and benefits of having injection while on blood thinners explained to the patient. Risks discussed included, but not limited to: pain, infection, bleeding requiring emergency surgery, headaches, damage to vital organs.\par \par Patient to follow up with Dr. Bean for general and cancer rehab needs - placing referral in the system\par \par Follow up with me PRN\par \par Patient was advised if the following symptoms develop: chills, fever, loss of bladder control, bowel incontinence or urinary retention, numbness/tingling or weakness is present in upper or lower extremities, to go to the nearest emergency room. This may be a new clinical condition not present at the time of the patient visit that may lead to paralysis and/or death. Patient advised if the above symptoms developed to also call the office immediately to inform us and to go to the nearest emergency room.\par \par Patient was advised if the following symptoms develop: chills, fever, loss of bladder control, bowel incontinence or urinary retention, numbness/tingling or weakness is present in upper or lower extremities, to go to the nearest emergency room. This may be a new clinical condition not present at the time of the patient visit that may lead to paralysis and/or death. Patient advised if the above symptoms developed to also call the office immediately to inform us and to go to the nearest emergency room.

## 2023-07-21 ENCOUNTER — OUTPATIENT (OUTPATIENT)
Dept: OUTPATIENT SERVICES | Facility: HOSPITAL | Age: 88
LOS: 1 days | Discharge: HOME | End: 2023-07-21

## 2023-07-21 DIAGNOSIS — H26.9 UNSPECIFIED CATARACT: Chronic | ICD-10-CM

## 2023-07-21 DIAGNOSIS — Z82.8 FAMILY HISTORY OF OTHER DISABILITIES AND CHRONIC DISEASES LEADING TO DISABLEMENT, NOT ELSEWHERE CLASSIFIED: Chronic | ICD-10-CM

## 2023-07-21 DIAGNOSIS — Z84.89 FAMILY HISTORY OF OTHER SPECIFIED CONDITIONS: Chronic | ICD-10-CM

## 2023-07-21 DIAGNOSIS — T14.8 OTHER INJURY OF UNSPECIFIED BODY REGION: Chronic | ICD-10-CM

## 2023-07-21 DIAGNOSIS — C85.10 UNSPECIFIED B-CELL LYMPHOMA, UNSPECIFIED SITE: ICD-10-CM

## 2023-07-21 DIAGNOSIS — Z90.710 ACQUIRED ABSENCE OF BOTH CERVIX AND UTERUS: Chronic | ICD-10-CM

## 2023-07-21 DIAGNOSIS — Z95.0 PRESENCE OF CARDIAC PACEMAKER: Chronic | ICD-10-CM

## 2023-07-25 ENCOUNTER — OUTPATIENT (OUTPATIENT)
Dept: OUTPATIENT SERVICES | Facility: HOSPITAL | Age: 88
LOS: 1 days | End: 2023-07-25
Payer: MEDICARE

## 2023-07-25 ENCOUNTER — APPOINTMENT (OUTPATIENT)
Dept: CT IMAGING | Facility: IMAGING CENTER | Age: 88
End: 2023-07-25
Payer: MEDICARE

## 2023-07-25 DIAGNOSIS — Z84.89 FAMILY HISTORY OF OTHER SPECIFIED CONDITIONS: Chronic | ICD-10-CM

## 2023-07-25 DIAGNOSIS — Z90.710 ACQUIRED ABSENCE OF BOTH CERVIX AND UTERUS: Chronic | ICD-10-CM

## 2023-07-25 DIAGNOSIS — Z95.0 PRESENCE OF CARDIAC PACEMAKER: Chronic | ICD-10-CM

## 2023-07-25 DIAGNOSIS — H26.9 UNSPECIFIED CATARACT: Chronic | ICD-10-CM

## 2023-07-25 DIAGNOSIS — Z82.8 FAMILY HISTORY OF OTHER DISABILITIES AND CHRONIC DISEASES LEADING TO DISABLEMENT, NOT ELSEWHERE CLASSIFIED: Chronic | ICD-10-CM

## 2023-07-25 DIAGNOSIS — C82.90 FOLLICULAR LYMPHOMA, UNSPECIFIED, UNSPECIFIED SITE: ICD-10-CM

## 2023-07-25 DIAGNOSIS — T14.8 OTHER INJURY OF UNSPECIFIED BODY REGION: Chronic | ICD-10-CM

## 2023-07-25 PROCEDURE — 71260 CT THORAX DX C+: CPT

## 2023-07-25 PROCEDURE — 71260 CT THORAX DX C+: CPT | Mod: 26,MH

## 2023-07-25 PROCEDURE — 74177 CT ABD & PELVIS W/CONTRAST: CPT

## 2023-07-25 PROCEDURE — 74177 CT ABD & PELVIS W/CONTRAST: CPT | Mod: 26,MH

## 2023-07-26 DIAGNOSIS — Z86.2 PERSONAL HISTORY OF DISEASES OF THE BLOOD AND BLOOD-FORMING ORGANS AND CERTAIN DISORDERS INVOLVING THE IMMUNE MECHANISM: ICD-10-CM

## 2023-07-26 DIAGNOSIS — R59.1 GENERALIZED ENLARGED LYMPH NODES: ICD-10-CM

## 2023-07-29 ENCOUNTER — EMERGENCY (EMERGENCY)
Facility: HOSPITAL | Age: 88
LOS: 1 days | Discharge: HOME | End: 2023-07-29
Attending: EMERGENCY MEDICINE | Admitting: EMERGENCY MEDICINE
Payer: MEDICARE

## 2023-07-29 VITALS
HEIGHT: 62.99 IN | RESPIRATION RATE: 16 BRPM | DIASTOLIC BLOOD PRESSURE: 60 MMHG | SYSTOLIC BLOOD PRESSURE: 117 MMHG | OXYGEN SATURATION: 98 % | TEMPERATURE: 98 F | HEART RATE: 88 BPM

## 2023-07-29 VITALS
TEMPERATURE: 97 F | DIASTOLIC BLOOD PRESSURE: 74 MMHG | OXYGEN SATURATION: 97 % | HEART RATE: 82 BPM | RESPIRATION RATE: 18 BRPM | SYSTOLIC BLOOD PRESSURE: 141 MMHG

## 2023-07-29 DIAGNOSIS — T14.8 OTHER INJURY OF UNSPECIFIED BODY REGION: Chronic | ICD-10-CM

## 2023-07-29 DIAGNOSIS — Z95.0 PRESENCE OF CARDIAC PACEMAKER: Chronic | ICD-10-CM

## 2023-07-29 DIAGNOSIS — H26.9 UNSPECIFIED CATARACT: Chronic | ICD-10-CM

## 2023-07-29 DIAGNOSIS — Z84.89 FAMILY HISTORY OF OTHER SPECIFIED CONDITIONS: Chronic | ICD-10-CM

## 2023-07-29 DIAGNOSIS — Z82.8 FAMILY HISTORY OF OTHER DISABILITIES AND CHRONIC DISEASES LEADING TO DISABLEMENT, NOT ELSEWHERE CLASSIFIED: Chronic | ICD-10-CM

## 2023-07-29 DIAGNOSIS — Z90.710 ACQUIRED ABSENCE OF BOTH CERVIX AND UTERUS: Chronic | ICD-10-CM

## 2023-07-29 LAB
ALBUMIN SERPL ELPH-MCNC: 3.8 G/DL — SIGNIFICANT CHANGE UP (ref 3.3–5)
ALP SERPL-CCNC: 62 U/L — SIGNIFICANT CHANGE UP (ref 40–120)
ALT FLD-CCNC: 10 U/L — SIGNIFICANT CHANGE UP (ref 4–33)
ANION GAP SERPL CALC-SCNC: 16 MMOL/L — HIGH (ref 7–14)
APPEARANCE UR: CLEAR — SIGNIFICANT CHANGE UP
APTT BLD: 40.6 SEC — HIGH (ref 24.5–35.6)
AST SERPL-CCNC: 17 U/L — SIGNIFICANT CHANGE UP (ref 4–32)
BACTERIA # UR AUTO: NEGATIVE /HPF — SIGNIFICANT CHANGE UP
BASOPHILS # BLD AUTO: 0.08 K/UL — SIGNIFICANT CHANGE UP (ref 0–0.2)
BASOPHILS NFR BLD AUTO: 0.5 % — SIGNIFICANT CHANGE UP (ref 0–2)
BILIRUB SERPL-MCNC: 0.7 MG/DL — SIGNIFICANT CHANGE UP (ref 0.2–1.2)
BILIRUB UR-MCNC: NEGATIVE — SIGNIFICANT CHANGE UP
BUN SERPL-MCNC: 28 MG/DL — HIGH (ref 7–23)
CALCIUM SERPL-MCNC: 9.1 MG/DL — SIGNIFICANT CHANGE UP (ref 8.4–10.5)
CHLORIDE SERPL-SCNC: 102 MMOL/L — SIGNIFICANT CHANGE UP (ref 98–107)
CO2 SERPL-SCNC: 26 MMOL/L — SIGNIFICANT CHANGE UP (ref 22–31)
COLOR SPEC: YELLOW — SIGNIFICANT CHANGE UP
CREAT SERPL-MCNC: 1.09 MG/DL — SIGNIFICANT CHANGE UP (ref 0.5–1.3)
DIFF PNL FLD: NEGATIVE — SIGNIFICANT CHANGE UP
EGFR: 49 ML/MIN/1.73M2 — LOW
EOSINOPHIL # BLD AUTO: 0.27 K/UL — SIGNIFICANT CHANGE UP (ref 0–0.5)
EOSINOPHIL NFR BLD AUTO: 1.8 % — SIGNIFICANT CHANGE UP (ref 0–6)
EPI CELLS # UR: 3 — SIGNIFICANT CHANGE UP
GLUCOSE SERPL-MCNC: 94 MG/DL — SIGNIFICANT CHANGE UP (ref 70–99)
GLUCOSE UR QL: NEGATIVE MG/DL — SIGNIFICANT CHANGE UP
HCT VFR BLD CALC: 44.6 % — SIGNIFICANT CHANGE UP (ref 34.5–45)
HGB BLD-MCNC: 14.2 G/DL — SIGNIFICANT CHANGE UP (ref 11.5–15.5)
IANC: 12.04 K/UL — HIGH (ref 1.8–7.4)
IMM GRANULOCYTES NFR BLD AUTO: 0.7 % — SIGNIFICANT CHANGE UP (ref 0–0.9)
INR BLD: 1.28 RATIO — HIGH (ref 0.85–1.18)
KETONES UR-MCNC: NEGATIVE MG/DL — SIGNIFICANT CHANGE UP
LEUKOCYTE ESTERASE UR-ACNC: ABNORMAL
LYMPHOCYTES # BLD AUTO: 1.58 K/UL — SIGNIFICANT CHANGE UP (ref 1–3.3)
LYMPHOCYTES # BLD AUTO: 10.4 % — LOW (ref 13–44)
MCHC RBC-ENTMCNC: 28.5 PG — SIGNIFICANT CHANGE UP (ref 27–34)
MCHC RBC-ENTMCNC: 31.8 GM/DL — LOW (ref 32–36)
MCV RBC AUTO: 89.6 FL — SIGNIFICANT CHANGE UP (ref 80–100)
MONOCYTES # BLD AUTO: 1.08 K/UL — HIGH (ref 0–0.9)
MONOCYTES NFR BLD AUTO: 7.1 % — SIGNIFICANT CHANGE UP (ref 2–14)
NEUTROPHILS # BLD AUTO: 12.04 K/UL — HIGH (ref 1.8–7.4)
NEUTROPHILS NFR BLD AUTO: 79.5 % — HIGH (ref 43–77)
NITRITE UR-MCNC: NEGATIVE — SIGNIFICANT CHANGE UP
NRBC # BLD: 0 /100 WBCS — SIGNIFICANT CHANGE UP (ref 0–0)
NRBC # FLD: 0 K/UL — SIGNIFICANT CHANGE UP (ref 0–0)
PH UR: 6 — SIGNIFICANT CHANGE UP (ref 5–8)
PLATELET # BLD AUTO: 227 K/UL — SIGNIFICANT CHANGE UP (ref 150–400)
POTASSIUM SERPL-MCNC: 3.8 MMOL/L — SIGNIFICANT CHANGE UP (ref 3.5–5.3)
POTASSIUM SERPL-SCNC: 3.8 MMOL/L — SIGNIFICANT CHANGE UP (ref 3.5–5.3)
PROT SERPL-MCNC: 6.3 G/DL — SIGNIFICANT CHANGE UP (ref 6–8.3)
PROT UR-MCNC: 100 MG/DL
PROTHROM AB SERPL-ACNC: 14.2 SEC — HIGH (ref 9.5–13)
RBC # BLD: 4.98 M/UL — SIGNIFICANT CHANGE UP (ref 3.8–5.2)
RBC # FLD: 14.3 % — SIGNIFICANT CHANGE UP (ref 10.3–14.5)
RBC CASTS # UR COMP ASSIST: 1 /HPF — SIGNIFICANT CHANGE UP (ref 0–4)
SODIUM SERPL-SCNC: 144 MMOL/L — SIGNIFICANT CHANGE UP (ref 135–145)
SP GR SPEC: >1.03 — SIGNIFICANT CHANGE UP (ref 1–1.03)
UROBILINOGEN FLD QL: 0.2 MG/DL — SIGNIFICANT CHANGE UP (ref 0.2–1)
WBC # BLD: 15.15 K/UL — HIGH (ref 3.8–10.5)
WBC # FLD AUTO: 15.15 K/UL — HIGH (ref 3.8–10.5)
WBC UR QL: 3 /HPF — SIGNIFICANT CHANGE UP (ref 0–5)

## 2023-07-29 PROCEDURE — 99284 EMERGENCY DEPT VISIT MOD MDM: CPT | Mod: 25,GC

## 2023-07-29 PROCEDURE — 93010 ELECTROCARDIOGRAM REPORT: CPT

## 2023-07-29 PROCEDURE — 36000 PLACE NEEDLE IN VEIN: CPT

## 2023-07-29 PROCEDURE — 73502 X-RAY EXAM HIP UNI 2-3 VIEWS: CPT | Mod: 26,RT

## 2023-07-29 RX ORDER — ACETAMINOPHEN 500 MG
1000 TABLET ORAL ONCE
Refills: 0 | Status: COMPLETED | OUTPATIENT
Start: 2023-07-29 | End: 2023-07-29

## 2023-07-29 RX ADMIN — Medication 400 MILLIGRAM(S): at 18:44

## 2023-07-29 NOTE — ED ADULT TRIAGE NOTE - CHIEF COMPLAINT QUOTE
pt on ELIQUIS pt was attempting to sit down on chair with wheels on it and pt fell on to buttocks  / daughter states no bruises noted buit pt has spinal compression fx from few yr ago pt c/o pain to buttock area

## 2023-07-29 NOTE — ED PROVIDER NOTE - PROGRESS NOTE DETAILS
Placed US guided IV. Labs drawn. Pt stable, feeling improved, ambulatory w/cane w/o assistance, no pain, requesting discharge. Discussed results of workup, importance of follow-up with PCP, otc meds for pain, and return precautions with patient who verbalized understanding and agreement. Pt had opportunity to ask questions and address concerns, and results of workup including lab and imaging, and incidental findings, were reviewed and provided in DC paperwork. Patient is medically clear for DC at this time. -Eileen Mtz, PGY-3

## 2023-07-29 NOTE — ED PROVIDER NOTE - ATTENDING CONTRIBUTION TO CARE
89F CHF NHL afib on eliquis pt had mech fall last night, tried to sit in chair that has wheels, landed on bottom and R wrist.  C/o R hip pain.  No LOC.  HD stable.  (+)SP ttp.  Pt had UTI 2 wk ago.  Bruise to R gluteal region.  Normal neuro exam.  Will check labs r/o anemia given age, bruise, on AC.  Nontender abd,  able to walk.  Given able to walk unlikely significant occult pelvic/hip fx.  Able to range hip.  Rx pain med, follow up with PMD or ortho.    VS:  unremarkable    GEN - NAD;   well appearing;   A+O x3   HEAD - NC/AT     ENT - PEERL, EOMI, mucous membranes    moist , no discharge      NECK: Neck supple, non-tender without lymphadenopathy, no masses, no JVD  PULM - CTA b/l,  symmetric breath sounds  COR -  normal heart sounds    ABD - , ND, NT, soft,  BACK - no CVA tenderness, nontender spine   R hip bruising, not extensive, no bulla or fluctuance or palpable fluid collection.    EXTREMS - no edema, no deformity, warm and well perfused   Mild pain with ROM R hip but able.    SKIN - no rash    or bruising    except as noted.   NEUROLOGIC - alert, face symmetric, speech fluent, sensation nl, motor no focal deficit.

## 2023-07-29 NOTE — ED PROVIDER NOTE - PHYSICAL EXAMINATION
Gen: NAD, non-toxic appearing  Head: normal appearing, no bruises/abrasions   HEENT: normal conjunctiva, oral mucosa moist  Lung: no respiratory distress, speaking in full sentences, CTA b/l     CV: regular rate and rhythm, no murmurs  Abd: soft, non distended, tenderness in suprapubic region  MSK: no visible deformities, 2 knee replacement scars midline bilaterally.  Neuro: No focal deficits, AAOx3  Skin: Warm  Psych: normal affect Gen: NAD, non-toxic appearing  Head: normal appearing, no bruises/abrasions   HEENT: normal conjunctiva, oral mucosa moist  Lung: no respiratory distress, speaking in full sentences, CTA b/l     CV: regular rate and rhythm, no murmurs  Abd: soft, non distended, tenderness in suprapubic region  MSK: no visible deformities, 2 knee replacement scars midline bilaterally. Bruise in RLQ of gluteal region.   Neuro: No focal deficits, AAOx3  Skin: Warm  Psych: normal affect

## 2023-07-29 NOTE — ED ADULT NURSE NOTE - OBJECTIVE STATEMENT
Pt received s/p fall at home. Pt landed on butt after trying to sit in a chair with wheels. Pt takes eliquis. Small bruise not to R hip. Unable to obtain IV access, MD aware. Pt awaiting Ultrasound guided IV and labs. Safety maintained. Daughter at bedside.

## 2023-07-29 NOTE — ED PROVIDER NOTE - OBJECTIVE STATEMENT
89 y F w/ PMH of Afib on eliques, HTN, CHF, compression fracture, nonhodgkins lymphoma, macular degeneration presents today s/p mechanical fall yesterday at 5 pm. Pt states she was attempting to sit on chair w/ wheels when she feel back on her right gluteal region attempted to brace her fall w/ r. hand. Pt recalls episode w/ no LOC. Admits to right gluteal pain tahts nonradiating. Ambulates w/ cane. States there is pain on ambulation on the right hip area. Denies head trauma, neck/back pain, headaches, dizziness, n/v, fever/ chills, chest pain, SOB, abd pain, numbness/tingling down legs. Currently on blood thinners. 89 y F w/ PMH of Afib on eliques, HTN, CHF, compression fracture, nonhodgkins lymphoma, macular degeneration presents today s/p mechanical fall yesterday at 5 pm. Pt states she was attempting to sit on chair w/ wheels when she feel back on her right gluteal region attempted to brace her fall w/ r. hand. Pt recalls episode w/ no LOC. Admits to right gluteal pain tahts nonradiating. Ambulates w/ cane. States there is pain on ambulation on the right hip area. Denies head trauma, neck/back pain, headaches, dizziness, n/v, fever/ chills, chest pain, SOB, abd pain, numbness/tingling down legs. Currently on blood thinners. Pt states she had UTI 2 weeks ago, completed Macrobid.

## 2023-07-29 NOTE — ED PROVIDER NOTE - NSFOLLOWUPINSTRUCTIONS_ED_ALL_ED_FT
You were seen and evaluated in the Emergency Department for your mechanical fall.     Clinical examination and history demonstrated no acute evidence of any life-threatening risks to your health as a result of your fall. Imaging demonstrated no acute fractures, dislocations, or hemorrhage.     While emergent evaluation does not demonstrate any immediate life-threats, we strongly recommend you follow up with primary care doctor for a comprehensive evaluation of your health.     Should you develop nausea, vomiting, worsening headaches, inability to walk properly, numbness or tingling in the extremities, dizziness, or changes to your hearing/vision - please immediately return to the Emergency Department for evaluation of your symptoms.     Should your headaches persist, you develop concussion symptoms (see attached packet) you can call the following number to schedule an appointment with our Neurology partners:    [facility]  Neurology  [address] [place] [address] [address] [place] [address]  Phone: [phone]

## 2023-07-29 NOTE — ED PROVIDER NOTE - CLINICAL SUMMARY MEDICAL DECISION MAKING FREE TEXT BOX
89 y F w/ PMH of Afib on eliques, HTN, CHF, compression fracture, nonhodgkins lymphoma, macular degeneration presents today s/p mechanical fall yesterday at 5 pm. Pt states she was attempting to sit on chair w/ wheels when she feel back on her right gluteal region attempted to brace her fall w/ r. hand. Pt recalls episode w/ no LOC. Admits to right gluteal pain tahts nonradiating. Ambulates w/ cane. States there is pain on ambulation on the right hip area. Denies head trauma, neck/back pain, headaches, dizziness, n/v, fever/ chills, chest pain, SOB, abd pain, numbness/tingling down legs. Currently on blood thinners. Hemodynamically stable. Physical exam remarkable for suprapubic pain. 89 y F w/ PMH of Afib on Eliquis, HTN, CHF, compression fracture, non-hodgkin's lymphoma, macular degeneration presents today s/p mechanical fall yesterday at 5 pm. Pt states she was attempting to sit on chair w/ wheels when she feel back on her right gluteal region attempted to brace her fall w/ r. hand. Pt recalls episode w/ no LOC. Admits to right gluteal pain that is non-radiating. Ambulates w/ cane. States there is pain on ambulation on the right hip area. Denies head trauma, neck/back pain, headaches, dizziness, n/v, fever/ chills, chest pain, SOB, abd pain, numbness/tingling down legs. Currently on blood thinners. Pt states she had UTI 2 weeks ago, completed Macrobid. Hemodynamically stable. Physical exam remarkable for suprapubic pain and RLQ gluteal bruising. Ambulating well. Ordered CBC, CMP, Coag to evaluate potential hemorrhage. Ordered x-ray pelvis/hip to assess potential fx. Given pain meds, will reassess.

## 2023-07-29 NOTE — ED PROVIDER NOTE - PATIENT PORTAL LINK FT
You can access the FollowMyHealth Patient Portal offered by [facility] by registering at the following website: [email or web address]. By joining [employer]’s FollowMyHealth portal, you will also be able to view your health information using other applications (apps) compatible with our system.

## 2023-08-01 ENCOUNTER — RESULT REVIEW (OUTPATIENT)
Age: 88
End: 2023-08-01

## 2023-08-01 ENCOUNTER — APPOINTMENT (OUTPATIENT)
Dept: HEMATOLOGY ONCOLOGY | Facility: CLINIC | Age: 88
End: 2023-08-01
Payer: MEDICARE

## 2023-08-01 VITALS
OXYGEN SATURATION: 98 % | DIASTOLIC BLOOD PRESSURE: 75 MMHG | HEART RATE: 71 BPM | BODY MASS INDEX: 34.41 KG/M2 | SYSTOLIC BLOOD PRESSURE: 111 MMHG | RESPIRATION RATE: 16 BRPM | WEIGHT: 187 LBS | HEIGHT: 62 IN | TEMPERATURE: 97.2 F

## 2023-08-01 LAB
BASOPHILS # BLD AUTO: 0.1 K/UL — SIGNIFICANT CHANGE UP (ref 0–0.2)
BASOPHILS NFR BLD AUTO: 0.8 % — SIGNIFICANT CHANGE UP (ref 0–2)
CULTURE RESULTS: SIGNIFICANT CHANGE UP
EOSINOPHIL # BLD AUTO: 0.28 K/UL — SIGNIFICANT CHANGE UP (ref 0–0.5)
EOSINOPHIL NFR BLD AUTO: 2.3 % — SIGNIFICANT CHANGE UP (ref 0–6)
HCT VFR BLD CALC: 40.1 % — SIGNIFICANT CHANGE UP (ref 34.5–45)
HGB BLD-MCNC: 12.7 G/DL — SIGNIFICANT CHANGE UP (ref 11.5–15.5)
IMM GRANULOCYTES NFR BLD AUTO: 0.7 % — SIGNIFICANT CHANGE UP (ref 0–0.9)
LYMPHOCYTES # BLD AUTO: 1.66 K/UL — SIGNIFICANT CHANGE UP (ref 1–3.3)
LYMPHOCYTES # BLD AUTO: 13.7 % — SIGNIFICANT CHANGE UP (ref 13–44)
MCHC RBC-ENTMCNC: 28.3 PG — SIGNIFICANT CHANGE UP (ref 27–34)
MCHC RBC-ENTMCNC: 31.7 G/DL — LOW (ref 32–36)
MCV RBC AUTO: 89.3 FL — SIGNIFICANT CHANGE UP (ref 80–100)
MONOCYTES # BLD AUTO: 0.92 K/UL — HIGH (ref 0–0.9)
MONOCYTES NFR BLD AUTO: 7.6 % — SIGNIFICANT CHANGE UP (ref 2–14)
NEUTROPHILS # BLD AUTO: 9.1 K/UL — HIGH (ref 1.8–7.4)
NEUTROPHILS NFR BLD AUTO: 74.9 % — SIGNIFICANT CHANGE UP (ref 43–77)
NRBC # BLD: 0 /100 WBCS — SIGNIFICANT CHANGE UP (ref 0–0)
PLATELET # BLD AUTO: 297 K/UL — SIGNIFICANT CHANGE UP (ref 150–400)
RBC # BLD: 4.49 M/UL — SIGNIFICANT CHANGE UP (ref 3.8–5.2)
RBC # FLD: 14.1 % — SIGNIFICANT CHANGE UP (ref 10.3–14.5)
SPECIMEN SOURCE: SIGNIFICANT CHANGE UP
WBC # BLD: 12.15 K/UL — HIGH (ref 3.8–10.5)
WBC # FLD AUTO: 12.15 K/UL — HIGH (ref 3.8–10.5)

## 2023-08-01 PROCEDURE — 99213 OFFICE O/P EST LOW 20 MIN: CPT

## 2023-08-01 RX ORDER — FAMOTIDINE 20 MG/1
20 TABLET, FILM COATED ORAL DAILY
Refills: 0 | Status: ACTIVE | COMMUNITY
Start: 2023-08-01

## 2023-08-01 RX ORDER — ATENOLOL 25 MG/1
25 TABLET ORAL DAILY
Refills: 0 | Status: ACTIVE | COMMUNITY
Start: 2023-08-01

## 2023-08-01 RX ORDER — FUROSEMIDE 40 MG/1
40 TABLET ORAL DAILY
Refills: 0 | Status: DISCONTINUED | COMMUNITY
Start: 2019-10-10 | End: 2023-08-01

## 2023-08-01 RX ORDER — DULOXETINE HYDROCHLORIDE 60 MG/1
60 CAPSULE, DELAYED RELEASE ORAL
Refills: 0 | Status: DISCONTINUED | COMMUNITY
Start: 2023-05-23 | End: 2023-08-01

## 2023-08-01 RX ORDER — ATENOLOL 25 MG/1
25 TABLET ORAL
Qty: 135 | Refills: 0 | Status: DISCONTINUED | COMMUNITY
Start: 2021-10-07 | End: 2023-08-01

## 2023-08-01 RX ORDER — MULTIVITAMIN
TABLET ORAL DAILY
Refills: 0 | Status: DISCONTINUED | COMMUNITY
Start: 2020-01-27 | End: 2023-08-01

## 2023-08-01 RX ORDER — FUROSEMIDE 40 MG/1
40 TABLET ORAL DAILY
Qty: 1 | Refills: 0 | Status: ACTIVE | COMMUNITY
Start: 2023-08-01

## 2023-08-01 RX ORDER — ONDANSETRON 4 MG/1
4 TABLET, ORALLY DISINTEGRATING ORAL EVERY 6 HOURS
Qty: 1 | Refills: 2 | Status: DISCONTINUED | COMMUNITY
Start: 2023-05-02 | End: 2023-08-01

## 2023-08-01 RX ORDER — DULOXETINE HYDROCHLORIDE 30 MG/1
30 CAPSULE, DELAYED RELEASE ORAL
Refills: 0 | Status: DISCONTINUED | COMMUNITY
Start: 2023-05-23 | End: 2023-08-01

## 2023-08-02 NOTE — ED POST DISCHARGE NOTE - RESULT SUMMARY
culture grew 3 or more types of organisms  which indicate collection contamination, consider recollection only if clinically indicated. No antibiotic listed in ED provider note or prescription writer at time of discharge. Patient elderly . UA; leuk estrase ;trace and only 3 WBC , negative nitrate. Since patient elderly called and s/w patient's daughter. Discussed with daughter patient needs repeat UCX ( according to daughter patient has no urinary symptoms). Strict return precautions given to patient's daughter.

## 2023-08-03 LAB
ALBUMIN SERPL ELPH-MCNC: 3.9 G/DL
ALP BLD-CCNC: 56 U/L
ALT SERPL-CCNC: 9 U/L
ANION GAP SERPL CALC-SCNC: 16 MMOL/L
AST SERPL-CCNC: 16 U/L
BILIRUB SERPL-MCNC: 1 MG/DL
BUN SERPL-MCNC: 27 MG/DL
CALCIUM SERPL-MCNC: 8.9 MG/DL
CHLORIDE SERPL-SCNC: 103 MMOL/L
CO2 SERPL-SCNC: 24 MMOL/L
CREAT SERPL-MCNC: 1.08 MG/DL
EGFR: 49 ML/MIN/1.73M2
GLUCOSE SERPL-MCNC: 98 MG/DL
LDH SERPL-CCNC: 338 U/L
POTASSIUM SERPL-SCNC: 4.3 MMOL/L
PROT SERPL-MCNC: 5.7 G/DL
SODIUM SERPL-SCNC: 142 MMOL/L
URATE SERPL-MCNC: 7.4 MG/DL

## 2023-08-03 NOTE — ASSESSMENT
[FreeTextEntry1] : 88 y/o presents with newly diagnosed follicular lymphoma. Previously US guided core bx and FNA revealing atypical B cell population. s/p  FNA core bx of left axillary lymph node on 1/24/23 , path consistent with follicular lymphoma, grade 1-2, follicular pattern with high proliferation index, Ki67 approx. 60-70%.   Follicular Lymphoma Pt has completed 4 weeks of Rituxan.  Tolerated treatment well.    s/p mechanical fall 7/28/23, went to the ED had xrays performed which were negative for fractures. Ecchymosis on right buttocks.  Results of chest, abd and pelvis reviewed with pt and daughter.  Pt will follow up with urology for renal findings and cardiology for cardiac findings.  Pt also to follow up with cardiology for c/o chest pain.  Care discussed with Dr Buitrago.   Plan FU in one month. CBC, CMP, LDH, Uric Acid follow up with cardiology due to chest pain,  follow up with urology due to possible cyst found on kidney on CT scan Call with any problems.

## 2023-08-03 NOTE — REVIEW OF SYSTEMS
[Chest Pain] : chest pain [Shortness Of Breath] : shortness of breath [Negative] : Genitourinary [FreeTextEntry5] : chest pain x a few weeks. achy, intermittent non-radiating. 5/10  [FreeTextEntry7] : intermittent loose stools

## 2023-08-03 NOTE — PHYSICAL EXAM
[Ambulatory and capable of all self care but unable to carry out any work activities] : Status 2- Ambulatory and capable of all self care but unable to carry out any work activities. Up and about more than 50% of waking hours [Normal] : normoactive bowel sounds, soft and nontender, no hepatosplenomegaly or masses appreciated [de-identified] : Abdomen rotund, no adenopathy appreciated [de-identified] : mild edema of b/l LE [de-identified] : right buttock ecchymosis s/p mechanical fall.

## 2023-08-03 NOTE — HISTORY OF PRESENT ILLNESS
[de-identified] : Ms. Dozier is a 89 year old female with PMHx of afib, YOVANI, anxiety, HTN, CHF, and CVA presents today for recent inguinal US guided core bx and FNA revealing atypical B cell population. Patient was referred by urologist Dr. Hartmann who has been following patient for bladder mass that is thought to be a benign process Patient had an annual CT scan to evaluate bladder mass and had incidental finding of retroperitoneal, aortal caval  adenopathy measuring 1.4 x 1.6 cm, left inguinal adenopathy measuring 1.9 cm x 3.0 cm and inguinal adenopathy measuring 1.4 x 1.8 cm as well as  left eternal iliac lymph node measuring 1.2 x 2.2 cm. Patient was reported to have tenderness to palpation of right abd as well prompting referral to IR for biopsy, s/p inguinal US guided core bx and FNA revealing atypical B cell population.   [de-identified] : :Pt is s/p FNA core bx of left axillary lymph node, path consistent with follicular lymphoma, grade 1-2, follicular pattern with high proliferation index, Ki67 approx. 60-70%.    Currently patient feels well, however she did have a mechanical fall four days ago (7/28) and went to the ED where x-rays were negative for any fractures. Ecchymosis noted on right buttock. Patient also endorses intermittent, non-radiating substernal chest pain 5/10 and intermittent SOB. denies palpitations/fevers/chills. No drenching night sweats. No enlarged/bothersome LN's. No CP/SOB. Normal BM's. No CP/SOB. No recent/recurrent infections.

## 2023-08-13 NOTE — DISCUSSION/SUMMARY
[FreeTextEntry1] : Received message from  staff" Liz would like to ask you - her Mom's PCP gave her a Rx for Trintellix (She is unsure of the dose amount, as she hasn't picked it up).  With her mom on Buspirone, is it OK to take with Trintellix?  Also she would like to ask you if you agree that her Mom should be on it."   Call returned today. Education about risk vs benefits of combining Trintillex and buspirone and risks outweigh benefits and advise patient's daughter schedule a follow up visit with this provider to assess patient and review need for med changes.

## 2023-08-15 ENCOUNTER — APPOINTMENT (OUTPATIENT)
Dept: PSYCHIATRY | Facility: CLINIC | Age: 88
End: 2023-08-15
Payer: MEDICARE

## 2023-08-15 DIAGNOSIS — F51.04 PSYCHOPHYSIOLOGIC INSOMNIA: ICD-10-CM

## 2023-08-15 DIAGNOSIS — F41.9 ANXIETY DISORDER, UNSPECIFIED: ICD-10-CM

## 2023-08-15 PROCEDURE — 99214 OFFICE O/P EST MOD 30 MIN: CPT | Mod: 95

## 2023-08-17 ENCOUNTER — APPOINTMENT (OUTPATIENT)
Dept: PHYSICAL MEDICINE AND REHAB | Facility: CLINIC | Age: 88
End: 2023-08-17

## 2023-08-20 NOTE — PHYSICAL EXAM
[None] : none [Cooperative] : cooperative [Euthymic] : euthymic [Full] : full [Clear] : clear [Linear/Goal Directed] : linear/goal directed [None Reported] : none reported [WNL] : within normal limits [de-identified] : Patient legally blind  [de-identified] : No SI/HI

## 2023-08-20 NOTE — HISTORY OF PRESENT ILLNESS
[FreeTextEntry1] : Patient seen with her daughter today. They changed the appointment to TEB today instead of in-person visit.  Patient states she is not well and has upper respiratory symptoms for past couple of days. Patient's daughter states patient never started Trintellix, but was prescribed Duloxetine for pain, but that did not work well, and she is now on Lyrica which is working well for pain.  Patient reported she is generally doing well, spends time listening to audiobooks and listening to music on Jeanne.  Patient is continuing to take Benadryl 25 mg at bedtime and on a rare occasion she has trouble falling asleep but otherwise she is sleeping better with Benadryl.  Patient denied side effects from Benadryl.  She has YOVANI, not using CPAP machine. She had NHL and was treated with infusion for 1 month.

## 2023-08-20 NOTE — DISCUSSION/SUMMARY
[FreeTextEntry1] : The patient is a 87 yo woman with anxious predisposition and likely social anxiety which she was able to manage without interference with daily functioning, and was in her usual state of health until about one year when she started feeling anxious and worried about her deteriorating vision and she was hospitalized and was in rehab for arm fracture in 2019 and in May 2019  , and she is experiencing worsening anxiety about health, depressed that she is losing her vision and grieving 's death, and since 2019 she had about 5 hospitalization for shortness of breath, exacerbation of CHF and last hospitalization was in 2020. She also reports chronic insomnia for past one year. She had a couple of med trial, short, did not tolerate and for past 2 months taking Buspar 5 mg TID with slight reduction of sx of anxiety and depression and she is also taking Benadryl for insomnia with god effect.    3/4/2020: Patient is tolerating increased dose of buspar, and considering risk of polypharmacy outweighs benefits and also given we have not maximized buspirone dose, discussed patient and daughter not to add Trintillex at this time and instead we will further titrate Buspar, and if either patient is not able to titrate dose adequately due to side effects and of if there is not improvement of sx, may consider switching meds.   3/25/2020: Patient did not increase Buspar dose as recommended. She reports she is still anxious and depressed and unhappy about losing her vision. Appetite is good, sleep- good with Benadryl and sleepy time tea.    2020: Patient reports compliance with Buspar, on 25 mg/day for one week, no side effects, and still taking Benadryl for sleep, sadness and crying is situational- when she thinks about her medical problems, and when she talks to people and listens to music she enjoys and feels better.   2020: Patient states she was doing well and was happy to have reconnected with an old BF and they were talking regularly for 2 months and 2 days ago he told her he could no longer continue to talk to her and she states she is "feeling devastated", and very depressed and feels "Buspar is not doing anything" and wants something stronger, and both daughter and patient asked for med change,however consider risk of switching to different med and that she had side effects from several meds the daughter was worried about making that change, and patient agrees to increase the dose, and per daughter patient is often forgetting to take HS dose.   2020: Patient reports feeling better than before, mild sx of depression, related to physical disability- legally blind and continues to take Benadryl 25 mg HS for slee, denies side effects, and reports good effect.   20: Patient reports she feels Buspar is not helping, daughter states she was doing well overall and she does feel Buspar is helping because she sees her mother "perk up" when she takes Buspar as scheduled, and recent depression is after she felt awkward at a social gathering due to social distancing and her poor eye sight.   2020: Patient and daughter report patient is doing better with Buspar, continues to have trouble sleeping, patient's daughter called back to report they checked with cardiologist who had no objection to patient trying trazodone.   2021: Patient and daughter reported patient is doing better and stable with no severe depression or anxiety.  She did not tolerate trazodone, had felt dizzy/had a bout of vertigo.  Patient reportedly is taking Benadryl a few days a week and 5 hydroxytryptophan a few days a week for sleep with good effect for several months without any reported symptoms of serotonin syndrome.  2021: Patient is in good spirits today and reports she is feeling much better and denies symptoms of depression and anxiety continues to have insomnia but much better than before and reports she is only taking Benadryl occasionally.  2022: Patient sounded to be in good spirits, denies symptoms of depression and anxiety, states she is taking Benadryl with good effect on sleep, no adverse effects   2022: Patient remains stable, not depressed or anxious and sleep remains a chronic problem, taking Benadryl for many years, had a few other med trials without benefit. Advised patient about adverse effects of taking Benadryl long term and advise not to take more than what she is already taking.   2023: Patient's daughter reported that the patient sometimes complains about not feeling so good or feeling depressed and was wondering if patient could take this buspirone 15 mg half a tablet as needed.  Patient's daughter reported that her primary care physician advised patient to only take buspirone 15 mg twice a day.  Patient's daughter reported that they have reduced the dose long time ago.  Patient reported that occasionally she feels down and about the patient and the daughter anticipates she may not need to take extra dose once or twice a week.  Patient has history of vertigo also the dizziness she is experiencing from buspirone may or may not be directly related to the medication however for most part she has remained stable active and not severely depressed or anxious so we can continue lower dose and monitor how often she is requiring the extra half a tablet and adjust the dose accordingly. 8/15/2023: Patient reported her mood and anxiety symptoms are in good control. sleep- okay with Benadryl low dose which she has been taking for several years.

## 2023-08-20 NOTE — PLAN
[Medication education provided] : Medication education provided. [Rationale for medication choices, possible risks/precautions, benefits, alternative treatment choices, and consequences of non-treatment discussed] : Rationale for medication choices, possible risks/precautions, benefits, alternative treatment choices, and consequences of non-treatment discussed with patient/family/caregiver  [FreeTextEntry5] : Psychoeducation and supportive therapy provided discussed rationale for recommended meds,  Medication: Continue Buspar 15 mg BID Patient continues to use over-the-counter Benadryl 25 mg at bedtime for insomnia with good effect and no adverse effects reported. Educated patient of importance of being abstinent from drugs and alcohol.  Emergency procedures were discussed: pt. educated to call 911 or go to nearest ER for worsening of symptoms/suicidal/homicidal ideation.  Continue individual psychotherapy to learn coping skills   RTC in 3 months or earlier as needed  Patient and daughter given opportunity to ask questions and her/his questions were answered and she/ he expressed understanding and agreement with recommendations.

## 2023-08-20 NOTE — REASON FOR VISIT
[Patient with collateral] : Patient with collateral  [Patient preference] : as per patient preference [Telehealth (audio & video) - Individual/Group] : This visit was provided via telehealth using real-time 2-way audio visual technology. [Medical Office: ([facility])___] : The provider was located at the medical office in [unfilled]. [Home] : The patient, [unfilled], was located at home, [unfilled], at the time of the visit. [Verbal consent obtained from patient/other participant(s)] : Verbal consent for telehealth/telephonic services obtained from patient/other participant(s) [TextBox_17] : Daughter [FreeTextEntry1] : depression and anxiety

## 2023-08-28 ENCOUNTER — INPATIENT (INPATIENT)
Facility: HOSPITAL | Age: 88
LOS: 2 days | Discharge: HOME HEALTH | End: 2023-08-31
Attending: INTERNAL MEDICINE | Admitting: INTERNAL MEDICINE
Payer: MEDICARE

## 2023-08-28 VITALS
HEIGHT: 62.99 IN | DIASTOLIC BLOOD PRESSURE: 90 MMHG | SYSTOLIC BLOOD PRESSURE: 140 MMHG | HEART RATE: 65 BPM | OXYGEN SATURATION: 100 % | RESPIRATION RATE: 18 BRPM | TEMPERATURE: 98 F

## 2023-08-28 DIAGNOSIS — A41.9 SEPSIS, UNSPECIFIED ORGANISM: ICD-10-CM

## 2023-08-28 DIAGNOSIS — Z29.9 ENCOUNTER FOR PROPHYLACTIC MEASURES, UNSPECIFIED: ICD-10-CM

## 2023-08-28 DIAGNOSIS — H26.9 UNSPECIFIED CATARACT: Chronic | ICD-10-CM

## 2023-08-28 DIAGNOSIS — I50.9 HEART FAILURE, UNSPECIFIED: ICD-10-CM

## 2023-08-28 DIAGNOSIS — G93.40 ENCEPHALOPATHY, UNSPECIFIED: ICD-10-CM

## 2023-08-28 DIAGNOSIS — J45.901 UNSPECIFIED ASTHMA WITH (ACUTE) EXACERBATION: ICD-10-CM

## 2023-08-28 DIAGNOSIS — Z79.899 OTHER LONG TERM (CURRENT) DRUG THERAPY: ICD-10-CM

## 2023-08-28 DIAGNOSIS — R05.9 COUGH, UNSPECIFIED: ICD-10-CM

## 2023-08-28 DIAGNOSIS — Z90.710 ACQUIRED ABSENCE OF BOTH CERVIX AND UTERUS: Chronic | ICD-10-CM

## 2023-08-28 DIAGNOSIS — T14.8 OTHER INJURY OF UNSPECIFIED BODY REGION: Chronic | ICD-10-CM

## 2023-08-28 DIAGNOSIS — E83.51 HYPOCALCEMIA: ICD-10-CM

## 2023-08-28 DIAGNOSIS — Z82.8 FAMILY HISTORY OF OTHER DISABILITIES AND CHRONIC DISEASES LEADING TO DISABLEMENT, NOT ELSEWHERE CLASSIFIED: Chronic | ICD-10-CM

## 2023-08-28 DIAGNOSIS — Z95.0 PRESENCE OF CARDIAC PACEMAKER: Chronic | ICD-10-CM

## 2023-08-28 DIAGNOSIS — Z84.89 FAMILY HISTORY OF OTHER SPECIFIED CONDITIONS: Chronic | ICD-10-CM

## 2023-08-28 DIAGNOSIS — I48.20 CHRONIC ATRIAL FIBRILLATION, UNSPECIFIED: ICD-10-CM

## 2023-08-28 LAB
ALBUMIN SERPL ELPH-MCNC: 3.4 G/DL — SIGNIFICANT CHANGE UP (ref 3.3–5)
ALP SERPL-CCNC: 56 U/L — SIGNIFICANT CHANGE UP (ref 40–120)
ALT FLD-CCNC: 10 U/L — SIGNIFICANT CHANGE UP (ref 4–33)
ANION GAP SERPL CALC-SCNC: 16 MMOL/L — HIGH (ref 7–14)
APPEARANCE UR: CLEAR — SIGNIFICANT CHANGE UP
AST SERPL-CCNC: 21 U/L — SIGNIFICANT CHANGE UP (ref 4–32)
B PERT DNA SPEC QL NAA+PROBE: SIGNIFICANT CHANGE UP
B PERT+PARAPERT DNA PNL SPEC NAA+PROBE: SIGNIFICANT CHANGE UP
BACTERIA # UR AUTO: ABNORMAL /HPF
BILIRUB SERPL-MCNC: 0.7 MG/DL — SIGNIFICANT CHANGE UP (ref 0.2–1.2)
BILIRUB UR-MCNC: NEGATIVE — SIGNIFICANT CHANGE UP
BORDETELLA PARAPERTUSSIS (RAPRVP): SIGNIFICANT CHANGE UP
BUN SERPL-MCNC: 21 MG/DL — SIGNIFICANT CHANGE UP (ref 7–23)
C PNEUM DNA SPEC QL NAA+PROBE: SIGNIFICANT CHANGE UP
CALCIUM SERPL-MCNC: 7.5 MG/DL — LOW (ref 8.4–10.5)
CAST: 0 /LPF — SIGNIFICANT CHANGE UP (ref 0–4)
CHLORIDE SERPL-SCNC: 97 MMOL/L — LOW (ref 98–107)
CO2 SERPL-SCNC: 25 MMOL/L — SIGNIFICANT CHANGE UP (ref 22–31)
COLOR SPEC: SIGNIFICANT CHANGE UP
CREAT SERPL-MCNC: 0.91 MG/DL — SIGNIFICANT CHANGE UP (ref 0.5–1.3)
DIFF PNL FLD: NEGATIVE — SIGNIFICANT CHANGE UP
EGFR: 60 ML/MIN/1.73M2 — SIGNIFICANT CHANGE UP
FLUAV SUBTYP SPEC NAA+PROBE: SIGNIFICANT CHANGE UP
FLUBV RNA SPEC QL NAA+PROBE: SIGNIFICANT CHANGE UP
GLUCOSE SERPL-MCNC: 114 MG/DL — HIGH (ref 70–99)
GLUCOSE UR QL: NEGATIVE MG/DL — SIGNIFICANT CHANGE UP
HADV DNA SPEC QL NAA+PROBE: SIGNIFICANT CHANGE UP
HCOV 229E RNA SPEC QL NAA+PROBE: SIGNIFICANT CHANGE UP
HCOV HKU1 RNA SPEC QL NAA+PROBE: SIGNIFICANT CHANGE UP
HCOV NL63 RNA SPEC QL NAA+PROBE: SIGNIFICANT CHANGE UP
HCOV OC43 RNA SPEC QL NAA+PROBE: SIGNIFICANT CHANGE UP
HCT VFR BLD CALC: 41.1 % — SIGNIFICANT CHANGE UP (ref 34.5–45)
HGB BLD-MCNC: 13.1 G/DL — SIGNIFICANT CHANGE UP (ref 11.5–15.5)
HMPV RNA SPEC QL NAA+PROBE: SIGNIFICANT CHANGE UP
HPIV1 RNA SPEC QL NAA+PROBE: SIGNIFICANT CHANGE UP
HPIV2 RNA SPEC QL NAA+PROBE: SIGNIFICANT CHANGE UP
HPIV3 RNA SPEC QL NAA+PROBE: SIGNIFICANT CHANGE UP
HPIV4 RNA SPEC QL NAA+PROBE: SIGNIFICANT CHANGE UP
KETONES UR-MCNC: NEGATIVE MG/DL — SIGNIFICANT CHANGE UP
LEUKOCYTE ESTERASE UR-ACNC: NEGATIVE — SIGNIFICANT CHANGE UP
M PNEUMO DNA SPEC QL NAA+PROBE: SIGNIFICANT CHANGE UP
MAGNESIUM SERPL-MCNC: 1.6 MG/DL — SIGNIFICANT CHANGE UP (ref 1.6–2.6)
MCHC RBC-ENTMCNC: 28.2 PG — SIGNIFICANT CHANGE UP (ref 27–34)
MCHC RBC-ENTMCNC: 31.9 GM/DL — LOW (ref 32–36)
MCV RBC AUTO: 88.6 FL — SIGNIFICANT CHANGE UP (ref 80–100)
NITRITE UR-MCNC: NEGATIVE — SIGNIFICANT CHANGE UP
NRBC # BLD: 0 /100 WBCS — SIGNIFICANT CHANGE UP (ref 0–0)
NRBC # FLD: 0 K/UL — SIGNIFICANT CHANGE UP (ref 0–0)
NT-PROBNP SERPL-SCNC: 5152 PG/ML — HIGH
PH UR: 6 — SIGNIFICANT CHANGE UP (ref 5–8)
PLATELET # BLD AUTO: 281 K/UL — SIGNIFICANT CHANGE UP (ref 150–400)
POTASSIUM SERPL-MCNC: 3.9 MMOL/L — SIGNIFICANT CHANGE UP (ref 3.5–5.3)
POTASSIUM SERPL-SCNC: 3.9 MMOL/L — SIGNIFICANT CHANGE UP (ref 3.5–5.3)
PROT SERPL-MCNC: 6.3 G/DL — SIGNIFICANT CHANGE UP (ref 6–8.3)
PROT UR-MCNC: 30 MG/DL
RAPID RVP RESULT: SIGNIFICANT CHANGE UP
RBC # BLD: 4.64 M/UL — SIGNIFICANT CHANGE UP (ref 3.8–5.2)
RBC # FLD: 14.7 % — HIGH (ref 10.3–14.5)
RBC CASTS # UR COMP ASSIST: 2 /HPF — SIGNIFICANT CHANGE UP (ref 0–4)
REVIEW: SIGNIFICANT CHANGE UP
RSV RNA SPEC QL NAA+PROBE: SIGNIFICANT CHANGE UP
RV+EV RNA SPEC QL NAA+PROBE: SIGNIFICANT CHANGE UP
SARS-COV-2 RNA SPEC QL NAA+PROBE: SIGNIFICANT CHANGE UP
SODIUM SERPL-SCNC: 138 MMOL/L — SIGNIFICANT CHANGE UP (ref 135–145)
SP GR SPEC: 1.1 — HIGH (ref 1–1.03)
SQUAMOUS # UR AUTO: 6 /HPF — HIGH (ref 0–5)
TROPONIN T, HIGH SENSITIVITY RESULT: 16 NG/L — SIGNIFICANT CHANGE UP
TROPONIN T, HIGH SENSITIVITY RESULT: 17 NG/L — SIGNIFICANT CHANGE UP
UROBILINOGEN FLD QL: 1 MG/DL — SIGNIFICANT CHANGE UP (ref 0.2–1)
WBC # BLD: 16.18 K/UL — HIGH (ref 3.8–10.5)
WBC # FLD AUTO: 16.18 K/UL — HIGH (ref 3.8–10.5)
WBC UR QL: 2 /HPF — SIGNIFICANT CHANGE UP (ref 0–5)

## 2023-08-28 PROCEDURE — G1004: CPT

## 2023-08-28 PROCEDURE — 99285 EMERGENCY DEPT VISIT HI MDM: CPT | Mod: GC

## 2023-08-28 PROCEDURE — 71046 X-RAY EXAM CHEST 2 VIEWS: CPT | Mod: 26

## 2023-08-28 PROCEDURE — 71275 CT ANGIOGRAPHY CHEST: CPT | Mod: 26,ME

## 2023-08-28 PROCEDURE — 99223 1ST HOSP IP/OBS HIGH 75: CPT | Mod: FS

## 2023-08-28 RX ORDER — ATENOLOL 25 MG/1
25 TABLET ORAL ONCE
Refills: 0 | Status: COMPLETED | OUTPATIENT
Start: 2023-08-28 | End: 2023-08-28

## 2023-08-28 RX ORDER — APIXABAN 2.5 MG/1
5 TABLET, FILM COATED ORAL EVERY 12 HOURS
Refills: 0 | Status: DISCONTINUED | OUTPATIENT
Start: 2023-08-28 | End: 2023-08-31

## 2023-08-28 RX ORDER — ATENOLOL 25 MG/1
37.5 TABLET ORAL DAILY
Refills: 0 | Status: DISCONTINUED | OUTPATIENT
Start: 2023-08-28 | End: 2023-08-31

## 2023-08-28 RX ORDER — MULTIVIT-MIN/FERROUS GLUCONATE 9 MG/15 ML
1 LIQUID (ML) ORAL
Qty: 0 | Refills: 0 | DISCHARGE

## 2023-08-28 RX ORDER — ATENOLOL 25 MG/1
37.5 TABLET ORAL
Qty: 0 | Refills: 0 | DISCHARGE

## 2023-08-28 RX ORDER — LOSARTAN POTASSIUM 100 MG/1
25 TABLET, FILM COATED ORAL DAILY
Refills: 0 | Status: DISCONTINUED | OUTPATIENT
Start: 2023-08-28 | End: 2023-08-31

## 2023-08-28 RX ORDER — APIXABAN 2.5 MG/1
1 TABLET, FILM COATED ORAL
Qty: 0 | Refills: 0 | DISCHARGE

## 2023-08-28 RX ORDER — ACETAMINOPHEN 500 MG
650 TABLET ORAL EVERY 6 HOURS
Refills: 0 | Status: DISCONTINUED | OUTPATIENT
Start: 2023-08-28 | End: 2023-08-31

## 2023-08-28 RX ORDER — IPRATROPIUM/ALBUTEROL SULFATE 18-103MCG
3 AEROSOL WITH ADAPTER (GRAM) INHALATION ONCE
Refills: 0 | Status: COMPLETED | OUTPATIENT
Start: 2023-08-28 | End: 2023-08-28

## 2023-08-28 RX ORDER — APIXABAN 2.5 MG/1
5 TABLET, FILM COATED ORAL ONCE
Refills: 0 | Status: COMPLETED | OUTPATIENT
Start: 2023-08-28 | End: 2023-08-28

## 2023-08-28 RX ORDER — FUROSEMIDE 40 MG
40 TABLET ORAL DAILY
Refills: 0 | Status: DISCONTINUED | OUTPATIENT
Start: 2023-08-28 | End: 2023-08-29

## 2023-08-28 RX ORDER — CEFTRIAXONE 500 MG/1
1000 INJECTION, POWDER, FOR SOLUTION INTRAMUSCULAR; INTRAVENOUS EVERY 24 HOURS
Refills: 0 | Status: DISCONTINUED | OUTPATIENT
Start: 2023-08-29 | End: 2023-08-31

## 2023-08-28 RX ORDER — AMIODARONE HYDROCHLORIDE 400 MG/1
150 TABLET ORAL ONCE
Refills: 0 | Status: COMPLETED | OUTPATIENT
Start: 2023-08-28 | End: 2023-08-28

## 2023-08-28 RX ORDER — IPRATROPIUM/ALBUTEROL SULFATE 18-103MCG
3 AEROSOL WITH ADAPTER (GRAM) INHALATION
Refills: 0 | Status: DISCONTINUED | OUTPATIENT
Start: 2023-08-28 | End: 2023-08-28

## 2023-08-28 RX ORDER — IPRATROPIUM/ALBUTEROL SULFATE 18-103MCG
3 AEROSOL WITH ADAPTER (GRAM) INHALATION EVERY 6 HOURS
Refills: 0 | Status: DISCONTINUED | OUTPATIENT
Start: 2023-08-28 | End: 2023-08-29

## 2023-08-28 RX ORDER — LANOLIN ALCOHOL/MO/W.PET/CERES
3 CREAM (GRAM) TOPICAL AT BEDTIME
Refills: 0 | Status: DISCONTINUED | OUTPATIENT
Start: 2023-08-28 | End: 2023-08-31

## 2023-08-28 RX ORDER — ONDANSETRON 8 MG/1
4 TABLET, FILM COATED ORAL EVERY 8 HOURS
Refills: 0 | Status: DISCONTINUED | OUTPATIENT
Start: 2023-08-28 | End: 2023-08-31

## 2023-08-28 RX ORDER — CALCIUM GLUCONATE 100 MG/ML
1 VIAL (ML) INTRAVENOUS ONCE
Refills: 0 | Status: COMPLETED | OUTPATIENT
Start: 2023-08-28 | End: 2023-08-28

## 2023-08-28 RX ORDER — LOSARTAN POTASSIUM 100 MG/1
1 TABLET, FILM COATED ORAL
Qty: 0 | Refills: 0 | DISCHARGE

## 2023-08-28 RX ORDER — CEFTRIAXONE 500 MG/1
1000 INJECTION, POWDER, FOR SOLUTION INTRAMUSCULAR; INTRAVENOUS ONCE
Refills: 0 | Status: COMPLETED | OUTPATIENT
Start: 2023-08-28 | End: 2023-08-28

## 2023-08-28 RX ORDER — LEVOTHYROXINE SODIUM 125 MCG
1 TABLET ORAL
Qty: 0 | Refills: 0 | DISCHARGE

## 2023-08-28 RX ORDER — LEVOTHYROXINE SODIUM 125 MCG
25 TABLET ORAL DAILY
Refills: 0 | Status: DISCONTINUED | OUTPATIENT
Start: 2023-08-28 | End: 2023-08-31

## 2023-08-28 RX ORDER — AZITHROMYCIN 500 MG/1
500 TABLET, FILM COATED ORAL ONCE
Refills: 0 | Status: COMPLETED | OUTPATIENT
Start: 2023-08-28 | End: 2023-08-28

## 2023-08-28 RX ORDER — MAGNESIUM SULFATE 500 MG/ML
2 VIAL (ML) INJECTION ONCE
Refills: 0 | Status: COMPLETED | OUTPATIENT
Start: 2023-08-28 | End: 2023-08-28

## 2023-08-28 RX ADMIN — Medication 15 MILLIGRAM(S): at 14:18

## 2023-08-28 RX ADMIN — AZITHROMYCIN 255 MILLIGRAM(S): 500 TABLET, FILM COATED ORAL at 15:57

## 2023-08-28 RX ADMIN — Medication 3 MILLILITER(S): at 11:32

## 2023-08-28 RX ADMIN — Medication 40 MILLIGRAM(S): at 08:32

## 2023-08-28 RX ADMIN — AMIODARONE HYDROCHLORIDE 618 MILLIGRAM(S): 400 TABLET ORAL at 14:23

## 2023-08-28 RX ADMIN — CEFTRIAXONE 100 MILLIGRAM(S): 500 INJECTION, POWDER, FOR SOLUTION INTRAMUSCULAR; INTRAVENOUS at 15:03

## 2023-08-28 RX ADMIN — ATENOLOL 25 MILLIGRAM(S): 25 TABLET ORAL at 08:31

## 2023-08-28 RX ADMIN — APIXABAN 5 MILLIGRAM(S): 2.5 TABLET, FILM COATED ORAL at 08:31

## 2023-08-28 RX ADMIN — Medication 25 GRAM(S): at 19:26

## 2023-08-28 RX ADMIN — Medication 200 GRAM(S): at 22:34

## 2023-08-28 NOTE — H&P ADULT - ASSESSMENT
88 yo F with Pmhx of Afib on eliquis, HTN, CHF, non-hodgkin's lymphoma (infusion ended in May), and new asthma presents to the ED with worsening SOB and wheezing, found to have sepsis 2/2 PNA and afib with RVR. 
None

## 2023-08-28 NOTE — ED ADULT NURSE REASSESSMENT NOTE - NS ED NURSE REASSESS COMMENT FT1
Received pt at change of shift, PT is resting in stretcher, easily arousable to verbal stimuli, A+Ox4. pt arrives for wheezing, cough, SOB. endorsing new diagnosed asthma. Respirations labored, no accessory muscle use, speaking in full clear uninterrupted sentences, auditory wheezing noted. AFIB on cardiac monitor. Pt denies any chest pain, headache, dizziness, N+V, diarrhea, fever, chills.  20G to LAC, Medicated as per Provider orders, will continue to monitor.

## 2023-08-28 NOTE — PHARMACOTHERAPY INTERVENTION NOTE - COMMENTS
Medication history is incomplete. Unable to verify patient's medication list with two sources.  [facility]

## 2023-08-28 NOTE — ED ADULT TRIAGE NOTE - CHIEF COMPLAINT QUOTE
c/o coughs and wheezing x 2 weeks. Today developed shortness of breath and pain to left rib when coughing. Used rescue inhaler around 6am. Hx: osteoarthritis, CVA, HTN, afib, YOVANI, pacemaker

## 2023-08-28 NOTE — ED PROVIDER NOTE - PROGRESS NOTE DETAILS
Reassess patient who is breathing comfortably on RA and vitally stable. Patient awake and cheerful.    Ginger Rios, DO PGY1 reassessed patient who is sitting comfortably.  No wheezes noted on lung auscultation. Patient Tachy btwn 120-140 but expected from duonebs.  Ginger Bradley, DO PGY1 Infectious nodule noted on CT, Patient started on abx and admitted to tele. Infectious nodule noted on CT, Patient started on abx and admitted to tele. [facility] btwen 100-112 improved.  Ginger Rios DO PGY1

## 2023-08-28 NOTE — H&P ADULT - HISTORY OF PRESENT ILLNESS
89 yo F with Pmhx of Afib on eliquis, HTN, CHF, non-hodgkin's lymphoma (infusion ended in May), and new asthma presents to the ED with worsening SOB and wheezing. Patient is AAOX2 but confused. Attempted to call daughter x2 but no answer. Most of the hx was obtained from chart review. Patient started having wheezing and coughing 2 weeks ago. It started when she opened a package from [place] containing a cot. The package smelled "funny" and when they spoke to the , they recommended to throw it away. Since then she has been having SOB and wheezing. She called her PCP and was given albuterol inhaler. It improved her symptoms transiently but then it progressively got worse. Consequently, her daughter brought her to the ED. Currently, pt denies any worsening chest pain or SOB.   In the ED, her vitals were notable for HTN and tachycardia to 126s. Labs were notable for leukocytosis, hypocalcemia, and elevated proBNP. CT chest showed new 2.6 cm consolidative opacity in the right lower lobe and branching   nodular opacity left upper lobe. EKG showed Afib with RVR, , corrected >500. Pt was given ceftriaxone and azithromycin IV in the ED. She was also given one dose of amiodarone 150 mg x1 and prednisone 40 mg.  89 yo F with Pmhx of Afib on eliquis, HTN, CHF, non-hodgkin's lymphoma (infusion ended in May), and new asthma presents to the ED with worsening SOB and wheezing. Patient is AAOX2 but confused. Most of the hx was obtained from chart review and daughter at bedside. Patient started having wheezing and coughing 2 weeks ago. It started when she opened a package from [place] containing a cot. The package smelled "funny" and when they spoke to the , they recommended to throw it away. Since then she has been having SOB and wheezing. She called her PCP and was given albuterol inhaler. It improved her symptoms transiently but then it progressively got worse. Consequently, her daughter brought her to the ED. Currently, pt denies any worsening chest pain or SOB.   In the ED, her vitals were notable for HTN and tachycardia to 126s. Labs were notable for leukocytosis, hypocalcemia, and elevated proBNP. CT chest showed new 2.6 cm consolidative opacity in the right lower lobe and branching   nodular opacity left upper lobe. EKG showed Afib with RVR, , corrected >500. Pt was given ceftriaxone and azithromycin IV in the ED. She was also given one dose of amiodarone 150 mg x1 and prednisone 40 mg.

## 2023-08-28 NOTE — ED PROVIDER NOTE - NS ED ROS FT
GENERAL: No fever or chills  EYES: No change in vision  HEENT: No trouble swallowing or speaking, no throat pain, no drooling  CARDIAC: Right sided chest pain worse with coughing  PULMONARY: non-productive cough, SOB  GI: No abdominal pain, no vomiting, no diarrhea or constipation. + nausea  : No changes in urination  SKIN: No rashes  NEURO: No headache, no numbness  MSK: No joint pain  Otherwise as HPI or negative.

## 2023-08-28 NOTE — H&P ADULT - NSHPLABSRESULTS_GEN_ALL_CORE
13.1   16.18 )-----------( 281      ( 28 Aug 2023 08:35 )             41.1     Hgb Trend: 13.1<--  08-28    138  |  97<L>  |  21  ----------------------------<  114<H>  3.9   |  25  |  0.91    Ca    7.5<L>      28 Aug 2023 08:35  Mg     1.60     08-28    TPro  6.3  /  Alb  3.4  /  TBili  0.7  /  DBili  x   /  AST  21  /  ALT  10  /  AlkPhos  56  08-28    Creatinine Trend: 0.91<--        Urinalysis Basic - ( 28 Aug 2023 08:35 )    Color: x / Appearance: x / SG: x / pH: x  Gluc: 114 mg/dL / Ketone: x  / Bili: x / Urobili: x   Blood: x / Protein: x / Nitrite: x   Leuk Esterase: x / RBC: x / WBC x   Sq Epi: x / Non Sq Epi: x / Bacteria: x      TTE is ordered       EKG is reviewed by me       CT chest as reviewed by the radiologist: No pulmonary embolism.    New 2.6 cm consolidative opacity in the right lower lobe and branching   nodular opacity left upper lobe. Findings likely represent infection and   recommend CT chest follow-up in one month to ensure clearing.    Additional 1 cm nodular opacity in the right lower lobe and 0.8 cm   nodular opacity in the left lower lobe appear increased in size compared   to 7/25/2023; recommend attention on follow-up examination.    Indeterminate left renal lesions and left adrenal nodule without   significant change.

## 2023-08-28 NOTE — H&P ADULT - PROBLEM SELECTOR PLAN 2
Patient with hx of chronic afib   -HR elevated to 110s after albuterol   -S/p IV amiodarone 150 mg in the ED   -HR fluctuating between 100-110s   -Will consult cardiology further recs   -Hold metoprolol for now due to asthma exacerbation   -C/w eliquis 5 mg BID   -C/w telemonitoring   -F/u with TTE Patient with hx of chronic afib   -HR elevated to 110s after albuterol   -S/p IV amiodarone 150 mg in the ED   -HR fluctuating between 100-110s   -Will consult cardiology further recs   -Hold metoprolol for now due to asthma exacerbation   -C/w eliquis 5 mg BID   -C/w telemonitoring   -F/u with TTE  -PPM interrogation in the AM Patient with confusion, AAOx2   -Most likely due to metabolic/infectious encephalopathy in the setting of PNA   -No focal deficits on exam. Hold off on CT head   -C/w IV abx as above

## 2023-08-28 NOTE — H&P ADULT - PROBLEM SELECTOR PLAN 6
DVT ppx eliquis 5 mg BID Calcium decreased to 7.5   -F/u with ionized calcium   -Will give 1 gm of calcium gluconate   -Trend Ca daily

## 2023-08-28 NOTE — H&P ADULT - PROBLEM SELECTOR PLAN 5
Calcium decreased to 7.5   -F/u with ionized calcium   -Will give 1 gm of calcium gluconate   -Trend Ca daily Patient with hx of ? diastolic HF   -Pt with worsening SOB, not overtly volume loaded on exam   -F/u with TTE   -Will hold off further diuresis for now Patient with hx of ? diastolic HF   -Pt with worsening SOB, not overtly volume loaded on exam   -F/u with TTE   -C/w IV lasix 40 mg daily for now   -F/u with cardiology in the AM

## 2023-08-28 NOTE — ED ADULT NURSE NOTE - OBJECTIVE STATEMENT
Patient came in with the complaints of cough and shortness of breath. As per daughter at bedside, Patient started wheezing 2 weeks ago and was diagnosed with Asthma a week ago and this morning she woke up with sob and left sided rib pain while coughing. No other complaints. Patient is placed on cardiac monitor with continues pulse ox. Awaiting for MD to see and further orders. Nursing care continues

## 2023-08-28 NOTE — ED ADULT NURSE REASSESSMENT NOTE - NS ED NURSE REASSESS COMMENT FT1
Pt a&ox3, resp even and unlabored, no acute distress in appearance. CCM shows atrial fibrillation, continuous pulse ox shows >96% on room air. Pt IV in L AC infiltrated, removed immediately. 22G IV placed in R wrist by RN Kt - medications administered per orders. Will maintain safety and continue to monitor.

## 2023-08-28 NOTE — ED ADULT NURSE NOTE - NSFALLHARMRISKINTERV_ED_ALL_ED

## 2023-08-28 NOTE — H&P ADULT - PROBLEM SELECTOR PLAN 3
Patient initially presented with SOB and wheezing   -S/p albuterol and prednisone 40 mg PO in the ED   -No significant wheezing on exam   -Will continue with prednisone 40 mg x4 days   -Michaela BROWNN Patient with hx of chronic afib   -HR elevated to 110s after albuterol   -S/p IV amiodarone 150 mg in the ED   -HR fluctuating between 100-110s   -Cardiology consulted, advised to continue atenolol 27.5 mg daily for now   -C/w eliquis 5 mg BID   -C/w telemonitoring   -F/u with TTE  -PPM interrogation in the AM    Prolonged QTC  -EKG showed prolonged QTC   -Most likely due to hypocalcemia   -Will given IV calcium and magnesium   -C/w telemonitoring   -Repeat EKG in the AM

## 2023-08-28 NOTE — ED PROVIDER NOTE - PHYSICAL EXAMINATION
Ginger Rios DO (PGY1)   Physical Exam:    Gen: NAD, AOx3, non-toxic appearing  Head: NCAT  HEENT: EOMI, PEERLA, normal conjunctiva, tongue midline, oral mucosa moist  Lung: CTAB, no respiratory distress, no wheezes/rhonchi/rales B/L. R anterior chest wall ttp in the lateral ribs 8-10.  CV: RRR, no murmurs, rubs or gallops  Abd: soft, NT, ND, no guarding, no rigidity, no rebound tenderness, no CVA tenderness   MSK: no visible deformities, ROM normal in UE/LE, no back pain  Neuro: No focal sensory or motor deficits  Skin: Warm, well perfused, no rash, no leg swelling, calf not ttp  Psych: normal affect, calm Ginger Rios DO (PGY1)   Physical Exam:    Gen: NAD, AOx3, non-toxic appearing  Head: NCAT  HEENT: EOMI, PEERLA, normal conjunctiva, tongue midline, oral mucosa moist  Lung: +b/l exp wheezing. R anterior chest wall ttp in the lateral ribs 8-10.  CV: RRR, no murmurs, rubs or gallops  Abd: soft, NT, ND, no guarding, no rigidity, no rebound tenderness, no CVA tenderness   MSK: no visible deformities, ROM normal in UE/LE, no back pain  Neuro: No focal sensory or motor deficits  Skin: Warm, well perfused, no rash, no leg swelling, calf not ttp  Psych: normal affect, calm

## 2023-08-28 NOTE — ED PROVIDER NOTE - OBJECTIVE STATEMENT
89 yo F past medical history of a fib, HTN, CHF, non-hodgkin's lymphoma ( infusing last ended in May), asthma presents for 2 weeks of non-productive cough and SOB and R sided rib pain that began this morning. Daughter states she bought a cot from [place] that "smelled funny" and after she opened the package, patient's coughing began. Spoke with primary care provider who prescribed brioelipta for asth,ma 5 days ago and again 2 days ago and was persctroibed  patient began Endorses nausea no vomiting. Patient states that SOB and Rib pain are worse with coughing. PERC does not apply, Wells PE positive for recent CA trxt. Denies fever, chills hemoptysis, calf pain or swelling, recent sick exposure.    reactive airway 88 yo F past medical history of a fib, HTN, CHF, non-hodgkin's lymphoma (infusion ended in May), asthma presents for 2 weeks of non-productive cough, SOB and R sided rib pain that began 2 days ago. Daughter states she bought a cot from [place] that "smelled funny" and after she opened the package, patient's coughing began.  told her to throw out the cot. Spoke with primary care provider 5 days ago who prescribed brioelipta for asthma. Spoke with pcp again 2 days ago and was prescribed Albuterol inhaler that she used for the first time this morning and it transiently improved her symptoms.  patient began Endorses nausea no vomiting. Patient states that SOB and Rib pain are worse with coughing. PERC does not apply, Wells PE positive for recent CA trxt. Denies fever, chills hemoptysis, calf pain or swelling, history of blood clots, recent sick exposure. 88 yo F past medical history of a fib, HTN, CHF, non-hodgkin's lymphoma (infusion ended in May), asthma presents for 2 weeks of non-productive cough, SOB and R sided rib pain that began 2 days ago. Daughter states she bought a cot from [place] that "smelled funny" and after she opened the package, patient's coughing began.  told her to throw out the cot. Spoke with primary care provider 5 days ago who prescribed brioelipta for asthma. Spoke with pcp again 2 days ago and was prescribed Albuterol inhaler that she used for the first time this morning and it transiently improved her symptoms.  patient began Endorses nausea no vomiting. Patient states that SOB and Rib pain are worse with coughing. Denies fever, chills hemoptysis, calf pain or swelling, history of blood clots, recent sick exposure.    Eileen Mtz, PGY-3: agree with above. Pt recently seen by pulmonology, PFTs consistent w/asthma, newly diagnosed. Pt also w/recent increase of lasix from 40 to 80mg, has been taking higher dose x3 days. 88 yo F past medical history of a fib (on ELIQUIS), HTN, CHF, non-hodgkin's lymphoma (infusion ended in May), asthma presents for 2 weeks of non-productive cough, SOB and R sided rib pain that began 2 days ago. Daughter states she bought a cot from [place] that "smelled funny" and after she opened the package, patient's coughing began.  told her to throw out the cot. Spoke with primary care provider 5 days ago who prescribed brio elipta for asthma. Spoke with pcp again 2 days ago and was prescribed Albuterol inhaler that she used for the first time this morning and it transiently improved her symptoms.  patient began Endorses nausea no vomiting. Patient states that SOB and Rib pain are worse with coughing. Denies fever, chills hemoptysis, calf pain or swelling, history of blood clots, recent sick exposure.    Eileen Mtz, PGY-3: agree with above. Pt recently seen by pulmonology, PFTs consistent w/asthma, newly diagnosed. Pt also w/recent increase of lasix from 40 to 80mg, has been taking higher dose x3 days.

## 2023-08-28 NOTE — ED ADULT NURSE NOTE - EXTENSIONS OF SELF_ADULT
HEARING AID CHECK    Name:  Ashwin Robles  :  1964  Age:  55 y.o.  Date of Evaluation:  3/29/2019      HISTORY    Reason for visit:  Ashwin Robles is seen today for a hearing aid check.  Patient reports his hearing aid works intermittently.  He states it quits and it takes anywhere from 30 minutes to several hours to turn back on.  He has worker's compensation.    Hearing aid history:  Patient is currently wearing a [place] [employer] [place] [employer]ITC) hearing aid in left ear(s).     OFFICE VISIT    During today's visit hearing was in good working condition at this time.  However, due to the complaints of intermittent performance, the hearing aid (SN # [id number]) will be sent to [employer] for inspection.  The hearing aid will return with a 1-year repair warranty.  Once the hearing aid arrives, the patient will be contacted to come pick it up.  The repair cost of $280.00 will be billed to worker's compensation at that time.      It was a pleasure seeing Ashwin Robles in Audiology today.  It is a pleasure helping Mr. Robles with his amplification needs.             This document has been electronically signed by Kaitlynn Alaniz MS CCC-A on 2019 3:50 PM       Kaitlynn Alaniz MS CCC-A  Licensed Audiologist    For Billing and Codin  Hearing Aid Check, Monaural - no charge     None

## 2023-08-28 NOTE — H&P ADULT - PROBLEM SELECTOR PLAN 4
Patient with hx of ? diastolic HF   -Pt with worsening SOB, not overtly volume loaded on exam   -F/u with TTE   -Will hold off further diuresis for now Patient initially presented with SOB and wheezing   -S/p albuterol and prednisone 40 mg PO in the ED   -No significant wheezing on exam   -Will continue with prednisone 40 mg x4 days   -Michaela BROWNN

## 2023-08-28 NOTE — ED PROVIDER NOTE - ATTENDING CONTRIBUTION TO CARE
History obtained from patient as well as patient's daughter  89-year-old female past medical history A-fib, hypertension, CHF, non-Hodgkin's lymphoma, asthma presents with 2 weeks of nonproductive cough, shortness of breath, right-sided rib pain.  Symptoms reported to start after patient inhaled abnormal fumes from package manufactured in [place].  PCP started patient on Breo days ago and then stop inhaler..  Patient with persistent symptoms so came to ED.  Patient also had recent increase in her p.o. Lasix for 3 days for concern of CHF, has not taken today's dose yet.  Exam as above, bilateral wheezing/crackles.  Differential diagnosis includes, but not limited to, CHF exacerbation, asthma exacerbation, pneumonia.  Plan labs, x-ray, CT chest, steroids, nebs, home Eliquis beta-blocker, reassess.  Patient signed out at end of my shift

## 2023-08-28 NOTE — ED PROVIDER NOTE - CLINICAL SUMMARY MEDICAL DECISION MAKING FREE TEXT BOX
89 yo F a fib, HTN, asthma, non-hodgkin's lymphoma (last infusion in May 2023) presents for 2 weeks of cough and SOB that began 2 days ago. No hx of blood clots, calf pain or swelling, fever, or sick exposure. Symptoms improved with Albuterol inhaler taken this morning and with Tylenol taken at 3 am.    PE Lungs CTABL, anterior chest wall ttp. No calf swelling or tenderness.     CBC, CMP, CXR, Trops, BNP, EKG to r/o pneumonia vs cardiac etiology. 88 yo F a fib, HTN, asthma, non-hodgkin's lymphoma (last infusion in May 2023) presents for 2 weeks of cough and SOB that began 2 days ago. No hx of blood clots, calf pain or swelling, fever, or sick exposure. Symptoms improved with Albuterol inhaler taken this morning and with Tylenol taken at 3 am.    VS unremarkable. PE Lungs CTABL, anterior chest wall ttp. No calf swelling or tenderness. PERC does not apply, Wells PE positive for recent CA trxt.     CBC, CMP, CXR, Trops, BNP, EKG to r/o pneumonia vs cardiac etiology vs asthma exacerbation. Consider CT chest if symptoms do not improve. Will also give home atenolol/eliquis and PO steroids w/duonebs. 88 yo F a fib, HTN, asthma, non-hodgkin's lymphoma (last infusion in May 2023) presents for 2 weeks of cough and SOB that began 2 days ago. No hx of blood clots, calf pain or swelling, fever, or sick exposure. Symptoms improved with Albuterol inhaler taken this morning and with Tylenol taken at 3 am.    VS unremarkable. PE Lungs w/b/l wheezing, anterior chest wall ttp. No calf swelling or tenderness. PERC does not apply, Wells PE positive for recent CA trxt.     CBC, CMP, CXR, Trops, BNP, EKG to r/o pneumonia vs cardiac etiology vs asthma exacerbation. Consider CT chest if symptoms do not improve. Will also give home atenolol/eliquis and PO steroids w/duonebs.

## 2023-08-29 LAB — LEGIONELLA AG UR QL: NEGATIVE — SIGNIFICANT CHANGE UP

## 2023-08-29 PROCEDURE — 93306 TTE W/DOPPLER COMPLETE: CPT | Mod: 26

## 2023-08-29 PROCEDURE — 93280 PM DEVICE PROGR EVAL DUAL: CPT | Mod: 26

## 2023-08-29 PROCEDURE — 99232 SBSQ HOSP IP/OBS MODERATE 35: CPT

## 2023-08-29 RX ORDER — LEVALBUTEROL 1.25 MG/.5ML
0.63 SOLUTION, CONCENTRATE RESPIRATORY (INHALATION) EVERY 6 HOURS
Refills: 0 | Status: DISCONTINUED | OUTPATIENT
Start: 2023-08-29 | End: 2023-08-30

## 2023-08-29 RX ORDER — CALCIUM CARBONATE 500(1250)
1 TABLET ORAL
Refills: 0 | Status: DISCONTINUED | OUTPATIENT
Start: 2023-08-29 | End: 2023-08-31

## 2023-08-29 RX ORDER — ACETAMINOPHEN 500 MG
975 TABLET ORAL ONCE
Refills: 0 | Status: COMPLETED | OUTPATIENT
Start: 2023-08-29 | End: 2023-08-29

## 2023-08-29 RX ADMIN — CEFTRIAXONE 100 MILLIGRAM(S): 500 INJECTION, POWDER, FOR SOLUTION INTRAMUSCULAR; INTRAVENOUS at 04:13

## 2023-08-29 RX ADMIN — APIXABAN 5 MILLIGRAM(S): 2.5 TABLET, FILM COATED ORAL at 17:29

## 2023-08-29 RX ADMIN — LOSARTAN POTASSIUM 25 MILLIGRAM(S): 100 TABLET, FILM COATED ORAL at 05:48

## 2023-08-29 RX ADMIN — Medication 975 MILLIGRAM(S): at 04:43

## 2023-08-29 RX ADMIN — APIXABAN 5 MILLIGRAM(S): 2.5 TABLET, FILM COATED ORAL at 05:50

## 2023-08-29 RX ADMIN — ATENOLOL 37.5 MILLIGRAM(S): 25 TABLET ORAL at 05:48

## 2023-08-29 RX ADMIN — Medication 25 MICROGRAM(S): at 05:49

## 2023-08-29 RX ADMIN — Medication 40 MILLIGRAM(S): at 05:48

## 2023-08-29 RX ADMIN — Medication 25 MILLIGRAM(S): at 17:29

## 2023-08-29 RX ADMIN — ONDANSETRON 4 MILLIGRAM(S): 8 TABLET, FILM COATED ORAL at 03:18

## 2023-08-29 RX ADMIN — Medication 1 TABLET(S): at 17:29

## 2023-08-29 RX ADMIN — Medication 975 MILLIGRAM(S): at 04:13

## 2023-08-29 RX ADMIN — Medication 40 MILLIGRAM(S): at 05:50

## 2023-08-29 RX ADMIN — Medication 25 MILLIGRAM(S): at 05:49

## 2023-08-29 NOTE — CONSULT NOTE ADULT - NS ATTEND AMEND GEN_ALL_CORE FT
continue atenolol for AFib rate control.  continue apixaban for AFib stroke prevention.  the anticoagulant can be held if she should need invasive procedures (no bridging required).  if she requires invasive procedures, we can check her pacemaker as part of pre-op optimization.

## 2023-08-29 NOTE — PROCEDURE NOTE - ADDITIONAL PROCEDURE DETAILS
Appropriate pacing and sensing.  Patient was noted to be in Rapid Afib on 8/24/2023 max ventricular rate 160BPM. This is a patient of Dr. Bolaños and patient is on Eliquis. Patient has been in Afib since 8/8/2021.   No reprogramming done

## 2023-08-30 DIAGNOSIS — E83.51 HYPOCALCEMIA: ICD-10-CM

## 2023-08-30 DIAGNOSIS — E03.9 HYPOTHYROIDISM, UNSPECIFIED: ICD-10-CM

## 2023-08-30 LAB
ALBUMIN SERPL ELPH-MCNC: SIGNIFICANT CHANGE UP G/DL (ref 3.3–5)
ALP SERPL-CCNC: 63 U/L — SIGNIFICANT CHANGE UP (ref 40–120)
ALT FLD-CCNC: SIGNIFICANT CHANGE UP U/L (ref 4–33)
ANION GAP SERPL CALC-SCNC: SIGNIFICANT CHANGE UP MMOL/L (ref 7–14)
AST SERPL-CCNC: SIGNIFICANT CHANGE UP U/L (ref 4–32)
BASOPHILS # BLD AUTO: 0.08 K/UL — SIGNIFICANT CHANGE UP (ref 0–0.2)
BASOPHILS NFR BLD AUTO: 0.4 % — SIGNIFICANT CHANGE UP (ref 0–2)
BILIRUB SERPL-MCNC: SIGNIFICANT CHANGE UP MG/DL (ref 0.2–1.2)
BUN SERPL-MCNC: SIGNIFICANT CHANGE UP MG/DL (ref 7–23)
CA-I BLD-SCNC: 0.72 MMOL/L — LOW (ref 1.15–1.29)
CALCIUM SERPL-MCNC: SIGNIFICANT CHANGE UP MG/DL (ref 8.4–10.5)
CHLORIDE SERPL-SCNC: 101 MMOL/L — SIGNIFICANT CHANGE UP (ref 98–107)
CO2 SERPL-SCNC: SIGNIFICANT CHANGE UP MMOL/L (ref 22–31)
CREAT SERPL-MCNC: SIGNIFICANT CHANGE UP MG/DL (ref 0.5–1.3)
CULTURE RESULTS: SIGNIFICANT CHANGE UP
EOSINOPHIL # BLD AUTO: 0.07 K/UL — SIGNIFICANT CHANGE UP (ref 0–0.5)
EOSINOPHIL NFR BLD AUTO: 0.4 % — SIGNIFICANT CHANGE UP (ref 0–6)
GLUCOSE SERPL-MCNC: SIGNIFICANT CHANGE UP MG/DL (ref 70–99)
HCT VFR BLD CALC: 39.8 % — SIGNIFICANT CHANGE UP (ref 34.5–45)
HGB BLD-MCNC: 12.2 G/DL — SIGNIFICANT CHANGE UP (ref 11.5–15.5)
IANC: 14.6 K/UL — HIGH (ref 1.8–7.4)
IMM GRANULOCYTES NFR BLD AUTO: 1.3 % — HIGH (ref 0–0.9)
LYMPHOCYTES # BLD AUTO: 1.8 K/UL — SIGNIFICANT CHANGE UP (ref 1–3.3)
LYMPHOCYTES # BLD AUTO: 9.9 % — LOW (ref 13–44)
MAGNESIUM SERPL-MCNC: SIGNIFICANT CHANGE UP MG/DL (ref 1.6–2.6)
MCHC RBC-ENTMCNC: 27.5 PG — SIGNIFICANT CHANGE UP (ref 27–34)
MCHC RBC-ENTMCNC: 30.7 GM/DL — LOW (ref 32–36)
MCV RBC AUTO: 89.6 FL — SIGNIFICANT CHANGE UP (ref 80–100)
MONOCYTES # BLD AUTO: 1.36 K/UL — HIGH (ref 0–0.9)
MONOCYTES NFR BLD AUTO: 7.5 % — SIGNIFICANT CHANGE UP (ref 2–14)
NEUTROPHILS # BLD AUTO: 14.6 K/UL — HIGH (ref 1.8–7.4)
NEUTROPHILS NFR BLD AUTO: 80.5 % — HIGH (ref 43–77)
NRBC # BLD: 0 /100 WBCS — SIGNIFICANT CHANGE UP (ref 0–0)
NRBC # FLD: 0 K/UL — SIGNIFICANT CHANGE UP (ref 0–0)
PHOSPHATE SERPL-MCNC: SIGNIFICANT CHANGE UP MG/DL (ref 2.5–4.5)
PLATELET # BLD AUTO: 323 K/UL — SIGNIFICANT CHANGE UP (ref 150–400)
POTASSIUM SERPL-MCNC: 4.5 MMOL/L — SIGNIFICANT CHANGE UP (ref 3.5–5.3)
POTASSIUM SERPL-SCNC: 4.5 MMOL/L — SIGNIFICANT CHANGE UP (ref 3.5–5.3)
PROT SERPL-MCNC: SIGNIFICANT CHANGE UP G/DL (ref 6–8.3)
RBC # BLD: 4.44 M/UL — SIGNIFICANT CHANGE UP (ref 3.8–5.2)
RBC # FLD: 14.8 % — HIGH (ref 10.3–14.5)
SODIUM SERPL-SCNC: 133 MMOL/L — LOW (ref 135–145)
SPECIMEN SOURCE: SIGNIFICANT CHANGE UP
WBC # BLD: 18.15 K/UL — HIGH (ref 3.8–10.5)
WBC # FLD AUTO: 18.15 K/UL — HIGH (ref 3.8–10.5)

## 2023-08-30 PROCEDURE — 99232 SBSQ HOSP IP/OBS MODERATE 35: CPT

## 2023-08-30 PROCEDURE — 99222 1ST HOSP IP/OBS MODERATE 55: CPT | Mod: GC

## 2023-08-30 RX ORDER — CHOLECALCIFEROL (VITAMIN D3) 125 MCG
1000 CAPSULE ORAL DAILY
Refills: 0 | Status: DISCONTINUED | OUTPATIENT
Start: 2023-08-30 | End: 2023-08-31

## 2023-08-30 RX ORDER — SENNA PLUS 8.6 MG/1
2 TABLET ORAL AT BEDTIME
Refills: 0 | Status: DISCONTINUED | OUTPATIENT
Start: 2023-08-30 | End: 2023-08-31

## 2023-08-30 RX ORDER — LEVALBUTEROL 1.25 MG/.5ML
0.63 SOLUTION, CONCENTRATE RESPIRATORY (INHALATION) EVERY 6 HOURS
Refills: 0 | Status: DISCONTINUED | OUTPATIENT
Start: 2023-08-30 | End: 2023-08-31

## 2023-08-30 RX ORDER — CALCIUM GLUCONATE 100 MG/ML
1 VIAL (ML) INTRAVENOUS ONCE
Refills: 0 | Status: COMPLETED | OUTPATIENT
Start: 2023-08-30 | End: 2023-08-30

## 2023-08-30 RX ORDER — POLYETHYLENE GLYCOL 3350 17 G/17G
17 POWDER, FOR SOLUTION ORAL
Refills: 0 | Status: DISCONTINUED | OUTPATIENT
Start: 2023-08-30 | End: 2023-08-31

## 2023-08-30 RX ORDER — HYDROCORTISONE 1 %
1 OINTMENT (GRAM) TOPICAL THREE TIMES A DAY
Refills: 0 | Status: DISCONTINUED | OUTPATIENT
Start: 2023-08-30 | End: 2023-08-30

## 2023-08-30 RX ADMIN — Medication 1 TABLET(S): at 05:37

## 2023-08-30 RX ADMIN — LEVALBUTEROL 0.63 MILLIGRAM(S): 1.25 SOLUTION, CONCENTRATE RESPIRATORY (INHALATION) at 11:10

## 2023-08-30 RX ADMIN — APIXABAN 5 MILLIGRAM(S): 2.5 TABLET, FILM COATED ORAL at 05:36

## 2023-08-30 RX ADMIN — ATENOLOL 37.5 MILLIGRAM(S): 25 TABLET ORAL at 05:35

## 2023-08-30 RX ADMIN — Medication 1 TABLET(S): at 17:14

## 2023-08-30 RX ADMIN — LEVALBUTEROL 0.63 MILLIGRAM(S): 1.25 SOLUTION, CONCENTRATE RESPIRATORY (INHALATION) at 21:31

## 2023-08-30 RX ADMIN — CEFTRIAXONE 100 MILLIGRAM(S): 500 INJECTION, POWDER, FOR SOLUTION INTRAMUSCULAR; INTRAVENOUS at 03:59

## 2023-08-30 RX ADMIN — POLYETHYLENE GLYCOL 3350 17 GRAM(S): 17 POWDER, FOR SOLUTION ORAL at 17:15

## 2023-08-30 RX ADMIN — Medication 25 MICROGRAM(S): at 05:36

## 2023-08-30 RX ADMIN — Medication 40 MILLIGRAM(S): at 05:36

## 2023-08-30 RX ADMIN — Medication 100 GRAM(S): at 12:09

## 2023-08-30 RX ADMIN — Medication 25 MILLIGRAM(S): at 17:14

## 2023-08-30 RX ADMIN — LEVALBUTEROL 0.63 MILLIGRAM(S): 1.25 SOLUTION, CONCENTRATE RESPIRATORY (INHALATION) at 16:20

## 2023-08-30 RX ADMIN — LOSARTAN POTASSIUM 25 MILLIGRAM(S): 100 TABLET, FILM COATED ORAL at 05:36

## 2023-08-30 RX ADMIN — APIXABAN 5 MILLIGRAM(S): 2.5 TABLET, FILM COATED ORAL at 17:14

## 2023-08-30 RX ADMIN — Medication 25 MILLIGRAM(S): at 06:41

## 2023-08-30 RX ADMIN — SENNA PLUS 2 TABLET(S): 8.6 TABLET ORAL at 22:33

## 2023-08-30 NOTE — CONSULT NOTE ADULT - SUBJECTIVE AND OBJECTIVE BOX
date of consult 8/29/23    HISTORY OF PRESENT ILLNESS: HPI:    89 yo F with Pmhx of Afib on eliquis, MDT PPM,  HTN, CHF, non-hodgkin's lymphoma (infusion ended in May), and new asthma presents to the ED with worsening SOB and wheezing. Patient is AAOX2 but confused. Most of the hx was obtained from chart review and daughter at bedside. Patient started having wheezing and coughing 2 weeks ago. It started when she opened a package from [place] containing a cot. The package smelled "funny" and when they spoke to the , they recommended to throw it away. Since then she has been having SOB and wheezing. She called her PCP and was given albuterol inhaler. It improved her symptoms transiently but then it progressively got worse. Consequently, her daughter brought her to the ED. Currently, pt denies any worsening chest pain or SOB.     ROS limited, hx obtained from chart.    PAST MEDICAL & SURGICAL HISTORY:  Atrial fibrillation  dx 7/09 on coumadin      HTN (hypertension)      HLD (hyperlipidemia)      UTI (urinary tract infection)  frequent last was 1/15      Thyroid nodule  followed by endocrinologist      YOVANI on CPAP      OA (osteoarthritis)      Cerebrovascular accident (CVA)      FHx: total knee replacement  left 2012      Cataract  b/l lense implant      S/P JAY-BSO  40 years ago      FHx: cholecystectomy  1993 laparoscopic      Fracture  ORIF right ankle 1986      Cardiac pacemaker  placed 2009    MEDICATIONS  (STANDING):  apixaban 5 milliGRAM(s) Oral every 12 hours  atenolol  Tablet 37.5 milliGRAM(s) Oral daily  cefTRIAXone   IVPB 1000 milliGRAM(s) IV Intermittent every 24 hours  furosemide   Injectable 40 milliGRAM(s) IV Push daily  levothyroxine 25 MICROGram(s) Oral daily  losartan 25 milliGRAM(s) Oral daily  predniSONE   Tablet 40 milliGRAM(s) Oral daily  pregabalin 25 milliGRAM(s) Oral two times a day      Allergies  sulfa drugs (Hives)  Cardizem (Urticaria)    Intolerances  OxyContin (Sedation/Somnol; Drowsiness)      FAMILY HISTORY  No pertinent family history in first degree relatives  Noncontributory for premature coronary disease or sudden cardiac death    SOCIAL HISTORY:    [x ] Non-smoker  [ ] Smoker  [ ] Alcohol    FLU VACCINE THIS YEAR STARTS IN AUGUST:  [ ] Yes    [ ] No    IF OVER 65 HAVE YOU EVER HAD A PNA VACCINE:  [ ] Yes    [ ] No       [ ] N/A      REVIEW OF SYSTEMS:  [ ]chest pain  [  ]shortness of breath  [  ]palpitations  [  ]syncope  [ ]near syncope [ ]upper extremity weakness   [ ] lower extremity weakness  [  ]diplopia  [  ]altered mental status   [  ]fevers  [ ]chills [ ]nausea  [ ]vomiting  [  ]dysphagia    [ ]abdominal pain  [ ]melena  [ ]BRBPR    [  ]epistaxis  [  ]rash    [ ]lower extremity edema        [x ] All others negative	  [ ] Unable to obtain      LABS:	 	    CARDIAC MARKERS:  TROP T 17, 16                        13.1   16.18 )-----------( 281      ( 28 Aug 2023 08:35 )             41.1       138  |  97<L>  |  21  ----------------------------<  114<H>  3.9   |  25  |  0.91    Ca    7.5<L>      28 Aug 2023 08:35  Mg     1.60     08-28    TPro  6.3  /  Alb  3.4  /  TBili  0.7  /  DBili  x   /  AST  21  /  ALT  10  /  AlkPhos  56  08-28    Creatinine Trend: 0.91<--    PHYSICAL EXAM:  T(C): 36.4 (08-29-23 @ 05:47), Max: 36.8 (08-29-23 @ 00:42)  HR: 99 (08-29-23 @ 05:47) (89 - 120)  BP: 137/85 (08-29-23 @ 05:47) (117/68 - 149/96)  RR: 19 (08-29-23 @ 05:47) (19 - 22)  SpO2: 97% (08-29-23 @ 05:47) (97% - 99%)    Gen: Appears comfortable  HEENT:  (-)icterus (-)pallor  CV: N S1 S2 1/6 DMITRIY (+)2 Pulses B/l  Resp:  Clear to auscultation B/L, normal effort  GI: (+) BS Soft, NT, ND  Lymph:  (-)Edema, (-)obvious lymphadenopathy  Skin: Warm to touch, Normal turgor  Psych: unable to fully assess      TELEMETRY: 	  AF 110s    ECG:  	AF 140s    < from: Transthoracic Echocardiogram (08.29.23 @ 10:04) >  CONCLUSIONS:  1. Mitral annular calcification, otherwise normal mitral  valve. Mild-moderate mitral regurgitation.  2. Calcified trileaflet aortic valve with normal opening.  Mild aortic regurgitation.  3. Moderately dilated left atrium.  LA volume index = 45  cc/m2.  4. Normal left ventricular internal dimensions and wall  thicknesses.  5. Normal left ventricular systolic function. No segmental  wall motion abnormalities.  6. Indeterminate diastolic function in the setting of  atrial fibrillation.  7. Normal right ventricular size and function.  A device  wire is noted in the right heart.  8. Estimated right ventricular systolic pressure equals 68  mm Hg, assuming right atrial pressure equals 10 mm Hg,  consistent with severe pulmonary hypertension.  ------------------------------------------------------------------------  Confirmed on  8/29/2023 - 11:17:06 by Sherif Garvin M.D.,  [facility]  < end of copied text >      < from: CT Angio Chest PE Protocol w/ IV Cont (08.28.23 @ 12:58) >  IMPRESSION:.    No pulmonary embolism.    New 2.6 cm consolidative opacity in the right lower lobe and branching   nodular opacity left upper lobe. Findings likely represent infection and   recommend CT chest follow-up in one month to ensure clearing.    Additional 1 cm nodular opacity in the right lower lobe and 0.8 cm   nodular opacity in the left lower lobe appear increased in size compared   to 7/25/2023; recommend attention on follow-up examination.    Indeterminate left renal lesions and left adrenal nodule without   significant change.    --- End of Report ---      < end of copied text >        ASSESSMENT/PLAN: 	89 yo F with Pmhx of Afib on eliquDORIS orellana PPM, last gen change 2018, remote CVA 2019,  HTN, CHF, non-hodgkin's lymphoma (infusion ended in May), and new asthma presents to the ED with worsening SOB and wheezing.  Cardiology consulted for rapid Afib.    Afib  --cont lifelong AC with ELiquis  --home Atenolol dose continued  --suspect rates elevated due to underlying lung infection  --TTE with preserved LV function  --cont telemetry    PNA  --abx per primary team  --consider pulm eval given CT findings  --not in clinical CHF, would DC IV Eleno RODNEY  [phone]                     
  HPI:  88 yo F with Pmhx of Afib on eliquis, HTN, CHF, non-hodgkin's lymphoma (infusion ended in May), and new asthma presents to the ED with worsening SOB and wheezing. Patient is AAOX2 but confused. Most of the hx was obtained from chart review and daughter at bedside. Patient started having wheezing and coughing 2 weeks ago. It started when she opened a package from [place] containing a cot. The package smelled "funny" and when they spoke to the , they recommended to throw it away. Since then she has been having SOB and wheezing. She called her PCP and was given albuterol inhaler. It improved her symptoms transiently but then it progressively got worse. Consequently, her daughter brought her to the ED. Currently, pt denies any worsening chest pain or SOB.   In the ED, her vitals were notable for HTN and tachycardia to 126s. Labs were notable for leukocytosis, hypocalcemia, and elevated proBNP. CT chest showed new 2.6 cm consolidative opacity in the right lower lobe and branching   nodular opacity left upper lobe. EKG showed Afib with RVR, , corrected >500. Pt was given ceftriaxone and azithromycin IV in the ED. She was also given one dose of amiodarone 150 mg x1 and prednisone 40 mg.  (28 Aug 2023 18:27)      ENDOCRINE HX:  No known personal or family hx of calcium disorders. No hx neck surgery. Takes lasix. Calcium wnl on prior admisison. Corrected calcium 8.0 this admission. Patient asymptomatic. Workup pending S/p calcium gluconate 1g IV x2, now started on Oscal 1250 mg PO BID.    PAST MEDICAL & SURGICAL HISTORY:  Atrial fibrillation  dx 7/09 on coumadin      HTN (hypertension)      HLD (hyperlipidemia)      UTI (urinary tract infection)  frequent last was 1/15      Thyroid nodule  followed by endocrinologist      YOVANI on CPAP      OA (osteoarthritis)      Cerebrovascular accident (CVA)      FHx: total knee replacement  left 2012      Cataract  b/l lense implant      S/P JAY-BSO  40 years ago      FHx: cholecystectomy  1993 laparoscopic      Fracture  ORIF right ankle 1986      Cardiac pacemaker  placed 2009          FAMILY HISTORY:  No pertinent family history in first degree relatives        Social History:    Outpatient Medications:  Home Medications:  atenolol 25 mg oral tablet: 1.5 tab(s) orally once a day (28 Aug 2023 21:02)  Breo Ellipta 200 mcg-25 mcg/inh inhalation powder: 1 puff(s) inhaled once a day (28 Aug 2023 21:24)  busPIRone 15 mg oral tablet: 1 tab(s) orally 3 times a day (28 Aug 2023 21:02)  Eliquis 5 mg oral tablet: 1 tab(s) orally 2 times a day (28 Aug 2023 21:02)  eplerenone 50 mg oral tablet: 1 tab(s) orally once a day (28 Aug 2023 21:02)  Estrace Vaginal 0.1 mg/g vaginal cream: 0.5 gram(s) intravaginally 2 times a week (28 Aug 2023 21:09)  furosemide 40 mg oral tablet: 1 tab(s) orally once a day (28 Aug 2023 21:04)  levothyroxine 25 mcg (0.025 mg) oral tablet: 1 tab(s) orally once a day (28 Aug 2023 21:04)  losartan 25 mg oral tablet: 0.5 tab(s) orally once a day (28 Aug 2023 21:04)  lovastatin 20 mg oral tablet: 1 tab(s) orally once a day (28 Aug 2023 21:02)  pregabalin 25 mg oral capsule: 1 cap(s) orally 2 times a day (28 Aug 2023 21:04)  Ventolin HFA 90 mcg/inh inhalation aerosol: 2 puff(s) inhaled every 6 hours as needed for  shortness of breath and/or wheezing (28 Aug 2023 21:24)      MEDICATIONS  (STANDING):  apixaban 5 milliGRAM(s) Oral every 12 hours  atenolol  Tablet 37.5 milliGRAM(s) Oral daily  calcium carbonate   1250 mG (OsCal) 1 Tablet(s) Oral two times a day  cefTRIAXone   IVPB 1000 milliGRAM(s) IV Intermittent every 24 hours  levalbuterol Inhalation 0.63 milliGRAM(s) Inhalation every 6 hours  levothyroxine 25 MICROGram(s) Oral daily  losartan 25 milliGRAM(s) Oral daily  polyethylene glycol 3350 17 Gram(s) Oral two times a day  predniSONE   Tablet 40 milliGRAM(s) Oral daily  pregabalin 25 milliGRAM(s) Oral two times a day  senna 2 Tablet(s) Oral at bedtime    MEDICATIONS  (PRN):  acetaminophen     Tablet .. 650 milliGRAM(s) Oral every 6 hours PRN Temp greater or equal to 38C (100.4F), Mild Pain (1 - 3)  aluminum hydroxide/magnesium hydroxide/simethicone Suspension 30 milliLiter(s) Oral every 4 hours PRN Dyspepsia  melatonin 3 milliGRAM(s) Oral at bedtime PRN Insomnia  ondansetron Injectable 4 milliGRAM(s) IV Push every 8 hours PRN Nausea and/or Vomiting      Allergies    sulfa drugs (Hives)  Cardizem (Urticaria)    Intolerances    OxyContin (Sedation/Somnol; Drowsiness)    Review of Systems:  Constitutional: No fever  Eyes: No blurry vision  Neuro: No tremors  HEENT: No pain  Cardiovascular: No chest pain, palpitations  Respiratory: No SOB, no cough  GI: No nausea, vomiting, abdominal pain  : No dysuria  Skin: no rash  Psych: no depression  Endocrine: no polyuria, polydipsia  Hem/lymph: no swelling  Osteoporosis: no fractures    ALL OTHER SYSTEMS REVIEWED AND NEGATIVE    PHYSICAL EXAM:  VITALS: T(C): 36.6 (08-30-23 @ 15:34)  T(F): 97.9 (08-30-23 @ 15:34), Max: 98.4 (08-29-23 @ 21:27)  HR: 93 (08-30-23 @ 15:34) (77 - 113)  BP: 110/65 (08-30-23 @ 15:34) (96/61 - 131/69)  RR:  (17 - 19)  SpO2:  (96% - 100%)  Wt(kg): --  GENERAL: NAD, well-groomed, well-developed  EYES: No proptosis, no lid lag, anicteric  HEENT:  Atraumatic, Normocephalic, moist mucous membranes  THYROID: Normal size, no palpable nodules  RESPIRATORY: Normal respiratory effort; no audible wheezing  SKIN: Dry, intact, No rashes or lesions  MUSCULOSKELETAL: Full range of motion, normal strength  NEURO: sensation intact, extraocular movements intact, no tremor  PSYCH: Alert and oriented x 3, normal affect, normal mood  CUSHING'S SIGNS: no striae      CAPILLARY BLOOD GLUCOSE                                12.2   18.15 )-----------( 323      ( 30 Aug 2023 06:00 )             39.8       08-30    133<L>  |  101  |  QNS  ----------------------------<  QNS  4.5   |  QNS  |  QNS    eGFR: x     Ca    QNS      08-30  Mg     QNS     08-30  Phos  QNS     08-30    TPro  QNS  /  Alb  QNS  /  TBili  QNS  /  DBili  x   /  AST  QNS  /  ALT  QNS  /  AlkPhos  63  08-30      Thyroid Function Tests:              Radiology:

## 2023-08-30 NOTE — CONSULT NOTE ADULT - ATTENDING COMMENTS
89-year-old woman with history of atrial fibrillation on Eliquis, hypertension, CHF on Lasix, non-Hodgkin's lymphoma with treatment ended in May, rituximab, hypothyroidism, and new onset asthma presenting with shortness of breath and wheezing found to have sepsis secondary to pneumonia.  Endocrinology consulted for hypercalcemia.  Calcium level was 8.0 mg/dL on this admission.  Patient received calcium gluconate IV, 1 g x 2 doses, started on [place] 1250 mg p.o. twice daily.  Recommend to follow-up parathyroid, 25-hydroxy vitamin D level.  Please check magnesium and phosphorus level.  Of note, patient with a history of osteoporosis, she reports that she is currently on treatment with a subcutaneous injection every 6 months, likely Prolia.  She does not recall the name of her endocrinologist nor does she remember the last dosage that was given.  Review outpatient records within [facility] and unable to locate any, probably seeing an endocrinologist outside of the [facility]. (Patient mentioned that she may be seeing someone at [facility], but does not recall the name of the doctor.)    Hypocalcemia likely in the setting of denosumab use, may also have a component of vitamin D deficiency which exacerbates the hypocalcemia.  Follow-up vitamin D 25-hydroxy level.  For now recommend starting vitamin D 1000 IU daily.    Patient also with a history of hypothyroidism, repeat TSH level, continue with home regimen of levothyroxine 25 mcg once daily.

## 2023-08-30 NOTE — CONSULT NOTE ADULT - ASSESSMENT
89 yo F with Pmhx of Afib on eliquis, HTN, CHF, non-hodgkin's lymphoma (infusion ended in May), and new asthma presents to the ED with worsening SOB and wheezing, found to have sepsis 2/2 PNA and afib with RVR. Endocrinology consulted for hypocalcemia.    INCOMPLETE NOTE    #Hypocalcemia  - DDx includes hypoparathyroidism, vitamin D deficiency, diuretics, sclerotic mets, and malabsorption  - No known personal or family hx of calcium disorders. No hx neck surgery. Asymptomatic  - Home medication includes lasix    - Calcium wnl on prior admisison. Corrected calcium 8.0 this admission.  - Magnesium 1.6, wnl; alk phos 63, wnl; renal function wnl  - CTA chest 8/28/23 noted: no sclerotic bony lesions  - S/p calcium gluconate 1g IV x2, now started on Oscal 1250 mg PO BID.  PLAN:  - f/u PTH and 25OH vitamin D - ordered  - check phosphorous  - trend CMP daily for calcium and albumin  - for corrected calcium <8 can give calcium gluconate 1g IV    Bolivar Hussein MD  Endocrine Fellow  For nonurgent matters, please email [email or web address] or [email or web address]. For urgent follow up questions, discharge recommendations, or new consults please call answering service at [phone] (weekdays), [phone] (nights/weekends).      89 yo F with Pmhx of Afib on eliquis, HTN, CHF, non-hodgkin's lymphoma (infusion ended in May), hypothyroidism, and new asthma presents to the ED with worsening SOB and wheezing, found to have sepsis 2/2 PNA and afib with RVR. Endocrinology consulted for hypocalcemia.    INCOMPLETE NOTE    #Hypocalcemia  - DDx includes hypoparathyroidism, vitamin D deficiency, diuretics, sclerotic mets, and malabsorption  - No known personal or family hx of calcium disorders. No hx neck surgery. Asymptomatic  - Home medication includes lasix    - Calcium wnl on prior admisison. Corrected calcium 8.0 this admission.  - Magnesium 1.6, wnl; alk phos 63, wnl; renal function wnl  - CTA chest 8/28/23 noted: no sclerotic bony lesions  - S/p calcium gluconate 1g IV x2, now started on Oscal 1250 mg PO BID.  PLAN:  - f/u PTH and 25OH vitamin D - ordered  - check phosphorous  - trend CMP daily for calcium and albumin  - for corrected calcium <8 can give calcium gluconate 1g IV    #Hypothyroidism  - c/w home levothyroxine 25 mcg daily  - check TSH - ordered    Bolivar Hussein MD  Endocrine Fellow  For nonurgent matters, please email [email or web address] or [email or web address]. For urgent follow up questions, discharge recommendations, or new consults please call answering service at [phone] (weekdays), [phone] (nights/weekends).      89 yo F with Pmhx of Afib on eliquis, HTN, CHF, non-hodgkin's lymphoma (infusion ended in May), hypothyroidism, and new asthma presents to the ED with worsening SOB and wheezing, found to have sepsis 2/2 PNA and afib with RVR. Endocrinology consulted for hypocalcemia.    #Hypocalcemia  - DDx includes hypoparathyroidism, vitamin D deficiency, medications (diuretics, bisphosphonates, etc.), sclerotic mets, and malabsorption  - No known personal or family hx of calcium disorders. No hx neck surgery. Asymptomatic  - Home medication includes lasix    - Calcium wnl on prior admission Corrected calcium 8.0 this admission.  - Magnesium 1.6, wnl; alk phos 63, wnl; renal function wnl  - CTA chest 8/28/23 noted: no sclerotic bony lesions  - S/p calcium gluconate 1g IV x2, now started on Oscal 1250 mg PO BID.  PLAN:  - f/u PTH and 25OH vitamin D - ordered  - f/u phosphorous - ordered  - c/w oscal 1250mg PO BID  - start vitamin D 1000 IU daily  - for corrected calcium <8 can give calcium gluconate 1g IV  - trend CMP daily for calcium and albumin    #Hypothyroidism  - c/w home levothyroxine 25 mcg daily  - check TSH - ordered    Bolivar Hussein MD  Endocrine Fellow  For nonurgent matters, please email [email or web address] or [email or web address]. For urgent follow up questions, discharge recommendations, or new consults please call answering service at [phone] (weekdays), [phone] (nights/weekends).

## 2023-08-31 ENCOUNTER — TRANSCRIPTION ENCOUNTER (OUTPATIENT)
Age: 88
End: 2023-08-31

## 2023-08-31 VITALS
OXYGEN SATURATION: 98 % | DIASTOLIC BLOOD PRESSURE: 64 MMHG | SYSTOLIC BLOOD PRESSURE: 120 MMHG | HEART RATE: 107 BPM | TEMPERATURE: 98 F | RESPIRATION RATE: 18 BRPM

## 2023-08-31 LAB
24R-OH-CALCIDIOL SERPL-MCNC: 35.1 NG/ML — SIGNIFICANT CHANGE UP (ref 30–80)
ALBUMIN SERPL ELPH-MCNC: 3.3 G/DL — SIGNIFICANT CHANGE UP (ref 3.3–5)
ALP SERPL-CCNC: 62 U/L — SIGNIFICANT CHANGE UP (ref 40–120)
ALT FLD-CCNC: 21 U/L — SIGNIFICANT CHANGE UP (ref 4–33)
ANION GAP SERPL CALC-SCNC: 16 MMOL/L — HIGH (ref 7–14)
AST SERPL-CCNC: 19 U/L — SIGNIFICANT CHANGE UP (ref 4–32)
BILIRUB DIRECT SERPL-MCNC: <0.2 MG/DL — SIGNIFICANT CHANGE UP (ref 0–0.3)
BILIRUB INDIRECT FLD-MCNC: >0 MG/DL — SIGNIFICANT CHANGE UP (ref 0–1)
BILIRUB SERPL-MCNC: 0.2 MG/DL — SIGNIFICANT CHANGE UP (ref 0.2–1.2)
BUN SERPL-MCNC: 38 MG/DL — HIGH (ref 7–23)
CA-I BLD-SCNC: 1.01 MMOL/L — LOW (ref 1.15–1.29)
CALCIUM SERPL-MCNC: 8.6 MG/DL — SIGNIFICANT CHANGE UP (ref 8.4–10.5)
CHLORIDE SERPL-SCNC: 99 MMOL/L — SIGNIFICANT CHANGE UP (ref 98–107)
CO2 SERPL-SCNC: 25 MMOL/L — SIGNIFICANT CHANGE UP (ref 22–31)
CREAT SERPL-MCNC: 1.05 MG/DL — SIGNIFICANT CHANGE UP (ref 0.5–1.3)
EGFR: 51 ML/MIN/1.73M2 — LOW
GLUCOSE SERPL-MCNC: 93 MG/DL — SIGNIFICANT CHANGE UP (ref 70–99)
MAGNESIUM SERPL-MCNC: 2.4 MG/DL — SIGNIFICANT CHANGE UP (ref 1.6–2.6)
PHOSPHATE SERPL-MCNC: 3.4 MG/DL — SIGNIFICANT CHANGE UP (ref 2.5–4.5)
POTASSIUM SERPL-MCNC: 3.7 MMOL/L — SIGNIFICANT CHANGE UP (ref 3.5–5.3)
POTASSIUM SERPL-SCNC: 3.7 MMOL/L — SIGNIFICANT CHANGE UP (ref 3.5–5.3)
PROT SERPL-MCNC: 6.1 G/DL — SIGNIFICANT CHANGE UP (ref 6–8.3)
PTH-INTACT FLD-MCNC: 128 PG/ML — HIGH (ref 15–65)
SODIUM SERPL-SCNC: 140 MMOL/L — SIGNIFICANT CHANGE UP (ref 135–145)
T4 FREE SERPL-MCNC: 1.9 NG/DL — HIGH (ref 0.9–1.8)
TSH SERPL-MCNC: 0.39 UIU/ML — SIGNIFICANT CHANGE UP (ref 0.27–4.2)

## 2023-08-31 PROCEDURE — 99239 HOSP IP/OBS DSCHRG MGMT >30: CPT

## 2023-08-31 PROCEDURE — 99232 SBSQ HOSP IP/OBS MODERATE 35: CPT | Mod: GC

## 2023-08-31 RX ORDER — CALCIUM CARBONATE 500(1250)
1 TABLET ORAL
Qty: 60 | Refills: 0
Start: 2023-08-31 | End: 2023-09-29

## 2023-08-31 RX ORDER — ALBUTEROL 90 UG/1
2 AEROSOL, METERED ORAL
Refills: 0 | DISCHARGE

## 2023-08-31 RX ORDER — SENNA PLUS 8.6 MG/1
2 TABLET ORAL
Qty: 0 | Refills: 0 | DISCHARGE
Start: 2023-08-31

## 2023-08-31 RX ORDER — POLYETHYLENE GLYCOL 3350 17 G/17G
17 POWDER, FOR SOLUTION ORAL
Qty: 0 | Refills: 0 | DISCHARGE
Start: 2023-08-31

## 2023-08-31 RX ORDER — CEFPODOXIME PROXETIL 100 MG
1 TABLET ORAL
Qty: 6 | Refills: 0
Start: 2023-08-31 | End: 2023-09-02

## 2023-08-31 RX ORDER — LEVALBUTEROL 1.25 MG/.5ML
3 SOLUTION, CONCENTRATE RESPIRATORY (INHALATION)
Qty: 360 | Refills: 0
Start: 2023-08-31 | End: 2023-09-29

## 2023-08-31 RX ORDER — LANOLIN ALCOHOL/MO/W.PET/CERES
1 CREAM (GRAM) TOPICAL
Qty: 0 | Refills: 0 | DISCHARGE
Start: 2023-08-31

## 2023-08-31 RX ORDER — CHOLECALCIFEROL (VITAMIN D3) 125 MCG
1000 CAPSULE ORAL
Qty: 30 | Refills: 0
Start: 2023-08-31 | End: 2023-09-29

## 2023-08-31 RX ADMIN — Medication 40 MILLIGRAM(S): at 07:02

## 2023-08-31 RX ADMIN — POLYETHYLENE GLYCOL 3350 17 GRAM(S): 17 POWDER, FOR SOLUTION ORAL at 07:02

## 2023-08-31 RX ADMIN — LOSARTAN POTASSIUM 25 MILLIGRAM(S): 100 TABLET, FILM COATED ORAL at 07:02

## 2023-08-31 RX ADMIN — APIXABAN 5 MILLIGRAM(S): 2.5 TABLET, FILM COATED ORAL at 07:02

## 2023-08-31 RX ADMIN — ATENOLOL 37.5 MILLIGRAM(S): 25 TABLET ORAL at 07:02

## 2023-08-31 RX ADMIN — Medication 1 TABLET(S): at 07:02

## 2023-08-31 RX ADMIN — LEVALBUTEROL 0.63 MILLIGRAM(S): 1.25 SOLUTION, CONCENTRATE RESPIRATORY (INHALATION) at 03:41

## 2023-08-31 RX ADMIN — Medication 25 MILLIGRAM(S): at 07:02

## 2023-08-31 RX ADMIN — Medication 1000 UNIT(S): at 12:32

## 2023-08-31 RX ADMIN — Medication 25 MICROGRAM(S): at 06:02

## 2023-08-31 RX ADMIN — Medication 10 MILLIGRAM(S): at 12:30

## 2023-08-31 RX ADMIN — CEFTRIAXONE 100 MILLIGRAM(S): 500 INJECTION, POWDER, FOR SOLUTION INTRAMUSCULAR; INTRAVENOUS at 04:38

## 2023-08-31 NOTE — PROGRESS NOTE ADULT - PROBLEM SELECTOR PLAN 5
Patient initially presented with SOB and wheezing   -S/p albuterol and prednisone 40 mg PO in the ED   -No significant wheezing on exam   -Will continue with prednisone 40 mg x4 days   -xopenex, ok for prn now
Patient initially presented with SOB and wheezing   -S/p albuterol and prednisone 40 mg PO in the ED   -No significant wheezing on exam   -Will continue with prednisone 40 mg x4 days   -xopenex-c/w ATC for now
Patient with hx of ? diastolic HF   -Pt with worsening SOB, not overtly volume loaded on exam   -TTE stable   -d/c iv lasix

## 2023-08-31 NOTE — PHYSICAL THERAPY INITIAL EVALUATION ADULT - GENERAL OBSERVATIONS, REHAB EVAL
Pt encountered in semisupine position in NAD, all lines intact, +primavit, a&ox4, and vitals: 144/68 mmHg. Pt encountered in semisupine position in NAD, all lines intact, +primafit, a&ox4, and vitals: 144/68 mmHg.

## 2023-08-31 NOTE — PROGRESS NOTE ADULT - PROBLEM SELECTOR PLAN 1
Patient with cough, SOB, tachycardia, and leukocytosis, found to have new 2.6 cm consolidative opacity in the right lower lobe and branching nodular opacity left upper lobe, on CT chest   -F/u with bcx, UA, RVP, MRSA, and urine legionella (all pending collection)  -S/p IV ceftriaxone and azithromycin in the ED. Will continue with ceftriaxone and hold azithromycin for now due to prolonged  QTC   -repeat EKG shows improved QTC  -Monitor SO2    will need repeat CT in 4 weeks. Pt sees Pulm as outpatient and will need to f/u with Pulm
Patient with cough, SOB, tachycardia, and leukocytosis, found to have new 2.6 cm consolidative opacity in the right lower lobe and branching nodular opacity left upper lobe, on CT chest   -F/u with bcx, UA, RVP, MRSA, and urine legionella (all pending collection)  -S/p IV ceftriaxone and azithromycin in the ED. Will continue with ceftriaxone and hold azithromycin for now due to prolonged  QTC   -Monitor SO2
Patient with cough, SOB, tachycardia, and leukocytosis, found to have new 2.6 cm consolidative opacity in the right lower lobe and branching nodular opacity left upper lobe, on CT chest   -F/u with bcx, UA, RVP, MRSA, and urine legionella (all pending collection)  -S/p IV ceftriaxone and azithromycin in the ED. Will continue with ceftriaxone and hold azithromycin for now due to prolonged  QTC   -repeat EKG shows improved QTC  -Monitor SO2    will need repeat CT in 4 weeks. Pt sees Pulm as outpatient and will need to f/u with Pulm

## 2023-08-31 NOTE — PROGRESS NOTE ADULT - SUBJECTIVE AND OBJECTIVE BOX
Amaris Ybarra MD   [facility] Medicine  Teams preferred  Pager: [phone]    Patient is a 89y old  Female who presents with a chief complaint of SOB (31 Aug 2023 13:43)      INTERVAL HPI/OVERNIGHT EVENTS: no issues overnight. Pt feels great. wants to have a BM. denies any CP or SOB    MEDICATIONS  (STANDING):  apixaban 5 milliGRAM(s) Oral every 12 hours  atenolol  Tablet 37.5 milliGRAM(s) Oral daily  calcium carbonate   1250 mG (OsCal) 1 Tablet(s) Oral two times a day  cefTRIAXone   IVPB 1000 milliGRAM(s) IV Intermittent every 24 hours  cholecalciferol 1000 Unit(s) Oral daily  levalbuterol Inhalation 0.63 milliGRAM(s) Inhalation every 6 hours  levothyroxine 25 MICROGram(s) Oral daily  losartan 25 milliGRAM(s) Oral daily  polyethylene glycol 3350 17 Gram(s) Oral two times a day  predniSONE   Tablet 40 milliGRAM(s) Oral daily  pregabalin 25 milliGRAM(s) Oral two times a day  senna 2 Tablet(s) Oral at bedtime    MEDICATIONS  (PRN):  acetaminophen     Tablet .. 650 milliGRAM(s) Oral every 6 hours PRN Temp greater or equal to 38C (100.4F), Mild Pain (1 - 3)  aluminum hydroxide/magnesium hydroxide/simethicone Suspension 30 milliLiter(s) Oral every 4 hours PRN Dyspepsia  melatonin 3 milliGRAM(s) Oral at bedtime PRN Insomnia  ondansetron Injectable 4 milliGRAM(s) IV Push every 8 hours PRN Nausea and/or Vomiting      Allergies    sulfa drugs (Hives)  Cardizem (Urticaria)    Intolerances    OxyContin (Sedation/Somnol; Drowsiness)      REVIEW OF SYSTEMS:  Please see interval HPI:    Vital Signs Last 24 Hrs  T(C): 36.3 (31 Aug 2023 10:40), Max: 36.7 (31 Aug 2023 06:37)  T(F): 97.4 (31 Aug 2023 10:40), Max: 98 (31 Aug 2023 06:37)  HR: 90 (31 Aug 2023 10:40) (78 - 91)  BP: 119/64 (31 Aug 2023 10:40) (104/73 - 144/68)  BP(mean): --  RR: 18 (31 Aug 2023 10:40) (16 - 18)  SpO2: 98% (31 Aug 2023 10:40) (95% - 100%)    Parameters below as of 31 Aug 2023 10:40  Patient On (Oxygen Delivery Method): room air      I&O's Detail        PHYSICAL EXAM:  Vital Signs Last 24 Hrs  T(C): 36.3 (31 Aug 2023 10:40), Max: 36.7 (31 Aug 2023 06:37)  T(F): 97.4 (31 Aug 2023 10:40), Max: 98 (31 Aug 2023 06:37)  HR: 90 (31 Aug 2023 10:40) (78 - 91)  BP: 119/64 (31 Aug 2023 10:40) (104/73 - 144/68)  BP(mean): --  RR: 18 (31 Aug 2023 10:40) (16 - 18)  SpO2: 98% (31 Aug 2023 10:40) (95% - 100%)    Parameters below as of 31 Aug 2023 10:40  Patient On (Oxygen Delivery Method): room air      CONSTITUTIONAL: NAD,   ENMT: Moist oral mucosa,   RESPIRATORY: Normal respiratory effort; lungs are clear to auscultation bilaterally, no wheezing noted  CARDIOVASCULAR: irregular rhythm, normal S1 and S2, no murmur/rub/gallop; No lower extremity edema;   ABDOMEN: Nontender to palpation, normoactive bowel sounds, no rebound/guarding; No hepatosplenomegaly  EXT: no edema b/l  NEUROLOGY: alert, following commands  SKIN: No rashes; no palpable lesions      LABS:    31 Aug 2023 06:20    140    |  99     |  38     ----------------------------<  93     3.7     |  25     |  1.05     Ca    8.6        31 Aug 2023 06:20  Phos  3.4       31 Aug 2023 06:20  Mg     2.40      31 Aug 2023 06:20    TPro  6.1    /  Alb  3.3    /  TBili  0.2    /  DBili  <0.2   /  AST  19     /  ALT  21     /  AlkPhos  62     31 Aug 2023 06:20      CAPILLARY BLOOD GLUCOSE        BLOOD CULTURE  08-28 @ 22:30   No growth at 48 Hours  --  --  08-28 @ 22:00   No growth at 48 Hours  --  --  08-28 @ 21:36   >=3 organisms. Probable collection contamination.  --  --    RADIOLOGY & ADDITIONAL TESTS:    Imaging Personally Reviewed:  [ ] YES     Consultant(s) Notes Reviewed:      Care Discussed with Consultants/Other Providers:
Date of service  8/30/23    chief complaint: SOB    extended hpi:   89 yo F with Pmhx of Afib on eliquis, MDT PPM,  HTN, CHF, non-hodgkin's lymphoma (infusion ended in May), and new asthma presents to the ED with worsening SOB and wheezing.     no chest pain or SOB    Review of Systems:   Constitutional: [ ] fevers, [ ] chills.   Skin: [ ] dry skin. [ ] rashes.  Psychiatric: [ ] depression, [ ] anxiety.   Gastrointestinal: [ ] BRBPR, [ ] melena.   Neurological: [ ] confusion. [ ] seizures. [ ] shuffling gait.   Ears,Nose,Mouth and Throat: [ ] ear pain [ ] sore throat.   Eyes: [ ] diplopia.   Respiratory: [ ] hemoptysis. [ ] shortness of breath  Cardiovascular: See HPI above  Hematologic/Lymphatic: [ ] anemia. [ ] painful nodes. [ ] prolonged bleeding.   Genitourinary: [ ] hematuria. [ ] flank pain.   Endocrine: [ ] significant change in weight. [ ] intolerance to heat and cold.     Review of systems [ x] otherwise negative, [ ] otherwise unable to obtain    FH: no family history of sudden cardiac death in first degree relatives    SH: [ ] tobacco, [ ] alcohol, [ ] drugs    acetaminophen     Tablet .. 650 milliGRAM(s) Oral every 6 hours PRN  aluminum hydroxide/magnesium hydroxide/simethicone Suspension 30 milliLiter(s) Oral every 4 hours PRN  apixaban 5 milliGRAM(s) Oral every 12 hours  atenolol  Tablet 37.5 milliGRAM(s) Oral daily  calcium carbonate   1250 mG (OsCal) 1 Tablet(s) Oral two times a day  cefTRIAXone   IVPB 1000 milliGRAM(s) IV Intermittent every 24 hours  levalbuterol Inhalation 0.63 milliGRAM(s) Inhalation every 6 hours  levothyroxine 25 MICROGram(s) Oral daily  losartan 25 milliGRAM(s) Oral daily  melatonin 3 milliGRAM(s) Oral at bedtime PRN  ondansetron Injectable 4 milliGRAM(s) IV Push every 8 hours PRN  polyethylene glycol 3350 17 Gram(s) Oral two times a day  predniSONE   Tablet 40 milliGRAM(s) Oral daily  pregabalin 25 milliGRAM(s) Oral two times a day  senna 2 Tablet(s) Oral at bedtime                            12.2   18.15 )-----------( 323      ( 30 Aug 2023 06:00 )             39.8     133<L>  |  101  |  QNS  ----------------------------<  QNS  4.5   |  QNS  |  QNS    Ca    QNS      30 Aug 2023 06:00  Phos  QNS     08-30  Mg     QNS     08-30      T(C): 36.5 (08-30-23 @ 12:57), Max: 36.9 (08-29-23 @ 21:27)  HR: 82 (08-30-23 @ 12:57) (77 - 113)  BP: 129/57 (08-30-23 @ 12:57) (96/61 - 131/69)  RR: 18 (08-30-23 @ 12:57) (17 - 19)  SpO2: 96% (08-30-23 @ 12:57) (96% - 98%)    General: Well nourished in no acute distress. Alert and Oriented * 3.   Head: Normocephalic and atraumatic.   Neck: No JVD. No bruits. Supple. Does not appear to be enlarged.   Cardiovascular: + S1,S2 ; RRR Soft systolic murmur at the left lower sternal border. No rubs noted.    Lungs: CTA b/l. No rhonchi, rales or wheezes.   Abdomen: + BS, soft. Non tender. Non distended. No rebound. No guarding.   Extremities: No clubbing/cyanosis/edema.   Neurologic: Moves all four extremities. Full range of motion.   Skin: Warm and moist. The patient's skin has normal elasticity and good skin turgor.   Psychiatric: Appropriate mood and affect.  Musculoskeletal: Normal range of motion, normal strength    DATA    TELEMETRY: 	  AF 80    ECG:  	AF 140s    < from: Transthoracic Echocardiogram (08.29.23 @ 10:04) >  CONCLUSIONS:  1. Mitral annular calcification, otherwise normal mitral  valve. Mild-moderate mitral regurgitation.  2. Calcified trileaflet aortic valve with normal opening.  Mild aortic regurgitation.  3. Moderately dilated left atrium.  LA volume index = 45  cc/m2.  4. Normal left ventricular internal dimensions and wall  thicknesses.  5. Normal left ventricular systolic function. No segmental  wall motion abnormalities.  6. Indeterminate diastolic function in the setting of  atrial fibrillation.  7. Normal right ventricular size and function.  A device  wire is noted in the right heart.  8. Estimated right ventricular systolic pressure equals 68  mm Hg, assuming right atrial pressure equals 10 mm Hg,  consistent with severe pulmonary hypertension.  ------------------------------------------------------------------------  Confirmed on  8/29/2023 - 11:17:06 by Sherif Garvin M.D.,  [facility], [facility]  < end of copied text >      < from: CT Angio Chest PE Protocol w/ IV Cont (08.28.23 @ 12:58) >  IMPRESSION:.    No pulmonary embolism.    New 2.6 cm consolidative opacity in the right lower lobe and branching   nodular opacity left upper lobe. Findings likely represent infection and   recommend CT chest follow-up in one month to ensure clearing.    Additional 1 cm nodular opacity in the right lower lobe and 0.8 cm   nodular opacity in the left lower lobe appear increased in size compared   to 7/25/2023; recommend attention on follow-up examination.    Indeterminate left renal lesions and left adrenal nodule without   significant change.    --- End of Report ---      < end of copied text >        ASSESSMENT/PLAN: 	89 yo F with Pmhx of Afib on eliquis, MDT PPM, last gen change 2018, remote CVA 2019,  HTN, CHF, non-hodgkin's lymphoma (infusion ended in May), and new asthma presents to the ED with worsening SOB and wheezing.  Cardiology consulted for rapid Afib.    Afib  --cont lifelong AC with ELiquis  --home Atenolol dose continued  --suspect rates elevated due to underlying lung infection, rates now stable  --TTE with preserved LV function  --cont telemetry    PNA  --abx per primary team  --consider pulm eval given CT findings  --not in clinical CHF, would DC IV Lasix      Sheeba RODNEY  [phone] 
Date of service  8/31/23    chief complaint: SOB    extended hpi:   89 yo F with Pmhx of Afib on eliquis, MDT PPM,  HTN, CHF, non-hodgkin's lymphoma (infusion ended in May), and new asthma presents to the ED with worsening SOB and wheezing.     no chest pain or SOB    Review of Systems:   Constitutional: [ ] fevers, [ ] chills.   Skin: [ ] dry skin. [ ] rashes.  Psychiatric: [ ] depression, [ ] anxiety.   Gastrointestinal: [ ] BRBPR, [ ] melena.   Neurological: [ ] confusion. [ ] seizures. [ ] shuffling gait.   Ears,Nose,Mouth and Throat: [ ] ear pain [ ] sore throat.   Eyes: [ ] diplopia.   Respiratory: [ ] hemoptysis. [ ] shortness of breath  Cardiovascular: See HPI above  Hematologic/Lymphatic: [ ] anemia. [ ] painful nodes. [ ] prolonged bleeding.   Genitourinary: [ ] hematuria. [ ] flank pain.   Endocrine: [ ] significant change in weight. [ ] intolerance to heat and cold.     Review of systems [x ] otherwise negative, [ ] otherwise unable to obtain    FH: no family history of sudden cardiac death in first degree relatives    SH: [ ] tobacco, [ ] alcohol, [ ] drugs    acetaminophen     Tablet .. 650 milliGRAM(s) Oral every 6 hours PRN  aluminum hydroxide/magnesium hydroxide/simethicone Suspension 30 milliLiter(s) Oral every 4 hours PRN  apixaban 5 milliGRAM(s) Oral every 12 hours  atenolol  Tablet 37.5 milliGRAM(s) Oral daily  calcium carbonate   1250 mG (OsCal) 1 Tablet(s) Oral two times a day  cefTRIAXone   IVPB 1000 milliGRAM(s) IV Intermittent every 24 hours  cholecalciferol 1000 Unit(s) Oral daily  levalbuterol Inhalation 0.63 milliGRAM(s) Inhalation every 6 hours  levothyroxine 25 MICROGram(s) Oral daily  losartan 25 milliGRAM(s) Oral daily  melatonin 3 milliGRAM(s) Oral at bedtime PRN  ondansetron Injectable 4 milliGRAM(s) IV Push every 8 hours PRN  polyethylene glycol 3350 17 Gram(s) Oral two times a day  predniSONE   Tablet 40 milliGRAM(s) Oral daily  pregabalin 25 milliGRAM(s) Oral two times a day  senna 2 Tablet(s) Oral at bedtime                            12.2   18.15 )-----------( 323      ( 30 Aug 2023 06:00 )             39.8       08-31    140  |  99  |  38<H>  ----------------------------<  93  3.7   |  25  |  1.05    Ca    8.6      31 Aug 2023 06:20  Phos  3.4     08-31  Mg     2.40     08-31    TPro  6.1  /  Alb  3.3  /  TBili  0.2  /  DBili  <0.2  /  AST  19  /  ALT  21  /  AlkPhos  62  08-31    T(C): 36.3 (08-31-23 @ 10:40), Max: 36.7 (08-31-23 @ 06:37)  HR: 90 (08-31-23 @ 10:40) (78 - 93)  BP: 119/64 (08-31-23 @ 10:40) (104/73 - 144/68)  RR: 18 (08-31-23 @ 10:40) (16 - 18)  SpO2: 98% (08-31-23 @ 10:40) (95% - 100%)  Wt(kg): --    General: Well nourished in no acute distress. Alert and Oriented * 3.   Head: Normocephalic and atraumatic.   Neck: No JVD. No bruits. Supple. Does not appear to be enlarged.   Cardiovascular: + S1,S2 ; RRR Soft systolic murmur at the left lower sternal border. No rubs noted.    Lungs: CTA b/l. No rhonchi, rales or wheezes.   Abdomen: + BS, soft. Non tender. Non distended. No rebound. No guarding.   Extremities: No clubbing/cyanosis/edema.   Neurologic: Moves all four extremities. Full range of motion.   Skin: Warm and moist. The patient's skin has normal elasticity and good skin turgor.   Psychiatric: Appropriate mood and affect.  Musculoskeletal: Normal range of motion, normal strength    DATA    TELEMETRY: 	  AF 80    ECG:  	AF 140s    < from: Transthoracic Echocardiogram (08.29.23 @ 10:04) >  CONCLUSIONS:  1. Mitral annular calcification, otherwise normal mitral  valve. Mild-moderate mitral regurgitation.  2. Calcified trileaflet aortic valve with normal opening.  Mild aortic regurgitation.  3. Moderately dilated left atrium.  LA volume index = 45  cc/m2.  4. Normal left ventricular internal dimensions and wall  thicknesses.  5. Normal left ventricular systolic function. No segmental  wall motion abnormalities.  6. Indeterminate diastolic function in the setting of  atrial fibrillation.  7. Normal right ventricular size and function.  A device  wire is noted in the right heart.  8. Estimated right ventricular systolic pressure equals 68  mm Hg, assuming right atrial pressure equals 10 mm Hg,  consistent with severe pulmonary hypertension.  ------------------------------------------------------------------------  Confirmed on  8/29/2023 - 11:17:06 by Sherif Garvin M.D.,  [facility], [facility]  < end of copied text >      < from: CT Angio Chest PE Protocol w/ IV Cont (08.28.23 @ 12:58) >  IMPRESSION:.    No pulmonary embolism.    New 2.6 cm consolidative opacity in the right lower lobe and branching   nodular opacity left upper lobe. Findings likely represent infection and   recommend CT chest follow-up in one month to ensure clearing.    Additional 1 cm nodular opacity in the right lower lobe and 0.8 cm   nodular opacity in the left lower lobe appear increased in size compared   to 7/25/2023; recommend attention on follow-up examination.    Indeterminate left renal lesions and left adrenal nodule without   significant change.    --- End of Report ---      < end of copied text >        ASSESSMENT/PLAN: 	89 yo F with Pmhx of Afib on eliquis, MDT PPM, last gen change 2018, remote CVA 2019,  HTN, CHF, non-hodgkin's lymphoma (infusion ended in May), and new asthma presents to the ED with worsening SOB and wheezing.  Cardiology consulted for rapid Afib.    Afib  --cont lifelong AC with ELiquis  --home Atenolol dose continued  --suspect rates elevated due to underlying lung infection, rates now stable  --TTE with preserved LV function and severe pulm HTN    PNA  --abx per primary team  --pt to f/u with her OP Pulm given CT and TTE findings  --not in clinical CHF, off [place] RODNEY  [phone] 
ENDOCRINE FOLLOW UP     Chief Complaint: hypocalcemia    History: Patient's daughter stated she received a dose of prolia in May. Calcium 9.2 corrected today    MEDICATIONS  (STANDING):  apixaban 5 milliGRAM(s) Oral every 12 hours  atenolol  Tablet 37.5 milliGRAM(s) Oral daily  calcium carbonate   1250 mG (OsCal) 1 Tablet(s) Oral two times a day  cefTRIAXone   IVPB 1000 milliGRAM(s) IV Intermittent every 24 hours  cholecalciferol 1000 Unit(s) Oral daily  levalbuterol Inhalation 0.63 milliGRAM(s) Inhalation every 6 hours  levothyroxine 25 MICROGram(s) Oral daily  losartan 25 milliGRAM(s) Oral daily  polyethylene glycol 3350 17 Gram(s) Oral two times a day  predniSONE   Tablet 40 milliGRAM(s) Oral daily  pregabalin 25 milliGRAM(s) Oral two times a day  senna 2 Tablet(s) Oral at bedtime    MEDICATIONS  (PRN):  acetaminophen     Tablet .. 650 milliGRAM(s) Oral every 6 hours PRN Temp greater or equal to 38C (100.4F), Mild Pain (1 - 3)  aluminum hydroxide/magnesium hydroxide/simethicone Suspension 30 milliLiter(s) Oral every 4 hours PRN Dyspepsia  melatonin 3 milliGRAM(s) Oral at bedtime PRN Insomnia  ondansetron Injectable 4 milliGRAM(s) IV Push every 8 hours PRN Nausea and/or Vomiting      Allergies    sulfa drugs (Hives)  Cardizem (Urticaria)    Intolerances    OxyContin (Sedation/Somnol; Drowsiness)      ROS: All other systems reviewed and negative    PHYSICAL EXAM:  VITALS: T(C): 36.3 (08-31-23 @ 10:40)  T(F): 97.4 (08-31-23 @ 10:40), Max: 98 (08-31-23 @ 06:37)  HR: 90 (08-31-23 @ 10:40) (78 - 93)  BP: 119/64 (08-31-23 @ 10:40) (104/73 - 144/68)  RR:  (16 - 18)  SpO2:  (95% - 100%)  Wt(kg): --  GENERAL: NAD, resting comfortably   EYES: No proptosis,  anicteric  HEENT:  Atraumatic, Normocephalic, moist mucous membranes  RESPIRATORY: Nonlabored respirations on room air, normal rate/effort   NEURO: AOx2, moves all extremities spontaenuously   PSYCH:  reactive affect, euthymic mood        08-31    140  |  99  |  38<H>  ----------------------------<  93  3.7   |  25  |  1.05    eGFR: 51<L>    Ca    8.6      08-31  Mg     2.40     08-31  Phos  3.4     08-31    TPro  6.1  /  Alb  3.3  /  TBili  0.2  /  DBili  <0.2  /  AST  19  /  ALT  21  /  AlkPhos  62  08-31          Thyroid Stimulating Hormone, Serum: 0.39 uIU/mL (08-31-23 @ 06:20)  
Amaris Ybarra MD   [facility]  Teams preferred  Pager: [phone]    Patient is a 89y old  Female who presents with a chief complaint of SOB (29 Aug 2023 16:00)      INTERVAL HPI/OVERNIGHT EVENTS: no issues overnight. Pt reports feeling much better, thinks the nebulizer treatments are helping. Denies any further CP.    MEDICATIONS  (STANDING):  apixaban 5 milliGRAM(s) Oral every 12 hours  atenolol  Tablet 37.5 milliGRAM(s) Oral daily  calcium carbonate   1250 mG (OsCal) 1 Tablet(s) Oral two times a day  cefTRIAXone   IVPB 1000 milliGRAM(s) IV Intermittent every 24 hours  levalbuterol Inhalation 0.63 milliGRAM(s) Inhalation every 6 hours  levothyroxine 25 MICROGram(s) Oral daily  losartan 25 milliGRAM(s) Oral daily  predniSONE   Tablet 40 milliGRAM(s) Oral daily  pregabalin 25 milliGRAM(s) Oral two times a day    MEDICATIONS  (PRN):  acetaminophen     Tablet .. 650 milliGRAM(s) Oral every 6 hours PRN Temp greater or equal to 38C (100.4F), Mild Pain (1 - 3)  aluminum hydroxide/magnesium hydroxide/simethicone Suspension 30 milliLiter(s) Oral every 4 hours PRN Dyspepsia  melatonin 3 milliGRAM(s) Oral at bedtime PRN Insomnia  ondansetron Injectable 4 milliGRAM(s) IV Push every 8 hours PRN Nausea and/or Vomiting      Allergies    sulfa drugs (Hives)  Cardizem (Urticaria)    Intolerances    OxyContin (Sedation/Somnol; Drowsiness)      REVIEW OF SYSTEMS:  Please see interval HPI:    Vital Signs Last 24 Hrs  T(C): 36.5 (30 Aug 2023 12:57), Max: 36.9 (29 Aug 2023 21:27)  T(F): 97.7 (30 Aug 2023 12:57), Max: 98.4 (29 Aug 2023 21:27)  HR: 82 (30 Aug 2023 12:57) (77 - 113)  BP: 129/57 (30 Aug 2023 12:57) (96/61 - 131/69)  BP(mean): --  RR: 18 (30 Aug 2023 12:57) (17 - 19)  SpO2: 96% (30 Aug 2023 12:57) (96% - 98%)    Parameters below as of 30 Aug 2023 12:57  Patient On (Oxygen Delivery Method): room air      I&O's Detail        PHYSICAL EXAM:  Vital Signs Last 24 Hrs  T(C): 36.5 (30 Aug 2023 12:57), Max: 36.9 (29 Aug 2023 21:27)  T(F): 97.7 (30 Aug 2023 12:57), Max: 98.4 (29 Aug 2023 21:27)  HR: 82 (30 Aug 2023 12:57) (77 - 113)  BP: 129/57 (30 Aug 2023 12:57) (96/61 - 131/69)  BP(mean): --  RR: 18 (30 Aug 2023 12:57) (17 - 19)  SpO2: 96% (30 Aug 2023 12:57) (96% - 98%)    Parameters below as of 30 Aug 2023 12:57  Patient On (Oxygen Delivery Method): room air      CONSTITUTIONAL: NAD,   ENMT: Moist oral mucosa,   RESPIRATORY: Normal respiratory effort; lungs are clear to auscultation bilaterally, rare wheezing  CARDIOVASCULAR: Regular rate, irregular rhythm, normal S1 and S2, no murmur/rub/gallop; No lower extremity edema;   ABDOMEN: Nontender to palpation, normoactive bowel sounds, no rebound/guarding; No hepatosplenomegaly  EXT: no edema b/l  NEUROLOGY: alert, following commands  SKIN: No rashes; no palpable lesions      LABS:                        12.2   18.15 )-----------( 323      ( 30 Aug 2023 06:00 )             39.8     30 Aug 2023 06:00    133    |  101    |  QNS    ----------------------------<  QNS    4.5     |  QNS    |  QNS      Ca    QNS        30 Aug 2023 06:00  Phos  QNS       30 Aug 2023 06:00  Mg     QNS       30 Aug 2023 06:00    TPro  QNS    /  Alb  QNS    /  TBili  QNS    /  DBili  x      /  AST  QNS    /  ALT  QNS    /  AlkPhos  63     30 Aug 2023 06:00      CAPILLARY BLOOD GLUCOSE        BLOOD CULTURE  08-28 @ 22:30   No growth at 24 hours  --  --  08-28 @ 22:00   No growth at 24 hours  --  --  08-28 @ 21:36   >=3 organisms. Probable collection contamination.  --  --    RADIOLOGY & ADDITIONAL TESTS:    Imaging Personally Reviewed:  [ ] YES     Consultant(s) Notes Reviewed:      Care Discussed with Consultants/Other Providers:
Amaris Ybarra MD   [facility] Medicine  Teams preferred  Pager: [phone]    Patient is a 89y old  Female who presents with a chief complaint of SOB (29 Aug 2023 14:11)      INTERVAL HPI/OVERNIGHT EVENTS: Pt reports feeling better. States that her right chest pain improved. No RIDDLE or sob at rest    MEDICATIONS  (STANDING):  apixaban 5 milliGRAM(s) Oral every 12 hours  atenolol  Tablet 37.5 milliGRAM(s) Oral daily  cefTRIAXone   IVPB 1000 milliGRAM(s) IV Intermittent every 24 hours  furosemide   Injectable 40 milliGRAM(s) IV Push daily  levothyroxine 25 MICROGram(s) Oral daily  losartan 25 milliGRAM(s) Oral daily  predniSONE   Tablet 40 milliGRAM(s) Oral daily  pregabalin 25 milliGRAM(s) Oral two times a day    MEDICATIONS  (PRN):  acetaminophen     Tablet .. 650 milliGRAM(s) Oral every 6 hours PRN Temp greater or equal to 38C (100.4F), Mild Pain (1 - 3)  aluminum hydroxide/magnesium hydroxide/simethicone Suspension 30 milliLiter(s) Oral every 4 hours PRN Dyspepsia  levalbuterol Inhalation 0.63 milliGRAM(s) Inhalation every 6 hours PRN Wheezing/Bronchospasm  melatonin 3 milliGRAM(s) Oral at bedtime PRN Insomnia  ondansetron Injectable 4 milliGRAM(s) IV Push every 8 hours PRN Nausea and/or Vomiting      Allergies    sulfa drugs (Hives)  Cardizem (Urticaria)    Intolerances    OxyContin (Sedation/Somnol; Drowsiness)      REVIEW OF SYSTEMS:  Please see interval HPI:    Vital Signs Last 24 Hrs  T(C): 36.7 (29 Aug 2023 14:34), Max: 36.8 (29 Aug 2023 00:42)  T(F): 98 (29 Aug 2023 14:34), Max: 98.2 (29 Aug 2023 00:42)  HR: 100 (29 Aug 2023 14:34) (89 - 120)  BP: 118/69 (29 Aug 2023 14:34) (117/68 - 149/96)  BP(mean): 81 (29 Aug 2023 14:34) (81 - 101)  RR: 19 (29 Aug 2023 14:34) (19 - 22)  SpO2: 95% (29 Aug 2023 14:34) (95% - 99%)    Parameters below as of 29 Aug 2023 14:34  Patient On (Oxygen Delivery Method): room air      I&O's Detail        PHYSICAL EXAM:  Vital Signs Last 24 Hrs  T(C): 36.7 (29 Aug 2023 14:34), Max: 36.8 (29 Aug 2023 00:42)  T(F): 98 (29 Aug 2023 14:34), Max: 98.2 (29 Aug 2023 00:42)  HR: 100 (29 Aug 2023 14:34) (89 - 120)  BP: 118/69 (29 Aug 2023 14:34) (117/68 - 149/96)  BP(mean): 81 (29 Aug 2023 14:34) (81 - 101)  RR: 19 (29 Aug 2023 14:34) (19 - 22)  SpO2: 95% (29 Aug 2023 14:34) (95% - 99%)    Parameters below as of 29 Aug 2023 14:34  Patient On (Oxygen Delivery Method): room air      CONSTITUTIONAL: NAD,   ENMT: Moist oral mucosa,   RESPIRATORY: Normal respiratory effort; diffuse wheezing b/l, no accessory muscle use  CARDIOVASCULAR: Regular rate and rhythm, normal S1 and S2, no murmur/rub/gallop; No lower extremity edema;   ABDOMEN: Nontender to palpation, normoactive bowel sounds, no rebound/guarding; No hepatosplenomegaly  EXT: no edema b/l  NEUROLOGY: alert, following commands  SKIN: No rashes; no palpable lesions      LABS:      Ca    7.5        28 Aug 2023 08:35        CAPILLARY BLOOD GLUCOSE        BLOOD CULTURE    RADIOLOGY & ADDITIONAL TESTS:    Imaging Personally Reviewed:  [ ] YES     Consultant(s) Notes Reviewed:      Care Discussed with Consultants/Other Providers:

## 2023-08-31 NOTE — PHYSICAL THERAPY INITIAL EVALUATION ADULT - ACTIVE RANGE OF MOTION EXAMINATION, REHAB EVAL
po. upper extremity Active ROM was WNL (within normal limits)/bilateral lower extremity Active ROM was WNL (within normal limits)

## 2023-08-31 NOTE — DISCHARGE NOTE PROVIDER - CARE PROVIDER_API CALL
Arabella Parra E  Internal Medicine  [address]  [place], [place] [id number]  Phone: [phone]  Fax: [phone]  Follow Up Time:

## 2023-08-31 NOTE — PROGRESS NOTE ADULT - ATTENDING COMMENTS
89F osteoporosis on Prolia s/p mild hypocalcemia now improved.  Continue twice daily calcium supplement and vitamin D.  Outpatient check labs prior to next Prolia dose.  No contraindication to discharge from endocrine standpoint.    Salomón Kennedy MD  Division of Endocrinology  Pager: [phone]    If after 6PM or before 9AM, or on weekends/holidays, please call endocrine answering service for assistance ([phone]).  For nonurgent matters email [email or web address] for assistance.

## 2023-08-31 NOTE — DISCHARGE NOTE PROVIDER - NSDCHHNEEDSERVICE_GEN_ALL_CORE
18-Dec-2020 Medication teaching and assessment/Observation and assessment/Rehabilitation services/Teaching and training 18-Dec-2020 18-Dec-2020 18-Dec-2020 18-Dec-2020 18-Dec-2020 18-Dec-2020

## 2023-08-31 NOTE — PHYSICAL THERAPY INITIAL EVALUATION ADULT - ADDITIONAL COMMENTS
Pt lives with daughter in private house with 3 steps to enter with bilateral handrails. Pt has HHA for 8 hours/3 times a week (MWF). Pt requires cane to ambulate throughout house and a rollator outdoors.     Pt left in semisupine position, in NAD, all lines intact, +primafit, call bell in reach, bed at lowest level, and bed alarm on.

## 2023-08-31 NOTE — DISCHARGE NOTE PROVIDER - NSDCCPCAREPLAN_GEN_ALL_CORE_FT
PRINCIPAL DISCHARGE DIAGNOSIS  Diagnosis: Cough  Assessment and Plan of Treatment: You were found to have PNEUMONIA in your right lower lobe. You were treated with antibiotics and will be discharged on Vantin for a couple of more days. You will need repeat imaging with a CT scan in 1 month with your pulmonologist      SECONDARY DISCHARGE DIAGNOSES  Diagnosis: Acute asthma exacerbation  Assessment and Plan of Treatment: You were treated with nebulizer and steroids. Please finish the course of steroids. You can return to your inhalers and take it as needed    Diagnosis: Chronic atrial fibrillation  Assessment and Plan of Treatment: When you first arrived your heart rate was quite elevated. Cardiology saw you in the hospital and felt as though it was because of your infection. Your heart rate has been stable ever since. please follow up with your heart doctors as an outpatient     PRINCIPAL DISCHARGE DIAGNOSIS  Diagnosis: Cough  Assessment and Plan of Treatment: You were found to have PNEUMONIA in your right lower lobe. You were treated with antibiotics and will be discharged on Vantin for a couple of more days. You will need repeat imaging with a CT scan in 1 month with your pulmonologist      SECONDARY DISCHARGE DIAGNOSES  Diagnosis: Acute asthma exacerbation  Assessment and Plan of Treatment: You were treated with nebulizer and steroids. Please finish the course of steroids. You can return to your inhalers and take it as needed    Diagnosis: Chronic atrial fibrillation  Assessment and Plan of Treatment: When you first arrived your heart rate was quite elevated. Cardiology saw you in the hospital and felt as though it was because of your infection. Your heart rate has been stable ever since. please follow up with your heart doctors as an outpatient    Diagnosis: Hypocalcemia  Assessment and Plan of Treatment: Your calcium was low likely secondary to Prolia. You were given replacements and now it's normal. Please check your calcium level again prior to your next Prolia dose

## 2023-08-31 NOTE — DISCHARGE NOTE NURSING/CASE MANAGEMENT/SOCIAL WORK - NSDCVIVACCINE_GEN_ALL_CORE_FT
Tdap; 23-Jan-2017 08:01; Ivelisse Carrera (RN); [employer]; [id number]; IntraMuscular; Deltoid Right.; 0.5 milliLiter(s); VIS (VIS Published: 09-May-2013, VIS Presented: 23-Jan-2017);   Tdap; 29-Apr-2019 12:26; Aga Pichardo (SCOTT); [employer]; [id number] (Exp. Date: 26-Feb-2021); IntraMuscular; Deltoid Right.; 0.5 milliLiter(s); VIS (VIS Published: 09-May-2013, VIS Presented: 29-Apr-2019);

## 2023-08-31 NOTE — PROGRESS NOTE ADULT - PROBLEM SELECTOR PLAN 2
Patient with confusion, AAOx2   -Most likely due to metabolic/infectious encephalopathy in the setting of PNA   -No focal deficits on exam. Hold off on CT head   -C/w IV abx as above    can discharge today and transition to oral    resolved
Patient with confusion, AAOx2   -Most likely due to metabolic/infectious encephalopathy in the setting of PNA   -No focal deficits on exam. Hold off on CT head   -C/w IV abx as above    resolved
Patient with confusion, AAOx2   -Most likely due to metabolic/infectious encephalopathy in the setting of PNA   -No focal deficits on exam. Hold off on CT head   -C/w IV abx as above    resolved

## 2023-08-31 NOTE — PHYSICAL THERAPY INITIAL EVALUATION ADULT - PATIENT PROFILE REVIEW, REHAB EVAL
yes PT initial evaluation received and chart review completed. Pt agreeable to participate in PT evaluation./yes

## 2023-08-31 NOTE — PROGRESS NOTE ADULT - PROBLEM SELECTOR PLAN 3
Patient with hx of chronic afib   -HR elevated to 110s after albuterol   -S/p IV amiodarone 150 mg in the ED   -HR fluctuating between 100-110s   -Cardiology consulted, advised to continue atenolol 27.5 mg daily for now   -C/w eliquis 5 mg BID   -C/w telemonitoring   -TTE stable  -PPM interrogation in the AM    Prolonged QTC  -EKG showed prolonged QTC   -Most likely due to hypocalcemia   -Will given IV calcium and magnesium   -C/w telemonitoring   -Repeat EKG in the AM
Patient with hx of chronic afib   -HR elevated to 110s after albuterol   -S/p IV amiodarone 150 mg in the ED   -HR improving today  -Cardiology consulted, advised to continue atenolol 27.5 mg daily for now   -C/w eliquis 5 mg BID   -C/w telemonitoring   -TTE stable  -PPM demonstrating Afib    Prolonged QTC  -EKG showed prolonged QTC   -Most likely due to hypocalcemia   -Will given IV calcium and magnesium   -C/w telemonitoring   -Repeat EKG in the AM
Patient with hx of chronic afib   -HR elevated to 110s after albuterol   -S/p IV amiodarone 150 mg in the ED   -HR improving today  -Cardiology consulted, advised to continue atenolol 27.5 mg daily for now   -C/w eliquis 5 mg BID   -C/w telemonitoring   -TTE stable  -PPM demonstrating Afib    Prolonged QTC  -EKG showed prolonged QTC   -Most likely due to hypocalcemia   -Will given IV calcium and magnesium   -C/w telemonitoring   -Repeat EKG in the AM

## 2023-08-31 NOTE — DISCHARGE NOTE NURSING/CASE MANAGEMENT/SOCIAL WORK - PATIENT PORTAL LINK FT
You can access the FollowMyHealth Patient Portal offered by [facility] by registering at the following website: [email or web address]. By joining [employer]’s FollowMyHealth portal, you will also be able to view your health information using other applications (apps) compatible with our system.

## 2023-08-31 NOTE — PROGRESS NOTE ADULT - ASSESSMENT
88 yo F with Pmhx of Afib on eliquis, HTN, CHF, non-hodgkin's lymphoma (infusion ended in May), hypothyroidism, and new asthma presents to the ED with worsening SOB and wheezing, found to have sepsis 2/2 PNA and afib with RVR. Endocrinology consulted for hypocalcemia.    #Hypocalcemia  #Osteoporosis  - hypocalcemia likely i/s/o prolia for osteoporosis (last dose 5/2023)  - No known personal or family hx of calcium disorders. No hx neck surgery. Asymptomatic  - Home medication includes lasix    - Calcium wnl on prior admission Corrected calcium 8.0 this admission.  - Magnesium 1.6, wnl; alk phos 63, wnl; renal function wnl; , appropriately high; vitamin D 35, wnl  - CTA chest 8/28/23 noted: no sclerotic bony lesions  - S/p calcium gluconate 1g IV x2, now started on Oscal 1250 mg PO BID.  PLAN:  - c/w oscal 1250mg PO BID  - c/w vitamin D 1000 IU daily  - for corrected calcium <8 can give calcium gluconate 1g IV  - no endocrine contraindication to diacharge. C/w calcium and vitamin D supps as above. recheck calcium prior to next Prolia shot.    #Hypothyroidism  - c/w home levothyroxine 25 mcg daily  - TSH 0.39  - outpatient followup    Bolivar Hussein MD  Endocrine Fellow  For nonurgent matters, please email [email or web address] or [email or web address]. For urgent follow up questions, discharge recommendations, or new consults please call answering service at [phone] (weekdays), [phone] (nights/weekends).     
89 yo F with Pmhx of Afib on eliquis, HTN, CHF, non-hodgkin's lymphoma (infusion ended in May), and new asthma presents to the ED with worsening SOB and wheezing, found to have sepsis 2/2 PNA and afib with RVR. 

## 2023-08-31 NOTE — PHYSICAL THERAPY INITIAL EVALUATION ADULT - NSPTDISCHREC_GEN_A_CORE
Home PT to improve strength and balance to return to previous functional mobility level./Home PT to improve strength and balance to return to previous functional mobility level./Home PT

## 2023-08-31 NOTE — PHYSICAL THERAPY INITIAL EVALUATION ADULT - PLANNED THERAPY INTERVENTIONS, PT EVAL
balance training/bed mobility training/gait training/ROM/strengthening/transfer training bed mobility training/gait training/neuromuscular re-education/postural re-education/ROM/strengthening/transfer training

## 2023-08-31 NOTE — PHYSICAL THERAPY INITIAL EVALUATION ADULT - IMPAIRMENTS FOUND, PT EVAL
gait, locomotion, and balance/muscle strength aerobic capacity/endurance/gait, locomotion, and balance/gross motor/muscle strength

## 2023-08-31 NOTE — DISCHARGE NOTE PROVIDER - HOSPITAL COURSE
88 yo F with Pmhx of Afib on eliquis, HTN, CHF, non-hodgkin's lymphoma (infusion ended in May), and new asthma presents to the ED with worsening SOB and wheezing. Patient is AAOX2 but confused. Most of the hx was obtained from chart review and daughter at bedside. Patient started having wheezing and coughing 2 weeks ago. It started when she opened a package from [place] containing a cot. The package smelled "funny" and when they spoke to the , they recommended to throw it away. Since then she has been having SOB and wheezing. She called her PCP and was given albuterol inhaler. It improved her symptoms transiently but then it progressively got worse. Consequently, her daughter brought her to the ED. Currently, pt denies any worsening chest pain or SOB.     #CAP: Pt had CT chest done RLL opacity 2.6cm likely infectious. Per CT recommend 1 month interval f/u. Pt was started on xopenex and steroid. Pt improved significantly. Will be discharged on vantin to complete 7 day course. She will need to f/u with Pulm as outpatient for repeat imaging.    #Asthma exacerbation. Pt was on ATC xopenex x 2 days and started on steroids. She will finish 5 day course of steroids    #Afib with RVR: Likely in the setting of infection. Cards consulted, s/p magy in ER. will continue with home atenolol at this time    >33 min have been spent in the care and coordination of this patient's care

## 2023-08-31 NOTE — PHYSICAL THERAPY INITIAL EVALUATION ADULT - GAIT DEVIATIONS NOTED, PT EVAL
Required redirection of walker when in close proximity of furniture throughout room/decreased jaciel/decreased step length/decreased stride length

## 2023-08-31 NOTE — PROGRESS NOTE ADULT - PROBLEM SELECTOR PLAN 6
Calcium decreased to 7.5   -F/u with ionized calcium  -s/p 1 gm calcium   -repeat lytes today
Patient with hx of ? diastolic HF   -Pt with worsening SOB, not overtly volume loaded on exam   -TTE stable   -d/c iv lasix
Patient with hx of ? diastolic HF   -Pt with worsening SOB, not overtly volume loaded on exam   -TTE stable   -d/c iv lasix, can resume home dosing

## 2023-08-31 NOTE — DISCHARGE NOTE PROVIDER - NSFOLLOWUPCLINICS_GEN_ALL_ED_FT
[facility] Pulmonolgy and Sleep Medicine  Pulmonology  [address] [place] [address], [place] [address]  Phone: [phone]  Fax:

## 2023-08-31 NOTE — DISCHARGE NOTE PROVIDER - NSDCQMSTROKE_NEU_ALL_CORE
States she's had nasal congestion and ear pain for two days now. States also noticed her B/P  Was up today and yesterday night her feet swelled up.   No

## 2023-08-31 NOTE — CHART NOTE - NSCHARTNOTEFT_GEN_A_CORE
Patient has medical diagnosis of asthma. Will require nebulizer for Xopenex  treatment every 6 hours as needed for wheezing/ bronchospasms.

## 2023-08-31 NOTE — PROGRESS NOTE ADULT - PROBLEM SELECTOR PLAN 8
Please f/u with outpatient pharmacy in the AM for medication list.

## 2023-08-31 NOTE — DISCHARGE NOTE PROVIDER - NSDCMRMEDTOKEN_GEN_ALL_CORE_FT
atenolol 25 mg oral tablet: 1.5 tab(s) orally once a day  Breo Ellipta 200 mcg-25 mcg/inh inhalation powder: 1 puff(s) inhaled once a day  busPIRone 15 mg oral tablet: 1 tab(s) orally 3 times a day  Eliquis 5 mg oral tablet: 1 tab(s) orally 2 times a day  eplerenone 50 mg oral tablet: 1 tab(s) orally once a day  Estrace Vaginal 0.1 mg/g vaginal cream: 0.5 gram(s) intravaginally 2 times a week  furosemide 40 mg oral tablet: 1 tab(s) orally once a day  levothyroxine 25 mcg (0.025 mg) oral tablet: 1 tab(s) orally once a day  losartan 25 mg oral tablet: 0.5 tab(s) orally once a day  lovastatin 20 mg oral tablet: 1 tab(s) orally once a day  pregabalin 25 mg oral capsule: 1 cap(s) orally 2 times a day  Ventolin HFA 90 mcg/inh inhalation aerosol: 2 puff(s) inhaled every 6 hours as needed for  shortness of breath and/or wheezing   atenolol 25 mg oral tablet: 1.5 tab(s) orally once a day  Breo Ellipta 200 mcg-25 mcg/inh inhalation powder: 1 puff(s) inhaled once a day  busPIRone 15 mg oral tablet: 1 tab(s) orally 3 times a day  Eliquis 5 mg oral tablet: 1 tab(s) orally 2 times a day  eplerenone 50 mg oral tablet: 1 tab(s) orally once a day  Estrace Vaginal 0.1 mg/g vaginal cream: 0.5 gram(s) intravaginally 2 times a week  furosemide 40 mg oral tablet: 1 tab(s) orally once a day  levothyroxine 25 mcg (0.025 mg) oral tablet: 1 tab(s) orally once a day  losartan 25 mg oral tablet: 0.5 tab(s) orally once a day  lovastatin 20 mg oral tablet: 1 tab(s) orally once a day  Nebulizer: Use your nebulizer as directed  pregabalin 25 mg oral capsule: 1 cap(s) orally 2 times a day  Ventolin HFA 90 mcg/inh inhalation aerosol: 2 puff(s) inhaled every 6 hours as needed for  shortness of breath and/or wheezing   atenolol 25 mg oral tablet: 1.5 tab(s) orally once a day  Breo Ellipta 200 mcg-25 mcg/inh inhalation powder: 1 puff(s) inhaled once a day  busPIRone 15 mg oral tablet: 1 tab(s) orally 3 times a day  calcium carbonate 1250 mg (500 mg elemental calcium) oral tablet: 1 tab(s) orally 2 times a day  cefpodoxime 200 mg oral tablet: 1 tab(s) orally 2 times a day 9/1/23-9/3/23.  cholecalciferol oral tablet: 1,000 international unit(s) orally once a day 1000 unit(s) orally once a day  Eliquis 5 mg oral tablet: 1 tab(s) orally 2 times a day  eplerenone 50 mg oral tablet: 1 tab(s) orally once a day  Estrace Vaginal 0.1 mg/g vaginal cream: 0.5 gram(s) intravaginally 2 times a week  furosemide 40 mg oral tablet: 1 tab(s) orally once a day  levalbuterol 0.63 mg/3 mL inhalation solution: 3 milliliter(s) by nebulizer every 6 hours as needed for  shortness of breath and/or wheezing  levothyroxine 25 mcg (0.025 mg) oral tablet: 1 tab(s) orally once a day  losartan 25 mg oral tablet: 1 tab(s) orally once a day  lovastatin 20 mg oral tablet: 1 tab(s) orally once a day  melatonin 3 mg oral tablet: 1 tab(s) orally once a day (at bedtime) As needed Insomnia  Nebulizer: Use your nebulizer as directed  polyethylene glycol 3350 oral powder for reconstitution: 17 gram(s) orally 2 times a day as needed for  constipation  predniSONE 20 mg oral tablet: 2 tab(s) orally once a day only on 9/1/23.  pregabalin 25 mg oral capsule: 1 cap(s) orally 2 times a day  senna leaf extract oral tablet: 2 tab(s) orally once a day (at bedtime) as needed for  constipation

## 2023-08-31 NOTE — PROGRESS NOTE ADULT - PROBLEM SELECTOR PLAN 4
calcium still low today  give an additional 1gm  c/w oral supplements  check PTH, vitamin D  check urine calcium  alk phos WNL
resolved. hypocalcemia likely i/s/o prolia for osteoporosis (last dose 5/2023)  appreciate endo input  will continue supplementation with calcium and vitamin d  will check labs prior to next prolia dose
Patient initially presented with SOB and wheezing   -S/p albuterol and prednisone 40 mg PO in the ED   -No significant wheezing on exam   -Will continue with prednisone 40 mg x4 days   -xopenex-will trial for effect
None

## 2023-09-03 LAB
CULTURE RESULTS: SIGNIFICANT CHANGE UP
CULTURE RESULTS: SIGNIFICANT CHANGE UP
SPECIMEN SOURCE: SIGNIFICANT CHANGE UP
SPECIMEN SOURCE: SIGNIFICANT CHANGE UP

## 2023-09-07 ENCOUNTER — TRANSCRIPTION ENCOUNTER (OUTPATIENT)
Age: 88
End: 2023-09-07

## 2023-09-11 ENCOUNTER — APPOINTMENT (OUTPATIENT)
Age: 88
End: 2023-09-11
Payer: MEDICARE

## 2023-09-11 PROCEDURE — 99349 HOME/RES VST EST MOD MDM 40: CPT | Mod: 95

## 2023-09-13 RX ORDER — AMIODARONE HYDROCHLORIDE 100 MG/1
100 TABLET ORAL DAILY
Refills: 0 | Status: DISCONTINUED | COMMUNITY
Start: 2019-09-11 | End: 2023-09-13

## 2023-09-18 ENCOUNTER — APPOINTMENT (OUTPATIENT)
Age: 88
End: 2023-09-18

## 2023-09-18 ENCOUNTER — APPOINTMENT (OUTPATIENT)
Dept: PULMONOLOGY | Facility: CLINIC | Age: 88
End: 2023-09-18
Payer: MEDICARE

## 2023-09-18 DIAGNOSIS — J18.9 PNEUMONIA, UNSPECIFIED ORGANISM: ICD-10-CM

## 2023-09-18 DIAGNOSIS — I48.20 CHRONIC ATRIAL FIBRILLATION, UNSP: ICD-10-CM

## 2023-09-18 DIAGNOSIS — I50.9 HEART FAILURE, UNSPECIFIED: ICD-10-CM

## 2023-09-18 DIAGNOSIS — R91.8 OTHER NONSPECIFIC ABNORMAL FINDING OF LUNG FIELD: ICD-10-CM

## 2023-09-18 PROCEDURE — 99204 OFFICE O/P NEW MOD 45 MIN: CPT | Mod: 95

## 2023-09-20 ENCOUNTER — NON-APPOINTMENT (OUTPATIENT)
Age: 88
End: 2023-09-20

## 2023-09-21 ENCOUNTER — APPOINTMENT (OUTPATIENT)
Dept: PULMONOLOGY | Facility: CLINIC | Age: 88
End: 2023-09-21
Payer: MEDICARE

## 2023-09-21 DIAGNOSIS — U07.1 COVID-19: ICD-10-CM

## 2023-09-21 PROBLEM — R91.8 ABNORMAL CT SCAN, LUNG: Status: ACTIVE | Noted: 2023-09-21

## 2023-09-21 PROCEDURE — 99214 OFFICE O/P EST MOD 30 MIN: CPT | Mod: 95

## 2023-09-25 NOTE — ED PROVIDER NOTE - MEDICAL DECISION MAKING DETAILS
Received fax from [facility] regarding identification discrepancy form. Clarified with Chaitanya Cartagena MA regarding patient's cultures collected on 9/15/23 for Aerobic bacterial culture and Anaerobic culture. Per Chaitanya Cartagena, 2 culture labels on the one specimen collection tube were placed as the method for which specimens are sent out to [facility]. For filled out and sent to [facility] fax at [phone]. 84f w hx afib on coumadin, ppm, HTN, HLD, YOVANI, here for 1 mo LE edema. NAD, w mild pitting edema in LEs symmetrically w/o erythema or tenderness. Low susp for dvt given minimal risk factors and physical, sx likely 2/2 mild fluid overload though clear lungs. Will check basic labs, possibly increase lasix dose, reassess likely dc Berkowitz: 84f w hx afib on coumadin, ppm, HTN, HLD, YOVANI, here for 1 mo LE edema. NAD, w mild pitting edema, in LEs symmetrically w/o erythema or tenderness. Low susp for dvt given minimal risk factors and physical, sx likely 2/2 mild fluid overload though clear lungs. Will check basic labs, possibly increase lasix dose, reassess likely dc

## 2023-09-28 NOTE — H&P ADULT - PROBLEM SELECTOR PLAN 1
Patient Patient with cough, SOB, tachycardia, and leukocytosis, found to have new 2.6 cm consolidative opacity in the right lower lobe and branching nodular opacity left upper lobe, on CT chest   -F/u with bcx, UA, RVP, MRSA, and urine legionella (all pending collection)  -S/p IV ceftriaxone and azithromycin in the ED. Will continue with ceftriaxone and hold azithromycin for now due to prolonged  QTC   -Monitor SO2

## 2023-10-11 NOTE — ED ADULT NURSE NOTE - PRIMARY CARE PROVIDER
Visit Information    Have you changed or started any medications since your last visit including any over-the-counter medicines, vitamins, or herbal medicines? no   Have you stopped taking any of your medications? Is so, why? -  no  Are you having any side effects from any of your medications? - no    Have you seen any other physician or provider since your last visit?  no   Have you had any other diagnostic tests since your last visit?  no   Have you been seen in the emergency room and/or had an admission in a hospital since we last saw you?  no   Have you had your routine dental cleaning in the past 6 months?  no     Do you have an active MyChart account? If no, what is the barrier?   No: na    Patient Care Team:  RIVER Lawton CNP as PCP - General (Nurse Practitioner)  RIVER Lawton CNP as PCP - Empaneled Provider  Pau Tran MD as Consulting Physician (Pain Management)    Medical History Review  Past Medical, Family, and Social History reviewed and  contribute to the patient presenting condition    Health Maintenance   Topic Date Due    HIV screen  Never done    DTaP/Tdap/Td vaccine (1 - Tdap) Never done    Colorectal Cancer Screen  Never done    Shingles vaccine (1 of 2) Never done    Pneumococcal 0-64 years Vaccine (2 - PCV) 08/16/2018    COVID-19 Vaccine (2 - Booster for Alec series) 07/07/2021    Flu vaccine (1) 08/01/2023    Annual Wellness Visit (AWV)  02/21/2024    Lipids  06/29/2024    Depression Monitoring  09/25/2024    Diabetes screen  06/29/2026    Hepatitis C screen  Completed    Hepatitis A vaccine  Aged Out    Hepatitis B vaccine  Aged Out    Hib vaccine  Aged Out    Meningococcal (ACWY) vaccine  Aged Out    Low dose CT lung screening &/or counseling  Discontinued    Prostate Specific Antigen (PSA) Screening or Monitoring  Discontinued
unknown
focal deficit present. Mental Status: He is alert and oriented to person, place, and time. Mental status is at baseline. Motor: No weakness. Gait: Gait normal.   Psychiatric:         Mood and Affect: Mood normal.         Behavior: Behavior normal.         Thought Content: Thought content normal.         Judgment: Judgment normal.         Assessment:       Diagnosis Orders   1. Anxiety and depression        2. Stress at home        3. Mood swings        4. Encounter for screening involving social determinants of health (SDoH)  [facility] Referral for Social Determinants of Health (Primary Care Practices)        Wt Readings from Last 3 Encounters:   10/11/23 205 lb 6.4 oz (93.2 kg)   09/24/23 206 lb 12.7 oz (93.8 kg)   09/18/23 208 lb 9.6 oz (94.6 kg)     BP Readings from Last 3 Encounters:   10/11/23 136/70   09/25/23 120/70   09/18/23 (!) 156/88       Chemistry        Component Value Date/Time     09/25/2023 0810    K 3.7 09/25/2023 0810     09/25/2023 0810    CO2 32 (H) 09/25/2023 0810    BUN 9 09/25/2023 0810    CREATININE 0.8 09/25/2023 0810    CREATININE 0.7 07/15/2020 0000        Component Value Date/Time    CALCIUM 9.0 09/25/2023 0810    ALKPHOS 70 09/21/2023 0608    AST 18 09/21/2023 0608    ALT 7 09/21/2023 0608    BILITOT 0.3 09/21/2023 0608        Lab Results   Component Value Date    WBC 6.9 09/23/2023    HGB 12.8 (L) 09/23/2023    HCT 39.0 (L) 09/23/2023    MCV 92.6 09/23/2023     09/23/2023     Lab Results   Component Value Date/Time    MG 1.9 09/25/2023 08:10 AM     Lab Results   Component Value Date    [id number] 161 (L) 09/21/2023         Plan:      Return in about 1 month (around 11/11/2023) for MED CHECK, anxiety/depression recheck. Orders Placed This Encounter   Procedures    [facility] Referral for Social Determinants of Health (Primary Care Practices)     Referral Priority:   Routine     Referral Type:    Other     Referral Reason:   Specialty Services Required

## 2023-10-16 ENCOUNTER — APPOINTMENT (OUTPATIENT)
Dept: CT IMAGING | Facility: IMAGING CENTER | Age: 88
End: 2023-10-16
Payer: MEDICARE

## 2023-10-16 ENCOUNTER — OUTPATIENT (OUTPATIENT)
Dept: OUTPATIENT SERVICES | Facility: HOSPITAL | Age: 88
LOS: 1 days | End: 2023-10-16
Payer: MEDICARE

## 2023-10-16 DIAGNOSIS — R91.8 OTHER NONSPECIFIC ABNORMAL FINDING OF LUNG FIELD: ICD-10-CM

## 2023-10-16 DIAGNOSIS — T14.8 OTHER INJURY OF UNSPECIFIED BODY REGION: Chronic | ICD-10-CM

## 2023-10-16 DIAGNOSIS — Z84.89 FAMILY HISTORY OF OTHER SPECIFIED CONDITIONS: Chronic | ICD-10-CM

## 2023-10-16 DIAGNOSIS — Z95.0 PRESENCE OF CARDIAC PACEMAKER: Chronic | ICD-10-CM

## 2023-10-16 DIAGNOSIS — Z82.8 FAMILY HISTORY OF OTHER DISABILITIES AND CHRONIC DISEASES LEADING TO DISABLEMENT, NOT ELSEWHERE CLASSIFIED: Chronic | ICD-10-CM

## 2023-10-16 DIAGNOSIS — H26.9 UNSPECIFIED CATARACT: Chronic | ICD-10-CM

## 2023-10-16 DIAGNOSIS — Z90.710 ACQUIRED ABSENCE OF BOTH CERVIX AND UTERUS: Chronic | ICD-10-CM

## 2023-10-16 PROCEDURE — 71250 CT THORAX DX C-: CPT

## 2023-10-16 PROCEDURE — 71250 CT THORAX DX C-: CPT | Mod: 26,MH

## 2023-10-24 ENCOUNTER — APPOINTMENT (OUTPATIENT)
Dept: PULMONOLOGY | Facility: CLINIC | Age: 88
End: 2023-10-24
Payer: MEDICARE

## 2023-10-24 VITALS
TEMPERATURE: 97 F | WEIGHT: 183 LBS | HEIGHT: 62 IN | OXYGEN SATURATION: 96 % | SYSTOLIC BLOOD PRESSURE: 127 MMHG | HEART RATE: 88 BPM | DIASTOLIC BLOOD PRESSURE: 88 MMHG | BODY MASS INDEX: 33.68 KG/M2 | RESPIRATION RATE: 15 BRPM

## 2023-10-24 DIAGNOSIS — J45.909 UNSPECIFIED ASTHMA, UNCOMPLICATED: ICD-10-CM

## 2023-10-24 PROCEDURE — 99214 OFFICE O/P EST MOD 30 MIN: CPT

## 2023-10-25 NOTE — ASSESSMENT
[FreeTextEntry1] : Ms. Felix is an 87 y.o. F with a history of a-fib, CHF, and CVA presents today with CT scan findings of a bladder mass. The mass is enhancing but appears to extend beyond the bladder, which could represent an extrinic mass or abnormal pathology besides UCC. We should directly inspect this with a cystoscopy. We discussed that if this is consistent with malignancy on direct visualization with cystoscopy, the next step would be to proceed to the operating room for transurethral resection of the bladder tumor.  We discussed the procedure in great detail, including the operative steps, risks and benefits, including infection, bleeding, damage to surrounding structures, need for a prolonged catheter.  We described that this is both diagnostic and therapeutic.  I answered all Ms. Dozier's and her daughter's questions. Post-Care Instructions: I reviewed with the patient in detail post-care instructions. Patient is not to engage in any heavy lifting, exercise, or swimming for the next few weeks. Should the patient develop any redness, swelling, fevers, chills, bleeding, severe pain patient will contact the office immediately.

## 2023-10-26 PROBLEM — J45.909 ASTHMA: Status: ACTIVE | Noted: 2023-09-13

## 2023-11-02 ENCOUNTER — APPOINTMENT (OUTPATIENT)
Dept: PULMONOLOGY | Facility: CLINIC | Age: 88
End: 2023-11-02

## 2023-12-12 ENCOUNTER — OUTPATIENT (OUTPATIENT)
Dept: OUTPATIENT SERVICES | Facility: HOSPITAL | Age: 88
LOS: 1 days | Discharge: HOME | End: 2023-12-12

## 2023-12-12 DIAGNOSIS — Z82.8 FAMILY HISTORY OF OTHER DISABILITIES AND CHRONIC DISEASES LEADING TO DISABLEMENT, NOT ELSEWHERE CLASSIFIED: Chronic | ICD-10-CM

## 2023-12-12 DIAGNOSIS — Z90.710 ACQUIRED ABSENCE OF BOTH CERVIX AND UTERUS: Chronic | ICD-10-CM

## 2023-12-12 DIAGNOSIS — Z84.89 FAMILY HISTORY OF OTHER SPECIFIED CONDITIONS: Chronic | ICD-10-CM

## 2023-12-12 DIAGNOSIS — C85.10 UNSPECIFIED B-CELL LYMPHOMA, UNSPECIFIED SITE: ICD-10-CM

## 2023-12-12 DIAGNOSIS — H26.9 UNSPECIFIED CATARACT: Chronic | ICD-10-CM

## 2023-12-12 DIAGNOSIS — Z95.0 PRESENCE OF CARDIAC PACEMAKER: Chronic | ICD-10-CM

## 2023-12-12 DIAGNOSIS — T14.8 OTHER INJURY OF UNSPECIFIED BODY REGION: Chronic | ICD-10-CM

## 2023-12-22 ENCOUNTER — NON-APPOINTMENT (OUTPATIENT)
Age: 88
End: 2023-12-22

## 2024-01-01 ENCOUNTER — INPATIENT (INPATIENT)
Facility: HOSPITAL | Age: 89
LOS: 1 days | DRG: 872 | End: 2024-08-22
Attending: SPECIALIST | Admitting: SPECIALIST
Payer: MEDICARE

## 2024-01-01 VITALS
TEMPERATURE: 100 F | OXYGEN SATURATION: 100 % | WEIGHT: 240.08 LBS | HEART RATE: 127 BPM | HEIGHT: 66 IN | RESPIRATION RATE: 20 BRPM

## 2024-01-01 VITALS — OXYGEN SATURATION: 100 %

## 2024-01-01 DIAGNOSIS — H26.9 UNSPECIFIED CATARACT: Chronic | ICD-10-CM

## 2024-01-01 DIAGNOSIS — Z84.89 FAMILY HISTORY OF OTHER SPECIFIED CONDITIONS: Chronic | ICD-10-CM

## 2024-01-01 DIAGNOSIS — Z95.0 PRESENCE OF CARDIAC PACEMAKER: Chronic | ICD-10-CM

## 2024-01-01 DIAGNOSIS — Z82.8 FAMILY HISTORY OF OTHER DISABILITIES AND CHRONIC DISEASES LEADING TO DISABLEMENT, NOT ELSEWHERE CLASSIFIED: Chronic | ICD-10-CM

## 2024-01-01 DIAGNOSIS — Z90.710 ACQUIRED ABSENCE OF BOTH CERVIX AND UTERUS: Chronic | ICD-10-CM

## 2024-01-01 DIAGNOSIS — T14.8 OTHER INJURY OF UNSPECIFIED BODY REGION: Chronic | ICD-10-CM

## 2024-01-01 DIAGNOSIS — A41.9 SEPSIS, UNSPECIFIED ORGANISM: ICD-10-CM

## 2024-01-01 LAB
ALBUMIN SERPL ELPH-MCNC: 2.3 G/DL — LOW (ref 3.3–5)
ALBUMIN SERPL ELPH-MCNC: 2.5 G/DL — LOW (ref 3.3–5)
ALBUMIN SERPL ELPH-MCNC: 2.5 G/DL — LOW (ref 3.3–5)
ALBUMIN SERPL ELPH-MCNC: 2.6 G/DL — LOW (ref 3.3–5)
ALP SERPL-CCNC: 79 U/L — SIGNIFICANT CHANGE UP (ref 40–120)
ALP SERPL-CCNC: 79 U/L — SIGNIFICANT CHANGE UP (ref 40–120)
ALP SERPL-CCNC: 94 U/L — SIGNIFICANT CHANGE UP (ref 40–120)
ALP SERPL-CCNC: 95 U/L — SIGNIFICANT CHANGE UP (ref 40–120)
ALT FLD-CCNC: 26 U/L — SIGNIFICANT CHANGE UP (ref 10–45)
ALT FLD-CCNC: 28 U/L — SIGNIFICANT CHANGE UP (ref 10–45)
ALT FLD-CCNC: 32 U/L — SIGNIFICANT CHANGE UP (ref 10–45)
ALT FLD-CCNC: 37 U/L — SIGNIFICANT CHANGE UP (ref 10–45)
ANION GAP SERPL CALC-SCNC: 18 MMOL/L — HIGH (ref 5–17)
ANION GAP SERPL CALC-SCNC: 19 MMOL/L — HIGH (ref 5–17)
ANION GAP SERPL CALC-SCNC: 20 MMOL/L — HIGH (ref 5–17)
ANION GAP SERPL CALC-SCNC: 22 MMOL/L — HIGH (ref 5–17)
ANISOCYTOSIS BLD QL: SLIGHT — SIGNIFICANT CHANGE UP
APPEARANCE UR: ABNORMAL
APTT BLD: 28.9 SEC — SIGNIFICANT CHANGE UP (ref 24.5–35.6)
APTT BLD: 31.4 SEC — SIGNIFICANT CHANGE UP (ref 24.5–35.6)
APTT BLD: 34.5 SEC — SIGNIFICANT CHANGE UP (ref 24.5–35.6)
APTT BLD: 36 SEC — HIGH (ref 24.5–35.6)
AST SERPL-CCNC: 20 U/L — SIGNIFICANT CHANGE UP (ref 10–40)
AST SERPL-CCNC: 29 U/L — SIGNIFICANT CHANGE UP (ref 10–40)
AST SERPL-CCNC: 38 U/L — SIGNIFICANT CHANGE UP (ref 10–40)
AST SERPL-CCNC: 57 U/L — HIGH (ref 10–40)
BACTERIA # UR AUTO: ABNORMAL /HPF
BASOPHILS # BLD AUTO: 0 K/UL — SIGNIFICANT CHANGE UP (ref 0–0.2)
BASOPHILS # BLD AUTO: 0.09 K/UL — SIGNIFICANT CHANGE UP (ref 0–0.2)
BASOPHILS # BLD AUTO: 0.13 K/UL — SIGNIFICANT CHANGE UP (ref 0–0.2)
BASOPHILS NFR BLD AUTO: 0 % — SIGNIFICANT CHANGE UP (ref 0–2)
BASOPHILS NFR BLD AUTO: 0.4 % — SIGNIFICANT CHANGE UP (ref 0–2)
BASOPHILS NFR BLD AUTO: 0.6 % — SIGNIFICANT CHANGE UP (ref 0–2)
BILIRUB SERPL-MCNC: 1.4 MG/DL — HIGH (ref 0.2–1.2)
BILIRUB SERPL-MCNC: 1.6 MG/DL — HIGH (ref 0.2–1.2)
BILIRUB SERPL-MCNC: 2 MG/DL — HIGH (ref 0.2–1.2)
BILIRUB SERPL-MCNC: 2.1 MG/DL — HIGH (ref 0.2–1.2)
BILIRUB UR-MCNC: NEGATIVE — SIGNIFICANT CHANGE UP
BUN SERPL-MCNC: 69 MG/DL — HIGH (ref 7–23)
BUN SERPL-MCNC: 69 MG/DL — HIGH (ref 7–23)
BUN SERPL-MCNC: 71 MG/DL — HIGH (ref 7–23)
BUN SERPL-MCNC: 74 MG/DL — HIGH (ref 7–23)
CA-I BLD-SCNC: 1.02 MMOL/L — LOW (ref 1.15–1.33)
CALCIUM SERPL-MCNC: 7 MG/DL — LOW (ref 8.4–10.5)
CALCIUM SERPL-MCNC: 7.3 MG/DL — LOW (ref 8.4–10.5)
CALCIUM SERPL-MCNC: 7.6 MG/DL — LOW (ref 8.4–10.5)
CALCIUM SERPL-MCNC: 7.7 MG/DL — LOW (ref 8.4–10.5)
CAST: 58 /LPF — HIGH (ref 0–4)
CHLORIDE SERPL-SCNC: 101 MMOL/L — SIGNIFICANT CHANGE UP (ref 96–108)
CHLORIDE SERPL-SCNC: 102 MMOL/L — SIGNIFICANT CHANGE UP (ref 96–108)
CHLORIDE SERPL-SCNC: 102 MMOL/L — SIGNIFICANT CHANGE UP (ref 96–108)
CHLORIDE SERPL-SCNC: 96 MMOL/L — SIGNIFICANT CHANGE UP (ref 96–108)
CK SERPL-CCNC: 42 U/L — SIGNIFICANT CHANGE UP (ref 25–170)
CK SERPL-CCNC: 57 U/L — SIGNIFICANT CHANGE UP (ref 25–170)
CO2 SERPL-SCNC: 16 MMOL/L — LOW (ref 22–31)
CO2 SERPL-SCNC: 17 MMOL/L — LOW (ref 22–31)
CO2 SERPL-SCNC: 19 MMOL/L — LOW (ref 22–31)
CO2 SERPL-SCNC: 21 MMOL/L — LOW (ref 22–31)
COLOR SPEC: SIGNIFICANT CHANGE UP
CREAT SERPL-MCNC: 1.49 MG/DL — HIGH (ref 0.5–1.3)
CREAT SERPL-MCNC: 1.54 MG/DL — HIGH (ref 0.5–1.3)
CREAT SERPL-MCNC: 1.67 MG/DL — HIGH (ref 0.5–1.3)
CREAT SERPL-MCNC: 1.83 MG/DL — HIGH (ref 0.5–1.3)
CULTURE RESULTS: SIGNIFICANT CHANGE UP
DIFF PNL FLD: ABNORMAL
EGFR: 26 ML/MIN/1.73M2 — LOW
EGFR: 29 ML/MIN/1.73M2 — LOW
EGFR: 32 ML/MIN/1.73M2 — LOW
EGFR: 33 ML/MIN/1.73M2 — LOW
EOSINOPHIL # BLD AUTO: 0 K/UL — SIGNIFICANT CHANGE UP (ref 0–0.5)
EOSINOPHIL # BLD AUTO: 0.07 K/UL — SIGNIFICANT CHANGE UP (ref 0–0.5)
EOSINOPHIL # BLD AUTO: 1.59 K/UL — HIGH (ref 0–0.5)
EOSINOPHIL NFR BLD AUTO: 0 % — SIGNIFICANT CHANGE UP (ref 0–6)
EOSINOPHIL NFR BLD AUTO: 0.3 % — SIGNIFICANT CHANGE UP (ref 0–6)
EOSINOPHIL NFR BLD AUTO: 6.8 % — HIGH (ref 0–6)
FLUAV AG NPH QL: SIGNIFICANT CHANGE UP
FLUBV AG NPH QL: SIGNIFICANT CHANGE UP
GAS PNL BLDA: SIGNIFICANT CHANGE UP
GAS PNL BLDV: SIGNIFICANT CHANGE UP
GLUCOSE SERPL-MCNC: 112 MG/DL — HIGH (ref 70–99)
GLUCOSE SERPL-MCNC: 123 MG/DL — HIGH (ref 70–99)
GLUCOSE SERPL-MCNC: 129 MG/DL — HIGH (ref 70–99)
GLUCOSE SERPL-MCNC: 229 MG/DL — HIGH (ref 70–99)
GLUCOSE UR QL: NEGATIVE MG/DL — SIGNIFICANT CHANGE UP
HCT VFR BLD CALC: 45 % — SIGNIFICANT CHANGE UP (ref 34.5–45)
HCT VFR BLD CALC: 47.8 % — HIGH (ref 34.5–45)
HCT VFR BLD CALC: 50 % — HIGH (ref 34.5–45)
HCT VFR BLD CALC: 50.6 % — HIGH (ref 34.5–45)
HGB BLD-MCNC: 14.5 G/DL — SIGNIFICANT CHANGE UP (ref 11.5–15.5)
HGB BLD-MCNC: 15.3 G/DL — SIGNIFICANT CHANGE UP (ref 11.5–15.5)
HGB BLD-MCNC: 15.8 G/DL — HIGH (ref 11.5–15.5)
HGB BLD-MCNC: 16.1 G/DL — HIGH (ref 11.5–15.5)
HYALINE CASTS # UR AUTO: PRESENT
IMM GRANULOCYTES NFR BLD AUTO: 2.9 % — HIGH (ref 0–0.9)
IMM GRANULOCYTES NFR BLD AUTO: 3.2 % — HIGH (ref 0–0.9)
INR BLD: 1.28 RATIO — HIGH (ref 0.85–1.18)
INR BLD: 1.49 RATIO — HIGH (ref 0.85–1.18)
INR BLD: 1.64 RATIO — HIGH (ref 0.85–1.18)
KETONES UR-MCNC: NEGATIVE MG/DL — SIGNIFICANT CHANGE UP
LEUKOCYTE ESTERASE UR-ACNC: ABNORMAL
LYMPHOCYTES # BLD AUTO: 0.43 K/UL — LOW (ref 1–3.3)
LYMPHOCYTES # BLD AUTO: 1.1 K/UL — SIGNIFICANT CHANGE UP (ref 1–3.3)
LYMPHOCYTES # BLD AUTO: 1.65 K/UL — SIGNIFICANT CHANGE UP (ref 1–3.3)
LYMPHOCYTES # BLD AUTO: 1.7 % — LOW (ref 13–44)
LYMPHOCYTES # BLD AUTO: 4.7 % — LOW (ref 13–44)
LYMPHOCYTES # BLD AUTO: 6.3 % — LOW (ref 13–44)
MACROCYTES BLD QL: SLIGHT — SIGNIFICANT CHANGE UP
MAGNESIUM SERPL-MCNC: 1.2 MG/DL — LOW (ref 1.6–2.6)
MAGNESIUM SERPL-MCNC: 1.5 MG/DL — LOW (ref 1.6–2.6)
MAGNESIUM SERPL-MCNC: 1.9 MG/DL — SIGNIFICANT CHANGE UP (ref 1.6–2.6)
MAGNESIUM SERPL-MCNC: 2.2 MG/DL — SIGNIFICANT CHANGE UP (ref 1.6–2.6)
MANUAL SMEAR VERIFICATION: SIGNIFICANT CHANGE UP
MCHC RBC-ENTMCNC: 30 PG — SIGNIFICANT CHANGE UP (ref 27–34)
MCHC RBC-ENTMCNC: 30.5 PG — SIGNIFICANT CHANGE UP (ref 27–34)
MCHC RBC-ENTMCNC: 30.7 PG — SIGNIFICANT CHANGE UP (ref 27–34)
MCHC RBC-ENTMCNC: 31 PG — SIGNIFICANT CHANGE UP (ref 27–34)
MCHC RBC-ENTMCNC: 31.6 GM/DL — LOW (ref 32–36)
MCHC RBC-ENTMCNC: 31.8 GM/DL — LOW (ref 32–36)
MCHC RBC-ENTMCNC: 32 GM/DL — SIGNIFICANT CHANGE UP (ref 32–36)
MCHC RBC-ENTMCNC: 32.2 GM/DL — SIGNIFICANT CHANGE UP (ref 32–36)
MCV RBC AUTO: 94.4 FL — SIGNIFICANT CHANGE UP (ref 80–100)
MCV RBC AUTO: 94.7 FL — SIGNIFICANT CHANGE UP (ref 80–100)
MCV RBC AUTO: 96.8 FL — SIGNIFICANT CHANGE UP (ref 80–100)
MCV RBC AUTO: 97.1 FL — SIGNIFICANT CHANGE UP (ref 80–100)
MONOCYTES # BLD AUTO: 0.61 K/UL — SIGNIFICANT CHANGE UP (ref 0–0.9)
MONOCYTES # BLD AUTO: 1.17 K/UL — HIGH (ref 0–0.9)
MONOCYTES # BLD AUTO: 1.18 K/UL — HIGH (ref 0–0.9)
MONOCYTES NFR BLD AUTO: 2.4 % — SIGNIFICANT CHANGE UP (ref 2–14)
MONOCYTES NFR BLD AUTO: 4.5 % — SIGNIFICANT CHANGE UP (ref 2–14)
MONOCYTES NFR BLD AUTO: 5 % — SIGNIFICANT CHANGE UP (ref 2–14)
MRSA PCR RESULT.: SIGNIFICANT CHANGE UP
NEUTROPHILS # BLD AUTO: 18.87 K/UL — HIGH (ref 1.8–7.4)
NEUTROPHILS # BLD AUTO: 23.32 K/UL — HIGH (ref 1.8–7.4)
NEUTROPHILS # BLD AUTO: 23.36 K/UL — HIGH (ref 1.8–7.4)
NEUTROPHILS NFR BLD AUTO: 80 % — HIGH (ref 43–77)
NEUTROPHILS NFR BLD AUTO: 87.4 % — HIGH (ref 43–77)
NEUTROPHILS NFR BLD AUTO: 92 % — HIGH (ref 43–77)
NEUTS BAND # BLD: 1.8 % — SIGNIFICANT CHANGE UP (ref 0–8)
NITRITE UR-MCNC: NEGATIVE — SIGNIFICANT CHANGE UP
NRBC # BLD: 0 /100 WBCS — SIGNIFICANT CHANGE UP (ref 0–0)
OVALOCYTES BLD QL SMEAR: SLIGHT — SIGNIFICANT CHANGE UP
PAPPENHEIMER BOD BLD QL SMEAR: PRESENT — SIGNIFICANT CHANGE UP
PH UR: 5.5 — SIGNIFICANT CHANGE UP (ref 5–8)
PHOSPHATE SERPL-MCNC: 5.3 MG/DL — HIGH (ref 2.5–4.5)
PHOSPHATE SERPL-MCNC: 5.5 MG/DL — HIGH (ref 2.5–4.5)
PHOSPHATE SERPL-MCNC: 5.8 MG/DL — HIGH (ref 2.5–4.5)
PHOSPHATE SERPL-MCNC: 6.2 MG/DL — HIGH (ref 2.5–4.5)
PLAT MORPH BLD: ABNORMAL
PLATELET # BLD AUTO: 114 K/UL — LOW (ref 150–400)
PLATELET # BLD AUTO: 121 K/UL — LOW (ref 150–400)
PLATELET # BLD AUTO: 125 K/UL — LOW (ref 150–400)
PLATELET # BLD AUTO: 136 K/UL — LOW (ref 150–400)
POIKILOCYTOSIS BLD QL AUTO: SLIGHT — SIGNIFICANT CHANGE UP
POLYCHROMASIA BLD QL SMEAR: SLIGHT — SIGNIFICANT CHANGE UP
POTASSIUM SERPL-MCNC: 3.6 MMOL/L — SIGNIFICANT CHANGE UP (ref 3.5–5.3)
POTASSIUM SERPL-MCNC: 3.6 MMOL/L — SIGNIFICANT CHANGE UP (ref 3.5–5.3)
POTASSIUM SERPL-MCNC: 4.1 MMOL/L — SIGNIFICANT CHANGE UP (ref 3.5–5.3)
POTASSIUM SERPL-MCNC: 5.6 MMOL/L — HIGH (ref 3.5–5.3)
POTASSIUM SERPL-SCNC: 3.6 MMOL/L — SIGNIFICANT CHANGE UP (ref 3.5–5.3)
POTASSIUM SERPL-SCNC: 3.6 MMOL/L — SIGNIFICANT CHANGE UP (ref 3.5–5.3)
POTASSIUM SERPL-SCNC: 4.1 MMOL/L — SIGNIFICANT CHANGE UP (ref 3.5–5.3)
POTASSIUM SERPL-SCNC: 5.6 MMOL/L — HIGH (ref 3.5–5.3)
PROCALCITONIN SERPL-MCNC: 0.64 NG/ML — HIGH (ref 0.02–0.1)
PROT SERPL-MCNC: 4.2 G/DL — LOW (ref 6–8.3)
PROT SERPL-MCNC: 4.3 G/DL — LOW (ref 6–8.3)
PROT SERPL-MCNC: 4.4 G/DL — LOW (ref 6–8.3)
PROT SERPL-MCNC: 5.2 G/DL — LOW (ref 6–8.3)
PROT UR-MCNC: 30 MG/DL
PROTHROM AB SERPL-ACNC: 14 SEC — HIGH (ref 9.5–13)
PROTHROM AB SERPL-ACNC: 15.5 SEC — HIGH (ref 9.5–13)
PROTHROM AB SERPL-ACNC: 17 SEC — HIGH (ref 9.5–13)
RBC # BLD: 4.75 M/UL — SIGNIFICANT CHANGE UP (ref 3.8–5.2)
RBC # BLD: 4.94 M/UL — SIGNIFICANT CHANGE UP (ref 3.8–5.2)
RBC # BLD: 5.15 M/UL — SIGNIFICANT CHANGE UP (ref 3.8–5.2)
RBC # BLD: 5.36 M/UL — HIGH (ref 3.8–5.2)
RBC # FLD: 16.8 % — HIGH (ref 10.3–14.5)
RBC # FLD: 17.2 % — HIGH (ref 10.3–14.5)
RBC # FLD: 17.3 % — HIGH (ref 10.3–14.5)
RBC # FLD: 17.4 % — HIGH (ref 10.3–14.5)
RBC BLD AUTO: ABNORMAL
RBC CASTS # UR COMP ASSIST: 5 /HPF — HIGH (ref 0–4)
REVIEW: SIGNIFICANT CHANGE UP
RSV RNA NPH QL NAA+NON-PROBE: SIGNIFICANT CHANGE UP
S AUREUS DNA NOSE QL NAA+PROBE: SIGNIFICANT CHANGE UP
SARS-COV-2 RNA SPEC QL NAA+PROBE: SIGNIFICANT CHANGE UP
SODIUM SERPL-SCNC: 137 MMOL/L — SIGNIFICANT CHANGE UP (ref 135–145)
SODIUM SERPL-SCNC: 138 MMOL/L — SIGNIFICANT CHANGE UP (ref 135–145)
SODIUM SERPL-SCNC: 139 MMOL/L — SIGNIFICANT CHANGE UP (ref 135–145)
SODIUM SERPL-SCNC: 139 MMOL/L — SIGNIFICANT CHANGE UP (ref 135–145)
SP GR SPEC: 1.02 — SIGNIFICANT CHANGE UP (ref 1–1.03)
SPECIMEN SOURCE: SIGNIFICANT CHANGE UP
SQUAMOUS # UR AUTO: 12 /HPF — HIGH (ref 0–5)
T4 FREE SERPL-MCNC: 1.9 NG/DL — HIGH (ref 0.9–1.8)
TROPONIN T, HIGH SENSITIVITY RESULT: 58 NG/L — HIGH (ref 0–51)
TSH SERPL-MCNC: 1.3 UIU/ML — SIGNIFICANT CHANGE UP (ref 0.27–4.2)
UROBILINOGEN FLD QL: 1 MG/DL — SIGNIFICANT CHANGE UP (ref 0.2–1)
WBC # BLD: 23.54 K/UL — HIGH (ref 3.8–10.5)
WBC # BLD: 24.43 K/UL — HIGH (ref 3.8–10.5)
WBC # BLD: 25.37 K/UL — HIGH (ref 3.8–10.5)
WBC # BLD: 26.14 K/UL — HIGH (ref 3.8–10.5)
WBC # FLD AUTO: 23.54 K/UL — HIGH (ref 3.8–10.5)
WBC # FLD AUTO: 24.43 K/UL — HIGH (ref 3.8–10.5)
WBC # FLD AUTO: 25.37 K/UL — HIGH (ref 3.8–10.5)
WBC # FLD AUTO: 26.14 K/UL — HIGH (ref 3.8–10.5)
WBC UR QL: 27 /HPF — HIGH (ref 0–5)

## 2024-01-01 PROCEDURE — 84439 ASSAY OF FREE THYROXINE: CPT

## 2024-01-01 PROCEDURE — 93308 TTE F-UP OR LMTD: CPT

## 2024-01-01 PROCEDURE — 83605 ASSAY OF LACTIC ACID: CPT

## 2024-01-01 PROCEDURE — 93306 TTE W/DOPPLER COMPLETE: CPT | Mod: 26

## 2024-01-01 PROCEDURE — P9047: CPT

## 2024-01-01 PROCEDURE — 86850 RBC ANTIBODY SCREEN: CPT

## 2024-01-01 PROCEDURE — 71045 X-RAY EXAM CHEST 1 VIEW: CPT

## 2024-01-01 PROCEDURE — 96374 THER/PROPH/DIAG INJ IV PUSH: CPT

## 2024-01-01 PROCEDURE — 81001 URINALYSIS AUTO W/SCOPE: CPT

## 2024-01-01 PROCEDURE — 84443 ASSAY THYROID STIM HORMONE: CPT

## 2024-01-01 PROCEDURE — 86900 BLOOD TYPING SEROLOGIC ABO: CPT

## 2024-01-01 PROCEDURE — 84132 ASSAY OF SERUM POTASSIUM: CPT

## 2024-01-01 PROCEDURE — 84145 PROCALCITONIN (PCT): CPT

## 2024-01-01 PROCEDURE — 86901 BLOOD TYPING SEROLOGIC RH(D): CPT

## 2024-01-01 PROCEDURE — 85730 THROMBOPLASTIN TIME PARTIAL: CPT

## 2024-01-01 PROCEDURE — 94660 CPAP INITIATION&MGMT: CPT

## 2024-01-01 PROCEDURE — 96376 TX/PRO/DX INJ SAME DRUG ADON: CPT

## 2024-01-01 PROCEDURE — 99291 CRITICAL CARE FIRST HOUR: CPT

## 2024-01-01 PROCEDURE — 93010 ELECTROCARDIOGRAM REPORT: CPT

## 2024-01-01 PROCEDURE — 93308 TTE F-UP OR LMTD: CPT | Mod: 26

## 2024-01-01 PROCEDURE — 87640 STAPH A DNA AMP PROBE: CPT

## 2024-01-01 PROCEDURE — 36415 COLL VENOUS BLD VENIPUNCTURE: CPT

## 2024-01-01 PROCEDURE — 36620 INSERTION CATHETER ARTERY: CPT | Mod: GC

## 2024-01-01 PROCEDURE — 96375 TX/PRO/DX INJ NEW DRUG ADDON: CPT

## 2024-01-01 PROCEDURE — 85014 HEMATOCRIT: CPT

## 2024-01-01 PROCEDURE — 93306 TTE W/DOPPLER COMPLETE: CPT

## 2024-01-01 PROCEDURE — 85027 COMPLETE CBC AUTOMATED: CPT

## 2024-01-01 PROCEDURE — 87040 BLOOD CULTURE FOR BACTERIA: CPT

## 2024-01-01 PROCEDURE — 84484 ASSAY OF TROPONIN QUANT: CPT

## 2024-01-01 PROCEDURE — 36000 PLACE NEEDLE IN VEIN: CPT | Mod: 59

## 2024-01-01 PROCEDURE — 85025 COMPLETE CBC W/AUTO DIFF WBC: CPT

## 2024-01-01 PROCEDURE — 76937 US GUIDE VASCULAR ACCESS: CPT | Mod: 26

## 2024-01-01 PROCEDURE — 82435 ASSAY OF BLOOD CHLORIDE: CPT

## 2024-01-01 PROCEDURE — 99291 CRITICAL CARE FIRST HOUR: CPT | Mod: FS

## 2024-01-01 PROCEDURE — 87086 URINE CULTURE/COLONY COUNT: CPT

## 2024-01-01 PROCEDURE — 97162 PT EVAL MOD COMPLEX 30 MIN: CPT

## 2024-01-01 PROCEDURE — 93308 TTE F-UP OR LMTD: CPT | Mod: 26,GC

## 2024-01-01 PROCEDURE — 82330 ASSAY OF CALCIUM: CPT

## 2024-01-01 PROCEDURE — 71045 X-RAY EXAM CHEST 1 VIEW: CPT | Mod: 26

## 2024-01-01 PROCEDURE — 82947 ASSAY GLUCOSE BLOOD QUANT: CPT

## 2024-01-01 PROCEDURE — 85018 HEMOGLOBIN: CPT

## 2024-01-01 PROCEDURE — 87637 SARSCOV2&INF A&B&RSV AMP PRB: CPT

## 2024-01-01 PROCEDURE — 82803 BLOOD GASES ANY COMBINATION: CPT

## 2024-01-01 PROCEDURE — 76604 US EXAM CHEST: CPT | Mod: 26,GC

## 2024-01-01 PROCEDURE — 99238 HOSP IP/OBS DSCHRG MGMT 30/<: CPT | Mod: 25

## 2024-01-01 PROCEDURE — 85610 PROTHROMBIN TIME: CPT

## 2024-01-01 PROCEDURE — 84295 ASSAY OF SERUM SODIUM: CPT

## 2024-01-01 PROCEDURE — 82550 ASSAY OF CK (CPK): CPT

## 2024-01-01 PROCEDURE — 87641 MR-STAPH DNA AMP PROBE: CPT

## 2024-01-01 PROCEDURE — 80053 COMPREHEN METABOLIC PANEL: CPT

## 2024-01-01 PROCEDURE — 83735 ASSAY OF MAGNESIUM: CPT

## 2024-01-01 PROCEDURE — 82962 GLUCOSE BLOOD TEST: CPT

## 2024-01-01 PROCEDURE — 93005 ELECTROCARDIOGRAM TRACING: CPT

## 2024-01-01 PROCEDURE — 99291 CRITICAL CARE FIRST HOUR: CPT | Mod: 25

## 2024-01-01 PROCEDURE — 84100 ASSAY OF PHOSPHORUS: CPT

## 2024-01-01 PROCEDURE — 76942 ECHO GUIDE FOR BIOPSY: CPT

## 2024-01-01 RX ORDER — ACETAMINOPHEN 325 MG/1
1000 TABLET ORAL ONCE
Refills: 0 | Status: COMPLETED | OUTPATIENT
Start: 2024-01-01 | End: 2024-01-01

## 2024-01-01 RX ORDER — HEPARIN SODIUM,BOVINE 1000/ML
VIAL (ML) INJECTION
Qty: 25000 | Refills: 0 | Status: DISCONTINUED | OUTPATIENT
Start: 2024-01-01 | End: 2024-01-01

## 2024-01-01 RX ORDER — POLYETHYLENE GLYCOL 3350 17 G/17G
17 POWDER, FOR SOLUTION ORAL
Refills: 0 | DISCHARGE

## 2024-01-01 RX ORDER — PHENYLEPHRINE HYDROCHLORIDE 10 MG/ML
0.2 INJECTION INTRAVENOUS
Qty: 40 | Refills: 0 | Status: DISCONTINUED | OUTPATIENT
Start: 2024-01-01 | End: 2024-01-01

## 2024-01-01 RX ORDER — DIGOXIN 0.12 MG/1
250 TABLET ORAL ONCE
Refills: 0 | Status: COMPLETED | OUTPATIENT
Start: 2024-01-01 | End: 2024-01-01

## 2024-01-01 RX ORDER — HYDROMORPHONE HYDROCHLORIDE 2 MG/1
0.5 TABLET ORAL ONCE
Refills: 0 | Status: DISCONTINUED | OUTPATIENT
Start: 2024-01-01 | End: 2024-01-01

## 2024-01-01 RX ORDER — HYDROMORPHONE HYDROCHLORIDE 2 MG/1
0.2 TABLET ORAL ONCE
Refills: 0 | Status: DISCONTINUED | OUTPATIENT
Start: 2024-01-01 | End: 2024-01-01

## 2024-01-01 RX ORDER — LEVOTHYROXINE SODIUM 100 MCG
18.75 TABLET ORAL AT BEDTIME
Refills: 0 | Status: DISCONTINUED | OUTPATIENT
Start: 2024-01-01 | End: 2024-01-01

## 2024-01-01 RX ORDER — DIGOXIN 0.12 MG/1
250 TABLET ORAL ONCE
Refills: 0 | Status: DISCONTINUED | OUTPATIENT
Start: 2024-01-01 | End: 2024-01-01

## 2024-01-01 RX ORDER — METOPROLOL TARTRATE 100 MG/1
2.5 TABLET ORAL ONCE
Refills: 0 | Status: COMPLETED | OUTPATIENT
Start: 2024-01-01 | End: 2024-01-01

## 2024-01-01 RX ORDER — DIGOXIN 0.12 MG/1
250 TABLET ORAL EVERY 6 HOURS
Refills: 0 | Status: DISCONTINUED | OUTPATIENT
Start: 2024-01-01 | End: 2024-01-01

## 2024-01-01 RX ORDER — LINEZOLID 600 MG/300ML
600 INJECTION INTRAVENOUS ONCE
Refills: 0 | Status: COMPLETED | OUTPATIENT
Start: 2024-01-01 | End: 2024-01-01

## 2024-01-01 RX ORDER — SODIUM CHLORIDE 9 MG/ML
1000 INJECTION INTRAMUSCULAR; INTRAVENOUS; SUBCUTANEOUS ONCE
Refills: 0 | Status: COMPLETED | OUTPATIENT
Start: 2024-01-01 | End: 2024-01-01

## 2024-01-01 RX ORDER — POTASSIUM CHLORIDE 10 MEQ
10 TABLET, EXT RELEASE, PARTICLES/CRYSTALS ORAL
Refills: 0 | Status: COMPLETED | OUTPATIENT
Start: 2024-01-01 | End: 2024-01-01

## 2024-01-01 RX ORDER — PIPERACILLIN SODIUM AND TAZOBACTAM SODIUM 3; .375 G/15ML; G/15ML
3.38 INJECTION, POWDER, FOR SOLUTION INTRAVENOUS EVERY 8 HOURS
Refills: 0 | Status: DISCONTINUED | OUTPATIENT
Start: 2024-01-01 | End: 2024-01-01

## 2024-01-01 RX ORDER — DAPTOMYCIN 500 MG/10ML
450 INJECTION, POWDER, LYOPHILIZED, FOR SOLUTION INTRAVENOUS EVERY 24 HOURS
Refills: 0 | Status: DISCONTINUED | OUTPATIENT
Start: 2024-01-01 | End: 2024-01-01

## 2024-01-01 RX ORDER — OXYCODONE HYDROCHLORIDE 15 MG/1
150 TABLET ORAL ONCE
Refills: 0 | Status: COMPLETED | OUTPATIENT
Start: 2024-01-01 | End: 2024-01-01

## 2024-01-01 RX ORDER — CALCIUM GLUCONATE 61(648) MG
2 TABLET ORAL ONCE
Refills: 0 | Status: COMPLETED | OUTPATIENT
Start: 2024-01-01 | End: 2024-01-01

## 2024-01-01 RX ORDER — VANCOMYCIN/0.9 % SOD CHLORIDE 1.75G/25
1000 PLASTIC BAG, INJECTION (ML) INTRAVENOUS ONCE
Refills: 0 | Status: COMPLETED | OUTPATIENT
Start: 2024-01-01 | End: 2024-01-01

## 2024-01-01 RX ORDER — MEMANTINE 7 MG/1
1 CAPSULE, EXTENDED RELEASE ORAL
Refills: 0 | DISCHARGE

## 2024-01-01 RX ORDER — DOCUSATE CALCIUM 240 MG/1
2 CAPSULE ORAL
Refills: 0 | DISCHARGE

## 2024-01-01 RX ORDER — POTASSIUM CHLORIDE 10 MEQ
1 TABLET, EXT RELEASE, PARTICLES/CRYSTALS ORAL
Refills: 0 | DISCHARGE

## 2024-01-01 RX ORDER — ROPIVACAINE IN 0.9% SOD CHL/PF 0.1 %
0.05 PLASTIC BAG, INJECTION (ML) EPIDURAL
Qty: 8 | Refills: 0 | Status: DISCONTINUED | OUTPATIENT
Start: 2024-01-01 | End: 2024-01-01

## 2024-01-01 RX ORDER — FUROSEMIDE 40 MG
1 TABLET ORAL
Refills: 0 | DISCHARGE

## 2024-01-01 RX ORDER — PIPERACILLIN SODIUM AND TAZOBACTAM SODIUM 3; .375 G/15ML; G/15ML
3.38 INJECTION, POWDER, FOR SOLUTION INTRAVENOUS ONCE
Refills: 0 | Status: COMPLETED | OUTPATIENT
Start: 2024-01-01 | End: 2024-01-01

## 2024-01-01 RX ORDER — ALBUMIN (HUMAN) 5 G/20ML
50 SOLUTION INTRAVENOUS ONCE
Refills: 0 | Status: COMPLETED | OUTPATIENT
Start: 2024-01-01 | End: 2024-01-01

## 2024-01-01 RX ORDER — HEPARIN SODIUM,BOVINE 1000/ML
5000 VIAL (ML) INJECTION EVERY 8 HOURS
Refills: 0 | Status: DISCONTINUED | OUTPATIENT
Start: 2024-01-01 | End: 2024-01-01

## 2024-01-01 RX ORDER — HYDROMORPHONE HYDROCHLORIDE 2 MG/1
0.25 TABLET ORAL EVERY 4 HOURS
Refills: 0 | Status: DISCONTINUED | OUTPATIENT
Start: 2024-01-01 | End: 2024-01-01

## 2024-01-01 RX ADMIN — HYDROMORPHONE HYDROCHLORIDE 0.2 MILLIGRAM(S): 2 TABLET ORAL at 01:51

## 2024-01-01 RX ADMIN — Medication 100 MILLIEQUIVALENT(S): at 06:22

## 2024-01-01 RX ADMIN — OXYCODONE HYDROCHLORIDE 600 MILLIGRAM(S): 15 TABLET ORAL at 01:09

## 2024-01-01 RX ADMIN — PHENYLEPHRINE HYDROCHLORIDE 6.66 MICROGRAM(S)/KG/MIN: 10 INJECTION INTRAVENOUS at 09:45

## 2024-01-01 RX ADMIN — METOPROLOL TARTRATE 2.5 MILLIGRAM(S): 100 TABLET ORAL at 16:33

## 2024-01-01 RX ADMIN — Medication 2000 UNIT(S)/HR: at 04:17

## 2024-01-01 RX ADMIN — Medication 5000 UNIT(S): at 13:28

## 2024-01-01 RX ADMIN — DIGOXIN 250 MICROGRAM(S): 0.12 TABLET ORAL at 21:40

## 2024-01-01 RX ADMIN — Medication 1500 MILLILITER(S): at 06:10

## 2024-01-01 RX ADMIN — HYDROMORPHONE HYDROCHLORIDE 0.25 MILLIGRAM(S): 2 TABLET ORAL at 09:28

## 2024-01-01 RX ADMIN — Medication 2000 MILLILITER(S): at 23:15

## 2024-01-01 RX ADMIN — PIPERACILLIN SODIUM AND TAZOBACTAM SODIUM 25 GRAM(S): 3; .375 INJECTION, POWDER, FOR SOLUTION INTRAVENOUS at 05:36

## 2024-01-01 RX ADMIN — DAPTOMYCIN 118 MILLIGRAM(S): 500 INJECTION, POWDER, LYOPHILIZED, FOR SOLUTION INTRAVENOUS at 05:23

## 2024-01-01 RX ADMIN — PHENYLEPHRINE HYDROCHLORIDE 6.66 MICROGRAM(S)/KG/MIN: 10 INJECTION INTRAVENOUS at 11:09

## 2024-01-01 RX ADMIN — Medication 25 GRAM(S): at 22:53

## 2024-01-01 RX ADMIN — HYDROMORPHONE HYDROCHLORIDE 0.5 MILLIGRAM(S): 2 TABLET ORAL at 04:17

## 2024-01-01 RX ADMIN — Medication 10.2 MICROGRAM(S)/KG/MIN: at 16:10

## 2024-01-01 RX ADMIN — OXYCODONE HYDROCHLORIDE 600 MILLIGRAM(S): 15 TABLET ORAL at 17:54

## 2024-01-01 RX ADMIN — OXYCODONE HYDROCHLORIDE 600 MILLIGRAM(S): 15 TABLET ORAL at 02:04

## 2024-01-01 RX ADMIN — Medication 18.75 MICROGRAM(S): at 01:51

## 2024-01-01 RX ADMIN — Medication 100 MILLIEQUIVALENT(S): at 08:39

## 2024-01-01 RX ADMIN — PIPERACILLIN SODIUM AND TAZOBACTAM SODIUM 25 GRAM(S): 3; .375 INJECTION, POWDER, FOR SOLUTION INTRAVENOUS at 13:28

## 2024-01-01 RX ADMIN — HYDROMORPHONE HYDROCHLORIDE 0.5 MILLIGRAM(S): 2 TABLET ORAL at 04:32

## 2024-01-01 RX ADMIN — PIPERACILLIN SODIUM AND TAZOBACTAM SODIUM 200 GRAM(S): 3; .375 INJECTION, POWDER, FOR SOLUTION INTRAVENOUS at 16:39

## 2024-01-01 RX ADMIN — PIPERACILLIN SODIUM AND TAZOBACTAM SODIUM 25 GRAM(S): 3; .375 INJECTION, POWDER, FOR SOLUTION INTRAVENOUS at 21:41

## 2024-01-01 RX ADMIN — PHENYLEPHRINE HYDROCHLORIDE 6.66 MICROGRAM(S)/KG/MIN: 10 INJECTION INTRAVENOUS at 05:04

## 2024-01-01 RX ADMIN — Medication 10.2 MICROGRAM(S)/KG/MIN: at 07:34

## 2024-01-01 RX ADMIN — HYDROMORPHONE HYDROCHLORIDE 0.25 MILLIGRAM(S): 2 TABLET ORAL at 09:13

## 2024-01-01 RX ADMIN — HYDROMORPHONE HYDROCHLORIDE 0.25 MILLIGRAM(S): 2 TABLET ORAL at 18:58

## 2024-01-01 RX ADMIN — Medication 5000 UNIT(S): at 05:23

## 2024-01-01 RX ADMIN — DAPTOMYCIN 118 MILLIGRAM(S): 500 INJECTION, POWDER, LYOPHILIZED, FOR SOLUTION INTRAVENOUS at 05:04

## 2024-01-01 RX ADMIN — DIGOXIN 250 MICROGRAM(S): 0.12 TABLET ORAL at 20:53

## 2024-01-01 RX ADMIN — Medication 100 MILLIEQUIVALENT(S): at 07:34

## 2024-01-01 RX ADMIN — SODIUM CHLORIDE 1000 MILLILITER(S): 9 INJECTION INTRAMUSCULAR; INTRAVENOUS; SUBCUTANEOUS at 16:40

## 2024-01-01 RX ADMIN — Medication 200 GRAM(S): at 23:15

## 2024-01-01 RX ADMIN — Medication 250 MILLIGRAM(S): at 19:07

## 2024-01-01 RX ADMIN — PIPERACILLIN SODIUM AND TAZOBACTAM SODIUM 25 GRAM(S): 3; .375 INJECTION, POWDER, FOR SOLUTION INTRAVENOUS at 23:00

## 2024-01-01 RX ADMIN — Medication 200 GRAM(S): at 23:00

## 2024-01-01 RX ADMIN — Medication 1600 UNIT(S)/HR: at 21:42

## 2024-01-01 RX ADMIN — Medication 10.2 MICROGRAM(S)/KG/MIN: at 23:01

## 2024-01-01 RX ADMIN — Medication 18.75 MICROGRAM(S): at 21:40

## 2024-01-01 RX ADMIN — Medication 5000 UNIT(S): at 23:00

## 2024-01-01 RX ADMIN — Medication 10.2 MICROGRAM(S)/KG/MIN: at 05:23

## 2024-01-01 RX ADMIN — ACETAMINOPHEN 400 MILLIGRAM(S): 325 TABLET ORAL at 01:52

## 2024-01-01 RX ADMIN — ALBUMIN (HUMAN) 50 MILLILITER(S): 5 SOLUTION INTRAVENOUS at 18:14

## 2024-01-01 RX ADMIN — PIPERACILLIN SODIUM AND TAZOBACTAM SODIUM 25 GRAM(S): 3; .375 INJECTION, POWDER, FOR SOLUTION INTRAVENOUS at 05:59

## 2024-01-01 RX ADMIN — HYDROMORPHONE HYDROCHLORIDE 0.25 MILLIGRAM(S): 2 TABLET ORAL at 19:15

## 2024-01-01 RX ADMIN — ACETAMINOPHEN 1000 MILLIGRAM(S): 325 TABLET ORAL at 02:10

## 2024-01-01 RX ADMIN — METOPROLOL TARTRATE 2.5 MILLIGRAM(S): 100 TABLET ORAL at 17:22

## 2024-01-01 RX ADMIN — Medication 25 GRAM(S): at 01:52

## 2024-01-01 RX ADMIN — DIGOXIN 250 MICROGRAM(S): 0.12 TABLET ORAL at 03:25

## 2024-01-01 RX ADMIN — Medication 25 GRAM(S): at 09:39

## 2024-01-01 RX ADMIN — HYDROMORPHONE HYDROCHLORIDE 0.2 MILLIGRAM(S): 2 TABLET ORAL at 02:10

## 2024-01-01 RX ADMIN — LINEZOLID 300 MILLIGRAM(S): 600 INJECTION INTRAVENOUS at 21:01

## 2024-01-04 ENCOUNTER — INPATIENT (INPATIENT)
Facility: HOSPITAL | Age: 89
LOS: 12 days | Discharge: NURSING, REHAB OR LONG-TERM CARE | DRG: 871 | End: 2024-01-17
Attending: INTERNAL MEDICINE | Admitting: INTERNAL MEDICINE
Payer: MEDICARE

## 2024-01-04 VITALS
WEIGHT: 192.9 LBS | DIASTOLIC BLOOD PRESSURE: 74 MMHG | HEART RATE: 92 BPM | OXYGEN SATURATION: 97 % | RESPIRATION RATE: 20 BRPM | HEIGHT: 62 IN | TEMPERATURE: 98 F | SYSTOLIC BLOOD PRESSURE: 159 MMHG

## 2024-01-04 DIAGNOSIS — Z90.710 ACQUIRED ABSENCE OF BOTH CERVIX AND UTERUS: Chronic | ICD-10-CM

## 2024-01-04 DIAGNOSIS — I50.22 CHRONIC SYSTOLIC (CONGESTIVE) HEART FAILURE: ICD-10-CM

## 2024-01-04 DIAGNOSIS — Z84.89 FAMILY HISTORY OF OTHER SPECIFIED CONDITIONS: Chronic | ICD-10-CM

## 2024-01-04 DIAGNOSIS — T14.8 OTHER INJURY OF UNSPECIFIED BODY REGION: Chronic | ICD-10-CM

## 2024-01-04 DIAGNOSIS — I48.20 CHRONIC ATRIAL FIBRILLATION, UNSPECIFIED: ICD-10-CM

## 2024-01-04 DIAGNOSIS — H26.9 UNSPECIFIED CATARACT: Chronic | ICD-10-CM

## 2024-01-04 DIAGNOSIS — J10.1 INFLUENZA DUE TO OTHER IDENTIFIED INFLUENZA VIRUS WITH OTHER RESPIRATORY MANIFESTATIONS: ICD-10-CM

## 2024-01-04 DIAGNOSIS — A41.9 SEPSIS, UNSPECIFIED ORGANISM: ICD-10-CM

## 2024-01-04 DIAGNOSIS — Z29.9 ENCOUNTER FOR PROPHYLACTIC MEASURES, UNSPECIFIED: ICD-10-CM

## 2024-01-04 DIAGNOSIS — J45.901 UNSPECIFIED ASTHMA WITH (ACUTE) EXACERBATION: ICD-10-CM

## 2024-01-04 DIAGNOSIS — Z82.8 FAMILY HISTORY OF OTHER DISABILITIES AND CHRONIC DISEASES LEADING TO DISABLEMENT, NOT ELSEWHERE CLASSIFIED: Chronic | ICD-10-CM

## 2024-01-04 DIAGNOSIS — J10.00 INFLUENZA DUE TO OTHER IDENTIFIED INFLUENZA VIRUS WITH UNSPECIFIED TYPE OF PNEUMONIA: ICD-10-CM

## 2024-01-04 DIAGNOSIS — Z95.0 PRESENCE OF CARDIAC PACEMAKER: Chronic | ICD-10-CM

## 2024-01-04 LAB
ALBUMIN SERPL ELPH-MCNC: 3.7 G/DL — SIGNIFICANT CHANGE UP (ref 3.3–5)
ALBUMIN SERPL ELPH-MCNC: 3.7 G/DL — SIGNIFICANT CHANGE UP (ref 3.3–5)
ALP SERPL-CCNC: 66 U/L — SIGNIFICANT CHANGE UP (ref 40–120)
ALP SERPL-CCNC: 66 U/L — SIGNIFICANT CHANGE UP (ref 40–120)
ALT FLD-CCNC: 24 U/L — SIGNIFICANT CHANGE UP (ref 10–45)
ALT FLD-CCNC: 24 U/L — SIGNIFICANT CHANGE UP (ref 10–45)
ANION GAP SERPL CALC-SCNC: 16 MMOL/L — SIGNIFICANT CHANGE UP (ref 5–17)
ANION GAP SERPL CALC-SCNC: 16 MMOL/L — SIGNIFICANT CHANGE UP (ref 5–17)
ANISOCYTOSIS BLD QL: SLIGHT — SIGNIFICANT CHANGE UP
ANISOCYTOSIS BLD QL: SLIGHT — SIGNIFICANT CHANGE UP
APTT BLD: 24.3 SEC — LOW (ref 24.5–35.6)
APTT BLD: 24.3 SEC — LOW (ref 24.5–35.6)
AST SERPL-CCNC: 27 U/L — SIGNIFICANT CHANGE UP (ref 10–40)
AST SERPL-CCNC: 27 U/L — SIGNIFICANT CHANGE UP (ref 10–40)
BASE EXCESS BLDV CALC-SCNC: 5.5 MMOL/L — HIGH (ref -2–3)
BASE EXCESS BLDV CALC-SCNC: 5.5 MMOL/L — HIGH (ref -2–3)
BASOPHILS # BLD AUTO: 0.15 K/UL — SIGNIFICANT CHANGE UP (ref 0–0.2)
BASOPHILS # BLD AUTO: 0.15 K/UL — SIGNIFICANT CHANGE UP (ref 0–0.2)
BASOPHILS NFR BLD AUTO: 0.9 % — SIGNIFICANT CHANGE UP (ref 0–2)
BASOPHILS NFR BLD AUTO: 0.9 % — SIGNIFICANT CHANGE UP (ref 0–2)
BILIRUB SERPL-MCNC: 1.2 MG/DL — SIGNIFICANT CHANGE UP (ref 0.2–1.2)
BILIRUB SERPL-MCNC: 1.2 MG/DL — SIGNIFICANT CHANGE UP (ref 0.2–1.2)
BUN SERPL-MCNC: 29 MG/DL — HIGH (ref 7–23)
BUN SERPL-MCNC: 29 MG/DL — HIGH (ref 7–23)
BURR CELLS BLD QL SMEAR: PRESENT — SIGNIFICANT CHANGE UP
BURR CELLS BLD QL SMEAR: PRESENT — SIGNIFICANT CHANGE UP
CA-I SERPL-SCNC: 1.18 MMOL/L — SIGNIFICANT CHANGE UP (ref 1.15–1.33)
CA-I SERPL-SCNC: 1.18 MMOL/L — SIGNIFICANT CHANGE UP (ref 1.15–1.33)
CALCIUM SERPL-MCNC: 9.4 MG/DL — SIGNIFICANT CHANGE UP (ref 8.4–10.5)
CALCIUM SERPL-MCNC: 9.4 MG/DL — SIGNIFICANT CHANGE UP (ref 8.4–10.5)
CHLORIDE BLDV-SCNC: 98 MMOL/L — SIGNIFICANT CHANGE UP (ref 96–108)
CHLORIDE BLDV-SCNC: 98 MMOL/L — SIGNIFICANT CHANGE UP (ref 96–108)
CHLORIDE SERPL-SCNC: 101 MMOL/L — SIGNIFICANT CHANGE UP (ref 96–108)
CHLORIDE SERPL-SCNC: 101 MMOL/L — SIGNIFICANT CHANGE UP (ref 96–108)
CO2 BLDV-SCNC: 32 MMOL/L — HIGH (ref 22–26)
CO2 BLDV-SCNC: 32 MMOL/L — HIGH (ref 22–26)
CO2 SERPL-SCNC: 23 MMOL/L — SIGNIFICANT CHANGE UP (ref 22–31)
CO2 SERPL-SCNC: 23 MMOL/L — SIGNIFICANT CHANGE UP (ref 22–31)
CREAT SERPL-MCNC: 1.03 MG/DL — SIGNIFICANT CHANGE UP (ref 0.5–1.3)
CREAT SERPL-MCNC: 1.03 MG/DL — SIGNIFICANT CHANGE UP (ref 0.5–1.3)
EGFR: 52 ML/MIN/1.73M2 — LOW
EGFR: 52 ML/MIN/1.73M2 — LOW
ELLIPTOCYTES BLD QL SMEAR: SLIGHT — SIGNIFICANT CHANGE UP
ELLIPTOCYTES BLD QL SMEAR: SLIGHT — SIGNIFICANT CHANGE UP
EOSINOPHIL # BLD AUTO: 0 K/UL — SIGNIFICANT CHANGE UP (ref 0–0.5)
EOSINOPHIL # BLD AUTO: 0 K/UL — SIGNIFICANT CHANGE UP (ref 0–0.5)
EOSINOPHIL NFR BLD AUTO: 0 % — SIGNIFICANT CHANGE UP (ref 0–6)
EOSINOPHIL NFR BLD AUTO: 0 % — SIGNIFICANT CHANGE UP (ref 0–6)
FLUAV AG NPH QL: DETECTED
FLUAV AG NPH QL: DETECTED
FLUBV AG NPH QL: SIGNIFICANT CHANGE UP
FLUBV AG NPH QL: SIGNIFICANT CHANGE UP
GAS PNL BLDV: 135 MMOL/L — LOW (ref 136–145)
GAS PNL BLDV: 135 MMOL/L — LOW (ref 136–145)
GAS PNL BLDV: SIGNIFICANT CHANGE UP
GAS PNL BLDV: SIGNIFICANT CHANGE UP
GLUCOSE BLDV-MCNC: 95 MG/DL — SIGNIFICANT CHANGE UP (ref 70–99)
GLUCOSE BLDV-MCNC: 95 MG/DL — SIGNIFICANT CHANGE UP (ref 70–99)
GLUCOSE SERPL-MCNC: 93 MG/DL — SIGNIFICANT CHANGE UP (ref 70–99)
GLUCOSE SERPL-MCNC: 93 MG/DL — SIGNIFICANT CHANGE UP (ref 70–99)
HCO3 BLDV-SCNC: 31 MMOL/L — HIGH (ref 22–29)
HCO3 BLDV-SCNC: 31 MMOL/L — HIGH (ref 22–29)
HCT VFR BLD CALC: 50 % — HIGH (ref 34.5–45)
HCT VFR BLD CALC: 50 % — HIGH (ref 34.5–45)
HCT VFR BLDA CALC: 46 % — SIGNIFICANT CHANGE UP (ref 34.5–46.5)
HCT VFR BLDA CALC: 46 % — SIGNIFICANT CHANGE UP (ref 34.5–46.5)
HGB BLD CALC-MCNC: 15.4 G/DL — SIGNIFICANT CHANGE UP (ref 11.7–16.1)
HGB BLD CALC-MCNC: 15.4 G/DL — SIGNIFICANT CHANGE UP (ref 11.7–16.1)
HGB BLD-MCNC: 15.8 G/DL — HIGH (ref 11.5–15.5)
HGB BLD-MCNC: 15.8 G/DL — HIGH (ref 11.5–15.5)
INR BLD: 1.58 RATIO — HIGH (ref 0.85–1.18)
INR BLD: 1.58 RATIO — HIGH (ref 0.85–1.18)
LACTATE BLDV-MCNC: 2.2 MMOL/L — HIGH (ref 0.5–2)
LACTATE BLDV-MCNC: 2.2 MMOL/L — HIGH (ref 0.5–2)
LACTATE SERPL-SCNC: 1.7 MMOL/L — SIGNIFICANT CHANGE UP (ref 0.5–2)
LACTATE SERPL-SCNC: 1.7 MMOL/L — SIGNIFICANT CHANGE UP (ref 0.5–2)
LYMPHOCYTES # BLD AUTO: 0.45 K/UL — LOW (ref 1–3.3)
LYMPHOCYTES # BLD AUTO: 0.45 K/UL — LOW (ref 1–3.3)
LYMPHOCYTES # BLD AUTO: 2.7 % — LOW (ref 13–44)
LYMPHOCYTES # BLD AUTO: 2.7 % — LOW (ref 13–44)
MANUAL SMEAR VERIFICATION: SIGNIFICANT CHANGE UP
MANUAL SMEAR VERIFICATION: SIGNIFICANT CHANGE UP
MCHC RBC-ENTMCNC: 28.1 PG — SIGNIFICANT CHANGE UP (ref 27–34)
MCHC RBC-ENTMCNC: 28.1 PG — SIGNIFICANT CHANGE UP (ref 27–34)
MCHC RBC-ENTMCNC: 31.6 GM/DL — LOW (ref 32–36)
MCHC RBC-ENTMCNC: 31.6 GM/DL — LOW (ref 32–36)
MCV RBC AUTO: 88.8 FL — SIGNIFICANT CHANGE UP (ref 80–100)
MCV RBC AUTO: 88.8 FL — SIGNIFICANT CHANGE UP (ref 80–100)
MICROCYTES BLD QL: SLIGHT — SIGNIFICANT CHANGE UP
MICROCYTES BLD QL: SLIGHT — SIGNIFICANT CHANGE UP
MONOCYTES # BLD AUTO: 0.77 K/UL — SIGNIFICANT CHANGE UP (ref 0–0.9)
MONOCYTES # BLD AUTO: 0.77 K/UL — SIGNIFICANT CHANGE UP (ref 0–0.9)
MONOCYTES NFR BLD AUTO: 4.6 % — SIGNIFICANT CHANGE UP (ref 2–14)
MONOCYTES NFR BLD AUTO: 4.6 % — SIGNIFICANT CHANGE UP (ref 2–14)
NEUTROPHILS # BLD AUTO: 15.44 K/UL — HIGH (ref 1.8–7.4)
NEUTROPHILS # BLD AUTO: 15.44 K/UL — HIGH (ref 1.8–7.4)
NEUTROPHILS NFR BLD AUTO: 91.8 % — HIGH (ref 43–77)
NEUTROPHILS NFR BLD AUTO: 91.8 % — HIGH (ref 43–77)
OVALOCYTES BLD QL SMEAR: SLIGHT — SIGNIFICANT CHANGE UP
OVALOCYTES BLD QL SMEAR: SLIGHT — SIGNIFICANT CHANGE UP
PCO2 BLDV: 45 MMHG — HIGH (ref 39–42)
PCO2 BLDV: 45 MMHG — HIGH (ref 39–42)
PH BLDV: 7.44 — HIGH (ref 7.32–7.43)
PH BLDV: 7.44 — HIGH (ref 7.32–7.43)
PLAT MORPH BLD: ABNORMAL
PLAT MORPH BLD: ABNORMAL
PLATELET # BLD AUTO: 127 K/UL — LOW (ref 150–400)
PLATELET # BLD AUTO: 127 K/UL — LOW (ref 150–400)
PO2 BLDV: 45 MMHG — SIGNIFICANT CHANGE UP (ref 25–45)
PO2 BLDV: 45 MMHG — SIGNIFICANT CHANGE UP (ref 25–45)
POIKILOCYTOSIS BLD QL AUTO: SLIGHT — SIGNIFICANT CHANGE UP
POIKILOCYTOSIS BLD QL AUTO: SLIGHT — SIGNIFICANT CHANGE UP
POTASSIUM BLDV-SCNC: 3.9 MMOL/L — SIGNIFICANT CHANGE UP (ref 3.5–5.1)
POTASSIUM BLDV-SCNC: 3.9 MMOL/L — SIGNIFICANT CHANGE UP (ref 3.5–5.1)
POTASSIUM SERPL-MCNC: 4.3 MMOL/L — SIGNIFICANT CHANGE UP (ref 3.5–5.3)
POTASSIUM SERPL-MCNC: 4.3 MMOL/L — SIGNIFICANT CHANGE UP (ref 3.5–5.3)
POTASSIUM SERPL-SCNC: 4.3 MMOL/L — SIGNIFICANT CHANGE UP (ref 3.5–5.3)
POTASSIUM SERPL-SCNC: 4.3 MMOL/L — SIGNIFICANT CHANGE UP (ref 3.5–5.3)
PROT SERPL-MCNC: 6.2 G/DL — SIGNIFICANT CHANGE UP (ref 6–8.3)
PROT SERPL-MCNC: 6.2 G/DL — SIGNIFICANT CHANGE UP (ref 6–8.3)
PROTHROM AB SERPL-ACNC: 16.4 SEC — HIGH (ref 9.5–13)
PROTHROM AB SERPL-ACNC: 16.4 SEC — HIGH (ref 9.5–13)
RBC # BLD: 5.63 M/UL — HIGH (ref 3.8–5.2)
RBC # BLD: 5.63 M/UL — HIGH (ref 3.8–5.2)
RBC # FLD: 17.2 % — HIGH (ref 10.3–14.5)
RBC # FLD: 17.2 % — HIGH (ref 10.3–14.5)
RBC BLD AUTO: ABNORMAL
RBC BLD AUTO: ABNORMAL
RSV RNA NPH QL NAA+NON-PROBE: SIGNIFICANT CHANGE UP
RSV RNA NPH QL NAA+NON-PROBE: SIGNIFICANT CHANGE UP
SAO2 % BLDV: 77.3 % — SIGNIFICANT CHANGE UP (ref 67–88)
SAO2 % BLDV: 77.3 % — SIGNIFICANT CHANGE UP (ref 67–88)
SARS-COV-2 RNA SPEC QL NAA+PROBE: SIGNIFICANT CHANGE UP
SARS-COV-2 RNA SPEC QL NAA+PROBE: SIGNIFICANT CHANGE UP
SODIUM SERPL-SCNC: 140 MMOL/L — SIGNIFICANT CHANGE UP (ref 135–145)
SODIUM SERPL-SCNC: 140 MMOL/L — SIGNIFICANT CHANGE UP (ref 135–145)
STOMATOCYTES BLD QL SMEAR: SLIGHT — SIGNIFICANT CHANGE UP
STOMATOCYTES BLD QL SMEAR: SLIGHT — SIGNIFICANT CHANGE UP
TROPONIN T, HIGH SENSITIVITY RESULT: 19 NG/L — SIGNIFICANT CHANGE UP (ref 0–51)
TROPONIN T, HIGH SENSITIVITY RESULT: 19 NG/L — SIGNIFICANT CHANGE UP (ref 0–51)
TROPONIN T, HIGH SENSITIVITY RESULT: 25 NG/L — SIGNIFICANT CHANGE UP (ref 0–51)
TROPONIN T, HIGH SENSITIVITY RESULT: 25 NG/L — SIGNIFICANT CHANGE UP (ref 0–51)
WBC # BLD: 16.82 K/UL — HIGH (ref 3.8–10.5)
WBC # BLD: 16.82 K/UL — HIGH (ref 3.8–10.5)
WBC # FLD AUTO: 16.82 K/UL — HIGH (ref 3.8–10.5)
WBC # FLD AUTO: 16.82 K/UL — HIGH (ref 3.8–10.5)

## 2024-01-04 PROCEDURE — 99223 1ST HOSP IP/OBS HIGH 75: CPT

## 2024-01-04 PROCEDURE — 71250 CT THORAX DX C-: CPT | Mod: 26

## 2024-01-04 PROCEDURE — 71045 X-RAY EXAM CHEST 1 VIEW: CPT | Mod: 26

## 2024-01-04 PROCEDURE — 99285 EMERGENCY DEPT VISIT HI MDM: CPT | Mod: FS

## 2024-01-04 RX ORDER — LOSARTAN POTASSIUM 100 MG/1
25 TABLET, FILM COATED ORAL DAILY
Refills: 0 | Status: DISCONTINUED | OUTPATIENT
Start: 2024-01-04 | End: 2024-01-17

## 2024-01-04 RX ORDER — ATORVASTATIN CALCIUM 80 MG/1
10 TABLET, FILM COATED ORAL AT BEDTIME
Refills: 0 | Status: DISCONTINUED | OUTPATIENT
Start: 2024-01-04 | End: 2024-01-17

## 2024-01-04 RX ORDER — IPRATROPIUM/ALBUTEROL SULFATE 18-103MCG
3 AEROSOL WITH ADAPTER (GRAM) INHALATION EVERY 6 HOURS
Refills: 0 | Status: DISCONTINUED | OUTPATIENT
Start: 2024-01-04 | End: 2024-01-17

## 2024-01-04 RX ORDER — ACETAMINOPHEN 500 MG
1000 TABLET ORAL ONCE
Refills: 0 | Status: COMPLETED | OUTPATIENT
Start: 2024-01-04 | End: 2024-01-04

## 2024-01-04 RX ORDER — EPLERENONE 50 MG/1
50 TABLET, FILM COATED ORAL DAILY
Refills: 0 | Status: DISCONTINUED | OUTPATIENT
Start: 2024-01-05 | End: 2024-01-17

## 2024-01-04 RX ORDER — LEVOTHYROXINE SODIUM 125 MCG
25 TABLET ORAL DAILY
Refills: 0 | Status: DISCONTINUED | OUTPATIENT
Start: 2024-01-04 | End: 2024-01-17

## 2024-01-04 RX ORDER — IPRATROPIUM/ALBUTEROL SULFATE 18-103MCG
3 AEROSOL WITH ADAPTER (GRAM) INHALATION
Refills: 0 | Status: COMPLETED | OUTPATIENT
Start: 2024-01-04 | End: 2024-01-04

## 2024-01-04 RX ORDER — DIPHENHYDRAMINE HCL 50 MG
25 CAPSULE ORAL AT BEDTIME
Refills: 0 | Status: DISCONTINUED | OUTPATIENT
Start: 2024-01-04 | End: 2024-01-07

## 2024-01-04 RX ORDER — BUDESONIDE AND FORMOTEROL FUMARATE DIHYDRATE 160; 4.5 UG/1; UG/1
2 AEROSOL RESPIRATORY (INHALATION)
Refills: 0 | Status: DISCONTINUED | OUTPATIENT
Start: 2024-01-04 | End: 2024-01-17

## 2024-01-04 RX ORDER — ALBUTEROL 90 UG/1
2.5 AEROSOL, METERED ORAL ONCE
Refills: 0 | Status: COMPLETED | OUTPATIENT
Start: 2024-01-04 | End: 2024-01-04

## 2024-01-04 RX ORDER — ATENOLOL 25 MG/1
37.5 TABLET ORAL DAILY
Refills: 0 | Status: DISCONTINUED | OUTPATIENT
Start: 2024-01-04 | End: 2024-01-12

## 2024-01-04 RX ORDER — CEFTRIAXONE 500 MG/1
1000 INJECTION, POWDER, FOR SOLUTION INTRAMUSCULAR; INTRAVENOUS EVERY 24 HOURS
Refills: 0 | Status: COMPLETED | OUTPATIENT
Start: 2024-01-04 | End: 2024-01-08

## 2024-01-04 RX ORDER — APIXABAN 2.5 MG/1
5 TABLET, FILM COATED ORAL EVERY 12 HOURS
Refills: 0 | Status: DISCONTINUED | OUTPATIENT
Start: 2024-01-04 | End: 2024-01-10

## 2024-01-04 RX ADMIN — Medication 3 MILLILITER(S): at 16:14

## 2024-01-04 RX ADMIN — Medication 1000 MILLIGRAM(S): at 17:23

## 2024-01-04 RX ADMIN — Medication 3 MILLILITER(S): at 15:30

## 2024-01-04 RX ADMIN — ALBUTEROL 2.5 MILLIGRAM(S): 90 AEROSOL, METERED ORAL at 18:03

## 2024-01-04 RX ADMIN — Medication 40 MILLIGRAM(S): at 15:28

## 2024-01-04 RX ADMIN — Medication 75 MILLIGRAM(S): at 18:03

## 2024-01-04 RX ADMIN — Medication 3 MILLILITER(S): at 15:00

## 2024-01-04 RX ADMIN — Medication 400 MILLIGRAM(S): at 16:53

## 2024-01-04 NOTE — H&P ADULT - NSICDXPASTMEDICALHX_GEN_ALL_CORE_FT
PAST MEDICAL HISTORY:  Atrial fibrillation dx 7/09 on coumadin    Cerebrovascular accident (CVA)     HLD (hyperlipidemia)     HTN (hypertension)     OA (osteoarthritis)     YOVNAI on CPAP     Thyroid nodule followed by endocrinologist    UTI (urinary tract infection) frequent last was 1/15     PAST MEDICAL HISTORY:  Atrial fibrillation dx 7/09 on coumadin    Cerebrovascular accident (CVA)     HLD (hyperlipidemia)     HTN (hypertension)     OA (osteoarthritis)     YOVANI on CPAP     Thyroid nodule followed by endocrinologist    UTI (urinary tract infection) frequent last was 1/15

## 2024-01-04 NOTE — H&P ADULT - NSHPSOCIALHISTORY_GEN_ALL_CORE
Social History:    Marital Status:   Occupation:   Lives with:    Substance Use (street drugs):   Tobacco Usage:   Alcohol Usage: Social History:    Marital Status:   Occupation:   Lives with: daughter    Substance Use (street drugs): none  Tobacco Usage: none  Alcohol Usage: none

## 2024-01-04 NOTE — H&P ADULT - PROBLEM SELECTOR PLAN 3
Wheezing on exam, likely triggered by influenza. Currently on room air.  - Ordered duoneb q6h  - Continue symbicort  - Will hold off steroids for now given sepsis

## 2024-01-04 NOTE — H&P ADULT - PROBLEM SELECTOR PLAN 5
EKG personally reviewed showing afib with HR 120s, qtc 469  - Continue eliquis  - HR improved in ED to 80s

## 2024-01-04 NOTE — H&P ADULT - PROBLEM SELECTOR PLAN 4
Not currently in decompensated HF. TTE from 8/29/23 showing normal LVSF, EF 61%  - Will continue eplerenone, atenolol, losartan for now   - Will also continue PO lasix  - Monitor I/Os, daily weight  - BMP daily, monitor electrolytes while on diuretics Not currently in decompensated HF. TTE from 8/29/23 showing normal LVSF, EF 61%  - Will continue eplerenone, atenolol, losartan for now   - Will hold lasix for now given sepsis and current euvolemic status   - Monitor I/Os, daily weight  - BMP daily, monitor electrolytes while on diuretics

## 2024-01-04 NOTE — PATIENT PROFILE ADULT - FALL HARM RISK - HARM RISK INTERVENTIONS
Assistance OOB with selected safe patient handling equipment/Communicate Risk of Fall with Harm to all staff/Discuss with provider need for PT consult/Monitor gait and stability/Provide patient with walking aids - walker, cane, crutches/Reinforce activity limits and safety measures with patient and family/Tailored Fall Risk Interventions/Visual Cue: Yellow wristband and red socks/Bed in lowest position, wheels locked, appropriate side rails in place/Call bell, personal items and telephone in reach/Instruct patient to call for assistance before getting out of bed or chair/Non-slip footwear when patient is out of bed/[place] to call system/Physically safe environment - no spills, clutter or unnecessary equipment/Purposeful Proactive Rounding/Room/bathroom lighting operational, light cord in reach Assistance OOB with selected safe patient handling equipment/Communicate Risk of Fall with Harm to all staff/Discuss with provider need for PT consult/Monitor gait and stability/Provide patient with walking aids - walker, cane, crutches/Reinforce activity limits and safety measures with patient and family/Tailored Fall Risk Interventions/Visual Cue: Yellow wristband and red socks/Bed in lowest position, wheels locked, appropriate side rails in place/Call bell, personal items and telephone in reach/Instruct patient to call for assistance before getting out of bed or chair/Non-slip footwear when patient is out of bed/[place] to call system/Physically safe environment - no spills, clutter or unnecessary equipment/Purposeful Proactive Rounding/Room/bathroom lighting operational, light cord in reach

## 2024-01-04 NOTE — ED ADULT NURSE NOTE - NSFALLUNIVINTERV_ED_ALL_ED
Bed/Stretcher in lowest position, wheels locked, appropriate side rails in place/Call bell, personal items and telephone in reach/Instruct patient to call for assistance before getting out of bed/chair/stretcher/Non-slip footwear applied when patient is off stretcher/[place] to call system/Physically safe environment - no spills, clutter or unnecessary equipment/Purposeful proactive rounding/Room/bathroom lighting operational, light cord in reach Bed/Stretcher in lowest position, wheels locked, appropriate side rails in place/Call bell, personal items and telephone in reach/Instruct patient to call for assistance before getting out of bed/chair/stretcher/Non-slip footwear applied when patient is off stretcher/[place] to call system/Physically safe environment - no spills, clutter or unnecessary equipment/Purposeful proactive rounding/Room/bathroom lighting operational, light cord in reach

## 2024-01-04 NOTE — H&P ADULT - NSHPPHYSICALEXAM_GEN_ALL_CORE
Vital Signs Last 24 Hrs  T(C): 36.7 (04 Jan 2024 18:21), Max: 38.6 (04 Jan 2024 15:38)  T(F): 98 (04 Jan 2024 18:21), Max: 101.4 (04 Jan 2024 15:38)  HR: 85 (04 Jan 2024 18:21) (85 - 109)  BP: 120/78 (04 Jan 2024 18:21) (120/78 - 159/74)  BP(mean): --  RR: 20 (04 Jan 2024 18:21) (20 - 22)  SpO2: 94% (04 Jan 2024 18:21) (93% - 97%)    Parameters below as of 04 Jan 2024 18:21  Patient On (Oxygen Delivery Method): room air        CONSTITUTIONAL: Well-groomed, in no apparent distress  EYES: No conjunctival or scleral injection, non-icteric; PERRLA and symmetric  ENMT: No external nasal lesions; nasal mucosa not inflamed; normal dentition; no pharyngeal injection or exudates, oral mucosa with moist membranes  RESPIRATORY: Breathing comfortably; lungs CTA without wheeze/rhonchi/rales  CARDIOVASCULAR: +S1S2, RRR, no M/G/R; no carotid bruits; pedal pulses full and symmetric; no lower extremity edema  GASTROINTESTINAL: No palpable masses or tenderness, +BS throughout, no rebound/guarding; no hepatosplenomegaly; no hernia palpated  GENITOURINARY:  LYMPHATIC: No cervical LAD or tenderness; no axillary LAD or tenderness; no inguinal LAD or tenderness  MUSCULOSKELETAL: Normal gait and station; no digital clubbing or cyanosis; no paraspinal tenderness; no malalignment of extremities  SKIN: No rashes or ulcers noted; no subcutaneous nodules or induration palpable  NEUROLOGIC: CN grossly intact; sensation intact in LEs b/l to light touch; normal strength and tone  PSYCHIATRIC: A+O x 3; mood and affect appropriate; appropriate insight and judgment Vital Signs Last 24 Hrs  T(C): 36.7 (04 Jan 2024 18:21), Max: 38.6 (04 Jan 2024 15:38)  T(F): 98 (04 Jan 2024 18:21), Max: 101.4 (04 Jan 2024 15:38)  HR: 85 (04 Jan 2024 18:21) (85 - 109)  BP: 120/78 (04 Jan 2024 18:21) (120/78 - 159/74)  BP(mean): --  RR: 20 (04 Jan 2024 18:21) (20 - 22)  SpO2: 94% (04 Jan 2024 18:21) (93% - 97%)    Parameters below as of 04 Jan 2024 18:21  Patient On (Oxygen Delivery Method): room air        CONSTITUTIONAL: Well-groomed, in no apparent distress  EYES: No conjunctival or scleral injection, non-icteric; PERRLA and symmetric  ENMT: No external nasal lesions; nasal mucosa not inflamed; no pharyngeal injection or exudates, oral mucosa with moist membranes  RESPIRATORY: Breathing comfortably; expiratory wheezes appreciated B/L  CARDIOVASCULAR: Irregular rhythm, +S1S2, no M/G/R; no carotid bruits; pedal pulses full and symmetric; no lower extremity edema  GASTROINTESTINAL: No palpable masses or tenderness, +BS throughout, no rebound/guarding; no hepatosplenomegaly; no hernia palpated  LYMPHATIC: No cervical LAD or tenderness  MUSCULOSKELETAL: No digital clubbing or cyanosis; no paraspinal tenderness; no malalignment of extremities  SKIN: No rashes or ulcers noted; no subcutaneous nodules or induration palpable  NEUROLOGIC: CN grossly intact; sensation intact in LEs b/l to light touch; normal strength and tone  PSYCHIATRIC: A+O x 3; mood and affect appropriate; appropriate insight and judgment

## 2024-01-04 NOTE — ED PROVIDER NOTE - ATTENDING APP SHARED VISIT CONTRIBUTION OF CARE
Dr. Louis: 89-year-old female PMH of CVA, YOVANI on CPAP, HTN, HLD, A-fib on Eliquis, heart failure on furosemide, recently admitted as per patient daughter to [facility] in December for pneumonia, completed a course of cefpodoxime last week for UTI presents to the ED complaining of several days of coughing, increased wheezing, shortness of breath.  Patient daughter reports today patient had 2 episodes of loose stool and felt lightheaded afterwards. no LOC, no CP, no N/V, no fever, chills, pt with hx of asthma    Vitals reviewed, Head: atraumatic, PERRLA, EOMI, moist mucous membranes, Neck: supple, RESP: Diffuse wheezing b/l, mildly tachypneic, able to speak in partial sentences, SpO2 96% on RA.  CARDIAC: Mildly tachycardic, irregular, clear distinct S1, S2, no appreciable murmurs. Abdomen: soft and nontender, Extremities: well perfused and without edema, Skin: intact, no visible rash, Psych: appropriate mood, Neuro: AxOx3, no focal neurologic deficit     Given history and physical differential diagnosis includes but not limited to with RAD from repiratory viruses such as COVID, Influenza, RSV, PNA, cardiac wheezing, Asthma exacerbation,   Will Plan for labs, blood cultures, CXR, viral swab, ECG, troponin, DuoNebs, steroids,  Most likely admission    Labs were ordered and independently reviewed and demonstrate Flu +, elevated lactate, white count  Imaging was ordered and independently reviewed and demonstrates CXR with no acute pulmonary disease  An EKG was ordered and independently reviewed and demonstrates afib with nonspecific ST-T changes

## 2024-01-04 NOTE — ED PROVIDER NOTE - CLINICAL SUMMARY MEDICAL DECISION MAKING FREE TEXT BOX
As per H&P, differential diagnosis includes but not limited to viral pneumonia, COPD/asthma exacerbation, may consider HF exacerbation but less likely given clinical presentation and does not appear volume overloaded, rule out bacterial pneumonia, consider atypical pneumonia such as Legionella given patient had URI symptoms today, had presyncopal episode after using the restroom earlier today may be vasovagal in nature, considered dehydration, versus atypical ACS.  Plan for labs, blood cultures, CXR, viral swab, ECG, troponin, DuoNebs, steroids, [facility].  Antibiotics at this time as more likely viral process than bacterial, reassess and dispo as per workup, will likely require admission. -Lei Up PA-C

## 2024-01-04 NOTE — ED ADULT NURSE NOTE - CAS TRG GEN SKIN COLOR
Report received from Aria in [facility]  Results indicate a nodular density in left lateral breast 9.1 cm form nipple.   Other scattered densities seen.  Results placed on Dr Powers desk for review at   2 copies received.  One sent to scanning,    Previous mammogram below    COMPARISON EXAMS: 12/5/14, 6/11/13, 5/29/12.     TECHNIQUE: Screening mammogram with 2D and tomosynthesis. Routine and  implant displacement views were performed.     CAD: Yes.     DENSITY: There are scattered areas of fibroglandular density.     FINDINGS: The implants are stable symmetric in appearance.     No suspicious asymmetry or calcifications in either breast.     Scattered benign-appearing calcifications are present bilaterally.     IMPRESSION: No signs of malignancy in either breast. Recommend routine  screening mammogram.    Routed to Dr Powers  Patient wanted your input on results  Diagnostic mammogram recommended     Normal for race

## 2024-01-04 NOTE — H&P ADULT - HISTORY OF PRESENT ILLNESS
89-year-old female PMH of CVA, YOVANI on CPAP, HTN, HLD, A-fib on Eliquis, heart failure on furosemide, recently admitted as per patient daughter to [facility] in December for pneumonia completed a course of unknown antibiotics, completed a course of cefpodoxime last week for UTI presents to the ED complaining of several days of coughing, increased wheezing, shortness of breath. 89-year-old female PMH of CVA, YOVANI on CPAP, HTN, HLD, A-fib on Eliquis, heart failure on furosemide, recently admitted 1 month ago to [facility] for pneumonia completed a course of antibiotics (for 3 days), completed a course of cefpodoxime last week for UTI presents to the ED complaining of several days of coughing, increased wheezing, shortness of breath. Symptoms started a week ago. Patient denied any fevers or other symptoms. Patient's daughter also with flu-like symptoms recently. Pt uses an albuterol inhaler at home as needed and recently started using it twice a day (usually uses it once a day). Pt also gets nebulizer treatments regularly. She is independent with her ADLs, and walks with a cane. No recent falls at home. Patient found to be influenza A positive in ED, s/p oseltamavir.

## 2024-01-04 NOTE — H&P ADULT - NSHPREVIEWOFSYSTEMS_GEN_ALL_CORE
Review of Systems:   CONSTITUTIONAL: No fever, weight loss  EYES: No eye pain, visual disturbances, or discharge  ENMT:  No difficulty hearing, tinnitus, vertigo; No sinus or throat pain  RESPIRATORY: No SOB. No cough, wheezing, chills or hemoptysis  CARDIOVASCULAR: No chest pain, palpitations, dizziness, or leg swelling  GASTROINTESTINAL: No abdominal or epigastric pain. No nausea, vomiting, or hematemesis; No diarrhea or constipation. No melena or hematochezia.  GENITOURINARY: No dysuria, frequency, hematuria, or incontinence  NEUROLOGICAL: No headaches, memory loss, loss of strength, numbness, or tremors  SKIN: No itching, burning, rashes, or lesions   LYMPH NODES: No enlarged glands  ENDOCRINE: No heat or cold intolerance; No hair loss  MUSCULOSKELETAL: No joint pain or swelling; No muscle, back pain  PSYCHIATRIC: No depression, anxiety, mood swings, or difficulty sleeping  HEME/LYMPH: No easy bruising, or bleeding gums Review of Systems:   CONSTITUTIONAL: No fever, weight loss  EYES: No eye pain, visual disturbances, or discharge  ENMT:  No difficulty hearing, tinnitus, vertigo; No sinus or throat pain  RESPIRATORY: +SOB. + cough, +wheezing, No chills or hemoptysis  CARDIOVASCULAR: No chest pain, palpitations, dizziness, or leg swelling  GASTROINTESTINAL: No abdominal or epigastric pain. No nausea, vomiting, or hematemesis; No diarrhea or constipation. No melena or hematochezia.  GENITOURINARY: No dysuria, frequency, hematuria, or incontinence  NEUROLOGICAL: No headaches, memory loss, loss of strength, numbness, or tremors  SKIN: No itching, burning, rashes, or lesions   MUSCULOSKELETAL: No joint pain or swelling; No muscle, back pain  HEME/LYMPH: No easy bruising, or bleeding gums

## 2024-01-04 NOTE — ED PROVIDER NOTE - OBJECTIVE STATEMENT
89-year-old female PMH of CVA, YOVANI on CPAP, HTN, HLD, A-fib on Eliquis, heart failure on furosemide, recently admitted as per patient daughter to [facility] in December for pneumonia completed a course of unknown antibiotics, completed a course of cefpodoxime last week for UTI presents to the ED complaining of several days of coughing, increased wheezing, shortness of breath.  Patient daughter reports today patient had 2 episodes of loose stool and felt lightheaded afterwards.  Patient daughter denies mickey syncopal episode, patient denies chest pain, abdominal pain, nausea, vomiting.  Patient denies fevers at home.  Patient not currently on antibiotics or steroids.  Patient daughter reports patient has a history of asthma and is on a daily maintenance inhaler.

## 2024-01-04 NOTE — ED PROVIDER NOTE - PHYSICAL EXAMINATION
GEN: Pt in NAD, chronically ill appearing.  PSYCH: Affect appropriate.  EYES: Sclera white w/o injection.   ENT: Head NCAT. MMM. Neck supple FROM.  RESP: Diffuse wheezing b/l, mildly tachypneic, able to speak in partial sentences, SpO2 96% on RA.  CARDIAC: Mildly tachycardic, irregular, clear distinct S1, S2, no appreciable murmurs.  ABD: Abdomen soft, non-tender. No CVAT b/l.  VASC: 2+ radial and dorsalis pedis pulses b/l. No edema or calf tenderness.  SKIN: No rashes on the trunk.

## 2024-01-04 NOTE — ED ADULT NURSE NOTE - OBJECTIVE STATEMENT
Pt 89 y/o female, AxOx3, presents to ED from pulmonologist office for near syncopal episode while ambulating. PMH of CVA, HTN, HLD, Afib on eliquis, CHF. daughter at bedside reporting pt has been complaining of shortness of breath and wheezing at home x few weeks despite daily inhaler/ nebulizers at home. Pt is well appearing, speaking full sentences without difficulty. Breathing spontaneous and unlabored. Upon assessment, abdomen soft and nontender, +strong peripheral pulses, moving all extremities without difficulty, wheezing at b/l lung bases. Pt placed on continuous pulse ox and cardiac monitor, afib noted.  Safety and comfort measures initiated- bed placed in lowest position and side rails raised. Pt oriented to call bell system.

## 2024-01-04 NOTE — H&P ADULT - ASSESSMENT
89-year-old female PMH of CVA, YOVANI on CPAP, HTN, HLD, A-fib on Eliquis, heart failure on furosemide, presents to the ED complaining of several days of coughing, increased wheezing, shortness of breath, and found to be influenza positive. Patient admitted for sepsis management.

## 2024-01-04 NOTE — H&P ADULT - NSHPLABSRESULTS_GEN_ALL_CORE
01-04    140  |  101  |  29<H>  ----------------------------<  93  4.3   |  23  |  1.03    Ca    9.4      04 Jan 2024 15:56    TPro  6.2  /  Alb  3.7  /  TBili  1.2  /  DBili  x   /  AST  27  /  ALT  24  /  AlkPhos  66  01-04      PT/INR - ( 04 Jan 2024 15:56 )   PT: 16.4 sec;   INR: 1.58 ratio         PTT - ( 04 Jan 2024 15:56 )  PTT:24.3 sec              Urinalysis Basic - ( 04 Jan 2024 15:56 )    Color: x / Appearance: x / SG: x / pH: x  Gluc: 93 mg/dL / Ketone: x  / Bili: x / Urobili: x   Blood: x / Protein: x / Nitrite: x   Leuk Esterase: x / RBC: x / WBC x   Sq Epi: x / Non Sq Epi: x / Bacteria: x                          15.8   16.82 )-----------( 127      ( 04 Jan 2024 15:56 )             50.0     IMAGING    < from: CT Chest No Cont (01.04.24 @ 19:38) >    FINDINGS:    LYMPH NODES: No lymphadenopathy in the chest.    HEART/VASCULATURE: Mild cardiomegaly. No pericardial effusion. Coronary   and valvular calcifications. Left chest wall pacemaker with leads in the   right atrium and right ventricle. Calcifications of the aorta and its   branch vessels.    AIRWAYS/LUNGS/PLEURA: Patent central airways. New bilateral upper lobe   tree-in-bud nodular opacities. Previously demonstrated 1.5 cm nodule in   the left upper lobe nodule is decreased in size. An 8 mm perifissural   right upper lobe nodule is slightly increased in size and conspicuity.   Scattered subsegmental atelectasis in the right lung. No pleural effusion.    UPPER ABDOMEN: 3.4 cm left adrenal nodule increased in size since prior   exam (previously 2.0 cm on 10/16/2023). Hepatic cysts. Partially   visualized indeterminate exophytic left renal upper pole unchanged in   size.    BONES/SOFT TISSUES: Degenerative changes of the spine.    IMPRESSION:    New areas of bilateral tree in bud nodules compatible with small airways   infection.    Previously demonstrated left upper lobe nodule is decreased in size.   Slightly increased right upper lobe nodule is indeterminate. Decreased   size of prior left upper lobe nodule.    3.4 cm left adrenal nodule is significantly increased since prior exam.   Recommend CT or MRI of the abdomen and pelvis for further evaluation.    < end of copied text >

## 2024-01-04 NOTE — PATIENT PROFILE ADULT - NSPROPTRIGHTBILLOFRIGHTS_GEN_A_NUR
Occupational Therapy Screen     Patient Name: Wili Dillon  NCZPX'M Date: 11/6/2022  Problem List  Principal Problem:    Dizziness  Active Problems:    Allergic reaction to contrast dye    Recent COVID-19    Nicotine dependence    Past Medical History  History reviewed  No pertinent past medical history  Past Surgical History  History reviewed  No pertinent surgical history  11/06/22 0900   OT Last Visit   OT Visit Date 11/06/22  (Sunday)   Note Type   Note type Screen   Additional Comments OT orders received and chart review completed  Per RN, Ambrosio/ Patricia appropriate to see pt  Contact made w/ pt from [phone]  Pt reports completing ADL at baseline level of I and feeling much better  No acute OT needs at this time  Will screen from OT caseload  Please re-consult if acute needs arise   ID OT   [facility], OTR/L
patient

## 2024-01-04 NOTE — H&P ADULT - PROBLEM SELECTOR PLAN 2
Influenza A positive. CT chest showing B/L tree in bud nodules with small airway infection. Likely viral pneumonia but cannot rule out underlying bacterial pneumonia as well  - F/u legionella ag  - Continue oseltamavir  - Continue ceftriaxone for now   - Monitor respiratory status, supplemental O2 as needed

## 2024-01-04 NOTE — ED PROVIDER NOTE - PROGRESS NOTE DETAILS
Results discussed with patient including influenza A patient has had symptoms for approximately 2 days will start oseltamavir given patient advanced age and current illness which will require admission.  Patient still with residual wheeze though improved  air movement on reevaluation, will give additional albuterol treatment. -Lei Up PA-C d/w pt PCP who requests admission to Dr. Plascencia, I spoke to Dr. Plascencia who accepts admission. -Lei Up PA-C

## 2024-01-04 NOTE — H&P ADULT - PROBLEM SELECTOR PLAN 1
Tmax 101.4, tachycardic, tachypneic. Likely pulmonary source.   - Blood cultures drawn and pending  - Check UA  - Will cover empirically with ceftriaxone for now  - Continue oseltamavir  - Monitor fever curve

## 2024-01-05 DIAGNOSIS — J10.1 INFLUENZA DUE TO OTHER IDENTIFIED INFLUENZA VIRUS WITH OTHER RESPIRATORY MANIFESTATIONS: ICD-10-CM

## 2024-01-05 DIAGNOSIS — R91.1 SOLITARY PULMONARY NODULE: ICD-10-CM

## 2024-01-05 DIAGNOSIS — G47.33 OBSTRUCTIVE SLEEP APNEA (ADULT) (PEDIATRIC): ICD-10-CM

## 2024-01-05 LAB
ANION GAP SERPL CALC-SCNC: 16 MMOL/L — SIGNIFICANT CHANGE UP (ref 5–17)
ANION GAP SERPL CALC-SCNC: 16 MMOL/L — SIGNIFICANT CHANGE UP (ref 5–17)
BUN SERPL-MCNC: 33 MG/DL — HIGH (ref 7–23)
BUN SERPL-MCNC: 33 MG/DL — HIGH (ref 7–23)
CALCIUM SERPL-MCNC: 9.3 MG/DL — SIGNIFICANT CHANGE UP (ref 8.4–10.5)
CALCIUM SERPL-MCNC: 9.3 MG/DL — SIGNIFICANT CHANGE UP (ref 8.4–10.5)
CHLORIDE SERPL-SCNC: 101 MMOL/L — SIGNIFICANT CHANGE UP (ref 96–108)
CHLORIDE SERPL-SCNC: 101 MMOL/L — SIGNIFICANT CHANGE UP (ref 96–108)
CO2 SERPL-SCNC: 25 MMOL/L — SIGNIFICANT CHANGE UP (ref 22–31)
CO2 SERPL-SCNC: 25 MMOL/L — SIGNIFICANT CHANGE UP (ref 22–31)
CREAT SERPL-MCNC: 1.07 MG/DL — SIGNIFICANT CHANGE UP (ref 0.5–1.3)
CREAT SERPL-MCNC: 1.07 MG/DL — SIGNIFICANT CHANGE UP (ref 0.5–1.3)
EGFR: 49 ML/MIN/1.73M2 — LOW
EGFR: 49 ML/MIN/1.73M2 — LOW
GLUCOSE SERPL-MCNC: 109 MG/DL — HIGH (ref 70–99)
GLUCOSE SERPL-MCNC: 109 MG/DL — HIGH (ref 70–99)
HCT VFR BLD CALC: 50 % — HIGH (ref 34.5–45)
HCT VFR BLD CALC: 50 % — HIGH (ref 34.5–45)
HGB BLD-MCNC: 15.7 G/DL — HIGH (ref 11.5–15.5)
HGB BLD-MCNC: 15.7 G/DL — HIGH (ref 11.5–15.5)
LEGIONELLA AG UR QL: NEGATIVE — SIGNIFICANT CHANGE UP
LEGIONELLA AG UR QL: NEGATIVE — SIGNIFICANT CHANGE UP
MAGNESIUM SERPL-MCNC: 2.2 MG/DL — SIGNIFICANT CHANGE UP (ref 1.6–2.6)
MAGNESIUM SERPL-MCNC: 2.2 MG/DL — SIGNIFICANT CHANGE UP (ref 1.6–2.6)
MCHC RBC-ENTMCNC: 27.8 PG — SIGNIFICANT CHANGE UP (ref 27–34)
MCHC RBC-ENTMCNC: 27.8 PG — SIGNIFICANT CHANGE UP (ref 27–34)
MCHC RBC-ENTMCNC: 31.4 GM/DL — LOW (ref 32–36)
MCHC RBC-ENTMCNC: 31.4 GM/DL — LOW (ref 32–36)
MCV RBC AUTO: 88.7 FL — SIGNIFICANT CHANGE UP (ref 80–100)
MCV RBC AUTO: 88.7 FL — SIGNIFICANT CHANGE UP (ref 80–100)
NRBC # BLD: 0 /100 WBCS — SIGNIFICANT CHANGE UP (ref 0–0)
NRBC # BLD: 0 /100 WBCS — SIGNIFICANT CHANGE UP (ref 0–0)
PHOSPHATE SERPL-MCNC: 4.7 MG/DL — HIGH (ref 2.5–4.5)
PHOSPHATE SERPL-MCNC: 4.7 MG/DL — HIGH (ref 2.5–4.5)
PLATELET # BLD AUTO: 116 K/UL — LOW (ref 150–400)
PLATELET # BLD AUTO: 116 K/UL — LOW (ref 150–400)
POTASSIUM SERPL-MCNC: 4.2 MMOL/L — SIGNIFICANT CHANGE UP (ref 3.5–5.3)
POTASSIUM SERPL-MCNC: 4.2 MMOL/L — SIGNIFICANT CHANGE UP (ref 3.5–5.3)
POTASSIUM SERPL-SCNC: 4.2 MMOL/L — SIGNIFICANT CHANGE UP (ref 3.5–5.3)
POTASSIUM SERPL-SCNC: 4.2 MMOL/L — SIGNIFICANT CHANGE UP (ref 3.5–5.3)
RBC # BLD: 5.64 M/UL — HIGH (ref 3.8–5.2)
RBC # BLD: 5.64 M/UL — HIGH (ref 3.8–5.2)
RBC # FLD: 17 % — HIGH (ref 10.3–14.5)
RBC # FLD: 17 % — HIGH (ref 10.3–14.5)
SODIUM SERPL-SCNC: 142 MMOL/L — SIGNIFICANT CHANGE UP (ref 135–145)
SODIUM SERPL-SCNC: 142 MMOL/L — SIGNIFICANT CHANGE UP (ref 135–145)
WBC # BLD: 15.69 K/UL — HIGH (ref 3.8–10.5)
WBC # BLD: 15.69 K/UL — HIGH (ref 3.8–10.5)
WBC # FLD AUTO: 15.69 K/UL — HIGH (ref 3.8–10.5)
WBC # FLD AUTO: 15.69 K/UL — HIGH (ref 3.8–10.5)

## 2024-01-05 RX ADMIN — Medication 15 MILLIGRAM(S): at 18:47

## 2024-01-05 RX ADMIN — Medication 25 MICROGRAM(S): at 04:30

## 2024-01-05 RX ADMIN — Medication 3 MILLILITER(S): at 18:45

## 2024-01-05 RX ADMIN — APIXABAN 5 MILLIGRAM(S): 2.5 TABLET, FILM COATED ORAL at 18:47

## 2024-01-05 RX ADMIN — Medication 25 MILLIGRAM(S): at 18:47

## 2024-01-05 RX ADMIN — Medication 3 MILLILITER(S): at 23:51

## 2024-01-05 RX ADMIN — LOSARTAN POTASSIUM 25 MILLIGRAM(S): 100 TABLET, FILM COATED ORAL at 06:22

## 2024-01-05 RX ADMIN — APIXABAN 5 MILLIGRAM(S): 2.5 TABLET, FILM COATED ORAL at 06:23

## 2024-01-05 RX ADMIN — Medication 75 MILLIGRAM(S): at 06:22

## 2024-01-05 RX ADMIN — BUDESONIDE AND FORMOTEROL FUMARATE DIHYDRATE 2 PUFF(S): 160; 4.5 AEROSOL RESPIRATORY (INHALATION) at 06:26

## 2024-01-05 RX ADMIN — Medication 3 MILLILITER(S): at 00:47

## 2024-01-05 RX ADMIN — BUDESONIDE AND FORMOTEROL FUMARATE DIHYDRATE 2 PUFF(S): 160; 4.5 AEROSOL RESPIRATORY (INHALATION) at 18:45

## 2024-01-05 RX ADMIN — EPLERENONE 50 MILLIGRAM(S): 50 TABLET, FILM COATED ORAL at 06:22

## 2024-01-05 RX ADMIN — Medication 3 MILLILITER(S): at 06:26

## 2024-01-05 RX ADMIN — ATORVASTATIN CALCIUM 10 MILLIGRAM(S): 80 TABLET, FILM COATED ORAL at 00:30

## 2024-01-05 RX ADMIN — ATORVASTATIN CALCIUM 10 MILLIGRAM(S): 80 TABLET, FILM COATED ORAL at 21:08

## 2024-01-05 RX ADMIN — Medication 3 MILLILITER(S): at 14:48

## 2024-01-05 RX ADMIN — Medication 75 MILLIGRAM(S): at 18:46

## 2024-01-05 RX ADMIN — Medication 25 MILLIGRAM(S): at 06:21

## 2024-01-05 RX ADMIN — Medication 25 MILLIGRAM(S): at 21:08

## 2024-01-05 RX ADMIN — Medication 15 MILLIGRAM(S): at 06:24

## 2024-01-05 RX ADMIN — CEFTRIAXONE 100 MILLIGRAM(S): 500 INJECTION, POWDER, FOR SOLUTION INTRAMUSCULAR; INTRAVENOUS at 00:46

## 2024-01-05 RX ADMIN — ATENOLOL 37.5 MILLIGRAM(S): 25 TABLET ORAL at 06:22

## 2024-01-05 RX ADMIN — CEFTRIAXONE 100 MILLIGRAM(S): 500 INJECTION, POWDER, FOR SOLUTION INTRAMUSCULAR; INTRAVENOUS at 23:51

## 2024-01-05 NOTE — CONSULT NOTE ADULT - SUBJECTIVE AND OBJECTIVE BOX
PULMONARY CONSULT    HPI: 89 y/o F with PMH of CVA, YOVANI on CPAP, HTN, HLD, Afib on Eliquis, CHF. Recent admission at [facility] 1 month ago for PNA, completed short course of ABX. Completed additional course of ABX last week for UTI. Presents to the ED with coughing, SOB, wheezing x several days. Found to be Flu +. Pulmonary called to consult for further management. Labs notable for leukocytosis, elevated proBNP.         PAST MEDICAL & SURGICAL HISTORY:  Atrial fibrillation  HTN (hypertension)  HLD (hyperlipidemia)  UTI (urinary tract infection)  Thyroid nodule  YOVANI on CPAP  OA (osteoarthritis)  Cerebrovascular accident (CVA)  Allergies  Cardizem (Urticaria)  sulfa drugs (Hives)    Intolerances  OxyContin (Sedation/Somnol; Drowsiness)    FAMILY HISTORY:    Social history:     Review of Systems:  CONSTITUTIONAL: No fever, chills, or fatigue  EYES: No eye pain, visual disturbances, or discharge  ENMT:  No difficulty hearing, tinnitus, vertigo; No sinus or throat pain  NECK: No pain or stiffness  RESPIRATORY: Per above  CARDIOVASCULAR: No chest pain, palpitations, dizziness, or leg swelling  GASTROINTESTINAL: No abdominal or epigastric pain. No nausea, vomiting, or hematemesis; No diarrhea or constipation. No melena or hematochezia.  GENITOURINARY: No dysuria, frequency, hematuria, or incontinence  NEUROLOGICAL: No headaches, memory loss, loss of strength, numbness, or tremors  SKIN: No itching, burning, rashes, or lesions   MUSCULOSKELETAL: No joint pain or swelling; No muscle, back, or extremity pain  PSYCHIATRIC: No depression, anxiety, mood swings, or difficulty sleeping      Medications:  MEDICATIONS  (STANDING):  albuterol/ipratropium for Nebulization 3 milliLiter(s) Nebulizer every 6 hours  apixaban 5 milliGRAM(s) Oral every 12 hours  atenolol  Tablet 37.5 milliGRAM(s) Oral daily  atorvastatin 10 milliGRAM(s) Oral at bedtime  budesonide 160 MICROgram(s)/formoterol 4.5 MICROgram(s) Inhaler 2 Puff(s) Inhalation two times a day  busPIRone 15 milliGRAM(s) Oral two times a day  cefTRIAXone   IVPB 1000 milliGRAM(s) IV Intermittent every 24 hours  eplerenone 50 milliGRAM(s) Oral daily  levothyroxine 25 MICROGram(s) Oral daily  losartan 25 milliGRAM(s) Oral daily  oseltamivir 75 milliGRAM(s) Oral two times a day  pregabalin 25 milliGRAM(s) Oral two times a day    MEDICATIONS  (PRN):  diphenhydrAMINE 25 milliGRAM(s) Oral at bedtime PRN Insomnia            Vital Signs Last 24 Hrs  T(C): 36.2 (05 Jan 2024 12:08), Max: 38.6 (04 Jan 2024 15:38)  T(F): 97.2 (05 Jan 2024 12:08), Max: 101.4 (04 Jan 2024 15:38)  HR: 89 (05 Jan 2024 12:08) (76 - 109)  BP: 146/88 (05 Jan 2024 12:08) (120/78 - 159/74)  BP(mean): --  RR: 17 (05 Jan 2024 12:08) (17 - 22)  SpO2: 96% (05 Jan 2024 12:08) (93% - 97%)    Parameters below as of 05 Jan 2024 12:08  Patient On (Oxygen Delivery Method): room air          VBG pH 7.44 01-04 @ 15:30  VBG pCO2 45 01-04 @ 15:30  VBG O2 sat 77.3 01-04 @ 15:30  VBG lactate 2.2 01-04 @ 15:30            LABS:                        15.7   15.69 )-----------( 116      ( 05 Jan 2024 07:31 )             50.0     01-05    142  |  101  |  33<H>  ----------------------------<  109<H>  4.2   |  25  |  1.07    Ca    9.3      05 Jan 2024 07:31  Phos  4.7     01-05  Mg     2.2     01-05    TPro  6.2  /  Alb  3.7  /  TBili  1.2  /  DBili  x   /  AST  27  /  ALT  24  /  AlkPhos  66  01-04          CAPILLARY BLOOD GLUCOSE        PT/INR - ( 04 Jan 2024 15:56 )   PT: 16.4 sec;   INR: 1.58 ratio         PTT - ( 04 Jan 2024 15:56 )  PTT:24.3 sec  Urinalysis Basic - ( 05 Jan 2024 07:31 )    Color: x / Appearance: x / SG: x / pH: x  Gluc: 109 mg/dL / Ketone: x  / Bili: x / Urobili: x   Blood: x / Protein: x / Nitrite: x   Leuk Esterase: x / RBC: x / WBC x   Sq Epi: x / Non Sq Epi: x / Bacteria: x    Physical Examination:    General: No acute distress.      HEENT: Pupils equal, reactive to light.  Symmetric.    PULM: Clear to auscultation bilaterally, no significant sputum production    CVS: S1, S2    ABD: Soft, nondistended, nontender, normoactive bowel sounds, no masses    EXT: No edema, nontender    SKIN: Warm and well perfused, no rashes noted.    NEURO: Alert, oriented, interactive, nonfocal    RADIOLOGY REVIEWED    < from: CT Chest No Cont (01.04.24 @ 19:38) >  FINDINGS:    LYMPH NODES: No lymphadenopathy in the chest.    HEART/VASCULATURE: Mild cardiomegaly. No pericardial effusion. Coronary   and valvular calcifications. Left chest wall pacemaker with leads in the   right atrium and right ventricle. Calcifications of the aorta and its   branch vessels.    AIRWAYS/LUNGS/PLEURA: Patent central airways. New bilateral upper lobe   tree-in-bud nodular opacities. Previously demonstrated 1.5 cm nodule in   the left upper lobe nodule is decreased in size. An 8 mm perifissural   right upper lobe nodule is slightly increased in size and conspicuity.   Scattered subsegmental atelectasis in the right lung. No pleural effusion.    UPPER ABDOMEN: 3.4 cm left adrenal nodule increased in size since prior   exam (previously 2.0 cm on 10/16/2023). Hepatic cysts. Partially   visualized indeterminate exophytic left renal upper pole unchanged in   size.    BONES/SOFT TISSUES: Degenerative changes of the spine.    IMPRESSION:    New areas of bilateral tree in bud nodules compatible with small airways   infection.    Previously demonstrated left upper lobe nodule is decreased in size.   Slightly increased right upper lobe nodule is indeterminate. Decreased   size of prior left upper lobe nodule.    3.4 cm left adrenal nodule is significantly increased since prior exam.   Recommend CT or MRI of the abdomen and pelvis for further evaluation.    --- End of Report ---      < end of copied text >     PULMONARY CONSULT    HPI: 89 y/o F with PMH of CVA, YOVANI on CPAP, HTN, HLD, Afib on Eliquis, CHF. Recent admission at [facility] 1 month ago for PNA, completed short course of ABX. Completed additional course of ABX last week for UTI. Presents to the ED with coughing, SOB, wheezing x several days. Found to be Flu +. Pulmonary called to consult for further management. Labs notable for leukocytosis, elevated proBNP.         PAST MEDICAL & SURGICAL HISTORY:  Atrial fibrillation  HTN (hypertension)  HLD (hyperlipidemia)  UTI (urinary tract infection)  Thyroid nodule  YOVANI on CPAP  OA (osteoarthritis)  Cerebrovascular accident (CVA)  Allergies  Cardizem (Urticaria)  sulfa drugs (Hives)    Intolerances  OxyContin (Sedation/Somnol; Drowsiness)    FAMILY HISTORY:    Social history:     Review of Systems:  CONSTITUTIONAL: No fever, chills, or fatigue  EYES: No eye pain, visual disturbances, or discharge  ENMT:  No difficulty hearing, tinnitus, vertigo; No sinus or throat pain  NECK: No pain or stiffness  RESPIRATORY: Per above  CARDIOVASCULAR: No chest pain, palpitations, dizziness, or leg swelling  GASTROINTESTINAL: No abdominal or epigastric pain. No nausea, vomiting, or hematemesis; No diarrhea or constipation. No melena or hematochezia.  GENITOURINARY: No dysuria, frequency, hematuria, or incontinence  NEUROLOGICAL: No headaches, memory loss, loss of strength, numbness, or tremors  SKIN: No itching, burning, rashes, or lesions   MUSCULOSKELETAL: No joint pain or swelling; No muscle, back, or extremity pain  PSYCHIATRIC: No depression, anxiety, mood swings, or difficulty sleeping      Medications:  MEDICATIONS  (STANDING):  albuterol/ipratropium for Nebulization 3 milliLiter(s) Nebulizer every 6 hours  apixaban 5 milliGRAM(s) Oral every 12 hours  atenolol  Tablet 37.5 milliGRAM(s) Oral daily  atorvastatin 10 milliGRAM(s) Oral at bedtime  budesonide 160 MICROgram(s)/formoterol 4.5 MICROgram(s) Inhaler 2 Puff(s) Inhalation two times a day  busPIRone 15 milliGRAM(s) Oral two times a day  cefTRIAXone   IVPB 1000 milliGRAM(s) IV Intermittent every 24 hours  eplerenone 50 milliGRAM(s) Oral daily  levothyroxine 25 MICROGram(s) Oral daily  losartan 25 milliGRAM(s) Oral daily  oseltamivir 75 milliGRAM(s) Oral two times a day  pregabalin 25 milliGRAM(s) Oral two times a day    MEDICATIONS  (PRN):  diphenhydrAMINE 25 milliGRAM(s) Oral at bedtime PRN Insomnia            Vital Signs Last 24 Hrs  T(C): 36.2 (05 Jan 2024 12:08), Max: 38.6 (04 Jan 2024 15:38)  T(F): 97.2 (05 Jan 2024 12:08), Max: 101.4 (04 Jan 2024 15:38)  HR: 89 (05 Jan 2024 12:08) (76 - 109)  BP: 146/88 (05 Jan 2024 12:08) (120/78 - 159/74)  BP(mean): --  RR: 17 (05 Jan 2024 12:08) (17 - 22)  SpO2: 96% (05 Jan 2024 12:08) (93% - 97%)    Parameters below as of 05 Jan 2024 12:08  Patient On (Oxygen Delivery Method): room air          VBG pH 7.44 01-04 @ 15:30  VBG pCO2 45 01-04 @ 15:30  VBG O2 sat 77.3 01-04 @ 15:30  VBG lactate 2.2 01-04 @ 15:30            LABS:                        15.7   15.69 )-----------( 116      ( 05 Jan 2024 07:31 )             50.0     01-05    142  |  101  |  33<H>  ----------------------------<  109<H>  4.2   |  25  |  1.07    Ca    9.3      05 Jan 2024 07:31  Phos  4.7     01-05  Mg     2.2     01-05    TPro  6.2  /  Alb  3.7  /  TBili  1.2  /  DBili  x   /  AST  27  /  ALT  24  /  AlkPhos  66  01-04          CAPILLARY BLOOD GLUCOSE        PT/INR - ( 04 Jan 2024 15:56 )   PT: 16.4 sec;   INR: 1.58 ratio         PTT - ( 04 Jan 2024 15:56 )  PTT:24.3 sec  Urinalysis Basic - ( 05 Jan 2024 07:31 )    Color: x / Appearance: x / SG: x / pH: x  Gluc: 109 mg/dL / Ketone: x  / Bili: x / Urobili: x   Blood: x / Protein: x / Nitrite: x   Leuk Esterase: x / RBC: x / WBC x   Sq Epi: x / Non Sq Epi: x / Bacteria: x    Physical Examination:    General: No acute distress.      HEENT: Pupils equal, reactive to light.  Symmetric.    PULM: Clear to auscultation bilaterally, no significant sputum production    CVS: S1, S2    ABD: Soft, nondistended, nontender, normoactive bowel sounds, no masses    EXT: No edema, nontender    SKIN: Warm and well perfused, no rashes noted.    NEURO: Alert, oriented, interactive, nonfocal    RADIOLOGY REVIEWED    < from: CT Chest No Cont (01.04.24 @ 19:38) >  FINDINGS:    LYMPH NODES: No lymphadenopathy in the chest.    HEART/VASCULATURE: Mild cardiomegaly. No pericardial effusion. Coronary   and valvular calcifications. Left chest wall pacemaker with leads in the   right atrium and right ventricle. Calcifications of the aorta and its   branch vessels.    AIRWAYS/LUNGS/PLEURA: Patent central airways. New bilateral upper lobe   tree-in-bud nodular opacities. Previously demonstrated 1.5 cm nodule in   the left upper lobe nodule is decreased in size. An 8 mm perifissural   right upper lobe nodule is slightly increased in size and conspicuity.   Scattered subsegmental atelectasis in the right lung. No pleural effusion.    UPPER ABDOMEN: 3.4 cm left adrenal nodule increased in size since prior   exam (previously 2.0 cm on 10/16/2023). Hepatic cysts. Partially   visualized indeterminate exophytic left renal upper pole unchanged in   size.    BONES/SOFT TISSUES: Degenerative changes of the spine.    IMPRESSION:    New areas of bilateral tree in bud nodules compatible with small airways   infection.    Previously demonstrated left upper lobe nodule is decreased in size.   Slightly increased right upper lobe nodule is indeterminate. Decreased   size of prior left upper lobe nodule.    3.4 cm left adrenal nodule is significantly increased since prior exam.   Recommend CT or MRI of the abdomen and pelvis for further evaluation.    --- End of Report ---      < end of copied text >     PULMONARY CONSULT    HPI: 89 y/o F with PMH of CVA, YOVANI on CPAP, HTN, HLD, Afib on Eliquis, CHF. Recent admission at [facility] 1 month ago for PNA, completed short course of ABX. Completed additional course of ABX last week for UTI. Presents to the ED with coughing, SOB, wheezing x several days. Found to be Flu +. Labs notable for leukocytosis, elevated proBNP.  Pulmonary called to consult for further management. Never smoker. Pt reports hx of asthma, on inhalers but unable to recall the name. Also with hx of YOVANI, denies CPAP use. Reports dyspnea improving still with c/o cough, wheezing. Denies CP, pleuritic chest pain, fever, chills. O2 sats 97% on room air.       PAST MEDICAL & SURGICAL HISTORY:  Atrial fibrillation  HTN (hypertension)  HLD (hyperlipidemia)  UTI (urinary tract infection)  Thyroid nodule  YOVANI on CPAP  OA (osteoarthritis)  Cerebrovascular accident (CVA)  Allergies  Cardizem (Urticaria)  sulfa drugs (Hives)    Intolerances  OxyContin (Sedation/Somnol; Drowsiness)    FAMILY HISTORY: non contributory     Social history: never smoker     Review of Systems:  CONSTITUTIONAL: No fever, chills, or fatigue  EYES: No eye pain, visual disturbances, or discharge  ENMT:  No difficulty hearing, tinnitus, vertigo; No sinus or throat pain  NECK: No pain or stiffness  RESPIRATORY: Per above  CARDIOVASCULAR: No chest pain, palpitations, dizziness, or leg swelling  GASTROINTESTINAL: No abdominal or epigastric pain. No nausea, vomiting, or hematemesis; No diarrhea or constipation. No melena or hematochezia.  GENITOURINARY: No dysuria, frequency, hematuria, or incontinence  NEUROLOGICAL: No headaches, memory loss, loss of strength, numbness, or tremors  SKIN: No itching, burning, rashes, or lesions   MUSCULOSKELETAL: No joint pain or swelling; No muscle, back, or extremity pain  PSYCHIATRIC: No depression, anxiety, mood swings, or difficulty sleeping      Medications:  MEDICATIONS  (STANDING):  albuterol/ipratropium for Nebulization 3 milliLiter(s) Nebulizer every 6 hours  apixaban 5 milliGRAM(s) Oral every 12 hours  atenolol  Tablet 37.5 milliGRAM(s) Oral daily  atorvastatin 10 milliGRAM(s) Oral at bedtime  budesonide 160 MICROgram(s)/formoterol 4.5 MICROgram(s) Inhaler 2 Puff(s) Inhalation two times a day  busPIRone 15 milliGRAM(s) Oral two times a day  cefTRIAXone   IVPB 1000 milliGRAM(s) IV Intermittent every 24 hours  eplerenone 50 milliGRAM(s) Oral daily  levothyroxine 25 MICROGram(s) Oral daily  losartan 25 milliGRAM(s) Oral daily  oseltamivir 75 milliGRAM(s) Oral two times a day  pregabalin 25 milliGRAM(s) Oral two times a day    MEDICATIONS  (PRN):  diphenhydrAMINE 25 milliGRAM(s) Oral at bedtime PRN Insomnia      Vital Signs Last 24 Hrs  T(C): 36.2 (05 Jan 2024 12:08), Max: 38.6 (04 Jan 2024 15:38)  T(F): 97.2 (05 Jan 2024 12:08), Max: 101.4 (04 Jan 2024 15:38)  HR: 89 (05 Jan 2024 12:08) (76 - 109)  BP: 146/88 (05 Jan 2024 12:08) (120/78 - 159/74)  BP(mean): --  RR: 17 (05 Jan 2024 12:08) (17 - 22)  SpO2: 96% (05 Jan 2024 12:08) (93% - 97%)    Parameters below as of 05 Jan 2024 12:08  Patient On (Oxygen Delivery Method): room air          VBG pH 7.44 01-04 @ 15:30  VBG pCO2 45 01-04 @ 15:30  VBG O2 sat 77.3 01-04 @ 15:30  VBG lactate 2.2 01-04 @ 15:30            LABS:                        15.7   15.69 )-----------( 116      ( 05 Jan 2024 07:31 )             50.0     01-05    142  |  101  |  33<H>  ----------------------------<  109<H>  4.2   |  25  |  1.07    Ca    9.3      05 Jan 2024 07:31  Phos  4.7     01-05  Mg     2.2     01-05    TPro  6.2  /  Alb  3.7  /  TBili  1.2  /  DBili  x   /  AST  27  /  ALT  24  /  AlkPhos  66  01-04      PT/INR - ( 04 Jan 2024 15:56 )   PT: 16.4 sec;   INR: 1.58 ratio         PTT - ( 04 Jan 2024 15:56 )  PTT:24.3 sec  Urinalysis Basic - ( 05 Jan 2024 07:31 )    Color: x / Appearance: x / SG: x / pH: x  Gluc: 109 mg/dL / Ketone: x  / Bili: x / Urobili: x   Blood: x / Protein: x / Nitrite: x   Leuk Esterase: x / RBC: x / WBC x   Sq Epi: x / Non Sq Epi: x / Bacteria: x    Physical Examination:    General: No acute distress.      HEENT: Pupils equal, reactive to light.  Symmetric.    PULM: b/l exp wheeze     CVS: S1, S2    ABD: Soft, nondistended, nontender, normoactive bowel sounds, no masses    EXT: No edema, nontender    SKIN: Warm and well perfused, no rashes noted.    NEURO: Alert, oriented, interactive, nonfocal    RADIOLOGY REVIEWED    < from: CT Chest No Cont (01.04.24 @ 19:38) >  FINDINGS:    LYMPH NODES: No lymphadenopathy in the chest.    HEART/VASCULATURE: Mild cardiomegaly. No pericardial effusion. Coronary   and valvular calcifications. Left chest wall pacemaker with leads in the   right atrium and right ventricle. Calcifications of the aorta and its   branch vessels.    AIRWAYS/LUNGS/PLEURA: Patent central airways. New bilateral upper lobe   tree-in-bud nodular opacities. Previously demonstrated 1.5 cm nodule in   the left upper lobe nodule is decreased in size. An 8 mm perifissural   right upper lobe nodule is slightly increased in size and conspicuity.   Scattered subsegmental atelectasis in the right lung. No pleural effusion.    UPPER ABDOMEN: 3.4 cm left adrenal nodule increased in size since prior   exam (previously 2.0 cm on 10/16/2023). Hepatic cysts. Partially   visualized indeterminate exophytic left renal upper pole unchanged in   size.    BONES/SOFT TISSUES: Degenerative changes of the spine.    IMPRESSION:    New areas of bilateral tree in bud nodules compatible with small airways   infection.    Previously demonstrated left upper lobe nodule is decreased in size.   Slightly increased right upper lobe nodule is indeterminate. Decreased   size of prior left upper lobe nodule.    3.4 cm left adrenal nodule is significantly increased since prior exam.   Recommend CT or MRI of the abdomen and pelvis for further evaluation.    --- End of Report ---      < end of copied text >     PULMONARY CONSULT    HPI: 89 y/o F with PMH of CVA, YOVANI on CPAP, HTN, HLD, Afib on Eliquis, CHF. Recent admission at  1 month ago for PNA, completed short course of ABX. Completed additional course of ABX last week for UTI. Presents to the ED with coughing, SOB, wheezing x several days. Found to be Flu +. Labs notable for leukocytosis, elevated proBNP.  Pulmonary called to consult for further management. Never smoker. Pt reports hx of asthma, on inhalers but unable to recall the name. Also with hx of YOVANI, denies CPAP use. Reports dyspnea improving still with c/o cough, wheezing. Denies CP, pleuritic chest pain, fever, chills. O2 sats 97% on room air.       PAST MEDICAL & SURGICAL HISTORY:  Atrial fibrillation  HTN (hypertension)  HLD (hyperlipidemia)  UTI (urinary tract infection)  Thyroid nodule  YOVANI on CPAP  OA (osteoarthritis)  Cerebrovascular accident (CVA)  Allergies  Cardizem (Urticaria)  sulfa drugs (Hives)    Intolerances  OxyContin (Sedation/Somnol; Drowsiness)    FAMILY HISTORY: non contributory     Social history: never smoker     Review of Systems:  CONSTITUTIONAL: No fever, chills, or fatigue  EYES: No eye pain, visual disturbances, or discharge  ENMT:  No difficulty hearing, tinnitus, vertigo; No sinus or throat pain  NECK: No pain or stiffness  RESPIRATORY: Per above  CARDIOVASCULAR: No chest pain, palpitations, dizziness, or leg swelling  GASTROINTESTINAL: No abdominal or epigastric pain. No nausea, vomiting, or hematemesis; No diarrhea or constipation. No melena or hematochezia.  GENITOURINARY: No dysuria, frequency, hematuria, or incontinence  NEUROLOGICAL: No headaches, memory loss, loss of strength, numbness, or tremors  SKIN: No itching, burning, rashes, or lesions   MUSCULOSKELETAL: No joint pain or swelling; No muscle, back, or extremity pain  PSYCHIATRIC: No depression, anxiety, mood swings, or difficulty sleeping      Medications:  MEDICATIONS  (STANDING):  albuterol/ipratropium for Nebulization 3 milliLiter(s) Nebulizer every 6 hours  apixaban 5 milliGRAM(s) Oral every 12 hours  atenolol  Tablet 37.5 milliGRAM(s) Oral daily  atorvastatin 10 milliGRAM(s) Oral at bedtime  budesonide 160 MICROgram(s)/formoterol 4.5 MICROgram(s) Inhaler 2 Puff(s) Inhalation two times a day  busPIRone 15 milliGRAM(s) Oral two times a day  cefTRIAXone   IVPB 1000 milliGRAM(s) IV Intermittent every 24 hours  eplerenone 50 milliGRAM(s) Oral daily  levothyroxine 25 MICROGram(s) Oral daily  losartan 25 milliGRAM(s) Oral daily  oseltamivir 75 milliGRAM(s) Oral two times a day  pregabalin 25 milliGRAM(s) Oral two times a day    MEDICATIONS  (PRN):  diphenhydrAMINE 25 milliGRAM(s) Oral at bedtime PRN Insomnia      Vital Signs Last 24 Hrs  T(C): 36.2 (05 Jan 2024 12:08), Max: 38.6 (04 Jan 2024 15:38)  T(F): 97.2 (05 Jan 2024 12:08), Max: 101.4 (04 Jan 2024 15:38)  HR: 89 (05 Jan 2024 12:08) (76 - 109)  BP: 146/88 (05 Jan 2024 12:08) (120/78 - 159/74)  BP(mean): --  RR: 17 (05 Jan 2024 12:08) (17 - 22)  SpO2: 96% (05 Jan 2024 12:08) (93% - 97%)    Parameters below as of 05 Jan 2024 12:08  Patient On (Oxygen Delivery Method): room air          VBG pH 7.44 01-04 @ 15:30  VBG pCO2 45 01-04 @ 15:30  VBG O2 sat 77.3 01-04 @ 15:30  VBG lactate 2.2 01-04 @ 15:30            LABS:                        15.7   15.69 )-----------( 116      ( 05 Jan 2024 07:31 )             50.0     01-05    142  |  101  |  33<H>  ----------------------------<  109<H>  4.2   |  25  |  1.07    Ca    9.3      05 Jan 2024 07:31  Phos  4.7     01-05  Mg     2.2     01-05    TPro  6.2  /  Alb  3.7  /  TBili  1.2  /  DBili  x   /  AST  27  /  ALT  24  /  AlkPhos  66  01-04      PT/INR - ( 04 Jan 2024 15:56 )   PT: 16.4 sec;   INR: 1.58 ratio         PTT - ( 04 Jan 2024 15:56 )  PTT:24.3 sec  Urinalysis Basic - ( 05 Jan 2024 07:31 )    Color: x / Appearance: x / SG: x / pH: x  Gluc: 109 mg/dL / Ketone: x  / Bili: x / Urobili: x   Blood: x / Protein: x / Nitrite: x   Leuk Esterase: x / RBC: x / WBC x   Sq Epi: x / Non Sq Epi: x / Bacteria: x    Physical Examination:    General: No acute distress.      HEENT: Pupils equal, reactive to light.  Symmetric.    PULM: b/l exp wheeze     CVS: S1, S2    ABD: Soft, nondistended, nontender, normoactive bowel sounds, no masses    EXT: No edema, nontender    SKIN: Warm and well perfused, no rashes noted.    NEURO: Alert, oriented, interactive, nonfocal    RADIOLOGY REVIEWED    < from: CT Chest No Cont (01.04.24 @ 19:38) >  FINDINGS:    LYMPH NODES: No lymphadenopathy in the chest.    HEART/VASCULATURE: Mild cardiomegaly. No pericardial effusion. Coronary   and valvular calcifications. Left chest wall pacemaker with leads in the   right atrium and right ventricle. Calcifications of the aorta and its   branch vessels.    AIRWAYS/LUNGS/PLEURA: Patent central airways. New bilateral upper lobe   tree-in-bud nodular opacities. Previously demonstrated 1.5 cm nodule in   the left upper lobe nodule is decreased in size. An 8 mm perifissural   right upper lobe nodule is slightly increased in size and conspicuity.   Scattered subsegmental atelectasis in the right lung. No pleural effusion.    UPPER ABDOMEN: 3.4 cm left adrenal nodule increased in size since prior   exam (previously 2.0 cm on 10/16/2023). Hepatic cysts. Partially   visualized indeterminate exophytic left renal upper pole unchanged in   size.    BONES/SOFT TISSUES: Degenerative changes of the spine.    IMPRESSION:    New areas of bilateral tree in bud nodules compatible with small airways   infection.    Previously demonstrated left upper lobe nodule is decreased in size.   Slightly increased right upper lobe nodule is indeterminate. Decreased   size of prior left upper lobe nodule.    3.4 cm left adrenal nodule is significantly increased since prior exam.   Recommend CT or MRI of the abdomen and pelvis for further evaluation.    --- End of Report ---      < end of copied text >

## 2024-01-05 NOTE — PHYSICAL THERAPY INITIAL EVALUATION ADULT - PERTINENT HX OF CURRENT PROBLEM, REHAB EVAL
Pt is a 89 year old female with PMH significant for CVA, YOVANI on CPAP, HTN, HLD, A-fib on Eliquis, heart failure on furosemide, recently admitted 1 month ago to [facility] for pneumonia completed a course of antibiotics (for 3 days), completed a course of cefpodoxime last week for UTI.  Pt presents with several days of coughing, increased wheezing, shortness of breath.  Pt reports symptoms started about a week ago. Patient found to be influenza A positive in ED, s/p oseltamavir.  Patient admitted for sepsis management.   CXR(1/4): No focal consolidations.  CT Chest(1/4): New areas of bilateral tree in bud nodules compatible with small airways infection. Previously demonstrated left upper lobe nodule is decreased in size. Slightly increased right upper lobe nodule is indeterminate. Decreased size of prior left upper lobe nodule. 3.4 cm left adrenal nodule is significantly increased since prior exam. Recommend CT or MRI of the abdomen and pelvis for further evaluation.

## 2024-01-05 NOTE — PROGRESS NOTE ADULT - ASSESSMENT
89-year-old female PMH of CVA, YOVANI on CPAP, HTN, HLD, A-fib on Eliquis, heart failure on furosemide, presents to the ED complaining of several days of coughing, increased wheezing, shortness of breath, and found to be influenza positive. Patient admitted for sepsis management.       Sepsis.   ·  Plan: Tmax 101.4, tachycardic, tachypneic. Likely pulmonary source.   - Blood cultures drawn and pending  - Check UA  - Will cover empirically with ceftriaxone for now  - Continue oseltamavir  - Monitor fever curve.    Pneumonia due to influenza A virus.   ·  Plan: Influenza A positive. CT chest showing B/L tree in bud nodules with small airway infection. Likely viral pneumonia but cannot rule out underlying bacterial pneumonia as well  - F/u legionella ag  - Continue oseltamavir  - Continue ceftriaxone for now   - Monitor respiratory status, supplemental O2 as needed.    Asthma exacerbation.   ·  Plan: Wheezing on exam, likely triggered by influenza. Currently on room air.  - Ordered duoneb q6h  - Continue symbicort  - Will hold off steroids for now given sepsis.    Chronic systolic congestive heart failure.   ·  Plan: Not currently in decompensated HF. TTE from 8/29/23 showing normal LVSF, EF 61%  - Will continue eplerenone, atenolol, losartan for now   - Will hold lasix for now given sepsis and current euvolemic status   - Monitor I/Os, daily weight  - BMP daily, monitor electrolytes while on diuretics.    Chronic atrial fibrillation.   ·  Plan: EKG personally reviewed showing afib with HR 120s, qtc 469  - Continue eliquis  - HR improved in ED to 80s.     Prophylactic measure.   ·  Plan: DVT ppx: on eliquis  Sleep: on benadryl qhs prn  Diet: DASH  Dispo: pending.

## 2024-01-05 NOTE — CONSULT NOTE ADULT - PROBLEM SELECTOR RECOMMENDATION 9
2nd to Influenza A +/- bacterial PNA  -Continue Duoneb q6h  -Continue Symbicort 160/4.5 mcg 2 puffs BID (home med Breo Ellipta)  -Will likely start steroids  -Keep sats >90% with O2 PRN, currently RA. 2nd to Influenza A +/- bacterial PNA  -Continue Duoneb q6h  -Continue Symbicort 160/4.5 mcg 2 puffs BID (home med Breo Ellipta)  -Start Prednisone 40 mg PO qd x5 days   -Keep sats >90% with O2 PRN, currently RA.

## 2024-01-05 NOTE — CONSULT NOTE ADULT - ASSESSMENT
89 y/o F with PMH of CVA, YOVANI on CPAP, HTN, HLD, Afib on Eliquis, CHF. Recent admission at [facility] 1 month ago for PNA, completed short course of ABX. Completed additional course of ABX last week for UTI. Presents to the ED with coughing, SOB, wheezing x several days. Found to be Flu + 89 y/o F with PMH of CVA, YOVANI on CPAP, HTN, HLD, Afib on Eliquis, CHF. Recent admission at [facility] 1 month ago for PNA, completed short course of ABX. Completed additional course of ABX last week for UTI. Presents to the ED with coughing, SOB, wheezing x several days. Found to be Flu +

## 2024-01-05 NOTE — PHYSICAL THERAPY INITIAL EVALUATION ADULT - IMPAIRMENTS CONTRIBUTING TO GAIT DEVIATIONS, PT EVAL
Pt with LOB x 1 during ambulation, able to regain balance with assist/impaired balance/impaired postural control/decreased strength

## 2024-01-05 NOTE — PHYSICAL THERAPY INITIAL EVALUATION ADULT - NSPTDISCHREC_GEN_A_CORE
If pt d/c home, pt would require Home PT, assist with ALL mobility, & DME: rolling walker, 3:1 commode/Sub-acute Rehab

## 2024-01-05 NOTE — PROGRESS NOTE ADULT - SUBJECTIVE AND OBJECTIVE BOX
89-year-old female PMH of CVA, YOVANI on CPAP, HTN, HLD, A-fib on Eliquis, heart failure on furosemide, recently admitted 1 month ago to [facility] for pneumonia completed a course of antibiotics (for 3 days), completed a course of cefpodoxime last week for UTI presents to the ED complaining of several days of coughing, increased wheezing, shortness of breath. Symptoms started a week ago. Patient denied any fevers or other symptoms. Patient's daughter also with flu-like symptoms recently. Pt uses an albuterol inhaler at home as needed and recently started using it twice a day (usually uses it once a day). Pt also gets nebulizer treatments regularly. She is independent with her ADLs, and walks with a cane. No recent falls at home. Patient found to be influenza A positive in ED, s/p oseltamavir.  (04 Jan 2024 20:29)    01-05-24 @ 11:57  PAST MEDICAL & SURGICAL HISTORY:  Atrial fibrillation  dx 7/09 on coumadin      HTN (hypertension)      HLD (hyperlipidemia)      UTI (urinary tract infection)  frequent last was 1/15      Thyroid nodule  followed by endocrinologist      YOVANI on CPAP      OA (osteoarthritis)      Cerebrovascular accident (CVA)      FHx: total knee replacement  left 2012      Cataract  b/l lense implant      S/P JAY-BSO  40 years ago      FHx: cholecystectomy  1993 laparoscopic      Fracture  ORIF right ankle 1986      Cardiac pacemaker  placed 2009          Review of Systems:   CONSTITUTIONAL: No fever, weight loss, or fatigue  EYES: No eye pain, visual disturbances, or discharge  ENMT:  No difficulty hearing, tinnitus, vertigo; No sinus or throat pain  NECK: No pain or stiffness  BREASTS: No pain, masses, or nipple discharge  RESPIRATORY: ++cough, wheezing, No chills or hemoptysis; No shortness of breath  CARDIOVASCULAR: No chest pain, palpitations, dizziness, or leg swelling  GASTROINTESTINAL: No abdominal or epigastric pain. No nausea, vomiting, or hematemesis; No diarrhea or constipation. No melena or hematochezia.  GENITOURINARY: No dysuria, frequency, hematuria, or incontinence  NEUROLOGICAL: No headaches, memory loss, loss of strength, numbness, or tremors  SKIN: No itching, burning, rashes, or lesions   LYMPH NODES: No enlarged glands  ENDOCRINE: No heat or cold intolerance; No hair loss  MUSCULOSKELETAL: No joint pain or swelling; No muscle, back, or extremity pain  PSYCHIATRIC: No depression, anxiety, mood swings, or difficulty sleeping  HEME/LYMPH: No easy bruising, or bleeding gums  ALLERY AND IMMUNOLOGIC: No hives or eczema    Allergies    Cardizem (Urticaria)  sulfa drugs (Hives)    Intolerances    OxyContin (Sedation/Somnol; Drowsiness)      Social History:     FAMILY HISTORY:      MEDICATIONS  (STANDING):  albuterol/ipratropium for Nebulization 3 milliLiter(s) Nebulizer every 6 hours  apixaban 5 milliGRAM(s) Oral every 12 hours  atenolol  Tablet 37.5 milliGRAM(s) Oral daily  atorvastatin 10 milliGRAM(s) Oral at bedtime  budesonide 160 MICROgram(s)/formoterol 4.5 MICROgram(s) Inhaler 2 Puff(s) Inhalation two times a day  busPIRone 15 milliGRAM(s) Oral two times a day  cefTRIAXone   IVPB 1000 milliGRAM(s) IV Intermittent every 24 hours  eplerenone 50 milliGRAM(s) Oral daily  levothyroxine 25 MICROGram(s) Oral daily  losartan 25 milliGRAM(s) Oral daily  oseltamivir 75 milliGRAM(s) Oral two times a day  pregabalin 25 milliGRAM(s) Oral two times a day    MEDICATIONS  (PRN):  diphenhydrAMINE 25 milliGRAM(s) Oral at bedtime PRN Insomnia      Vital Signs Last 24 Hrs  T(C): 36.3 (05 Jan 2024 07:41), Max: 38.6 (04 Jan 2024 15:38)  T(F): 97.4 (05 Jan 2024 07:41), Max: 101.4 (04 Jan 2024 15:38)  HR: 87 (05 Jan 2024 07:41) (76 - 109)  BP: 153/77 (05 Jan 2024 07:41) (120/78 - 159/74)  BP(mean): --  RR: 17 (05 Jan 2024 07:41) (17 - 22)  SpO2: 95% (05 Jan 2024 07:41) (93% - 97%)    Parameters below as of 05 Jan 2024 07:41  Patient On (Oxygen Delivery Method): room air      CAPILLARY BLOOD GLUCOSE        I&O's Summary      PHYSICAL EXAM:  GENERAL: NAD, well-developed  HEAD:  Atraumatic, Normocephalic  EYES: EOMI, PERRLA, conjunctiva and sclera clear  NECK: Supple, No JVD  CHEST/LUNG: po  wheeze and rhonchi  HEART: Regular rate and rhythm; No murmurs, rubs, or gallops  ABDOMEN: Soft, Nontender, Nondistended; Bowel sounds present  EXTREMITIES:  2+ Peripheral Pulses, No clubbing, cyanosis, or edema  PSYCH: AAOx3  NEUROLOGY: non-focal  SKIN: No rashes or lesions    LABS:                        15.7   15.69 )-----------( 116      ( 05 Jan 2024 07:31 )             50.0     01-05    142  |  101  |  33<H>  ----------------------------<  109<H>  4.2   |  25  |  1.07    Ca    9.3      05 Jan 2024 07:31  Phos  4.7     01-05  Mg     2.2     01-05    TPro  6.2  /  Alb  3.7  /  TBili  1.2  /  DBili  x   /  AST  27  /  ALT  24  /  AlkPhos  66  01-04    PT/INR - ( 04 Jan 2024 15:56 )   PT: 16.4 sec;   INR: 1.58 ratio         PTT - ( 04 Jan 2024 15:56 )  PTT:24.3 sec      Urinalysis Basic - ( 05 Jan 2024 07:31 )    Color: x / Appearance: x / SG: x / pH: x  Gluc: 109 mg/dL / Ketone: x  / Bili: x / Urobili: x   Blood: x / Protein: x / Nitrite: x   Leuk Esterase: x / RBC: x / WBC x   Sq Epi: x / Non Sq Epi: x / Bacteria: x    < from: Xray Chest 1 View- PORTABLE-Urgent (01.04.24 @ 16:04) >  FINDINGS:  Left chest wall pacemaker with intact leads.  The heart is normal in size.  The lungs are clear.  There is no pneumothorax or pleural effusion.  No acute bony abnormality.    IMPRESSION:  No focal consolidations.      < end of copied text >    < from: CT Chest No Cont (01.04.24 @ 19:38) >  FINDINGS:    LYMPH NODES: No lymphadenopathy in the chest.    HEART/VASCULATURE: Mild cardiomegaly. No pericardial effusion. Coronary   and valvular calcifications. Left chest wall pacemaker with leads in the   right atrium and right ventricle. Calcifications of the aorta and its   branch vessels.    AIRWAYS/LUNGS/PLEURA: Patent central airways. New bilateral upper lobe   tree-in-bud nodular opacities. Previously demonstrated 1.5 cm nodule in   the left upper lobe nodule is decreased in size. An 8 mm perifissural   right upper lobe nodule is slightly increased in size and conspicuity.   Scattered subsegmental atelectasis in the right lung. No pleural effusion.    UPPER ABDOMEN: 3.4 cm left adrenal nodule increased in size since prior   exam (previously 2.0 cm on 10/16/2023). Hepatic cysts. Partially   visualized indeterminate exophytic left renal upper pole unchanged in   size.    BONES/SOFT TISSUES: Degenerative changes of the spine.    IMPRESSION:    New areas of bilateral tree in bud nodules compatible with small airways   infection.    Previously demonstrated left upper lobe nodule is decreased in size.   Slightly increased right upper lobe nodule is indeterminate. Decreased   size of prior left upper lobe nodule.    3.4 cm left adrenal nodule is significantly increased since prior exam.   Recommend CT or MRI of the abdomen and pelvis for further evaluation.    --- End of Report ---        < end of copied text >    RADIOLOGY & ADDITIONAL TESTS:    Imaging Personally Reviewed:    Consultant(s) Notes Reviewed:      Care Discussed with Consultants/Other Providers:

## 2024-01-05 NOTE — CONSULT NOTE ADULT - NS ATTEND AMEND GEN_ALL_CORE FT
symbicort 160/4.5 2 puffs bid  prednisone 40mg x 5d  can dc in am with ceftin 500mg bid for total abx of 5d  fu in office

## 2024-01-05 NOTE — PHYSICAL THERAPY INITIAL EVALUATION ADULT - IMPAIRED TRANSFERS: TOILET, REHAB EVAL
Pt c/o of mild dizziness upon entering bathroom, BP stable 146/74 mmHg./impaired balance/impaired postural control/decreased strength

## 2024-01-05 NOTE — CONSULT NOTE ADULT - PROBLEM SELECTOR RECOMMENDATION 3
CT chest with COLEEN nodule, decreased in size from prior imaging, RUL nodule  -? infectious in nature  -Suggest repeat CT chest as an outpatient.

## 2024-01-05 NOTE — CONSULT NOTE ADULT - PROBLEM SELECTOR RECOMMENDATION 5
-CT chest with b/l TIB opacities, may be viral +/- bacterial PNA  -Leukocytosis downtrending  -Check procalcitonin  -Continue ABX, likely will complete short course. -CT chest with b/l TIB opacities, may be viral +/- bacterial PNA  -Leukocytosis downtrending  -Check procalcitonin  -Continue ABX. Suggest 5 day course, when d/c planning can transition to PO Ceftin 500 mg PO BID.

## 2024-01-05 NOTE — PHYSICAL THERAPY INITIAL EVALUATION ADULT - ADDITIONAL COMMENTS
Pt lives in a house with 3 DEONNA and bedroom and bathroom on the first floor.  Pt lives with daughter.  Pt ambulates independently in the house, uses a cane for community ambulation sometimes.  Pt has a HHA 3x per week (hours unknown), and daughter to assist all other times.  Pt requires assist for ADLs.  Pt owns a shower chair and grab bars.

## 2024-01-05 NOTE — CONSULT NOTE ADULT - PROBLEM SELECTOR RECOMMENDATION 2
-CT chest with b/l TIB opacities, may be viral +/- bacterial PNA   -Continue Tamiflu  -Steroids/bronchodilators as above.

## 2024-01-06 LAB
ANION GAP SERPL CALC-SCNC: 13 MMOL/L — SIGNIFICANT CHANGE UP (ref 5–17)
ANION GAP SERPL CALC-SCNC: 13 MMOL/L — SIGNIFICANT CHANGE UP (ref 5–17)
BUN SERPL-MCNC: 38 MG/DL — HIGH (ref 7–23)
BUN SERPL-MCNC: 38 MG/DL — HIGH (ref 7–23)
CALCIUM SERPL-MCNC: 8.9 MG/DL — SIGNIFICANT CHANGE UP (ref 8.4–10.5)
CALCIUM SERPL-MCNC: 8.9 MG/DL — SIGNIFICANT CHANGE UP (ref 8.4–10.5)
CHLORIDE SERPL-SCNC: 102 MMOL/L — SIGNIFICANT CHANGE UP (ref 96–108)
CHLORIDE SERPL-SCNC: 102 MMOL/L — SIGNIFICANT CHANGE UP (ref 96–108)
CO2 SERPL-SCNC: 26 MMOL/L — SIGNIFICANT CHANGE UP (ref 22–31)
CO2 SERPL-SCNC: 26 MMOL/L — SIGNIFICANT CHANGE UP (ref 22–31)
CREAT SERPL-MCNC: 1.02 MG/DL — SIGNIFICANT CHANGE UP (ref 0.5–1.3)
CREAT SERPL-MCNC: 1.02 MG/DL — SIGNIFICANT CHANGE UP (ref 0.5–1.3)
EGFR: 52 ML/MIN/1.73M2 — LOW
EGFR: 52 ML/MIN/1.73M2 — LOW
GLUCOSE SERPL-MCNC: 109 MG/DL — HIGH (ref 70–99)
GLUCOSE SERPL-MCNC: 109 MG/DL — HIGH (ref 70–99)
HCT VFR BLD CALC: 49 % — HIGH (ref 34.5–45)
HCT VFR BLD CALC: 49 % — HIGH (ref 34.5–45)
HGB BLD-MCNC: 15.5 G/DL — SIGNIFICANT CHANGE UP (ref 11.5–15.5)
HGB BLD-MCNC: 15.5 G/DL — SIGNIFICANT CHANGE UP (ref 11.5–15.5)
MCHC RBC-ENTMCNC: 28 PG — SIGNIFICANT CHANGE UP (ref 27–34)
MCHC RBC-ENTMCNC: 28 PG — SIGNIFICANT CHANGE UP (ref 27–34)
MCHC RBC-ENTMCNC: 31.6 GM/DL — LOW (ref 32–36)
MCHC RBC-ENTMCNC: 31.6 GM/DL — LOW (ref 32–36)
MCV RBC AUTO: 88.4 FL — SIGNIFICANT CHANGE UP (ref 80–100)
MCV RBC AUTO: 88.4 FL — SIGNIFICANT CHANGE UP (ref 80–100)
NRBC # BLD: 0 /100 WBCS — SIGNIFICANT CHANGE UP (ref 0–0)
NRBC # BLD: 0 /100 WBCS — SIGNIFICANT CHANGE UP (ref 0–0)
PLATELET # BLD AUTO: 191 K/UL — SIGNIFICANT CHANGE UP (ref 150–400)
PLATELET # BLD AUTO: 191 K/UL — SIGNIFICANT CHANGE UP (ref 150–400)
POTASSIUM SERPL-MCNC: 3.8 MMOL/L — SIGNIFICANT CHANGE UP (ref 3.5–5.3)
POTASSIUM SERPL-MCNC: 3.8 MMOL/L — SIGNIFICANT CHANGE UP (ref 3.5–5.3)
POTASSIUM SERPL-SCNC: 3.8 MMOL/L — SIGNIFICANT CHANGE UP (ref 3.5–5.3)
POTASSIUM SERPL-SCNC: 3.8 MMOL/L — SIGNIFICANT CHANGE UP (ref 3.5–5.3)
PROCALCITONIN SERPL-MCNC: 0.1 NG/ML — SIGNIFICANT CHANGE UP (ref 0.02–0.1)
PROCALCITONIN SERPL-MCNC: 0.1 NG/ML — SIGNIFICANT CHANGE UP (ref 0.02–0.1)
RBC # BLD: 5.54 M/UL — HIGH (ref 3.8–5.2)
RBC # BLD: 5.54 M/UL — HIGH (ref 3.8–5.2)
RBC # FLD: 16.6 % — HIGH (ref 10.3–14.5)
RBC # FLD: 16.6 % — HIGH (ref 10.3–14.5)
SODIUM SERPL-SCNC: 141 MMOL/L — SIGNIFICANT CHANGE UP (ref 135–145)
SODIUM SERPL-SCNC: 141 MMOL/L — SIGNIFICANT CHANGE UP (ref 135–145)
WBC # BLD: 15.97 K/UL — HIGH (ref 3.8–10.5)
WBC # BLD: 15.97 K/UL — HIGH (ref 3.8–10.5)
WBC # FLD AUTO: 15.97 K/UL — HIGH (ref 3.8–10.5)
WBC # FLD AUTO: 15.97 K/UL — HIGH (ref 3.8–10.5)

## 2024-01-06 PROCEDURE — 70450 CT HEAD/BRAIN W/O DYE: CPT | Mod: 26

## 2024-01-06 RX ORDER — ONDANSETRON 8 MG/1
4 TABLET, FILM COATED ORAL ONCE
Refills: 0 | Status: COMPLETED | OUTPATIENT
Start: 2024-01-06 | End: 2024-01-06

## 2024-01-06 RX ORDER — IPRATROPIUM/ALBUTEROL SULFATE 18-103MCG
3 AEROSOL WITH ADAPTER (GRAM) INHALATION ONCE
Refills: 0 | Status: COMPLETED | OUTPATIENT
Start: 2024-01-06 | End: 2024-01-06

## 2024-01-06 RX ORDER — ACETAMINOPHEN 500 MG
650 TABLET ORAL ONCE
Refills: 0 | Status: COMPLETED | OUTPATIENT
Start: 2024-01-06 | End: 2024-01-06

## 2024-01-06 RX ADMIN — EPLERENONE 50 MILLIGRAM(S): 50 TABLET, FILM COATED ORAL at 06:04

## 2024-01-06 RX ADMIN — ONDANSETRON 4 MILLIGRAM(S): 8 TABLET, FILM COATED ORAL at 20:50

## 2024-01-06 RX ADMIN — ATENOLOL 37.5 MILLIGRAM(S): 25 TABLET ORAL at 06:06

## 2024-01-06 RX ADMIN — Medication 75 MILLIGRAM(S): at 17:57

## 2024-01-06 RX ADMIN — Medication 3 MILLILITER(S): at 11:31

## 2024-01-06 RX ADMIN — Medication 650 MILLIGRAM(S): at 20:50

## 2024-01-06 RX ADMIN — Medication 3 MILLILITER(S): at 06:06

## 2024-01-06 RX ADMIN — LOSARTAN POTASSIUM 25 MILLIGRAM(S): 100 TABLET, FILM COATED ORAL at 06:05

## 2024-01-06 RX ADMIN — BUDESONIDE AND FORMOTEROL FUMARATE DIHYDRATE 2 PUFF(S): 160; 4.5 AEROSOL RESPIRATORY (INHALATION) at 06:07

## 2024-01-06 RX ADMIN — Medication 25 MILLIGRAM(S): at 23:32

## 2024-01-06 RX ADMIN — Medication 3 MILLILITER(S): at 20:51

## 2024-01-06 RX ADMIN — Medication 3 MILLILITER(S): at 17:57

## 2024-01-06 RX ADMIN — APIXABAN 5 MILLIGRAM(S): 2.5 TABLET, FILM COATED ORAL at 17:58

## 2024-01-06 RX ADMIN — Medication 15 MILLIGRAM(S): at 06:04

## 2024-01-06 RX ADMIN — Medication 40 MILLIGRAM(S): at 06:05

## 2024-01-06 RX ADMIN — CEFTRIAXONE 100 MILLIGRAM(S): 500 INJECTION, POWDER, FOR SOLUTION INTRAMUSCULAR; INTRAVENOUS at 23:55

## 2024-01-06 RX ADMIN — Medication 75 MILLIGRAM(S): at 06:04

## 2024-01-06 RX ADMIN — Medication 3 MILLILITER(S): at 23:32

## 2024-01-06 RX ADMIN — ATORVASTATIN CALCIUM 10 MILLIGRAM(S): 80 TABLET, FILM COATED ORAL at 23:32

## 2024-01-06 RX ADMIN — APIXABAN 5 MILLIGRAM(S): 2.5 TABLET, FILM COATED ORAL at 06:05

## 2024-01-06 RX ADMIN — Medication 25 MICROGRAM(S): at 06:05

## 2024-01-06 RX ADMIN — BUDESONIDE AND FORMOTEROL FUMARATE DIHYDRATE 2 PUFF(S): 160; 4.5 AEROSOL RESPIRATORY (INHALATION) at 17:57

## 2024-01-06 RX ADMIN — Medication 650 MILLIGRAM(S): at 21:50

## 2024-01-06 RX ADMIN — Medication 15 MILLIGRAM(S): at 17:57

## 2024-01-06 RX ADMIN — Medication 25 MILLIGRAM(S): at 17:57

## 2024-01-06 RX ADMIN — Medication 25 MILLIGRAM(S): at 06:06

## 2024-01-06 NOTE — PROGRESS NOTE ADULT - SUBJECTIVE AND OBJECTIVE BOX
DATE OF SERVICE: 01-06-24 @ 10:09    Patient is a 89y old  Female who presents with a chief complaint of SOB (05 Jan 2024 12:48)      SUBJECTIVE / OVERNIGHT EVENTS:  c/o cough , wheezing and sob    MEDICATIONS  (STANDING):  albuterol/ipratropium for Nebulization 3 milliLiter(s) Nebulizer every 6 hours  apixaban 5 milliGRAM(s) Oral every 12 hours  atenolol  Tablet 37.5 milliGRAM(s) Oral daily  atorvastatin 10 milliGRAM(s) Oral at bedtime  budesonide 160 MICROgram(s)/formoterol 4.5 MICROgram(s) Inhaler 2 Puff(s) Inhalation two times a day  busPIRone 15 milliGRAM(s) Oral two times a day  cefTRIAXone   IVPB 1000 milliGRAM(s) IV Intermittent every 24 hours  eplerenone 50 milliGRAM(s) Oral daily  levothyroxine 25 MICROGram(s) Oral daily  losartan 25 milliGRAM(s) Oral daily  oseltamivir 75 milliGRAM(s) Oral two times a day  predniSONE   Tablet 40 milliGRAM(s) Oral daily  pregabalin 25 milliGRAM(s) Oral two times a day    MEDICATIONS  (PRN):  diphenhydrAMINE 25 milliGRAM(s) Oral at bedtime PRN Insomnia      Vital Signs Last 24 Hrs  T(C): 36.4 (06 Jan 2024 09:30), Max: 36.8 (06 Jan 2024 04:24)  T(F): 97.5 (06 Jan 2024 09:30), Max: 98.2 (06 Jan 2024 04:24)  HR: 58 (06 Jan 2024 09:30) (58 - 89)  BP: 129/61 (06 Jan 2024 09:30) (120/80 - 155/87)  BP(mean): --  RR: 18 (06 Jan 2024 09:30) (17 - 18)  SpO2: 93% (06 Jan 2024 09:30) (92% - 97%)    Parameters below as of 06 Jan 2024 09:30  Patient On (Oxygen Delivery Method): room air      CAPILLARY BLOOD GLUCOSE        I&O's Summary    05 Jan 2024 07:01  -  06 Jan 2024 07:00  --------------------------------------------------------  IN: 930 mL / OUT: 700 mL / NET: 230 mL        PHYSICAL EXAM:  GENERAL: NAD, well-developed  HEAD:  Atraumatic, Normocephalic  EYES: EOMI, PERRLA, conjunctiva and sclera clear  NECK: Supple, No JVD  CHEST/LUNG: po wheeze  HEART: Regular rate and rhythm; No murmurs, rubs, or gallops  ABDOMEN: Soft, Nontender, Nondistended; Bowel sounds present  EXTREMITIES:  2+ Peripheral Pulses, No clubbing, cyanosis, or edema  PSYCH: AAOx3  NEUROLOGY: non-focal  SKIN: No rashes or lesions    LABS:                        15.5   15.97 )-----------( 191      ( 06 Jan 2024 06:46 )             49.0     01-06    141  |  102  |  38<H>  ----------------------------<  109<H>  3.8   |  26  |  1.02    Ca    8.9      06 Jan 2024 06:46  Phos  4.7     01-05  Mg     2.2     01-05    TPro  6.2  /  Alb  3.7  /  TBili  1.2  /  DBili  x   /  AST  27  /  ALT  24  /  AlkPhos  66  01-04    PT/INR - ( 04 Jan 2024 15:56 )   PT: 16.4 sec;   INR: 1.58 ratio         PTT - ( 04 Jan 2024 15:56 )  PTT:24.3 sec      Urinalysis Basic - ( 06 Jan 2024 06:46 )    Color: x / Appearance: x / SG: x / pH: x  Gluc: 109 mg/dL / Ketone: x  / Bili: x / Urobili: x   Blood: x / Protein: x / Nitrite: x   Leuk Esterase: x / RBC: x / WBC x   Sq Epi: x / Non Sq Epi: x / Bacteria: x        RADIOLOGY & ADDITIONAL TESTS:    Imaging Personally Reviewed:    Consultant(s) Notes Reviewed:      Care Discussed with Consultants/Other Providers:

## 2024-01-06 NOTE — PROGRESS NOTE ADULT - ASSESSMENT
89-year-old female PMH of CVA, YOVANI on CPAP, HTN, HLD, A-fib on Eliquis, heart failure on furosemide, presents to the ED complaining of several days of coughing, increased wheezing, shortness of breath, and found to be influenza positive. Patient admitted for sepsis management.       Sepsis.   ·  Plan: Tmax 101.4, tachycardic, tachypneic. Likely pulmonary source.   - Blood cultures drawn and pending  - Check UA  - Will cover empirically with ceftriaxone for now  - Continue oseltamavir  - Monitor fever curve.    Asthma exacerbation.   ·  Recommendation: 2nd to Influenza A +/- bacterial PNA  -Continue Duoneb q6h  -Continue Symbicort 160/4.5 mcg 2 puffs BID (home med Breo Ellipta)  -Start Prednisone 40 mg PO qd x5 days   -Keep sats >90% with O2 PRN, currently RA.    simonenza A.    -CT chest with b/l TIB opacities, may be viral +/- bacterial PNA   -Continue Tamiflu  -Steroids/bronchodilators as above.    Chronic systolic congestive heart failure.   -Not currently in decompensated HF. TTE from 8/29/23 showing normal LVSF, EF 61%  - Will continue eplerenone, atenolol, losartan for now   - Will hold lasix for now given sepsis and current euvolemic status   - Monitor I/Os, daily weight  - BMP daily, monitor electrolytes while on diuretics.    Chronic atrial fibrillation.   ·  Plan: EKG personally reviewed showing afib with HR 120s, qtc 469  - Continue eliquis  - HR improved in ED to 80s.     Prophylactic measure.   ·  Plan: DVT ppx: on eliquis  Sleep: on benadryl qhs prn  Diet: DASH  Dispo: pending.

## 2024-01-07 LAB
ALBUMIN SERPL ELPH-MCNC: 3.6 G/DL — SIGNIFICANT CHANGE UP (ref 3.3–5)
ALP SERPL-CCNC: 50 U/L — SIGNIFICANT CHANGE UP (ref 40–120)
ALP SERPL-CCNC: 50 U/L — SIGNIFICANT CHANGE UP (ref 40–120)
ALP SERPL-CCNC: 54 U/L — SIGNIFICANT CHANGE UP (ref 40–120)
ALP SERPL-CCNC: 54 U/L — SIGNIFICANT CHANGE UP (ref 40–120)
ALT FLD-CCNC: 33 U/L — SIGNIFICANT CHANGE UP (ref 10–45)
ALT FLD-CCNC: 33 U/L — SIGNIFICANT CHANGE UP (ref 10–45)
ALT FLD-CCNC: 38 U/L — SIGNIFICANT CHANGE UP (ref 10–45)
ALT FLD-CCNC: 38 U/L — SIGNIFICANT CHANGE UP (ref 10–45)
ANION GAP SERPL CALC-SCNC: 11 MMOL/L — SIGNIFICANT CHANGE UP (ref 5–17)
ANION GAP SERPL CALC-SCNC: 11 MMOL/L — SIGNIFICANT CHANGE UP (ref 5–17)
ANION GAP SERPL CALC-SCNC: 13 MMOL/L — SIGNIFICANT CHANGE UP (ref 5–17)
ANION GAP SERPL CALC-SCNC: 13 MMOL/L — SIGNIFICANT CHANGE UP (ref 5–17)
AST SERPL-CCNC: 20 U/L — SIGNIFICANT CHANGE UP (ref 10–40)
AST SERPL-CCNC: 20 U/L — SIGNIFICANT CHANGE UP (ref 10–40)
AST SERPL-CCNC: 29 U/L — SIGNIFICANT CHANGE UP (ref 10–40)
AST SERPL-CCNC: 29 U/L — SIGNIFICANT CHANGE UP (ref 10–40)
BASE EXCESS BLDV CALC-SCNC: 3.8 MMOL/L — HIGH (ref -2–3)
BASE EXCESS BLDV CALC-SCNC: 3.8 MMOL/L — HIGH (ref -2–3)
BASOPHILS # BLD AUTO: 0.06 K/UL — SIGNIFICANT CHANGE UP (ref 0–0.2)
BASOPHILS # BLD AUTO: 0.06 K/UL — SIGNIFICANT CHANGE UP (ref 0–0.2)
BASOPHILS NFR BLD AUTO: 0.4 % — SIGNIFICANT CHANGE UP (ref 0–2)
BASOPHILS NFR BLD AUTO: 0.4 % — SIGNIFICANT CHANGE UP (ref 0–2)
BILIRUB SERPL-MCNC: 0.7 MG/DL — SIGNIFICANT CHANGE UP (ref 0.2–1.2)
BUN SERPL-MCNC: 43 MG/DL — HIGH (ref 7–23)
BUN SERPL-MCNC: 43 MG/DL — HIGH (ref 7–23)
BUN SERPL-MCNC: 46 MG/DL — HIGH (ref 7–23)
BUN SERPL-MCNC: 46 MG/DL — HIGH (ref 7–23)
CA-I SERPL-SCNC: 1.12 MMOL/L — LOW (ref 1.15–1.33)
CA-I SERPL-SCNC: 1.12 MMOL/L — LOW (ref 1.15–1.33)
CALCIUM SERPL-MCNC: 8.3 MG/DL — LOW (ref 8.4–10.5)
CALCIUM SERPL-MCNC: 8.3 MG/DL — LOW (ref 8.4–10.5)
CALCIUM SERPL-MCNC: 8.5 MG/DL — SIGNIFICANT CHANGE UP (ref 8.4–10.5)
CALCIUM SERPL-MCNC: 8.5 MG/DL — SIGNIFICANT CHANGE UP (ref 8.4–10.5)
CHLORIDE BLDV-SCNC: 102 MMOL/L — SIGNIFICANT CHANGE UP (ref 96–108)
CHLORIDE BLDV-SCNC: 102 MMOL/L — SIGNIFICANT CHANGE UP (ref 96–108)
CHLORIDE SERPL-SCNC: 103 MMOL/L — SIGNIFICANT CHANGE UP (ref 96–108)
CHLORIDE SERPL-SCNC: 103 MMOL/L — SIGNIFICANT CHANGE UP (ref 96–108)
CHLORIDE SERPL-SCNC: 104 MMOL/L — SIGNIFICANT CHANGE UP (ref 96–108)
CHLORIDE SERPL-SCNC: 104 MMOL/L — SIGNIFICANT CHANGE UP (ref 96–108)
CO2 BLDV-SCNC: 31 MMOL/L — HIGH (ref 22–26)
CO2 BLDV-SCNC: 31 MMOL/L — HIGH (ref 22–26)
CO2 SERPL-SCNC: 25 MMOL/L — SIGNIFICANT CHANGE UP (ref 22–31)
CO2 SERPL-SCNC: 25 MMOL/L — SIGNIFICANT CHANGE UP (ref 22–31)
CO2 SERPL-SCNC: 26 MMOL/L — SIGNIFICANT CHANGE UP (ref 22–31)
CO2 SERPL-SCNC: 26 MMOL/L — SIGNIFICANT CHANGE UP (ref 22–31)
CREAT SERPL-MCNC: 0.99 MG/DL — SIGNIFICANT CHANGE UP (ref 0.5–1.3)
CREAT SERPL-MCNC: 0.99 MG/DL — SIGNIFICANT CHANGE UP (ref 0.5–1.3)
CREAT SERPL-MCNC: 1.18 MG/DL — SIGNIFICANT CHANGE UP (ref 0.5–1.3)
CREAT SERPL-MCNC: 1.18 MG/DL — SIGNIFICANT CHANGE UP (ref 0.5–1.3)
EGFR: 44 ML/MIN/1.73M2 — LOW
EGFR: 44 ML/MIN/1.73M2 — LOW
EGFR: 54 ML/MIN/1.73M2 — LOW
EGFR: 54 ML/MIN/1.73M2 — LOW
EOSINOPHIL # BLD AUTO: 0.01 K/UL — SIGNIFICANT CHANGE UP (ref 0–0.5)
EOSINOPHIL # BLD AUTO: 0.01 K/UL — SIGNIFICANT CHANGE UP (ref 0–0.5)
EOSINOPHIL NFR BLD AUTO: 0.1 % — SIGNIFICANT CHANGE UP (ref 0–6)
EOSINOPHIL NFR BLD AUTO: 0.1 % — SIGNIFICANT CHANGE UP (ref 0–6)
FOLATE SERPL-MCNC: >20 NG/ML — SIGNIFICANT CHANGE UP
FOLATE SERPL-MCNC: >20 NG/ML — SIGNIFICANT CHANGE UP
GAS PNL BLDV: 135 MMOL/L — LOW (ref 136–145)
GAS PNL BLDV: 135 MMOL/L — LOW (ref 136–145)
GAS PNL BLDV: SIGNIFICANT CHANGE UP
GLUCOSE BLDC GLUCOMTR-MCNC: 110 MG/DL — HIGH (ref 70–99)
GLUCOSE BLDC GLUCOMTR-MCNC: 110 MG/DL — HIGH (ref 70–99)
GLUCOSE BLDV-MCNC: 100 MG/DL — HIGH (ref 70–99)
GLUCOSE BLDV-MCNC: 100 MG/DL — HIGH (ref 70–99)
GLUCOSE SERPL-MCNC: 108 MG/DL — HIGH (ref 70–99)
GLUCOSE SERPL-MCNC: 108 MG/DL — HIGH (ref 70–99)
GLUCOSE SERPL-MCNC: 139 MG/DL — HIGH (ref 70–99)
GLUCOSE SERPL-MCNC: 139 MG/DL — HIGH (ref 70–99)
HCO3 BLDV-SCNC: 30 MMOL/L — HIGH (ref 22–29)
HCO3 BLDV-SCNC: 30 MMOL/L — HIGH (ref 22–29)
HCT VFR BLD CALC: 46 % — HIGH (ref 34.5–45)
HCT VFR BLD CALC: 46 % — HIGH (ref 34.5–45)
HCT VFR BLD CALC: 49 % — HIGH (ref 34.5–45)
HCT VFR BLD CALC: 49 % — HIGH (ref 34.5–45)
HCT VFR BLDA CALC: 46 % — SIGNIFICANT CHANGE UP (ref 34.5–46.5)
HCT VFR BLDA CALC: 46 % — SIGNIFICANT CHANGE UP (ref 34.5–46.5)
HGB BLD CALC-MCNC: 15.4 G/DL — SIGNIFICANT CHANGE UP (ref 11.7–16.1)
HGB BLD CALC-MCNC: 15.4 G/DL — SIGNIFICANT CHANGE UP (ref 11.7–16.1)
HGB BLD-MCNC: 14.3 G/DL — SIGNIFICANT CHANGE UP (ref 11.5–15.5)
HGB BLD-MCNC: 14.3 G/DL — SIGNIFICANT CHANGE UP (ref 11.5–15.5)
HGB BLD-MCNC: 15 G/DL — SIGNIFICANT CHANGE UP (ref 11.5–15.5)
HGB BLD-MCNC: 15 G/DL — SIGNIFICANT CHANGE UP (ref 11.5–15.5)
IMM GRANULOCYTES NFR BLD AUTO: 1 % — HIGH (ref 0–0.9)
IMM GRANULOCYTES NFR BLD AUTO: 1 % — HIGH (ref 0–0.9)
LACTATE BLDV-MCNC: 1.5 MMOL/L — SIGNIFICANT CHANGE UP (ref 0.5–2)
LACTATE BLDV-MCNC: 1.5 MMOL/L — SIGNIFICANT CHANGE UP (ref 0.5–2)
LYMPHOCYTES # BLD AUTO: 1.39 K/UL — SIGNIFICANT CHANGE UP (ref 1–3.3)
LYMPHOCYTES # BLD AUTO: 1.39 K/UL — SIGNIFICANT CHANGE UP (ref 1–3.3)
LYMPHOCYTES # BLD AUTO: 9.1 % — LOW (ref 13–44)
LYMPHOCYTES # BLD AUTO: 9.1 % — LOW (ref 13–44)
MAGNESIUM SERPL-MCNC: 2.4 MG/DL — SIGNIFICANT CHANGE UP (ref 1.6–2.6)
MCHC RBC-ENTMCNC: 27.6 PG — SIGNIFICANT CHANGE UP (ref 27–34)
MCHC RBC-ENTMCNC: 27.6 PG — SIGNIFICANT CHANGE UP (ref 27–34)
MCHC RBC-ENTMCNC: 27.9 PG — SIGNIFICANT CHANGE UP (ref 27–34)
MCHC RBC-ENTMCNC: 27.9 PG — SIGNIFICANT CHANGE UP (ref 27–34)
MCHC RBC-ENTMCNC: 30.6 GM/DL — LOW (ref 32–36)
MCHC RBC-ENTMCNC: 30.6 GM/DL — LOW (ref 32–36)
MCHC RBC-ENTMCNC: 31.1 GM/DL — LOW (ref 32–36)
MCHC RBC-ENTMCNC: 31.1 GM/DL — LOW (ref 32–36)
MCV RBC AUTO: 89.8 FL — SIGNIFICANT CHANGE UP (ref 80–100)
MCV RBC AUTO: 89.8 FL — SIGNIFICANT CHANGE UP (ref 80–100)
MCV RBC AUTO: 90.2 FL — SIGNIFICANT CHANGE UP (ref 80–100)
MCV RBC AUTO: 90.2 FL — SIGNIFICANT CHANGE UP (ref 80–100)
MONOCYTES # BLD AUTO: 1.17 K/UL — HIGH (ref 0–0.9)
MONOCYTES # BLD AUTO: 1.17 K/UL — HIGH (ref 0–0.9)
MONOCYTES NFR BLD AUTO: 7.7 % — SIGNIFICANT CHANGE UP (ref 2–14)
MONOCYTES NFR BLD AUTO: 7.7 % — SIGNIFICANT CHANGE UP (ref 2–14)
NEUTROPHILS # BLD AUTO: 12.47 K/UL — HIGH (ref 1.8–7.4)
NEUTROPHILS # BLD AUTO: 12.47 K/UL — HIGH (ref 1.8–7.4)
NEUTROPHILS NFR BLD AUTO: 81.7 % — HIGH (ref 43–77)
NEUTROPHILS NFR BLD AUTO: 81.7 % — HIGH (ref 43–77)
NRBC # BLD: 0 /100 WBCS — SIGNIFICANT CHANGE UP (ref 0–0)
PCO2 BLDV: 48 MMHG — HIGH (ref 39–42)
PCO2 BLDV: 48 MMHG — HIGH (ref 39–42)
PH BLDV: 7.4 — SIGNIFICANT CHANGE UP (ref 7.32–7.43)
PH BLDV: 7.4 — SIGNIFICANT CHANGE UP (ref 7.32–7.43)
PHOSPHATE SERPL-MCNC: 3.1 MG/DL — SIGNIFICANT CHANGE UP (ref 2.5–4.5)
PHOSPHATE SERPL-MCNC: 3.1 MG/DL — SIGNIFICANT CHANGE UP (ref 2.5–4.5)
PHOSPHATE SERPL-MCNC: 4.2 MG/DL — SIGNIFICANT CHANGE UP (ref 2.5–4.5)
PHOSPHATE SERPL-MCNC: 4.2 MG/DL — SIGNIFICANT CHANGE UP (ref 2.5–4.5)
PLATELET # BLD AUTO: 172 K/UL — SIGNIFICANT CHANGE UP (ref 150–400)
PLATELET # BLD AUTO: 172 K/UL — SIGNIFICANT CHANGE UP (ref 150–400)
PLATELET # BLD AUTO: 194 K/UL — SIGNIFICANT CHANGE UP (ref 150–400)
PLATELET # BLD AUTO: 194 K/UL — SIGNIFICANT CHANGE UP (ref 150–400)
PO2 BLDV: 63 MMHG — HIGH (ref 25–45)
PO2 BLDV: 63 MMHG — HIGH (ref 25–45)
POTASSIUM BLDV-SCNC: 4.3 MMOL/L — SIGNIFICANT CHANGE UP (ref 3.5–5.1)
POTASSIUM BLDV-SCNC: 4.3 MMOL/L — SIGNIFICANT CHANGE UP (ref 3.5–5.1)
POTASSIUM SERPL-MCNC: 4.3 MMOL/L — SIGNIFICANT CHANGE UP (ref 3.5–5.3)
POTASSIUM SERPL-MCNC: 4.3 MMOL/L — SIGNIFICANT CHANGE UP (ref 3.5–5.3)
POTASSIUM SERPL-MCNC: 4.5 MMOL/L — SIGNIFICANT CHANGE UP (ref 3.5–5.3)
POTASSIUM SERPL-MCNC: 4.5 MMOL/L — SIGNIFICANT CHANGE UP (ref 3.5–5.3)
POTASSIUM SERPL-SCNC: 4.3 MMOL/L — SIGNIFICANT CHANGE UP (ref 3.5–5.3)
POTASSIUM SERPL-SCNC: 4.3 MMOL/L — SIGNIFICANT CHANGE UP (ref 3.5–5.3)
POTASSIUM SERPL-SCNC: 4.5 MMOL/L — SIGNIFICANT CHANGE UP (ref 3.5–5.3)
POTASSIUM SERPL-SCNC: 4.5 MMOL/L — SIGNIFICANT CHANGE UP (ref 3.5–5.3)
PROT SERPL-MCNC: 5.5 G/DL — LOW (ref 6–8.3)
PROT SERPL-MCNC: 5.5 G/DL — LOW (ref 6–8.3)
PROT SERPL-MCNC: 5.9 G/DL — LOW (ref 6–8.3)
PROT SERPL-MCNC: 5.9 G/DL — LOW (ref 6–8.3)
RBC # BLD: 5.12 M/UL — SIGNIFICANT CHANGE UP (ref 3.8–5.2)
RBC # BLD: 5.12 M/UL — SIGNIFICANT CHANGE UP (ref 3.8–5.2)
RBC # BLD: 5.43 M/UL — HIGH (ref 3.8–5.2)
RBC # BLD: 5.43 M/UL — HIGH (ref 3.8–5.2)
RBC # FLD: 16.4 % — HIGH (ref 10.3–14.5)
RBC # FLD: 16.4 % — HIGH (ref 10.3–14.5)
RBC # FLD: 16.7 % — HIGH (ref 10.3–14.5)
RBC # FLD: 16.7 % — HIGH (ref 10.3–14.5)
SAO2 % BLDV: 91.2 % — HIGH (ref 67–88)
SAO2 % BLDV: 91.2 % — HIGH (ref 67–88)
SODIUM SERPL-SCNC: 141 MMOL/L — SIGNIFICANT CHANGE UP (ref 135–145)
T4 FREE SERPL-MCNC: 2.1 NG/DL — HIGH (ref 0.9–1.8)
T4 FREE SERPL-MCNC: 2.1 NG/DL — HIGH (ref 0.9–1.8)
TSH SERPL-MCNC: 0.22 UIU/ML — LOW (ref 0.27–4.2)
TSH SERPL-MCNC: 0.22 UIU/ML — LOW (ref 0.27–4.2)
VIT B12 SERPL-MCNC: 344 PG/ML — SIGNIFICANT CHANGE UP (ref 232–1245)
VIT B12 SERPL-MCNC: 344 PG/ML — SIGNIFICANT CHANGE UP (ref 232–1245)
WBC # BLD: 13.24 K/UL — HIGH (ref 3.8–10.5)
WBC # BLD: 13.24 K/UL — HIGH (ref 3.8–10.5)
WBC # BLD: 15.26 K/UL — HIGH (ref 3.8–10.5)
WBC # BLD: 15.26 K/UL — HIGH (ref 3.8–10.5)
WBC # FLD AUTO: 13.24 K/UL — HIGH (ref 3.8–10.5)
WBC # FLD AUTO: 13.24 K/UL — HIGH (ref 3.8–10.5)
WBC # FLD AUTO: 15.26 K/UL — HIGH (ref 3.8–10.5)
WBC # FLD AUTO: 15.26 K/UL — HIGH (ref 3.8–10.5)

## 2024-01-07 PROCEDURE — 71045 X-RAY EXAM CHEST 1 VIEW: CPT | Mod: 26

## 2024-01-07 RX ADMIN — Medication 75 MILLIGRAM(S): at 05:42

## 2024-01-07 RX ADMIN — Medication 25 MICROGRAM(S): at 05:42

## 2024-01-07 RX ADMIN — LOSARTAN POTASSIUM 25 MILLIGRAM(S): 100 TABLET, FILM COATED ORAL at 05:42

## 2024-01-07 RX ADMIN — ATENOLOL 37.5 MILLIGRAM(S): 25 TABLET ORAL at 05:41

## 2024-01-07 RX ADMIN — Medication 15 MILLIGRAM(S): at 05:42

## 2024-01-07 RX ADMIN — Medication 3 MILLILITER(S): at 05:44

## 2024-01-07 RX ADMIN — Medication 75 MILLIGRAM(S): at 17:01

## 2024-01-07 RX ADMIN — BUDESONIDE AND FORMOTEROL FUMARATE DIHYDRATE 2 PUFF(S): 160; 4.5 AEROSOL RESPIRATORY (INHALATION) at 05:45

## 2024-01-07 RX ADMIN — EPLERENONE 50 MILLIGRAM(S): 50 TABLET, FILM COATED ORAL at 05:42

## 2024-01-07 RX ADMIN — APIXABAN 5 MILLIGRAM(S): 2.5 TABLET, FILM COATED ORAL at 17:01

## 2024-01-07 RX ADMIN — Medication 15 MILLIGRAM(S): at 17:01

## 2024-01-07 RX ADMIN — Medication 3 MILLILITER(S): at 17:01

## 2024-01-07 RX ADMIN — Medication 3 MILLILITER(S): at 11:17

## 2024-01-07 RX ADMIN — Medication 40 MILLIGRAM(S): at 05:42

## 2024-01-07 RX ADMIN — Medication 25 MILLIGRAM(S): at 17:01

## 2024-01-07 RX ADMIN — ATORVASTATIN CALCIUM 10 MILLIGRAM(S): 80 TABLET, FILM COATED ORAL at 22:10

## 2024-01-07 RX ADMIN — APIXABAN 5 MILLIGRAM(S): 2.5 TABLET, FILM COATED ORAL at 05:43

## 2024-01-07 RX ADMIN — BUDESONIDE AND FORMOTEROL FUMARATE DIHYDRATE 2 PUFF(S): 160; 4.5 AEROSOL RESPIRATORY (INHALATION) at 17:01

## 2024-01-07 RX ADMIN — Medication 25 MILLIGRAM(S): at 05:42

## 2024-01-07 NOTE — PROGRESS NOTE ADULT - SUBJECTIVE AND OBJECTIVE BOX
DATE OF SERVICE: 01-07-24 @ 10:44    Patient is a 89y old  Female who presents with a chief complaint of SOB (06 Jan 2024 10:09)      SUBJECTIVE / OVERNIGHT EVENTS:  No chest pain. No shortness of breath. No complaints. No events overnight.     MEDICATIONS  (STANDING):  albuterol/ipratropium for Nebulization 3 milliLiter(s) Nebulizer every 6 hours  apixaban 5 milliGRAM(s) Oral every 12 hours  atenolol  Tablet 37.5 milliGRAM(s) Oral daily  atorvastatin 10 milliGRAM(s) Oral at bedtime  budesonide 160 MICROgram(s)/formoterol 4.5 MICROgram(s) Inhaler 2 Puff(s) Inhalation two times a day  busPIRone 15 milliGRAM(s) Oral two times a day  cefTRIAXone   IVPB 1000 milliGRAM(s) IV Intermittent every 24 hours  eplerenone 50 milliGRAM(s) Oral daily  levothyroxine 25 MICROGram(s) Oral daily  losartan 25 milliGRAM(s) Oral daily  oseltamivir 75 milliGRAM(s) Oral two times a day  predniSONE   Tablet 40 milliGRAM(s) Oral daily  pregabalin 25 milliGRAM(s) Oral two times a day    MEDICATIONS  (PRN):      Vital Signs Last 24 Hrs  T(C): 36.4 (07 Jan 2024 08:36), Max: 37.2 (06 Jan 2024 11:50)  T(F): 97.5 (07 Jan 2024 08:36), Max: 99 (06 Jan 2024 11:50)  HR: 63 (07 Jan 2024 08:36) (63 - 102)  BP: 122/65 (07 Jan 2024 08:36) (112/71 - 147/89)  BP(mean): --  RR: 18 (07 Jan 2024 08:36) (18 - 18)  SpO2: 95% (07 Jan 2024 08:36) (92% - 97%)    Parameters below as of 07 Jan 2024 08:36  Patient On (Oxygen Delivery Method): room air      CAPILLARY BLOOD GLUCOSE      POCT Blood Glucose.: 110 mg/dL (07 Jan 2024 03:07)    I&O's Summary    06 Jan 2024 07:01  -  07 Jan 2024 07:00  --------------------------------------------------------  IN: 470 mL / OUT: 100 mL / NET: 370 mL        PHYSICAL EXAM:  GENERAL: NAD, well-developed  HEAD:  Atraumatic, Normocephalic  EYES: EOMI, PERRLA, conjunctiva and sclera clear  NECK: Supple, No JVD  CHEST/LUNG: Clear to auscultation bilaterally; No wheeze  HEART: Regular rate and rhythm; No murmurs, rubs, or gallops  ABDOMEN: Soft, Nontender, Nondistended; Bowel sounds present  EXTREMITIES:  2+ Peripheral Pulses, No clubbing, cyanosis, or edema  PSYCH: AAOx3  NEUROLOGY: non-focal  SKIN: No rashes or lesions    LABS:                        15.0   15.26 )-----------( 194      ( 07 Jan 2024 03:27 )             49.0     01-07    141  |  103  |  46<H>  ----------------------------<  108<H>  4.5   |  25  |  1.18    Ca    8.5      07 Jan 2024 03:27  Phos  4.2     01-07  Mg     2.4     01-07    TPro  5.9<L>  /  Alb  3.6  /  TBili  0.7  /  DBili  x   /  AST  29  /  ALT  38  /  AlkPhos  54  01-07          Urinalysis Basic - ( 07 Jan 2024 03:27 )    Color: x / Appearance: x / SG: x / pH: x  Gluc: 108 mg/dL / Ketone: x  / Bili: x / Urobili: x   Blood: x / Protein: x / Nitrite: x   Leuk Esterase: x / RBC: x / WBC x   Sq Epi: x / Non Sq Epi: x / Bacteria: x    < from: CT Head No Cont (01.06.24 @ 21:28) >    IMPRESSION:    1.  No evidence of acute intracranial hemorrhage or midline shift.  2.  Chronic ischemic changes as discussed above. There is continued   concern for acute neurologic compromise, recommend MRI of the brain for   further evaluation.  3.  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    --- End of Report ---        < end of copied text >      RADIOLOGY & ADDITIONAL TESTS:    Imaging Personally Reviewed:    Consultant(s) Notes Reviewed:      Care Discussed with Consultants/Other Providers:

## 2024-01-07 NOTE — CHART NOTE - NSCHARTNOTEFT_GEN_A_CORE
Events overnight  At start of shift, notified that patient complained of a headache, nausea and dizziness, patient states that her left frontal headache was off and on all day, along with the dizziness. The Nausea started around lunch time. Patient reports headache was left forehead, did not radiate, was aching pain that was at different intervals throughout the day. At bedside patient was able to answer orientation, president of [place], place, time person.  Discussed the above with attending Dr Plascencia    Patient also complained of burning with urination, U/A ordered  Patient noted to be wheezing, additional duoneb ordered  Zofran 4mg IV ordered for Nausea  CTH performed, verbal prelim by radiology resident, was negative for any acute pathology.  Chest xray ordered for increased wheezing    Blood Gas Venous - Lactate (01.07.24 @ 03:28)   Blood Gas Venous - Lactate: 1.5 mmol/LBlood Gas Profile - Venous (01.07.24 @ 03:28)   pH, Venous: 7.40: No collection time indicated, please interpret with caution  pCO2, Venous: 48 mmHg  pO2, Venous: 63 mmHg  HCO3, Venous: 30 mmol/L  Base Excess, Venous: 3.8 mmol/L  Oxygen Saturation, Venous: 91.2 %  Total CO2, Venous: 31 mmol/L  Blood Gas Source Venous: Venous    s/p RRT for AMS  Patient was aroused, however confused  followed up labs  still pending dementia labs  Benadryl discontinued during rapid as possible source of questionable hospital induced psychosis  Patient stable in no acute distress, with no complaints of pain     Will endorse to primary day team, attending to follow  Dary Shin NP  spectralink [id number] Events overnight  At start of shift, notified that patient complained of a headache, nausea and dizziness, patient states that her left frontal headache was off and on all day, along with the dizziness. The Nausea started around lunch time. Patient reports headache was left forehead, did not radiate, was aching pain that was at different intervals throughout the day. At bedside patient was able to answer orientation, president of [place], place, time person.  Discussed the above with attending Dr Plascencia    Patient also complained of burning with urination, U/A ordered  Patient noted to be wheezing, additional duoneb ordered  Zofran 4mg IV ordered for Nausea  CTH performed, verbal prelim by radiology resident, was negative for any acute pathology.  Chest xray ordered for increased wheezing    Blood Gas Venous - Lactate (01.07.24 @ 03:28)   Blood Gas Venous - Lactate: 1.5 mmol/LBlood Gas Profile - Venous (01.07.24 @ 03:28)   pH, Venous: 7.40: No collection time indicated, please interpret with caution  pCO2, Venous: 48 mmHg  pO2, Venous: 63 mmHg  HCO3, Venous: 30 mmol/L  Base Excess, Venous: 3.8 mmol/L  Oxygen Saturation, Venous: 91.2 %  Total CO2, Venous: 31 mmol/L  Blood Gas Source Venous: Venous    s/p RRT for AMS  Patient was aroused, however confused  followed up labs  still pending dementia labs  Benadryl discontinued during rapid as possible source of questionable hospital induced psychosis  Patient stable in no acute distress, with no complaints of pain     Will endorse to primary day team, attending to follow  Dary Shin NP  spectralink [phone]

## 2024-01-07 NOTE — RAPID RESPONSE TEAM SUMMARY - NSOTHERINTERVENTIONSRRT_GEN_ALL_CORE
sulaiman'd benadryl  -primary team to f/u on labs (cbc, cmp, vbg, delirium labs including b12/folate/tsh)  - consider broaden if lactic acidosis w/ white count especially pt is at risk for aspiration

## 2024-01-07 NOTE — RAPID RESPONSE TEAM SUMMARY - NSADDTLFINDINGSRRT_GEN_ALL_CORE
Upon arrival, pt responded to name but thought she was in a dental clinic. Pt is dementia at baseline w/ confusions so MAR unsure if this is really AMS. Suspect hospital acquired delirium iso pneumonia sepsis. Night float resident examined pt w/o any focal findings other than lung wheezes. Duoneb x1 given. Pt singing throughout the rapid. Upon chart check pt received benadryl around 11pm which can certainly exacerbate delirium in a 89y/o w/ dementia. Upon arrival, pt responded to name but thought she was in a dental clinic. Pt is dementia at baseline w/ confusions so MAR unsure if this is really AMS. Suspect hospital acquired delirium iso pneumonia sepsis. Night float resident examined pt w/o any focal findings other than lung wheezes. Duoneb x1 given. Pt singing throughout the rapid. Upon chart check pt received benadryl around 11pm which can certainly exacerbate delirium in a 89y/o w/ dementia.

## 2024-01-08 LAB
ANION GAP SERPL CALC-SCNC: 15 MMOL/L — SIGNIFICANT CHANGE UP (ref 5–17)
ANION GAP SERPL CALC-SCNC: 15 MMOL/L — SIGNIFICANT CHANGE UP (ref 5–17)
BUN SERPL-MCNC: 43 MG/DL — HIGH (ref 7–23)
BUN SERPL-MCNC: 43 MG/DL — HIGH (ref 7–23)
CALCIUM SERPL-MCNC: 8.4 MG/DL — SIGNIFICANT CHANGE UP (ref 8.4–10.5)
CALCIUM SERPL-MCNC: 8.4 MG/DL — SIGNIFICANT CHANGE UP (ref 8.4–10.5)
CHLORIDE SERPL-SCNC: 104 MMOL/L — SIGNIFICANT CHANGE UP (ref 96–108)
CHLORIDE SERPL-SCNC: 104 MMOL/L — SIGNIFICANT CHANGE UP (ref 96–108)
CO2 SERPL-SCNC: 23 MMOL/L — SIGNIFICANT CHANGE UP (ref 22–31)
CO2 SERPL-SCNC: 23 MMOL/L — SIGNIFICANT CHANGE UP (ref 22–31)
CREAT SERPL-MCNC: 0.98 MG/DL — SIGNIFICANT CHANGE UP (ref 0.5–1.3)
CREAT SERPL-MCNC: 0.98 MG/DL — SIGNIFICANT CHANGE UP (ref 0.5–1.3)
EGFR: 55 ML/MIN/1.73M2 — LOW
EGFR: 55 ML/MIN/1.73M2 — LOW
GLUCOSE SERPL-MCNC: 102 MG/DL — HIGH (ref 70–99)
GLUCOSE SERPL-MCNC: 102 MG/DL — HIGH (ref 70–99)
HCT VFR BLD CALC: 48.1 % — HIGH (ref 34.5–45)
HCT VFR BLD CALC: 48.1 % — HIGH (ref 34.5–45)
HGB BLD-MCNC: 14.9 G/DL — SIGNIFICANT CHANGE UP (ref 11.5–15.5)
HGB BLD-MCNC: 14.9 G/DL — SIGNIFICANT CHANGE UP (ref 11.5–15.5)
MCHC RBC-ENTMCNC: 28 PG — SIGNIFICANT CHANGE UP (ref 27–34)
MCHC RBC-ENTMCNC: 28 PG — SIGNIFICANT CHANGE UP (ref 27–34)
MCHC RBC-ENTMCNC: 31 GM/DL — LOW (ref 32–36)
MCHC RBC-ENTMCNC: 31 GM/DL — LOW (ref 32–36)
MCV RBC AUTO: 90.2 FL — SIGNIFICANT CHANGE UP (ref 80–100)
MCV RBC AUTO: 90.2 FL — SIGNIFICANT CHANGE UP (ref 80–100)
NRBC # BLD: 0 /100 WBCS — SIGNIFICANT CHANGE UP (ref 0–0)
NRBC # BLD: 0 /100 WBCS — SIGNIFICANT CHANGE UP (ref 0–0)
PLATELET # BLD AUTO: 151 K/UL — SIGNIFICANT CHANGE UP (ref 150–400)
PLATELET # BLD AUTO: 151 K/UL — SIGNIFICANT CHANGE UP (ref 150–400)
POTASSIUM SERPL-MCNC: 4.1 MMOL/L — SIGNIFICANT CHANGE UP (ref 3.5–5.3)
POTASSIUM SERPL-MCNC: 4.1 MMOL/L — SIGNIFICANT CHANGE UP (ref 3.5–5.3)
POTASSIUM SERPL-SCNC: 4.1 MMOL/L — SIGNIFICANT CHANGE UP (ref 3.5–5.3)
POTASSIUM SERPL-SCNC: 4.1 MMOL/L — SIGNIFICANT CHANGE UP (ref 3.5–5.3)
RBC # BLD: 5.33 M/UL — HIGH (ref 3.8–5.2)
RBC # BLD: 5.33 M/UL — HIGH (ref 3.8–5.2)
RBC # FLD: 16.4 % — HIGH (ref 10.3–14.5)
RBC # FLD: 16.4 % — HIGH (ref 10.3–14.5)
SODIUM SERPL-SCNC: 142 MMOL/L — SIGNIFICANT CHANGE UP (ref 135–145)
SODIUM SERPL-SCNC: 142 MMOL/L — SIGNIFICANT CHANGE UP (ref 135–145)
WBC # BLD: 13.82 K/UL — HIGH (ref 3.8–10.5)
WBC # BLD: 13.82 K/UL — HIGH (ref 3.8–10.5)
WBC # FLD AUTO: 13.82 K/UL — HIGH (ref 3.8–10.5)
WBC # FLD AUTO: 13.82 K/UL — HIGH (ref 3.8–10.5)

## 2024-01-08 RX ADMIN — BUDESONIDE AND FORMOTEROL FUMARATE DIHYDRATE 2 PUFF(S): 160; 4.5 AEROSOL RESPIRATORY (INHALATION) at 05:23

## 2024-01-08 RX ADMIN — APIXABAN 5 MILLIGRAM(S): 2.5 TABLET, FILM COATED ORAL at 17:30

## 2024-01-08 RX ADMIN — Medication 3 MILLILITER(S): at 05:22

## 2024-01-08 RX ADMIN — EPLERENONE 50 MILLIGRAM(S): 50 TABLET, FILM COATED ORAL at 05:24

## 2024-01-08 RX ADMIN — Medication 15 MILLIGRAM(S): at 05:23

## 2024-01-08 RX ADMIN — Medication 40 MILLIGRAM(S): at 05:23

## 2024-01-08 RX ADMIN — Medication 75 MILLIGRAM(S): at 05:24

## 2024-01-08 RX ADMIN — Medication 25 MILLIGRAM(S): at 05:26

## 2024-01-08 RX ADMIN — Medication 25 MILLIGRAM(S): at 17:30

## 2024-01-08 RX ADMIN — BUDESONIDE AND FORMOTEROL FUMARATE DIHYDRATE 2 PUFF(S): 160; 4.5 AEROSOL RESPIRATORY (INHALATION) at 17:32

## 2024-01-08 RX ADMIN — CEFTRIAXONE 100 MILLIGRAM(S): 500 INJECTION, POWDER, FOR SOLUTION INTRAMUSCULAR; INTRAVENOUS at 23:05

## 2024-01-08 RX ADMIN — LOSARTAN POTASSIUM 25 MILLIGRAM(S): 100 TABLET, FILM COATED ORAL at 05:23

## 2024-01-08 RX ADMIN — ATENOLOL 37.5 MILLIGRAM(S): 25 TABLET ORAL at 05:23

## 2024-01-08 RX ADMIN — Medication 3 MILLILITER(S): at 23:06

## 2024-01-08 RX ADMIN — Medication 3 MILLILITER(S): at 17:30

## 2024-01-08 RX ADMIN — Medication 3 MILLILITER(S): at 13:14

## 2024-01-08 RX ADMIN — CEFTRIAXONE 100 MILLIGRAM(S): 500 INJECTION, POWDER, FOR SOLUTION INTRAMUSCULAR; INTRAVENOUS at 00:00

## 2024-01-08 RX ADMIN — Medication 15 MILLIGRAM(S): at 17:30

## 2024-01-08 RX ADMIN — APIXABAN 5 MILLIGRAM(S): 2.5 TABLET, FILM COATED ORAL at 05:24

## 2024-01-08 RX ADMIN — Medication 75 MILLIGRAM(S): at 17:30

## 2024-01-08 RX ADMIN — Medication 25 MICROGRAM(S): at 05:23

## 2024-01-08 RX ADMIN — ATORVASTATIN CALCIUM 10 MILLIGRAM(S): 80 TABLET, FILM COATED ORAL at 21:39

## 2024-01-08 RX ADMIN — Medication 3 MILLILITER(S): at 00:00

## 2024-01-08 NOTE — PROGRESS NOTE ADULT - SUBJECTIVE AND OBJECTIVE BOX
Follow-up Pulm Progress Note    Wore CPAP overnight  Denies CP/SOB  Reports cough/wheeze improving  O2 sats 96% RA     Medications:  MEDICATIONS  (STANDING):  albuterol/ipratropium for Nebulization 3 milliLiter(s) Nebulizer every 6 hours  apixaban 5 milliGRAM(s) Oral every 12 hours  atenolol  Tablet 37.5 milliGRAM(s) Oral daily  atorvastatin 10 milliGRAM(s) Oral at bedtime  budesonide 160 MICROgram(s)/formoterol 4.5 MICROgram(s) Inhaler 2 Puff(s) Inhalation two times a day  busPIRone 15 milliGRAM(s) Oral two times a day  cefTRIAXone   IVPB 1000 milliGRAM(s) IV Intermittent every 24 hours  eplerenone 50 milliGRAM(s) Oral daily  levothyroxine 25 MICROGram(s) Oral daily  losartan 25 milliGRAM(s) Oral daily  oseltamivir 75 milliGRAM(s) Oral two times a day  predniSONE   Tablet 40 milliGRAM(s) Oral daily  pregabalin 25 milliGRAM(s) Oral two times a day    MEDICATIONS  (PRN):          Vital Signs Last 24 Hrs  T(C): 36.3 (08 Jan 2024 08:46), Max: 36.7 (07 Jan 2024 20:00)  T(F): 97.4 (08 Jan 2024 08:46), Max: 98 (07 Jan 2024 20:00)  HR: 94 (08 Jan 2024 09:26) (65 - 94)  BP: 121/75 (08 Jan 2024 08:46) (112/73 - 143/83)  BP(mean): --  RR: 18 (08 Jan 2024 08:46) (18 - 18)  SpO2: 96% (08 Jan 2024 09:26) (94% - 98%)    Parameters below as of 08 Jan 2024 08:46  Patient On (Oxygen Delivery Method): room air          VBG pH 7.40 01-07 @ 03:28    VBG pCO2 48 01-07 @ 03:28    VBG O2 sat 91.2 01-07 @ 03:28    VBG lactate 1.5 01-07 @ 03:28      01-07 @ 07:01  -  01-08 @ 07:00  --------------------------------------------------------  IN: 520 mL / OUT: 300 mL / NET: 220 mL          LABS:                        14.9   13.82 )-----------( 151      ( 08 Jan 2024 07:53 )             48.1     01-08    142  |  104  |  43<H>  ----------------------------<  102<H>  4.1   |  23  |  0.98    Ca    8.4      08 Jan 2024 07:53  Phos  3.1     01-07  Mg     2.4     01-07    TPro  5.5<L>  /  Alb  3.6  /  TBili  0.7  /  DBili  x   /  AST  20  /  ALT  33  /  AlkPhos  50  01-07          CAPILLARY BLOOD GLUCOSE      POCT Blood Glucose.: 110 mg/dL (07 Jan 2024 03:07)      Urinalysis Basic - ( 08 Jan 2024 07:53 )    Color: x / Appearance: x / SG: x / pH: x  Gluc: 102 mg/dL / Ketone: x  / Bili: x / Urobili: x   Blood: x / Protein: x / Nitrite: x   Leuk Esterase: x / RBC: x / WBC x   Sq Epi: x / Non Sq Epi: x / Bacteria: x      Procalcitonin, Serum: 0.10 ng/mL (01-06-24 @ 06:46)                  CULTURES: (if applicable)    Culture - Blood (collected 01-04-24 @ 15:30)  Source: .Blood Blood-Peripheral  Preliminary Report (01-07-24 @ 23:01):    No growth at 72 Hours    Culture - Blood (collected 01-04-24 @ 15:00)  Source: .Blood Blood-Peripheral  Preliminary Report (01-07-24 @ 23:01):    No growth at 72 Hours      Physical Examination:  PULM: few scattered rhonchi, minimal expiratory wheeze   CVS: S1, S2 heard    RADIOLOGY REVIEWED    CT chest: < from: CT Chest No Cont (01.04.24 @ 19:38) >    FINDINGS:    LYMPH NODES: No lymphadenopathy in the chest.    HEART/VASCULATURE: Mild cardiomegaly. No pericardial effusion. Coronary   and valvular calcifications. Left chest wall pacemaker with leads in the   right atrium and right ventricle. Calcifications of the aorta and its   branch vessels.    AIRWAYS/LUNGS/PLEURA: Patent central airways. New bilateral upper lobe   tree-in-bud nodular opacities. Previously demonstrated 1.5 cm nodule in   the left upper lobe nodule is decreased in size. An 8 mm perifissural   right upper lobe nodule is slightly increased in size and conspicuity.   Scattered subsegmental atelectasis in the right lung. No pleural effusion.    UPPER ABDOMEN: 3.4 cm left adrenal nodule increased in size since prior   exam (previously 2.0 cm on 10/16/2023). Hepatic cysts. Partially   visualized indeterminate exophytic left renal upper pole unchanged in   size.    BONES/SOFT TISSUES: Degenerative changes of the spine.    IMPRESSION:    New areas of bilateral tree in bud nodules compatible with small airways   infection.    Previously demonstrated left upper lobe nodule is decreased in size.   Slightly increased right upper lobe nodule is indeterminate. Decreased   size of prior left upper lobe nodule.    3.4 cm left adrenal nodule is significantly increased since prior exam.   Recommend CT or MRI of the abdomen and pelvis for further evaluation.    --- End of Report ---      < end of copied text >

## 2024-01-08 NOTE — PROGRESS NOTE ADULT - ASSESSMENT
89-year-old female PMH of CVA, YOVANI on CPAP, HTN, HLD, A-fib on Eliquis, heart failure on furosemide, presents to the ED complaining of several days of coughing, increased wheezing, shortness of breath, and found to be influenza positive. Patient admitted for sepsis management.       Sepsis.   ·  Plan: Tmax 101.4, tachycardic, tachypneic. Likely pulmonary source.   - Blood cultures drawn and pending  - Check UA  - Will cover empirically with ceftriaxone for now  - Continue oseltamavir  - Monitor fever curve.    Asthma exacerbation.   ·  Plan: 2nd to Influenza A +/- bacterial PNA  -Continue Duoneb q6h  -Continue Symbicort 160/4.5 mcg 2 puffs BID (home med Breo Ellipta)  -Prednisone 40 mg PO qd x5 days   -Keep sats >90% with O2 PRN, currently RA.    nfluenza A.    -CT chest with b/l TIB opacities, may be viral +/- bacterial PNA   -Continue Tamiflu  -Steroids/bronchodilators as above.    Chronic systolic congestive heart failure.   -Not currently in decompensated HF. TTE from 8/29/23 showing normal LVSF, EF 61%  - Will continue eplerenone, atenolol, losartan for now   - Will hold lasix for now given sepsis and current euvolemic status   - Monitor I/Os, daily weight  - BMP daily, monitor electrolytes while on diuretics.    Chronic atrial fibrillation.   ·  Plan: EKG personally reviewed showing afib with HR 120s, qtc 469  - Continue eliquis  - HR improved in ED to 80s.       YOVANI (obstructive sleep apnea).   - By hx  -Started on CPAP overnight, pt reports does not have CPAP at home   -VBG acceptable  -Suggest monitor off CPAP, order d/c'd.     Prophylactic measure.   ·  Plan: DVT ppx: on eliquis  Sleep: on benadryl qhs prn  Diet: DASH  Dispo: when cleared by pulm

## 2024-01-08 NOTE — PROGRESS NOTE ADULT - SUBJECTIVE AND OBJECTIVE BOX
DATE OF SERVICE: 01-08-24 @ 12:33    Patient is a 89y old  Female who presents with a chief complaint of SOB (08 Jan 2024 11:17)      SUBJECTIVE / OVERNIGHT EVENTS:  No chest pain. No shortness of breath. No complaints. No events overnight.     MEDICATIONS  (STANDING):  albuterol/ipratropium for Nebulization 3 milliLiter(s) Nebulizer every 6 hours  apixaban 5 milliGRAM(s) Oral every 12 hours  atenolol  Tablet 37.5 milliGRAM(s) Oral daily  atorvastatin 10 milliGRAM(s) Oral at bedtime  budesonide 160 MICROgram(s)/formoterol 4.5 MICROgram(s) Inhaler 2 Puff(s) Inhalation two times a day  busPIRone 15 milliGRAM(s) Oral two times a day  cefTRIAXone   IVPB 1000 milliGRAM(s) IV Intermittent every 24 hours  eplerenone 50 milliGRAM(s) Oral daily  levothyroxine 25 MICROGram(s) Oral daily  losartan 25 milliGRAM(s) Oral daily  oseltamivir 75 milliGRAM(s) Oral two times a day  predniSONE   Tablet 40 milliGRAM(s) Oral daily  pregabalin 25 milliGRAM(s) Oral two times a day    MEDICATIONS  (PRN):      Vital Signs Last 24 Hrs  T(C): 36.5 (08 Jan 2024 11:46), Max: 36.7 (07 Jan 2024 20:00)  T(F): 97.7 (08 Jan 2024 11:46), Max: 98 (07 Jan 2024 20:00)  HR: 92 (08 Jan 2024 11:46) (65 - 94)  BP: 133/75 (08 Jan 2024 11:46) (112/73 - 143/83)  BP(mean): --  RR: 18 (08 Jan 2024 11:46) (18 - 18)  SpO2: 93% (08 Jan 2024 11:46) (93% - 98%)    Parameters below as of 08 Jan 2024 11:46  Patient On (Oxygen Delivery Method): room air      CAPILLARY BLOOD GLUCOSE        I&O's Summary    07 Jan 2024 07:01  -  08 Jan 2024 07:00  --------------------------------------------------------  IN: 520 mL / OUT: 300 mL / NET: 220 mL        PHYSICAL EXAM:  GENERAL: NAD, well-developed  HEAD:  Atraumatic, Normocephalic  EYES: EOMI, PERRLA, conjunctiva and sclera clear  NECK: Supple, No JVD  CHEST/LUNG: Clear to auscultation bilaterally; No wheeze  HEART: Regular rate and rhythm; No murmurs, rubs, or gallops  ABDOMEN: Soft, Nontender, Nondistended; Bowel sounds present  EXTREMITIES:  2+ Peripheral Pulses, No clubbing, cyanosis, or edema  PSYCH: AAOx3  NEUROLOGY: non-focal  SKIN: No rashes or lesions    LABS:                        14.9   13.82 )-----------( 151      ( 08 Jan 2024 07:53 )             48.1     01-08    142  |  104  |  43<H>  ----------------------------<  102<H>  4.1   |  23  |  0.98    Ca    8.4      08 Jan 2024 07:53  Phos  3.1     01-07  Mg     2.4     01-07    TPro  5.5<L>  /  Alb  3.6  /  TBili  0.7  /  DBili  x   /  AST  20  /  ALT  33  /  AlkPhos  50  01-07          Urinalysis Basic - ( 08 Jan 2024 07:53 )    Color: x / Appearance: x / SG: x / pH: x  Gluc: 102 mg/dL / Ketone: x  / Bili: x / Urobili: x   Blood: x / Protein: x / Nitrite: x   Leuk Esterase: x / RBC: x / WBC x   Sq Epi: x / Non Sq Epi: x / Bacteria: x        RADIOLOGY & ADDITIONAL TESTS:    Imaging Personally Reviewed:    Consultant(s) Notes Reviewed:      Care Discussed with Consultants/Other Providers:

## 2024-01-08 NOTE — PROGRESS NOTE ADULT - PROBLEM SELECTOR PLAN 4
By hx  -Started on CPAP overnight, pt reports does not have CPAP at home   -VBG acceptable  -Suggest monitor off CPAP, order d/c'd.

## 2024-01-08 NOTE — PROGRESS NOTE ADULT - ASSESSMENT
89 y/o F with PMH of CVA, YOVANI on CPAP, HTN, HLD, Afib on Eliquis, CHF. Recent admission at [facility] 1 month ago for PNA, completed short course of ABX. Completed additional course of ABX last week for UTI. Presents to the ED with coughing, SOB, wheezing x several days. Found to be Flu + 89 y/o F with PMH of CVA, YOVANI on CPAP, HTN, HLD, Afib on Eliquis, CHF. Recent admission at [facility] 1 month ago for PNA, completed short course of ABX. Completed additional course of ABX last week for UTI. Presents to the ED with coughing, SOB, wheezing x several days. Found to be Flu +

## 2024-01-09 ENCOUNTER — TRANSCRIPTION ENCOUNTER (OUTPATIENT)
Age: 89
End: 2024-01-09

## 2024-01-09 LAB
CULTURE RESULTS: SIGNIFICANT CHANGE UP
HCT VFR BLD CALC: 48.6 % — HIGH (ref 34.5–45)
HCT VFR BLD CALC: 48.6 % — HIGH (ref 34.5–45)
HGB BLD-MCNC: 14.8 G/DL — SIGNIFICANT CHANGE UP (ref 11.5–15.5)
HGB BLD-MCNC: 14.8 G/DL — SIGNIFICANT CHANGE UP (ref 11.5–15.5)
MCHC RBC-ENTMCNC: 27.5 PG — SIGNIFICANT CHANGE UP (ref 27–34)
MCHC RBC-ENTMCNC: 27.5 PG — SIGNIFICANT CHANGE UP (ref 27–34)
MCHC RBC-ENTMCNC: 30.5 GM/DL — LOW (ref 32–36)
MCHC RBC-ENTMCNC: 30.5 GM/DL — LOW (ref 32–36)
MCV RBC AUTO: 90.2 FL — SIGNIFICANT CHANGE UP (ref 80–100)
MCV RBC AUTO: 90.2 FL — SIGNIFICANT CHANGE UP (ref 80–100)
NRBC # BLD: 0 /100 WBCS — SIGNIFICANT CHANGE UP (ref 0–0)
NRBC # BLD: 0 /100 WBCS — SIGNIFICANT CHANGE UP (ref 0–0)
PLATELET # BLD AUTO: 217 K/UL — SIGNIFICANT CHANGE UP (ref 150–400)
PLATELET # BLD AUTO: 217 K/UL — SIGNIFICANT CHANGE UP (ref 150–400)
RBC # BLD: 5.39 M/UL — HIGH (ref 3.8–5.2)
RBC # BLD: 5.39 M/UL — HIGH (ref 3.8–5.2)
RBC # FLD: 16.5 % — HIGH (ref 10.3–14.5)
RBC # FLD: 16.5 % — HIGH (ref 10.3–14.5)
SARS-COV-2 RNA SPEC QL NAA+PROBE: SIGNIFICANT CHANGE UP
SARS-COV-2 RNA SPEC QL NAA+PROBE: SIGNIFICANT CHANGE UP
SPECIMEN SOURCE: SIGNIFICANT CHANGE UP
WBC # BLD: 22.63 K/UL — HIGH (ref 3.8–10.5)
WBC # BLD: 22.63 K/UL — HIGH (ref 3.8–10.5)
WBC # FLD AUTO: 22.63 K/UL — HIGH (ref 3.8–10.5)
WBC # FLD AUTO: 22.63 K/UL — HIGH (ref 3.8–10.5)

## 2024-01-09 RX ORDER — FAMOTIDINE 10 MG/ML
20 INJECTION INTRAVENOUS ONCE
Refills: 0 | Status: DISCONTINUED | OUTPATIENT
Start: 2024-01-09 | End: 2024-01-09

## 2024-01-09 RX ORDER — ONDANSETRON 8 MG/1
4 TABLET, FILM COATED ORAL ONCE
Refills: 0 | Status: DISCONTINUED | OUTPATIENT
Start: 2024-01-09 | End: 2024-01-17

## 2024-01-09 RX ORDER — POLYETHYLENE GLYCOL 3350 17 G/17G
17 POWDER, FOR SOLUTION ORAL DAILY
Refills: 0 | Status: DISCONTINUED | OUTPATIENT
Start: 2024-01-09 | End: 2024-01-17

## 2024-01-09 RX ORDER — PANTOPRAZOLE SODIUM 20 MG/1
40 TABLET, DELAYED RELEASE ORAL
Refills: 0 | Status: DISCONTINUED | OUTPATIENT
Start: 2024-01-09 | End: 2024-01-16

## 2024-01-09 RX ADMIN — BUDESONIDE AND FORMOTEROL FUMARATE DIHYDRATE 2 PUFF(S): 160; 4.5 AEROSOL RESPIRATORY (INHALATION) at 17:17

## 2024-01-09 RX ADMIN — PANTOPRAZOLE SODIUM 40 MILLIGRAM(S): 20 TABLET, DELAYED RELEASE ORAL at 17:09

## 2024-01-09 RX ADMIN — Medication 3 MILLILITER(S): at 11:11

## 2024-01-09 RX ADMIN — ATORVASTATIN CALCIUM 10 MILLIGRAM(S): 80 TABLET, FILM COATED ORAL at 21:47

## 2024-01-09 RX ADMIN — APIXABAN 5 MILLIGRAM(S): 2.5 TABLET, FILM COATED ORAL at 17:11

## 2024-01-09 RX ADMIN — Medication 75 MILLIGRAM(S): at 17:10

## 2024-01-09 RX ADMIN — Medication 15 MILLIGRAM(S): at 17:12

## 2024-01-09 RX ADMIN — ATENOLOL 37.5 MILLIGRAM(S): 25 TABLET ORAL at 05:10

## 2024-01-09 RX ADMIN — APIXABAN 5 MILLIGRAM(S): 2.5 TABLET, FILM COATED ORAL at 05:10

## 2024-01-09 RX ADMIN — Medication 25 MILLIGRAM(S): at 17:11

## 2024-01-09 RX ADMIN — LOSARTAN POTASSIUM 25 MILLIGRAM(S): 100 TABLET, FILM COATED ORAL at 05:11

## 2024-01-09 RX ADMIN — Medication 75 MILLIGRAM(S): at 05:11

## 2024-01-09 RX ADMIN — EPLERENONE 50 MILLIGRAM(S): 50 TABLET, FILM COATED ORAL at 05:11

## 2024-01-09 RX ADMIN — Medication 25 MICROGRAM(S): at 05:12

## 2024-01-09 RX ADMIN — Medication 40 MILLIGRAM(S): at 05:12

## 2024-01-09 RX ADMIN — Medication 30 MILLILITER(S): at 00:26

## 2024-01-09 RX ADMIN — BUDESONIDE AND FORMOTEROL FUMARATE DIHYDRATE 2 PUFF(S): 160; 4.5 AEROSOL RESPIRATORY (INHALATION) at 05:13

## 2024-01-09 RX ADMIN — Medication 3 MILLILITER(S): at 05:13

## 2024-01-09 RX ADMIN — Medication 3 MILLILITER(S): at 23:49

## 2024-01-09 RX ADMIN — Medication 25 MILLIGRAM(S): at 05:12

## 2024-01-09 RX ADMIN — Medication 15 MILLIGRAM(S): at 05:11

## 2024-01-09 RX ADMIN — Medication 3 MILLILITER(S): at 17:10

## 2024-01-09 NOTE — DISCHARGE NOTE PROVIDER - NSDCMRMEDTOKEN_GEN_ALL_CORE_FT
Albuterol (Eqv-Proventil HFA) 90 mcg/inh inhalation aerosol: 2 puff(s) inhaled every 6 hours as needed for  shortness of breath and/or wheezing  atenolol 25 mg oral tablet: 1.5 tab(s) orally once a day  Breo Ellipta 200 mcg-25 mcg/inh inhalation powder: 1 puff(s) inhaled once a day  busPIRone 15 mg oral tablet: 1 tab(s) orally 2 times a day  diphenhydrAMINE 25 mg oral capsule: 1 cap(s) orally once a day (at bedtime) as needed for  insomnia  Eliquis 5 mg oral tablet: 1 tab(s) orally 2 times a day  eplerenone 50 mg oral tablet: 1 tab(s) orally once a day  Estrace Vaginal 0.1 mg/g vaginal cream: 0.5 gram(s) intravaginally 2 times a week  famotidine 10 mg oral tablet: 1 tab(s) orally once a day (at bedtime) as needed for  heartburn  furosemide 40 mg oral tablet: 1 tab(s) orally once a day  levalbuterol 0.63 mg/3 mL inhalation solution: 3 milliliter(s) by nebulizer every 6 hours as needed for  shortness of breath and/or wheezing  levothyroxine 25 mcg (0.025 mg) oral tablet: 1 tab(s) orally once a day  losartan 25 mg oral tablet: 1 tab(s) orally once a day  lovastatin 20 mg oral tablet: 1 tab(s) orally once a day  magnesium 200m tab(s) orally 3 to 4 times per week  otc supplements: preservision areds 2 / vitamin d3 / calcium  pregabalin 25 mg oral capsule: 1 cap(s) orally 2 times a day  Prolia 60 mg/mL subcutaneous solution: 60 milligram(s) subcutaneously every 6 months   Albuterol (Eqv-Proventil HFA) 90 mcg/inh inhalation aerosol: 2 puff(s) inhaled every 6 hours as needed for  shortness of breath and/or wheezing  Breo Ellipta 200 mcg-25 mcg/inh inhalation powder: 1 puff(s) inhaled once a day  busPIRone 15 mg oral tablet: 1 tab(s) orally 2 times a day  digoxin 125 mcg (0.125 mg) oral tablet: 1 tab(s) orally once a day  diphenhydrAMINE 25 mg oral capsule: 1 cap(s) orally once a day (at bedtime) as needed for  insomnia  Eliquis 5 mg oral tablet: 1 tab(s) orally 2 times a day  eplerenone 50 mg oral tablet: 1 tab(s) orally once a day  Estrace Vaginal 0.1 mg/g vaginal cream: 0.5 gram(s) intravaginally 2 times a week  famotidine 10 mg oral tablet: 1 tab(s) orally once a day (at bedtime) as needed for  heartburn  furosemide 40 mg oral tablet: 1 tab(s) orally once a day  levalbuterol 0.63 mg/3 mL inhalation solution: 3 milliliter(s) by nebulizer every 6 hours as needed for  shortness of breath and/or wheezing  levothyroxine 25 mcg (0.025 mg) oral tablet: 1 tab(s) orally once a day  losartan 25 mg oral tablet: 1 tab(s) orally once a day  lovastatin 20 mg oral tablet: 1 tab(s) orally once a day  magnesium 200m tab(s) orally 3 to 4 times per week  metoprolol tartrate 100 mg oral tablet: 1 tab(s) orally 3 times a day  pantoprazole 40 mg oral delayed release tablet: 1 tab(s) orally 2 times a day  polyethylene glycol 3350 oral powder for reconstitution: 17 gram(s) orally once a day  predniSONE 5 mg oral tablet: 4 tab(s) orally once a day Prednisone 20 mg PO qd x1 more day, then 10 mg PO qd x3 days, then 5 mg PO qd x3 days then stop.  pregabalin 25 mg oral capsule: 1 cap(s) orally 2 times a day  Prolia 60 mg/mL subcutaneous solution: 60 milligram(s) subcutaneously every 6 months

## 2024-01-09 NOTE — PROGRESS NOTE ADULT - SUBJECTIVE AND OBJECTIVE BOX
DATE OF SERVICE: 01-09-24 @ 12:58    Patient is a 89y old  Female who presents with a chief complaint of SOB (09 Jan 2024 12:26)      SUBJECTIVE / OVERNIGHT EVENTS:  No chest pain. No shortness of breath. No complaints. No events overnight.     MEDICATIONS  (STANDING):  albuterol/ipratropium for Nebulization 3 milliLiter(s) Nebulizer every 6 hours  apixaban 5 milliGRAM(s) Oral every 12 hours  atenolol  Tablet 37.5 milliGRAM(s) Oral daily  atorvastatin 10 milliGRAM(s) Oral at bedtime  budesonide 160 MICROgram(s)/formoterol 4.5 MICROgram(s) Inhaler 2 Puff(s) Inhalation two times a day  busPIRone 15 milliGRAM(s) Oral two times a day  eplerenone 50 milliGRAM(s) Oral daily  famotidine Injectable 20 milliGRAM(s) IV Push once  levothyroxine 25 MICROGram(s) Oral daily  losartan 25 milliGRAM(s) Oral daily  oseltamivir 75 milliGRAM(s) Oral two times a day  predniSONE   Tablet 40 milliGRAM(s) Oral daily  pregabalin 25 milliGRAM(s) Oral two times a day    MEDICATIONS  (PRN):      Vital Signs Last 24 Hrs  T(C): 36.4 (09 Jan 2024 08:37), Max: 36.7 (08 Jan 2024 20:32)  T(F): 97.6 (09 Jan 2024 08:37), Max: 98.1 (08 Jan 2024 20:32)  HR: 61 (09 Jan 2024 08:37) (61 - 96)  BP: 117/93 (09 Jan 2024 08:37) (117/93 - 139/85)  BP(mean): --  RR: 18 (09 Jan 2024 08:37) (18 - 18)  SpO2: 96% (09 Jan 2024 08:37) (92% - 97%)    Parameters below as of 09 Jan 2024 08:37  Patient On (Oxygen Delivery Method): room air      CAPILLARY BLOOD GLUCOSE        I&O's Summary    08 Jan 2024 07:01  -  09 Jan 2024 07:00  --------------------------------------------------------  IN: 500 mL / OUT: 225 mL / NET: 275 mL    09 Jan 2024 07:01  -  09 Jan 2024 12:58  --------------------------------------------------------  IN: 240 mL / OUT: 0 mL / NET: 240 mL        PHYSICAL EXAM:  GENERAL: NAD, well-developed  HEAD:  Atraumatic, Normocephalic  EYES: EOMI, PERRLA, conjunctiva and sclera clear  NECK: Supple, No JVD  CHEST/LUNG: Clear to auscultation bilaterally; No wheeze  HEART: Regular rate and rhythm; No murmurs, rubs, or gallops  ABDOMEN: Soft, Nontender, Nondistended; Bowel sounds present  EXTREMITIES:  2+ Peripheral Pulses, No clubbing, cyanosis, or edema  PSYCH: AAOx3  NEUROLOGY: non-focal  SKIN: No rashes or lesions    LABS:                        14.8   22.63 )-----------( 217      ( 09 Jan 2024 06:51 )             48.6     01-08    142  |  104  |  43<H>  ----------------------------<  102<H>  4.1   |  23  |  0.98    Ca    8.4      08 Jan 2024 07:53            Urinalysis Basic - ( 08 Jan 2024 07:53 )    Color: x / Appearance: x / SG: x / pH: x  Gluc: 102 mg/dL / Ketone: x  / Bili: x / Urobili: x   Blood: x / Protein: x / Nitrite: x   Leuk Esterase: x / RBC: x / WBC x   Sq Epi: x / Non Sq Epi: x / Bacteria: x        RADIOLOGY & ADDITIONAL TESTS:    Imaging Personally Reviewed:    Consultant(s) Notes Reviewed:      Care Discussed with Consultants/Other Providers:

## 2024-01-09 NOTE — PROGRESS NOTE ADULT - ASSESSMENT
89 y/o F with PMH of CVA, YOVANI on CPAP, HTN, HLD, Afib on Eliquis, CHF. Recent admission at [facility] 1 month ago for PNA, completed short course of ABX. Completed additional course of ABX last week for UTI. Presents to the ED with coughing, SOB, wheezing x several days. Found to be Flu + 89 y/o F with PMH of CVA, YOVANI on CPAP, HTN, HLD, Afib on Eliquis, CHF. Recent admission at [facility] 1 month ago for PNA, completed short course of ABX. Completed additional course of ABX last week for UTI. Presents to the ED with coughing, SOB, wheezing x several days. Found to be Flu +

## 2024-01-09 NOTE — DISCHARGE NOTE PROVIDER - NSDCFUADDINST_GEN_ALL_CORE_FT
Prednisone 20 mg PO qd x1 more day, then 10 mg PO qd x3 days, then 5 mg PO qd x3 days then stop.

## 2024-01-09 NOTE — DISCHARGE NOTE PROVIDER - NSDCCPCAREPLAN_GEN_ALL_CORE_FT
PRINCIPAL DISCHARGE DIAGNOSIS  Diagnosis: Influenza A  Assessment and Plan of Treatment: Tamiflu completed  Supportive care      SECONDARY DISCHARGE DIAGNOSES  Diagnosis: Pneumonia due to influenza A virus  Assessment and Plan of Treatment: Pneumonia is a lung infection that can cause a fever, cough, and trouble breathing.  Continue all antibiotics as ordered until complete.  Nutrition is important, eat small frequent meals.  Get lots of rest and drink fluids.  Call your health care provider upon arrival home from hospital and make a follow up appointment for one week.  If your cough worsens, you develop fever greater than 101', you have shaking chills, a fast heartbeat, trouble breathing and/or feel your are breathing much faster than usual, call your healthcare provider.  Make sure you wash your hands frequently.      Diagnosis: Chronic systolic congestive heart failure  Assessment and Plan of Treatment: Weigh yourself daily.  If you gain 3lbs in 3 days, or 5lbs in a week call your Health Care Provider.  Do not eat or drink foods containing more than 2000mg of salt (sodium) in your diet every day.  Call your Health Care Provider if you have any swelling or increased swelling in your feet, ankles, and/or stomach.  Take all of your medication as directed.  If you become dizzy call your Health Care Provider.      Diagnosis: Lung nodule  Assessment and Plan of Treatment: Follow up with PMD for managment and surveillance    Diagnosis: Chronic atrial fibrillation  Assessment and Plan of Treatment: Atrial fibrillation is the most common heart rhythm problem.  The condition puts you at risk for has stroke and heart attack  It helps if you control your blood pressure, not drink more than 1-2 alcohol drinks per day, cut down on caffeine, getting treatment for over active thyroid gland, and get regular exercise  Call your doctor if you feel your heart racing or beating unusually, chest tightness or pain, lightheaded, faint, shortness of breath especially with exercise  Continue eliquis   You may be on anticoagulation which is very important to take as directed - you may need blood work to monitor drug levels      Diagnosis: Obstructive sleep apnea  Assessment and Plan of Treatment: CPAP as needed for nighttime  Follow up with PMD and pulm     PRINCIPAL DISCHARGE DIAGNOSIS  Diagnosis: Influenza A  Assessment and Plan of Treatment: Tamiflu completed  Supportive care      SECONDARY DISCHARGE DIAGNOSES  Diagnosis: Pneumonia due to influenza A virus  Assessment and Plan of Treatment: Pneumonia is a lung infection that can cause a fever, cough, and trouble breathing.  Continue all antibiotics as ordered until complete.  Nutrition is important, eat small frequent meals.  Get lots of rest and drink fluids.  Call your health care provider upon arrival home from hospital and make a follow up appointment for one week.  If your cough worsens, you develop fever greater than 101', you have shaking chills, a fast heartbeat, trouble breathing and/or feel your are breathing much faster than usual, call your healthcare provider.  Make sure you wash your hands frequently.      Diagnosis: Chronic systolic congestive heart failure  Assessment and Plan of Treatment: Weigh yourself daily.  If you gain 3lbs in 3 days, or 5lbs in a week call your Health Care Provider.  Do not eat or drink foods containing more than 2000mg of salt (sodium) in your diet every day.  Call your Health Care Provider if you have any swelling or increased swelling in your feet, ankles, and/or stomach.  Take all of your medication as directed.  If you become dizzy call your Health Care Provider.      Diagnosis: Lung nodule  Assessment and Plan of Treatment: Follow up with PMD for managment and surveillance  reviously demonstrated left upper lobe nodule is decreased in size.   Slightly increased right upper lobe nodule is indeterminate. Decreased   size of prior left upper lobe nodule.      Diagnosis: Chronic atrial fibrillation  Assessment and Plan of Treatment: Atrial fibrillation is the most common heart rhythm problem.  The condition puts you at risk for has stroke and heart attack  It helps if you control your blood pressure, not drink more than 1-2 alcohol drinks per day, cut down on caffeine, getting treatment for over active thyroid gland, and get regular exercise  Call your doctor if you feel your heart racing or beating unusually, chest tightness or pain, lightheaded, faint, shortness of breath especially with exercise  Continue eliquis   You may be on anticoagulation which is very important to take as directed - you may need blood work to monitor drug levels      Diagnosis: Obstructive sleep apnea  Assessment and Plan of Treatment: CPAP as needed for nighttime  Follow up with PMD and pulm    Diagnosis: Adrenal nodule  Assessment and Plan of Treatment: 3.4 cm left adrenal nodule is significantly increased since prior exam.   Follow up with PMD and endocrine for outpatient workup        PRINCIPAL DISCHARGE DIAGNOSIS  Diagnosis: Influenza A  Assessment and Plan of Treatment: Tamiflu completed  Supportive care      SECONDARY DISCHARGE DIAGNOSES  Diagnosis: Pneumonia due to influenza A virus  Assessment and Plan of Treatment: Pneumonia is a lung infection that can cause a fever, cough, and trouble breathing.  Continue all antibiotics as ordered until complete.  Nutrition is important, eat small frequent meals.  Get lots of rest and drink fluids.  Call your health care provider upon arrival home from hospital and make a follow up appointment for one week.  If your cough worsens, you develop fever greater than 101', you have shaking chills, a fast heartbeat, trouble breathing and/or feel your are breathing much faster than usual, call your healthcare provider.  Make sure you wash your hands frequently.      Diagnosis: Chronic systolic congestive heart failure  Assessment and Plan of Treatment: Weigh yourself daily.  If you gain 3lbs in 3 days, or 5lbs in a week call your Health Care Provider.  Do not eat or drink foods containing more than 2000mg of salt (sodium) in your diet every day.  Call your Health Care Provider if you have any swelling or increased swelling in your feet, ankles, and/or stomach.  Take all of your medication as directed.  If you become dizzy call your Health Care Provider.      Diagnosis: Lung nodule  Assessment and Plan of Treatment: Follow up with PMD for managment and surveillance  reviously demonstrated left upper lobe nodule is decreased in size.   Slightly increased right upper lobe nodule is indeterminate. Decreased   size of prior left upper lobe nodule.      Diagnosis: Chronic atrial fibrillation  Assessment and Plan of Treatment: Atrial fibrillation is the most common heart rhythm problem.  The condition puts you at risk for has stroke and heart attack  It helps if you control your blood pressure, not drink more than 1-2 alcohol drinks per day, cut down on caffeine, getting treatment for over active thyroid gland, and get regular exercise  Call your doctor if you feel your heart racing or beating unusually, chest tightness or pain, lightheaded, faint, shortness of breath especially with exercise  Continue eliquis   You may be on anticoagulation which is very important to take as directed - you may need blood work to monitor drug levels      Diagnosis: Obstructive sleep apnea  Assessment and Plan of Treatment: -NO CO2 retention of concern in the hospital.  -No indication for CPAP at this time.  Follow up with PMD and pulmonary for managment    Diagnosis: Adrenal nodule  Assessment and Plan of Treatment: 3.4 cm left adrenal nodule is significantly increased since prior exam.   Follow up with PMD and endocrine for outpatient workup

## 2024-01-09 NOTE — DISCHARGE NOTE PROVIDER - HOSPITAL COURSE
HPI:  89-year-old female PMH of CVA, YOVANI on CPAP, HTN, HLD, A-fib on Eliquis, heart failure on furosemide, recently admitted 1 month ago to [facility] for pneumonia completed a course of antibiotics (for 3 days), completed a course of cefpodoxime last week for UTI presents to the ED complaining of several days of coughing, increased wheezing, shortness of breath. Symptoms started a week ago. Patient denied any fevers or other symptoms. Patient's daughter also with flu-like symptoms recently. Pt uses an albuterol inhaler at home as needed and recently started using it twice a day (usually uses it once a day). Pt also gets nebulizer treatments regularly. She is independent with her ADLs, and walks with a cane. No recent falls at home. Patient found to be influenza A positive in ED, s/p oseltamavir.  (04 Jan 2024 20:29)    Hospital Course:        Important Medication Changes and Reason:    Active or Pending Issues Requiring Follow-up:    Advanced Directives:   [ ] Full code  [ ] DNR  [ ] Hospice    Discharge Diagnoses:         HPI:  89-year-old female PMH of CVA, YOVANI on CPAP, HTN, HLD, A-fib on Eliquis, heart failure on furosemide, recently admitted 1 month ago to [facility] for pneumonia completed a course of antibiotics (for 3 days), completed a course of cefpodoxime last week for UTI presents to the ED complaining of several days of coughing, increased wheezing, shortness of breath. Symptoms started a week ago. Patient denied any fevers or other symptoms. Patient's daughter also with flu-like symptoms recently. Pt uses an albuterol inhaler at home as needed and recently started using it twice a day (usually uses it once a day). Pt also gets nebulizer treatments regularly. She is independent with her ADLs, and walks with a cane. No recent falls at home. Patient found to be influenza A positive in ED, s/p oseltamavir.  (04 Jan 2024 20:29)    Hospital Course:  Pt presented to the ED complaining of several days of coughing, increased wheezing, shortness of breath, and found to be influenza positive. Tamiflu initiated, Pulmonary consulted.   . -CT chest with b/l TIB opacities, may be viral +/- bacterial PNA, with leukocytosis-Procalcitonin normal , -S/p 5 day course of Ceftriaxone and IV steroids. changed to Prednisone 40 mg PO qd x5 days . Chronic systolic congestive heart failure. -Not currently in decompensated HF. TTE from 8/29/23 showing normal LVSF, EF 61%  Chronic atrial fibrillation. ·- Continue eliquis  PT for PRASHANTH       Important Medication Changes and Reason:    Active or Pending Issues Requiring Follow-up:    Advanced Directives:   [x ] Full code  [ ] DNR  [ ] Hospice    Discharge Diagnoses:    Influenza A  PNA   CHF  Asthma

## 2024-01-09 NOTE — DISCHARGE NOTE PROVIDER - CARE PROVIDER_API CALL
Juan Pablo Spring.  Pulmonary Disease  [address] [address]  Phone: [phone]  Fax: [phone]  Follow Up Time:    Juan Pablo Spring.  Pulmonary Disease  [address] [address]  Phone: [phone]  Fax: [phone]  Follow Up Time:    Juan Pablo Spring.  Pulmonary Disease  [address] [address]  Phone: [phone]  Fax: [phone]  Follow Up Time:    Juan Pablo Spring.  Pulmonary Disease  891 [facility] [address]  [place] [address]  Phone: [phone]  Fax: [phone]  Follow Up Time: 2 weeks   Juan Pablo Spring.  Pulmonary Disease  891 [facility] [address]  [place] [address]  Phone: [phone]  Fax: [phone]  Follow Up Time: 2 weeks   Juan Pablo Spring.  Pulmonary Disease  891 [facility] [address]  [place] [address]  Phone: [phone]  Fax: [phone]  Follow Up Time: 2 weeks

## 2024-01-09 NOTE — PROGRESS NOTE ADULT - ASSESSMENT
89-year-old female PMH of CVA, YOVANI on CPAP, HTN, HLD, A-fib on Eliquis, heart failure on furosemide, presents to the ED complaining of several days of coughing, increased wheezing, shortness of breath, and found to be influenza positive. Patient admitted for sepsis management.       Sepsis.   - Blood cultures drawn and NGTD  - completed  ceftriaxone   - Continue oseltamavir  - Monitor fever curve.    Asthma exacerbation.   ·  Plan: 2nd to Influenza A +/- bacterial PNA  -Continue Duoneb q6h  -Continue Symbicort 160/4.5 mcg 2 puffs BID (home med Breo Ellipta)  -Prednisone 40 mg PO qd x5 days   -Keep sats >90% with O2 PRN, currently RA.    tiffany DAVALOS.    -CT chest with b/l TIB opacities, may be viral +/- bacterial PNA   -Continue Tamiflu  -Steroids/bronchodilators as above.    Chronic systolic congestive heart failure.   -Not currently in decompensated HF. TTE from 8/29/23 showing normal LVSF, EF 61%  - Will continue eplerenone, atenolol, losartan for now   - Will hold lasix for now given sepsis and current euvolemic status   - Monitor I/Os, daily weight  - BMP daily, monitor electrolytes while on diuretics.    Chronic atrial fibrillation.   ·  Plan: EKG personally reviewed showing afib with HR 120s, qtc 469  - Continue eliquis  - HR improved in ED to 80s.       YOVANI (obstructive sleep apnea).   - By hx  -Started on CPAP overnight, pt reports does not have CPAP at home   -VBG acceptable  -Suggest monitor off CPAP, order d/c'd.     Prophylactic measure.   ·  Plan: DVT ppx: on eliquis  Sleep: on benadryl qhs prn  Diet: DASH  Dispo: cleared by pulm for discharge to [facility]     89-year-old female PMH of CVA, YOVANI on CPAP, HTN, HLD, A-fib on Eliquis, heart failure on furosemide, presents to the ED complaining of several days of coughing, increased wheezing, shortness of breath, and found to be influenza positive. Patient admitted for sepsis management.       Sepsis.   - Blood cultures drawn and NGTD  - completed  ceftriaxone   - Continue oseltamavir  - Monitor fever curve.    Asthma exacerbation.   ·  Plan: 2nd to Influenza A +/- bacterial PNA  -Continue Duoneb q6h  -Continue Symbicort 160/4.5 mcg 2 puffs BID (home med Breo Ellipta)  -Prednisone 40 mg PO qd x5 days   -Keep sats >90% with O2 PRN, currently RA.    tiffany DAVALOS.    -CT chest with b/l TIB opacities, may be viral +/- bacterial PNA   -Continue Tamiflu  -Steroids/bronchodilators as above.    Chronic systolic congestive heart failure.   -Not currently in decompensated HF. TTE from 8/29/23 showing normal LVSF, EF 61%  - Will continue eplerenone, atenolol, losartan for now   - Will hold lasix for now given sepsis and current euvolemic status   - Monitor I/Os, daily weight  - BMP daily, monitor electrolytes while on diuretics.    Chronic atrial fibrillation.   ·  Plan: EKG personally reviewed showing afib with HR 120s, qtc 469  - Continue eliquis  - HR improved in ED to 80s.       YOVANI (obstructive sleep apnea).   - By hx  -Started on CPAP overnight, pt reports does not have CPAP at home   -VBG acceptable  -Suggest monitor off CPAP, order d/c'd.     Prophylactic measure.   ·  Plan: DVT ppx: on eliquis  Sleep: on benadryl qhs prn  Diet: DASH  Dispo: cleared by pulm for discharge to [facility]

## 2024-01-09 NOTE — PROGRESS NOTE ADULT - PROBLEM SELECTOR PLAN 4
By hx  -Started on CPAP overnight, pt reports does not have CPAP at home   -VBG acceptable  -Suggest monitor off CPAP By hx - pt has not worn her CPAP in many years as she could not tolerate it  -Her VBG during this hospitalization does not show any CO2 retention of concern   -Normoxic  -No indication for CPAP at this time

## 2024-01-09 NOTE — PROGRESS NOTE ADULT - PROBLEM SELECTOR PLAN 3
CT chest with COLEEN nodule, decreased in size from prior imaging, RUL nodule  -? infectious in nature  -Suggest repeat CT chest as an outpatient in 4-6 weeks.

## 2024-01-09 NOTE — PROGRESS NOTE ADULT - SUBJECTIVE AND OBJECTIVE BOX
Follow-up Pulm Progress Note    No new respiratory events overnight.  Denies SOB/CP.     Medications:  MEDICATIONS  (STANDING):  albuterol/ipratropium for Nebulization 3 milliLiter(s) Nebulizer every 6 hours  apixaban 5 milliGRAM(s) Oral every 12 hours  atenolol  Tablet 37.5 milliGRAM(s) Oral daily  atorvastatin 10 milliGRAM(s) Oral at bedtime  budesonide 160 MICROgram(s)/formoterol 4.5 MICROgram(s) Inhaler 2 Puff(s) Inhalation two times a day  busPIRone 15 milliGRAM(s) Oral two times a day  eplerenone 50 milliGRAM(s) Oral daily  famotidine Injectable 20 milliGRAM(s) IV Push once  levothyroxine 25 MICROGram(s) Oral daily  losartan 25 milliGRAM(s) Oral daily  oseltamivir 75 milliGRAM(s) Oral two times a day  predniSONE   Tablet 40 milliGRAM(s) Oral daily  pregabalin 25 milliGRAM(s) Oral two times a day        Vital Signs Last 24 Hrs  T(C): 36.4 (09 Jan 2024 08:37), Max: 36.7 (08 Jan 2024 20:32)  T(F): 97.6 (09 Jan 2024 08:37), Max: 98.1 (08 Jan 2024 20:32)  HR: 61 (09 Jan 2024 08:37) (61 - 96)  BP: 117/93 (09 Jan 2024 08:37) (117/93 - 139/85)  BP(mean): --  RR: 18 (09 Jan 2024 08:37) (18 - 18)  SpO2: 96% (09 Jan 2024 08:37) (92% - 97%)    Parameters below as of 09 Jan 2024 08:37  Patient On (Oxygen Delivery Method): room air              01-08 @ 07:01  -  01-09 @ 07:00  --------------------------------------------------------  IN: 500 mL / OUT: 225 mL / NET: 275 mL          LABS:                        14.8   22.63 )-----------( 217      ( 09 Jan 2024 06:51 )             48.6     01-08    142  |  104  |  43<H>  ----------------------------<  102<H>  4.1   |  23  |  0.98    Ca    8.4      08 Jan 2024 07:53            CAPILLARY BLOOD GLUCOSE          Urinalysis Basic - ( 08 Jan 2024 07:53 )    Color: x / Appearance: x / SG: x / pH: x  Gluc: 102 mg/dL / Ketone: x  / Bili: x / Urobili: x   Blood: x / Protein: x / Nitrite: x   Leuk Esterase: x / RBC: x / WBC x   Sq Epi: x / Non Sq Epi: x / Bacteria: x                      CULTURES:    Culture - Blood (collected 01-04-24 @ 15:30)  Source: .Blood Blood-Peripheral  Preliminary Report (01-08-24 @ 23:01):    No growth at 4 days    Culture - Blood (collected 01-04-24 @ 15:00)  Source: .Blood Blood-Peripheral  Preliminary Report (01-08-24 @ 23:01):    No growth at 4 days          Physical Examination:  PULM: CTA bilaterally, no wheezing   CVS: S1, S2 heard    RADIOLOGY REVIEWED    CT chest: < from: CT Chest No Cont (01.04.24 @ 19:38) >    FINDINGS:    LYMPH NODES: No lymphadenopathy in the chest.    HEART/VASCULATURE: Mild cardiomegaly. No pericardial effusion. Coronary   and valvular calcifications. Left chest wall pacemaker with leads in the   right atrium and right ventricle. Calcifications of the aorta and its   branch vessels.    AIRWAYS/LUNGS/PLEURA: Patent central airways. New bilateral upper lobe   tree-in-bud nodular opacities. Previously demonstrated 1.5 cm nodule in   the left upper lobe nodule is decreased in size. An 8 mm perifissural   right upper lobe nodule is slightly increased in size and conspicuity.   Scattered subsegmental atelectasis in the right lung. No pleural effusion.    UPPER ABDOMEN: 3.4 cm left adrenal nodule increased in size since prior   exam (previously 2.0 cm on 10/16/2023). Hepatic cysts. Partially   visualized indeterminate exophytic left renal upper pole unchanged in   size.    BONES/SOFT TISSUES: Degenerative changes of the spine.    IMPRESSION:    New areas of bilateral tree in bud nodules compatible with small airways   infection.    Previously demonstrated left upper lobe nodule is decreased in size.   Slightly increased right upper lobe nodule is indeterminate. Decreased   size of prior left upper lobe nodule.    3.4 cm left adrenal nodule is significantly increased since prior exam.   Recommend CT or MRI of the abdomen and pelvis for further evaluation.    --- End of Report ---      < end of copied text >

## 2024-01-09 NOTE — DISCHARGE NOTE PROVIDER - NSDCFUSCHEDAPPT_GEN_ALL_CORE_FT
[facility]  [facility]  Scheduled Appointment: 02/20/2024    [facility] Buitrago [facility]  [facility]  Scheduled Appointment: 02/20/2024     [place] Buit[facility]ago Physician [facility]  [facility]  Scheduled Appointment: 02/20/2024    [place] Buitrago [facility]  [facility]  Scheduled Appointment: 02/20/2024     [facility] Buitrago Physician [facility]  [facility]  Scheduled Appointment: 02/20/2024    [place] Buitrago [facility]  [facility]  Scheduled Appointment: 02/20/2024

## 2024-01-10 ENCOUNTER — APPOINTMENT (OUTPATIENT)
Dept: PULMONOLOGY | Facility: CLINIC | Age: 89
End: 2024-01-10

## 2024-01-10 DIAGNOSIS — K92.0 HEMATEMESIS: ICD-10-CM

## 2024-01-10 DIAGNOSIS — D72.829 ELEVATED WHITE BLOOD CELL COUNT, UNSPECIFIED: ICD-10-CM

## 2024-01-10 DIAGNOSIS — I48.91 UNSPECIFIED ATRIAL FIBRILLATION: ICD-10-CM

## 2024-01-10 LAB
ALBUMIN SERPL ELPH-MCNC: 3.1 G/DL — LOW (ref 3.3–5)
ALBUMIN SERPL ELPH-MCNC: 3.1 G/DL — LOW (ref 3.3–5)
ALP SERPL-CCNC: 54 U/L — SIGNIFICANT CHANGE UP (ref 40–120)
ALP SERPL-CCNC: 54 U/L — SIGNIFICANT CHANGE UP (ref 40–120)
ALT FLD-CCNC: 44 U/L — SIGNIFICANT CHANGE UP (ref 10–45)
ALT FLD-CCNC: 44 U/L — SIGNIFICANT CHANGE UP (ref 10–45)
ANION GAP SERPL CALC-SCNC: 12 MMOL/L — SIGNIFICANT CHANGE UP (ref 5–17)
ANION GAP SERPL CALC-SCNC: 12 MMOL/L — SIGNIFICANT CHANGE UP (ref 5–17)
ANION GAP SERPL CALC-SCNC: 15 MMOL/L — SIGNIFICANT CHANGE UP (ref 5–17)
ANION GAP SERPL CALC-SCNC: 15 MMOL/L — SIGNIFICANT CHANGE UP (ref 5–17)
AST SERPL-CCNC: 25 U/L — SIGNIFICANT CHANGE UP (ref 10–40)
AST SERPL-CCNC: 25 U/L — SIGNIFICANT CHANGE UP (ref 10–40)
BASE EXCESS BLDA CALC-SCNC: 5 MMOL/L — HIGH (ref -2–3)
BASE EXCESS BLDA CALC-SCNC: 5 MMOL/L — HIGH (ref -2–3)
BASOPHILS # BLD AUTO: 0 K/UL — SIGNIFICANT CHANGE UP (ref 0–0.2)
BASOPHILS # BLD AUTO: 0 K/UL — SIGNIFICANT CHANGE UP (ref 0–0.2)
BASOPHILS NFR BLD AUTO: 0 % — SIGNIFICANT CHANGE UP (ref 0–2)
BASOPHILS NFR BLD AUTO: 0 % — SIGNIFICANT CHANGE UP (ref 0–2)
BILIRUB SERPL-MCNC: 0.8 MG/DL — SIGNIFICANT CHANGE UP (ref 0.2–1.2)
BILIRUB SERPL-MCNC: 0.8 MG/DL — SIGNIFICANT CHANGE UP (ref 0.2–1.2)
BLD GP AB SCN SERPL QL: NEGATIVE — SIGNIFICANT CHANGE UP
BLD GP AB SCN SERPL QL: NEGATIVE — SIGNIFICANT CHANGE UP
BUN SERPL-MCNC: 66 MG/DL — HIGH (ref 7–23)
BUN SERPL-MCNC: 66 MG/DL — HIGH (ref 7–23)
BUN SERPL-MCNC: 68 MG/DL — HIGH (ref 7–23)
BUN SERPL-MCNC: 68 MG/DL — HIGH (ref 7–23)
CALCIUM SERPL-MCNC: 8.1 MG/DL — LOW (ref 8.4–10.5)
CHLORIDE SERPL-SCNC: 103 MMOL/L — SIGNIFICANT CHANGE UP (ref 96–108)
CHLORIDE SERPL-SCNC: 103 MMOL/L — SIGNIFICANT CHANGE UP (ref 96–108)
CHLORIDE SERPL-SCNC: 104 MMOL/L — SIGNIFICANT CHANGE UP (ref 96–108)
CHLORIDE SERPL-SCNC: 104 MMOL/L — SIGNIFICANT CHANGE UP (ref 96–108)
CO2 BLDA-SCNC: 30 MMOL/L — HIGH (ref 19–24)
CO2 BLDA-SCNC: 30 MMOL/L — HIGH (ref 19–24)
CO2 SERPL-SCNC: 18 MMOL/L — LOW (ref 22–31)
CO2 SERPL-SCNC: 18 MMOL/L — LOW (ref 22–31)
CO2 SERPL-SCNC: 23 MMOL/L — SIGNIFICANT CHANGE UP (ref 22–31)
CO2 SERPL-SCNC: 23 MMOL/L — SIGNIFICANT CHANGE UP (ref 22–31)
CREAT SERPL-MCNC: 1.23 MG/DL — SIGNIFICANT CHANGE UP (ref 0.5–1.3)
EGFR: 42 ML/MIN/1.73M2 — LOW
EOSINOPHIL # BLD AUTO: 0 K/UL — SIGNIFICANT CHANGE UP (ref 0–0.5)
EOSINOPHIL # BLD AUTO: 0 K/UL — SIGNIFICANT CHANGE UP (ref 0–0.5)
EOSINOPHIL NFR BLD AUTO: 0 % — SIGNIFICANT CHANGE UP (ref 0–6)
EOSINOPHIL NFR BLD AUTO: 0 % — SIGNIFICANT CHANGE UP (ref 0–6)
GAS PNL BLDA: SIGNIFICANT CHANGE UP
GAS PNL BLDA: SIGNIFICANT CHANGE UP
GLUCOSE BLDC GLUCOMTR-MCNC: 116 MG/DL — HIGH (ref 70–99)
GLUCOSE BLDC GLUCOMTR-MCNC: 116 MG/DL — HIGH (ref 70–99)
GLUCOSE BLDC GLUCOMTR-MCNC: 120 MG/DL — HIGH (ref 70–99)
GLUCOSE BLDC GLUCOMTR-MCNC: 120 MG/DL — HIGH (ref 70–99)
GLUCOSE BLDC GLUCOMTR-MCNC: 127 MG/DL — HIGH (ref 70–99)
GLUCOSE BLDC GLUCOMTR-MCNC: 127 MG/DL — HIGH (ref 70–99)
GLUCOSE BLDC GLUCOMTR-MCNC: 132 MG/DL — HIGH (ref 70–99)
GLUCOSE BLDC GLUCOMTR-MCNC: 132 MG/DL — HIGH (ref 70–99)
GLUCOSE BLDC GLUCOMTR-MCNC: 164 MG/DL — HIGH (ref 70–99)
GLUCOSE BLDC GLUCOMTR-MCNC: 164 MG/DL — HIGH (ref 70–99)
GLUCOSE SERPL-MCNC: 124 MG/DL — HIGH (ref 70–99)
GLUCOSE SERPL-MCNC: 124 MG/DL — HIGH (ref 70–99)
GLUCOSE SERPL-MCNC: 189 MG/DL — HIGH (ref 70–99)
GLUCOSE SERPL-MCNC: 189 MG/DL — HIGH (ref 70–99)
HCO3 BLDA-SCNC: 29 MMOL/L — HIGH (ref 21–28)
HCO3 BLDA-SCNC: 29 MMOL/L — HIGH (ref 21–28)
HCT VFR BLD CALC: 45.8 % — HIGH (ref 34.5–45)
HCT VFR BLD CALC: 45.8 % — HIGH (ref 34.5–45)
HCT VFR BLD CALC: 48.6 % — HIGH (ref 34.5–45)
HCT VFR BLD CALC: 48.6 % — HIGH (ref 34.5–45)
HGB BLD-MCNC: 14.6 G/DL — SIGNIFICANT CHANGE UP (ref 11.5–15.5)
HGB BLD-MCNC: 14.6 G/DL — SIGNIFICANT CHANGE UP (ref 11.5–15.5)
HGB BLD-MCNC: 15 G/DL — SIGNIFICANT CHANGE UP (ref 11.5–15.5)
HGB BLD-MCNC: 15 G/DL — SIGNIFICANT CHANGE UP (ref 11.5–15.5)
HOROWITZ INDEX BLDA+IHG-RTO: 21 — SIGNIFICANT CHANGE UP
HOROWITZ INDEX BLDA+IHG-RTO: 21 — SIGNIFICANT CHANGE UP
LYMPHOCYTES # BLD AUTO: 1.56 K/UL — SIGNIFICANT CHANGE UP (ref 1–3.3)
LYMPHOCYTES # BLD AUTO: 1.56 K/UL — SIGNIFICANT CHANGE UP (ref 1–3.3)
LYMPHOCYTES # BLD AUTO: 5.1 % — LOW (ref 13–44)
LYMPHOCYTES # BLD AUTO: 5.1 % — LOW (ref 13–44)
MCHC RBC-ENTMCNC: 27.4 PG — SIGNIFICANT CHANGE UP (ref 27–34)
MCHC RBC-ENTMCNC: 27.4 PG — SIGNIFICANT CHANGE UP (ref 27–34)
MCHC RBC-ENTMCNC: 28 PG — SIGNIFICANT CHANGE UP (ref 27–34)
MCHC RBC-ENTMCNC: 28 PG — SIGNIFICANT CHANGE UP (ref 27–34)
MCHC RBC-ENTMCNC: 30.9 GM/DL — LOW (ref 32–36)
MCHC RBC-ENTMCNC: 30.9 GM/DL — LOW (ref 32–36)
MCHC RBC-ENTMCNC: 31.9 GM/DL — LOW (ref 32–36)
MCHC RBC-ENTMCNC: 31.9 GM/DL — LOW (ref 32–36)
MCV RBC AUTO: 87.9 FL — SIGNIFICANT CHANGE UP (ref 80–100)
MCV RBC AUTO: 87.9 FL — SIGNIFICANT CHANGE UP (ref 80–100)
MCV RBC AUTO: 88.8 FL — SIGNIFICANT CHANGE UP (ref 80–100)
MCV RBC AUTO: 88.8 FL — SIGNIFICANT CHANGE UP (ref 80–100)
MONOCYTES # BLD AUTO: 2.11 K/UL — HIGH (ref 0–0.9)
MONOCYTES # BLD AUTO: 2.11 K/UL — HIGH (ref 0–0.9)
MONOCYTES NFR BLD AUTO: 6.9 % — SIGNIFICANT CHANGE UP (ref 2–14)
MONOCYTES NFR BLD AUTO: 6.9 % — SIGNIFICANT CHANGE UP (ref 2–14)
NEUTROPHILS # BLD AUTO: 26.91 K/UL — HIGH (ref 1.8–7.4)
NEUTROPHILS # BLD AUTO: 26.91 K/UL — HIGH (ref 1.8–7.4)
NEUTROPHILS NFR BLD AUTO: 88 % — HIGH (ref 43–77)
NEUTROPHILS NFR BLD AUTO: 88 % — HIGH (ref 43–77)
NRBC # BLD: 0 /100 WBCS — SIGNIFICANT CHANGE UP (ref 0–0)
NRBC # BLD: 0 /100 WBCS — SIGNIFICANT CHANGE UP (ref 0–0)
PCO2 BLDA: 40 MMHG — SIGNIFICANT CHANGE UP (ref 32–45)
PCO2 BLDA: 40 MMHG — SIGNIFICANT CHANGE UP (ref 32–45)
PH BLDA: 7.47 — HIGH (ref 7.35–7.45)
PH BLDA: 7.47 — HIGH (ref 7.35–7.45)
PLATELET # BLD AUTO: 205 K/UL — SIGNIFICANT CHANGE UP (ref 150–400)
PLATELET # BLD AUTO: 205 K/UL — SIGNIFICANT CHANGE UP (ref 150–400)
PLATELET # BLD AUTO: 224 K/UL — SIGNIFICANT CHANGE UP (ref 150–400)
PLATELET # BLD AUTO: 224 K/UL — SIGNIFICANT CHANGE UP (ref 150–400)
PO2 BLDA: 70 MMHG — LOW (ref 83–108)
PO2 BLDA: 70 MMHG — LOW (ref 83–108)
POTASSIUM SERPL-MCNC: 4.5 MMOL/L — SIGNIFICANT CHANGE UP (ref 3.5–5.3)
POTASSIUM SERPL-MCNC: 4.5 MMOL/L — SIGNIFICANT CHANGE UP (ref 3.5–5.3)
POTASSIUM SERPL-MCNC: 5.2 MMOL/L — SIGNIFICANT CHANGE UP (ref 3.5–5.3)
POTASSIUM SERPL-MCNC: 5.2 MMOL/L — SIGNIFICANT CHANGE UP (ref 3.5–5.3)
POTASSIUM SERPL-SCNC: 4.5 MMOL/L — SIGNIFICANT CHANGE UP (ref 3.5–5.3)
POTASSIUM SERPL-SCNC: 4.5 MMOL/L — SIGNIFICANT CHANGE UP (ref 3.5–5.3)
POTASSIUM SERPL-SCNC: 5.2 MMOL/L — SIGNIFICANT CHANGE UP (ref 3.5–5.3)
POTASSIUM SERPL-SCNC: 5.2 MMOL/L — SIGNIFICANT CHANGE UP (ref 3.5–5.3)
PROT SERPL-MCNC: 5.5 G/DL — LOW (ref 6–8.3)
PROT SERPL-MCNC: 5.5 G/DL — LOW (ref 6–8.3)
RBC # BLD: 5.21 M/UL — HIGH (ref 3.8–5.2)
RBC # BLD: 5.21 M/UL — HIGH (ref 3.8–5.2)
RBC # BLD: 5.47 M/UL — HIGH (ref 3.8–5.2)
RBC # BLD: 5.47 M/UL — HIGH (ref 3.8–5.2)
RBC # FLD: 16.7 % — HIGH (ref 10.3–14.5)
RBC # FLD: 16.7 % — HIGH (ref 10.3–14.5)
RBC # FLD: 16.8 % — HIGH (ref 10.3–14.5)
RBC # FLD: 16.8 % — HIGH (ref 10.3–14.5)
RH IG SCN BLD-IMP: POSITIVE — SIGNIFICANT CHANGE UP
RH IG SCN BLD-IMP: POSITIVE — SIGNIFICANT CHANGE UP
SAO2 % BLDA: 94.9 % — SIGNIFICANT CHANGE UP (ref 94–98)
SAO2 % BLDA: 94.9 % — SIGNIFICANT CHANGE UP (ref 94–98)
SODIUM SERPL-SCNC: 136 MMOL/L — SIGNIFICANT CHANGE UP (ref 135–145)
SODIUM SERPL-SCNC: 136 MMOL/L — SIGNIFICANT CHANGE UP (ref 135–145)
SODIUM SERPL-SCNC: 139 MMOL/L — SIGNIFICANT CHANGE UP (ref 135–145)
SODIUM SERPL-SCNC: 139 MMOL/L — SIGNIFICANT CHANGE UP (ref 135–145)
WBC # BLD: 24.48 K/UL — HIGH (ref 3.8–10.5)
WBC # BLD: 24.48 K/UL — HIGH (ref 3.8–10.5)
WBC # BLD: 30.58 K/UL — HIGH (ref 3.8–10.5)
WBC # BLD: 30.58 K/UL — HIGH (ref 3.8–10.5)
WBC # FLD AUTO: 24.48 K/UL — HIGH (ref 3.8–10.5)
WBC # FLD AUTO: 24.48 K/UL — HIGH (ref 3.8–10.5)
WBC # FLD AUTO: 30.58 K/UL — HIGH (ref 3.8–10.5)
WBC # FLD AUTO: 30.58 K/UL — HIGH (ref 3.8–10.5)

## 2024-01-10 PROCEDURE — 93010 ELECTROCARDIOGRAM REPORT: CPT

## 2024-01-10 PROCEDURE — 99223 1ST HOSP IP/OBS HIGH 75: CPT | Mod: GC

## 2024-01-10 PROCEDURE — 74018 RADEX ABDOMEN 1 VIEW: CPT | Mod: 26,76

## 2024-01-10 PROCEDURE — 74177 CT ABD & PELVIS W/CONTRAST: CPT | Mod: 26

## 2024-01-10 PROCEDURE — 71045 X-RAY EXAM CHEST 1 VIEW: CPT | Mod: 26

## 2024-01-10 RX ORDER — METOPROLOL TARTRATE 50 MG
5 TABLET ORAL ONCE
Refills: 0 | Status: COMPLETED | OUTPATIENT
Start: 2024-01-10 | End: 2024-01-10

## 2024-01-10 RX ORDER — SODIUM CHLORIDE 9 MG/ML
1000 INJECTION INTRAMUSCULAR; INTRAVENOUS; SUBCUTANEOUS
Refills: 0 | Status: DISCONTINUED | OUTPATIENT
Start: 2024-01-10 | End: 2024-01-12

## 2024-01-10 RX ORDER — PIPERACILLIN AND TAZOBACTAM 4; .5 G/20ML; G/20ML
3.38 INJECTION, POWDER, LYOPHILIZED, FOR SOLUTION INTRAVENOUS EVERY 8 HOURS
Refills: 0 | Status: DISCONTINUED | OUTPATIENT
Start: 2024-01-10 | End: 2024-01-16

## 2024-01-10 RX ORDER — PIPERACILLIN AND TAZOBACTAM 4; .5 G/20ML; G/20ML
3.38 INJECTION, POWDER, LYOPHILIZED, FOR SOLUTION INTRAVENOUS ONCE
Refills: 0 | Status: COMPLETED | OUTPATIENT
Start: 2024-01-10 | End: 2024-01-10

## 2024-01-10 RX ORDER — METOPROLOL TARTRATE 50 MG
2.5 TABLET ORAL EVERY 6 HOURS
Refills: 0 | Status: DISCONTINUED | OUTPATIENT
Start: 2024-01-10 | End: 2024-01-12

## 2024-01-10 RX ORDER — PANTOPRAZOLE SODIUM 20 MG/1
40 TABLET, DELAYED RELEASE ORAL
Refills: 0 | Status: DISCONTINUED | OUTPATIENT
Start: 2024-01-10 | End: 2024-01-16

## 2024-01-10 RX ORDER — METOPROLOL TARTRATE 50 MG
2.5 TABLET ORAL ONCE
Refills: 0 | Status: DISCONTINUED | OUTPATIENT
Start: 2024-01-10 | End: 2024-01-10

## 2024-01-10 RX ORDER — AMIODARONE HYDROCHLORIDE 400 MG/1
150 TABLET ORAL ONCE
Refills: 0 | Status: DISCONTINUED | OUTPATIENT
Start: 2024-01-10 | End: 2024-01-13

## 2024-01-10 RX ORDER — PANTOPRAZOLE SODIUM 20 MG/1
40 TABLET, DELAYED RELEASE ORAL ONCE
Refills: 0 | Status: COMPLETED | OUTPATIENT
Start: 2024-01-10 | End: 2024-01-10

## 2024-01-10 RX ADMIN — Medication 2.5 MILLIGRAM(S): at 23:56

## 2024-01-10 RX ADMIN — Medication 2.5 MILLIGRAM(S): at 06:36

## 2024-01-10 RX ADMIN — PANTOPRAZOLE SODIUM 40 MILLIGRAM(S): 20 TABLET, DELAYED RELEASE ORAL at 05:21

## 2024-01-10 RX ADMIN — Medication 2.5 MILLIGRAM(S): at 12:15

## 2024-01-10 RX ADMIN — Medication 3 MILLILITER(S): at 23:46

## 2024-01-10 RX ADMIN — Medication 5 MILLIGRAM(S): at 00:00

## 2024-01-10 RX ADMIN — PIPERACILLIN AND TAZOBACTAM 25 GRAM(S): 4; .5 INJECTION, POWDER, LYOPHILIZED, FOR SOLUTION INTRAVENOUS at 05:20

## 2024-01-10 RX ADMIN — Medication 3 MILLILITER(S): at 18:22

## 2024-01-10 RX ADMIN — PANTOPRAZOLE SODIUM 40 MILLIGRAM(S): 20 TABLET, DELAYED RELEASE ORAL at 00:00

## 2024-01-10 RX ADMIN — Medication 3 MILLILITER(S): at 05:21

## 2024-01-10 RX ADMIN — PIPERACILLIN AND TAZOBACTAM 25 GRAM(S): 4; .5 INJECTION, POWDER, LYOPHILIZED, FOR SOLUTION INTRAVENOUS at 23:44

## 2024-01-10 RX ADMIN — PANTOPRAZOLE SODIUM 40 MILLIGRAM(S): 20 TABLET, DELAYED RELEASE ORAL at 18:22

## 2024-01-10 RX ADMIN — PIPERACILLIN AND TAZOBACTAM 25 GRAM(S): 4; .5 INJECTION, POWDER, LYOPHILIZED, FOR SOLUTION INTRAVENOUS at 18:13

## 2024-01-10 RX ADMIN — BUDESONIDE AND FORMOTEROL FUMARATE DIHYDRATE 2 PUFF(S): 160; 4.5 AEROSOL RESPIRATORY (INHALATION) at 05:21

## 2024-01-10 RX ADMIN — Medication 2.5 MILLIGRAM(S): at 18:13

## 2024-01-10 RX ADMIN — PIPERACILLIN AND TAZOBACTAM 200 GRAM(S): 4; .5 INJECTION, POWDER, LYOPHILIZED, FOR SOLUTION INTRAVENOUS at 00:00

## 2024-01-10 RX ADMIN — SODIUM CHLORIDE 75 MILLILITER(S): 9 INJECTION INTRAMUSCULAR; INTRAVENOUS; SUBCUTANEOUS at 18:13

## 2024-01-10 NOTE — PROGRESS NOTE ADULT - PROBLEM SELECTOR PLAN 3
-Uptrending WBC  -S/p RRT this AM for AMS, afib with RVR, coffee ground emesis  -? aspiration. Continue ABX  -Continue to trend  -F/u CT A/P  -GI f/u  -F/u ABG ordered, pt appears more confused than baseline.

## 2024-01-10 NOTE — PROGRESS NOTE ADULT - SUBJECTIVE AND OBJECTIVE BOX
Follow-up Pulm Progress Note    Events noted, s/p RRT for AMS, coffee ground emesis, afib with RVR   Seen on 2LNC sats 99%, placed on room air  Confused    Medications:  MEDICATIONS  (STANDING):  albuterol/ipratropium for Nebulization 3 milliLiter(s) Nebulizer every 6 hours  aMIOdarone IVPB 150 milliGRAM(s) IV Intermittent once  aMIOdarone IVPB 150 milliGRAM(s) IV Intermittent once  apixaban 5 milliGRAM(s) Oral every 12 hours  atenolol  Tablet 37.5 milliGRAM(s) Oral daily  atorvastatin 10 milliGRAM(s) Oral at bedtime  budesonide 160 MICROgram(s)/formoterol 4.5 MICROgram(s) Inhaler 2 Puff(s) Inhalation two times a day  busPIRone 15 milliGRAM(s) Oral two times a day  eplerenone 50 milliGRAM(s) Oral daily  levothyroxine 25 MICROGram(s) Oral daily  losartan 25 milliGRAM(s) Oral daily  metoprolol tartrate Injectable 2.5 milliGRAM(s) IV Push every 6 hours  ondansetron Injectable 4 milliGRAM(s) IV Push once  pantoprazole    Tablet 40 milliGRAM(s) Oral before breakfast  pantoprazole  Injectable 40 milliGRAM(s) IV Push two times a day  piperacillin/tazobactam IVPB.. 3.375 Gram(s) IV Intermittent every 8 hours  polyethylene glycol 3350 17 Gram(s) Oral daily  predniSONE   Tablet 40 milliGRAM(s) Oral daily  pregabalin 25 milliGRAM(s) Oral two times a day    Vital Signs Last 24 Hrs  T(C): 36.7 (10 Percy 2024 04:53), Max: 36.9 (10 Percy 2024 02:46)  T(F): 98 (10 Percy 2024 04:53), Max: 98.4 (10 Percy 2024 02:46)  HR: 116 (10 Percy 2024 06:38) (80 - 116)  BP: 170/90 (10 Percy 2024 06:38) (123/80 - 170/90)  BP(mean): --  RR: 18 (10 Percy 2024 04:53) (18 - 20)  SpO2: 94% (10 Percy 2024 04:53) (94% - 94%)    Parameters below as of 10 Percy 2024 04:53  Patient On (Oxygen Delivery Method): nasal cannula  O2 Flow (L/min): 2        01-09 @ 07:01  -  01-10 @ 07:00  --------------------------------------------------------  IN: 1575 mL / OUT: 150 mL / NET: 1425 mL          LABS:                        14.6   30.58 )-----------( 205      ( 10 Percy 2024 05:28 )             45.8     01-10    139  |  104  |  66<H>  ----------------------------<  124<H>  4.5   |  23  |  1.23    Ca    8.1<L>      10 Percy 2024 05:27    TPro  5.5<L>  /  Alb  3.1<L>  /  TBili  0.8  /  DBili  x   /  AST  25  /  ALT  44  /  AlkPhos  54  01-10          CAPILLARY BLOOD GLUCOSE  POCT Blood Glucose.: 132 mg/dL (10 Percy 2024 11:29)    Urinalysis Basic - ( 10 Percy 2024 05:27 )    Color: x / Appearance: x / SG: x / pH: x  Gluc: 124 mg/dL / Ketone: x  / Bili: x / Urobili: x   Blood: x / Protein: x / Nitrite: x   Leuk Esterase: x / RBC: x / WBC x   Sq Epi: x / Non Sq Epi: x / Bacteria: x      CULTURES:   Culture - Blood (collected 01-04-24 @ 15:30)  Source: .Blood Blood-Peripheral  Final Report (01-09-24 @ 23:00):    No growth at 5 days    Culture - Blood (collected 01-04-24 @ 15:00)  Source: .Blood Blood-Peripheral  Final Report (01-09-24 @ 23:00):    No growth at 5 days    Physical Examination:  PULM: Decreased at bases   CVS: S1, S2 heard    RADIOLOGY REVIEWED  < from: CT Abdomen and Pelvis w/ IV Cont (01.10.24 @ 02:10) >  INTERPRETATION:  Enteric tube with tip in the stomach. The stomach is   distended with fluid and air.  No small bowel obstruction.  Trace right pleural effusion and associated atelectasis.        < end of copied text >      CT chest: CT chest: < from: CT Chest No Cont (01.04.24 @ 19:38) >    FINDINGS:    LYMPH NODES: No lymphadenopathy in the chest.    HEART/VASCULATURE: Mild cardiomegaly. No pericardial effusion. Coronary   and valvular calcifications. Left chest wall pacemaker with leads in the   right atrium and right ventricle. Calcifications of the aorta and its   branch vessels.    AIRWAYS/LUNGS/PLEURA: Patent central airways. New bilateral upper lobe   tree-in-bud nodular opacities. Previously demonstrated 1.5 cm nodule in   the left upper lobe nodule is decreased in size. An 8 mm perifissural   right upper lobe nodule is slightly increased in size and conspicuity.   Scattered subsegmental atelectasis in the right lung. No pleural effusion.    UPPER ABDOMEN: 3.4 cm left adrenal nodule increased in size since prior   exam (previously 2.0 cm on 10/16/2023). Hepatic cysts. Partially   visualized indeterminate exophytic left renal upper pole unchanged in   size.    BONES/SOFT TISSUES: Degenerative changes of the spine.    IMPRESSION:    New areas of bilateral tree in bud nodules compatible with small airways   infection.    Previously demonstrated left upper lobe nodule is decreased in size.   Slightly increased right upper lobe nodule is indeterminate. Decreased   size of prior left upper lobe nodule.    3.4 cm left adrenal nodule is significantly increased since prior exam.   Recommend CT or MRI of the abdomen and pelvis for further evaluation.    --- End of Report ---      < end of copied text >

## 2024-01-10 NOTE — PROGRESS NOTE ADULT - NS ATTEND AMEND GEN_ALL_CORE FT
? GI bleeding   antibiotic coverage for aspiration event  Cont. Bronchodilators   Short course of steroids

## 2024-01-10 NOTE — PROGRESS NOTE ADULT - ASSESSMENT
89-year-old female PMH of CVA, YOVANI on CPAP, HTN, HLD, A-fib on Eliquis, heart failure on furosemide, presents to the ED complaining of several days of coughing, increased wheezing, shortness of breath, and found to be influenza positive. Patient admitted for sepsis management.     Upper GI bleed  - npo  - iv ppi  - GI consult for endoscopy  - hold eliquis    Sepsis.   - Blood cultures drawn and NGTD  - completed  ceftriaxone   - Continue oseltamavir  - Monitor fever curve.    Asthma exacerbation.   ·  Plan: 2nd to Influenza A +/- bacterial PNA  -Continue Duoneb q6h  -Continue Symbicort 160/4.5 mcg 2 puffs BID (home med Breo Ellipta)  -Prednisone 40 mg PO qd x5 days   -Keep sats >90% with O2 PRN, currently RANikko DAVALOS.    -CT chest with b/l TIB opacities, may be viral +/- bacterial PNA   -Continue Tamiflu  -Steroids/bronchodilators as above.    Chronic systolic congestive heart failure.   -Not currently in decompensated HF. TTE from 8/29/23 showing normal LVSF, EF 61%  - Will continue eplerenone, atenolol, losartan for now   - Will hold lasix for now given sepsis and current euvolemic status   - Monitor I/Os, daily weight  - BMP daily, monitor electrolytes while on diuretics.    Chronic atrial fibrillation.   ·  Plan: EKG personally reviewed showing afib with HR 120s, qtc 469  - Continue eliquis  - HR improved in ED to 80s.    CORY clot on ct  - will get tte  - pt has been on a/c    adrenall mass  - to be evaluated as out pt as pt has active issues      YOVANI (obstructive sleep apnea).   - By hx  -  pt reports does not have CPAP at home   -VBG acceptable  -Suggest monitor off CPAP, order d/c'd.     Prophylactic measure.   ·  Plan: DVT ppx: holding  eliquis  Sleep: on benadryl qhs prn  Diet: DASH  Dispo: cleared by pulm for discharge to [facility]     89-year-old female PMH of CVA, YOVANI on CPAP, HTN, HLD, A-fib on Eliquis, heart failure on furosemide, presents to the ED complaining of several days of coughing, increased wheezing, shortness of breath, and found to be influenza positive. Patient admitted for sepsis management.     Upper GI bleed  - npo  - iv ppi  - GI consult for endoscopy  - hold eliquis    Sepsis.   - Blood cultures drawn and NGTD  - completed  ceftriaxone   - Continue oseltamavir  - Monitor fever curve.    Asthma exacerbation.   ·  Plan: 2nd to Influenza A +/- bacterial PNA  -Continue Duoneb q6h  -Continue Symbicort 160/4.5 mcg 2 puffs BID (home med Breo Ellipta)  -Prednisone 40 mg PO qd x5 days   -Keep sats >90% with O2 PRN, currently RANikok DAVALOS.    -CT chest with b/l TIB opacities, may be viral +/- bacterial PNA   -Continue Tamiflu  -Steroids/bronchodilators as above.    Chronic systolic congestive heart failure.   -Not currently in decompensated HF. TTE from 8/29/23 showing normal LVSF, EF 61%  - Will continue eplerenone, atenolol, losartan for now   - Will hold lasix for now given sepsis and current euvolemic status   - Monitor I/Os, daily weight  - BMP daily, monitor electrolytes while on diuretics.    Chronic atrial fibrillation.   ·  Plan: EKG personally reviewed showing afib with HR 120s, qtc 469  - Continue eliquis  - HR improved in ED to 80s.    CORY clot on ct  - will get tte  - pt has been on a/c    adrenall mass  - to be evaluated as out pt as pt has active issues      YOVANI (obstructive sleep apnea).   - By hx  -  pt reports does not have CPAP at home   -VBG acceptable  -Suggest monitor off CPAP, order d/c'd.     Prophylactic measure.   ·  Plan: DVT ppx: holding  eliquis  Sleep: on benadryl qhs prn  Diet: DASH  Dispo: cleared by pulm for discharge to [facility]

## 2024-01-10 NOTE — CHART NOTE - NSCHARTNOTEFT_GEN_A_CORE
Cardiology contacted due to RRT called for increased WOB. Briefly, patient is a 89F with PMH of afib, HFpEF, HTN, YOVANI, hypothyroidism presenting with influenza +/- PNA as well as concern for SBO. Noted to be in afib RVR to 160s, was given 5mg IV lopressor and amio boluses with improvement in rates. Patient NPO given concern for SBO, unable to take PO medications. Patient remains HDS.     Recs:  -switch patient to telemetry bed  -while patient not tolerating PO; would give lopressor 2.5mg IV Q6hrs  -goal HR <110; if still sustaining 120s or high can increase to lopressor 5mg IV Q6 hours  -ensure euvolemia, may need fluid replacement if patient remains NPO  -once patient can tolerate PO, please start metoprolol 25mg Q6  -also noted that TSH is mildly suppressed at 0.22, free T4 mildly elevated at 2.1, could contribute to rapid afib (pt taking synthroid)

## 2024-01-10 NOTE — RAPID RESPONSE TEAM SUMMARY - NSSITUATIONBACKGROUNDRRT_GEN_ALL_CORE
89-year-old female PMH of CVA, YOVANI on CPAP, HTN, HLD, A-fib on Eliquis, heart failure on furosemide, presents to the ED complaining of several days of coughing, increased wheezing, shortness of breath, and found to be influenza positive. RRT called due to staff concern.
89-year-old female PMH of CVA, YOVANI on CPAP, HTN, HLD, A-fib on Eliquis, heart failure on furosemide, recently admitted 1 month ago to [facility] for pneumonia completed a course of antibiotics (for 3 days), completed a course of cefpodoxime last week for UTI presents to the ED complaining of several days of coughing, increased wheezing, shortness of breath. RRT called for increased WOB. During the rapid pt in A-Fib RVR.

## 2024-01-10 NOTE — RAPID RESPONSE TEAM SUMMARY - NSADDTLFINDINGSRRT_GEN_ALL_CORE
Upon arrival pt was satting well receiving nebulizer treatment. She had increased WOB and just had coffee ground emesis unsure if truly had a GIB vs a bowel obstruction w/ dark material. Checked labs. Hgb stable. Due to concerns for SBO ordered X-Ray which could not rule out obstruction and therefore CT ordered. During the rapid pt became A-Fib RVR and therefore gave lopressor 5, did not break, and gave amio load x2 and another lopressor 2.5. Called cardiology during the rapid who agreed on the above management and recommended standing lopressor 2.5 due to concerns for SBO. Surgery at bedside helped putting in NGT after surgery resident and MAR tried 4 times combined as pt difficult to collaborate w/ more dark material coming out. Finally pt HR stable in 110s and transferred down to CT. Prelim read showed distended abdomen but no SBO. Unsure if pt still needs some decompression due to abd distention. Upon arrival pt was satting well receiving nebulizer treatment. She had increased WOB and just had coffee ground emesis unsure if truly had a GIB vs a bowel obstruction w/ dark material. PPI 40 IV push given. Checked labs. Hgb stable. Due to concerns for SBO ordered X-Ray which could not rule out obstruction and therefore CT ordered. During the rapid pt became A-Fib RVR and therefore gave lopressor 5, did not break, and gave amio load x2 and another lopressor 2.5. Called cardiology during the rapid who agreed on the above management and recommended standing lopressor 2.5 due to concerns for SBO. Surgery at bedside helped putting in NGT after surgery resident and MAR tried 4 times combined as pt difficult to collaborate w/ more dark material coming out. Finally pt HR stable in 110s and transferred down to CT. Prelim read showed distended abdomen but no SBO. Unsure if pt still needs some decompression due to abd distention.

## 2024-01-10 NOTE — CONSULT NOTE ADULT - ATTENDING COMMENTS
Agree with above. Patient with coffee ground dark emesis overnight, initially had NGT but pulled it out. CT shows no obstruction. Serial Hgb is still stable, Hgb >14. Discussed with daughter at bedside - if EGD is to be considered in this setting, given vomiting and distended stomach, she will need general anesthesia which daughter does not want at this time. She is agreeable to conservative management medical management for now with PPI BID, transfusion as needed. Since patient cannot tolerate NGT, repeat AXR. If no continued gastric distension, then consider trial of clears.

## 2024-01-10 NOTE — CONSULT NOTE ADULT - SUBJECTIVE AND OBJECTIVE BOX
HPI:  DAGMAR CONNORS is a 89year old female with     Otherwise, patient denies fevers, chills, weight loss, dysphagia, odynophagia, early satiety, poor oral intake, abdominal pain, nausea, vomiting, diarrhea, melena, hematemesis, hematochezia, change in stool caliber, or family history of GI-related cancers.    ROS:   General:  No fevers, chills, (+) fatigue  Eyes:  Good vision, no reported pain  ENT:  No sore throat, pain, runny nose  CV:  No pain, palpitations  Pulm:  No dyspnea, cough  GI:  See HPI, otherwise negative  :  No  incontinence, nocturia  Muscle:  No pain, weakness  Neuro:  No memory problems  Psych:  No insomnia, mood problems  Endocrine:  No polyuria, polydipsia  Heme:  No petechiae, ecchymosis  Skin:  No active rashes    PMHX/PSHX:    Atrial fibrillation    HTN (hypertension)    HLD (hyperlipidemia)    UTI (urinary tract infection)    Thyroid nodule    YOVANI on CPAP    OA (osteoarthritis)    Cerebrovascular accident (CVA)    FHx: total knee replacement    Cataract    S/P JAY-BSO    FHx: cholecystectomy    Fracture    Cardiac pacemaker      Allergies:  OxyContin (Sedation/Somnol; Drowsiness)  Cardizem (Urticaria)  sulfa drugs (Hives)      Home Medications: reviewed  Hospital Medications:  albuterol/ipratropium for Nebulization 3 milliLiter(s) Nebulizer every 6 hours  aMIOdarone IVPB 150 milliGRAM(s) IV Intermittent once  aMIOdarone IVPB 150 milliGRAM(s) IV Intermittent once  apixaban 5 milliGRAM(s) Oral every 12 hours  atenolol  Tablet 37.5 milliGRAM(s) Oral daily  atorvastatin 10 milliGRAM(s) Oral at bedtime  budesonide 160 MICROgram(s)/formoterol 4.5 MICROgram(s) Inhaler 2 Puff(s) Inhalation two times a day  busPIRone 15 milliGRAM(s) Oral two times a day  eplerenone 50 milliGRAM(s) Oral daily  levothyroxine 25 MICROGram(s) Oral daily  losartan 25 milliGRAM(s) Oral daily  metoprolol tartrate Injectable 2.5 milliGRAM(s) IV Push every 6 hours  ondansetron Injectable 4 milliGRAM(s) IV Push once  pantoprazole    Tablet 40 milliGRAM(s) Oral before breakfast  pantoprazole  Injectable 40 milliGRAM(s) IV Push two times a day  piperacillin/tazobactam IVPB.. 3.375 Gram(s) IV Intermittent every 8 hours  polyethylene glycol 3350 17 Gram(s) Oral daily  predniSONE   Tablet 40 milliGRAM(s) Oral daily  pregabalin 25 milliGRAM(s) Oral two times a day      Social History:   Tobacco: denies  Alcohol: denies  Recreational drugs: denies    Family history:    No pertinent family history in first degree relatives    No pertinent family history in first degree relatives    No pertinent family history in first degree relatives      Denies family history of colon cancer/polyps, stomach cancer/polyps, pancreatic cancer/masses, liver cancer/disease, ovarian cancer and endometrial cancer.    PHYSICAL EXAM:   Vital Signs:  Vital Signs Last 24 Hrs  T(C): 36.7 (10 Percy 2024 04:53), Max: 36.9 (10 Percy 2024 02:46)  T(F): 98 (10 Percy 2024 04:53), Max: 98.4 (10 Percy 2024 02:46)  HR: 116 (10 Percy 2024 06:38) (80 - 116)  BP: 170/90 (10 Percy 2024 06:38) (123/80 - 170/90)  BP(mean): --  RR: 18 (10 Percy 2024 04:53) (18 - 20)  SpO2: 94% (10 Percy 2024 04:53) (94% - 94%)    Parameters below as of 10 Percy 2024 04:53  Patient On (Oxygen Delivery Method): nasal cannula  O2 Flow (L/min): 2    Daily     Daily Weight in k.6 (10 Percy 2024 07:47)    GENERAL: no acute distress  NEURO: alert and oriented x 3  HEENT: NCAT, no conjunctival pallor appreciated; anicteric sclera  CHEST: no respiratory distress, no accessory muscle use  CARDIAC: regular rate, +S1/S2  ABDOMEN: soft, nontender, no rebound or guarding; rectal exam performed with ___: Normal rectal tone and coordination; No hard stool or blood in the rectum; Brown stool smear on gloved finger.   EXTREMITIES: warm, well perfused  SKIN: no lesions noted    LABS: reviewed                        14.6   30.58 )-----------( 205      ( 10 Percy 2024 05:28 )             45.8     01-10    139  |  104  |  66<H>  ----------------------------<  124<H>  4.5   |  23  |  1.23    Ca    8.1<L>      10 Percy 2024 05:27    TPro  5.5<L>  /  Alb  3.1<L>  /  TBili  0.8  /  DBili  x   /  AST  25  /  ALT  44  /  AlkPhos  54  01-10    LIVER FUNCTIONS - ( 10 Percy 2024 01:24 )  Alb: 3.1 g/dL / Pro: 5.5 g/dL / ALK PHOS: 54 U/L / ALT: 44 U/L / AST: 25 U/L / GGT: x                    HPI:  DAGMAR CONNORS is a 89-year-old female PMH of CVA, YOVANI on CPAP, HTN, HLD, A-fib on Eliquis, heart failure on furosemide, recently admitted 1 month ago to [facility] for pneumonia completed a course of antibiotics (for 3 days), completed a course of cefpodoxime 1 week prior to admission for UTI presents to the ED complaining of several days of coughing, increased wheezing, shortness of breath, found to have sepsis and PNA 2/2 flu. RRT called 1/10 for increased WOB. During the rapid pt was found in A-Fib RVR.    GI was consulted for dark emesis and concern for abdominal distention.     Patient had dark emesis prior to RRT and NGT was placed during RRT with dark output.     Otherwise, patient denies fevers, chills, weight loss, dysphagia, odynophagia, early satiety, poor oral intake, abdominal pain, nausea, vomiting, diarrhea, melena, hematemesis, hematochezia, change in stool caliber, or family history of GI-related cancers.    ROS:   General:  No fevers, chills, (+) fatigue  Eyes:  Good vision, no reported pain  ENT:  No sore throat, pain, runny nose  CV:  No pain, palpitations  Pulm:  No dyspnea, cough  GI:  See HPI, otherwise negative  :  No  incontinence, nocturia  Muscle:  No pain, weakness  Neuro:  No memory problems  Psych:  No insomnia, mood problems  Endocrine:  No polyuria, polydipsia  Heme:  No petechiae, ecchymosis  Skin:  No active rashes    PMHX/PSHX:    Atrial fibrillation    HTN (hypertension)    HLD (hyperlipidemia)    UTI (urinary tract infection)    Thyroid nodule    YOVANI on CPAP    OA (osteoarthritis)    Cerebrovascular accident (CVA)    FHx: total knee replacement    Cataract    S/P JAY-BSO    FHx: cholecystectomy    Fracture    Cardiac pacemaker      Allergies:  OxyContin (Sedation/Somnol; Drowsiness)  Cardizem (Urticaria)  sulfa drugs (Hives)      Home Medications: reviewed  Hospital Medications:  albuterol/ipratropium for Nebulization 3 milliLiter(s) Nebulizer every 6 hours  aMIOdarone IVPB 150 milliGRAM(s) IV Intermittent once  aMIOdarone IVPB 150 milliGRAM(s) IV Intermittent once  apixaban 5 milliGRAM(s) Oral every 12 hours  atenolol  Tablet 37.5 milliGRAM(s) Oral daily  atorvastatin 10 milliGRAM(s) Oral at bedtime  budesonide 160 MICROgram(s)/formoterol 4.5 MICROgram(s) Inhaler 2 Puff(s) Inhalation two times a day  busPIRone 15 milliGRAM(s) Oral two times a day  eplerenone 50 milliGRAM(s) Oral daily  levothyroxine 25 MICROGram(s) Oral daily  losartan 25 milliGRAM(s) Oral daily  metoprolol tartrate Injectable 2.5 milliGRAM(s) IV Push every 6 hours  ondansetron Injectable 4 milliGRAM(s) IV Push once  pantoprazole    Tablet 40 milliGRAM(s) Oral before breakfast  pantoprazole  Injectable 40 milliGRAM(s) IV Push two times a day  piperacillin/tazobactam IVPB.. 3.375 Gram(s) IV Intermittent every 8 hours  polyethylene glycol 3350 17 Gram(s) Oral daily  predniSONE   Tablet 40 milliGRAM(s) Oral daily  pregabalin 25 milliGRAM(s) Oral two times a day      Social History:   Tobacco: denies  Alcohol: denies  Recreational drugs: denies    Family history:    No pertinent family history in first degree relatives    No pertinent family history in first degree relatives    No pertinent family history in first degree relatives      Denies family history of colon cancer/polyps, stomach cancer/polyps, pancreatic cancer/masses, liver cancer/disease, ovarian cancer and endometrial cancer.    PHYSICAL EXAM:   Vital Signs:  Vital Signs Last 24 Hrs  T(C): 36.7 (10 Percy 2024 04:53), Max: 36.9 (10 Percy 2024 02:46)  T(F): 98 (10 Percy 2024 04:53), Max: 98.4 (10 Percy 2024 02:46)  HR: 116 (10 Percy 2024 06:38) (80 - 116)  BP: 170/90 (10 Percy 2024 06:38) (123/80 - 170/90)  BP(mean): --  RR: 18 (10 Percy 2024 04:53) (18 - 20)  SpO2: 94% (10 Percy 2024 04:53) (94% - 94%)    Parameters below as of 10 Percy 2024 04:53  Patient On (Oxygen Delivery Method): nasal cannula  O2 Flow (L/min): 2    Daily     Daily Weight in k.6 (10 Percy 2024 07:47)    GENERAL: no acute distress  NEURO: alert and oriented x 3  HEENT: NCAT, no conjunctival pallor appreciated; anicteric sclera  CHEST: no respiratory distress, no accessory muscle use  CARDIAC: regular rate, +S1/S2  ABDOMEN: soft, nontender, no rebound or guarding; rectal exam performed with ___: Normal rectal tone and coordination; No hard stool or blood in the rectum; Brown stool smear on gloved finger.   EXTREMITIES: warm, well perfused  SKIN: no lesions noted    LABS: reviewed                        14.6   30.58 )-----------( 205      ( 10 Percy 2024 05:28 )             45.8     01-10    139  |  104  |  66<H>  ----------------------------<  124<H>  4.5   |  23  |  1.23    Ca    8.1<L>      10 Percy 2024 05:27    TPro  5.5<L>  /  Alb  3.1<L>  /  TBili  0.8  /  DBili  x   /  AST  25  /  ALT  44  /  AlkPhos  54  01-10    LIVER FUNCTIONS - ( 10 Percy 2024 01:24 )  Alb: 3.1 g/dL / Pro: 5.5 g/dL / ALK PHOS: 54 U/L / ALT: 44 U/L / AST: 25 U/L / GGT: x                    HPI:  DAGMAR CONNORS is a 89-year-old female PMH of CVA, YOVANI on CPAP, HTN, HLD, A-fib on Eliquis, heart failure on furosemide, recently admitted 1 month ago to [facility] for pneumonia completed a course of antibiotics (for 3 days), completed a course of cefpodoxime 1 week prior to admission for UTI presents to the ED complaining of several days of coughing, increased wheezing, shortness of breath, found to have sepsis and PNA 2/2 flu. RRT called 1/10 for increased WOB. During the rapid pt was found in A-Fib RVR.    GI was consulted for dark emesis and concern for abdominal distention.     Patient had dark emesis prior to RRT and NGT was placed during RRT with dark output. Patient was transferred to tele floor. Since the transfer, patient has pulled out NGT, no further emesis and no known melena or hematochezia per RN. Denied abdominal pain, but was confused.       PMHX/PSHX:    Atrial fibrillation    HTN (hypertension)    HLD (hyperlipidemia)    UTI (urinary tract infection)    Thyroid nodule    YOVANI on CPAP    OA (osteoarthritis)    Cerebrovascular accident (CVA)    FHx: total knee replacement    Cataract    S/P JAY-BSO    FHx: cholecystectomy    Fracture    Cardiac pacemaker      Allergies:  OxyContin (Sedation/Somnol; Drowsiness)  Cardizem (Urticaria)  sulfa drugs (Hives)      Home Medications: reviewed  Hospital Medications:  albuterol/ipratropium for Nebulization 3 milliLiter(s) Nebulizer every 6 hours  aMIOdarone IVPB 150 milliGRAM(s) IV Intermittent once  aMIOdarone IVPB 150 milliGRAM(s) IV Intermittent once  apixaban 5 milliGRAM(s) Oral every 12 hours  atenolol  Tablet 37.5 milliGRAM(s) Oral daily  atorvastatin 10 milliGRAM(s) Oral at bedtime  budesonide 160 MICROgram(s)/formoterol 4.5 MICROgram(s) Inhaler 2 Puff(s) Inhalation two times a day  busPIRone 15 milliGRAM(s) Oral two times a day  eplerenone 50 milliGRAM(s) Oral daily  levothyroxine 25 MICROGram(s) Oral daily  losartan 25 milliGRAM(s) Oral daily  metoprolol tartrate Injectable 2.5 milliGRAM(s) IV Push every 6 hours  ondansetron Injectable 4 milliGRAM(s) IV Push once  pantoprazole    Tablet 40 milliGRAM(s) Oral before breakfast  pantoprazole  Injectable 40 milliGRAM(s) IV Push two times a day  piperacillin/tazobactam IVPB.. 3.375 Gram(s) IV Intermittent every 8 hours  polyethylene glycol 3350 17 Gram(s) Oral daily  predniSONE   Tablet 40 milliGRAM(s) Oral daily  pregabalin 25 milliGRAM(s) Oral two times a day      Social History:   See H&P      PHYSICAL EXAM:   Vital Signs:  Vital Signs Last 24 Hrs  T(C): 36.7 (10 Percy 2024 04:53), Max: 36.9 (10 Percy 2024 02:46)  T(F): 98 (10 Percy 2024 04:53), Max: 98.4 (10 Percy 2024 02:46)  HR: 116 (10 Percy 2024 06:38) (80 - 116)  BP: 170/90 (10 Percy 2024 06:38) (123/80 - 170/90)  BP(mean): --  RR: 18 (10 Percy 2024 04:53) (18 - 20)  SpO2: 94% (10 Percy 2024 04:53) (94% - 94%)    Parameters below as of 10 Percy 2024 04:53  Patient On (Oxygen Delivery Method): nasal cannula  O2 Flow (L/min): 2    Daily     Daily Weight in k.6 (10 Percy 2024 07:47)    GENERAL: no acute distress  NEURO: alert and confused  HEENT: NCAT, anicteric sclera  CHEST: no respiratory distress on O2 via NC, no accessory muscle use  CARDIAC: regular rate, +S1/S2  ABDOMEN: soft, nontender, no rebound or guarding;  EXTREMITIES: warm, well perfused  SKIN: no lesions noted    LABS: reviewed                        14.6   30.58 )-----------( 205      ( 10 Percy 2024 05:28 )             45.8     01-10    139  |  104  |  66<H>  ----------------------------<  124<H>  4.5   |  23  |  1.23    Ca    8.1<L>      10 Percy 2024 05:27    TPro  5.5<L>  /  Alb  3.1<L>  /  TBili  0.8  /  DBili  x   /  AST  25  /  ALT  44  /  AlkPhos  54  01-10    LIVER FUNCTIONS - ( 10 Percy 2024 01:24 )  Alb: 3.1 g/dL / Pro: 5.5 g/dL / ALK PHOS: 54 U/L / ALT: 44 U/L / AST: 25 U/L / GGT: x                    HPI:  DAGMAR CONNORS is a 89-year-old female PMH of CVA, YOVANI on CPAP, HTN, HLD, A-fib on Eliquis, heart failure on furosemide, recently admitted 1 month ago to [facility] for pneumonia completed a course of antibiotics (for 3 days), completed a course of cefpodoxime 1 week prior to admission for UTI presents to the ED complaining of several days of coughing, increased wheezing, shortness of breath, found to have sepsis and PNA 2/2 flu. RRT called 1/10 for increased WOB. During the rapid pt was found in A-Fib RVR.    GI was consulted for dark emesis and concern for abdominal distention.     Patient had dark emesis prior to RRT and NGT was placed during RRT with dark output. Patient was transferred to tele floor. Since the transfer, patient has pulled out NGT, no further emesis and no known melena or hematochezia per RN. Denied abdominal pain, but was confused.       PMHX/PSHX:    Atrial fibrillation    HTN (hypertension)    HLD (hyperlipidemia)    UTI (urinary tract infection)    Thyroid nodule    YOVANI on CPAP    OA (osteoarthritis)    Cerebrovascular accident (CVA)    FHx: total knee replacement    Cataract    S/P JAY-BSO    FHx: cholecystectomy    Fracture    Cardiac pacemaker      Allergies:  OxyContin (Sedation/Somnol; Drowsiness)  Cardizem (Urticaria)  sulfa drugs (Hives)      Home Medications: reviewed  Hospital Medications:  albuterol/ipratropium for Nebulization 3 milliLiter(s) Nebulizer every 6 hours  aMIOdarone IVPB 150 milliGRAM(s) IV Intermittent once  aMIOdarone IVPB 150 milliGRAM(s) IV Intermittent once  apixaban 5 milliGRAM(s) Oral every 12 hours  atenolol  Tablet 37.5 milliGRAM(s) Oral daily  atorvastatin 10 milliGRAM(s) Oral at bedtime  budesonide 160 MICROgram(s)/formoterol 4.5 MICROgram(s) Inhaler 2 Puff(s) Inhalation two times a day  busPIRone 15 milliGRAM(s) Oral two times a day  eplerenone 50 milliGRAM(s) Oral daily  levothyroxine 25 MICROGram(s) Oral daily  losartan 25 milliGRAM(s) Oral daily  metoprolol tartrate Injectable 2.5 milliGRAM(s) IV Push every 6 hours  ondansetron Injectable 4 milliGRAM(s) IV Push once  pantoprazole    Tablet 40 milliGRAM(s) Oral before breakfast  pantoprazole  Injectable 40 milliGRAM(s) IV Push two times a day  piperacillin/tazobactam IVPB.. 3.375 Gram(s) IV Intermittent every 8 hours  polyethylene glycol 3350 17 Gram(s) Oral daily  predniSONE   Tablet 40 milliGRAM(s) Oral daily  pregabalin 25 milliGRAM(s) Oral two times a day      Social History:   See H&P      PHYSICAL EXAM:   Vital Signs:  Vital Signs Last 24 Hrs  T(C): 36.7 (10 Percy 2024 04:53), Max: 36.9 (10 Percy 2024 02:46)  T(F): 98 (10 Percy 2024 04:53), Max: 98.4 (10 Percy 2024 02:46)  HR: 116 (10 Percy 2024 06:38) (80 - 116)  BP: 170/90 (10 Percy 2024 06:38) (123/80 - 170/90)  BP(mean): --  RR: 18 (10 Percy 2024 04:53) (18 - 20)  SpO2: 94% (10 Percy 2024 04:53) (94% - 94%)    Parameters below as of 10 Percy 2024 04:53  Patient On (Oxygen Delivery Method): nasal cannula  O2 Flow (L/min): 2    Daily     Daily Weight in k.6 (10 Percy 2024 07:47)    GENERAL: no acute distress  NEURO: alert but confused; not oriented  HEENT: NCAT, anicteric sclera  CHEST: no respiratory distress on O2 via NC, no accessory muscle use  CARDIAC: regular rate, +S1/S2  ABDOMEN: soft, nontender, no rebound or guarding;  EXTREMITIES: warm, well perfused  SKIN: no lesions noted    LABS: reviewed                        14.6   30.58 )-----------( 205      ( 10 Percy 2024 05:28 )             45.8     Hemoglobin: 14.6 g/dL (01-10-24 @ 05:28)  Hemoglobin: 15.0 g/dL (01-10-24 @ 00:29)  Hemoglobin: 14.8 g/dL (24 @ 06:51)  Hemoglobin: 14.9 g/dL (24 @ 07:53)  Hemoglobin: 14.3 g/dL (24 @ 12:19)      01-10    139  |  104  |  66<H>  ----------------------------<  124<H>  4.5   |  23  |  1.23    Ca    8.1<L>      10 Percy 2024 05:27    TPro  5.5<L>  /  Alb  3.1<L>  /  TBili  0.8  /  DBili  x   /  AST  25  /  ALT  44  /  AlkPhos  54  01-10    LIVER FUNCTIONS - ( 10 Percy 2024 01:24 )  Alb: 3.1 g/dL / Pro: 5.5 g/dL / ALK PHOS: 54 U/L / ALT: 44 U/L / AST: 25 U/L / GGT: x             < from: CT Abdomen and Pelvis w/ IV Cont (01.10.24 @ 02:10) >  IMPRESSION:  *  Mural thickening of the lower esophagus, which could represent   esophagitis.  *  Mild nonspecific distention of the stomach. Evaluation of the pylorus   and proximal duodenum is limited by motion. No evidence for small or   large bowel obstruction.  *  Redemonstrated filling defect within the left atrial appendage, as was   present on the exam of 2023, which could represent thrombus or   mixing artifact. Recommend further evaluation with echocardiogram as   warranted.  *  Enlarging indeterminate left adrenal mass, now measuring 3.8 cm.  *  Bilateral indeterminate renal lesions are unchanged since 2023.  *  Both the adrenal and renal lesions can be further evaluated by   contrast enhanced abdominal MRI.  *  Increased small right pleural effusion.    < end of copied text >

## 2024-01-10 NOTE — CONSULT NOTE ADULT - ASSESSMENT
89-year-old female PMH of CVA, YOVANI on CPAP, HTN, HLD, A-fib on Eliquis, heart failure on furosemide, recently admitted 1 month ago to [facility] for pneumonia completed a course of antibiotics (for 3 days), completed a course of cefpodoxime 1 week prior to admission for UTI presents to the ED complaining of several days of coughing, increased wheezing, shortness of breath, found to have sepsis and PNA 2/2 flu. RRT called 1/10 for increased WOB. During the rapid pt was found in A-Fib RVR.    GI was consulted for dark emesis and concern for abdominal distention.     Assessment  #Dark emesis  #Concern for gastric distention  #Acute respiratory distress  - Unclear if dark emesis represents GIB: BUN uptrending, H&H stable, hemodynamically stable  - CT A/P final read without bowel obstruction and mild gastric distention. Limited evaluation of pylorus and duodenum. However, image taken after gastric decompression after NGT placement  - Currently clinically improving; weaned off O2, and no active GI complaints or known melena/hematochezia/hematemesis  - Uptrending leukocytosis  - Discussed with daughter Rachana at bedside: To pursue EGD at this time, patient will likely need intubation and carries a high risk given her clinical conditions. Patient is improving clinically, and daughter opted for medical management at this time.     Recommendations  - Continue PPI IV BID (will empirically treat ulcer disease, gastritis, esophagitis etc.)  - Monitor for clinical symptoms, if worsening nausea and vomiting, would recommend replacing NGT for decompression  - If asymptomatic, may trial clear liquid diet  - Continue with medical optimization per primary team  - If patient has active GIB, EGD will be warranted. Daughter is aware.      Note incomplete until finalized by attending signature/attestation.    Nu Garcia  GI/Hepatology Fellow    MONDAY-FRIDAY 8AM-5PM:  Pager# [phone] ([facility]) or [phone] ([facility])    NON-URGENT CONSULTS:  Please email [email or web address]  AT NIGHT AND ON WEEKENDS:  Contact on-call GI fellow via answering service ([phone]) from 5pm-8am and on weekends/holidays   89-year-old female PMH of CVA, YOVANI on CPAP, HTN, HLD, A-fib on Eliquis, heart failure on furosemide, recently admitted 1 month ago to [facility] for pneumonia completed a course of antibiotics (for 3 days), completed a course of cefpodoxime 1 week prior to admission for UTI presents to the ED complaining of several days of coughing, increased wheezing, shortness of breath, found to have sepsis and PNA 2/2 flu. RRT called 1/10 for increased WOB. During the rapid pt was found in A-Fib RVR.    GI was consulted for dark emesis and concern for abdominal distention.     Assessment  #Dark emesis  #Concern for gastric distention  #Acute respiratory distress  - Unclear if dark emesis represents GIB: BUN uptrending, H&H stable, hemodynamically stable  - CT A/P final read without bowel obstruction and mild gastric distention. Limited evaluation of pylorus and duodenum. However, image taken after gastric decompression after NGT placement  - Currently clinically improving; weaned off O2, and no active GI complaints or known melena/hematochezia/hematemesis  - Uptrending leukocytosis  - Discussed with daughter Rachana at bedside: To pursue EGD at this time, patient will likely need intubation and carries a high risk given her clinical conditions. Patient is improving clinically, and daughter opted for medical management at this time.     Recommendations  - Continue PPI IV BID (will empirically treat ulcer disease, gastritis, esophagitis etc.)  - Monitor for clinical symptoms, if worsening nausea and vomiting, would recommend replacing NGT for decompression  - If asymptomatic, may trial clear liquid diet  - Continue with medical optimization per primary team  - If patient has active GIB, EGD will be warranted. Daughter is aware.      Note incomplete until finalized by attending signature/attestation.    Nu Garcia  GI/Hepatology Fellow    MONDAY-FRIDAY 8AM-5PM:  Pager# [phone] ([facility]) or [phone] ([facility])    NON-URGENT CONSULTS:  Please email [email or web address]  AT NIGHT AND ON WEEKENDS:  Contact on-call GI fellow via answering service ([phone]) from 5pm-8am and on weekends/holidays

## 2024-01-10 NOTE — PROGRESS NOTE ADULT - SUBJECTIVE AND OBJECTIVE BOX
DATE OF SERVICE: 01-10-24 @ 15:57    Patient is a 89y old  Female who presents with a chief complaint of SOB (10 Percy 2024 11:52)      SUBJECTIVE / OVERNIGHT EVENTS:  events noted.  pt had coffee ground emesis    MEDICATIONS  (STANDING):  albuterol/ipratropium for Nebulization 3 milliLiter(s) Nebulizer every 6 hours  aMIOdarone IVPB 150 milliGRAM(s) IV Intermittent once  aMIOdarone IVPB 150 milliGRAM(s) IV Intermittent once  atenolol  Tablet 37.5 milliGRAM(s) Oral daily  atorvastatin 10 milliGRAM(s) Oral at bedtime  budesonide 160 MICROgram(s)/formoterol 4.5 MICROgram(s) Inhaler 2 Puff(s) Inhalation two times a day  busPIRone 15 milliGRAM(s) Oral two times a day  eplerenone 50 milliGRAM(s) Oral daily  levothyroxine 25 MICROGram(s) Oral daily  losartan 25 milliGRAM(s) Oral daily  metoprolol tartrate Injectable 2.5 milliGRAM(s) IV Push every 6 hours  ondansetron Injectable 4 milliGRAM(s) IV Push once  pantoprazole    Tablet 40 milliGRAM(s) Oral before breakfast  pantoprazole  Injectable 40 milliGRAM(s) IV Push two times a day  piperacillin/tazobactam IVPB.. 3.375 Gram(s) IV Intermittent every 8 hours  polyethylene glycol 3350 17 Gram(s) Oral daily  predniSONE   Tablet 40 milliGRAM(s) Oral daily  pregabalin 25 milliGRAM(s) Oral two times a day  sodium chloride 0.9%. 1000 milliLiter(s) (75 mL/Hr) IV Continuous <Continuous>    MEDICATIONS  (PRN):      Vital Signs Last 24 Hrs  T(C): 36.9 (10 Percy 2024 11:56), Max: 36.9 (10 Percy 2024 02:46)  T(F): 98.5 (10 Percy 2024 11:56), Max: 98.5 (10 Percy 2024 11:56)  HR: 89 (10 Percy 2024 11:56) (80 - 116)  BP: 135/81 (10 Percy 2024 11:56) (135/81 - 170/90)  BP(mean): --  RR: 18 (10 Percy 2024 11:56) (18 - 20)  SpO2: 95% (10 Percy 2024 11:56) (94% - 95%)    Parameters below as of 10 Percy 2024 11:56  Patient On (Oxygen Delivery Method): nasal cannula      CAPILLARY BLOOD GLUCOSE      POCT Blood Glucose.: 132 mg/dL (10 Percy 2024 11:29)  POCT Blood Glucose.: 127 mg/dL (10 Percy 2024 07:58)  POCT Blood Glucose.: 120 mg/dL (10 Percy 2024 06:35)  POCT Blood Glucose.: 164 mg/dL (10 Percy 2024 00:18)    I&O's Summary    09 Jan 2024 07:01  -  10 Percy 2024 07:00  --------------------------------------------------------  IN: 1575 mL / OUT: 150 mL / NET: 1425 mL    10 Percy 2024 07:01  -  10 Percy 2024 15:57  --------------------------------------------------------  IN: 0 mL / OUT: 350 mL / NET: -350 mL        PHYSICAL EXAM:  GENERAL: NAD, well-developed  HEAD:  Atraumatic, Normocephalic  EYES: EOMI, PERRLA, conjunctiva and sclera clear  NECK: Supple, No JVD  CHEST/LUNG: Clear to auscultation bilaterally; No wheeze  HEART: Regular rate and rhythm; No murmurs, rubs, or gallops  ABDOMEN: Soft, Nontender, Nondistended; Bowel sounds present  EXTREMITIES:  2+ Peripheral Pulses, No clubbing, cyanosis, or edema  PSYCH: AAOx3  NEUROLOGY: non-focal  SKIN: No rashes or lesions    LABS:                        14.6   30.58 )-----------( 205      ( 10 Percy 2024 05:28 )             45.8     01-10    139  |  104  |  66<H>  ----------------------------<  124<H>  4.5   |  23  |  1.23    Ca    8.1<L>      10 Percy 2024 05:27    TPro  5.5<L>  /  Alb  3.1<L>  /  TBili  0.8  /  DBili  x   /  AST  25  /  ALT  44  /  AlkPhos  54  01-10          Urinalysis Basic - ( 10 Percy 2024 05:27 )    Color: x / Appearance: x / SG: x / pH: x  Gluc: 124 mg/dL / Ketone: x  / Bili: x / Urobili: x   Blood: x / Protein: x / Nitrite: x   Leuk Esterase: x / RBC: x / WBC x   Sq Epi: x / Non Sq Epi: x / Bacteria: x    < from: CT Abdomen and Pelvis w/ IV Cont (01.10.24 @ 02:10) >  FINDINGS:  LOWER CHEST: Small right pleural effusion, increased from the chest CT of   1/4/2024. Bibasilar subsegmental atelectasis. Cardiomegaly. Cardiac   device leads. Coronary artery calcifications. Partially visualized   filling defect within the left atrial appendage, as was present on the   exam of 7/25/2023.    LIVER: Normal morphology. There are a few hepatic cysts, the largest of   which measures 4.3 cm in the left hepatic lobe. Pericapsular   calcifications along the posterior right lobe.  BILE DUCTS: Mild bilobar intrahepatic duct dilation, unchanged from the   prior exam of 7/25/2023.  GALLBLADDER: Cholecystectomy.  SPLEEN: Within normal limits.  PANCREAS: Within normal limits.  ADRENALS: Indeterminate left adrenal mass measuring 3.8 x 3.3 cm, not   significantly changed since 1/4/2024, previously 1.1 cm on 7/25/2023.  KIDNEYS/URETERS: No hydronephrosis. Bilateral renal cysts. Bilateral   indeterminate renal lesions are without significant change since   7/25/2023:  Right mid renal lesion measuring 2.1 cm (series 301 image 50), previously   2.2 cm.  Left upper pole lesion measuring 3.0 cm (series 301 image 42), previously   3.0 cm.  Left lower pole lesion measuring 1.3 cm (series 301 image 58), previously   1.3 cm.    BLADDER: Within normal limits.  REPRODUCTIVE ORGANS: Hysterectomy.    BOWEL: Enteric tube with tip in the stomach. Circumferential mural   thickening of the imaged lower esophagus. No evidence for bowel   obstruction. The stomach is mildly distended with fluid and gas,   nonspecific. Motion limits evaluation of the region of the pylorus and   proximal duodenum. The small and large bowel are nondilated. Colonic   diverticula. No evidence for acute diverticulitis.  PERITONEUM: No ascites.  VESSELS: Atherosclerotic changes.  RETROPERITONEUM/LYMPH NODES: No lymphadenopathy.  ABDOMINAL WALL: Small fat-containing umbilical hernia.  BONES: Degenerative changes. Unchanged compression fractures at T12   through L3.    IMPRESSION:  *  Mural thickening of the lower esophagus, which could represent   esophagitis.  *  Mild nonspecific distention of the stomach. Evaluation of the pylorus   and proximal duodenum is limited by motion. No evidence for small or   large bowel obstruction.  *  Redemonstrated filling defect within the left atrial appendage, as was   present on the exam of 7/25/2023, which could represent thrombus or   mixing artifact. Recommend further evaluation with echocardiogram as   warranted.  *  Enlarging indeterminate left adrenal mass, now measuring 3.8 cm.  *  Bilateral indeterminate renal lesions are unchanged since 7/25/2023.  *  Both the adrenal and renal lesions can be further evaluated by   contrast enhanced abdominal MRI.  *  Increased small right pleural effusion.    < end of copied text >      RADIOLOGY & ADDITIONAL TESTS:    Imaging Personally Reviewed:    Consultant(s) Notes Reviewed:      Care Discussed with Consultants/Other Providers:

## 2024-01-10 NOTE — RAPID RESPONSE TEAM SUMMARY - NSMEDICATIONSRRT_GEN_ALL_CORE
lopressor 5 and 2.5  amio load x2 lopressor 5 and 2.5  amio load x2  ppi 40  zosyn for aspiration precaution

## 2024-01-10 NOTE — PROGRESS NOTE ADULT - ASSESSMENT
89 y/o F with PMH of CVA, YOVANI on CPAP, HTN, HLD, Afib on Eliquis, CHF. Recent admission at [facility] 1 month ago for PNA, completed short course of ABX. Completed additional course of ABX last week for UTI. Presents to the ED with coughing, SOB, wheezing x several days. Found to be Flu + 89 y/o F with PMH of CVA, YOVANI on CPAP, HTN, HLD, Afib on Eliquis, CHF. Recent admission at [facility] 1 month ago for PNA, completed short course of ABX. Completed additional course of ABX last week for UTI. Presents to the ED with coughing, SOB, wheezing x several days. Found to be Flu +

## 2024-01-10 NOTE — CHART NOTE - NSCHARTNOTEFT_GEN_A_CORE
[id number]  [facility]    Event Summary:  RRT called for increased work of breathing. Notified by RN, patient with coffee ground emesis. Patient seen and examined at bedside with distended abdomen that was tender to palpation. RN at bedside also stated that  after episode of emesis, patient began desaturating. RRT called to r/o possible aspiration and possible SBO.       T(C): 36.8 (01-09-24 @ 23:47), Max: 36.8 (01-09-24 @ 15:45)  HR: 80 (01-09-24 @ 23:47) (61 - 92)  BP: 151/76 (01-09-24 @ 23:47) (117/93 - 151/76)  RR: 20 (01-09-24 @ 23:47) (18 - 20)  SpO2: 94% (01-09-24 @ 23:47) (94% - 96%)    Labs:            A/P:   Briefly, this is a  HPI:  89-year-old female PMH of CVA, YOVANI on CPAP, HTN, HLD, A-fib on Eliquis, heart failure on furosemide, recently admitted 1 month ago to [facility] for pneumonia completed a course of antibiotics (for 3 days), completed a course of cefpodoxime last week for UTI presents to the ED complaining of several days of coughing, increased wheezing, shortness of breath. Symptoms started a week ago. Patient denied any fevers or other symptoms. Patient's daughter also with flu-like symptoms recently. Pt uses an albuterol inhaler at home as needed and recently started using it twice a day (usually uses it once a day). Pt also gets nebulizer treatments regularly. She is independent with her ADLs, and walks with a cane. No recent falls at home. Patient found to be influenza A positive in ED, s/p oseltamavir.  (04 Jan 2024 20:29)    Patient now seen post  RRT for ____; vital signs hemodynamically stable.     - See RRT note for full details  - Will continue to monitor patient/vitals and f/u labs  - Patient transferred to  for telemetry monitoring   - Family made aware, daughter Rachana, consented patient for CT A/P w/ IV contrast   -  Will notify attending in AM   - Will update day team       Rachel Campbell PA-C   Department of Medicine  [phone]  [facility]    Event Summary:  RRT called for increased work of breathing. Notified by RN, patient with coffee ground emesis. Patient seen and examined at bedside with distended abdomen that was tender to palpation. RN at bedside also stated that  after episode of emesis, patient began desaturating. RRT called to r/o possible aspiration and possible SBO.       T(C): 36.8 (01-09-24 @ 23:47), Max: 36.8 (01-09-24 @ 15:45)  HR: 80 (01-09-24 @ 23:47) (61 - 92)  BP: 151/76 (01-09-24 @ 23:47) (117/93 - 151/76)  RR: 20 (01-09-24 @ 23:47) (18 - 20)  SpO2: 94% (01-09-24 @ 23:47) (94% - 96%)    Labs:            A/P:   Briefly, this is a  HPI:  89-year-old female PMH of CVA, YOVANI on CPAP, HTN, HLD, A-fib on Eliquis, heart failure on furosemide, recently admitted 1 month ago to [facility] for pneumonia completed a course of antibiotics (for 3 days), completed a course of cefpodoxime last week for UTI presents to the ED complaining of several days of coughing, increased wheezing, shortness of breath. Symptoms started a week ago. Patient denied any fevers or other symptoms. Patient's daughter also with flu-like symptoms recently. Pt uses an albuterol inhaler at home as needed and recently started using it twice a day (usually uses it once a day). Pt also gets nebulizer treatments regularly. She is independent with her ADLs, and walks with a cane. No recent falls at home. Patient found to be influenza A positive in ED, s/p oseltamavir.  (04 Jan 2024 20:29)    Patient now seen post  RRT for ____; vital signs hemodynamically stable.     - See RRT note for full details  - Will continue to monitor patient/vitals and f/u labs  - Patient transferred to  for telemetry monitoring   - Family made aware, daughter Rachana, consented patient for CT A/P w/ IV contrast   -  Will notify attending in AM   - Will update day team       Rachel Campbell PA-C   Department of Medicine  [phone]  [facility]    Event Summary:  RRT called for increased work of breathing. Notified by RN, patient with coffee ground emesis. Patient seen and examined at bedside with distended abdomen that was tender to palpation. RN at bedside also stated that  after episode of emesis, patient began desaturating. RRT called to r/o possible aspiration and possible SBO.       T(C): 36.8 (01-09-24 @ 23:47), Max: 36.8 (01-09-24 @ 15:45)  HR: 80 (01-09-24 @ 23:47) (61 - 92)  BP: 151/76 (01-09-24 @ 23:47) (117/93 - 151/76)  RR: 20 (01-09-24 @ 23:47) (18 - 20)  SpO2: 94% (01-09-24 @ 23:47) (94% - 96%)    Labs:            A/P:   Briefly, this is a  HPI:  89-year-old female PMH of CVA, YOVANI on CPAP, HTN, HLD, A-fib on Eliquis, heart failure on furosemide, recently admitted 1 month ago to [facility] for pneumonia completed a course of antibiotics (for 3 days), completed a course of cefpodoxime last week for UTI presents to the ED complaining of several days of coughing, increased wheezing, shortness of breath. Symptoms started a week ago. Patient denied any fevers or other symptoms. Patient's daughter also with flu-like symptoms recently. Pt uses an albuterol inhaler at home as needed and recently started using it twice a day (usually uses it once a day). Pt also gets nebulizer treatments regularly. She is independent with her ADLs, and walks with a cane. No recent falls at home. Patient found to be influenza A positive in ED, s/p oseltamavir.  (04 Jan 2024 20:29)    Patient now seen post  RRT; vital signs hemodynamically stable.     - See RRT note for full details  - Will continue to monitor patient/vitals and f/u labs  - Patient transferred to  for telemetry monitoring   - Family made aware, daughter Rachana, consented patient for CT A/P w/ IV contrast   - Followed up w/ surg for CT findings: according to surg resident no indication at this time for NGT given that SBO ruled out. Could remove in AM depending on output overnight.   -  Will notify attending in AM   - Will update day team       Rachel Campbell PA-C   Department of Medicine  [phone]  [facility]    Event Summary:  RRT called for increased work of breathing. Notified by RN, patient with coffee ground emesis. Patient seen and examined at bedside with distended abdomen that was tender to palpation. RN at bedside also stated that  after episode of emesis, patient began desaturating. RRT called to r/o possible aspiration and possible SBO.       T(C): 36.8 (01-09-24 @ 23:47), Max: 36.8 (01-09-24 @ 15:45)  HR: 80 (01-09-24 @ 23:47) (61 - 92)  BP: 151/76 (01-09-24 @ 23:47) (117/93 - 151/76)  RR: 20 (01-09-24 @ 23:47) (18 - 20)  SpO2: 94% (01-09-24 @ 23:47) (94% - 96%)    Labs:            A/P:   Briefly, this is a  HPI:  89-year-old female PMH of CVA, YOVANI on CPAP, HTN, HLD, A-fib on Eliquis, heart failure on furosemide, recently admitted 1 month ago to [facility] for pneumonia completed a course of antibiotics (for 3 days), completed a course of cefpodoxime last week for UTI presents to the ED complaining of several days of coughing, increased wheezing, shortness of breath. Symptoms started a week ago. Patient denied any fevers or other symptoms. Patient's daughter also with flu-like symptoms recently. Pt uses an albuterol inhaler at home as needed and recently started using it twice a day (usually uses it once a day). Pt also gets nebulizer treatments regularly. She is independent with her ADLs, and walks with a cane. No recent falls at home. Patient found to be influenza A positive in ED, s/p oseltamavir.  (04 Jan 2024 20:29)    Patient now seen post  RRT; vital signs hemodynamically stable.     - See RRT note for full details  - Will continue to monitor patient/vitals and f/u labs  - Patient transferred to  for telemetry monitoring   - Family made aware, daughter Rachana, consented patient for CT A/P w/ IV contrast   - Followed up w/ surg for CT findings: according to surg resident no indication at this time for NGT given that SBO ruled out. Could remove in AM depending on output overnight.   -  Will notify attending in AM   - Will update day team       Rachel Campbell PA-C   Department of Medicine  [phone]  [facility]    Event Summary:  RRT called for increased work of breathing. Notified by RN, patient with coffee ground emesis. Patient seen and examined at bedside with distended abdomen that was tender to palpation. RN at bedside also stated that  after episode of emesis, patient began desaturating. RRT called to r/o possible aspiration and possible SBO.       T(C): 36.8 (01-09-24 @ 23:47), Max: 36.8 (01-09-24 @ 15:45)  HR: 80 (01-09-24 @ 23:47) (61 - 92)  BP: 151/76 (01-09-24 @ 23:47) (117/93 - 151/76)  RR: 20 (01-09-24 @ 23:47) (18 - 20)  SpO2: 94% (01-09-24 @ 23:47) (94% - 96%)    Labs:            A/P:   Briefly, this is a  HPI:  89-year-old female PMH of CVA, YOVANI on CPAP, HTN, HLD, A-fib on Eliquis, heart failure on furosemide, recently admitted 1 month ago to [facility] for pneumonia completed a course of antibiotics (for 3 days), completed a course of cefpodoxime last week for UTI presents to the ED complaining of several days of coughing, increased wheezing, shortness of breath. Symptoms started a week ago. Patient denied any fevers or other symptoms. Patient's daughter also with flu-like symptoms recently. Pt uses an albuterol inhaler at home as needed and recently started using it twice a day (usually uses it once a day). Pt also gets nebulizer treatments regularly. She is independent with her ADLs, and walks with a cane. No recent falls at home. Patient found to be influenza A positive in ED, s/p oseltamavir.  (04 Jan 2024 20:29)    Patient now seen post  RRT; vital signs hemodynamically stable.     - See RRT note for full details  - Will continue to monitor patient/vitals and f/u labs  - Patient transferred to 2D for telemetry monitoring, 2D provider updated regarding patients overnight events   - Family made aware, daughter Rachana, consented patient for CT A/P w/ IV contrast   - Followed up w/ surg for CT findings: according to surg resident no indication at this time for NGT given that SBO ruled out. Could remove in AM depending on output overnight.   -  Will notify attending in AM   - Will update day team       Rachel Campbell PA-C   Department of Medicine  [phone]  [facility]    Event Summary:  RRT called for increased work of breathing. Notified by RN, patient with coffee ground emesis. Patient seen and examined at bedside with distended abdomen that was tender to palpation. RN at bedside also stated that  after episode of emesis, patient began desaturating. RRT called to r/o possible aspiration and possible SBO.       T(C): 36.8 (01-09-24 @ 23:47), Max: 36.8 (01-09-24 @ 15:45)  HR: 80 (01-09-24 @ 23:47) (61 - 92)  BP: 151/76 (01-09-24 @ 23:47) (117/93 - 151/76)  RR: 20 (01-09-24 @ 23:47) (18 - 20)  SpO2: 94% (01-09-24 @ 23:47) (94% - 96%)    Labs:            A/P:   Briefly, this is a  HPI:  89-year-old female PMH of CVA, YOVANI on CPAP, HTN, HLD, A-fib on Eliquis, heart failure on furosemide, recently admitted 1 month ago to [facility] for pneumonia completed a course of antibiotics (for 3 days), completed a course of cefpodoxime last week for UTI presents to the ED complaining of several days of coughing, increased wheezing, shortness of breath. Symptoms started a week ago. Patient denied any fevers or other symptoms. Patient's daughter also with flu-like symptoms recently. Pt uses an albuterol inhaler at home as needed and recently started using it twice a day (usually uses it once a day). Pt also gets nebulizer treatments regularly. She is independent with her ADLs, and walks with a cane. No recent falls at home. Patient found to be influenza A positive in ED, s/p oseltamavir.  (04 Jan 2024 20:29)    Patient now seen post  RRT; vital signs hemodynamically stable.     - See RRT note for full details  - Will continue to monitor patient/vitals and f/u labs  - Patient transferred to 2D for telemetry monitoring, 2D provider updated regarding patients overnight events   - Family made aware, daughter Rachana, consented patient for CT A/P w/ IV contrast   - Followed up w/ surg for CT findings: according to surg resident no indication at this time for NGT given that SBO ruled out. Could remove in AM depending on output overnight.   -  Will notify attending in AM   - Will update day team       Rachel Campbell PA-C   Department of Medicine  [phone]

## 2024-01-11 LAB
ALBUMIN SERPL ELPH-MCNC: 3.2 G/DL — LOW (ref 3.3–5)
ALBUMIN SERPL ELPH-MCNC: 3.2 G/DL — LOW (ref 3.3–5)
ALP SERPL-CCNC: 47 U/L — SIGNIFICANT CHANGE UP (ref 40–120)
ALP SERPL-CCNC: 47 U/L — SIGNIFICANT CHANGE UP (ref 40–120)
ALT FLD-CCNC: 32 U/L — SIGNIFICANT CHANGE UP (ref 10–45)
ALT FLD-CCNC: 32 U/L — SIGNIFICANT CHANGE UP (ref 10–45)
ANION GAP SERPL CALC-SCNC: 10 MMOL/L — SIGNIFICANT CHANGE UP (ref 5–17)
ANION GAP SERPL CALC-SCNC: 10 MMOL/L — SIGNIFICANT CHANGE UP (ref 5–17)
AST SERPL-CCNC: 16 U/L — SIGNIFICANT CHANGE UP (ref 10–40)
AST SERPL-CCNC: 16 U/L — SIGNIFICANT CHANGE UP (ref 10–40)
BASOPHILS # BLD AUTO: 0.04 K/UL — SIGNIFICANT CHANGE UP (ref 0–0.2)
BASOPHILS # BLD AUTO: 0.04 K/UL — SIGNIFICANT CHANGE UP (ref 0–0.2)
BASOPHILS NFR BLD AUTO: 0.2 % — SIGNIFICANT CHANGE UP (ref 0–2)
BASOPHILS NFR BLD AUTO: 0.2 % — SIGNIFICANT CHANGE UP (ref 0–2)
BILIRUB SERPL-MCNC: 1.4 MG/DL — HIGH (ref 0.2–1.2)
BILIRUB SERPL-MCNC: 1.4 MG/DL — HIGH (ref 0.2–1.2)
BUN SERPL-MCNC: 43 MG/DL — HIGH (ref 7–23)
BUN SERPL-MCNC: 43 MG/DL — HIGH (ref 7–23)
CALCIUM SERPL-MCNC: 7.6 MG/DL — LOW (ref 8.4–10.5)
CALCIUM SERPL-MCNC: 7.6 MG/DL — LOW (ref 8.4–10.5)
CHLORIDE SERPL-SCNC: 108 MMOL/L — SIGNIFICANT CHANGE UP (ref 96–108)
CHLORIDE SERPL-SCNC: 108 MMOL/L — SIGNIFICANT CHANGE UP (ref 96–108)
CO2 SERPL-SCNC: 23 MMOL/L — SIGNIFICANT CHANGE UP (ref 22–31)
CO2 SERPL-SCNC: 23 MMOL/L — SIGNIFICANT CHANGE UP (ref 22–31)
CREAT SERPL-MCNC: 1 MG/DL — SIGNIFICANT CHANGE UP (ref 0.5–1.3)
CREAT SERPL-MCNC: 1 MG/DL — SIGNIFICANT CHANGE UP (ref 0.5–1.3)
EGFR: 54 ML/MIN/1.73M2 — LOW
EGFR: 54 ML/MIN/1.73M2 — LOW
EOSINOPHIL # BLD AUTO: 0.01 K/UL — SIGNIFICANT CHANGE UP (ref 0–0.5)
EOSINOPHIL # BLD AUTO: 0.01 K/UL — SIGNIFICANT CHANGE UP (ref 0–0.5)
EOSINOPHIL NFR BLD AUTO: 0.1 % — SIGNIFICANT CHANGE UP (ref 0–6)
EOSINOPHIL NFR BLD AUTO: 0.1 % — SIGNIFICANT CHANGE UP (ref 0–6)
GLUCOSE BLDC GLUCOMTR-MCNC: 109 MG/DL — HIGH (ref 70–99)
GLUCOSE BLDC GLUCOMTR-MCNC: 109 MG/DL — HIGH (ref 70–99)
GLUCOSE BLDC GLUCOMTR-MCNC: 115 MG/DL — HIGH (ref 70–99)
GLUCOSE BLDC GLUCOMTR-MCNC: 115 MG/DL — HIGH (ref 70–99)
GLUCOSE BLDC GLUCOMTR-MCNC: 122 MG/DL — HIGH (ref 70–99)
GLUCOSE BLDC GLUCOMTR-MCNC: 122 MG/DL — HIGH (ref 70–99)
GLUCOSE BLDC GLUCOMTR-MCNC: 130 MG/DL — HIGH (ref 70–99)
GLUCOSE BLDC GLUCOMTR-MCNC: 130 MG/DL — HIGH (ref 70–99)
GLUCOSE SERPL-MCNC: 109 MG/DL — HIGH (ref 70–99)
GLUCOSE SERPL-MCNC: 109 MG/DL — HIGH (ref 70–99)
HCT VFR BLD CALC: 43.6 % — SIGNIFICANT CHANGE UP (ref 34.5–45)
HCT VFR BLD CALC: 43.6 % — SIGNIFICANT CHANGE UP (ref 34.5–45)
HGB BLD-MCNC: 13.7 G/DL — SIGNIFICANT CHANGE UP (ref 11.5–15.5)
HGB BLD-MCNC: 13.7 G/DL — SIGNIFICANT CHANGE UP (ref 11.5–15.5)
IMM GRANULOCYTES NFR BLD AUTO: 1.4 % — HIGH (ref 0–0.9)
IMM GRANULOCYTES NFR BLD AUTO: 1.4 % — HIGH (ref 0–0.9)
LYMPHOCYTES # BLD AUTO: 1 K/UL — SIGNIFICANT CHANGE UP (ref 1–3.3)
LYMPHOCYTES # BLD AUTO: 1 K/UL — SIGNIFICANT CHANGE UP (ref 1–3.3)
LYMPHOCYTES # BLD AUTO: 5.1 % — LOW (ref 13–44)
LYMPHOCYTES # BLD AUTO: 5.1 % — LOW (ref 13–44)
MCHC RBC-ENTMCNC: 27.8 PG — SIGNIFICANT CHANGE UP (ref 27–34)
MCHC RBC-ENTMCNC: 27.8 PG — SIGNIFICANT CHANGE UP (ref 27–34)
MCHC RBC-ENTMCNC: 31.4 GM/DL — LOW (ref 32–36)
MCHC RBC-ENTMCNC: 31.4 GM/DL — LOW (ref 32–36)
MCV RBC AUTO: 88.6 FL — SIGNIFICANT CHANGE UP (ref 80–100)
MCV RBC AUTO: 88.6 FL — SIGNIFICANT CHANGE UP (ref 80–100)
MONOCYTES # BLD AUTO: 1.4 K/UL — HIGH (ref 0–0.9)
MONOCYTES # BLD AUTO: 1.4 K/UL — HIGH (ref 0–0.9)
MONOCYTES NFR BLD AUTO: 7.2 % — SIGNIFICANT CHANGE UP (ref 2–14)
MONOCYTES NFR BLD AUTO: 7.2 % — SIGNIFICANT CHANGE UP (ref 2–14)
NEUTROPHILS # BLD AUTO: 16.76 K/UL — HIGH (ref 1.8–7.4)
NEUTROPHILS # BLD AUTO: 16.76 K/UL — HIGH (ref 1.8–7.4)
NEUTROPHILS NFR BLD AUTO: 86 % — HIGH (ref 43–77)
NEUTROPHILS NFR BLD AUTO: 86 % — HIGH (ref 43–77)
NRBC # BLD: 0 /100 WBCS — SIGNIFICANT CHANGE UP (ref 0–0)
NRBC # BLD: 0 /100 WBCS — SIGNIFICANT CHANGE UP (ref 0–0)
PLATELET # BLD AUTO: 181 K/UL — SIGNIFICANT CHANGE UP (ref 150–400)
PLATELET # BLD AUTO: 181 K/UL — SIGNIFICANT CHANGE UP (ref 150–400)
POTASSIUM SERPL-MCNC: 4.3 MMOL/L — SIGNIFICANT CHANGE UP (ref 3.5–5.3)
POTASSIUM SERPL-MCNC: 4.3 MMOL/L — SIGNIFICANT CHANGE UP (ref 3.5–5.3)
POTASSIUM SERPL-SCNC: 4.3 MMOL/L — SIGNIFICANT CHANGE UP (ref 3.5–5.3)
POTASSIUM SERPL-SCNC: 4.3 MMOL/L — SIGNIFICANT CHANGE UP (ref 3.5–5.3)
PROT SERPL-MCNC: 5.1 G/DL — LOW (ref 6–8.3)
PROT SERPL-MCNC: 5.1 G/DL — LOW (ref 6–8.3)
RBC # BLD: 4.92 M/UL — SIGNIFICANT CHANGE UP (ref 3.8–5.2)
RBC # BLD: 4.92 M/UL — SIGNIFICANT CHANGE UP (ref 3.8–5.2)
RBC # FLD: 16.6 % — HIGH (ref 10.3–14.5)
RBC # FLD: 16.6 % — HIGH (ref 10.3–14.5)
SODIUM SERPL-SCNC: 141 MMOL/L — SIGNIFICANT CHANGE UP (ref 135–145)
SODIUM SERPL-SCNC: 141 MMOL/L — SIGNIFICANT CHANGE UP (ref 135–145)
WBC # BLD: 19.49 K/UL — HIGH (ref 3.8–10.5)
WBC # BLD: 19.49 K/UL — HIGH (ref 3.8–10.5)
WBC # FLD AUTO: 19.49 K/UL — HIGH (ref 3.8–10.5)
WBC # FLD AUTO: 19.49 K/UL — HIGH (ref 3.8–10.5)

## 2024-01-11 PROCEDURE — 93306 TTE W/DOPPLER COMPLETE: CPT | Mod: 26

## 2024-01-11 PROCEDURE — 99232 SBSQ HOSP IP/OBS MODERATE 35: CPT | Mod: GC

## 2024-01-11 RX ORDER — METOPROLOL TARTRATE 50 MG
2.5 TABLET ORAL ONCE
Refills: 0 | Status: COMPLETED | OUTPATIENT
Start: 2024-01-11 | End: 2024-01-11

## 2024-01-11 RX ADMIN — ATORVASTATIN CALCIUM 10 MILLIGRAM(S): 80 TABLET, FILM COATED ORAL at 21:05

## 2024-01-11 RX ADMIN — Medication 2.5 MILLIGRAM(S): at 13:10

## 2024-01-11 RX ADMIN — BUDESONIDE AND FORMOTEROL FUMARATE DIHYDRATE 2 PUFF(S): 160; 4.5 AEROSOL RESPIRATORY (INHALATION) at 05:32

## 2024-01-11 RX ADMIN — Medication 3 MILLILITER(S): at 05:10

## 2024-01-11 RX ADMIN — Medication 3 MILLILITER(S): at 17:36

## 2024-01-11 RX ADMIN — Medication 2.5 MILLIGRAM(S): at 17:23

## 2024-01-11 RX ADMIN — Medication 3 MILLILITER(S): at 13:13

## 2024-01-11 RX ADMIN — PIPERACILLIN AND TAZOBACTAM 25 GRAM(S): 4; .5 INJECTION, POWDER, LYOPHILIZED, FOR SOLUTION INTRAVENOUS at 21:04

## 2024-01-11 RX ADMIN — PANTOPRAZOLE SODIUM 40 MILLIGRAM(S): 20 TABLET, DELAYED RELEASE ORAL at 17:35

## 2024-01-11 RX ADMIN — Medication 25 MILLIGRAM(S): at 18:39

## 2024-01-11 RX ADMIN — Medication 2.5 MILLIGRAM(S): at 21:08

## 2024-01-11 RX ADMIN — PANTOPRAZOLE SODIUM 40 MILLIGRAM(S): 20 TABLET, DELAYED RELEASE ORAL at 05:07

## 2024-01-11 RX ADMIN — PIPERACILLIN AND TAZOBACTAM 25 GRAM(S): 4; .5 INJECTION, POWDER, LYOPHILIZED, FOR SOLUTION INTRAVENOUS at 13:21

## 2024-01-11 RX ADMIN — SODIUM CHLORIDE 75 MILLILITER(S): 9 INJECTION INTRAMUSCULAR; INTRAVENOUS; SUBCUTANEOUS at 06:18

## 2024-01-11 RX ADMIN — Medication 15 MILLIGRAM(S): at 17:23

## 2024-01-11 RX ADMIN — PIPERACILLIN AND TAZOBACTAM 25 GRAM(S): 4; .5 INJECTION, POWDER, LYOPHILIZED, FOR SOLUTION INTRAVENOUS at 05:08

## 2024-01-11 RX ADMIN — Medication 2.5 MILLIGRAM(S): at 05:06

## 2024-01-11 RX ADMIN — BUDESONIDE AND FORMOTEROL FUMARATE DIHYDRATE 2 PUFF(S): 160; 4.5 AEROSOL RESPIRATORY (INHALATION) at 18:00

## 2024-01-11 NOTE — PROGRESS NOTE ADULT - ATTENDING COMMENTS
Agree with above. X-ray shows non-obstructive bowel pattern. no signs of bleeding at this time, H/H is stable. Daughter would like to avoid invasive testing if possible - would treat conservatively with PPI. Advance diet to clears then as tolerated. Please let us know if signs of bleeding recur and we will revisit endoscopic evaluation with family.

## 2024-01-11 NOTE — PROGRESS NOTE ADULT - ASSESSMENT
89 y/o F with PMH of CVA, YOVANI on CPAP, HTN, HLD, Afib on Eliquis, CHF. Recent admission at [facility] 1 month ago for PNA, completed short course of ABX. Completed additional course of ABX last week for UTI. Presents to the ED with coughing, SOB, wheezing x several days. Found to be Flu + 89 y/o F with PMH of CVA, YOVANI on CPAP, HTN, HLD, Afib on Eliquis, CHF. Recent admission at [facility] 1 month ago for PNA, completed short course of ABX. Completed additional course of ABX last week for UTI. Presents to the ED with coughing, SOB, wheezing x several days. Found to be Flu +

## 2024-01-11 NOTE — PROGRESS NOTE ADULT - PROBLEM SELECTOR PLAN 3
-Uptrending WBC  -S/p RRT 1/10 for coffee ground emesis, ? aspiration event.  -Continue ABX  -CXR with no infiltrate as of yet   -Continue to trend  -GI f/u.

## 2024-01-11 NOTE — PROGRESS NOTE ADULT - ASSESSMENT
89-year-old female PMH of CVA, YOVANI on CPAP, HTN, HLD, A-fib on Eliquis, heart failure on furosemide, recently admitted 1 month ago to [facility] for pneumonia completed a course of antibiotics (for 3 days), completed a course of cefpodoxime 1 week prior to admission for UTI presents to the ED complaining of several days of coughing, increased wheezing, shortness of breath, found to have sepsis and PNA 2/2 flu. RRT called 1/10 for increased WOB. During the rapid pt was found in A-Fib RVR.    GI was consulted for dark emesis and concern for abdominal distention.     Assessment  #Dark emesis  #Concern for gastric distention  #Acute respiratory distress  - Unclear if dark emesis represents GIB: BUN uptrending (downtrending on 1/11, with improving Cr as well), H&H stable, hemodynamically stable  - CT A/P final read without bowel obstruction and mild gastric distention. Limited evaluation of pylorus and duodenum. However, image taken after gastric decompression after NGT placement  - Currently clinically improving; weaned off O2, and no active GI complaints or known melena/hematochezia/hematemesis  - Uptrending leukocytosis - Downtrending on 1/11  - Discussed with daughter Rachana at bedside: To pursue EGD at this time, patient will likely need intubation and carries a high risk given her clinical conditions. Patient is improving clinically, and daughter opted for medical management at this time.   - No further episodes of emesis and no signs of overt GIB as of 1/11    Recommendations  - Continue PPI BID (will empirically treat ulcer disease, gastritis, esophagitis etc.)  - Monitor for clinical symptoms, if with recurring nausea and vomiting, would recommend replacing NGT for decompression  - Given lack of clinical symptoms, may trial clear liquid diet  - Continue with medical optimization per primary team  - If patient has active GIB, EGD will be warranted. Daughter is aware.      Note incomplete until finalized by attending signature/attestation.    Nu Garcia  GI/Hepatology Fellow    MONDAY-FRIDAY 8AM-5PM:  Pager# [phone] ([facility]) or [phone] ([facility])    NON-URGENT CONSULTS:  Please email [email or web address]  AT NIGHT AND ON WEEKENDS:  Contact on-call GI fellow via answering service ([phone]) from 5pm-8am and on weekends/holidays   89-year-old female PMH of CVA, YOVANI on CPAP, HTN, HLD, A-fib on Eliquis, heart failure on furosemide, recently admitted 1 month ago to [facility] for pneumonia completed a course of antibiotics (for 3 days), completed a course of cefpodoxime 1 week prior to admission for UTI presents to the ED complaining of several days of coughing, increased wheezing, shortness of breath, found to have sepsis and PNA 2/2 flu. RRT called 1/10 for increased WOB. During the rapid pt was found in A-Fib RVR.    GI was consulted for dark emesis and concern for abdominal distention.     Assessment  #Dark emesis  #Concern for gastric distention  #Acute respiratory distress  - Unclear if dark emesis represents GIB: BUN uptrending (downtrending on 1/11, with improving Cr as well), H&H stable, hemodynamically stable  - CT A/P final read without bowel obstruction and mild gastric distention. Limited evaluation of pylorus and duodenum. However, image taken after gastric decompression after NGT placement  - Currently clinically improving; weaned off O2, and no active GI complaints or known melena/hematochezia/hematemesis  - Uptrending leukocytosis - Downtrending on 1/11  - Discussed with daughter Rachana at bedside: To pursue EGD at this time, patient will likely need intubation and carries a high risk given her clinical conditions. Patient is improving clinically, and daughter opted for medical management at this time.   - No further episodes of emesis and no signs of overt GIB as of 1/11    Recommendations  - Continue PPI BID (will empirically treat ulcer disease, gastritis, esophagitis etc.)  - Monitor for clinical symptoms, if with recurring nausea and vomiting, would recommend replacing NGT for decompression  - Given lack of clinical symptoms, may trial clear liquid diet  - Continue with medical optimization per primary team  - If patient has active GIB, EGD will be warranted. Daughter is aware.      Note incomplete until finalized by attending signature/attestation.    Nu Garcia  GI/Hepatology Fellow    MONDAY-FRIDAY 8AM-5PM:  Pager# [phone] ([facility]) or [phone] ([facility])    NON-URGENT CONSULTS:  Please email [email or web address]  AT NIGHT AND ON WEEKENDS:  Contact on-call GI fellow via answering service ([phone]) from 5pm-8am and on weekends/holidays   89-year-old female PMH of CVA, YOVANI on CPAP, HTN, HLD, A-fib on Eliquis, heart failure on furosemide, recently admitted 1 month ago to [facility] for pneumonia completed a course of antibiotics (for 3 days), completed a course of cefpodoxime 1 week prior to admission for UTI presents to the ED complaining of several days of coughing, increased wheezing, shortness of breath, found to have sepsis and PNA 2/2 flu. RRT called 1/10 for increased WOB. During the rapid pt was found in A-Fib RVR.    GI was consulted for dark emesis and concern for abdominal distention.     Assessment  #Dark emesis  #Concern for gastric distention  #Acute respiratory distress  - Unclear if dark emesis represents GIB: BUN uptrending (downtrending on 1/11, with improving Cr as well), H&H stable, hemodynamically stable  - CT A/P final read without bowel obstruction and mild gastric distention. Limited evaluation of pylorus and duodenum. However, image taken after gastric decompression after NGT placement  - Currently clinically improving; weaned off O2, and no active GI complaints or known melena/hematochezia/hematemesis  - Uptrending leukocytosis - Downtrending on 1/11  - Discussed with daughter Rachana at bedside: To pursue EGD at this time, patient will likely need intubation and carries a high risk given her clinical conditions. Patient is improving clinically, and daughter opted for medical management at this time.   - No further episodes of emesis and no signs of overt GIB as of 1/11    Recommendations  - Continue PPI BID (will empirically treat ulcer disease, gastritis, esophagitis etc.)  - Monitor for clinical symptoms, if with recurring nausea and vomiting, would recommend replacing NGT for decompression  - Given lack of clinical symptoms, may trial clear liquid diet  - Continue with medical optimization per primary team  - If patient has active GIB, EGD will be warranted. Daughter is aware.   - GI team will sign off for now. Please call back if patient has active bleeding or further episodes of emesis.      Note incomplete until finalized by attending signature/attestation.    Nu Garcia  GI/Hepatology Fellow    MONDAY-FRIDAY 8AM-5PM:  Pager# [phone] ([facility]) or [phone] ([facility])    NON-URGENT CONSULTS:  Please email [email or web address]  AT NIGHT AND ON WEEKENDS:  Contact on-call GI fellow via answering service ([phone]) from 5pm-8am and on weekends/holidays   89-year-old female PMH of CVA, YOVANI on CPAP, HTN, HLD, A-fib on Eliquis, heart failure on furosemide, recently admitted 1 month ago to [facility] for pneumonia completed a course of antibiotics (for 3 days), completed a course of cefpodoxime 1 week prior to admission for UTI presents to the ED complaining of several days of coughing, increased wheezing, shortness of breath, found to have sepsis and PNA 2/2 flu. RRT called 1/10 for increased WOB. During the rapid pt was found in A-Fib RVR.    GI was consulted for dark emesis and concern for abdominal distention.     Assessment  #Dark emesis  #Concern for gastric distention  #Acute respiratory distress  - Unclear if dark emesis represents GIB: BUN uptrending (downtrending on 1/11, with improving Cr as well), H&H stable, hemodynamically stable  - CT A/P final read without bowel obstruction and mild gastric distention. Limited evaluation of pylorus and duodenum. However, image taken after gastric decompression after NGT placement  - Currently clinically improving; weaned off O2, and no active GI complaints or known melena/hematochezia/hematemesis  - Uptrending leukocytosis - Downtrending on 1/11  - Discussed with daughter Rachana at bedside: To pursue EGD at this time, patient will likely need intubation and carries a high risk given her clinical conditions. Patient is improving clinically, and daughter opted for medical management at this time.   - No further episodes of emesis and no signs of overt GIB as of 1/11    Recommendations  - Continue PPI BID (will empirically treat ulcer disease, gastritis, esophagitis etc.)  - Monitor for clinical symptoms, if with recurring nausea and vomiting, would recommend replacing NGT for decompression  - Given lack of clinical symptoms, may trial clear liquid diet  - Continue with medical optimization per primary team  - If patient has active GIB, EGD will be warranted. Daughter is aware.   - GI team will sign off for now. Please call back if patient has active bleeding or further episodes of emesis.      Note incomplete until finalized by attending signature/attestation.    Nu Garcia  GI/Hepatology Fellow    MONDAY-FRIDAY 8AM-5PM:  Pager# [phone] ([facility]) or [phone] ([facility])    NON-URGENT CONSULTS:  Please email [email or web address]  AT NIGHT AND ON WEEKENDS:  Contact on-call GI fellow via answering service ([phone]) from 5pm-8am and on weekends/holidays

## 2024-01-11 NOTE — PROGRESS NOTE ADULT - ASSESSMENT
89-year-old female PMH of CVA, YOVANI on CPAP, HTN, HLD, A-fib on Eliquis, heart failure on furosemide, presents to the ED complaining of several days of coughing, increased wheezing, shortness of breath, and found to be influenza positive. Patient admitted for sepsis management.     Upper GI bleed  - iv ppi bid  - GI consult saw pt  - conservative management  - clear liquid diet  - hold eliquis for now    Sepsis.   - Blood cultures drawn and NGTD  - completed  ceftriaxone   - Continue oseltamavir  - Monitor fever curve.  - now on zosyn empirically    Asthma exacerbation.   ·  Plan: 2nd to Influenza A +/- bacterial PNA  -Continue Duoneb q6h  -Continue Symbicort 160/4.5 mcg 2 puffs BID (home med Breo Ellipta)  -Prednisone 40 mg PO qd x5 days   -Keep sats >90% with O2 PRN, currently RA.    ruddyluenza A.    -CT chest with b/l TIB opacities, may be viral +/- bacterial PNA   -Continue Tamiflu  -Steroids/bronchodilators as above.    Chronic systolic congestive heart failure.   -Not currently in decompensated HF. TTE from 8/29/23 showing normal LVSF, EF 61%  - Will continue eplerenone, atenolol, losartan for now   - Will hold lasix for now given sepsis and current euvolemic status   - Monitor I/Os, daily weight  - BMP daily, monitor electrolytes while on diuretics.    Chronic atrial fibrillation.   ·  Plan: EKG personally reviewed showing afib with HR 120s, qtc 469  - Continue eliquis  - HR improved in ED to 80s.    CORY clot on ct  - will get tte  - pt has been on a/c    adrenal mass  - to be evaluated as out pt as pt has active issues      YOVANI (obstructive sleep apnea).   - By hx  -  pt reports does not have CPAP at home   -VBG acceptable  -Suggest monitor off CPAP, order d/c'd.     Prophylactic measure.   ·  Plan: DVT ppx: holding  eliquis  Sleep: on benadryl qhs prn  Diet: DASH  Dispo: cleared by pulm for discharge to [facility]  - dicussed with PCP Dr Parra  - d/w daughter     89-year-old female PMH of CVA, YOVANI on CPAP, HTN, HLD, A-fib on Eliquis, heart failure on furosemide, presents to the ED complaining of several days of coughing, increased wheezing, shortness of breath, and found to be influenza positive. Patient admitted for sepsis management.     Upper GI bleed  - iv ppi bid  - GI consult saw pt  - conservative management  - clear liquid diet  - hold eliquis for now    Sepsis.   - Blood cultures drawn and NGTD  - completed  ceftriaxone   - Continue oseltamavir  - Monitor fever curve.  - now on zosyn empirically    Asthma exacerbation.   ·  Plan: 2nd to Influenza A +/- bacterial PNA  -Continue Duoneb q6h  -Continue Symbicort 160/4.5 mcg 2 puffs BID (home med Breo Ellipta)  -Prednisone 40 mg PO qd x5 days   -Keep sats >90% with O2 PRN, currently RA.    ruddyluenza A.    -CT chest with b/l TIB opacities, may be viral +/- bacterial PNA   -Continue Tamiflu  -Steroids/bronchodilators as above.    Chronic systolic congestive heart failure.   -Not currently in decompensated HF. TTE from 8/29/23 showing normal LVSF, EF 61%  - Will continue eplerenone, atenolol, losartan for now   - Will hold lasix for now given sepsis and current euvolemic status   - Monitor I/Os, daily weight  - BMP daily, monitor electrolytes while on diuretics.    Chronic atrial fibrillation.   ·  Plan: EKG personally reviewed showing afib with HR 120s, qtc 469  - Continue eliquis  - HR improved in ED to 80s.    CORY clot on ct  - will get tte  - pt has been on a/c    adrenal mass  - to be evaluated as out pt as pt has active issues      YOVANI (obstructive sleep apnea).   - By hx  -  pt reports does not have CPAP at home   -VBG acceptable  -Suggest monitor off CPAP, order d/c'd.     Prophylactic measure.   ·  Plan: DVT ppx: holding  eliquis  Sleep: on benadryl qhs prn  Diet: DASH  Dispo: cleared by pulm for discharge to [facility]  - dicussed with PCP Dr Parra  - d/w daughter

## 2024-01-11 NOTE — PROGRESS NOTE ADULT - SUBJECTIVE AND OBJECTIVE BOX
DATE OF SERVICE: 01-11-24 @ 11:48    Patient is a 89y old  Female who presents with a chief complaint of SOB (11 Jan 2024 11:24)      SUBJECTIVE / OVERNIGHT EVENTS:  No chest pain. No shortness of breath. No complaints. No events overnight.     MEDICATIONS  (STANDING):  albuterol/ipratropium for Nebulization 3 milliLiter(s) Nebulizer every 6 hours  aMIOdarone IVPB 150 milliGRAM(s) IV Intermittent once  aMIOdarone IVPB 150 milliGRAM(s) IV Intermittent once  atenolol  Tablet 37.5 milliGRAM(s) Oral daily  atorvastatin 10 milliGRAM(s) Oral at bedtime  budesonide 160 MICROgram(s)/formoterol 4.5 MICROgram(s) Inhaler 2 Puff(s) Inhalation two times a day  busPIRone 15 milliGRAM(s) Oral two times a day  eplerenone 50 milliGRAM(s) Oral daily  levothyroxine 25 MICROGram(s) Oral daily  losartan 25 milliGRAM(s) Oral daily  metoprolol tartrate Injectable 2.5 milliGRAM(s) IV Push every 6 hours  ondansetron Injectable 4 milliGRAM(s) IV Push once  pantoprazole    Tablet 40 milliGRAM(s) Oral before breakfast  pantoprazole  Injectable 40 milliGRAM(s) IV Push two times a day  piperacillin/tazobactam IVPB.. 3.375 Gram(s) IV Intermittent every 8 hours  polyethylene glycol 3350 17 Gram(s) Oral daily  sodium chloride 0.9%. 1000 milliLiter(s) (75 mL/Hr) IV Continuous <Continuous>    MEDICATIONS  (PRN):      Vital Signs Last 24 Hrs  T(C): 37.2 (11 Jan 2024 04:54), Max: 37.2 (10 Percy 2024 23:59)  T(F): 98.9 (11 Jan 2024 04:54), Max: 98.9 (10 Percy 2024 23:59)  HR: 99 (11 Jan 2024 05:14) (72 - 111)  BP: 146/81 (11 Jan 2024 05:14) (122/76 - 166/77)  BP(mean): --  RR: 18 (11 Jan 2024 05:14) (16 - 18)  SpO2: 95% (11 Jan 2024 05:14) (94% - 98%)    Parameters below as of 11 Jan 2024 05:14  Patient On (Oxygen Delivery Method): room air      CAPILLARY BLOOD GLUCOSE      POCT Blood Glucose.: 109 mg/dL (11 Jan 2024 06:14)  POCT Blood Glucose.: 122 mg/dL (11 Jan 2024 00:00)  POCT Blood Glucose.: 116 mg/dL (10 Percy 2024 17:08)    I&O's Summary    10 Percy 2024 07:01  -  11 Jan 2024 07:00  --------------------------------------------------------  IN: 0 mL / OUT: 450 mL / NET: -450 mL    11 Jan 2024 07:01  -  11 Jan 2024 11:48  --------------------------------------------------------  IN: 0 mL / OUT: 150 mL / NET: -150 mL        PHYSICAL EXAM:  GENERAL: NAD, well-developed  HEAD:  Atraumatic, Normocephalic  EYES: EOMI, PERRLA, conjunctiva and sclera clear  NECK: Supple, No JVD  CHEST/LUNG: Clear to auscultation bilaterally; No wheeze  HEART: Regular rate and rhythm; No murmurs, rubs, or gallops  ABDOMEN: Soft, Nontender, Nondistended; Bowel sounds present  EXTREMITIES:  2+ Peripheral Pulses, No clubbing, cyanosis, or edema  PSYCH: AAOx3  NEUROLOGY: non-focal  SKIN: No rashes or lesions    LABS:                        13.7   19.49 )-----------( 181      ( 11 Jan 2024 04:52 )             43.6     01-11    141  |  108  |  43<H>  ----------------------------<  109<H>  4.3   |  23  |  1.00    Ca    7.6<L>      11 Jan 2024 04:51    TPro  5.1<L>  /  Alb  3.2<L>  /  TBili  1.4<H>  /  DBili  x   /  AST  16  /  ALT  32  /  AlkPhos  47  01-11          Urinalysis Basic - ( 11 Jan 2024 04:51 )    Color: x / Appearance: x / SG: x / pH: x  Gluc: 109 mg/dL / Ketone: x  / Bili: x / Urobili: x   Blood: x / Protein: x / Nitrite: x   Leuk Esterase: x / RBC: x / WBC x   Sq Epi: x / Non Sq Epi: x / Bacteria: x      < from: CT Abdomen and Pelvis w/ IV Cont (01.10.24 @ 02:10) >    IMPRESSION:  *  Mural thickening of the lower esophagus, which could represent   esophagitis.  *  Mild nonspecific distention of the stomach. Evaluation of the pylorus   and proximal duodenum is limited by motion. No evidence for small or   large bowel obstruction.  *  Redemonstrated filling defect within the left atrial appendage, as was   present on the exam of 7/25/2023, which could represent thrombus or   mixing artifact. Recommend further evaluation with echocardiogram as   warranted.  *  Enlarging indeterminate left adrenal mass, now measuring 3.8 cm.  *  Bilateral indeterminate renal lesions are unchanged since 7/25/2023.  *  Both the adrenal and renal lesions can be further evaluated by   contrast enhanced abdominal MRI.  *  Increased small right pleural effusion.    < end of copied text >    RADIOLOGY & ADDITIONAL TESTS:    Imaging Personally Reviewed:    Consultant(s) Notes Reviewed:      Care Discussed with Consultants/Other Providers:

## 2024-01-11 NOTE — PROGRESS NOTE ADULT - SUBJECTIVE AND OBJECTIVE BOX
Interval Events:   No acute events overnight. No further episodes of emesis noted. Patient had dark brown BM per RN. No melena or hematochezia.   Denied abdominal pain or nausea this AM.     Hospital Medications:  albuterol/ipratropium for Nebulization 3 milliLiter(s) Nebulizer every 6 hours  aMIOdarone IVPB 150 milliGRAM(s) IV Intermittent once  aMIOdarone IVPB 150 milliGRAM(s) IV Intermittent once  atenolol  Tablet 37.5 milliGRAM(s) Oral daily  atorvastatin 10 milliGRAM(s) Oral at bedtime  budesonide 160 MICROgram(s)/formoterol 4.5 MICROgram(s) Inhaler 2 Puff(s) Inhalation two times a day  busPIRone 15 milliGRAM(s) Oral two times a day  eplerenone 50 milliGRAM(s) Oral daily  levothyroxine 25 MICROGram(s) Oral daily  losartan 25 milliGRAM(s) Oral daily  metoprolol tartrate Injectable 2.5 milliGRAM(s) IV Push every 6 hours  ondansetron Injectable 4 milliGRAM(s) IV Push once  pantoprazole    Tablet 40 milliGRAM(s) Oral before breakfast  pantoprazole  Injectable 40 milliGRAM(s) IV Push two times a day  piperacillin/tazobactam IVPB.. 3.375 Gram(s) IV Intermittent every 8 hours  polyethylene glycol 3350 17 Gram(s) Oral daily  sodium chloride 0.9%. 1000 milliLiter(s) IV Continuous <Continuous>      PHYSICAL EXAM:   Vital Signs:  Vital Signs Last 24 Hrs  T(C): 37.2 (2024 04:54), Max: 37.2 (10 Percy 2024 23:59)  T(F): 98.9 (2024 04:54), Max: 98.9 (10 Percy 2024 23:59)  HR: 99 (2024 05:14) (72 - 111)  BP: 146/81 (2024 05:14) (122/76 - 166/77)  BP(mean): --  RR: 18 (2024 05:14) (16 - 18)  SpO2: 95% (2024 05:14) (94% - 98%)    Parameters below as of 2024 05:14  Patient On (Oxygen Delivery Method): room air      Daily     Daily Weight in k.7 (2024 06:00)    GENERAL: no acute distress  NEURO: alert and answering questions appropriately  HEENT: NCAT, no conjunctival pallor appreciated; anicteric sclera  CHEST: no respiratory distress, no accessory muscle use  CARDIAC: regular rate, +S1/S2  ABDOMEN: soft, nontender, no rebound or guarding  EXTREMITIES: warm, well perfused  SKIN: no active lesions noted    LABS: reviewed                        13.7   19.49 )-----------( 181      ( 2024 04:52 )             43.6         141  |  108  |  43<H>  ----------------------------<  109<H>  4.3   |  23  |  1.00    Ca    7.6<L>      2024 04:51    TPro  5.1<L>  /  Alb  3.2<L>  /  TBili  1.4<H>  /  DBili  x   /  AST  16  /  ALT  32  /  AlkPhos  47

## 2024-01-11 NOTE — PROGRESS NOTE ADULT - SUBJECTIVE AND OBJECTIVE BOX
Follow-up Pulm Progress Note    No new respiratory events overnight.  Denies SOB/CP.   More alert today, less confused  O2 sats 96% RA     Medications:  MEDICATIONS  (STANDING):  albuterol/ipratropium for Nebulization 3 milliLiter(s) Nebulizer every 6 hours  aMIOdarone IVPB 150 milliGRAM(s) IV Intermittent once  aMIOdarone IVPB 150 milliGRAM(s) IV Intermittent once  atenolol  Tablet 37.5 milliGRAM(s) Oral daily  atorvastatin 10 milliGRAM(s) Oral at bedtime  budesonide 160 MICROgram(s)/formoterol 4.5 MICROgram(s) Inhaler 2 Puff(s) Inhalation two times a day  busPIRone 15 milliGRAM(s) Oral two times a day  eplerenone 50 milliGRAM(s) Oral daily  levothyroxine 25 MICROGram(s) Oral daily  losartan 25 milliGRAM(s) Oral daily  metoprolol tartrate Injectable 2.5 milliGRAM(s) IV Push every 6 hours  ondansetron Injectable 4 milliGRAM(s) IV Push once  pantoprazole    Tablet 40 milliGRAM(s) Oral before breakfast  pantoprazole  Injectable 40 milliGRAM(s) IV Push two times a day  piperacillin/tazobactam IVPB.. 3.375 Gram(s) IV Intermittent every 8 hours  polyethylene glycol 3350 17 Gram(s) Oral daily  sodium chloride 0.9%. 1000 milliLiter(s) (75 mL/Hr) IV Continuous <Continuous>    MEDICATIONS  (PRN):          Vital Signs Last 24 Hrs  T(C): 37.2 (11 Jan 2024 04:54), Max: 37.2 (10 Percy 2024 23:59)  T(F): 98.9 (11 Jan 2024 04:54), Max: 98.9 (10 Percy 2024 23:59)  HR: 99 (11 Jan 2024 05:14) (72 - 111)  BP: 146/81 (11 Jan 2024 05:14) (122/76 - 166/77)  BP(mean): --  RR: 18 (11 Jan 2024 05:14) (16 - 18)  SpO2: 95% (11 Jan 2024 05:14) (94% - 98%)    Parameters below as of 11 Jan 2024 05:14  Patient On (Oxygen Delivery Method): room air        ABG - ( 10 Percy 2024 12:58 )  pH, Arterial: 7.47  pH, Blood: x     /  pCO2: 40    /  pO2: 70    / HCO3: 29    / Base Excess: 5.0   /  SaO2: 94.9                  01-10 @ 07:01  -  01-11 @ 07:00  --------------------------------------------------------  IN: 0 mL / OUT: 450 mL / NET: -450 mL          LABS:                        13.7   19.49 )-----------( 181      ( 11 Jan 2024 04:52 )             43.6     01-11    141  |  108  |  43<H>  ----------------------------<  109<H>  4.3   |  23  |  1.00    Ca    7.6<L>      11 Jan 2024 04:51    TPro  5.1<L>  /  Alb  3.2<L>  /  TBili  1.4<H>  /  DBili  x   /  AST  16  /  ALT  32  /  AlkPhos  47  01-11          CAPILLARY BLOOD GLUCOSE      POCT Blood Glucose.: 109 mg/dL (11 Jan 2024 06:14)      Urinalysis Basic - ( 11 Jan 2024 04:51 )    Color: x / Appearance: x / SG: x / pH: x  Gluc: 109 mg/dL / Ketone: x  / Bili: x / Urobili: x   Blood: x / Protein: x / Nitrite: x   Leuk Esterase: x / RBC: x / WBC x   Sq Epi: x / Non Sq Epi: x / Bacteria: x      CULTURES: (if applicable)    Culture - Blood (collected 01-04-24 @ 15:30)  Source: .Blood Blood-Peripheral  Final Report (01-09-24 @ 23:00):    No growth at 5 days    Culture - Blood (collected 01-04-24 @ 15:00)  Source: .Blood Blood-Peripheral  Final Report (01-09-24 @ 23:00):    No growth at 5 days    Physical Examination:  PULM: Clear to auscultation bilaterally  CVS: S1, S2 heard    RADIOLOGY REVIEWED  CXR: grossly clear     < from: CT Abdomen and Pelvis w/ IV Cont (01.10.24 @ 02:10) >    FINDINGS:  LOWER CHEST: Small right pleural effusion, increased from the chest CT of   1/4/2024. Bibasilar subsegmental atelectasis. Cardiomegaly. Cardiac   device leads. Coronary artery calcifications. Partially visualized   filling defect within the left atrial appendage, as was present on the   exam of 7/25/2023.    < end of copied text >

## 2024-01-12 LAB
ALBUMIN SERPL ELPH-MCNC: 3.4 G/DL — SIGNIFICANT CHANGE UP (ref 3.3–5)
ALBUMIN SERPL ELPH-MCNC: 3.4 G/DL — SIGNIFICANT CHANGE UP (ref 3.3–5)
ALP SERPL-CCNC: 68 U/L — SIGNIFICANT CHANGE UP (ref 40–120)
ALP SERPL-CCNC: 68 U/L — SIGNIFICANT CHANGE UP (ref 40–120)
ALT FLD-CCNC: 41 U/L — SIGNIFICANT CHANGE UP (ref 10–45)
ALT FLD-CCNC: 41 U/L — SIGNIFICANT CHANGE UP (ref 10–45)
ANION GAP SERPL CALC-SCNC: 12 MMOL/L — SIGNIFICANT CHANGE UP (ref 5–17)
ANION GAP SERPL CALC-SCNC: 12 MMOL/L — SIGNIFICANT CHANGE UP (ref 5–17)
AST SERPL-CCNC: 24 U/L — SIGNIFICANT CHANGE UP (ref 10–40)
AST SERPL-CCNC: 24 U/L — SIGNIFICANT CHANGE UP (ref 10–40)
BASOPHILS # BLD AUTO: 0.06 K/UL — SIGNIFICANT CHANGE UP (ref 0–0.2)
BASOPHILS # BLD AUTO: 0.06 K/UL — SIGNIFICANT CHANGE UP (ref 0–0.2)
BASOPHILS NFR BLD AUTO: 0.3 % — SIGNIFICANT CHANGE UP (ref 0–2)
BASOPHILS NFR BLD AUTO: 0.3 % — SIGNIFICANT CHANGE UP (ref 0–2)
BILIRUB SERPL-MCNC: 1.3 MG/DL — HIGH (ref 0.2–1.2)
BILIRUB SERPL-MCNC: 1.3 MG/DL — HIGH (ref 0.2–1.2)
BUN SERPL-MCNC: 34 MG/DL — HIGH (ref 7–23)
BUN SERPL-MCNC: 34 MG/DL — HIGH (ref 7–23)
CALCIUM SERPL-MCNC: 7.2 MG/DL — LOW (ref 8.4–10.5)
CALCIUM SERPL-MCNC: 7.2 MG/DL — LOW (ref 8.4–10.5)
CHLORIDE SERPL-SCNC: 109 MMOL/L — HIGH (ref 96–108)
CHLORIDE SERPL-SCNC: 109 MMOL/L — HIGH (ref 96–108)
CO2 SERPL-SCNC: 23 MMOL/L — SIGNIFICANT CHANGE UP (ref 22–31)
CO2 SERPL-SCNC: 23 MMOL/L — SIGNIFICANT CHANGE UP (ref 22–31)
CREAT SERPL-MCNC: 0.97 MG/DL — SIGNIFICANT CHANGE UP (ref 0.5–1.3)
CREAT SERPL-MCNC: 0.97 MG/DL — SIGNIFICANT CHANGE UP (ref 0.5–1.3)
EGFR: 56 ML/MIN/1.73M2 — LOW
EGFR: 56 ML/MIN/1.73M2 — LOW
EOSINOPHIL # BLD AUTO: 0.03 K/UL — SIGNIFICANT CHANGE UP (ref 0–0.5)
EOSINOPHIL # BLD AUTO: 0.03 K/UL — SIGNIFICANT CHANGE UP (ref 0–0.5)
EOSINOPHIL NFR BLD AUTO: 0.1 % — SIGNIFICANT CHANGE UP (ref 0–6)
EOSINOPHIL NFR BLD AUTO: 0.1 % — SIGNIFICANT CHANGE UP (ref 0–6)
GLUCOSE BLDC GLUCOMTR-MCNC: 120 MG/DL — HIGH (ref 70–99)
GLUCOSE BLDC GLUCOMTR-MCNC: 120 MG/DL — HIGH (ref 70–99)
GLUCOSE BLDC GLUCOMTR-MCNC: 126 MG/DL — HIGH (ref 70–99)
GLUCOSE BLDC GLUCOMTR-MCNC: 126 MG/DL — HIGH (ref 70–99)
GLUCOSE SERPL-MCNC: 118 MG/DL — HIGH (ref 70–99)
GLUCOSE SERPL-MCNC: 118 MG/DL — HIGH (ref 70–99)
HCT VFR BLD CALC: 44.7 % — SIGNIFICANT CHANGE UP (ref 34.5–45)
HCT VFR BLD CALC: 44.7 % — SIGNIFICANT CHANGE UP (ref 34.5–45)
HGB BLD-MCNC: 13.9 G/DL — SIGNIFICANT CHANGE UP (ref 11.5–15.5)
HGB BLD-MCNC: 13.9 G/DL — SIGNIFICANT CHANGE UP (ref 11.5–15.5)
IMM GRANULOCYTES NFR BLD AUTO: 1.9 % — HIGH (ref 0–0.9)
IMM GRANULOCYTES NFR BLD AUTO: 1.9 % — HIGH (ref 0–0.9)
LYMPHOCYTES # BLD AUTO: 0.85 K/UL — LOW (ref 1–3.3)
LYMPHOCYTES # BLD AUTO: 0.85 K/UL — LOW (ref 1–3.3)
LYMPHOCYTES # BLD AUTO: 4 % — LOW (ref 13–44)
LYMPHOCYTES # BLD AUTO: 4 % — LOW (ref 13–44)
MAGNESIUM SERPL-MCNC: 2.6 MG/DL — SIGNIFICANT CHANGE UP (ref 1.6–2.6)
MAGNESIUM SERPL-MCNC: 2.6 MG/DL — SIGNIFICANT CHANGE UP (ref 1.6–2.6)
MCHC RBC-ENTMCNC: 27.6 PG — SIGNIFICANT CHANGE UP (ref 27–34)
MCHC RBC-ENTMCNC: 27.6 PG — SIGNIFICANT CHANGE UP (ref 27–34)
MCHC RBC-ENTMCNC: 31.1 GM/DL — LOW (ref 32–36)
MCHC RBC-ENTMCNC: 31.1 GM/DL — LOW (ref 32–36)
MCV RBC AUTO: 88.7 FL — SIGNIFICANT CHANGE UP (ref 80–100)
MCV RBC AUTO: 88.7 FL — SIGNIFICANT CHANGE UP (ref 80–100)
MONOCYTES # BLD AUTO: 1.25 K/UL — HIGH (ref 0–0.9)
MONOCYTES # BLD AUTO: 1.25 K/UL — HIGH (ref 0–0.9)
MONOCYTES NFR BLD AUTO: 5.9 % — SIGNIFICANT CHANGE UP (ref 2–14)
MONOCYTES NFR BLD AUTO: 5.9 % — SIGNIFICANT CHANGE UP (ref 2–14)
NEUTROPHILS # BLD AUTO: 18.47 K/UL — HIGH (ref 1.8–7.4)
NEUTROPHILS # BLD AUTO: 18.47 K/UL — HIGH (ref 1.8–7.4)
NEUTROPHILS NFR BLD AUTO: 87.8 % — HIGH (ref 43–77)
NEUTROPHILS NFR BLD AUTO: 87.8 % — HIGH (ref 43–77)
NRBC # BLD: 0 /100 WBCS — SIGNIFICANT CHANGE UP (ref 0–0)
NRBC # BLD: 0 /100 WBCS — SIGNIFICANT CHANGE UP (ref 0–0)
NT-PROBNP SERPL-SCNC: 8932 PG/ML — HIGH (ref 0–300)
NT-PROBNP SERPL-SCNC: 8932 PG/ML — HIGH (ref 0–300)
PHOSPHATE SERPL-MCNC: 2.4 MG/DL — LOW (ref 2.5–4.5)
PHOSPHATE SERPL-MCNC: 2.4 MG/DL — LOW (ref 2.5–4.5)
PLATELET # BLD AUTO: 244 K/UL — SIGNIFICANT CHANGE UP (ref 150–400)
PLATELET # BLD AUTO: 244 K/UL — SIGNIFICANT CHANGE UP (ref 150–400)
POTASSIUM SERPL-MCNC: 4 MMOL/L — SIGNIFICANT CHANGE UP (ref 3.5–5.3)
POTASSIUM SERPL-MCNC: 4 MMOL/L — SIGNIFICANT CHANGE UP (ref 3.5–5.3)
POTASSIUM SERPL-SCNC: 4 MMOL/L — SIGNIFICANT CHANGE UP (ref 3.5–5.3)
POTASSIUM SERPL-SCNC: 4 MMOL/L — SIGNIFICANT CHANGE UP (ref 3.5–5.3)
PROT SERPL-MCNC: 5.4 G/DL — LOW (ref 6–8.3)
PROT SERPL-MCNC: 5.4 G/DL — LOW (ref 6–8.3)
RBC # BLD: 5.04 M/UL — SIGNIFICANT CHANGE UP (ref 3.8–5.2)
RBC # BLD: 5.04 M/UL — SIGNIFICANT CHANGE UP (ref 3.8–5.2)
RBC # FLD: 16.8 % — HIGH (ref 10.3–14.5)
RBC # FLD: 16.8 % — HIGH (ref 10.3–14.5)
SODIUM SERPL-SCNC: 144 MMOL/L — SIGNIFICANT CHANGE UP (ref 135–145)
SODIUM SERPL-SCNC: 144 MMOL/L — SIGNIFICANT CHANGE UP (ref 135–145)
WBC # BLD: 21.06 K/UL — HIGH (ref 3.8–10.5)
WBC # BLD: 21.06 K/UL — HIGH (ref 3.8–10.5)
WBC # FLD AUTO: 21.06 K/UL — HIGH (ref 3.8–10.5)
WBC # FLD AUTO: 21.06 K/UL — HIGH (ref 3.8–10.5)

## 2024-01-12 PROCEDURE — 71045 X-RAY EXAM CHEST 1 VIEW: CPT | Mod: 26

## 2024-01-12 RX ORDER — METOPROLOL TARTRATE 50 MG
2.5 TABLET ORAL ONCE
Refills: 0 | Status: DISCONTINUED | OUTPATIENT
Start: 2024-01-12 | End: 2024-01-12

## 2024-01-12 RX ORDER — METOPROLOL TARTRATE 50 MG
50 TABLET ORAL
Refills: 0 | Status: DISCONTINUED | OUTPATIENT
Start: 2024-01-12 | End: 2024-01-13

## 2024-01-12 RX ORDER — FUROSEMIDE 40 MG
20 TABLET ORAL ONCE
Refills: 0 | Status: COMPLETED | OUTPATIENT
Start: 2024-01-12 | End: 2024-01-13

## 2024-01-12 RX ADMIN — Medication 50 MILLIGRAM(S): at 17:38

## 2024-01-12 RX ADMIN — LOSARTAN POTASSIUM 25 MILLIGRAM(S): 100 TABLET, FILM COATED ORAL at 05:54

## 2024-01-12 RX ADMIN — Medication 2.5 MILLIGRAM(S): at 06:05

## 2024-01-12 RX ADMIN — PIPERACILLIN AND TAZOBACTAM 25 GRAM(S): 4; .5 INJECTION, POWDER, LYOPHILIZED, FOR SOLUTION INTRAVENOUS at 06:08

## 2024-01-12 RX ADMIN — Medication 15 MILLIGRAM(S): at 19:09

## 2024-01-12 RX ADMIN — PIPERACILLIN AND TAZOBACTAM 25 GRAM(S): 4; .5 INJECTION, POWDER, LYOPHILIZED, FOR SOLUTION INTRAVENOUS at 17:43

## 2024-01-12 RX ADMIN — Medication 15 MILLIGRAM(S): at 05:54

## 2024-01-12 RX ADMIN — EPLERENONE 50 MILLIGRAM(S): 50 TABLET, FILM COATED ORAL at 06:13

## 2024-01-12 RX ADMIN — Medication 25 MILLIGRAM(S): at 20:49

## 2024-01-12 RX ADMIN — Medication 3 MILLILITER(S): at 00:25

## 2024-01-12 RX ADMIN — PANTOPRAZOLE SODIUM 40 MILLIGRAM(S): 20 TABLET, DELAYED RELEASE ORAL at 06:03

## 2024-01-12 RX ADMIN — Medication 3 MILLILITER(S): at 12:10

## 2024-01-12 RX ADMIN — Medication 25 MILLIGRAM(S): at 05:54

## 2024-01-12 RX ADMIN — ATENOLOL 37.5 MILLIGRAM(S): 25 TABLET ORAL at 05:55

## 2024-01-12 RX ADMIN — BUDESONIDE AND FORMOTEROL FUMARATE DIHYDRATE 2 PUFF(S): 160; 4.5 AEROSOL RESPIRATORY (INHALATION) at 06:32

## 2024-01-12 RX ADMIN — Medication 3 MILLILITER(S): at 19:09

## 2024-01-12 RX ADMIN — Medication 3 MILLILITER(S): at 06:09

## 2024-01-12 RX ADMIN — Medication 2.5 MILLIGRAM(S): at 00:23

## 2024-01-12 RX ADMIN — BUDESONIDE AND FORMOTEROL FUMARATE DIHYDRATE 2 PUFF(S): 160; 4.5 AEROSOL RESPIRATORY (INHALATION) at 17:38

## 2024-01-12 RX ADMIN — Medication 25 MICROGRAM(S): at 05:54

## 2024-01-12 RX ADMIN — ATORVASTATIN CALCIUM 10 MILLIGRAM(S): 80 TABLET, FILM COATED ORAL at 20:50

## 2024-01-12 RX ADMIN — PANTOPRAZOLE SODIUM 40 MILLIGRAM(S): 20 TABLET, DELAYED RELEASE ORAL at 05:54

## 2024-01-12 RX ADMIN — PIPERACILLIN AND TAZOBACTAM 25 GRAM(S): 4; .5 INJECTION, POWDER, LYOPHILIZED, FOR SOLUTION INTRAVENOUS at 22:25

## 2024-01-12 RX ADMIN — POLYETHYLENE GLYCOL 3350 17 GRAM(S): 17 POWDER, FOR SOLUTION ORAL at 17:41

## 2024-01-12 NOTE — PROGRESS NOTE ADULT - ASSESSMENT
89-year-old female PMH of CVA, YOVANI on CPAP, HTN, HLD, A-fib on Eliquis, heart failure on furosemide, presents to the ED complaining of several days of coughing, increased wheezing, shortness of breath, and found to be influenza positive. Patient admitted for sepsis management.     Upper GI bleed  - iv ppi bid  - GI consult saw pt  - conservative management  - advance diet  - hold eliquis for now    Sepsis.   - Blood cultures drawn and NGTD  - completed  ceftriaxone   - Continue oseltamavir  - Monitor fever curve.  - now on zosyn empirically day 3 for poss aspiration PNA    Asthma exacerbation.   ·  Plan: 2nd to Influenza A +/- bacterial PNA  -Continue Duoneb q6h  -Continue Symbicort 160/4.5 mcg 2 puffs BID (home med Breo Ellipta)  -Prednisone 40 mg PO qd x5 days   -Keep sats >90% with O2 PRN, currently RA.    nfluenza A.    -CT chest with b/l TIB opacities, may be viral +/- bacterial PNA   -Continue Tamiflu  -Steroids/bronchodilators as above.    Chronic systolic congestive heart failure.   -Not currently in decompensated HF. TTE from 8/29/23 showing normal LVSF, EF 61%  - Will continue eplerenone, atenolol, losartan for now   - Will hold lasix for now given sepsis and current euvolemic status   - Monitor I/Os, daily weight  - BMP daily, monitor electrolytes while on diuretics.    Chronic atrial fibrillation.   ·  Plan: EKG personally reviewed showing afib with HR 120s, qtc 469  - Continue eliquis  - on atenolol  - cardiology to see    CORY clot on ct  -  tte done  - pt has been on a/c    adrenal mass  - to be evaluated as out pt as pt has active issues      YOVANI (obstructive sleep apnea).   - By hx  -  pt reports does not have CPAP at home   -VBG acceptable  -Suggest monitor off CPAP, order d/c'd.     Prophylactic measure.   ·  Plan: DVT ppx: holding  eliquis  Sleep: on benadryl qhs prn  Diet: DASH  Dispo: cleared by pulm for discharge to [facility]  - dicussed with PCP Dr Parra  - d/w daughter     89-year-old female PMH of CVA, YOVANI on CPAP, HTN, HLD, A-fib on Eliquis, heart failure on furosemide, presents to the ED complaining of several days of coughing, increased wheezing, shortness of breath, and found to be influenza positive. Patient admitted for sepsis management.     Upper GI bleed  - iv ppi bid  - GI consult saw pt  - conservative management  - advance diet  - hold eliquis for now    Sepsis.   - Blood cultures drawn and NGTD  - completed  ceftriaxone   - Continue oseltamavir  - Monitor fever curve.  - now on zosyn empirically day 3 for poss aspiration PNA    Asthma exacerbation.   ·  Plan: 2nd to Influenza A +/- bacterial PNA  -Continue Duoneb q6h  -Continue Symbicort 160/4.5 mcg 2 puffs BID (home med Breo Ellipta)  -Prednisone 40 mg PO qd x5 days   -Keep sats >90% with O2 PRN, currently RA.    nfluenza A.    -CT chest with b/l TIB opacities, may be viral +/- bacterial PNA   -Continue Tamiflu  -Steroids/bronchodilators as above.    Chronic systolic congestive heart failure.   -Not currently in decompensated HF. TTE from 8/29/23 showing normal LVSF, EF 61%  - Will continue eplerenone, atenolol, losartan for now   - Will hold lasix for now given sepsis and current euvolemic status   - Monitor I/Os, daily weight  - BMP daily, monitor electrolytes while on diuretics.    Chronic atrial fibrillation.   ·  Plan: EKG personally reviewed showing afib with HR 120s, qtc 469  - Continue eliquis  - on atenolol  - cardiology to see    CORY clot on ct  -  tte done  - pt has been on a/c    adrenal mass  - to be evaluated as out pt as pt has active issues      YOVANI (obstructive sleep apnea).   - By hx  -  pt reports does not have CPAP at home   -VBG acceptable  -Suggest monitor off CPAP, order d/c'd.     Prophylactic measure.   ·  Plan: DVT ppx: holding  eliquis  Sleep: on benadryl qhs prn  Diet: DASH  Dispo: cleared by pulm for discharge to [facility]  - dicussed with PCP Dr Parra  - d/w daughter

## 2024-01-12 NOTE — PROGRESS NOTE ADULT - PROBLEM SELECTOR PLAN 3
-Uptrending WBC, now downtrending. Continue to trend.   -S/p RRT 1/10 for coffee ground emesis, ? aspiration event.  -Continue ABX  -CXR with no infiltrate as of yet   -CXR ordered   -GI f/u.

## 2024-01-12 NOTE — CONSULT NOTE ADULT - SUBJECTIVE AND OBJECTIVE BOX
C A R D I O L O G Y  *********************    DATE OF SERVICE: 01-12-24    HISTORY OF PRESENT ILLNESS: HPI:  Patient is a 89 y/o female with PMH of CVA, OYVANI on CPAP, HTN, HLD, persistent afib on Eliquis s/p Medtronic PPM, and diastolic heart failure who presented with SOB, cough, and wheezing admitted with Influenza A and PNA. Cardiology consulted for afib RVR. Patient reports SOB improved. Resting comfortably in no distress on room air. Denies chest pain, palpitations, dizziness, or syncope.      PAST MEDICAL & SURGICAL HISTORY:  Atrial fibrillation  dx 7/09 on coumadin      HTN (hypertension)      HLD (hyperlipidemia)      UTI (urinary tract infection)  frequent last was 1/15      Thyroid nodule  followed by endocrinologist      YOVANI on CPAP      OA (osteoarthritis)      Cerebrovascular accident (CVA)      FHx: total knee replacement  left 2012      Cataract  b/l lense implant      S/P JAY-BSO  40 years ago      FHx: cholecystectomy  1993 laparoscopic      Fracture  ORIF right ankle 1986      Cardiac pacemaker  placed 2009              MEDICATIONS:  MEDICATIONS  (STANDING):  albuterol/ipratropium for Nebulization 3 milliLiter(s) Nebulizer every 6 hours  aMIOdarone IVPB 150 milliGRAM(s) IV Intermittent once  aMIOdarone IVPB 150 milliGRAM(s) IV Intermittent once  atorvastatin 10 milliGRAM(s) Oral at bedtime  budesonide 160 MICROgram(s)/formoterol 4.5 MICROgram(s) Inhaler 2 Puff(s) Inhalation two times a day  busPIRone 15 milliGRAM(s) Oral two times a day  eplerenone 50 milliGRAM(s) Oral daily  furosemide   Injectable 20 milliGRAM(s) IV Push once  levothyroxine 25 MICROGram(s) Oral daily  losartan 25 milliGRAM(s) Oral daily  metoprolol tartrate 50 milliGRAM(s) Oral two times a day  ondansetron Injectable 4 milliGRAM(s) IV Push once  pantoprazole    Tablet 40 milliGRAM(s) Oral before breakfast  pantoprazole  Injectable 40 milliGRAM(s) IV Push two times a day  piperacillin/tazobactam IVPB.. 3.375 Gram(s) IV Intermittent every 8 hours  polyethylene glycol 3350 17 Gram(s) Oral daily  pregabalin 25 milliGRAM(s) Oral two times a day      Allergies    Cardizem (Urticaria)  sulfa drugs (Hives)    Intolerances    OxyContin (Sedation/Somnol; Drowsiness)      FAMILY HISTORY:    Non-contributary for premature coronary disease or sudden cardiac death    SOCIAL HISTORY:    [ x] Non-smoker  [ ] Smoker  [ ] Alcohol    FLU VACCINE THIS YEAR STARTS IN AUGUST:  [ ] Yes    [ ] No    IF OVER 65 HAVE YOU EVER HAD A PNA VACCINE:  [ ] Yes    [ ] No       [ ] N/A      REVIEW OF SYSTEMS:  [ ]chest pain  [ x ]shortness of breath  [  ]palpitations  [  ]syncope  [ ]near syncope [ ]upper extremity weakness   [ ] lower extremity weakness  [  ]diplopia  [  ]altered mental status   [  ]fevers  [ ]chills [ ]nausea  [ ]vomiting  [  ]dysphagia    [ ]abdominal pain  [ ]melena  [ ]BRBPR    [  ]epistaxis  [  ]rash    [ ]lower extremity edema        [X] All others negative	  [ ] Unable to obtain      LABS:	 	    CARDIAC MARKERS:                              13.9   21.06 )-----------( 244      ( 12 Jan 2024 06:39 )             44.7     Hb Trend: 13.9<--    01-12    144  |  109<H>  |  34<H>  ----------------------------<  118<H>  4.0   |  23  |  0.97    Ca    7.2<L>      12 Jan 2024 08:01  Phos  2.4     01-12  Mg     2.6     01-12    TPro  5.4<L>  /  Alb  3.4  /  TBili  1.3<H>  /  DBili  x   /  AST  24  /  ALT  41  /  AlkPhos  68  01-12    Creatinine Trend: 0.97<--, 1.00<--, 1.23<--, 1.23<--, 0.98<--, 0.99<--    Coags:      proBNP:   Lipid Profile:   HgA1c:   TSH:         PHYSICAL EXAM:  T(C): 36.4 (01-12-24 @ 11:37), Max: 36.9 (01-12-24 @ 00:01)  HR: 71 (01-12-24 @ 11:37) (63 - 140)  BP: 152/81 (01-12-24 @ 11:37) (121/90 - 163/96)  RR: 18 (01-12-24 @ 11:37) (18 - 18)  SpO2: 96% (01-12-24 @ 11:37) (93% - 96%)  Wt(kg): --   BMI (kg/m2): 35.3 (01-04-24 @ 13:49)  I&O's Summary    11 Jan 2024 07:01  -  12 Jan 2024 07:00  --------------------------------------------------------  IN: 170 mL / OUT: 150 mL / NET: 20 mL    12 Jan 2024 07:01  -  12 Jan 2024 16:28  --------------------------------------------------------  IN: 120 mL / OUT: 0 mL / NET: 120 mL        Gen: NAD  HEENT:  (-)icterus (-)pallor  CV: N S1 S2 1/6 DMITRIY (+)2 Pulses B/l  Resp:  Clear to auscultation B/L, normal effort  GI: (+) BS Soft, NT, ND  Lymph:  (-)Edema, (-)obvious lymphadenopathy  Skin: Warm to touch, Normal turgor  Psych: Appropriate mood and affect      TELEMETRY: -130	      ECG: AF RVR  	    RADIOLOGY:         CXR: < from: Xray Chest 1 View- PORTABLE-Urgent (Xray Chest 1 View- PORTABLE-Urgent .) (01.12.24 @ 13:10) >  IMPRESSION:    Clear lungs.    < end of copied text >    < from: TTE W or WO Ultrasound Enhancing Agent (01.11.24 @ 11:10) >  CONCLUSIONS:      1. Left ventricular cavity is normal. Left ventricular systolic function is normal. There are no regional wall motion abnormalities seen.   2. Mild pulmonary hypertension.   3. Device lead is visualized in the right heart.   4. No prior echocardiogram is available for comparison.    < end of copied text >      ASSESSMENT/PLAN: Patient is a 89 y/o female with PMH of CVA, YOVANI on CPAP, HTN, HLD, persistent afib on Eliquis s/p Medtronic PPM, and diastolic heart failure who presented with SOB, cough, and wheezing admitted with Influenza A and PNA. Cardiology consulted for afib RVR.    #Afib RVR  - In setting of infection  - Change atenolol to metoprolol 50mg BID for better rate control  - Restart Eliquis for stroke prevention if no contraindications  - TTE with normal LV function    #Influenza A/PNA  - Management and Abx per med/pulm  - BCx negative    #Chronic Diastolic Heart Failure  - Continue eplerenone  - Does not appear to be in heart failure - CXR with clear lungs however IV lasix x1 ordered per pulm  - Restart PO lasix as tolerated    #HTN  - Continue Losartan and Metoprolol    #HLD  - Continue Lipitor    - No further inpatient cardiac w/u expected    Gagandeep Joya PA-C  Pager: [phone]   C A R D I O L O G Y  *********************    DATE OF SERVICE: 01-12-24    HISTORY OF PRESENT ILLNESS: HPI:  Patient is a 89 y/o female with PMH of CVA, YOVANI on CPAP, HTN, HLD, persistent afib on Eliquis s/p Medtronic PPM, and diastolic heart failure who presented with SOB, cough, and wheezing admitted with Influenza A and PNA. Cardiology consulted for afib RVR. Patient reports SOB improved. Resting comfortably in no distress on room air. Denies chest pain, palpitations, dizziness, or syncope.      PAST MEDICAL & SURGICAL HISTORY:  Atrial fibrillation  dx 7/09 on coumadin      HTN (hypertension)      HLD (hyperlipidemia)      UTI (urinary tract infection)  frequent last was 1/15      Thyroid nodule  followed by endocrinologist      YOVANI on CPAP      OA (osteoarthritis)      Cerebrovascular accident (CVA)      FHx: total knee replacement  left 2012      Cataract  b/l lense implant      S/P JAY-BSO  40 years ago      FHx: cholecystectomy  1993 laparoscopic      Fracture  ORIF right ankle 1986      Cardiac pacemaker  placed 2009              MEDICATIONS:  MEDICATIONS  (STANDING):  albuterol/ipratropium for Nebulization 3 milliLiter(s) Nebulizer every 6 hours  aMIOdarone IVPB 150 milliGRAM(s) IV Intermittent once  aMIOdarone IVPB 150 milliGRAM(s) IV Intermittent once  atorvastatin 10 milliGRAM(s) Oral at bedtime  budesonide 160 MICROgram(s)/formoterol 4.5 MICROgram(s) Inhaler 2 Puff(s) Inhalation two times a day  busPIRone 15 milliGRAM(s) Oral two times a day  eplerenone 50 milliGRAM(s) Oral daily  furosemide   Injectable 20 milliGRAM(s) IV Push once  levothyroxine 25 MICROGram(s) Oral daily  losartan 25 milliGRAM(s) Oral daily  metoprolol tartrate 50 milliGRAM(s) Oral two times a day  ondansetron Injectable 4 milliGRAM(s) IV Push once  pantoprazole    Tablet 40 milliGRAM(s) Oral before breakfast  pantoprazole  Injectable 40 milliGRAM(s) IV Push two times a day  piperacillin/tazobactam IVPB.. 3.375 Gram(s) IV Intermittent every 8 hours  polyethylene glycol 3350 17 Gram(s) Oral daily  pregabalin 25 milliGRAM(s) Oral two times a day      Allergies    Cardizem (Urticaria)  sulfa drugs (Hives)    Intolerances    OxyContin (Sedation/Somnol; Drowsiness)      FAMILY HISTORY:    Non-contributary for premature coronary disease or sudden cardiac death    SOCIAL HISTORY:    [ x] Non-smoker  [ ] Smoker  [ ] Alcohol    FLU VACCINE THIS YEAR STARTS IN AUGUST:  [ ] Yes    [ ] No    IF OVER 65 HAVE YOU EVER HAD A PNA VACCINE:  [ ] Yes    [ ] No       [ ] N/A      REVIEW OF SYSTEMS:  [ ]chest pain  [ x ]shortness of breath  [  ]palpitations  [  ]syncope  [ ]near syncope [ ]upper extremity weakness   [ ] lower extremity weakness  [  ]diplopia  [  ]altered mental status   [  ]fevers  [ ]chills [ ]nausea  [ ]vomiting  [  ]dysphagia    [ ]abdominal pain  [ ]melena  [ ]BRBPR    [  ]epistaxis  [  ]rash    [ ]lower extremity edema        [X] All others negative	  [ ] Unable to obtain      LABS:	 	    CARDIAC MARKERS:                              13.9   21.06 )-----------( 244      ( 12 Jan 2024 06:39 )             44.7     Hb Trend: 13.9<--    01-12    144  |  109<H>  |  34<H>  ----------------------------<  118<H>  4.0   |  23  |  0.97    Ca    7.2<L>      12 Jan 2024 08:01  Phos  2.4     01-12  Mg     2.6     01-12    TPro  5.4<L>  /  Alb  3.4  /  TBili  1.3<H>  /  DBili  x   /  AST  24  /  ALT  41  /  AlkPhos  68  01-12    Creatinine Trend: 0.97<--, 1.00<--, 1.23<--, 1.23<--, 0.98<--, 0.99<--    Coags:      proBNP:   Lipid Profile:   HgA1c:   TSH:         PHYSICAL EXAM:  T(C): 36.4 (01-12-24 @ 11:37), Max: 36.9 (01-12-24 @ 00:01)  HR: 71 (01-12-24 @ 11:37) (63 - 140)  BP: 152/81 (01-12-24 @ 11:37) (121/90 - 163/96)  RR: 18 (01-12-24 @ 11:37) (18 - 18)  SpO2: 96% (01-12-24 @ 11:37) (93% - 96%)  Wt(kg): --   BMI (kg/m2): 35.3 (01-04-24 @ 13:49)  I&O's Summary    11 Jan 2024 07:01  -  12 Jan 2024 07:00  --------------------------------------------------------  IN: 170 mL / OUT: 150 mL / NET: 20 mL    12 Jan 2024 07:01  -  12 Jan 2024 16:28  --------------------------------------------------------  IN: 120 mL / OUT: 0 mL / NET: 120 mL        Gen: NAD  HEENT:  (-)icterus (-)pallor  CV: N S1 S2 1/6 DMITRIY (+)2 Pulses B/l  Resp:  Clear to auscultation B/L, normal effort  GI: (+) BS Soft, NT, ND  Lymph:  (-)Edema, (-)obvious lymphadenopathy  Skin: Warm to touch, Normal turgor  Psych: Appropriate mood and affect      TELEMETRY: -130	      ECG: AF RVR  	    RADIOLOGY:         CXR: < from: Xray Chest 1 View- PORTABLE-Urgent (Xray Chest 1 View- PORTABLE-Urgent .) (01.12.24 @ 13:10) >  IMPRESSION:    Clear lungs.    < end of copied text >    < from: TTE W or WO Ultrasound Enhancing Agent (01.11.24 @ 11:10) >  CONCLUSIONS:      1. Left ventricular cavity is normal. Left ventricular systolic function is normal. There are no regional wall motion abnormalities seen.   2. Mild pulmonary hypertension.   3. Device lead is visualized in the right heart.   4. No prior echocardiogram is available for comparison.    < end of copied text >      ASSESSMENT/PLAN: Patient is a 89 y/o female with PMH of CVA, YOVANI on CPAP, HTN, HLD, persistent afib on Eliquis s/p Medtronic PPM, and diastolic heart failure who presented with SOB, cough, and wheezing admitted with Influenza A and PNA. Cardiology consulted for afib RVR.    #Afib RVR  - In setting of infection  - Change atenolol to metoprolol 50mg BID for better rate control  - Restart Eliquis for stroke prevention if no contraindications  - TTE with normal LV function    #Influenza A/PNA  - Management and Abx per med/pulm  - BCx negative    #Chronic Diastolic Heart Failure  - Continue eplerenone  - Does not appear to be in heart failure - CXR with clear lungs however IV lasix x1 ordered per pulm  - Restart PO lasix as tolerated    #HTN  - Continue Losartan and Metoprolol    #HLD  - Continue Lipitor    - No further inpatient cardiac w/u expected    Gagandeep Joya PA-C  Pager: [phone]

## 2024-01-12 NOTE — CONSULT NOTE ADULT - ASSESSMENT
Patient seen and examined, agree with above assessment and plan as transcribed above.    - Change atenolol to metoprolol    Herman Shoemaker MD, [facility]  BEEPER [phone]     Patient seen and examined, agree with above assessment and plan as transcribed above.    - Change atenolol to metoprolol    Herman Shoemaker MD, [facility]  BEEPER [phone]

## 2024-01-12 NOTE — PROVIDER CONTACT NOTE (OTHER) - ACTION/TREATMENT ORDERED:
Do not administer open parameter of IV push metoprolol. Cont to monitor.
Pending ordered chest x-ray.
NP made aware. Existing orders administered including 00:00 duoneb tx, ABX. Chest X-ray ordered. No further nursing interventions at this time. Will continue to monitor.
NP to bedside. Duoneb 1x dose ordered and administered. Tylenol 650 mg PO ordered & administered. Zofran 4 mg given and administered. CT of head ordered. Pt brought down to CT by SCOTT Oviedo & Izzy
Awaiting provider orders, handoff given to Liliam CHAVEZ

## 2024-01-12 NOTE — PROGRESS NOTE ADULT - SUBJECTIVE AND OBJECTIVE BOX
DATE OF SERVICE: 01-12-24 @ 11:12    Patient is a 89y old  Female who presents with a chief complaint of SOB (11 Jan 2024 11:48)      SUBJECTIVE / OVERNIGHT EVENTS:  No chest pain. No shortness of breath. No complaints. No events overnight.     MEDICATIONS  (STANDING):  albuterol/ipratropium for Nebulization 3 milliLiter(s) Nebulizer every 6 hours  aMIOdarone IVPB 150 milliGRAM(s) IV Intermittent once  aMIOdarone IVPB 150 milliGRAM(s) IV Intermittent once  atenolol  Tablet 37.5 milliGRAM(s) Oral daily  atorvastatin 10 milliGRAM(s) Oral at bedtime  budesonide 160 MICROgram(s)/formoterol 4.5 MICROgram(s) Inhaler 2 Puff(s) Inhalation two times a day  busPIRone 15 milliGRAM(s) Oral two times a day  eplerenone 50 milliGRAM(s) Oral daily  levothyroxine 25 MICROGram(s) Oral daily  losartan 25 milliGRAM(s) Oral daily  ondansetron Injectable 4 milliGRAM(s) IV Push once  pantoprazole    Tablet 40 milliGRAM(s) Oral before breakfast  pantoprazole  Injectable 40 milliGRAM(s) IV Push two times a day  piperacillin/tazobactam IVPB.. 3.375 Gram(s) IV Intermittent every 8 hours  polyethylene glycol 3350 17 Gram(s) Oral daily  pregabalin 25 milliGRAM(s) Oral two times a day  sodium chloride 0.9%. 1000 milliLiter(s) (75 mL/Hr) IV Continuous <Continuous>    MEDICATIONS  (PRN):      Vital Signs Last 24 Hrs  T(C): 36.7 (12 Jan 2024 05:45), Max: 36.9 (12 Jan 2024 00:01)  T(F): 98 (12 Jan 2024 05:45), Max: 98.4 (12 Jan 2024 00:01)  HR: 89 (12 Jan 2024 06:10) (63 - 140)  BP: 144/82 (12 Jan 2024 06:10) (121/90 - 163/96)  BP(mean): --  RR: 18 (12 Jan 2024 06:10) (18 - 18)  SpO2: 95% (12 Jan 2024 06:10) (93% - 96%)    Parameters below as of 12 Jan 2024 06:10  Patient On (Oxygen Delivery Method): room air      CAPILLARY BLOOD GLUCOSE      POCT Blood Glucose.: 126 mg/dL (12 Jan 2024 08:31)  POCT Blood Glucose.: 130 mg/dL (11 Jan 2024 17:08)  POCT Blood Glucose.: 115 mg/dL (11 Jan 2024 11:53)    I&O's Summary    11 Jan 2024 07:01  -  12 Jan 2024 07:00  --------------------------------------------------------  IN: 170 mL / OUT: 150 mL / NET: 20 mL        PHYSICAL EXAM:  GENERAL: NAD, well-developed  HEAD:  Atraumatic, Normocephalic  EYES: EOMI, PERRLA, conjunctiva and sclera clear  NECK: Supple, No JVD  CHEST/LUNG: Clear to auscultation bilaterally; No wheeze  HEART: Regular rate and rhythm; No murmurs, rubs, or gallops  ABDOMEN: Soft, Nontender, Nondistended; Bowel sounds present  EXTREMITIES:  2+ Peripheral Pulses, No clubbing, cyanosis, or edema  PSYCH: AAOx3  NEUROLOGY: non-focal  SKIN: No rashes or lesions    LABS:                        13.9   21.06 )-----------( 244      ( 12 Jan 2024 06:39 )             44.7     01-12    144  |  109<H>  |  34<H>  ----------------------------<  118<H>  4.0   |  23  |  0.97    Ca    7.2<L>      12 Jan 2024 08:01  Phos  2.4     01-12  Mg     2.6     01-12    TPro  5.4<L>  /  Alb  3.4  /  TBili  1.3<H>  /  DBili  x   /  AST  24  /  ALT  41  /  AlkPhos  68  01-12          Urinalysis Basic - ( 12 Jan 2024 08:01 )    Color: x / Appearance: x / SG: x / pH: x  Gluc: 118 mg/dL / Ketone: x  / Bili: x / Urobili: x   Blood: x / Protein: x / Nitrite: x   Leuk Esterase: x / RBC: x / WBC x   Sq Epi: x / Non Sq Epi: x / Bacteria: x    < from: TTE W or WO Ultrasound Enhancing Agent (01.11.24 @ 11:10) >  CONCLUSIONS:      1. Left ventricular cavity is normal. Left ventricular systolic function is normal. There are no regional wall motion abnormalities seen.   2. Mild pulmonary hypertension.   3. Device lead is visualized in the right heart.   4. No prior echocardiogram is available for comparison.      < end of copied text >      RADIOLOGY & ADDITIONAL TESTS:    Imaging Personally Reviewed:    Consultant(s) Notes Reviewed:      Care Discussed with Consultants/Other Providers:

## 2024-01-12 NOTE — PROVIDER CONTACT NOTE (OTHER) - REASON
Pt expiratory wheezing
-140's
Pt c/o headache, nausea, dizziness, burning sensation when urinating.
Expiratory wheezing
Hr 80s-100s on Vitals Machine & Upon Palpation

## 2024-01-12 NOTE — PROGRESS NOTE ADULT - ASSESSMENT
89 y/o F with PMH of CVA, YOVANI on CPAP, HTN, HLD, Afib on Eliquis, CHF. Recent admission at [facility] 1 month ago for PNA, completed short course of ABX. Completed additional course of ABX last week for UTI. Presents to the ED with coughing, SOB, wheezing x several days. Found to be Flu + 89 y/o F with PMH of CVA, YOVANI on CPAP, HTN, HLD, Afib on Eliquis, CHF. Recent admission at [facility] 1 month ago for PNA, completed short course of ABX. Completed additional course of ABX last week for UTI. Presents to the ED with coughing, SOB, wheezing x several days. Found to be Flu +

## 2024-01-12 NOTE — PROVIDER CONTACT NOTE (OTHER) - BACKGROUND
Admitted for pneumonia.
Dx. Influenza, pmhx afib on eliquis
See h&p.
see h&p
Admitted for pneumonia.

## 2024-01-12 NOTE — PROVIDER CONTACT NOTE (OTHER) - SITUATION
Expiratory wheezing
Pt expiratory wheezing. VS - /70, HR 98, SPO2 93% on RA, T 97. 3 O, RR 18. Denies SOB, chest pain/tightness. CPOX on pt.
Hr 80s-100s on Vitals Machine & Upon Palpation of Radial pulse
Pt c/o of 10/10 headache, nausea & dizziness. VSS. /75, HR 93, spO2 94% on RA, RR 18.
Tele made writer aware of -140s's

## 2024-01-12 NOTE — PROVIDER CONTACT NOTE (OTHER) - RECOMMENDATIONS
Awaiting provider orders
NP notified.
Do not administer open parameter of IV push metoprolol. Cont to monitor.
NP notified.
Pending ordered chest x-ray.

## 2024-01-12 NOTE — PROVIDER CONTACT NOTE (OTHER) - ASSESSMENT
Patient is A&Ox2, no changes from baseline, no dizziness, no distress, VSS, see in flow sheets. HR on tele is 120s-160s. Vitals machine HR is 80s-100s, Palpated HR is 80s-100s.
Pt c/o of headache, nausea & dizziness. VSS. /75, HR 93, spO2 94% on RA, RR 18. Denies SOB, chest pain/tightness.
Pt A0x2, confused.  AFIB on tele. Pt denies and headache, dizziness, shortness of breath, headache and chest pain. Pt verbalizes no pain, no acute distress noted. /82, afebrile
Patient is A&O2-3, no dizziness, no distress, VSS, see in flow sheets.
Pt expiratory wheezing. VS - /70, HR 98, SPO2 93% on RA, T 97. 3 O, RR 18. Denies SOB, chest pain/tightness. Pt no longer c/o headache, nausea/dizziness. CPOX on pt.

## 2024-01-12 NOTE — PROGRESS NOTE ADULT - SUBJECTIVE AND OBJECTIVE BOX
Follow-up Pulm Progress Note    alert, confused  sats 100% on RA   noted to have audible expiratory wheezes     Medications:  MEDICATIONS  (STANDING):  albuterol/ipratropium for Nebulization 3 milliLiter(s) Nebulizer every 6 hours  aMIOdarone IVPB 150 milliGRAM(s) IV Intermittent once  aMIOdarone IVPB 150 milliGRAM(s) IV Intermittent once  atenolol  Tablet 37.5 milliGRAM(s) Oral daily  atorvastatin 10 milliGRAM(s) Oral at bedtime  budesonide 160 MICROgram(s)/formoterol 4.5 MICROgram(s) Inhaler 2 Puff(s) Inhalation two times a day  busPIRone 15 milliGRAM(s) Oral two times a day  eplerenone 50 milliGRAM(s) Oral daily  levothyroxine 25 MICROGram(s) Oral daily  losartan 25 milliGRAM(s) Oral daily  ondansetron Injectable 4 milliGRAM(s) IV Push once  pantoprazole    Tablet 40 milliGRAM(s) Oral before breakfast  pantoprazole  Injectable 40 milliGRAM(s) IV Push two times a day  piperacillin/tazobactam IVPB.. 3.375 Gram(s) IV Intermittent every 8 hours  polyethylene glycol 3350 17 Gram(s) Oral daily  pregabalin 25 milliGRAM(s) Oral two times a day        Vital Signs Last 24 Hrs  T(C): 36.4 (12 Jan 2024 11:37), Max: 36.9 (12 Jan 2024 00:01)  T(F): 97.5 (12 Jan 2024 11:37), Max: 98.4 (12 Jan 2024 00:01)  HR: 71 (12 Jan 2024 11:37) (63 - 140)  BP: 152/81 (12 Jan 2024 11:37) (121/90 - 163/96)  BP(mean): --  RR: 18 (12 Jan 2024 11:37) (18 - 18)  SpO2: 96% (12 Jan 2024 11:37) (93% - 96%)    Parameters below as of 12 Jan 2024 11:37  Patient On (Oxygen Delivery Method): room air        ABG - ( 10 Percy 2024 12:58 )  pH, Arterial: 7.47  pH, Blood: x     /  pCO2: 40    /  pO2: 70    / HCO3: 29    / Base Excess: 5.0   /  SaO2: 94.9                  01-11 @ 07:01  -  01-12 @ 07:00  --------------------------------------------------------  IN: 170 mL / OUT: 150 mL / NET: 20 mL          LABS:                        13.9   21.06 )-----------( 244      ( 12 Jan 2024 06:39 )             44.7     01-12    144  |  109<H>  |  34<H>  ----------------------------<  118<H>  4.0   |  23  |  0.97    Ca    7.2<L>      12 Jan 2024 08:01  Phos  2.4     01-12  Mg     2.6     01-12    TPro  5.4<L>  /  Alb  3.4  /  TBili  1.3<H>  /  DBili  x   /  AST  24  /  ALT  41  /  AlkPhos  68  01-12          CAPILLARY BLOOD GLUCOSE      POCT Blood Glucose.: 120 mg/dL (12 Jan 2024 11:58)      Urinalysis Basic - ( 12 Jan 2024 08:01 )    Color: x / Appearance: x / SG: x / pH: x  Gluc: 118 mg/dL / Ketone: x  / Bili: x / Urobili: x   Blood: x / Protein: x / Nitrite: x   Leuk Esterase: x / RBC: x / WBC x   Sq Epi: x / Non Sq Epi: x / Bacteria: x              CULTURES:     Culture - Blood (collected 01-04-24 @ 15:30)  Source: .Blood Blood-Peripheral  Final Report (01-09-24 @ 23:00):    No growth at 5 days    Culture - Blood (collected 01-04-24 @ 15:00)  Source: .Blood Blood-Peripheral  Final Report (01-09-24 @ 23:00):    No growth at 5 days                Physical Examination:  PULM: b/l expiratory wheezes   CVS: S1, S2 heard      RADIOLOGY REVIEWED  CXR: grossly clear     < from: CT Abdomen and Pelvis w/ IV Cont (01.10.24 @ 02:10) >    FINDINGS:  LOWER CHEST: Small right pleural effusion, increased from the chest CT of   1/4/2024. Bibasilar subsegmental atelectasis. Cardiomegaly. Cardiac   device leads. Coronary artery calcifications. Partially visualized   filling defect within the left atrial appendage, as was present on the   exam of 7/25/2023.    < end of copied text >

## 2024-01-13 LAB
ALBUMIN SERPL ELPH-MCNC: 3.5 G/DL — SIGNIFICANT CHANGE UP (ref 3.3–5)
ALBUMIN SERPL ELPH-MCNC: 3.5 G/DL — SIGNIFICANT CHANGE UP (ref 3.3–5)
ALP SERPL-CCNC: 71 U/L — SIGNIFICANT CHANGE UP (ref 40–120)
ALP SERPL-CCNC: 71 U/L — SIGNIFICANT CHANGE UP (ref 40–120)
ALT FLD-CCNC: 62 U/L — HIGH (ref 10–45)
ALT FLD-CCNC: 62 U/L — HIGH (ref 10–45)
ANION GAP SERPL CALC-SCNC: 16 MMOL/L — SIGNIFICANT CHANGE UP (ref 5–17)
ANION GAP SERPL CALC-SCNC: 16 MMOL/L — SIGNIFICANT CHANGE UP (ref 5–17)
AST SERPL-CCNC: 32 U/L — SIGNIFICANT CHANGE UP (ref 10–40)
AST SERPL-CCNC: 32 U/L — SIGNIFICANT CHANGE UP (ref 10–40)
BASOPHILS # BLD AUTO: 0.08 K/UL — SIGNIFICANT CHANGE UP (ref 0–0.2)
BASOPHILS # BLD AUTO: 0.08 K/UL — SIGNIFICANT CHANGE UP (ref 0–0.2)
BASOPHILS NFR BLD AUTO: 0.4 % — SIGNIFICANT CHANGE UP (ref 0–2)
BASOPHILS NFR BLD AUTO: 0.4 % — SIGNIFICANT CHANGE UP (ref 0–2)
BILIRUB SERPL-MCNC: 1.6 MG/DL — HIGH (ref 0.2–1.2)
BILIRUB SERPL-MCNC: 1.6 MG/DL — HIGH (ref 0.2–1.2)
BUN SERPL-MCNC: 34 MG/DL — HIGH (ref 7–23)
BUN SERPL-MCNC: 34 MG/DL — HIGH (ref 7–23)
CALCIUM SERPL-MCNC: 7.2 MG/DL — LOW (ref 8.4–10.5)
CALCIUM SERPL-MCNC: 7.2 MG/DL — LOW (ref 8.4–10.5)
CHLORIDE SERPL-SCNC: 104 MMOL/L — SIGNIFICANT CHANGE UP (ref 96–108)
CHLORIDE SERPL-SCNC: 104 MMOL/L — SIGNIFICANT CHANGE UP (ref 96–108)
CO2 SERPL-SCNC: 23 MMOL/L — SIGNIFICANT CHANGE UP (ref 22–31)
CO2 SERPL-SCNC: 23 MMOL/L — SIGNIFICANT CHANGE UP (ref 22–31)
CREAT SERPL-MCNC: 1.06 MG/DL — SIGNIFICANT CHANGE UP (ref 0.5–1.3)
CREAT SERPL-MCNC: 1.06 MG/DL — SIGNIFICANT CHANGE UP (ref 0.5–1.3)
EGFR: 50 ML/MIN/1.73M2 — LOW
EGFR: 50 ML/MIN/1.73M2 — LOW
EOSINOPHIL # BLD AUTO: 0.04 K/UL — SIGNIFICANT CHANGE UP (ref 0–0.5)
EOSINOPHIL # BLD AUTO: 0.04 K/UL — SIGNIFICANT CHANGE UP (ref 0–0.5)
EOSINOPHIL NFR BLD AUTO: 0.2 % — SIGNIFICANT CHANGE UP (ref 0–6)
EOSINOPHIL NFR BLD AUTO: 0.2 % — SIGNIFICANT CHANGE UP (ref 0–6)
GLUCOSE SERPL-MCNC: 84 MG/DL — SIGNIFICANT CHANGE UP (ref 70–99)
GLUCOSE SERPL-MCNC: 84 MG/DL — SIGNIFICANT CHANGE UP (ref 70–99)
HCT VFR BLD CALC: 47.2 % — HIGH (ref 34.5–45)
HCT VFR BLD CALC: 47.2 % — HIGH (ref 34.5–45)
HGB BLD-MCNC: 14.3 G/DL — SIGNIFICANT CHANGE UP (ref 11.5–15.5)
HGB BLD-MCNC: 14.3 G/DL — SIGNIFICANT CHANGE UP (ref 11.5–15.5)
IMM GRANULOCYTES NFR BLD AUTO: 3 % — HIGH (ref 0–0.9)
IMM GRANULOCYTES NFR BLD AUTO: 3 % — HIGH (ref 0–0.9)
LYMPHOCYTES # BLD AUTO: 1.12 K/UL — SIGNIFICANT CHANGE UP (ref 1–3.3)
LYMPHOCYTES # BLD AUTO: 1.12 K/UL — SIGNIFICANT CHANGE UP (ref 1–3.3)
LYMPHOCYTES # BLD AUTO: 5.9 % — LOW (ref 13–44)
LYMPHOCYTES # BLD AUTO: 5.9 % — LOW (ref 13–44)
MCHC RBC-ENTMCNC: 27.2 PG — SIGNIFICANT CHANGE UP (ref 27–34)
MCHC RBC-ENTMCNC: 27.2 PG — SIGNIFICANT CHANGE UP (ref 27–34)
MCHC RBC-ENTMCNC: 30.3 GM/DL — LOW (ref 32–36)
MCHC RBC-ENTMCNC: 30.3 GM/DL — LOW (ref 32–36)
MCV RBC AUTO: 89.7 FL — SIGNIFICANT CHANGE UP (ref 80–100)
MCV RBC AUTO: 89.7 FL — SIGNIFICANT CHANGE UP (ref 80–100)
MONOCYTES # BLD AUTO: 1.13 K/UL — HIGH (ref 0–0.9)
MONOCYTES # BLD AUTO: 1.13 K/UL — HIGH (ref 0–0.9)
MONOCYTES NFR BLD AUTO: 6 % — SIGNIFICANT CHANGE UP (ref 2–14)
MONOCYTES NFR BLD AUTO: 6 % — SIGNIFICANT CHANGE UP (ref 2–14)
NEUTROPHILS # BLD AUTO: 16.02 K/UL — HIGH (ref 1.8–7.4)
NEUTROPHILS # BLD AUTO: 16.02 K/UL — HIGH (ref 1.8–7.4)
NEUTROPHILS NFR BLD AUTO: 84.5 % — HIGH (ref 43–77)
NEUTROPHILS NFR BLD AUTO: 84.5 % — HIGH (ref 43–77)
NRBC # BLD: 0 /100 WBCS — SIGNIFICANT CHANGE UP (ref 0–0)
NRBC # BLD: 0 /100 WBCS — SIGNIFICANT CHANGE UP (ref 0–0)
PLATELET # BLD AUTO: 238 K/UL — SIGNIFICANT CHANGE UP (ref 150–400)
PLATELET # BLD AUTO: 238 K/UL — SIGNIFICANT CHANGE UP (ref 150–400)
POTASSIUM SERPL-MCNC: 3.5 MMOL/L — SIGNIFICANT CHANGE UP (ref 3.5–5.3)
POTASSIUM SERPL-MCNC: 3.5 MMOL/L — SIGNIFICANT CHANGE UP (ref 3.5–5.3)
POTASSIUM SERPL-SCNC: 3.5 MMOL/L — SIGNIFICANT CHANGE UP (ref 3.5–5.3)
POTASSIUM SERPL-SCNC: 3.5 MMOL/L — SIGNIFICANT CHANGE UP (ref 3.5–5.3)
PROT SERPL-MCNC: 5.6 G/DL — LOW (ref 6–8.3)
PROT SERPL-MCNC: 5.6 G/DL — LOW (ref 6–8.3)
RBC # BLD: 5.26 M/UL — HIGH (ref 3.8–5.2)
RBC # BLD: 5.26 M/UL — HIGH (ref 3.8–5.2)
RBC # FLD: 17.2 % — HIGH (ref 10.3–14.5)
RBC # FLD: 17.2 % — HIGH (ref 10.3–14.5)
SODIUM SERPL-SCNC: 143 MMOL/L — SIGNIFICANT CHANGE UP (ref 135–145)
SODIUM SERPL-SCNC: 143 MMOL/L — SIGNIFICANT CHANGE UP (ref 135–145)
WBC # BLD: 18.95 K/UL — HIGH (ref 3.8–10.5)
WBC # BLD: 18.95 K/UL — HIGH (ref 3.8–10.5)
WBC # FLD AUTO: 18.95 K/UL — HIGH (ref 3.8–10.5)
WBC # FLD AUTO: 18.95 K/UL — HIGH (ref 3.8–10.5)

## 2024-01-13 RX ORDER — FUROSEMIDE 40 MG
40 TABLET ORAL DAILY
Refills: 0 | Status: DISCONTINUED | OUTPATIENT
Start: 2024-01-13 | End: 2024-01-17

## 2024-01-13 RX ORDER — POTASSIUM CHLORIDE 20 MEQ
40 PACKET (EA) ORAL ONCE
Refills: 0 | Status: COMPLETED | OUTPATIENT
Start: 2024-01-13 | End: 2024-01-13

## 2024-01-13 RX ORDER — APIXABAN 2.5 MG/1
5 TABLET, FILM COATED ORAL EVERY 12 HOURS
Refills: 0 | Status: DISCONTINUED | OUTPATIENT
Start: 2024-01-13 | End: 2024-01-17

## 2024-01-13 RX ORDER — METOPROLOL TARTRATE 50 MG
100 TABLET ORAL EVERY 12 HOURS
Refills: 0 | Status: DISCONTINUED | OUTPATIENT
Start: 2024-01-13 | End: 2024-01-14

## 2024-01-13 RX ORDER — DIGOXIN 250 MCG
125 TABLET ORAL DAILY
Refills: 0 | Status: DISCONTINUED | OUTPATIENT
Start: 2024-01-13 | End: 2024-01-17

## 2024-01-13 RX ORDER — METOPROLOL TARTRATE 50 MG
5 TABLET ORAL ONCE
Refills: 0 | Status: COMPLETED | OUTPATIENT
Start: 2024-01-13 | End: 2024-01-13

## 2024-01-13 RX ADMIN — PIPERACILLIN AND TAZOBACTAM 25 GRAM(S): 4; .5 INJECTION, POWDER, LYOPHILIZED, FOR SOLUTION INTRAVENOUS at 05:14

## 2024-01-13 RX ADMIN — PIPERACILLIN AND TAZOBACTAM 25 GRAM(S): 4; .5 INJECTION, POWDER, LYOPHILIZED, FOR SOLUTION INTRAVENOUS at 13:43

## 2024-01-13 RX ADMIN — Medication 40 MILLIEQUIVALENT(S): at 15:20

## 2024-01-13 RX ADMIN — PIPERACILLIN AND TAZOBACTAM 25 GRAM(S): 4; .5 INJECTION, POWDER, LYOPHILIZED, FOR SOLUTION INTRAVENOUS at 22:43

## 2024-01-13 RX ADMIN — Medication 20 MILLIGRAM(S): at 00:43

## 2024-01-13 RX ADMIN — PANTOPRAZOLE SODIUM 40 MILLIGRAM(S): 20 TABLET, DELAYED RELEASE ORAL at 17:52

## 2024-01-13 RX ADMIN — Medication 125 MICROGRAM(S): at 13:43

## 2024-01-13 RX ADMIN — APIXABAN 5 MILLIGRAM(S): 2.5 TABLET, FILM COATED ORAL at 17:51

## 2024-01-13 RX ADMIN — Medication 25 MILLIGRAM(S): at 22:20

## 2024-01-13 RX ADMIN — Medication 15 MILLIGRAM(S): at 17:51

## 2024-01-13 RX ADMIN — Medication 3 MILLILITER(S): at 00:49

## 2024-01-13 RX ADMIN — BUDESONIDE AND FORMOTEROL FUMARATE DIHYDRATE 2 PUFF(S): 160; 4.5 AEROSOL RESPIRATORY (INHALATION) at 17:58

## 2024-01-13 RX ADMIN — Medication 3 MILLILITER(S): at 05:17

## 2024-01-13 RX ADMIN — Medication 30 MILLIGRAM(S): at 12:34

## 2024-01-13 RX ADMIN — PANTOPRAZOLE SODIUM 40 MILLIGRAM(S): 20 TABLET, DELAYED RELEASE ORAL at 05:16

## 2024-01-13 RX ADMIN — Medication 100 MILLIGRAM(S): at 17:51

## 2024-01-13 RX ADMIN — Medication 15 MILLIGRAM(S): at 05:16

## 2024-01-13 RX ADMIN — Medication 3 MILLILITER(S): at 12:34

## 2024-01-13 RX ADMIN — POLYETHYLENE GLYCOL 3350 17 GRAM(S): 17 POWDER, FOR SOLUTION ORAL at 12:35

## 2024-01-13 RX ADMIN — Medication 40 MILLIGRAM(S): at 13:43

## 2024-01-13 RX ADMIN — BUDESONIDE AND FORMOTEROL FUMARATE DIHYDRATE 2 PUFF(S): 160; 4.5 AEROSOL RESPIRATORY (INHALATION) at 05:09

## 2024-01-13 RX ADMIN — PANTOPRAZOLE SODIUM 40 MILLIGRAM(S): 20 TABLET, DELAYED RELEASE ORAL at 05:42

## 2024-01-13 RX ADMIN — EPLERENONE 50 MILLIGRAM(S): 50 TABLET, FILM COATED ORAL at 05:16

## 2024-01-13 RX ADMIN — Medication 3 MILLILITER(S): at 17:51

## 2024-01-13 RX ADMIN — Medication 50 MILLIGRAM(S): at 05:30

## 2024-01-13 RX ADMIN — ATORVASTATIN CALCIUM 10 MILLIGRAM(S): 80 TABLET, FILM COATED ORAL at 22:23

## 2024-01-13 RX ADMIN — Medication 25 MICROGRAM(S): at 05:16

## 2024-01-13 RX ADMIN — LOSARTAN POTASSIUM 25 MILLIGRAM(S): 100 TABLET, FILM COATED ORAL at 05:17

## 2024-01-13 RX ADMIN — Medication 25 MILLIGRAM(S): at 05:37

## 2024-01-13 RX ADMIN — Medication 5 MILLIGRAM(S): at 15:20

## 2024-01-13 NOTE — PROGRESS NOTE ADULT - ASSESSMENT
89 y/o F with PMH of CVA, YOVANI on CPAP, HTN, HLD, Afib on Eliquis, CHF. Recent admission at [facility] 1 month ago for PNA, completed short course of ABX. Completed additional course of ABX last week for UTI. Presents to the ED with coughing, SOB, wheezing x several days. Found to be Flu + 89 y/o F with PMH of CVA, YOVANI on CPAP, HTN, HLD, Afib on Eliquis, CHF. Recent admission at [facility] 1 month ago for PNA, completed short course of ABX. Completed additional course of ABX last week for UTI. Presents to the ED with coughing, SOB, wheezing x several days. Found to be Flu +

## 2024-01-13 NOTE — PROGRESS NOTE ADULT - PROBLEM SELECTOR PLAN 3
-Uptrending WBC, now downtrending. Continue to trend.   -S/p RRT 1/10 for coffee ground emesis, ? aspiration event.  -Continue ABX  -CXR with no infiltrate as of yet   -GI f/u.

## 2024-01-13 NOTE — CHART NOTE - NSCHARTNOTEFT_GEN_A_CORE
Called By RN to report patient with elevated Heart rate   - Tele AFIB 140-160's , patient seen and evaluated , denies complaints, no sob, no chest pain , no dizziness or headaches.   - Cards following - per recs increase Metoprolol to 100mg BID, Dig was added today by Cards , Eliquis restarted , EKG ordered and reviewed   - I ordered Metoprolol 5mg IV x1, vitals otherwise stable  - Cont Cardiac monitoring , Cards paged pending call back, Dr. Plascencia updated on above information

## 2024-01-13 NOTE — CONSULT NOTE ADULT - CONSULT REASON
UTD  GBS Positive - intrapartum ppx abx  Contraceptive: POP  
rapid persistent AF
Afib RVR
Flu
Dark emesis

## 2024-01-13 NOTE — PROGRESS NOTE ADULT - ASSESSMENT
89-year-old female PMH of CVA, YOVANI on CPAP, HTN, HLD, A-fib on Eliquis, heart failure on furosemide, presents to the ED complaining of several days of coughing, increased wheezing, shortness of breath, and found to be influenza positive. Patient admitted for sepsis management.     Upper GI bleed  - iv ppi bid  - GI consult saw pt  - conservative management  - advance diet  - hold eliquis for now    Sepsis.   - Blood cultures drawn and NGTD  - completed  ceftriaxone   - Continue oseltamavir  - Monitor fever curve.  - now on zosyn empirically day 4 for poss aspiration PNA  - leukocytosis is chronic    Asthma exacerbation.   ·  Plan: 2nd to Influenza A +/- bacterial PNA  -Continue Duoneb q6h  -Continue Symbicort 160/4.5 mcg 2 puffs BID (home med Breo Ellipta)  -Prednisone 40 mg PO qd x5 days  completed  -Keep sats >90% with O2 PRN, currently RA.    nfluenza A.    -CT chest with b/l TIB opacities, may be viral +/- bacterial PNA   -Continue Tamiflu  -Steroids/bronchodilators as above.    Chronic systolic congestive heart failure.   -Not currently in decompensated HF. TTE from 8/29/23 showing normal LVSF, EF 61%  - Will continue eplerenone, atenolol, losartan for now   - Will hold lasix for now given sepsis and current euvolemic status   - Monitor I/Os, daily weight  - BMP daily, monitor electrolytes while on diuretics.    Chronic atrial fibrillation.   ·  Plan: EKG personally reviewed showing afib with HR 120s, qtc 469  - restart eliquis tomorrow  - on atenolol  - cardiology to see    CORY clot on ct  -  tte done  - pt has been on a/c    adrenal mass  - to be evaluated as out pt as pt has active issues      YOVANI (obstructive sleep apnea).   - By hx  -  pt reports does not have CPAP at home   -VBG acceptable  -Suggest monitor off CPAP, order d/c'd.     Prophylactic measure.   ·  Plan: DVT ppx: holding  eliquis  Sleep: on benadryl qhs prn  Diet: DASH  Dispo: cleared by pulm for discharge to [facility]  - dicussed with PCP Dr Parra  - d/w daughter  d/c planing to [facility]     89-year-old female PMH of CVA, YOVANI on CPAP, HTN, HLD, A-fib on Eliquis, heart failure on furosemide, presents to the ED complaining of several days of coughing, increased wheezing, shortness of breath, and found to be influenza positive. Patient admitted for sepsis management.     Upper GI bleed  - iv ppi bid  - GI consult saw pt  - conservative management  - advance diet  - hold eliquis for now    Sepsis.   - Blood cultures drawn and NGTD  - completed  ceftriaxone   - Continue oseltamavir  - Monitor fever curve.  - now on zosyn empirically day 4 for poss aspiration PNA  - leukocytosis is chronic    Asthma exacerbation.   ·  Plan: 2nd to Influenza A +/- bacterial PNA  -Continue Duoneb q6h  -Continue Symbicort 160/4.5 mcg 2 puffs BID (home med Breo Ellipta)  -Prednisone 40 mg PO qd x5 days  completed  -Keep sats >90% with O2 PRN, currently RA.    nfluenza A.    -CT chest with b/l TIB opacities, may be viral +/- bacterial PNA   -Continue Tamiflu  -Steroids/bronchodilators as above.    Chronic systolic congestive heart failure.   -Not currently in decompensated HF. TTE from 8/29/23 showing normal LVSF, EF 61%  - Will continue eplerenone, atenolol, losartan for now   - Will hold lasix for now given sepsis and current euvolemic status   - Monitor I/Os, daily weight  - BMP daily, monitor electrolytes while on diuretics.    Chronic atrial fibrillation.   ·  Plan: EKG personally reviewed showing afib with HR 120s, qtc 469  - restart eliquis tomorrow  - on atenolol  - cardiology to see    CORY clot on ct  -  tte done  - pt has been on a/c    adrenal mass  - to be evaluated as out pt as pt has active issues      YOVANI (obstructive sleep apnea).   - By hx  -  pt reports does not have CPAP at home   -VBG acceptable  -Suggest monitor off CPAP, order d/c'd.     Prophylactic measure.   ·  Plan: DVT ppx: holding  eliquis  Sleep: on benadryl qhs prn  Diet: DASH  Dispo: cleared by pulm for discharge to [facility]  - dicussed with PCP Dr Parra  - d/w daughter  d/c planing to [facility]

## 2024-01-13 NOTE — PROGRESS NOTE ADULT - SUBJECTIVE AND OBJECTIVE BOX
DATE OF SERVICE: 01-13-24 @ 10:23    Patient is a 89y old  Female who presents with a chief complaint of SOB (13 Jan 2024 07:55)      SUBJECTIVE / OVERNIGHT EVENTS:  confused    MEDICATIONS  (STANDING):  albuterol/ipratropium for Nebulization 3 milliLiter(s) Nebulizer every 6 hours  aMIOdarone IVPB 150 milliGRAM(s) IV Intermittent once  aMIOdarone IVPB 150 milliGRAM(s) IV Intermittent once  atorvastatin 10 milliGRAM(s) Oral at bedtime  budesonide 160 MICROgram(s)/formoterol 4.5 MICROgram(s) Inhaler 2 Puff(s) Inhalation two times a day  busPIRone 15 milliGRAM(s) Oral two times a day  eplerenone 50 milliGRAM(s) Oral daily  levothyroxine 25 MICROGram(s) Oral daily  losartan 25 milliGRAM(s) Oral daily  metoprolol tartrate 50 milliGRAM(s) Oral two times a day  ondansetron Injectable 4 milliGRAM(s) IV Push once  pantoprazole    Tablet 40 milliGRAM(s) Oral before breakfast  pantoprazole  Injectable 40 milliGRAM(s) IV Push two times a day  piperacillin/tazobactam IVPB.. 3.375 Gram(s) IV Intermittent every 8 hours  polyethylene glycol 3350 17 Gram(s) Oral daily  pregabalin 25 milliGRAM(s) Oral two times a day    MEDICATIONS  (PRN):      Vital Signs Last 24 Hrs  T(C): 37 (13 Jan 2024 04:42), Max: 37 (13 Jan 2024 04:42)  T(F): 98.6 (13 Jan 2024 04:42), Max: 98.6 (13 Jan 2024 04:42)  HR: 90 (13 Jan 2024 08:10) (71 - 120)  BP: 158/73 (13 Jan 2024 04:42) (112/88 - 158/73)  BP(mean): --  RR: 18 (13 Jan 2024 04:42) (18 - 18)  SpO2: 96% (13 Jan 2024 04:42) (96% - 96%)    Parameters below as of 13 Jan 2024 08:10  Patient On (Oxygen Delivery Method): room air      CAPILLARY BLOOD GLUCOSE      POCT Blood Glucose.: 120 mg/dL (12 Jan 2024 11:58)    I&O's Summary    12 Jan 2024 07:01  -  13 Jan 2024 07:00  --------------------------------------------------------  IN: 120 mL / OUT: 0 mL / NET: 120 mL        PHYSICAL EXAM:  GENERAL: NAD, well-developed  HEAD:  Atraumatic, Normocephalic  EYES: EOMI, PERRLA, conjunctiva and sclera clear  NECK: Supple, No JVD  CHEST/LUNG: Clear to auscultation bilaterally; No wheeze  HEART: Regular rate and rhythm; No murmurs, rubs, or gallops  ABDOMEN: Soft, Nontender, Nondistended; Bowel sounds present  EXTREMITIES:  2+ Peripheral Pulses, No clubbing, cyanosis, or edema  PSYCH: AAOx3  NEUROLOGY: non-focal  SKIN: No rashes or lesions    LABS:                        14.3   18.95 )-----------( 238      ( 13 Jan 2024 07:35 )             47.2     01-13    143  |  104  |  34<H>  ----------------------------<  84  3.5   |  23  |  1.06    Ca    7.2<L>      13 Jan 2024 07:35  Phos  2.4     01-12  Mg     2.6     01-12    TPro  5.6<L>  /  Alb  3.5  /  TBili  1.6<H>  /  DBili  x   /  AST  32  /  ALT  62<H>  /  AlkPhos  71  01-13          Urinalysis Basic - ( 13 Jan 2024 07:35 )    Color: x / Appearance: x / SG: x / pH: x  Gluc: 84 mg/dL / Ketone: x  / Bili: x / Urobili: x   Blood: x / Protein: x / Nitrite: x   Leuk Esterase: x / RBC: x / WBC x   Sq Epi: x / Non Sq Epi: x / Bacteria: x        RADIOLOGY & ADDITIONAL TESTS:    Imaging Personally Reviewed:    Consultant(s) Notes Reviewed:      Care Discussed with Consultants/Other Providers:

## 2024-01-13 NOTE — CONSULT NOTE ADULT - SUBJECTIVE AND OBJECTIVE BOX
EP ATTENDING      HISTORY OF PRESENT ILLNESS: She is a pleasant 89 y/o female that was implanted with a dual chamber pacemaker in 2009. Her last pulse generator change was around 2018. She has since developed persistent AF, and her PPM has been managed by Dr. Bill Napier at [place]. She is now a/w rapid AF. Echo unremarkable. She denies syncope, but reports palpitations and dyspnea.       PMH: persistent AF, stroke, hypertension, hyperlipidemia    PAST SURGICAL HISTORY:  Fracture: ORIF right ankle 1986  FHx: cholecystectomy: 1993 laparoscopic  S/P JAY-BSO: 40 years ago  Cataract: b/l lense implant  FHx: total knee replacement: left 2012  dual chamber PPM (2009, gen change 2018)    MEDICATIONS  (STANDING):  albuterol/ipratropium for Nebulization 3 milliLiter(s) Nebulizer every 6 hours  aMIOdarone IVPB 150 milliGRAM(s) IV Intermittent once  aMIOdarone IVPB 150 milliGRAM(s) IV Intermittent once  atorvastatin 10 milliGRAM(s) Oral at bedtime  budesonide 160 MICROgram(s)/formoterol 4.5 MICROgram(s) Inhaler 2 Puff(s) Inhalation two times a day  busPIRone 15 milliGRAM(s) Oral two times a day  eplerenone 50 milliGRAM(s) Oral daily  levothyroxine 25 MICROGram(s) Oral daily  losartan 25 milliGRAM(s) Oral daily  metoprolol tartrate 50 milliGRAM(s) Oral two times a day  ondansetron Injectable 4 milliGRAM(s) IV Push once  pantoprazole    Tablet 40 milliGRAM(s) Oral before breakfast  pantoprazole  Injectable 40 milliGRAM(s) IV Push two times a day  piperacillin/tazobactam IVPB.. 3.375 Gram(s) IV Intermittent every 8 hours  polyethylene glycol 3350 17 Gram(s) Oral daily  predniSONE   Tablet 30 milliGRAM(s) Oral daily  pregabalin 25 milliGRAM(s) Oral two times a day      Allergies  Cardizem (Urticaria)  sulfa drugs (Hives)  oxyContin (Sedation/Somnol; Drowsiness)      SOCIAL HISTORY:    [x ] Non-smoker  [ ] Smoker  [ ] Alcohol      REVIEW OF SYSTEMS:  [ ]chest pain  [ x ]shortness of breath  [x  ]palpitations  [  ]syncope  [ ]near syncope [ ]upper extremity weakness   [ ] lower extremity weakness  [  ]diplopia  [  ]altered mental status   [  ]fevers  [ ]chills [ ]nausea  [ ]vomitting  [  ]dysphagia    [ ]abdominal pain  [ ]melena  [ ]BRBPR    [  ]epistaxis  [  ]rash    [ ]lower extremity edema        [ ] All others negative	  [ ] Unable to obtain    PHYSICAL EXAM:  T(C): 36.3 (01-13-24 @ 11:50), Max: 37 (01-13-24 @ 04:42)  HR: 84 (01-13-24 @ 11:50) (79 - 120)  BP: 157/76 (01-13-24 @ 11:50) (112/88 - 158/73)  RR: 17 (01-13-24 @ 11:50) (17 - 18)  SpO2: 98% (01-13-24 @ 11:50) (96% - 98%)  Wt(kg): --    Appearance: Normal	  HEENT:   Normal oral mucosa, PERRL, EOMI	  Lymphatic: No lymphadenopathy , no edema  Cardiovascular: IRR, Normal S1 S2, No JVD, No murmurs , Peripheral pulses palpable 2+ bilaterally  Respiratory: Lungs clear to auscultation, normal effort 	  Gastrointestinal:  Soft, Non-tender, + BS	  Skin: No rashes, No ecchymoses, No cyanosis, warm to touch  Musculoskeletal: Normal range of motion, normal strength  Psychiatry:  Mood & affect appropriate      TELEMETRY: 	    ECG:  	    Echo:  NST:  Cath:  	  	  LABS:	 	                          14.3   18.95 )-----------( 238      ( 13 Jan 2024 07:35 )             47.2     01-13    143  |  104  |  34<H>  ----------------------------<  84  3.5   |  23  |  1.06    Ca    7.2<L>      13 Jan 2024 07:35  Phos  2.4     01-12  Mg     2.6     01-12    TPro  5.6<L>  /  Alb  3.5  /  TBili  1.6<H>  /  DBili  x   /  AST  32  /  ALT  62<H>  /  AlkPhos  71  01-13    proBNP:   Lipid Profile:   HgA1c:   TSH:     A/P) She is a pleasant 89 y/o female PMH persistent AF, hypertension, hyperlipidemia, hypothyroidism and stroke that was implanted with a dual chamber pacemaker in 2009. Her last pulse generator change was around 2018. She has since developed persistent AF, and her PPM has been managed by Dr. Bill Napier at [place]. She is now a/w rapid AF. Echo unremarkable. She denies syncope, but reports palpitations and dyspnea.     -d/c amiodarone, she is a poor candidate for a rhythm control strategy  -continue metoprolol 50 bid, will add dig 0.125mg daily  -continue lipitor for hyperlipidemia  -continue synthroid for hypothyroidism  -continue losartan for hypertension  -please restart eliquis 5 bid for lifelong a/c  -continue tele  -if AF remains rapid would increase metoprolol to 100 bid        Jerome Wills M.D., [facility]  Cardiac Electrophysiology  [facility]  [address] [place] [address], [place] [address]  [email or web address]    office [phone]  pager [phone] EP ATTENDING      HISTORY OF PRESENT ILLNESS: She is a pleasant 89 y/o female that was implanted with a dual chamber pacemaker in 2009. Her last pulse generator change was around 2018. She has since developed persistent AF, and her PPM has been managed by Dr. Bill Napier at [place]. She is now a/w rapid AF. Echo unremarkable. She denies syncope, but reports palpitations and dyspnea.       PMH: persistent AF, stroke, hypertension, hyperlipidemia    PAST SURGICAL HISTORY:  Fracture: ORIF right ankle 1986  FHx: cholecystectomy: 1993 laparoscopic  S/P JAY-BSO: 40 years ago  Cataract: b/l lense implant  FHx: total knee replacement: left 2012  dual chamber PPM (2009, gen change 2018)    MEDICATIONS  (STANDING):  albuterol/ipratropium for Nebulization 3 milliLiter(s) Nebulizer every 6 hours  aMIOdarone IVPB 150 milliGRAM(s) IV Intermittent once  aMIOdarone IVPB 150 milliGRAM(s) IV Intermittent once  atorvastatin 10 milliGRAM(s) Oral at bedtime  budesonide 160 MICROgram(s)/formoterol 4.5 MICROgram(s) Inhaler 2 Puff(s) Inhalation two times a day  busPIRone 15 milliGRAM(s) Oral two times a day  eplerenone 50 milliGRAM(s) Oral daily  levothyroxine 25 MICROGram(s) Oral daily  losartan 25 milliGRAM(s) Oral daily  metoprolol tartrate 50 milliGRAM(s) Oral two times a day  ondansetron Injectable 4 milliGRAM(s) IV Push once  pantoprazole    Tablet 40 milliGRAM(s) Oral before breakfast  pantoprazole  Injectable 40 milliGRAM(s) IV Push two times a day  piperacillin/tazobactam IVPB.. 3.375 Gram(s) IV Intermittent every 8 hours  polyethylene glycol 3350 17 Gram(s) Oral daily  predniSONE   Tablet 30 milliGRAM(s) Oral daily  pregabalin 25 milliGRAM(s) Oral two times a day      Allergies  Cardizem (Urticaria)  sulfa drugs (Hives)  oxyContin (Sedation/Somnol; Drowsiness)      SOCIAL HISTORY:    [x ] Non-smoker  [ ] Smoker  [ ] Alcohol      REVIEW OF SYSTEMS:  [ ]chest pain  [ x ]shortness of breath  [x  ]palpitations  [  ]syncope  [ ]near syncope [ ]upper extremity weakness   [ ] lower extremity weakness  [  ]diplopia  [  ]altered mental status   [  ]fevers  [ ]chills [ ]nausea  [ ]vomitting  [  ]dysphagia    [ ]abdominal pain  [ ]melena  [ ]BRBPR    [  ]epistaxis  [  ]rash    [ ]lower extremity edema        [ ] All others negative	  [ ] Unable to obtain    PHYSICAL EXAM:  T(C): 36.3 (01-13-24 @ 11:50), Max: 37 (01-13-24 @ 04:42)  HR: 84 (01-13-24 @ 11:50) (79 - 120)  BP: 157/76 (01-13-24 @ 11:50) (112/88 - 158/73)  RR: 17 (01-13-24 @ 11:50) (17 - 18)  SpO2: 98% (01-13-24 @ 11:50) (96% - 98%)  Wt(kg): --    Appearance: Normal	  HEENT:   Normal oral mucosa, PERRL, EOMI	  Lymphatic: No lymphadenopathy , no edema  Cardiovascular: IRR, Normal S1 S2, No JVD, No murmurs , Peripheral pulses palpable 2+ bilaterally  Respiratory: Lungs clear to auscultation, normal effort 	  Gastrointestinal:  Soft, Non-tender, + BS	  Skin: No rashes, No ecchymoses, No cyanosis, warm to touch  Musculoskeletal: Normal range of motion, normal strength  Psychiatry:  Mood & affect appropriate      TELEMETRY: 	    ECG:  	    Echo:  NST:  Cath:  	  	  LABS:	 	                          14.3   18.95 )-----------( 238      ( 13 Jan 2024 07:35 )             47.2     01-13    143  |  104  |  34<H>  ----------------------------<  84  3.5   |  23  |  1.06    Ca    7.2<L>      13 Jan 2024 07:35  Phos  2.4     01-12  Mg     2.6     01-12    TPro  5.6<L>  /  Alb  3.5  /  TBili  1.6<H>  /  DBili  x   /  AST  32  /  ALT  62<H>  /  AlkPhos  71  01-13    proBNP:   Lipid Profile:   HgA1c:   TSH:     A/P) She is a pleasant 89 y/o female PMH persistent AF, hypertension, hyperlipidemia, hypothyroidism and stroke that was implanted with a dual chamber pacemaker in 2009. Her last pulse generator change was around 2018. She has since developed persistent AF, and her PPM has been managed by Dr. Bill Napier at [place]. She is now a/w rapid AF. Echo unremarkable. She denies syncope, but reports palpitations and dyspnea.     -d/c amiodarone, she is a poor candidate for a rhythm control strategy  -continue metoprolol 50 bid, will add dig 0.125mg daily  -continue lipitor for hyperlipidemia  -continue synthroid for hypothyroidism  -continue losartan for hypertension  -please restart eliquis 5 bid for lifelong a/c  -continue tele  -if AF remains rapid would increase metoprolol to 100 bid        Jerome Wills M.D., [facility]  Cardiac Electrophysiology  [facility]  [address] [place] [address], [place] [address]  [email or web address]    office [phone]  pager [phone]

## 2024-01-13 NOTE — PROGRESS NOTE ADULT - SUBJECTIVE AND OBJECTIVE BOX
Follow-up Pulm Progress Note    Reports still wheezing  O2 sats 96% RA  Denies CP, SOB     Medications:  MEDICATIONS  (STANDING):  albuterol/ipratropium for Nebulization 3 milliLiter(s) Nebulizer every 6 hours  aMIOdarone IVPB 150 milliGRAM(s) IV Intermittent once  aMIOdarone IVPB 150 milliGRAM(s) IV Intermittent once  atorvastatin 10 milliGRAM(s) Oral at bedtime  budesonide 160 MICROgram(s)/formoterol 4.5 MICROgram(s) Inhaler 2 Puff(s) Inhalation two times a day  busPIRone 15 milliGRAM(s) Oral two times a day  eplerenone 50 milliGRAM(s) Oral daily  levothyroxine 25 MICROGram(s) Oral daily  losartan 25 milliGRAM(s) Oral daily  metoprolol tartrate 50 milliGRAM(s) Oral two times a day  ondansetron Injectable 4 milliGRAM(s) IV Push once  pantoprazole    Tablet 40 milliGRAM(s) Oral before breakfast  pantoprazole  Injectable 40 milliGRAM(s) IV Push two times a day  piperacillin/tazobactam IVPB.. 3.375 Gram(s) IV Intermittent every 8 hours  polyethylene glycol 3350 17 Gram(s) Oral daily  pregabalin 25 milliGRAM(s) Oral two times a day          Vital Signs Last 24 Hrs  T(C): 36.3 (13 Jan 2024 11:50), Max: 37 (13 Jan 2024 04:42)  T(F): 97.4 (13 Jan 2024 11:50), Max: 98.6 (13 Jan 2024 04:42)  HR: 84 (13 Jan 2024 11:50) (79 - 120)  BP: 157/76 (13 Jan 2024 11:50) (112/88 - 158/73)  BP(mean): --  RR: 17 (13 Jan 2024 11:50) (17 - 18)  SpO2: 98% (13 Jan 2024 11:50) (96% - 98%)    Parameters below as of 13 Jan 2024 11:50  Patient On (Oxygen Delivery Method): room air          01-12 @ 07:01  -  01-13 @ 07:00  --------------------------------------------------------  IN: 120 mL / OUT: 0 mL / NET: 120 mL          LABS:                        14.3   18.95 )-----------( 238      ( 13 Jan 2024 07:35 )             47.2     01-13    143  |  104  |  34<H>  ----------------------------<  84  3.5   |  23  |  1.06    Ca    7.2<L>      13 Jan 2024 07:35  Phos  2.4     01-12  Mg     2.6     01-12    TPro  5.6<L>  /  Alb  3.5  /  TBili  1.6<H>  /  DBili  x   /  AST  32  /  ALT  62<H>  /  AlkPhos  71  01-13          CAPILLARY BLOOD GLUCOSE  POCT Blood Glucose.: 120 mg/dL (12 Jan 2024 11:58)      Urinalysis Basic - ( 13 Jan 2024 07:35 )    Color: x / Appearance: x / SG: x / pH: x  Gluc: 84 mg/dL / Ketone: x  / Bili: x / Urobili: x   Blood: x / Protein: x / Nitrite: x   Leuk Esterase: x / RBC: x / WBC x   Sq Epi: x / Non Sq Epi: x / Bacteria: x      CULTURES:     Culture - Blood (collected 01-04-24 @ 15:30)  Source: .Blood Blood-Peripheral  Final Report (01-09-24 @ 23:00):    No growth at 5 days    Culture - Blood (collected 01-04-24 @ 15:00)  Source: .Blood Blood-Peripheral  Final Report (01-09-24 @ 23:00):    No growth at 5 days      Physical Examination:  PULM: mild b/l exp wheeze   CVS: S1, S2 heard      RADIOLOGY REVIEWED  CXR: grossly clear     < from: CT Abdomen and Pelvis w/ IV Cont (01.10.24 @ 02:10) >    FINDINGS:  LOWER CHEST: Small right pleural effusion, increased from the chest CT of   1/4/2024. Bibasilar subsegmental atelectasis. Cardiomegaly. Cardiac   device leads. Coronary artery calcifications. Partially visualized   filling defect within the left atrial appendage, as was present on the   exam of 7/25/2023.    < end of copied text >

## 2024-01-13 NOTE — PROGRESS NOTE ADULT - SUBJECTIVE AND OBJECTIVE BOX
pt seen in bed, no complaints        albuterol/ipratropium for Nebulization 3 milliLiter(s) Nebulizer every 6 hours  aMIOdarone IVPB 150 milliGRAM(s) IV Intermittent once  aMIOdarone IVPB 150 milliGRAM(s) IV Intermittent once  atorvastatin 10 milliGRAM(s) Oral at bedtime  budesonide 160 MICROgram(s)/formoterol 4.5 MICROgram(s) Inhaler 2 Puff(s) Inhalation two times a day  busPIRone 15 milliGRAM(s) Oral two times a day  eplerenone 50 milliGRAM(s) Oral daily  levothyroxine 25 MICROGram(s) Oral daily  losartan 25 milliGRAM(s) Oral daily  metoprolol tartrate 50 milliGRAM(s) Oral two times a day  ondansetron Injectable 4 milliGRAM(s) IV Push once  pantoprazole    Tablet 40 milliGRAM(s) Oral before breakfast  pantoprazole  Injectable 40 milliGRAM(s) IV Push two times a day  piperacillin/tazobactam IVPB.. 3.375 Gram(s) IV Intermittent every 8 hours  polyethylene glycol 3350 17 Gram(s) Oral daily  pregabalin 25 milliGRAM(s) Oral two times a day                            13.9   21.06 )-----------( 244      ( 12 Jan 2024 06:39 )             44.7       Hemoglobin: 13.9 g/dL (01-12 @ 06:39)  Hemoglobin: 13.7 g/dL (01-11 @ 04:52)  Hemoglobin: 14.6 g/dL (01-10 @ 05:28)  Hemoglobin: 15.0 g/dL (01-10 @ 00:29)  Hemoglobin: 14.8 g/dL (01-09 @ 06:51)      01-12    144  |  109<H>  |  34<H>  ----------------------------<  118<H>  4.0   |  23  |  0.97    Ca    7.2<L>      12 Jan 2024 08:01  Phos  2.4     01-12  Mg     2.6     01-12    TPro  5.4<L>  /  Alb  3.4  /  TBili  1.3<H>  /  DBili  x   /  AST  24  /  ALT  41  /  AlkPhos  68  01-12    Creatinine Trend: 0.97<--, 1.00<--, 1.23<--, 1.23<--, 0.98<--, 0.99<--    COAGS:           T(C): 37 (01-13-24 @ 04:42), Max: 37 (01-13-24 @ 04:42)  HR: 79 (01-13-24 @ 04:42) (71 - 120)  BP: 158/73 (01-13-24 @ 04:42) (112/88 - 158/73)  RR: 18 (01-13-24 @ 04:42) (18 - 18)  SpO2: 96% (01-13-24 @ 04:42) (96% - 96%)  Wt(kg): --    I&O's Summary    12 Jan 2024 07:01  -  13 Jan 2024 07:00  --------------------------------------------------------  IN: 120 mL / OUT: 0 mL / NET: 120 mL       Gen: NAD  HEENT:  (-)icterus (-)pallor  CV: N S1 S2 1/6 DMITRIY (+)2 Pulses B/l  Resp:  Clear to auscultation B/L, normal effort  GI: (+) BS Soft, NT, ND  Lymph:  (-)Edema, (-)obvious lymphadenopathy  Skin: Warm to touch, Normal turgor  Psych: Appropriate mood and affect      TELEMETRY: -130	      ECG: AF RVR  	    RADIOLOGY:         CXR: < from: Xray Chest 1 View- PORTABLE-Urgent (Xray Chest 1 View- PORTABLE-Urgent .) (01.12.24 @ 13:10) >  IMPRESSION:    Clear lungs.    < end of copied text >    < from: TTE W or WO Ultrasound Enhancing Agent (01.11.24 @ 11:10) >  CONCLUSIONS:      1. Left ventricular cavity is normal. Left ventricular systolic function is normal. There are no regional wall motion abnormalities seen.   2. Mild pulmonary hypertension.   3. Device lead is visualized in the right heart.   4. No prior echocardiogram is available for comparison.    < end of copied text >      ASSESSMENT/PLAN: Patient is a 89 y/o female with PMH of CVA, YOVANI on CPAP, HTN, HLD, persistent afib on Eliquis s/p Medtronic PPM, and diastolic heart failure who presented with SOB, cough, and wheezing admitted with Influenza A and PNA. Cardiology consulted for afib RVR.    #Afib RVR  - resolving over the past 24 hr  - In setting of infection  - Change atenolol to metoprolol 50mg BID for better rate control  - Restart Eliquis for stroke prevention if no contraindications  - TTE with normal LV function    #Influenza A/PNA  - Management and Abx per med/pulm  - BCx negative    #Chronic Diastolic Heart Failure  - Continue eplerenone  - Does not appear to be in heart failure - CXR with clear lungs however IV lasix x1 ordered per pulm  - Restart PO lasix as tolerated    #HTN  - Continue Losartan and Metoprolol    #HLD  - Continue Lipitor    - No further inpatient cardiac w/u expected     pt seen in bed, no complaints        albuterol/ipratropium for Nebulization 3 milliLiter(s) Nebulizer every 6 hours  aMIOdarone IVPB 150 milliGRAM(s) IV Intermittent once  aMIOdarone IVPB 150 milliGRAM(s) IV Intermittent once  atorvastatin 10 milliGRAM(s) Oral at bedtime  budesonide 160 MICROgram(s)/formoterol 4.5 MICROgram(s) Inhaler 2 Puff(s) Inhalation two times a day  busPIRone 15 milliGRAM(s) Oral two times a day  eplerenone 50 milliGRAM(s) Oral daily  levothyroxine 25 MICROGram(s) Oral daily  losartan 25 milliGRAM(s) Oral daily  metoprolol tartrate 50 milliGRAM(s) Oral two times a day  ondansetron Injectable 4 milliGRAM(s) IV Push once  pantoprazole    Tablet 40 milliGRAM(s) Oral before breakfast  pantoprazole  Injectable 40 milliGRAM(s) IV Push two times a day  piperacillin/tazobactam IVPB.. 3.375 Gram(s) IV Intermittent every 8 hours  polyethylene glycol 3350 17 Gram(s) Oral daily  pregabalin 25 milliGRAM(s) Oral two times a day                            13.9   21.06 )-----------( 244      ( 12 Jan 2024 06:39 )             44.7       Hemoglobin: 13.9 g/dL (01-12 @ 06:39)  Hemoglobin: 13.7 g/dL (01-11 @ 04:52)  Hemoglobin: 14.6 g/dL (01-10 @ 05:28)  Hemoglobin: 15.0 g/dL (01-10 @ 00:29)  Hemoglobin: 14.8 g/dL (01-09 @ 06:51)      01-12    144  |  109<H>  |  34<H>  ----------------------------<  118<H>  4.0   |  23  |  0.97    Ca    7.2<L>      12 Jan 2024 08:01  Phos  2.4     01-12  Mg     2.6     01-12    TPro  5.4<L>  /  Alb  3.4  /  TBili  1.3<H>  /  DBili  x   /  AST  24  /  ALT  41  /  AlkPhos  68  01-12    Creatinine Trend: 0.97<--, 1.00<--, 1.23<--, 1.23<--, 0.98<--, 0.99<--    COAGS:           T(C): 37 (01-13-24 @ 04:42), Max: 37 (01-13-24 @ 04:42)  HR: 79 (01-13-24 @ 04:42) (71 - 120)  BP: 158/73 (01-13-24 @ 04:42) (112/88 - 158/73)  RR: 18 (01-13-24 @ 04:42) (18 - 18)  SpO2: 96% (01-13-24 @ 04:42) (96% - 96%)  Wt(kg): --    I&O's Summary    12 Jan 2024 07:01  -  13 Jan 2024 07:00  --------------------------------------------------------  IN: 120 mL / OUT: 0 mL / NET: 120 mL       Gen: NAD  HEENT:  (-)icterus (-)pallor  CV: N S1 S2 1/6 DMITRIY (+)2 Pulses B/l  Resp:  Clear to auscultation B/L, normal effort  GI: (+) BS Soft, NT, ND  Lymph:  (-)Edema, (-)obvious lymphadenopathy  Skin: Warm to touch, Normal turgor  Psych: Appropriate mood and affect      TELEMETRY: -130	      ECG: AF RVR  	    RADIOLOGY:         CXR: < from: Xray Chest 1 View- PORTABLE-Urgent (Xray Chest 1 View- PORTABLE-Urgent .) (01.12.24 @ 13:10) >  IMPRESSION:    Clear lungs.    < end of copied text >    < from: TTE W or WO Ultrasound Enhancing Agent (01.11.24 @ 11:10) >  CONCLUSIONS:      1. Left ventricular cavity is normal. Left ventricular systolic function is normal. There are no regional wall motion abnormalities seen.   2. Mild pulmonary hypertension.   3. Device lead is visualized in the right heart.   4. No prior echocardiogram is available for comparison.    < end of copied text >      ASSESSMENT/PLAN: Patient is a 89 y/o female with PMH of CVA, YOVANI on CPAP, HTN, HLD, persistent afib on Eliquis s/p Medtronic PPM, and diastolic heart failure who presented with SOB, cough, and wheezing admitted with Influenza A and PNA. Cardiology consulted for afib RVR.    #Afib RVR  - resolving over the past 24 hr  - In setting of infection  - Change atenolol to metoprolol 50mg BID for better rate control  - Restart Eliquis for stroke prevention if no contraindications  - TTE with normal LV function    #Influenza A/PNA  - Management and Abx per med/pulm  - BCx negative    #Chronic Diastolic Heart Failure  - Continue eplerenone  - Does not appear to be in heart failure - CXR with clear lungs however IV lasix x1 ordered per pulm  - Restart PO lasix as tolerated    #HTN  - Continue Losartan and Metoprolol    #HLD  - Continue Lipitor    - No further inpatient cardiac w/u expected

## 2024-01-14 LAB
ANION GAP SERPL CALC-SCNC: 16 MMOL/L — SIGNIFICANT CHANGE UP (ref 5–17)
ANION GAP SERPL CALC-SCNC: 16 MMOL/L — SIGNIFICANT CHANGE UP (ref 5–17)
BUN SERPL-MCNC: 39 MG/DL — HIGH (ref 7–23)
BUN SERPL-MCNC: 39 MG/DL — HIGH (ref 7–23)
CALCIUM SERPL-MCNC: 7.3 MG/DL — LOW (ref 8.4–10.5)
CALCIUM SERPL-MCNC: 7.3 MG/DL — LOW (ref 8.4–10.5)
CHLORIDE SERPL-SCNC: 103 MMOL/L — SIGNIFICANT CHANGE UP (ref 96–108)
CHLORIDE SERPL-SCNC: 103 MMOL/L — SIGNIFICANT CHANGE UP (ref 96–108)
CO2 SERPL-SCNC: 22 MMOL/L — SIGNIFICANT CHANGE UP (ref 22–31)
CO2 SERPL-SCNC: 22 MMOL/L — SIGNIFICANT CHANGE UP (ref 22–31)
CREAT SERPL-MCNC: 1.12 MG/DL — SIGNIFICANT CHANGE UP (ref 0.5–1.3)
CREAT SERPL-MCNC: 1.12 MG/DL — SIGNIFICANT CHANGE UP (ref 0.5–1.3)
EGFR: 47 ML/MIN/1.73M2 — LOW
EGFR: 47 ML/MIN/1.73M2 — LOW
GLUCOSE SERPL-MCNC: 114 MG/DL — HIGH (ref 70–99)
GLUCOSE SERPL-MCNC: 114 MG/DL — HIGH (ref 70–99)
HCT VFR BLD CALC: 47.8 % — HIGH (ref 34.5–45)
HCT VFR BLD CALC: 47.8 % — HIGH (ref 34.5–45)
HGB BLD-MCNC: 14.9 G/DL — SIGNIFICANT CHANGE UP (ref 11.5–15.5)
HGB BLD-MCNC: 14.9 G/DL — SIGNIFICANT CHANGE UP (ref 11.5–15.5)
MAGNESIUM SERPL-MCNC: 2.2 MG/DL — SIGNIFICANT CHANGE UP (ref 1.6–2.6)
MAGNESIUM SERPL-MCNC: 2.2 MG/DL — SIGNIFICANT CHANGE UP (ref 1.6–2.6)
MCHC RBC-ENTMCNC: 27.4 PG — SIGNIFICANT CHANGE UP (ref 27–34)
MCHC RBC-ENTMCNC: 27.4 PG — SIGNIFICANT CHANGE UP (ref 27–34)
MCHC RBC-ENTMCNC: 31.2 GM/DL — LOW (ref 32–36)
MCHC RBC-ENTMCNC: 31.2 GM/DL — LOW (ref 32–36)
MCV RBC AUTO: 87.9 FL — SIGNIFICANT CHANGE UP (ref 80–100)
MCV RBC AUTO: 87.9 FL — SIGNIFICANT CHANGE UP (ref 80–100)
NRBC # BLD: 0 /100 WBCS — SIGNIFICANT CHANGE UP (ref 0–0)
NRBC # BLD: 0 /100 WBCS — SIGNIFICANT CHANGE UP (ref 0–0)
PLATELET # BLD AUTO: 184 K/UL — SIGNIFICANT CHANGE UP (ref 150–400)
PLATELET # BLD AUTO: 184 K/UL — SIGNIFICANT CHANGE UP (ref 150–400)
POTASSIUM SERPL-MCNC: 4.2 MMOL/L — SIGNIFICANT CHANGE UP (ref 3.5–5.3)
POTASSIUM SERPL-MCNC: 4.2 MMOL/L — SIGNIFICANT CHANGE UP (ref 3.5–5.3)
POTASSIUM SERPL-SCNC: 4.2 MMOL/L — SIGNIFICANT CHANGE UP (ref 3.5–5.3)
POTASSIUM SERPL-SCNC: 4.2 MMOL/L — SIGNIFICANT CHANGE UP (ref 3.5–5.3)
RBC # BLD: 5.44 M/UL — HIGH (ref 3.8–5.2)
RBC # BLD: 5.44 M/UL — HIGH (ref 3.8–5.2)
RBC # FLD: 17.1 % — HIGH (ref 10.3–14.5)
RBC # FLD: 17.1 % — HIGH (ref 10.3–14.5)
SODIUM SERPL-SCNC: 141 MMOL/L — SIGNIFICANT CHANGE UP (ref 135–145)
SODIUM SERPL-SCNC: 141 MMOL/L — SIGNIFICANT CHANGE UP (ref 135–145)
WBC # BLD: 16.72 K/UL — HIGH (ref 3.8–10.5)
WBC # BLD: 16.72 K/UL — HIGH (ref 3.8–10.5)
WBC # FLD AUTO: 16.72 K/UL — HIGH (ref 3.8–10.5)
WBC # FLD AUTO: 16.72 K/UL — HIGH (ref 3.8–10.5)

## 2024-01-14 PROCEDURE — 93010 ELECTROCARDIOGRAM REPORT: CPT

## 2024-01-14 RX ORDER — METOPROLOL TARTRATE 50 MG
100 TABLET ORAL THREE TIMES A DAY
Refills: 0 | Status: DISCONTINUED | OUTPATIENT
Start: 2024-01-14 | End: 2024-01-17

## 2024-01-14 RX ADMIN — PIPERACILLIN AND TAZOBACTAM 25 GRAM(S): 4; .5 INJECTION, POWDER, LYOPHILIZED, FOR SOLUTION INTRAVENOUS at 06:30

## 2024-01-14 RX ADMIN — APIXABAN 5 MILLIGRAM(S): 2.5 TABLET, FILM COATED ORAL at 17:48

## 2024-01-14 RX ADMIN — Medication 3 MILLILITER(S): at 11:52

## 2024-01-14 RX ADMIN — Medication 15 MILLIGRAM(S): at 06:34

## 2024-01-14 RX ADMIN — BUDESONIDE AND FORMOTEROL FUMARATE DIHYDRATE 2 PUFF(S): 160; 4.5 AEROSOL RESPIRATORY (INHALATION) at 07:14

## 2024-01-14 RX ADMIN — Medication 15 MILLIGRAM(S): at 17:48

## 2024-01-14 RX ADMIN — PANTOPRAZOLE SODIUM 40 MILLIGRAM(S): 20 TABLET, DELAYED RELEASE ORAL at 17:49

## 2024-01-14 RX ADMIN — APIXABAN 5 MILLIGRAM(S): 2.5 TABLET, FILM COATED ORAL at 06:35

## 2024-01-14 RX ADMIN — PIPERACILLIN AND TAZOBACTAM 25 GRAM(S): 4; .5 INJECTION, POWDER, LYOPHILIZED, FOR SOLUTION INTRAVENOUS at 22:33

## 2024-01-14 RX ADMIN — Medication 40 MILLIGRAM(S): at 06:36

## 2024-01-14 RX ADMIN — ATORVASTATIN CALCIUM 10 MILLIGRAM(S): 80 TABLET, FILM COATED ORAL at 21:37

## 2024-01-14 RX ADMIN — Medication 3 MILLILITER(S): at 06:37

## 2024-01-14 RX ADMIN — Medication 3 MILLILITER(S): at 17:50

## 2024-01-14 RX ADMIN — Medication 30 MILLIGRAM(S): at 06:36

## 2024-01-14 RX ADMIN — Medication 3 MILLILITER(S): at 00:05

## 2024-01-14 RX ADMIN — Medication 100 MILLIGRAM(S): at 21:37

## 2024-01-14 RX ADMIN — PIPERACILLIN AND TAZOBACTAM 25 GRAM(S): 4; .5 INJECTION, POWDER, LYOPHILIZED, FOR SOLUTION INTRAVENOUS at 13:29

## 2024-01-14 RX ADMIN — Medication 25 MILLIGRAM(S): at 06:33

## 2024-01-14 RX ADMIN — POLYETHYLENE GLYCOL 3350 17 GRAM(S): 17 POWDER, FOR SOLUTION ORAL at 13:29

## 2024-01-14 RX ADMIN — BUDESONIDE AND FORMOTEROL FUMARATE DIHYDRATE 2 PUFF(S): 160; 4.5 AEROSOL RESPIRATORY (INHALATION) at 17:58

## 2024-01-14 RX ADMIN — Medication 100 MILLIGRAM(S): at 13:29

## 2024-01-14 RX ADMIN — LOSARTAN POTASSIUM 25 MILLIGRAM(S): 100 TABLET, FILM COATED ORAL at 06:33

## 2024-01-14 RX ADMIN — Medication 125 MICROGRAM(S): at 06:35

## 2024-01-14 RX ADMIN — PANTOPRAZOLE SODIUM 40 MILLIGRAM(S): 20 TABLET, DELAYED RELEASE ORAL at 06:35

## 2024-01-14 RX ADMIN — Medication 100 MILLIGRAM(S): at 06:37

## 2024-01-14 RX ADMIN — Medication 25 MICROGRAM(S): at 06:31

## 2024-01-14 RX ADMIN — Medication 25 MILLIGRAM(S): at 17:49

## 2024-01-14 RX ADMIN — EPLERENONE 50 MILLIGRAM(S): 50 TABLET, FILM COATED ORAL at 06:37

## 2024-01-14 RX ADMIN — PANTOPRAZOLE SODIUM 40 MILLIGRAM(S): 20 TABLET, DELAYED RELEASE ORAL at 06:38

## 2024-01-14 NOTE — PROGRESS NOTE ADULT - SUBJECTIVE AND OBJECTIVE BOX
Events noted          albuterol/ipratropium for Nebulization 3 milliLiter(s) Nebulizer every 6 hours  apixaban 5 milliGRAM(s) Oral every 12 hours  atorvastatin 10 milliGRAM(s) Oral at bedtime  budesonide 160 MICROgram(s)/formoterol 4.5 MICROgram(s) Inhaler 2 Puff(s) Inhalation two times a day  busPIRone 15 milliGRAM(s) Oral two times a day  digoxin     Tablet 125 MICROGram(s) Oral daily  eplerenone 50 milliGRAM(s) Oral daily  furosemide    Tablet 40 milliGRAM(s) Oral daily  levothyroxine 25 MICROGram(s) Oral daily  losartan 25 milliGRAM(s) Oral daily  metoprolol tartrate 100 milliGRAM(s) Oral every 12 hours  ondansetron Injectable 4 milliGRAM(s) IV Push once  pantoprazole    Tablet 40 milliGRAM(s) Oral before breakfast  pantoprazole  Injectable 40 milliGRAM(s) IV Push two times a day  piperacillin/tazobactam IVPB.. 3.375 Gram(s) IV Intermittent every 8 hours  polyethylene glycol 3350 17 Gram(s) Oral daily  predniSONE   Tablet 30 milliGRAM(s) Oral daily  pregabalin 25 milliGRAM(s) Oral two times a day                            14.9   16.72 )-----------( 184      ( 14 Jan 2024 06:16 )             47.8       Hemoglobin: 14.9 g/dL (01-14 @ 06:16)  Hemoglobin: 14.3 g/dL (01-13 @ 07:35)  Hemoglobin: 13.9 g/dL (01-12 @ 06:39)  Hemoglobin: 13.7 g/dL (01-11 @ 04:52)  Hemoglobin: 14.6 g/dL (01-10 @ 05:28)      01-14    141  |  103  |  39<H>  ----------------------------<  114<H>  4.2   |  22  |  1.12    Ca    7.3<L>      14 Jan 2024 06:16  Phos  2.4     01-12  Mg     2.2     01-14    TPro  5.6<L>  /  Alb  3.5  /  TBili  1.6<H>  /  DBili  x   /  AST  32  /  ALT  62<H>  /  AlkPhos  71  01-13    Creatinine Trend: 1.12<--, 1.06<--, 0.97<--, 1.00<--, 1.23<--, 1.23<--    COAGS:           T(C): 36.9 (01-13-24 @ 21:58), Max: 36.9 (01-13-24 @ 21:58)  HR: 75 (01-13-24 @ 21:58) (75 - 109)  BP: 124/64 (01-13-24 @ 21:58) (122/75 - 157/76)  RR: 18 (01-13-24 @ 21:58) (17 - 18)  SpO2: 96% (01-13-24 @ 21:58) (92% - 98%)  Wt(kg): --    I&O's Summary    13 Jan 2024 07:01  -  14 Jan 2024 07:00  --------------------------------------------------------  IN: 180 mL / OUT: 1100 mL / NET: -920 mL         Gen: NAD  HEENT:  (-)icterus (-)pallor  CV: N S1 S2 1/6 DMITRIY (+)2 Pulses B/l  Resp:  Clear to auscultation B/L, normal effort  GI: (+) BS Soft, NT, ND  Lymph:  (-)Edema, (-)obvious lymphadenopathy  Skin: Warm to touch, Normal turgor  Psych: Appropriate mood and affect      TELEMETRY: -130	      ECG: AF RVR  	    RADIOLOGY:         CXR: < from: Xray Chest 1 View- PORTABLE-Urgent (Xray Chest 1 View- PORTABLE-Urgent .) (01.12.24 @ 13:10) >  IMPRESSION:    Clear lungs.    < end of copied text >    < from: TTE W or WO Ultrasound Enhancing Agent (01.11.24 @ 11:10) >  CONCLUSIONS:      1. Left ventricular cavity is normal. Left ventricular systolic function is normal. There are no regional wall motion abnormalities seen.   2. Mild pulmonary hypertension.   3. Device lead is visualized in the right heart.   4. No prior echocardiogram is available for comparison.    < end of copied text >      ASSESSMENT/PLAN: Patient is a 89 y/o female with PMH of CVA, YOVANI on CPAP, HTN, HLD, persistent afib on Eliquis s/p Medtronic PPM, and diastolic heart failure who presented with SOB, cough, and wheezing admitted with Influenza A and PNA. Cardiology consulted for afib RVR.    #Afib RVR  - resolving over the past 24 hr  - In setting of infection  - cont dig   -  cont metoprolol 100mg BID for better rate control  - Restart Eliquis for stroke prevention if no contraindications  - TTE with normal LV function    #Influenza A/PNA  - Management and Abx per med/pulm  - BCx negative    #Chronic Diastolic Heart Failure  - Continue eplerenone  - Does not appear to be in heart failure - CXR with clear lungs however IV lasix x1 ordered per pulm  - Restart PO lasix as tolerated    #HTN  - Continue Losartan and Metoprolol    #HLD  - Continue Lipitor    - No further inpatient cardiac w/u expected     Events noted          albuterol/ipratropium for Nebulization 3 milliLiter(s) Nebulizer every 6 hours  apixaban 5 milliGRAM(s) Oral every 12 hours  atorvastatin 10 milliGRAM(s) Oral at bedtime  budesonide 160 MICROgram(s)/formoterol 4.5 MICROgram(s) Inhaler 2 Puff(s) Inhalation two times a day  busPIRone 15 milliGRAM(s) Oral two times a day  digoxin     Tablet 125 MICROGram(s) Oral daily  eplerenone 50 milliGRAM(s) Oral daily  furosemide    Tablet 40 milliGRAM(s) Oral daily  levothyroxine 25 MICROGram(s) Oral daily  losartan 25 milliGRAM(s) Oral daily  metoprolol tartrate 100 milliGRAM(s) Oral every 12 hours  ondansetron Injectable 4 milliGRAM(s) IV Push once  pantoprazole    Tablet 40 milliGRAM(s) Oral before breakfast  pantoprazole  Injectable 40 milliGRAM(s) IV Push two times a day  piperacillin/tazobactam IVPB.. 3.375 Gram(s) IV Intermittent every 8 hours  polyethylene glycol 3350 17 Gram(s) Oral daily  predniSONE   Tablet 30 milliGRAM(s) Oral daily  pregabalin 25 milliGRAM(s) Oral two times a day                            14.9   16.72 )-----------( 184      ( 14 Jan 2024 06:16 )             47.8       Hemoglobin: 14.9 g/dL (01-14 @ 06:16)  Hemoglobin: 14.3 g/dL (01-13 @ 07:35)  Hemoglobin: 13.9 g/dL (01-12 @ 06:39)  Hemoglobin: 13.7 g/dL (01-11 @ 04:52)  Hemoglobin: 14.6 g/dL (01-10 @ 05:28)      01-14    141  |  103  |  39<H>  ----------------------------<  114<H>  4.2   |  22  |  1.12    Ca    7.3<L>      14 Jan 2024 06:16  Phos  2.4     01-12  Mg     2.2     01-14    TPro  5.6<L>  /  Alb  3.5  /  TBili  1.6<H>  /  DBili  x   /  AST  32  /  ALT  62<H>  /  AlkPhos  71  01-13    Creatinine Trend: 1.12<--, 1.06<--, 0.97<--, 1.00<--, 1.23<--, 1.23<--    COAGS:           T(C): 36.9 (01-13-24 @ 21:58), Max: 36.9 (01-13-24 @ 21:58)  HR: 75 (01-13-24 @ 21:58) (75 - 109)  BP: 124/64 (01-13-24 @ 21:58) (122/75 - 157/76)  RR: 18 (01-13-24 @ 21:58) (17 - 18)  SpO2: 96% (01-13-24 @ 21:58) (92% - 98%)  Wt(kg): --    I&O's Summary    13 Jan 2024 07:01  -  14 Jan 2024 07:00  --------------------------------------------------------  IN: 180 mL / OUT: 1100 mL / NET: -920 mL         Gen: NAD  HEENT:  (-)icterus (-)pallor  CV: N S1 S2 1/6 DMITRIY (+)2 Pulses B/l  Resp:  Clear to auscultation B/L, normal effort  GI: (+) BS Soft, NT, ND  Lymph:  (-)Edema, (-)obvious lymphadenopathy  Skin: Warm to touch, Normal turgor  Psych: Appropriate mood and affect      TELEMETRY: -130	      ECG: AF RVR  	    RADIOLOGY:         CXR: < from: Xray Chest 1 View- PORTABLE-Urgent (Xray Chest 1 View- PORTABLE-Urgent .) (01.12.24 @ 13:10) >  IMPRESSION:    Clear lungs.    < end of copied text >    < from: TTE W or WO Ultrasound Enhancing Agent (01.11.24 @ 11:10) >  CONCLUSIONS:      1. Left ventricular cavity is normal. Left ventricular systolic function is normal. There are no regional wall motion abnormalities seen.   2. Mild pulmonary hypertension.   3. Device lead is visualized in the right heart.   4. No prior echocardiogram is available for comparison.    < end of copied text >      ASSESSMENT/PLAN: Patient is a 89 y/o female with PMH of CVA, YOVANI on CPAP, HTN, HLD, persistent afib on Eliquis s/p Medtronic PPM, and diastolic heart failure who presented with SOB, cough, and wheezing admitted with Influenza A and PNA. Cardiology consulted for afib RVR.    #Afib RVR  - resolving over the past 24 hr  - In setting of infection  - cont dig   -  cont metoprolol 100mg BID for better rate control  - Restart Eliquis for stroke prevention if no contraindications  - TTE with normal LV function    #Influenza A/PNA  - Management and Abx per med/pulm  - BCx negative    #Chronic Diastolic Heart Failure  - Continue eplerenone  - Does not appear to be in heart failure - CXR with clear lungs however IV lasix x1 ordered per pulm  - Restart PO lasix as tolerated    #HTN  - Continue Losartan and Metoprolol    #HLD  - Continue Lipitor    - No further inpatient cardiac w/u expected

## 2024-01-14 NOTE — PROGRESS NOTE ADULT - ASSESSMENT
89-year-old female PMH of CVA, YOVANI on CPAP, HTN, HLD, A-fib on Eliquis, heart failure on furosemide, presents to the ED complaining of several days of coughing, increased wheezing, shortness of breath, and found to be influenza positive. Patient admitted for sepsis management.     Upper GI bleed  - iv ppi bid  - GI consult saw pt  - conservative management  - advance diet  - hold eliquis for now    Sepsis.   - Blood cultures drawn and NGTD  - completed  ceftriaxone   - Continue oseltamavir  - Monitor fever curve.  - now on zosyn empirically day 5 for poss aspiration PNA  - leukocytosis is chronic    Asthma exacerbation.   ·  Plan: 2nd to Influenza A +/- bacterial PNA  -Continue Duoneb q6h  -Continue Symbicort 160/4.5 mcg 2 puffs BID (home med Breo Ellipta)  -Prednisone 40 mg PO qd x5 days  completed  -Keep sats >90% with O2 PRN, currently RA.    nfluenza A.    -CT chest with b/l TIB opacities, may be viral +/- bacterial PNA   -Continue Tamiflu  -Steroids/bronchodilators as above.    Chronic systolic congestive heart failure.   -Not currently in decompensated HF. TTE from 8/29/23 showing normal LVSF, EF 61%  - Will continue eplerenone, atenolol, losartan for now   - Will hold lasix for now given sepsis and current euvolemic status   - Monitor I/Os, daily weight  - BMP daily, monitor electrolytes while on diuretics.    Chronic atrial fibrillation.   ·  Plan: EKG personally reviewed showing afib with HR 120s, qtc 469  - restart eliquis tomorrow  - on atenolol  - cardiology to see    CORY clot on ct  -  tte done  - pt has been on a/c    adrenal mass  - to be evaluated as out pt as pt has active issues      YOVANI (obstructive sleep apnea).   - By hx  -  pt reports does not have CPAP at home   -VBG acceptable  -Suggest monitor off CPAP, order d/c'd.     Prophylactic measure.   ·  Plan: DVT ppx: holding  eliquis  Sleep: on benadryl qhs prn  Diet: DASH  Dispo: cleared by pulm for discharge to [facility]  - dicussed with PCP Dr Parra  - d/w daughter  d/c planing to [facility]     89-year-old female PMH of CVA, YOVANI on CPAP, HTN, HLD, A-fib on Eliquis, heart failure on furosemide, presents to the ED complaining of several days of coughing, increased wheezing, shortness of breath, and found to be influenza positive. Patient admitted for sepsis management.     Upper GI bleed  - iv ppi bid  - GI consult saw pt  - conservative management  - advance diet  - hold eliquis for now    Sepsis.   - Blood cultures drawn and NGTD  - completed  ceftriaxone   - Continue oseltamavir  - Monitor fever curve.  - now on zosyn empirically day 5 for poss aspiration PNA  - leukocytosis is chronic    Asthma exacerbation.   ·  Plan: 2nd to Influenza A +/- bacterial PNA  -Continue Duoneb q6h  -Continue Symbicort 160/4.5 mcg 2 puffs BID (home med Breo Ellipta)  -Prednisone 40 mg PO qd x5 days  completed  -Keep sats >90% with O2 PRN, currently RA.    nfluenza A.    -CT chest with b/l TIB opacities, may be viral +/- bacterial PNA   -Continue Tamiflu  -Steroids/bronchodilators as above.    Chronic systolic congestive heart failure.   -Not currently in decompensated HF. TTE from 8/29/23 showing normal LVSF, EF 61%  - Will continue eplerenone, atenolol, losartan for now   - Will hold lasix for now given sepsis and current euvolemic status   - Monitor I/Os, daily weight  - BMP daily, monitor electrolytes while on diuretics.    Chronic atrial fibrillation.   ·  Plan: EKG personally reviewed showing afib with HR 120s, qtc 469  - restart eliquis tomorrow  - on atenolol  - cardiology to see    CORY clot on ct  -  tte done  - pt has been on a/c    adrenal mass  - to be evaluated as out pt as pt has active issues      YOVANI (obstructive sleep apnea).   - By hx  -  pt reports does not have CPAP at home   -VBG acceptable  -Suggest monitor off CPAP, order d/c'd.     Prophylactic measure.   ·  Plan: DVT ppx: holding  eliquis  Sleep: on benadryl qhs prn  Diet: DASH  Dispo: cleared by pulm for discharge to [facility]  - dicussed with PCP Dr Parra  - d/w daughter  d/c planing to [facility]     89-year-old female PMH of CVA, YOVANI on CPAP, HTN, HLD, A-fib on Eliquis, heart failure on furosemide, presents to the ED complaining of several days of coughing, increased wheezing, shortness of breath, and found to be influenza positive. Patient admitted for sepsis management.     Upper GI bleed  - iv ppi bid  - GI consult saw pt  - conservative management  - advance diet  - hold eliquis for now    Sepsis.   - Blood cultures drawn and NGTD  - completed  ceftriaxone   - Continue oseltamavir  - Monitor fever curve.  - now on zosyn empirically day 5 for poss aspiration PNA  - leukocytosis is chronic    Asthma exacerbation.   ·  Plan: 2nd to Influenza A +/- bacterial PNA  -Continue Duoneb q6h  -Continue Symbicort 160/4.5 mcg 2 puffs BID (home med Breo Ellipta)  -Prednisone 40 mg PO qd x5 days  completed  -Keep sats >90% with O2 PRN, currently RA.    nfluenza A.    -CT chest with b/l TIB opacities, may be viral +/- bacterial PNA   -Continue Tamiflu  -Steroids/bronchodilators as above.    Chronic systolic congestive heart failure.   -Not currently in decompensated HF. TTE from 8/29/23 showing normal LVSF, EF 61%  - Will continue eplerenone, atenolol, losartan for now   - Will hold lasix for now given sepsis and current euvolemic status   - Monitor I/Os, daily weight  - BMP daily, monitor electrolytes while on diuretics.    Chronic atrial fibrillation.   - restart eliquis tomorrow  - on metoprolol  - cardiology to see    CORY clot on ct  -  tte done  - pt has been on a/c    adrenal mass  - to be evaluated as out pt as pt has active issues      YOVANI (obstructive sleep apnea).   - By hx  -  pt reports does not have CPAP at home   -VBG acceptable  -Suggest monitor off CPAP, order d/c'd.     Prophylactic measure.   ·  Plan: DVT ppx: holding  eliquis  Sleep: on benadryl qhs prn  Diet: DASH  Dispo: cleared by pulm for discharge to [facility]  - dicussed with PCP Dr Parra  - d/w daughter  d/c planing to [facility]     89-year-old female PMH of CVA, YOVANI on CPAP, HTN, HLD, A-fib on Eliquis, heart failure on furosemide, presents to the ED complaining of several days of coughing, increased wheezing, shortness of breath, and found to be influenza positive. Patient admitted for sepsis management.     Upper GI bleed  - iv ppi bid  - GI consult saw pt  - conservative management  - advance diet  - hold eliquis for now    Sepsis.   - Blood cultures drawn and NGTD  - completed  ceftriaxone   - Continue oseltamavir  - Monitor fever curve.  - now on zosyn empirically day 5 for poss aspiration PNA  - leukocytosis is chronic    Asthma exacerbation.   ·  Plan: 2nd to Influenza A +/- bacterial PNA  -Continue Duoneb q6h  -Continue Symbicort 160/4.5 mcg 2 puffs BID (home med Breo Ellipta)  -Prednisone 40 mg PO qd x5 days  completed  -Keep sats >90% with O2 PRN, currently RA.    nfluenza A.    -CT chest with b/l TIB opacities, may be viral +/- bacterial PNA   -Continue Tamiflu  -Steroids/bronchodilators as above.    Chronic systolic congestive heart failure.   -Not currently in decompensated HF. TTE from 8/29/23 showing normal LVSF, EF 61%  - Will continue eplerenone, atenolol, losartan for now   - Will hold lasix for now given sepsis and current euvolemic status   - Monitor I/Os, daily weight  - BMP daily, monitor electrolytes while on diuretics.    Chronic atrial fibrillation.   - restart eliquis tomorrow  - on metoprolol  - cardiology to see    CORY clot on ct  -  tte done  - pt has been on a/c    adrenal mass  - to be evaluated as out pt as pt has active issues      YOVANI (obstructive sleep apnea).   - By hx  -  pt reports does not have CPAP at home   -VBG acceptable  -Suggest monitor off CPAP, order d/c'd.     Prophylactic measure.   ·  Plan: DVT ppx: holding  eliquis  Sleep: on benadryl qhs prn  Diet: DASH  Dispo: cleared by pulm for discharge to [facility]  - dicussed with PCP Dr Parra  - d/w daughter  d/c planing to [facility]

## 2024-01-14 NOTE — PROGRESS NOTE ADULT - SUBJECTIVE AND OBJECTIVE BOX
EP ATTENDING    tele: AF better rate controlled with dig, but still having periods of RVR 140s        DATE OF SERVICE - 01-14-24     Review of Systems:   Constitutional: [ ] fevers, [ ] chills.   Skin: [ ] dry skin. [ ] rashes.  Psychiatric: [ ] depression, [ ] anxiety.   Gastrointestinal: [ ] BRBPR, [ ] melena.   Neurological: [ ] confusion. [ ] seizures. [ ] shuffling gait.   Ears,Nose,Mouth and Throat: [ ] ear pain [ ] sore throat.   Eyes: [ ] diplopia.   Respiratory: [ ] hemoptysis. [ ] shortness of breath  Cardiovascular: See HPI above  Hematologic/Lymphatic: [ ] anemia. [ ] painful nodes. [ ] prolonged bleeding.   Genitourinary: [ ] hematuria. [ ] flank pain.   Endocrine: [ ] significant change in weight. [ ] intolerance to heat and cold.     Review of systems [ x] otherwise negative, [ ] otherwise unable to obtain    FH: no family history of sudden cardiac death in first degree relatives    SH: [ ] tobacco, [ ] alcohol, [ ] drugs    albuterol/ipratropium for Nebulization 3 milliLiter(s) Nebulizer every 6 hours  apixaban 5 milliGRAM(s) Oral every 12 hours  atorvastatin 10 milliGRAM(s) Oral at bedtime  budesonide 160 MICROgram(s)/formoterol 4.5 MICROgram(s) Inhaler 2 Puff(s) Inhalation two times a day  busPIRone 15 milliGRAM(s) Oral two times a day  digoxin     Tablet 125 MICROGram(s) Oral daily  eplerenone 50 milliGRAM(s) Oral daily  furosemide    Tablet 40 milliGRAM(s) Oral daily  levothyroxine 25 MICROGram(s) Oral daily  losartan 25 milliGRAM(s) Oral daily  metoprolol tartrate 100 milliGRAM(s) Oral three times a day  ondansetron Injectable 4 milliGRAM(s) IV Push once  pantoprazole    Tablet 40 milliGRAM(s) Oral before breakfast  pantoprazole  Injectable 40 milliGRAM(s) IV Push two times a day  piperacillin/tazobactam IVPB.. 3.375 Gram(s) IV Intermittent every 8 hours  polyethylene glycol 3350 17 Gram(s) Oral daily  predniSONE   Tablet 30 milliGRAM(s) Oral daily  pregabalin 25 milliGRAM(s) Oral two times a day                            14.9   16.72 )-----------( 184      ( 14 Jan 2024 06:16 )             47.8       01-14    141  |  103  |  39<H>  ----------------------------<  114<H>  4.2   |  22  |  1.12    Ca    7.3<L>      14 Jan 2024 06:16  Mg     2.2     01-14    TPro  5.6<L>  /  Alb  3.5  /  TBili  1.6<H>  /  DBili  x   /  AST  32  /  ALT  62<H>  /  AlkPhos  71  01-13            T(C): 36.5 (01-14-24 @ 06:33), Max: 36.9 (01-13-24 @ 21:58)  HR: 88 (01-14-24 @ 06:33) (75 - 109)  BP: 158/82 (01-14-24 @ 06:33) (122/75 - 158/82)  RR: 18 (01-14-24 @ 06:33) (17 - 18)  SpO2: 95% (01-14-24 @ 06:33) (92% - 98%)  Wt(kg): --    I&O's Summary    13 Jan 2024 07:01  -  14 Jan 2024 07:00  --------------------------------------------------------  IN: 180 mL / OUT: 1100 mL / NET: -920 mL    14 Jan 2024 07:01  -  14 Jan 2024 10:35  --------------------------------------------------------  IN: 177 mL / OUT: 0 mL / NET: 177 mL          A/P) She is a pleasant 89 y/o female PMH persistent AF, hypertension, hyperlipidemia, hypothyroidism and stroke who was implanted with a dual chamber pacemaker in 2009. Her last pulse generator change was around 2018. She has since developed persistent AF, and her PPM has been managed by Dr. Bill Napier at [facility]. She is now a/w rapid AF. Echo unremarkable. She denies syncope, but reports palpitations and dyspnea.     -no more amiodarone, she is a poor candidate for a rhythm control strategy  -increase metoprolol to 100 tid, continue dig 0.125mg daily  -continue lipitor for hyperlipidemia  -continue synthroid for hypothyroidism  -continue losartan for hypertension  -continue eliquis 5 bid for lifelong a/c  -continue tele  -long term EP plan is rate control & anticoagulation +/- AV node ablation        Jerome Wills M.D., [facility]  Cardiac Electrophysiology  [facility]  [address] [place] [address], [address] [place] [address]  [email or web address]    office [phone]  pager [phone]   EP ATTENDING    tele: AF better rate controlled with dig, but still having periods of RVR 140s        DATE OF SERVICE - 01-14-24     Review of Systems:   Constitutional: [ ] fevers, [ ] chills.   Skin: [ ] dry skin. [ ] rashes.  Psychiatric: [ ] depression, [ ] anxiety.   Gastrointestinal: [ ] BRBPR, [ ] melena.   Neurological: [ ] confusion. [ ] seizures. [ ] shuffling gait.   Ears,Nose,Mouth and Throat: [ ] ear pain [ ] sore throat.   Eyes: [ ] diplopia.   Respiratory: [ ] hemoptysis. [ ] shortness of breath  Cardiovascular: See HPI above  Hematologic/Lymphatic: [ ] anemia. [ ] painful nodes. [ ] prolonged bleeding.   Genitourinary: [ ] hematuria. [ ] flank pain.   Endocrine: [ ] significant change in weight. [ ] intolerance to heat and cold.     Review of systems [ x] otherwise negative, [ ] otherwise unable to obtain    FH: no family history of sudden cardiac death in first degree relatives    SH: [ ] tobacco, [ ] alcohol, [ ] drugs    albuterol/ipratropium for Nebulization 3 milliLiter(s) Nebulizer every 6 hours  apixaban 5 milliGRAM(s) Oral every 12 hours  atorvastatin 10 milliGRAM(s) Oral at bedtime  budesonide 160 MICROgram(s)/formoterol 4.5 MICROgram(s) Inhaler 2 Puff(s) Inhalation two times a day  busPIRone 15 milliGRAM(s) Oral two times a day  digoxin     Tablet 125 MICROGram(s) Oral daily  eplerenone 50 milliGRAM(s) Oral daily  furosemide    Tablet 40 milliGRAM(s) Oral daily  levothyroxine 25 MICROGram(s) Oral daily  losartan 25 milliGRAM(s) Oral daily  metoprolol tartrate 100 milliGRAM(s) Oral three times a day  ondansetron Injectable 4 milliGRAM(s) IV Push once  pantoprazole    Tablet 40 milliGRAM(s) Oral before breakfast  pantoprazole  Injectable 40 milliGRAM(s) IV Push two times a day  piperacillin/tazobactam IVPB.. 3.375 Gram(s) IV Intermittent every 8 hours  polyethylene glycol 3350 17 Gram(s) Oral daily  predniSONE   Tablet 30 milliGRAM(s) Oral daily  pregabalin 25 milliGRAM(s) Oral two times a day                            14.9   16.72 )-----------( 184      ( 14 Jan 2024 06:16 )             47.8       01-14    141  |  103  |  39<H>  ----------------------------<  114<H>  4.2   |  22  |  1.12    Ca    7.3<L>      14 Jan 2024 06:16  Mg     2.2     01-14    TPro  5.6<L>  /  Alb  3.5  /  TBili  1.6<H>  /  DBili  x   /  AST  32  /  ALT  62<H>  /  AlkPhos  71  01-13            T(C): 36.5 (01-14-24 @ 06:33), Max: 36.9 (01-13-24 @ 21:58)  HR: 88 (01-14-24 @ 06:33) (75 - 109)  BP: 158/82 (01-14-24 @ 06:33) (122/75 - 158/82)  RR: 18 (01-14-24 @ 06:33) (17 - 18)  SpO2: 95% (01-14-24 @ 06:33) (92% - 98%)  Wt(kg): --    I&O's Summary    13 Jan 2024 07:01  -  14 Jan 2024 07:00  --------------------------------------------------------  IN: 180 mL / OUT: 1100 mL / NET: -920 mL    14 Jan 2024 07:01  -  14 Jan 2024 10:35  --------------------------------------------------------  IN: 177 mL / OUT: 0 mL / NET: 177 mL          A/P) She is a pleasant 89 y/o female PMH persistent AF, hypertension, hyperlipidemia, hypothyroidism and stroke who was implanted with a dual chamber pacemaker in 2009. Her last pulse generator change was around 2018. She has since developed persistent AF, and her PPM has been managed by Dr. Bill Napier at [facility]. She is now a/w rapid AF. Echo unremarkable. She denies syncope, but reports palpitations and dyspnea.     -no more amiodarone, she is a poor candidate for a rhythm control strategy  -increase metoprolol to 100 tid, continue dig 0.125mg daily  -continue lipitor for hyperlipidemia  -continue synthroid for hypothyroidism  -continue losartan for hypertension  -continue eliquis 5 bid for lifelong a/c  -continue tele  -long term EP plan is rate control & anticoagulation +/- AV node ablation        Jerome Wills M.D., [facility]  Cardiac Electrophysiology  [facility]  [address] [place] [address], [address] [place] [address]  [email or web address]    office [phone]  pager [phone]

## 2024-01-14 NOTE — PROGRESS NOTE ADULT - SUBJECTIVE AND OBJECTIVE BOX
DATE OF SERVICE: 01-14-24 @ 12:47    Patient is a 89y old  Female who presents with a chief complaint of SOB (14 Jan 2024 10:35)      SUBJECTIVE / OVERNIGHT EVENTS:  No chest pain. No shortness of breath. No complaints. No events overnight.     MEDICATIONS  (STANDING):  albuterol/ipratropium for Nebulization 3 milliLiter(s) Nebulizer every 6 hours  apixaban 5 milliGRAM(s) Oral every 12 hours  atorvastatin 10 milliGRAM(s) Oral at bedtime  budesonide 160 MICROgram(s)/formoterol 4.5 MICROgram(s) Inhaler 2 Puff(s) Inhalation two times a day  busPIRone 15 milliGRAM(s) Oral two times a day  digoxin     Tablet 125 MICROGram(s) Oral daily  eplerenone 50 milliGRAM(s) Oral daily  furosemide    Tablet 40 milliGRAM(s) Oral daily  levothyroxine 25 MICROGram(s) Oral daily  losartan 25 milliGRAM(s) Oral daily  metoprolol tartrate 100 milliGRAM(s) Oral three times a day  ondansetron Injectable 4 milliGRAM(s) IV Push once  pantoprazole    Tablet 40 milliGRAM(s) Oral before breakfast  pantoprazole  Injectable 40 milliGRAM(s) IV Push two times a day  piperacillin/tazobactam IVPB.. 3.375 Gram(s) IV Intermittent every 8 hours  polyethylene glycol 3350 17 Gram(s) Oral daily  predniSONE   Tablet 30 milliGRAM(s) Oral daily  pregabalin 25 milliGRAM(s) Oral two times a day    MEDICATIONS  (PRN):      Vital Signs Last 24 Hrs  T(C): 36.2 (14 Jan 2024 11:22), Max: 36.9 (13 Jan 2024 21:58)  T(F): 97.2 (14 Jan 2024 11:22), Max: 98.5 (13 Jan 2024 21:58)  HR: 75 (14 Jan 2024 11:22) (75 - 109)  BP: 114/65 (14 Jan 2024 11:22) (114/65 - 158/82)  BP(mean): --  RR: 18 (14 Jan 2024 11:22) (18 - 18)  SpO2: 94% (14 Jan 2024 11:22) (92% - 96%)    Parameters below as of 14 Jan 2024 11:22  Patient On (Oxygen Delivery Method): room air      CAPILLARY BLOOD GLUCOSE        I&O's Summary    13 Jan 2024 07:01  -  14 Jan 2024 07:00  --------------------------------------------------------  IN: 180 mL / OUT: 1100 mL / NET: -920 mL    14 Jan 2024 07:01  -  14 Jan 2024 12:47  --------------------------------------------------------  IN: 177 mL / OUT: 800 mL / NET: -623 mL        PHYSICAL EXAM:  GENERAL: NAD, well-developed  HEAD:  Atraumatic, Normocephalic  EYES: EOMI, PERRLA, conjunctiva and sclera clear  NECK: Supple, No JVD  CHEST/LUNG: Clear to auscultation bilaterally; No wheeze  HEART: Regular rate and rhythm; No murmurs, rubs, or gallops  ABDOMEN: Soft, Nontender, Nondistended; Bowel sounds present  EXTREMITIES:  2+ Peripheral Pulses, No clubbing, cyanosis, or edema  PSYCH: AAOx3  NEUROLOGY: non-focal  SKIN: No rashes or lesions    LABS:                        14.9   16.72 )-----------( 184      ( 14 Jan 2024 06:16 )             47.8     01-14    141  |  103  |  39<H>  ----------------------------<  114<H>  4.2   |  22  |  1.12    Ca    7.3<L>      14 Jan 2024 06:16  Mg     2.2     01-14    TPro  5.6<L>  /  Alb  3.5  /  TBili  1.6<H>  /  DBili  x   /  AST  32  /  ALT  62<H>  /  AlkPhos  71  01-13          Urinalysis Basic - ( 14 Jan 2024 06:16 )    Color: x / Appearance: x / SG: x / pH: x  Gluc: 114 mg/dL / Ketone: x  / Bili: x / Urobili: x   Blood: x / Protein: x / Nitrite: x   Leuk Esterase: x / RBC: x / WBC x   Sq Epi: x / Non Sq Epi: x / Bacteria: x        RADIOLOGY & ADDITIONAL TESTS:    Imaging Personally Reviewed:    Consultant(s) Notes Reviewed:      Care Discussed with Consultants/Other Providers:

## 2024-01-15 LAB
ANION GAP SERPL CALC-SCNC: 14 MMOL/L — SIGNIFICANT CHANGE UP (ref 5–17)
ANION GAP SERPL CALC-SCNC: 14 MMOL/L — SIGNIFICANT CHANGE UP (ref 5–17)
BUN SERPL-MCNC: 43 MG/DL — HIGH (ref 7–23)
BUN SERPL-MCNC: 43 MG/DL — HIGH (ref 7–23)
CALCIUM SERPL-MCNC: 7.2 MG/DL — LOW (ref 8.4–10.5)
CALCIUM SERPL-MCNC: 7.2 MG/DL — LOW (ref 8.4–10.5)
CHLORIDE SERPL-SCNC: 100 MMOL/L — SIGNIFICANT CHANGE UP (ref 96–108)
CHLORIDE SERPL-SCNC: 100 MMOL/L — SIGNIFICANT CHANGE UP (ref 96–108)
CO2 SERPL-SCNC: 26 MMOL/L — SIGNIFICANT CHANGE UP (ref 22–31)
CO2 SERPL-SCNC: 26 MMOL/L — SIGNIFICANT CHANGE UP (ref 22–31)
CREAT SERPL-MCNC: 1.1 MG/DL — SIGNIFICANT CHANGE UP (ref 0.5–1.3)
CREAT SERPL-MCNC: 1.1 MG/DL — SIGNIFICANT CHANGE UP (ref 0.5–1.3)
EGFR: 48 ML/MIN/1.73M2 — LOW
EGFR: 48 ML/MIN/1.73M2 — LOW
GLUCOSE SERPL-MCNC: 94 MG/DL — SIGNIFICANT CHANGE UP (ref 70–99)
GLUCOSE SERPL-MCNC: 94 MG/DL — SIGNIFICANT CHANGE UP (ref 70–99)
HCT VFR BLD CALC: 44.2 % — SIGNIFICANT CHANGE UP (ref 34.5–45)
HCT VFR BLD CALC: 44.2 % — SIGNIFICANT CHANGE UP (ref 34.5–45)
HGB BLD-MCNC: 14.5 G/DL — SIGNIFICANT CHANGE UP (ref 11.5–15.5)
HGB BLD-MCNC: 14.5 G/DL — SIGNIFICANT CHANGE UP (ref 11.5–15.5)
MCHC RBC-ENTMCNC: 28 PG — SIGNIFICANT CHANGE UP (ref 27–34)
MCHC RBC-ENTMCNC: 28 PG — SIGNIFICANT CHANGE UP (ref 27–34)
MCHC RBC-ENTMCNC: 32.8 GM/DL — SIGNIFICANT CHANGE UP (ref 32–36)
MCHC RBC-ENTMCNC: 32.8 GM/DL — SIGNIFICANT CHANGE UP (ref 32–36)
MCV RBC AUTO: 85.3 FL — SIGNIFICANT CHANGE UP (ref 80–100)
MCV RBC AUTO: 85.3 FL — SIGNIFICANT CHANGE UP (ref 80–100)
NRBC # BLD: 0 /100 WBCS — SIGNIFICANT CHANGE UP (ref 0–0)
NRBC # BLD: 0 /100 WBCS — SIGNIFICANT CHANGE UP (ref 0–0)
PLATELET # BLD AUTO: 246 K/UL — SIGNIFICANT CHANGE UP (ref 150–400)
PLATELET # BLD AUTO: 246 K/UL — SIGNIFICANT CHANGE UP (ref 150–400)
POTASSIUM SERPL-MCNC: 3.5 MMOL/L — SIGNIFICANT CHANGE UP (ref 3.5–5.3)
POTASSIUM SERPL-MCNC: 3.5 MMOL/L — SIGNIFICANT CHANGE UP (ref 3.5–5.3)
POTASSIUM SERPL-SCNC: 3.5 MMOL/L — SIGNIFICANT CHANGE UP (ref 3.5–5.3)
POTASSIUM SERPL-SCNC: 3.5 MMOL/L — SIGNIFICANT CHANGE UP (ref 3.5–5.3)
RBC # BLD: 5.18 M/UL — SIGNIFICANT CHANGE UP (ref 3.8–5.2)
RBC # BLD: 5.18 M/UL — SIGNIFICANT CHANGE UP (ref 3.8–5.2)
RBC # FLD: 16.8 % — HIGH (ref 10.3–14.5)
RBC # FLD: 16.8 % — HIGH (ref 10.3–14.5)
SODIUM SERPL-SCNC: 140 MMOL/L — SIGNIFICANT CHANGE UP (ref 135–145)
SODIUM SERPL-SCNC: 140 MMOL/L — SIGNIFICANT CHANGE UP (ref 135–145)
WBC # BLD: 19.6 K/UL — HIGH (ref 3.8–10.5)
WBC # BLD: 19.6 K/UL — HIGH (ref 3.8–10.5)
WBC # FLD AUTO: 19.6 K/UL — HIGH (ref 3.8–10.5)
WBC # FLD AUTO: 19.6 K/UL — HIGH (ref 3.8–10.5)

## 2024-01-15 PROCEDURE — 71045 X-RAY EXAM CHEST 1 VIEW: CPT | Mod: 26

## 2024-01-15 RX ADMIN — Medication 100 MILLIGRAM(S): at 05:20

## 2024-01-15 RX ADMIN — Medication 125 MICROGRAM(S): at 05:20

## 2024-01-15 RX ADMIN — Medication 3 MILLILITER(S): at 11:47

## 2024-01-15 RX ADMIN — BUDESONIDE AND FORMOTEROL FUMARATE DIHYDRATE 2 PUFF(S): 160; 4.5 AEROSOL RESPIRATORY (INHALATION) at 17:00

## 2024-01-15 RX ADMIN — Medication 40 MILLIGRAM(S): at 05:20

## 2024-01-15 RX ADMIN — LOSARTAN POTASSIUM 25 MILLIGRAM(S): 100 TABLET, FILM COATED ORAL at 05:17

## 2024-01-15 RX ADMIN — Medication 3 MILLILITER(S): at 00:53

## 2024-01-15 RX ADMIN — PANTOPRAZOLE SODIUM 40 MILLIGRAM(S): 20 TABLET, DELAYED RELEASE ORAL at 16:59

## 2024-01-15 RX ADMIN — PIPERACILLIN AND TAZOBACTAM 25 GRAM(S): 4; .5 INJECTION, POWDER, LYOPHILIZED, FOR SOLUTION INTRAVENOUS at 06:00

## 2024-01-15 RX ADMIN — POLYETHYLENE GLYCOL 3350 17 GRAM(S): 17 POWDER, FOR SOLUTION ORAL at 11:47

## 2024-01-15 RX ADMIN — Medication 30 MILLIGRAM(S): at 05:19

## 2024-01-15 RX ADMIN — Medication 3 MILLILITER(S): at 17:00

## 2024-01-15 RX ADMIN — PANTOPRAZOLE SODIUM 40 MILLIGRAM(S): 20 TABLET, DELAYED RELEASE ORAL at 05:17

## 2024-01-15 RX ADMIN — Medication 25 MILLIGRAM(S): at 05:18

## 2024-01-15 RX ADMIN — PANTOPRAZOLE SODIUM 40 MILLIGRAM(S): 20 TABLET, DELAYED RELEASE ORAL at 05:18

## 2024-01-15 RX ADMIN — APIXABAN 5 MILLIGRAM(S): 2.5 TABLET, FILM COATED ORAL at 16:58

## 2024-01-15 RX ADMIN — EPLERENONE 50 MILLIGRAM(S): 50 TABLET, FILM COATED ORAL at 05:17

## 2024-01-15 RX ADMIN — Medication 3 MILLILITER(S): at 05:16

## 2024-01-15 RX ADMIN — Medication 15 MILLIGRAM(S): at 05:35

## 2024-01-15 RX ADMIN — APIXABAN 5 MILLIGRAM(S): 2.5 TABLET, FILM COATED ORAL at 05:19

## 2024-01-15 RX ADMIN — Medication 15 MILLIGRAM(S): at 16:58

## 2024-01-15 RX ADMIN — PIPERACILLIN AND TAZOBACTAM 25 GRAM(S): 4; .5 INJECTION, POWDER, LYOPHILIZED, FOR SOLUTION INTRAVENOUS at 21:47

## 2024-01-15 RX ADMIN — Medication 25 MILLIGRAM(S): at 16:58

## 2024-01-15 RX ADMIN — Medication 25 MICROGRAM(S): at 05:17

## 2024-01-15 RX ADMIN — Medication 100 MILLIGRAM(S): at 21:45

## 2024-01-15 RX ADMIN — Medication 100 MILLIGRAM(S): at 14:12

## 2024-01-15 RX ADMIN — PIPERACILLIN AND TAZOBACTAM 25 GRAM(S): 4; .5 INJECTION, POWDER, LYOPHILIZED, FOR SOLUTION INTRAVENOUS at 14:12

## 2024-01-15 RX ADMIN — ATORVASTATIN CALCIUM 10 MILLIGRAM(S): 80 TABLET, FILM COATED ORAL at 21:45

## 2024-01-15 NOTE — PROGRESS NOTE ADULT - ASSESSMENT
89-year-old female PMH of CVA, YOVANI on CPAP, HTN, HLD, A-fib on Eliquis, heart failure on furosemide, presents to the ED complaining of several days of coughing, increased wheezing, shortness of breath, and found to be influenza positive. Patient admitted for sepsis management.     Upper GI bleed  - iv ppi bid  - GI consult saw pt  - conservative management  - advance diet  - hold eliquis for now    Sepsis.   - Blood cultures drawn and NGTD  - completed  ceftriaxone   - Continue oseltamavir  - Monitor fever curve.  - now on zosyn empirically day 5 for poss aspiration PNA  - leukocytosis is chronic    Asthma exacerbation.   ·  Plan: 2nd to Influenza A +/- bacterial PNA  -Continue Duoneb q6h  -Continue Symbicort 160/4.5 mcg 2 puffs BID (home med Breo Ellipta)  -Prednisone 40 mg PO qd x5 days  completed  -Keep sats >90% with O2 PRN, currently RA.    Influenza A.    -CT chest with b/l TIB opacities, may be viral +/- bacterial PNA   -Continue Tamiflu  -Steroids/bronchodilators as above.    Chronic systolic congestive heart failure.   -Not currently in decompensated HF. TTE from 8/29/23 showing normal LVSF, EF 61%  - Will continue eplerenone, atenolol, losartan for now   - Will hold lasix for now given sepsis and current euvolemic status   - Monitor I/Os, daily weight  - BMP daily, monitor electrolytes while on diuretics.    Chronic atrial fibrillation.   - restart eliquis tomorrow  - on metoprolol  - cardiology to see    CORY clot on ct  -  tte done  - pt has been on a/c    adrenal mass  - to be evaluated as out pt as pt has active issues      YOVANI (obstructive sleep apnea).   - By hx  -  pt reports does not have CPAP at home   -VBG acceptable  -Suggest monitor off CPAP, order d/c'd.     Prophylactic measure.   ·  Plan: DVT ppx: holding  eliquis  Sleep: on benadryl qhs prn  Diet: DASH  Dispo: cleared by pulm for discharge to [facility]  - dicussed with PCP Dr Parra  - d/w daughter  d/c planing to [facility]     89-year-old female PMH of CVA, YOVANI on CPAP, HTN, HLD, A-fib on Eliquis, heart failure on furosemide, presents to the ED complaining of several days of coughing, increased wheezing, shortness of breath, and found to be influenza positive. Patient admitted for sepsis management.     Upper GI bleed  - iv ppi bid  - GI consult saw pt  - conservative management  - advance diet  - hold eliquis for now    Sepsis.   - Blood cultures drawn and NGTD  - completed  ceftriaxone   - Continue oseltamavir  - Monitor fever curve.  - now on zosyn empirically day 5 for poss aspiration PNA  - leukocytosis is chronic    Asthma exacerbation.   ·  Plan: 2nd to Influenza A +/- bacterial PNA  -Continue Duoneb q6h  -Continue Symbicort 160/4.5 mcg 2 puffs BID (home med Breo Ellipta)  -Prednisone 40 mg PO qd x5 days  completed  -Keep sats >90% with O2 PRN, currently RA.    Influenza A.    -CT chest with b/l TIB opacities, may be viral +/- bacterial PNA   -Continue Tamiflu  -Steroids/bronchodilators as above.    Chronic systolic congestive heart failure.   -Not currently in decompensated HF. TTE from 8/29/23 showing normal LVSF, EF 61%  - Will continue eplerenone, atenolol, losartan for now   - Will hold lasix for now given sepsis and current euvolemic status   - Monitor I/Os, daily weight  - BMP daily, monitor electrolytes while on diuretics.    Chronic atrial fibrillation.   - restart eliquis tomorrow  - on metoprolol  - cardiology to see    CORY clot on ct  -  tte done  - pt has been on a/c    adrenal mass  - to be evaluated as out pt as pt has active issues      YOVANI (obstructive sleep apnea).   - By hx  -  pt reports does not have CPAP at home   -VBG acceptable  -Suggest monitor off CPAP, order d/c'd.     Prophylactic measure.   ·  Plan: DVT ppx: holding  eliquis  Sleep: on benadryl qhs prn  Diet: DASH  Dispo: cleared by pulm for discharge to [facility]  - dicussed with PCP Dr Parra  - d/w daughter  d/c planing to [facility]     89-year-old female PMH of CVA, YOVANI on CPAP, HTN, HLD, A-fib on Eliquis, heart failure on furosemide, presents to the ED complaining of several days of coughing, increased wheezing, shortness of breath, and found to be influenza positive. Patient admitted for sepsis management.     Upper GI bleed  - iv ppi bid  - GI consult saw pt  - conservative management  - advance diet  - hold eliquis for now    Sepsis.   - Blood cultures drawn and NGTD  - completed  ceftriaxone   - Continue oseltamavir  - Monitor fever curve.  - now on zosyn empirically day 5 for poss aspiration PNA  - leukocytosis is chronic    Asthma exacerbation.   ·  Plan: 2nd to Influenza A +/- bacterial PNA  -Continue Duoneb q6h  -Continue Symbicort 160/4.5 mcg 2 puffs BID (home med Breo Ellipta)  -Prednisone 40 mg PO qd x5 days  completed  -Keep sats >90% with O2 PRN, currently RA.    Influenza A.    -CT chest with b/l TIB opacities, may be viral +/- bacterial PNA   -Continue Tamiflu  -Steroids/bronchodilators as above.    Chronic systolic congestive heart failure.   -Not currently in decompensated HF. TTE from 8/29/23 showing normal LVSF, EF 61%  - Will continue eplerenone, atenolol, losartan for now   - Will hold lasix for now given sepsis and current euvolemic status   - Monitor I/Os, daily weight  - BMP daily, monitor electrolytes while on diuretics.    Chronic atrial fibrillation.   - restart eliquis tomorrow  - on metoprolol  - cardiology to see    CORY clot on ct  -  tte done  - pt has been on a/c    adrenal mass  - to be evaluated as out pt as pt has active issues      YOVANI (obstructive sleep apnea).   - By hx  -  pt reports does not have CPAP at home   -VBG acceptable  -Suggest monitor off CPAP, order d/c'd.     Prophylactic measure.   ·  Plan: DVT ppx: holding  eliquis  Sleep: on benadryl qhs prn  Diet: DASH  Dispo: cleared by pulm for discharge to [facility]  - dicussed with PCP Dr Parra  - d/w daughter  d/c planing to [facility]     89-year-old female PMH of CVA, YOVANI on CPAP, HTN, HLD, A-fib on Eliquis, heart failure on furosemide, presents to the ED complaining of several days of coughing, increased wheezing, shortness of breath, and found to be influenza positive. Patient admitted for sepsis management.     Upper GI bleed  - iv ppi bid  - GI consult saw pt  - conservative management  - advance diet  - hold eliquis for now    Sepsis.   - Blood cultures drawn and NGTD  - completed  ceftriaxone   - Continue oseltamavir  - Monitor fever curve.  - now on zosyn empirically day 5 for poss aspiration PNA  - leukocytosis is chronic    Asthma exacerbation.   ·  Plan: 2nd to Influenza A +/- bacterial PNA  -Continue Duoneb q6h  -Continue Symbicort 160/4.5 mcg 2 puffs BID (home med Breo Ellipta)  -Prednisone 40 mg PO qd x5 days  completed  -Keep sats >90% with O2 PRN, currently RA.    Influenza A.    -CT chest with b/l TIB opacities, may be viral +/- bacterial PNA   -Continue Tamiflu  -Steroids/bronchodilators as above.    Chronic systolic congestive heart failure.   -Not currently in decompensated HF. TTE from 8/29/23 showing normal LVSF, EF 61%  - Will continue eplerenone, atenolol, losartan for now   - Will hold lasix for now given sepsis and current euvolemic status   - Monitor I/Os, daily weight  - BMP daily, monitor electrolytes while on diuretics.    Chronic atrial fibrillation.   - on eliquis  - on metoprolol  - cardiology to see    CORY clot on ct  -  tte done  - pt has been on a/c    adrenal mass  - to be evaluated as out pt as pt has active issues      YOVANI (obstructive sleep apnea).   - By hx  -  pt reports does not have CPAP at home   -VBG acceptable  -Suggest monitor off CPAP, order d/c'd.     Prophylactic measure.   ·  Plan: DVT ppx: eliquis  Sleep: on benadryl qhs prn  Diet: DASH  Dispo: cleared by pulm for discharge to [facility]  - dicussed with PCP Dr Parra  - d/w daughter  d/c planing to [facility]     89-year-old female PMH of CVA, YOVANI on CPAP, HTN, HLD, A-fib on Eliquis, heart failure on furosemide, presents to the ED complaining of several days of coughing, increased wheezing, shortness of breath, and found to be influenza positive. Patient admitted for sepsis management.     Upper GI bleed  - iv ppi bid  - GI consult saw pt  - conservative management  - advance diet  - hold eliquis for now    Sepsis.   - Blood cultures drawn and NGTD  - completed  ceftriaxone   - Continue oseltamavir  - Monitor fever curve.  - now on zosyn empirically day 5 for poss aspiration PNA  - leukocytosis is chronic    Asthma exacerbation.   ·  Plan: 2nd to Influenza A +/- bacterial PNA  -Continue Duoneb q6h  -Continue Symbicort 160/4.5 mcg 2 puffs BID (home med Breo Ellipta)  -Prednisone 40 mg PO qd x5 days  completed  -Keep sats >90% with O2 PRN, currently RA.    Influenza A.    -CT chest with b/l TIB opacities, may be viral +/- bacterial PNA   -Continue Tamiflu  -Steroids/bronchodilators as above.    Chronic systolic congestive heart failure.   -Not currently in decompensated HF. TTE from 8/29/23 showing normal LVSF, EF 61%  - Will continue eplerenone, atenolol, losartan for now   - Will hold lasix for now given sepsis and current euvolemic status   - Monitor I/Os, daily weight  - BMP daily, monitor electrolytes while on diuretics.    Chronic atrial fibrillation.   - on eliquis  - on metoprolol  - cardiology to see    CORY clot on ct  -  tte done  - pt has been on a/c    adrenal mass  - to be evaluated as out pt as pt has active issues      YOVANI (obstructive sleep apnea).   - By hx  -  pt reports does not have CPAP at home   -VBG acceptable  -Suggest monitor off CPAP, order d/c'd.     Prophylactic measure.   ·  Plan: DVT ppx: eliquis  Sleep: on benadryl qhs prn  Diet: DASH  Dispo: cleared by pulm for discharge to [facility]  - dicussed with PCP Dr Parra  - d/w daughter  d/c planing to [facility]     89-year-old female PMH of CVA, YOVANI on CPAP, HTN, HLD, A-fib on Eliquis, heart failure on furosemide, presents to the ED complaining of several days of coughing, increased wheezing, shortness of breath, and found to be influenza positive. Patient admitted for sepsis management.     Upper GI bleed  - iv ppi bid  - GI consult saw pt  - conservative management  - advance diet  - hold eliquis for now    Sepsis.   - Blood cultures drawn and NGTD  - completed  ceftriaxone   - Continue oseltamavir  - Monitor fever curve.  - now on zosyn empirically day 5 for poss aspiration PNA  - leukocytosis is chronic    Asthma exacerbation.   ·  Plan: 2nd to Influenza A +/- bacterial PNA  -Continue Duoneb q6h  -Continue Symbicort 160/4.5 mcg 2 puffs BID (home med Breo Ellipta)  -Prednisone 40 mg PO qd x5 days  completed  -Keep sats >90% with O2 PRN, currently RA.    Influenza A.    -CT chest with b/l TIB opacities, may be viral +/- bacterial PNA   -Continue Tamiflu  -Steroids/bronchodilators as above.    Chronic systolic congestive heart failure.   -Not currently in decompensated HF. TTE from 8/29/23 showing normal LVSF, EF 61%  - Will continue eplerenone, atenolol, losartan for now   - Will hold lasix for now given sepsis and current euvolemic status   - Monitor I/Os, daily weight  - BMP daily, monitor electrolytes while on diuretics.    Chronic atrial fibrillation.   - on eliquis  - on metoprolol  - cardiology to see    CORY clot on ct  -  tte done  - pt has been on a/c    adrenal mass  - to be evaluated as out pt as pt has active issues      YOVANI (obstructive sleep apnea).   - By hx  -  pt reports does not have CPAP at home   -VBG acceptable  -Suggest monitor off CPAP, order d/c'd.     Prophylactic measure.   ·  Plan: DVT ppx: eliquis  Sleep: on benadryl qhs prn  Diet: DASH  Dispo: cleared by pulm for discharge to [facility]  - dicussed with PCP Dr Parra  - d/w daughter  d/c planing to [facility]

## 2024-01-15 NOTE — PROGRESS NOTE ADULT - SUBJECTIVE AND OBJECTIVE BOX
DATE OF SERVICE: 01-15-24 @ 12:28    Patient is a 89y old  Female who presents with a chief complaint of SOB (15 Percy 2024 12:15)      SUBJECTIVE / OVERNIGHT EVENTS:  No chest pain. No shortness of breath. No complaints. No events overnight.     MEDICATIONS  (STANDING):  albuterol/ipratropium for Nebulization 3 milliLiter(s) Nebulizer every 6 hours  apixaban 5 milliGRAM(s) Oral every 12 hours  atorvastatin 10 milliGRAM(s) Oral at bedtime  budesonide 160 MICROgram(s)/formoterol 4.5 MICROgram(s) Inhaler 2 Puff(s) Inhalation two times a day  busPIRone 15 milliGRAM(s) Oral two times a day  digoxin     Tablet 125 MICROGram(s) Oral daily  eplerenone 50 milliGRAM(s) Oral daily  furosemide    Tablet 40 milliGRAM(s) Oral daily  levothyroxine 25 MICROGram(s) Oral daily  losartan 25 milliGRAM(s) Oral daily  metoprolol tartrate 100 milliGRAM(s) Oral three times a day  ondansetron Injectable 4 milliGRAM(s) IV Push once  pantoprazole    Tablet 40 milliGRAM(s) Oral before breakfast  pantoprazole  Injectable 40 milliGRAM(s) IV Push two times a day  piperacillin/tazobactam IVPB.. 3.375 Gram(s) IV Intermittent every 8 hours  polyethylene glycol 3350 17 Gram(s) Oral daily  predniSONE   Tablet 30 milliGRAM(s) Oral daily  pregabalin 25 milliGRAM(s) Oral two times a day    MEDICATIONS  (PRN):      Vital Signs Last 24 Hrs  T(C): 36.5 (15 Percy 2024 11:32), Max: 36.5 (15 Percy 2024 04:57)  T(F): 97.7 (15 Percy 2024 11:32), Max: 97.7 (15 Percy 2024 04:57)  HR: 59 (15 Percy 2024 11:32) (59 - 86)  BP: 108/71 (15 Percy 2024 11:32) (108/71 - 132/76)  BP(mean): --  RR: 18 (15 Percy 2024 11:32) (18 - 18)  SpO2: 94% (15 Percy 2024 11:32) (94% - 97%)    Parameters below as of 15 Percy 2024 11:32  Patient On (Oxygen Delivery Method): room air      CAPILLARY BLOOD GLUCOSE        I&O's Summary    14 Jan 2024 07:01  -  15 Jan 2024 07:00  --------------------------------------------------------  IN: 417 mL / OUT: 1550 mL / NET: -1133 mL        PHYSICAL EXAM:  GENERAL: NAD, well-developed  HEAD:  Atraumatic, Normocephalic  EYES: EOMI, PERRLA, conjunctiva and sclera clear  NECK: Supple, No JVD  CHEST/LUNG: Clear to auscultation bilaterally; No wheeze  HEART: Regular rate and rhythm; No murmurs, rubs, or gallops  ABDOMEN: Soft, Nontender, Nondistended; Bowel sounds present  EXTREMITIES:  2+ Peripheral Pulses, No clubbing, cyanosis, or edema  NEUROLOGY: non-focal  SKIN: No rashes or lesions    LABS:                        14.5   19.60 )-----------( 246      ( 15 Percy 2024 05:37 )             44.2     01-15    140  |  100  |  43<H>  ----------------------------<  94  3.5   |  26  |  1.10    Ca    7.2<L>      15 Percy 2024 05:36  Mg     2.2     01-14            Urinalysis Basic - ( 15 Percy 2024 05:36 )    Color: x / Appearance: x / SG: x / pH: x  Gluc: 94 mg/dL / Ketone: x  / Bili: x / Urobili: x   Blood: x / Protein: x / Nitrite: x   Leuk Esterase: x / RBC: x / WBC x   Sq Epi: x / Non Sq Epi: x / Bacteria: x        RADIOLOGY & ADDITIONAL TESTS:    Imaging Personally Reviewed:    Consultant(s) Notes Reviewed:      Care Discussed with Consultants/Other Providers:

## 2024-01-15 NOTE — PROGRESS NOTE ADULT - ASSESSMENT
Agree with above assessment and plan as outlined above.    - abx per med    Herman Shoemaker MD, [facility]  [facility]EEPER [phone]   Agree with above assessment and plan as outlined above.    - abx per med    Herman Shoemaker MD, [facility]  [facility]EE[facility] [phone]   Agree with above assessment and plan as outlined above.    - abx per med    Herman Shoemaker MD, [facility]  BEEPER [phone]

## 2024-01-15 NOTE — PROGRESS NOTE ADULT - SUBJECTIVE AND OBJECTIVE BOX
C A R D I O L O G Y  **********************************     DATE OF SERVICE: 01-15-24    Patient denies chest pain, palpitations, or shortness of breath.   Review of systems otherwise negative.  	  MEDICATIONS:  MEDICATIONS  (STANDING):  albuterol/ipratropium for Nebulization 3 milliLiter(s) Nebulizer every 6 hours  apixaban 5 milliGRAM(s) Oral every 12 hours  atorvastatin 10 milliGRAM(s) Oral at bedtime  budesonide 160 MICROgram(s)/formoterol 4.5 MICROgram(s) Inhaler 2 Puff(s) Inhalation two times a day  busPIRone 15 milliGRAM(s) Oral two times a day  digoxin     Tablet 125 MICROGram(s) Oral daily  eplerenone 50 milliGRAM(s) Oral daily  furosemide    Tablet 40 milliGRAM(s) Oral daily  levothyroxine 25 MICROGram(s) Oral daily  losartan 25 milliGRAM(s) Oral daily  metoprolol tartrate 100 milliGRAM(s) Oral three times a day  ondansetron Injectable 4 milliGRAM(s) IV Push once  pantoprazole    Tablet 40 milliGRAM(s) Oral before breakfast  pantoprazole  Injectable 40 milliGRAM(s) IV Push two times a day  piperacillin/tazobactam IVPB.. 3.375 Gram(s) IV Intermittent every 8 hours  polyethylene glycol 3350 17 Gram(s) Oral daily  predniSONE   Tablet 30 milliGRAM(s) Oral daily  pregabalin 25 milliGRAM(s) Oral two times a day      LABS:	 	    CARDIAC MARKERS:                          14.5   19.60 )-----------( 246      ( 15 Percy 2024 05:37 )             44.2     Hemoglobin: 14.5 g/dL (01-15 @ 05:37)  Hemoglobin: 14.9 g/dL (01-14 @ 06:16)  Hemoglobin: 14.3 g/dL (01-13 @ 07:35)  Hemoglobin: 13.9 g/dL (01-12 @ 06:39)  Hemoglobin: 13.7 g/dL (01-11 @ 04:52)      01-15    140  |  100  |  43<H>  ----------------------------<  94  3.5   |  26  |  1.10    Ca    7.2<L>      15 Percy 2024 05:36  Mg     2.2     01-14      Creatinine Trend: 1.10<--, 1.12<--, 1.06<--, 0.97<--, 1.00<--, 1.23<--    COAGS:       proBNP:   Lipid Profile:   HgA1c:   TSH:       PHYSICAL EXAM:  T(C): 36.5 (01-15-24 @ 11:32), Max: 36.5 (01-15-24 @ 04:57)  HR: 59 (01-15-24 @ 11:32) (59 - 86)  BP: 108/71 (01-15-24 @ 11:32) (108/71 - 132/76)  RR: 18 (01-15-24 @ 11:32) (18 - 18)  SpO2: 94% (01-15-24 @ 11:32) (94% - 97%)  Wt(kg): --  I&O's Summary    14 Jan 2024 07:01  -  15 Percy 2024 07:00  --------------------------------------------------------  IN: 417 mL / OUT: 1550 mL / NET: -1133 mL          Gen: NAD  HEENT:  (-)icterus (-)pallor  CV: N S1 S2 1/6 DMITRIY (+)2 Pulses B/l  Resp:  Clear to auscultation B/L, normal effort  GI: (+) BS Soft, NT, ND  Lymph:  (-)Edema, (-)obvious lymphadenopathy  Skin: Warm to touch, Normal turgor  Psych: Appropriate mood and affect      TELEMETRY: AF 70-90	    ECG: AF RVR  	    RADIOLOGY:         CXR: < from: Xray Chest 1 View- PORTABLE-Urgent (Xray Chest 1 View- PORTABLE-Urgent .) (01.12.24 @ 13:10) >  IMPRESSION:    Clear lungs.    < end of copied text >    < from: TTE W or WO Ultrasound Enhancing Agent (01.11.24 @ 11:10) >  CONCLUSIONS:      1. Left ventricular cavity is normal. Left ventricular systolic function is normal. There are no regional wall motion abnormalities seen.   2. Mild pulmonary hypertension.   3. Device lead is visualized in the right heart.   4. No prior echocardiogram is available for comparison.    < end of copied text >      ASSESSMENT/PLAN: Patient is a 89 y/o female with PMH of CVA, YOVANI on CPAP, HTN, HLD, persistent afib on Eliquis s/p Medtronic PPM, and diastolic heart failure who presented with SOB, cough, and wheezing admitted with Influenza A and PNA. Cardiology consulted for afib RVR.    #Afib RVR  - In setting of infection now improved  - EP eval appreciated  - Continue rate control with metoprolol 100mg TID and Digoxin 0.125mg daily  - Continue Eliquis for stroke prevention if no contraindications  - TTE with normal LV function    #Influenza A/PNA  - Management and Abx per med/pulm  - BCx negative    #Chronic Diastolic Heart Failure  - Continue eplerenone  - Does not appear to be in heart failure - CXR with clear lungs  - Continue PO lasix as tolerated    #HTN  - Continue Losartan and Metoprolol    #HLD  - Continue Lipitor    - No further inpatient cardiac w/u planned    Gagandeep Joya PA-C  Pager: [phone]       C A R D I O L O G Y  **********************************     DATE OF SERVICE: 01-15-24    Patient denies chest pain, palpitations, or shortness of breath.   Review of systems otherwise negative.  	  MEDICATIONS:  MEDICATIONS  (STANDING):  albuterol/ipratropium for Nebulization 3 milliLiter(s) Nebulizer every 6 hours  apixaban 5 milliGRAM(s) Oral every 12 hours  atorvastatin 10 milliGRAM(s) Oral at bedtime  budesonide 160 MICROgram(s)/formoterol 4.5 MICROgram(s) Inhaler 2 Puff(s) Inhalation two times a day  busPIRone 15 milliGRAM(s) Oral two times a day  digoxin     Tablet 125 MICROGram(s) Oral daily  eplerenone 50 milliGRAM(s) Oral daily  furosemide    Tablet 40 milliGRAM(s) Oral daily  levothyroxine 25 MICROGram(s) Oral daily  losartan 25 milliGRAM(s) Oral daily  metoprolol tartrate 100 milliGRAM(s) Oral three times a day  ondansetron Injectable 4 milliGRAM(s) IV Push once  pantoprazole    Tablet 40 milliGRAM(s) Oral before breakfast  pantoprazole  Injectable 40 milliGRAM(s) IV Push two times a day  piperacillin/tazobactam IVPB.. 3.375 Gram(s) IV Intermittent every 8 hours  polyethylene glycol 3350 17 Gram(s) Oral daily  predniSONE   Tablet 30 milliGRAM(s) Oral daily  pregabalin 25 milliGRAM(s) Oral two times a day      LABS:	 	    CARDIAC MARKERS:                          14.5   19.60 )-----------( 246      ( 15 Percy 2024 05:37 )             44.2     Hemoglobin: 14.5 g/dL (01-15 @ 05:37)  Hemoglobin: 14.9 g/dL (01-14 @ 06:16)  Hemoglobin: 14.3 g/dL (01-13 @ 07:35)  Hemoglobin: 13.9 g/dL (01-12 @ 06:39)  Hemoglobin: 13.7 g/dL (01-11 @ 04:52)      01-15    140  |  100  |  43<H>  ----------------------------<  94  3.5   |  26  |  1.10    Ca    7.2<L>      15 Percy 2024 05:36  Mg     2.2     01-14      Creatinine Trend: 1.10<--, 1.12<--, 1.06<--, 0.97<--, 1.00<--, 1.23<--    COAGS:       proBNP:   Lipid Profile:   HgA1c:   TSH:       PHYSICAL EXAM:  T(C): 36.5 (01-15-24 @ 11:32), Max: 36.5 (01-15-24 @ 04:57)  HR: 59 (01-15-24 @ 11:32) (59 - 86)  BP: 108/71 (01-15-24 @ 11:32) (108/71 - 132/76)  RR: 18 (01-15-24 @ 11:32) (18 - 18)  SpO2: 94% (01-15-24 @ 11:32) (94% - 97%)  Wt(kg): --  I&O's Summary    14 Jan 2024 07:01  -  15 Percy 2024 07:00  --------------------------------------------------------  IN: 417 mL / OUT: 1550 mL / NET: -1133 mL          Gen: NAD  HEENT:  (-)icterus (-)pallor  CV: N S1 S2 1/6 DMITRIY (+)2 Pulses B/l  Resp:  Clear to auscultation B/L, normal effort  GI: (+) BS Soft, NT, ND  Lymph:  (-)Edema, (-)obvious lymphadenopathy  Skin: Warm to touch, Normal turgor  Psych: Appropriate mood and affect      TELEMETRY: AF 70-90	    ECG: AF RVR  	    RADIOLOGY:         CXR: < from: Xray Chest 1 View- PORTABLE-Urgent (Xray Chest 1 View- PORTABLE-Urgent .) (01.12.24 @ 13:10) >  IMPRESSION:    Clear lungs.    < end of copied text >    < from: TTE W or WO Ultrasound Enhancing Agent (01.11.24 @ 11:10) >  CONCLUSIONS:      1. Left ventricular cavity is normal. Left ventricular systolic function is normal. There are no regional wall motion abnormalities seen.   2. Mild pulmonary hypertension.   3. Device lead is visualized in the right heart.   4. No prior echocardiogram is available for comparison.    < end of copied text >      ASSESSMENT/PLAN: Patient is a 89 y/o female with PMH of CVA, YOVANI on CPAP, HTN, HLD, persistent afib on Eliquis s/p Medtronic PPM, and diastolic heart failure who presented with SOB, cough, and wheezing admitted with Influenza A and PNA. Cardiology consulted for afib RVR.    #Afib RVR  - In setting of infection now improved  - EP eval appreciated  - Continue rate control with metoprolol 100mg TID and Digoxin 0.125mg daily  - Continue Eliquis for stroke prevention if no contraindications  - TTE with normal LV function    #Influenza A/PNA  - Management and Abx per med/pulm  - BCx negative    #Chronic Diastolic Heart Failure  - Continue eplerenone  - Does not appear to be in heart failure - CXR with clear lungs  - Continue PO lasix as tolerated    #HTN  - Continue Losartan and Metoprolol    #HLD  - Continue Lipitor    - No further inpatient cardiac w/u planned    Gagandeep Joya PA-C  Pager: [phone]       C A R D I O L O G Y  **********************************     DATE OF SERVICE: 01-15-24    Patient denies chest pain, palpitations, or shortness of breath.   Review of systems otherwise negative.  	  MEDICATIONS:  MEDICATIONS  (STANDING):  albuterol/ipratropium for Nebulization 3 milliLiter(s) Nebulizer every 6 hours  apixaban 5 milliGRAM(s) Oral every 12 hours  atorvastatin 10 milliGRAM(s) Oral at bedtime  budesonide 160 MICROgram(s)/formoterol 4.5 MICROgram(s) Inhaler 2 Puff(s) Inhalation two times a day  busPIRone 15 milliGRAM(s) Oral two times a day  digoxin     Tablet 125 MICROGram(s) Oral daily  eplerenone 50 milliGRAM(s) Oral daily  furosemide    Tablet 40 milliGRAM(s) Oral daily  levothyroxine 25 MICROGram(s) Oral daily  losartan 25 milliGRAM(s) Oral daily  metoprolol tartrate 100 milliGRAM(s) Oral three times a day  ondansetron Injectable 4 milliGRAM(s) IV Push once  pantoprazole    Tablet 40 milliGRAM(s) Oral before breakfast  pantoprazole  Injectable 40 milliGRAM(s) IV Push two times a day  piperacillin/tazobactam IVPB.. 3.375 Gram(s) IV Intermittent every 8 hours  polyethylene glycol 3350 17 Gram(s) Oral daily  predniSONE   Tablet 30 milliGRAM(s) Oral daily  pregabalin 25 milliGRAM(s) Oral two times a day      LABS:	 	    CARDIAC MARKERS:                          14.5   19.60 )-----------( 246      ( 15 Percy 2024 05:37 )             44.2     Hemoglobin: 14.5 g/dL (01-15 @ 05:37)  Hemoglobin: 14.9 g/dL (01-14 @ 06:16)  Hemoglobin: 14.3 g/dL (01-13 @ 07:35)  Hemoglobin: 13.9 g/dL (01-12 @ 06:39)  Hemoglobin: 13.7 g/dL (01-11 @ 04:52)      01-15    140  |  100  |  43<H>  ----------------------------<  94  3.5   |  26  |  1.10    Ca    7.2<L>      15 Percy 2024 05:36  Mg     2.2     01-14      Creatinine Trend: 1.10<--, 1.12<--, 1.06<--, 0.97<--, 1.00<--, 1.23<--    COAGS:       proBNP:   Lipid Profile:   HgA1c:   TSH:       PHYSICAL EXAM:  T(C): 36.5 (01-15-24 @ 11:32), Max: 36.5 (01-15-24 @ 04:57)  HR: 59 (01-15-24 @ 11:32) (59 - 86)  BP: 108/71 (01-15-24 @ 11:32) (108/71 - 132/76)  RR: 18 (01-15-24 @ 11:32) (18 - 18)  SpO2: 94% (01-15-24 @ 11:32) (94% - 97%)  Wt(kg): --  I&O's Summary    14 Jan 2024 07:01  -  15 Percy 2024 07:00  --------------------------------------------------------  IN: 417 mL / OUT: 1550 mL / NET: -1133 mL          Gen: NAD  HEENT:  (-)icterus (-)pallor  CV: N S1 S2 1/6 DMITRIY (+)2 Pulses B/l  Resp:  Clear to auscultation B/L, normal effort  GI: (+) BS Soft, NT, ND  Lymph:  (-)Edema, (-)obvious lymphadenopathy  Skin: Warm to touch, Normal turgor  Psych: Appropriate mood and affect      TELEMETRY: AF 70-90	    ECG: AF RVR  	    RADIOLOGY:         CXR: < from: Xray Chest 1 View- PORTABLE-Urgent (Xray Chest 1 View- PORTABLE-Urgent .) (01.12.24 @ 13:10) >  IMPRESSION:    Clear lungs.    < end of copied text >    < from: TTE W or WO Ultrasound Enhancing Agent (01.11.24 @ 11:10) >  CONCLUSIONS:      1. Left ventricular cavity is normal. Left ventricular systolic function is normal. There are no regional wall motion abnormalities seen.   2. Mild pulmonary hypertension.   3. Device lead is visualized in the right heart.   4. No prior echocardiogram is available for comparison.    < end of copied text >      ASSESSMENT/PLAN: Patient is a 89 y/o female with PMH of CVA, YOVANI on CPAP, HTN, HLD, persistent afib on Eliquis s/p Medtronic PPM, and diastolic heart failure who presented with SOB, cough, and wheezing admitted with Influenza A and PNA. Cardiology consulted for afib RVR.    #Afib RVR  - In setting of infection now improved  - EP eval appreciated  - Continue rate control with metoprolol 100mg TID and Digoxin 0.125mg daily  - Continue Eliquis for stroke prevention if no contraindications  - TTE with normal LV function    #Influenza A/PNA  - Management and Abx per med/pulm  - BCx negative    #Chronic Diastolic Heart Failure  - Continue eplerenone  - Does not appear to be in heart failure - CXR with clear lungs  - Continue PO lasix as tolerated    #HTN  - Continue Losartan and Metoprolol    #HLD  - Continue Lipitor    - No further inpatient cardiac w/u planned    Gagandeep Joya PA-C  Pager: [phone]

## 2024-01-15 NOTE — PROGRESS NOTE ADULT - PROBLEM SELECTOR PLAN 3
-S/p RRT 1/10 for coffee ground emesis, ? aspiration event.  -Continue ABX  -CXR with no infiltrate as of yet   -GI f/u  -WBC uptrending. CXR ordered. -S/p RRT 1/10 for coffee ground emesis, ? aspiration event.  -Continue ABX  -CXR with no infiltrate as of yet   -GI f/u  -WBC uptrending, may be 2nd to steroids. F/u CXR ordered.

## 2024-01-15 NOTE — PROGRESS NOTE ADULT - SUBJECTIVE AND OBJECTIVE BOX
Follow-up Pulm Progress Note    No new respiratory events overnight.  Denies SOB/CP.     Medications:  MEDICATIONS  (STANDING):  albuterol/ipratropium for Nebulization 3 milliLiter(s) Nebulizer every 6 hours  apixaban 5 milliGRAM(s) Oral every 12 hours  atorvastatin 10 milliGRAM(s) Oral at bedtime  budesonide 160 MICROgram(s)/formoterol 4.5 MICROgram(s) Inhaler 2 Puff(s) Inhalation two times a day  busPIRone 15 milliGRAM(s) Oral two times a day  digoxin     Tablet 125 MICROGram(s) Oral daily  eplerenone 50 milliGRAM(s) Oral daily  furosemide    Tablet 40 milliGRAM(s) Oral daily  levothyroxine 25 MICROGram(s) Oral daily  losartan 25 milliGRAM(s) Oral daily  metoprolol tartrate 100 milliGRAM(s) Oral three times a day  ondansetron Injectable 4 milliGRAM(s) IV Push once  pantoprazole    Tablet 40 milliGRAM(s) Oral before breakfast  pantoprazole  Injectable 40 milliGRAM(s) IV Push two times a day  piperacillin/tazobactam IVPB.. 3.375 Gram(s) IV Intermittent every 8 hours  polyethylene glycol 3350 17 Gram(s) Oral daily  pregabalin 25 milliGRAM(s) Oral two times a day        Vital Signs Last 24 Hrs  T(C): 36.5 (15 Percy 2024 11:32), Max: 36.5 (15 Percy 2024 04:57)  T(F): 97.7 (15 Percy 2024 11:32), Max: 97.7 (15 Percy 2024 04:57)  HR: 59 (15 Percy 2024 11:32) (59 - 86)  BP: 108/71 (15 Percy 2024 11:32) (108/71 - 132/76)  BP(mean): --  RR: 18 (15 Percy 2024 11:32) (18 - 18)  SpO2: 94% (15 Percy 2024 11:32) (94% - 97%)    Parameters below as of 15 Percy 2024 11:32  Patient On (Oxygen Delivery Method): room air              01-14 @ 07:01  -  01-15 @ 07:00  --------------------------------------------------------  IN: 417 mL / OUT: 1550 mL / NET: -1133 mL          LABS:                        14.5   19.60 )-----------( 246      ( 15 Percy 2024 05:37 )             44.2     01-15    140  |  100  |  43<H>  ----------------------------<  94  3.5   |  26  |  1.10    Ca    7.2<L>      15 Percy 2024 05:36  Mg     2.2     01-14      Urinalysis Basic - ( 15 Percy 2024 05:36 )    Color: x / Appearance: x / SG: x / pH: x  Gluc: 94 mg/dL / Ketone: x  / Bili: x / Urobili: x   Blood: x / Protein: x / Nitrite: x   Leuk Esterase: x / RBC: x / WBC x   Sq Epi: x / Non Sq Epi: x / Bacteria: x    Physical Examination:  PULM: CTA b/l, no wheeze   CVS: S1, S2 heard    RADIOLOGY REVIEWED  CXR: grossly clear      Follow-up Pulm Progress Note    No new respiratory events overnight.  Denies SOB/CP.     Medications:  MEDICATIONS  (STANDING):  albuterol/ipratropium for Nebulization 3 milliLiter(s) Nebulizer every 6 hours  apixaban 5 milliGRAM(s) Oral every 12 hours  atorvastatin 10 milliGRAM(s) Oral at bedtime  budesonide 160 MICROgram(s)/formoterol 4.5 MICROgram(s) Inhaler 2 Puff(s) Inhalation two times a day  busPIRone 15 milliGRAM(s) Oral two times a day  digoxin     Tablet 125 MICROGram(s) Oral daily  eplerenone 50 milliGRAM(s) Oral daily  furosemide    Tablet 40 milliGRAM(s) Oral daily  levothyroxine 25 MICROGram(s) Oral daily  losartan 25 milliGRAM(s) Oral daily  metoprolol tartrate 100 milliGRAM(s) Oral three times a day  ondansetron Injectable 4 milliGRAM(s) IV Push once  pantoprazole    Tablet 40 milliGRAM(s) Oral before breakfast  pantoprazole  Injectable 40 milliGRAM(s) IV Push two times a day  piperacillin/tazobactam IVPB.. 3.375 Gram(s) IV Intermittent every 8 hours  polyethylene glycol 3350 17 Gram(s) Oral daily  pregabalin 25 milliGRAM(s) Oral two times a day        Vital Signs Last 24 Hrs  T(C): 36.5 (15 Percy 2024 11:32), Max: 36.5 (15 Percy 2024 04:57)  T(F): 97.7 (15 Percy 2024 11:32), Max: 97.7 (15 Percy 2024 04:57)  HR: 59 (15 Percy 2024 11:32) (59 - 86)  BP: 108/71 (15 Percy 2024 11:32) (108/71 - 132/76)  BP(mean): --  RR: 18 (15 Percy 2024 11:32) (18 - 18)  SpO2: 94% (15 Percy 2024 11:32) (94% - 97%)    Parameters below as of 15 Pecry 2024 11:32  Patient On (Oxygen Delivery Method): room air              01-14 @ 07:01  -  01-15 @ 07:00  --------------------------------------------------------  IN: 417 mL / OUT: 1550 mL / NET: -1133 mL          LABS:                        14.5   19.60 )-----------( 246      ( 15 Percy 2024 05:37 )             44.2     01-15    140  |  100  |  43<H>  ----------------------------<  94  3.5   |  26  |  1.10    Ca    7.2<L>      15 Percy 2024 05:36  Mg     2.2     01-14      Urinalysis Basic - ( 15 Percy 2024 05:36 )    Color: x / Appearance: x / SG: x / pH: x  Gluc: 94 mg/dL / Ketone: x  / Bili: x / Urobili: x   Blood: x / Protein: x / Nitrite: x   Leuk Esterase: x / RBC: x / WBC x   Sq Epi: x / Non Sq Epi: x / Bacteria: x    Physical Examination:  PULM: CTA b/l, no wheeze   CVS: S1, S2 heard    RADIOLOGY REVIEWED  CXR: grossly clear

## 2024-01-15 NOTE — PROGRESS NOTE ADULT - SUBJECTIVE AND OBJECTIVE BOX
EP ATTENDING    tele: AF better rate controlled, 70s at rest today.  DATE OF SERVICE - 01-15-24     Review of Systems:   Constitutional: [ ] fevers, [ ] chills.   Skin: [ ] dry skin. [ ] rashes.  Psychiatric: [ ] depression, [ ] anxiety.   Gastrointestinal: [ ] BRBPR, [ ] melena.   Neurological: [ ] confusion. [ ] seizures. [ ] shuffling gait.   Ears,Nose,Mouth and Throat: [ ] ear pain [ ] sore throat.   Eyes: [ ] diplopia.   Respiratory: [ ] hemoptysis. [ ] shortness of breath  Cardiovascular: See HPI above  Hematologic/Lymphatic: [ ] anemia. [ ] painful nodes. [ ] prolonged bleeding.   Genitourinary: [ ] hematuria. [ ] flank pain.   Endocrine: [ ] significant change in weight. [ ] intolerance to heat and cold.     Review of systems [ x] otherwise negative, [ ] otherwise unable to obtain    FH: no family history of sudden cardiac death in first degree relatives    SH: [ ] tobacco, [ ] alcohol, [ ] drugs    albuterol/ipratropium for Nebulization 3 milliLiter(s) Nebulizer every 6 hours  apixaban 5 milliGRAM(s) Oral every 12 hours  atorvastatin 10 milliGRAM(s) Oral at bedtime  budesonide 160 MICROgram(s)/formoterol 4.5 MICROgram(s) Inhaler 2 Puff(s) Inhalation two times a day  busPIRone 15 milliGRAM(s) Oral two times a day  digoxin     Tablet 125 MICROGram(s) Oral daily  eplerenone 50 milliGRAM(s) Oral daily  furosemide    Tablet 40 milliGRAM(s) Oral daily  levothyroxine 25 MICROGram(s) Oral daily  losartan 25 milliGRAM(s) Oral daily  metoprolol tartrate 100 milliGRAM(s) Oral three times a day  ondansetron Injectable 4 milliGRAM(s) IV Push once  pantoprazole    Tablet 40 milliGRAM(s) Oral before breakfast  pantoprazole  Injectable 40 milliGRAM(s) IV Push two times a day  piperacillin/tazobactam IVPB.. 3.375 Gram(s) IV Intermittent every 8 hours  polyethylene glycol 3350 17 Gram(s) Oral daily  predniSONE   Tablet 30 milliGRAM(s) Oral daily  pregabalin 25 milliGRAM(s) Oral two times a day                            14.5   19.60 )-----------( 246      ( 15 Percy 2024 05:37 )             44.2       01-15    140  |  100  |  43<H>  ----------------------------<  94  3.5   |  26  |  1.10    Ca    7.2<L>      15 Percy 2024 05:36  Mg     2.2     01-14      T(C): 36.5 (01-15-24 @ 04:57), Max: 36.5 (01-15-24 @ 04:57)  HR: 84 (01-15-24 @ 04:57) (75 - 86)  BP: 132/76 (01-15-24 @ 04:57) (111/73 - 132/76)  RR: 18 (01-15-24 @ 04:57) (18 - 18)  SpO2: 97% (01-15-24 @ 04:57) (94% - 97%)  Wt(kg): --    I&O's Summary    14 Jan 2024 07:01  -  15 Percy 2024 07:00  --------------------------------------------------------  IN: 417 mL / OUT: 1550 mL / NET: -1133 mL    General: Well nourished, no acute distress, alert and oriented x 3  Head: normocephalic, no trauma  Neck: no JVD, no bruit, supple, not enlarged  CV: irregular S1S2,  regular rate, rhythm is AFib, no murmurs.    Lungs: clear BL, no rales or wheezes  Abdomen: bowel sounds +, soft, nontender, nondistended  Extremities: no clubbing, cyanosis or edema  Neuro: Moves all 4 extremities, sensation intact x 4 extremities  Skin: warm and moist, normal turgor  Psych: Mood and affect are appropriate for circumstances  MSK: normal range of motion and strength x4 extremities.    < from: TTE W or WO Ultrasound Enhancing Agent (01.11.24 @ 11:10) >  CONCLUSIONS:      1. Left ventricular cavity is normal. Left ventricular systolic function is normal. There are no regional wall motion abnormalities seen.   2. Mild pulmonary hypertension.   3. Device lead is visualized in the right heart.   4. No prior echocardiogram is available for comparison.    ________________________________________________________________________________________  FINDINGS:     Left Ventricle:  The leftventricular cavity is normal. Left ventricular systolic function is normal with an ejection fraction visually estimated at 55 to 60%. There are no regional wall motion abnormalities seen. There is normal LV mass and concentric remodeling.     Right Ventricle:  The right ventricular cavity is normal in size and borderline reduced systolic function. Tricuspid annular plane systolic excursion (TAPSE) is 1.2 cm (normal >=1.7 cm). A device lead is visualized in the right heart.     Left Atrium:  The left atrium is severely dilated with an indexed volume of 64.80 ml/m².     Right Atrium:  The right atrium is severely dilated in size with an indexed volume of 80.20 ml/m².    < end of copied text >      A/P) She is a pleasant 89 y/o female PMH persistent AF, hypertension, hyperlipidemia, hypothyroidism and stroke who was implanted with a dual chamber pacemaker in 2009. Her last pulse generator change was around 2018. She has since developed persistent AF, and her PPM has been managed by Dr. Bill Napier at [place]. She is now a/w rapid AF. Echo unremarkable. She denies syncope, but reports palpitations and dyspnea.     -no more amiodarone, she is a poor candidate for a rhythm control strategy  -increase metoprolol to 100 tid, continue dig 0.125mg daily.  thus far, effective.  -continue lipitor for hyperlipidemia  -continue synthroid for hypothyroidism  -continue losartan for hypertension  -continue eliquis 5 bid for lifelong a/c  -continue tele  -long term EP plan is rate control & anticoagulation +/- AV node ablation    Franc Bolaños M.D.  Cardiac Electrophysiology  [phone] Pt oxygen weaned to 2L NC ealier during the day tolerating well.   EP ATTENDING    tele: AF better rate controlled, 70s at rest today.  DATE OF SERVICE - 01-15-24     Review of Systems:   Constitutional: [ ] fevers, [ ] chills.   Skin: [ ] dry skin. [ ] rashes.  Psychiatric: [ ] depression, [ ] anxiety.   Gastrointestinal: [ ] BRBPR, [ ] melena.   Neurological: [ ] confusion. [ ] seizures. [ ] shuffling gait.   Ears,Nose,Mouth and Throat: [ ] ear pain [ ] sore throat.   Eyes: [ ] diplopia.   Respiratory: [ ] hemoptysis. [ ] shortness of breath  Cardiovascular: See HPI above  Hematologic/Lymphatic: [ ] anemia. [ ] painful nodes. [ ] prolonged bleeding.   Genitourinary: [ ] hematuria. [ ] flank pain.   Endocrine: [ ] significant change in weight. [ ] intolerance to heat and cold.     Review of systems [ x] otherwise negative, [ ] otherwise unable to obtain    FH: no family history of sudden cardiac death in first degree relatives    SH: [ ] tobacco, [ ] alcohol, [ ] drugs    albuterol/ipratropium for Nebulization 3 milliLiter(s) Nebulizer every 6 hours  apixaban 5 milliGRAM(s) Oral every 12 hours  atorvastatin 10 milliGRAM(s) Oral at bedtime  budesonide 160 MICROgram(s)/formoterol 4.5 MICROgram(s) Inhaler 2 Puff(s) Inhalation two times a day  busPIRone 15 milliGRAM(s) Oral two times a day  digoxin     Tablet 125 MICROGram(s) Oral daily  eplerenone 50 milliGRAM(s) Oral daily  furosemide    Tablet 40 milliGRAM(s) Oral daily  levothyroxine 25 MICROGram(s) Oral daily  losartan 25 milliGRAM(s) Oral daily  metoprolol tartrate 100 milliGRAM(s) Oral three times a day  ondansetron Injectable 4 milliGRAM(s) IV Push once  pantoprazole    Tablet 40 milliGRAM(s) Oral before breakfast  pantoprazole  Injectable 40 milliGRAM(s) IV Push two times a day  piperacillin/tazobactam IVPB.. 3.375 Gram(s) IV Intermittent every 8 hours  polyethylene glycol 3350 17 Gram(s) Oral daily  predniSONE   Tablet 30 milliGRAM(s) Oral daily  pregabalin 25 milliGRAM(s) Oral two times a day                            14.5   19.60 )-----------( 246      ( 15 Percy 2024 05:37 )             44.2       01-15    140  |  100  |  43<H>  ----------------------------<  94  3.5   |  26  |  1.10    Ca    7.2<L>      15 Percy 2024 05:36  Mg     2.2     01-14      T(C): 36.5 (01-15-24 @ 04:57), Max: 36.5 (01-15-24 @ 04:57)  HR: 84 (01-15-24 @ 04:57) (75 - 86)  BP: 132/76 (01-15-24 @ 04:57) (111/73 - 132/76)  RR: 18 (01-15-24 @ 04:57) (18 - 18)  SpO2: 97% (01-15-24 @ 04:57) (94% - 97%)  Wt(kg): --    I&O's Summary    14 Jan 2024 07:01  -  15 Percy 2024 07:00  --------------------------------------------------------  IN: 417 mL / OUT: 1550 mL / NET: -1133 mL    General: Well nourished, no acute distress, alert and oriented x 3  Head: normocephalic, no trauma  Neck: no JVD, no bruit, supple, not enlarged  CV: irregular S1S2,  regular rate, rhythm is AFib, no murmurs.    Lungs: clear BL, no rales or wheezes  Abdomen: bowel sounds +, soft, nontender, nondistended  Extremities: no clubbing, cyanosis or edema  Neuro: Moves all 4 extremities, sensation intact x 4 extremities  Skin: warm and moist, normal turgor  Psych: Mood and affect are appropriate for circumstances  MSK: normal range of motion and strength x4 extremities.    < from: TTE W or WO Ultrasound Enhancing Agent (01.11.24 @ 11:10) >  CONCLUSIONS:      1. Left ventricular cavity is normal. Left ventricular systolic function is normal. There are no regional wall motion abnormalities seen.   2. Mild pulmonary hypertension.   3. Device lead is visualized in the right heart.   4. No prior echocardiogram is available for comparison.    ________________________________________________________________________________________  FINDINGS:     Left Ventricle:  The leftventricular cavity is normal. Left ventricular systolic function is normal with an ejection fraction visually estimated at 55 to 60%. There are no regional wall motion abnormalities seen. There is normal LV mass and concentric remodeling.     Right Ventricle:  The right ventricular cavity is normal in size and borderline reduced systolic function. Tricuspid annular plane systolic excursion (TAPSE) is 1.2 cm (normal >=1.7 cm). A device lead is visualized in the right heart.     Left Atrium:  The left atrium is severely dilated with an indexed volume of 64.80 ml/m².     Right Atrium:  The right atrium is severely dilated in size with an indexed volume of 80.20 ml/m².    < end of copied text >      A/P) She is a pleasant 89 y/o female PMH persistent AF, hypertension, hyperlipidemia, hypothyroidism and stroke who was implanted with a dual chamber pacemaker in 2009. Her last pulse generator change was around 2018. She has since developed persistent AF, and her PPM has been managed by Dr. Bill Napier at [place]. She is now a/w rapid AF. Echo unremarkable. She denies syncope, but reports palpitations and dyspnea.     -no more amiodarone, she is a poor candidate for a rhythm control strategy  -increase metoprolol to 100 tid, continue dig 0.125mg daily.  thus far, effective.  -continue lipitor for hyperlipidemia  -continue synthroid for hypothyroidism  -continue losartan for hypertension  -continue eliquis 5 bid for lifelong a/c  -continue tele  -long term EP plan is rate control & anticoagulation +/- AV node ablation    Franc Bolaños M.D.  Cardiac Electrophysiology  [phone] EP ATTENDING    tele: AF better rate controlled, 70s at rest today.  DATE OF SERVICE - 01-15-24     Review of Systems:   Constitutional: [ ] fevers, [ ] chills.   Skin: [ ] dry skin. [ ] rashes.  Psychiatric: [ ] depression, [ ] anxiety.   Gastrointestinal: [ ] BRBPR, [ ] melena.   Neurological: [ ] confusion. [ ] seizures. [ ] shuffling gait.   Ears,Nose,Mouth and Throat: [ ] ear pain [ ] sore throat.   Eyes: [ ] diplopia.   Respiratory: [ ] hemoptysis. [ ] shortness of breath  Cardiovascular: See HPI above  Hematologic/Lymphatic: [ ] anemia. [ ] painful nodes. [ ] prolonged bleeding.   Genitourinary: [ ] hematuria. [ ] flank pain.   Endocrine: [ ] significant change in weight. [ ] intolerance to heat and cold.     Review of systems [ x] otherwise negative, [ ] otherwise unable to obtain    FH: no family history of sudden cardiac death in first degree relatives    SH: [ ] tobacco, [ ] alcohol, [ ] drugs    albuterol/ipratropium for Nebulization 3 milliLiter(s) Nebulizer every 6 hours  apixaban 5 milliGRAM(s) Oral every 12 hours  atorvastatin 10 milliGRAM(s) Oral at bedtime  budesonide 160 MICROgram(s)/formoterol 4.5 MICROgram(s) Inhaler 2 Puff(s) Inhalation two times a day  busPIRone 15 milliGRAM(s) Oral two times a day  digoxin     Tablet 125 MICROGram(s) Oral daily  eplerenone 50 milliGRAM(s) Oral daily  furosemide    Tablet 40 milliGRAM(s) Oral daily  levothyroxine 25 MICROGram(s) Oral daily  losartan 25 milliGRAM(s) Oral daily  metoprolol tartrate 100 milliGRAM(s) Oral three times a day  ondansetron Injectable 4 milliGRAM(s) IV Push once  pantoprazole    Tablet 40 milliGRAM(s) Oral before breakfast  pantoprazole  Injectable 40 milliGRAM(s) IV Push two times a day  piperacillin/tazobactam IVPB.. 3.375 Gram(s) IV Intermittent every 8 hours  polyethylene glycol 3350 17 Gram(s) Oral daily  predniSONE   Tablet 30 milliGRAM(s) Oral daily  pregabalin 25 milliGRAM(s) Oral two times a day                            14.5   19.60 )-----------( 246      ( 15 Percy 2024 05:37 )             44.2       01-15    140  |  100  |  43<H>  ----------------------------<  94  3.5   |  26  |  1.10    Ca    7.2<L>      15 Percy 2024 05:36  Mg     2.2     01-14      T(C): 36.5 (01-15-24 @ 04:57), Max: 36.5 (01-15-24 @ 04:57)  HR: 84 (01-15-24 @ 04:57) (75 - 86)  BP: 132/76 (01-15-24 @ 04:57) (111/73 - 132/76)  RR: 18 (01-15-24 @ 04:57) (18 - 18)  SpO2: 97% (01-15-24 @ 04:57) (94% - 97%)  Wt(kg): --    I&O's Summary    14 Jan 2024 07:01  -  15 Percy 2024 07:00  --------------------------------------------------------  IN: 417 mL / OUT: 1550 mL / NET: -1133 mL    General: Well nourished, no acute distress, alert and oriented x 3  Head: normocephalic, no trauma  Neck: no JVD, no bruit, supple, not enlarged  CV: irregular S1S2,  regular rate, rhythm is AFib, no murmurs.    Lungs: clear BL, no rales or wheezes  Abdomen: bowel sounds +, soft, nontender, nondistended  Extremities: no clubbing, cyanosis or edema  Neuro: Moves all 4 extremities, sensation intact x 4 extremities  Skin: warm and moist, normal turgor  Psych: Mood and affect are appropriate for circumstances  MSK: normal range of motion and strength x4 extremities.    < from: TTE W or WO Ultrasound Enhancing Agent (01.11.24 @ 11:10) >  CONCLUSIONS:      1. Left ventricular cavity is normal. Left ventricular systolic function is normal. There are no regional wall motion abnormalities seen.   2. Mild pulmonary hypertension.   3. Device lead is visualized in the right heart.   4. No prior echocardiogram is available for comparison.    ________________________________________________________________________________________  FINDINGS:     Left Ventricle:  The leftventricular cavity is normal. Left ventricular systolic function is normal with an ejection fraction visually estimated at 55 to 60%. There are no regional wall motion abnormalities seen. There is normal LV mass and concentric remodeling.     Right Ventricle:  The right ventricular cavity is normal in size and borderline reduced systolic function. Tricuspid annular plane systolic excursion (TAPSE) is 1.2 cm (normal >=1.7 cm). A device lead is visualized in the right heart.     Left Atrium:  The left atrium is severely dilated with an indexed volume of 64.80 ml/m².     Right Atrium:  The right atrium is severely dilated in size with an indexed volume of 80.20 ml/m².    < end of copied text >      A/P) She is a pleasant 89 y/o female PMH persistent AF, hypertension, hyperlipidemia, hypothyroidism and stroke who was implanted with a dual chamber pacemaker in 2009. Her last pulse generator change was around 2018. She has since developed persistent AF, and her PPM has been managed by Dr. Bill Napier at [place]. She is now a/w rapid AF. Echo unremarkable. She denies syncope, but reports palpitations and dyspnea.     -no more amiodarone, she is a poor candidate for a rhythm control strategy  -increase metoprolol to 100 tid, continue dig 0.125mg daily.  thus far, effective.  -continue lipitor for hyperlipidemia  -continue synthroid for hypothyroidism  -continue losartan for hypertension  -continue eliquis 5 bid for lifelong a/c  -continue tele  -long term EP plan is rate control & anticoagulation +/- AV node ablation    Franc Bolaños M.D.  Cardiac Electrophysiology  [phone]

## 2024-01-15 NOTE — PROGRESS NOTE ADULT - ASSESSMENT
89 y/o F with PMH of CVA, YOVANI on CPAP, HTN, HLD, Afib on Eliquis, CHF. Recent admission at [facility] 1 month ago for PNA, completed short course of ABX. Completed additional course of ABX last week for UTI. Presents to the ED with coughing, SOB, wheezing x several days. Found to be Flu + 89 y/o F with PMH of CVA, YOVANI on CPAP, HTN, HLD, Afib on Eliquis, CHF. Recent admission at [facility] 1 month ago for PNA, completed short course of ABX. Completed additional course of ABX last week for UTI. Presents to the ED with coughing, SOB, wheezing x several days. Found to be Flu + 89 y/o F with PMH of CVA, YOVANI on CPAP, HTN, HLD, Afib on Eliquis, CHF. Recent admission at [facility] 1 month ago for PNA, completed short course of ABX. Completed additional course of ABX last week for UTI. Presents to the ED with coughing, SOB, wheezing x several days. Found to be Flu +

## 2024-01-16 LAB
ANION GAP SERPL CALC-SCNC: 13 MMOL/L — SIGNIFICANT CHANGE UP (ref 5–17)
BUN SERPL-MCNC: 46 MG/DL — HIGH (ref 7–23)
CALCIUM SERPL-MCNC: 6.6 MG/DL — LOW (ref 8.4–10.5)
CHLORIDE SERPL-SCNC: 101 MMOL/L — SIGNIFICANT CHANGE UP (ref 96–108)
CO2 SERPL-SCNC: 23 MMOL/L — SIGNIFICANT CHANGE UP (ref 22–31)
CREAT SERPL-MCNC: 1.3 MG/DL — SIGNIFICANT CHANGE UP (ref 0.5–1.3)
EGFR: 39 ML/MIN/1.73M2 — LOW
GLUCOSE SERPL-MCNC: 96 MG/DL — SIGNIFICANT CHANGE UP (ref 70–99)
HCT VFR BLD CALC: 47.1 % — HIGH (ref 34.5–45)
HGB BLD-MCNC: 15.2 G/DL — SIGNIFICANT CHANGE UP (ref 11.5–15.5)
MAGNESIUM SERPL-MCNC: 1.8 MG/DL — SIGNIFICANT CHANGE UP (ref 1.6–2.6)
MCHC RBC-ENTMCNC: 28.1 PG — SIGNIFICANT CHANGE UP (ref 27–34)
MCHC RBC-ENTMCNC: 32.3 GM/DL — SIGNIFICANT CHANGE UP (ref 32–36)
MCV RBC AUTO: 87.1 FL — SIGNIFICANT CHANGE UP (ref 80–100)
NRBC # BLD: 0 /100 WBCS — SIGNIFICANT CHANGE UP (ref 0–0)
PHOSPHATE SERPL-MCNC: 3.4 MG/DL — SIGNIFICANT CHANGE UP (ref 2.5–4.5)
PLATELET # BLD AUTO: 251 K/UL — SIGNIFICANT CHANGE UP (ref 150–400)
POTASSIUM SERPL-MCNC: 4.1 MMOL/L — SIGNIFICANT CHANGE UP (ref 3.5–5.3)
POTASSIUM SERPL-SCNC: 4.1 MMOL/L — SIGNIFICANT CHANGE UP (ref 3.5–5.3)
RBC # BLD: 5.41 M/UL — HIGH (ref 3.8–5.2)
RBC # FLD: 17.2 % — HIGH (ref 10.3–14.5)
SARS-COV-2 RNA SPEC QL NAA+PROBE: SIGNIFICANT CHANGE UP
SODIUM SERPL-SCNC: 137 MMOL/L — SIGNIFICANT CHANGE UP (ref 135–145)
WBC # BLD: 22.99 K/UL — HIGH (ref 3.8–10.5)
WBC # FLD AUTO: 22.99 K/UL — HIGH (ref 3.8–10.5)

## 2024-01-16 RX ORDER — PANTOPRAZOLE SODIUM 20 MG/1
40 TABLET, DELAYED RELEASE ORAL
Refills: 0 | Status: DISCONTINUED | OUTPATIENT
Start: 2024-01-17 | End: 2024-01-17

## 2024-01-16 RX ORDER — ATENOLOL 25 MG/1
1.5 TABLET ORAL
Refills: 0 | DISCHARGE

## 2024-01-16 RX ADMIN — Medication 125 MICROGRAM(S): at 05:52

## 2024-01-16 RX ADMIN — Medication 25 MICROGRAM(S): at 05:51

## 2024-01-16 RX ADMIN — Medication 3 MILLILITER(S): at 00:25

## 2024-01-16 RX ADMIN — BUDESONIDE AND FORMOTEROL FUMARATE DIHYDRATE 2 PUFF(S): 160; 4.5 AEROSOL RESPIRATORY (INHALATION) at 17:48

## 2024-01-16 RX ADMIN — LOSARTAN POTASSIUM 25 MILLIGRAM(S): 100 TABLET, FILM COATED ORAL at 05:51

## 2024-01-16 RX ADMIN — Medication 25 MILLIGRAM(S): at 05:51

## 2024-01-16 RX ADMIN — Medication 3 MILLILITER(S): at 05:52

## 2024-01-16 RX ADMIN — Medication 15 MILLIGRAM(S): at 05:51

## 2024-01-16 RX ADMIN — ATORVASTATIN CALCIUM 10 MILLIGRAM(S): 80 TABLET, FILM COATED ORAL at 21:58

## 2024-01-16 RX ADMIN — PANTOPRAZOLE SODIUM 40 MILLIGRAM(S): 20 TABLET, DELAYED RELEASE ORAL at 05:52

## 2024-01-16 RX ADMIN — BUDESONIDE AND FORMOTEROL FUMARATE DIHYDRATE 2 PUFF(S): 160; 4.5 AEROSOL RESPIRATORY (INHALATION) at 05:55

## 2024-01-16 RX ADMIN — Medication 100 MILLIGRAM(S): at 13:13

## 2024-01-16 RX ADMIN — PANTOPRAZOLE SODIUM 40 MILLIGRAM(S): 20 TABLET, DELAYED RELEASE ORAL at 05:51

## 2024-01-16 RX ADMIN — Medication 100 MILLIGRAM(S): at 21:57

## 2024-01-16 RX ADMIN — APIXABAN 5 MILLIGRAM(S): 2.5 TABLET, FILM COATED ORAL at 17:47

## 2024-01-16 RX ADMIN — Medication 25 MILLIGRAM(S): at 18:13

## 2024-01-16 RX ADMIN — Medication 40 MILLIGRAM(S): at 05:51

## 2024-01-16 RX ADMIN — Medication 3 MILLILITER(S): at 17:47

## 2024-01-16 RX ADMIN — APIXABAN 5 MILLIGRAM(S): 2.5 TABLET, FILM COATED ORAL at 05:52

## 2024-01-16 RX ADMIN — Medication 20 MILLIGRAM(S): at 05:51

## 2024-01-16 RX ADMIN — Medication 100 MILLIGRAM(S): at 05:52

## 2024-01-16 RX ADMIN — PIPERACILLIN AND TAZOBACTAM 25 GRAM(S): 4; .5 INJECTION, POWDER, LYOPHILIZED, FOR SOLUTION INTRAVENOUS at 05:50

## 2024-01-16 RX ADMIN — Medication 15 MILLIGRAM(S): at 17:47

## 2024-01-16 RX ADMIN — POLYETHYLENE GLYCOL 3350 17 GRAM(S): 17 POWDER, FOR SOLUTION ORAL at 13:13

## 2024-01-16 RX ADMIN — EPLERENONE 50 MILLIGRAM(S): 50 TABLET, FILM COATED ORAL at 05:51

## 2024-01-16 RX ADMIN — Medication 3 MILLILITER(S): at 13:13

## 2024-01-16 NOTE — PHARMACOTHERAPY INTERVENTION NOTE - COMMENTS
Patient is a 89 year old female started on PPI therapy this admission for dark emesis. Currently with two active orders for pantoprazole, one for 40 mg PO daily and another for 40 mg IV BID. Per last GI note on 1/11, recommendation is for PPI BID with no further GI workup for now, and patient is currently able to tolerate other oral medications. Recommended discontinuing current orders and changing to pantoprazole 40 mg PO BID.    Rosana Cox, PharmD, [facility]  Clinical Pharmacy Specialist  [phone] or Teams  Patient is a 89 year old female started on PPI therapy this admission for dark emesis. Currently with two active orders for pantoprazole, one for 40 mg PO daily and another for 40 mg IV BID. Per last GI note on 1/11, recommendation is for PPI BID with no further GI workup for now, and patient is currently able to tolerate other oral medications. Recommended discontinuing current orders and changing to pantoprazole 40 mg PO BID.    Rosana Cox, PharmD, [facility]  Clinical Pharmacy Specialist  [phone] or Teams

## 2024-01-16 NOTE — PROGRESS NOTE ADULT - SUBJECTIVE AND OBJECTIVE BOX
DATE OF SERVICE: 01-16-24 @ 11:47    Patient is a 89y old  Female who presents with a chief complaint of SOB (15 Percy 2024 12:41)      SUBJECTIVE / OVERNIGHT EVENTS:  No chest pain. No shortness of breath. No complaints. No events overnight.     MEDICATIONS  (STANDING):  albuterol/ipratropium for Nebulization 3 milliLiter(s) Nebulizer every 6 hours  apixaban 5 milliGRAM(s) Oral every 12 hours  atorvastatin 10 milliGRAM(s) Oral at bedtime  budesonide 160 MICROgram(s)/formoterol 4.5 MICROgram(s) Inhaler 2 Puff(s) Inhalation two times a day  busPIRone 15 milliGRAM(s) Oral two times a day  digoxin     Tablet 125 MICROGram(s) Oral daily  eplerenone 50 milliGRAM(s) Oral daily  furosemide    Tablet 40 milliGRAM(s) Oral daily  levothyroxine 25 MICROGram(s) Oral daily  losartan 25 milliGRAM(s) Oral daily  metoprolol tartrate 100 milliGRAM(s) Oral three times a day  ondansetron Injectable 4 milliGRAM(s) IV Push once  pantoprazole    Tablet 40 milliGRAM(s) Oral before breakfast  pantoprazole  Injectable 40 milliGRAM(s) IV Push two times a day  polyethylene glycol 3350 17 Gram(s) Oral daily  predniSONE   Tablet 20 milliGRAM(s) Oral daily  predniSONE   Tablet   Oral   pregabalin 25 milliGRAM(s) Oral two times a day    MEDICATIONS  (PRN):      Vital Signs Last 24 Hrs  T(C): 36.3 (16 Jan 2024 10:55), Max: 36.8 (16 Jan 2024 00:10)  T(F): 97.4 (16 Jan 2024 10:55), Max: 98.2 (16 Jan 2024 00:10)  HR: 67 (16 Jan 2024 10:55) (67 - 75)  BP: 95/62 (16 Jan 2024 10:55) (91/70 - 113/69)  BP(mean): --  RR: 18 (16 Jan 2024 10:55) (18 - 18)  SpO2: 93% (16 Jan 2024 10:55) (93% - 94%)    Parameters below as of 16 Jan 2024 10:55  Patient On (Oxygen Delivery Method): room air      CAPILLARY BLOOD GLUCOSE        I&O's Summary    15 Percy 2024 07:01  -  16 Jan 2024 07:00  --------------------------------------------------------  IN: 240 mL / OUT: 400 mL / NET: -160 mL        PHYSICAL EXAM:  GENERAL: NAD, well-developed  HEAD:  Atraumatic, Normocephalic  EYES: EOMI, PERRLA, conjunctiva and sclera clear  NECK: Supple, No JVD  CHEST/LUNG: Clear to auscultation bilaterally; No wheeze  HEART: Regular rate and rhythm; No murmurs, rubs, or gallops  ABDOMEN: Soft, Nontender, Nondistended; Bowel sounds present  EXTREMITIES:  2+ Peripheral Pulses, No clubbing, cyanosis, or edema  PSYCH: AAOx3  NEUROLOGY: non-focal  SKIN: No rashes or lesions    LABS:                        15.2   22.99 )-----------( 251      ( 16 Jan 2024 05:44 )             47.1     01-16    137  |  101  |  46<H>  ----------------------------<  96  4.1   |  23  |  1.30    Ca    6.6<L>      16 Jan 2024 05:44  Phos  3.4     01-16  Mg     1.8     01-16            Urinalysis Basic - ( 16 Jan 2024 05:44 )    Color: x / Appearance: x / SG: x / pH: x  Gluc: 96 mg/dL / Ketone: x  / Bili: x / Urobili: x   Blood: x / Protein: x / Nitrite: x   Leuk Esterase: x / RBC: x / WBC x   Sq Epi: x / Non Sq Epi: x / Bacteria: x        RADIOLOGY & ADDITIONAL TESTS:    Imaging Personally Reviewed:    Consultant(s) Notes Reviewed:      Care Discussed with Consultants/Other Providers:

## 2024-01-16 NOTE — PROGRESS NOTE ADULT - ASSESSMENT
89-year-old female PMH of CVA, YOVANI on CPAP, HTN, HLD, A-fib on Eliquis, heart failure on furosemide, presents to the ED complaining of several days of coughing, increased wheezing, shortness of breath, and found to be influenza positive. Patient admitted for sepsis management.     Upper GI bleed  - iv ppi bid  - GI consult saw pt  - conservative management  - advance diet  - hold eliquis for now    Sepsis.   - Blood cultures drawn and NGTD  - completed  ceftriaxone   - Continue oseltamavir  - Monitor fever curve.  - now on zosyn empirically day 5 for poss aspiration PNA  - leukocytosis is chronic    Asthma exacerbation.   ·  Plan: 2nd to Influenza A +/- bacterial PNA  -Continue Duoneb q6h  -Continue Symbicort 160/4.5 mcg 2 puffs BID (home med Breo Ellipta)  -Prednisone 40 mg PO qd x5 days  completed  -Keep sats >90% with O2 PRN, currently RA.    Influenza A.    -CT chest with b/l TIB opacities, may be viral +/- bacterial PNA   -Continue Tamiflu  -Steroids/bronchodilators as above.    Chronic systolic congestive heart failure.   -Not currently in decompensated HF. TTE from 8/29/23 showing normal LVSF, EF 61%  - Will continue eplerenone, atenolol, losartan for now   - Will hold lasix for now given sepsis and current euvolemic status   - Monitor I/Os, daily weight  - BMP daily, monitor electrolytes while on diuretics.    Chronic atrial fibrillation.   - on eliquis  - on metoprolol  - cardiology to see    CORY clot on ct  -  tte done  - pt has been on a/c    adrenal mass  - to be evaluated as out pt as pt has active issues      YOVANI (obstructive sleep apnea).   - By hx  -  pt reports does not have CPAP at home   -VBG acceptable  -Suggest monitor off CPAP, order d/c'd.     Prophylactic measure.   ·  Plan: DVT ppx: eliquis  Sleep: on benadryl qhs prn  Diet: DASH  Dispo: cleared by pulm for discharge to [facility]  - dicussed with PCP Dr Parra  - d/w daughter  d/c planing to [facility]     89-year-old female PMH of CVA, YOVANI on CPAP, HTN, HLD, A-fib on Eliquis, heart failure on furosemide, presents to the ED complaining of several days of coughing, increased wheezing, shortness of breath, and found to be influenza positive. Patient admitted for sepsis management.     Upper GI bleed  - iv ppi bid  - GI consult saw pt  - conservative management  - advance diet  - hold eliquis for now    Sepsis.   - Blood cultures drawn and NGTD  - completed  ceftriaxone   - Continue oseltamavir  - Monitor fever curve.  - now on zosyn empirically day 5 for poss aspiration PNA  - leukocytosis is chronic    Asthma exacerbation.   ·  Plan: 2nd to Influenza A +/- bacterial PNA  -Continue Duoneb q6h  -Continue Symbicort 160/4.5 mcg 2 puffs BID (home med Breo Ellipta)  -Prednisone 40 mg PO qd x5 days  completed  -Keep sats >90% with O2 PRN, currently RA.    Influenza A.    -CT chest with b/l TIB opacities, may be viral +/- bacterial PNA   -Continue Tamiflu  -Steroids/bronchodilators as above.    Chronic systolic congestive heart failure.   -Not currently in decompensated HF. TTE from 8/29/23 showing normal LVSF, EF 61%  - Will continue eplerenone, atenolol, losartan for now   - Will hold lasix for now given sepsis and current euvolemic status   - Monitor I/Os, daily weight  - BMP daily, monitor electrolytes while on diuretics.    Chronic atrial fibrillation.   - on eliquis  - on metoprolol  - cardiology to see    CORY clot on ct  -  tte done  - pt has been on a/c    adrenal mass  - to be evaluated as out pt as pt has active issues      YOVANI (obstructive sleep apnea).   - By hx  -  pt reports does not have CPAP at home   -VBG acceptable  -Suggest monitor off CPAP, order d/c'd.     Prophylactic measure.   ·  Plan: DVT ppx: eliquis  Sleep: on benadryl qhs prn  Diet: DASH  Dispo: cleared by pulm for discharge to [facility]  - dicussed with PCP Dr Parra  - d/w daughter  d/c planing to [facility]

## 2024-01-16 NOTE — PROGRESS NOTE ADULT - NS ATTEND AMEND GEN_ALL_CORE FT
Patient seen and examined. Agree with plan as detailed in PA/NP Note.     Euvolemic, c/w Eplrenone, C/w BB for afib rate management    Cheri Espinoza MD  Pager: [phone] Patient seen and examined. Agree with plan as detailed in PA/NP Note.     Euvolemic, c/w Eplrenone, C/w BB for afib rate management    Cheri Espinoza MD  Pager: [phone]

## 2024-01-16 NOTE — PROGRESS NOTE ADULT - ASSESSMENT
89 y/o F with PMH of CVA, YOVANI on CPAP, HTN, HLD, Afib on Eliquis, CHF. Recent admission at [facility] 1 month ago for PNA, completed short course of ABX. Completed additional course of ABX last week for UTI. Presents to the ED with coughing, SOB, wheezing x several days. Found to be Flu + 89 y/o F with PMH of CVA, YOVANI on CPAP, HTN, HLD, Afib on Eliquis, CHF. Recent admission at [facility] 1 month ago for PNA, completed short course of ABX. Completed additional course of ABX last week for UTI. Presents to the ED with coughing, SOB, wheezing x several days. Found to be Flu +

## 2024-01-16 NOTE — PROGRESS NOTE ADULT - SUBJECTIVE AND OBJECTIVE BOX
EP ATTENDING    tele: AF better rate controlled, 70s at rest today.  DATE OF SERVICE - 01-16-24     Review of Systems:   Constitutional: [ ] fevers, [ ] chills.   Skin: [ ] dry skin. [ ] rashes.  Psychiatric: [ ] depression, [ ] anxiety.   Gastrointestinal: [ ] BRBPR, [ ] melena.   Neurological: [ ] confusion. [ ] seizures. [ ] shuffling gait.   Ears,Nose,Mouth and Throat: [ ] ear pain [ ] sore throat.   Eyes: [ ] diplopia.   Respiratory: [ ] hemoptysis. [ ] shortness of breath  Cardiovascular: See HPI above  Hematologic/Lymphatic: [ ] anemia. [ ] painful nodes. [ ] prolonged bleeding.   Genitourinary: [ ] hematuria. [ ] flank pain.   Endocrine: [ ] significant change in weight. [ ] intolerance to heat and cold.     Review of systems [ x] otherwise negative, [ ] otherwise unable to obtain    FH: no family history of sudden cardiac death in first degree relatives    SH: [ ] tobacco, [ ] alcohol, [ ] drugs    albuterol/ipratropium for Nebulization 3 milliLiter(s) Nebulizer every 6 hours  apixaban 5 milliGRAM(s) Oral every 12 hours  atorvastatin 10 milliGRAM(s) Oral at bedtime  budesonide 160 MICROgram(s)/formoterol 4.5 MICROgram(s) Inhaler 2 Puff(s) Inhalation two times a day  busPIRone 15 milliGRAM(s) Oral two times a day  digoxin     Tablet 125 MICROGram(s) Oral daily  eplerenone 50 milliGRAM(s) Oral daily  furosemide    Tablet 40 milliGRAM(s) Oral daily  levothyroxine 25 MICROGram(s) Oral daily  losartan 25 milliGRAM(s) Oral daily  metoprolol tartrate 100 milliGRAM(s) Oral three times a day  ondansetron Injectable 4 milliGRAM(s) IV Push once  pantoprazole    Tablet 40 milliGRAM(s) Oral before breakfast  pantoprazole  Injectable 40 milliGRAM(s) IV Push two times a day  polyethylene glycol 3350 17 Gram(s) Oral daily  predniSONE   Tablet 20 milliGRAM(s) Oral daily  predniSONE   Tablet   Oral   pregabalin 25 milliGRAM(s) Oral two times a day                            15.2   22.99 )-----------( 251      ( 16 Jan 2024 05:44 )             47.1       01-16    137  |  101  |  46<H>  ----------------------------<  96  4.1   |  23  |  1.30    Ca    6.6<L>      16 Jan 2024 05:44  Phos  3.4     01-16  Mg     1.8     01-16      T(C): 36.3 (01-16-24 @ 10:55), Max: 36.8 (01-16-24 @ 00:10)  HR: 67 (01-16-24 @ 10:55) (67 - 75)  BP: 95/62 (01-16-24 @ 10:55) (91/70 - 113/69)  RR: 18 (01-16-24 @ 10:55) (18 - 18)  SpO2: 93% (01-16-24 @ 10:55) (93% - 94%)  Wt(kg): --    I&O's Summary    15 Percy 2024 07:01  -  16 Jan 2024 07:00  --------------------------------------------------------  IN: 240 mL / OUT: 400 mL / NET: -160 mL        General: Well nourished, no acute distress, alert and oriented x 3  Head: normocephalic, no trauma  Neck: no JVD, no bruit, supple, not enlarged  CV: irregular S1S2,  regular rate, rhythm is AFib, no murmurs.    Lungs: clear BL, no rales or wheezes  Abdomen: bowel sounds +, soft, nontender, nondistended  Extremities: no clubbing, cyanosis or edema  Neuro: Moves all 4 extremities, sensation intact x 4 extremities  Skin: warm and moist, normal turgor  Psych: Mood and affect are appropriate for circumstances  MSK: normal range of motion and strength x4 extremities.    < from: TTE W or WO Ultrasound Enhancing Agent (01.11.24 @ 11:10) >  CONCLUSIONS:      1. Left ventricular cavity is normal. Left ventricular systolic function is normal. There are no regional wall motion abnormalities seen.   2. Mild pulmonary hypertension.   3. Device lead is visualized in the right heart.   4. No prior echocardiogram is available for comparison.    ________________________________________________________________________________________  FINDINGS:     Left Ventricle:  The leftventricular cavity is normal. Left ventricular systolic function is normal with an ejection fraction visually estimated at 55 to 60%. There are no regional wall motion abnormalities seen. There is normal LV mass and concentric remodeling.     Right Ventricle:  The right ventricular cavity is normal in size and borderline reduced systolic function. Tricuspid annular plane systolic excursion (TAPSE) is 1.2 cm (normal >=1.7 cm). A device lead is visualized in the right heart.     Left Atrium:  The left atrium is severely dilated with an indexed volume of 64.80 ml/m².     Right Atrium:  The right atrium is severely dilated in size with an indexed volume of 80.20 ml/m².    < end of copied text >      A/P) She is a pleasant 89 y/o female PMH persistent AF, hypertension, hyperlipidemia, hypothyroidism and stroke who was implanted with a dual chamber pacemaker in 2009. Her last pulse generator change was around 2018. She has since developed persistent AF, and her PPM has been managed by Dr. Bill Napier at [facility]. She is now a/w rapid AF. Echo unremarkable. She denies syncope, but reports palpitations and dyspnea.     -no more amiodarone, she is a poor candidate for a rhythm control strategy  -increase metoprolol to 100 tid, continue dig 0.125mg daily.  thus far, effective.  -continue lipitor for hyperlipidemia  -continue synthroid for hypothyroidism  -continue losartan for hypertension  -continue eliquis 5 bid for lifelong a/c  -continue tele  -long term EP plan is rate control & anticoagulation +/- AV node ablation if HR not controlled.    Franc Bolaños M.D.  Cardiac Electrophysiology  [phone] EP ATTENDING    tele: AF better rate controlled, 70s at rest today.  DATE OF SERVICE - 01-16-24     Review of Systems:   Constitutional: [ ] fevers, [ ] chills.   Skin: [ ] dry skin. [ ] rashes.  Psychiatric: [ ] depression, [ ] anxiety.   Gastrointestinal: [ ] BRBPR, [ ] melena.   Neurological: [ ] confusion. [ ] seizures. [ ] shuffling gait.   Ears,Nose,Mouth and Throat: [ ] ear pain [ ] sore throat.   Eyes: [ ] diplopia.   Respiratory: [ ] hemoptysis. [ ] shortness of breath  Cardiovascular: See HPI above  Hematologic/Lymphatic: [ ] anemia. [ ] painful nodes. [ ] prolonged bleeding.   Genitourinary: [ ] hematuria. [ ] flank pain.   Endocrine: [ ] significant change in weight. [ ] intolerance to heat and cold.     Review of systems [ x] otherwise negative, [ ] otherwise unable to obtain    FH: no family history of sudden cardiac death in first degree relatives    SH: [ ] tobacco, [ ] alcohol, [ ] drugs    albuterol/ipratropium for Nebulization 3 milliLiter(s) Nebulizer every 6 hours  apixaban 5 milliGRAM(s) Oral every 12 hours  atorvastatin 10 milliGRAM(s) Oral at bedtime  budesonide 160 MICROgram(s)/formoterol 4.5 MICROgram(s) Inhaler 2 Puff(s) Inhalation two times a day  busPIRone 15 milliGRAM(s) Oral two times a day  digoxin     Tablet 125 MICROGram(s) Oral daily  eplerenone 50 milliGRAM(s) Oral daily  furosemide    Tablet 40 milliGRAM(s) Oral daily  levothyroxine 25 MICROGram(s) Oral daily  losartan 25 milliGRAM(s) Oral daily  metoprolol tartrate 100 milliGRAM(s) Oral three times a day  ondansetron Injectable 4 milliGRAM(s) IV Push once  pantoprazole    Tablet 40 milliGRAM(s) Oral before breakfast  pantoprazole  Injectable 40 milliGRAM(s) IV Push two times a day  polyethylene glycol 3350 17 Gram(s) Oral daily  predniSONE   Tablet 20 milliGRAM(s) Oral daily  predniSONE   Tablet   Oral   pregabalin 25 milliGRAM(s) Oral two times a day                            15.2   22.99 )-----------( 251      ( 16 Jan 2024 05:44 )             47.1       01-16    137  |  101  |  46<H>  ----------------------------<  96  4.1   |  23  |  1.30    Ca    6.6<L>      16 Jan 2024 05:44  Phos  3.4     01-16  Mg     1.8     01-16      T(C): 36.3 (01-16-24 @ 10:55), Max: 36.8 (01-16-24 @ 00:10)  HR: 67 (01-16-24 @ 10:55) (67 - 75)  BP: 95/62 (01-16-24 @ 10:55) (91/70 - 113/69)  RR: 18 (01-16-24 @ 10:55) (18 - 18)  SpO2: 93% (01-16-24 @ 10:55) (93% - 94%)  Wt(kg): --    I&O's Summary    15 Percy 2024 07:01  -  16 Jan 2024 07:00  --------------------------------------------------------  IN: 240 mL / OUT: 400 mL / NET: -160 mL        General: Well nourished, no acute distress, alert and oriented x 3  Head: normocephalic, no trauma  Neck: no JVD, no bruit, supple, not enlarged  CV: irregular S1S2,  regular rate, rhythm is AFib, no murmurs.    Lungs: clear BL, no rales or wheezes  Abdomen: bowel sounds +, soft, nontender, nondistended  Extremities: no clubbing, cyanosis or edema  Neuro: Moves all 4 extremities, sensation intact x 4 extremities  Skin: warm and moist, normal turgor  Psych: Mood and affect are appropriate for circumstances  MSK: normal range of motion and strength x4 extremities.    < from: TTE W or WO Ultrasound Enhancing Agent (01.11.24 @ 11:10) >  CONCLUSIONS:      1. Left ventricular cavity is normal. Left ventricular systolic function is normal. There are no regional wall motion abnormalities seen.   2. Mild pulmonary hypertension.   3. Device lead is visualized in the right heart.   4. No prior echocardiogram is available for comparison.    ________________________________________________________________________________________  FINDINGS:     Left Ventricle:  The leftventricular cavity is normal. Left ventricular systolic function is normal with an ejection fraction visually estimated at 55 to 60%. There are no regional wall motion abnormalities seen. There is normal LV mass and concentric remodeling.     Right Ventricle:  The right ventricular cavity is normal in size and borderline reduced systolic function. Tricuspid annular plane systolic excursion (TAPSE) is 1.2 cm (normal >=1.7 cm). A device lead is visualized in the right heart.     Left Atrium:  The left atrium is severely dilated with an indexed volume of 64.80 ml/m².     Right Atrium:  The right atrium is severely dilated in size with an indexed volume of 80.20 ml/m².    < end of copied text >      A/P) She is a pleasant 89 y/o female PMH persistent AF, hypertension, hyperlipidemia, hypothyroidism and stroke who was implanted with a dual chamber pacemaker in 2009. Her last pulse generator change was around 2018. She has since developed persistent AF, and her PPM has been managed by Dr. Bill Napier at [facility]. She is now a/w rapid AF. Echo unremarkable. She denies syncope, but reports palpitations and dyspnea.     -no more amiodarone, she is a poor candidate for a rhythm control strategy  -increase metoprolol to 100 tid, continue dig 0.125mg daily.  thus far, effective.  -continue lipitor for hyperlipidemia  -continue synthroid for hypothyroidism  -continue losartan for hypertension  -continue eliquis 5 bid for lifelong a/c  -continue tele  -long term EP plan is rate control & anticoagulation +/- AV node ablation if HR not controlled.    Franc Bolaños M.D.  Cardiac Electrophysiology  [phone]

## 2024-01-16 NOTE — PROGRESS NOTE ADULT - PROBLEM SELECTOR PLAN 3
-S/p RRT 1/10 for coffee ground emesis, ? aspiration event.  -Continue ABX  -CXR with no infiltrate as of yet   -GI f/u  -Leukocytosis likely 2nd to steroids

## 2024-01-16 NOTE — PROGRESS NOTE ADULT - SUBJECTIVE AND OBJECTIVE BOX
C A R D I O L O G Y  **********************************     DATE OF SERVICE: 01-16-24    Patient denies chest pain or shortness of breath.   Review of symptoms otherwise negative.    MEDICATIONS:  albuterol/ipratropium for Nebulization 3 milliLiter(s) Nebulizer every 6 hours  apixaban 5 milliGRAM(s) Oral every 12 hours  atorvastatin 10 milliGRAM(s) Oral at bedtime  budesonide 160 MICROgram(s)/formoterol 4.5 MICROgram(s) Inhaler 2 Puff(s) Inhalation two times a day  busPIRone 15 milliGRAM(s) Oral two times a day  digoxin     Tablet 125 MICROGram(s) Oral daily  eplerenone 50 milliGRAM(s) Oral daily  furosemide    Tablet 40 milliGRAM(s) Oral daily  levothyroxine 25 MICROGram(s) Oral daily  losartan 25 milliGRAM(s) Oral daily  metoprolol tartrate 100 milliGRAM(s) Oral three times a day  ondansetron Injectable 4 milliGRAM(s) IV Push once  pantoprazole    Tablet 40 milliGRAM(s) Oral before breakfast  pantoprazole  Injectable 40 milliGRAM(s) IV Push two times a day  polyethylene glycol 3350 17 Gram(s) Oral daily  predniSONE   Tablet 20 milliGRAM(s) Oral daily  predniSONE   Tablet   Oral   pregabalin 25 milliGRAM(s) Oral two times a day      LABS:                        15.2   22.99 )-----------( 251      ( 16 Jan 2024 05:44 )             47.1       Hemoglobin: 15.2 g/dL (01-16 @ 05:44)  Hemoglobin: 14.5 g/dL (01-15 @ 05:37)  Hemoglobin: 14.9 g/dL (01-14 @ 06:16)  Hemoglobin: 14.3 g/dL (01-13 @ 07:35)  Hemoglobin: 13.9 g/dL (01-12 @ 06:39)      01-16    137  |  101  |  46<H>  ----------------------------<  96  4.1   |  23  |  1.30    Ca    6.6<L>      16 Jan 2024 05:44  Phos  3.4     01-16  Mg     1.8     01-16      Creatinine Trend: 1.30<--, 1.10<--, 1.12<--, 1.06<--, 0.97<--, 1.00<--    COAGS:           PHYSICAL EXAM:  T(C): 36.3 (01-16-24 @ 10:55), Max: 36.8 (01-16-24 @ 00:10)  HR: 67 (01-16-24 @ 10:55) (67 - 75)  BP: 95/62 (01-16-24 @ 10:55) (91/70 - 113/69)  RR: 18 (01-16-24 @ 10:55) (18 - 18)  SpO2: 93% (01-16-24 @ 10:55) (93% - 94%)  Wt(kg): --    I&O's Summary    15 Percy 2024 07:01  -  16 Jan 2024 07:00  --------------------------------------------------------  IN: 240 mL / OUT: 400 mL / NET: -160 mL        Gen: NAD  HEENT:  (-)icterus (-)pallor  CV: N S1 S2 1/6 DMITRIY (+)2 Pulses B/l  Resp:  Clear to auscultation B/L, normal effort  GI: (+) BS Soft, NT, ND  Lymph:  (-)Edema, (-)obvious lymphadenopathy  Skin: Warm to touch, Normal turgor  Psych: Appropriate mood and affect      TELEMETRY: AF 60-80	    ECG: AF RVR  	    RADIOLOGY:         CXR: < from: Xray Chest 1 View- PORTABLE-Urgent (Xray Chest 1 View- PORTABLE-Urgent .) (01.12.24 @ 13:10) >  IMPRESSION:    Clear lungs.    < end of copied text >    < from: TTE W or WO Ultrasound Enhancing Agent (01.11.24 @ 11:10) >  CONCLUSIONS:      1. Left ventricular cavity is normal. Left ventricular systolic function is normal. There are no regional wall motion abnormalities seen.   2. Mild pulmonary hypertension.   3. Device lead is visualized in the right heart.   4. No prior echocardiogram is available for comparison.    < end of copied text >      ASSESSMENT/PLAN: Patient is a 89 y/o female with PMH of CVA, YOVANI on CPAP, HTN, HLD, persistent afib on Eliquis s/p Medtronic PPM, and diastolic heart failure who presented with SOB, cough, and wheezing admitted with Influenza A and PNA. Cardiology consulted for afib RVR.    #Afib RVR  - In setting of infection now improved  - EP eval appreciated  - Continue rate control with metoprolol 100mg TID and Digoxin 0.125mg daily  - Continue Eliquis for stroke prevention if no contraindications  - TTE with normal LV function    #Influenza A/PNA  - Management and Abx per med/pulm  - BCx negative    #Chronic Diastolic Heart Failure  - Continue eplerenone  - Does not appear to be in heart failure - CXR with clear lungs  - Continue PO lasix as tolerated    #HTN  - Continue Losartan and Metoprolol    #HLD  - Continue Lipitor    - No further inpatient cardiac w/u planned  - D/C planning per primary team    Gagandeep Joya PA-C  Pager: [phone]       C A R D I O L O G Y  **********************************     DATE OF SERVICE: 01-16-24    Patient denies chest pain or shortness of breath.   Review of symptoms otherwise negative.    MEDICATIONS:  albuterol/ipratropium for Nebulization 3 milliLiter(s) Nebulizer every 6 hours  apixaban 5 milliGRAM(s) Oral every 12 hours  atorvastatin 10 milliGRAM(s) Oral at bedtime  budesonide 160 MICROgram(s)/formoterol 4.5 MICROgram(s) Inhaler 2 Puff(s) Inhalation two times a day  busPIRone 15 milliGRAM(s) Oral two times a day  digoxin     Tablet 125 MICROGram(s) Oral daily  eplerenone 50 milliGRAM(s) Oral daily  furosemide    Tablet 40 milliGRAM(s) Oral daily  levothyroxine 25 MICROGram(s) Oral daily  losartan 25 milliGRAM(s) Oral daily  metoprolol tartrate 100 milliGRAM(s) Oral three times a day  ondansetron Injectable 4 milliGRAM(s) IV Push once  pantoprazole    Tablet 40 milliGRAM(s) Oral before breakfast  pantoprazole  Injectable 40 milliGRAM(s) IV Push two times a day  polyethylene glycol 3350 17 Gram(s) Oral daily  predniSONE   Tablet 20 milliGRAM(s) Oral daily  predniSONE   Tablet   Oral   pregabalin 25 milliGRAM(s) Oral two times a day      LABS:                        15.2   22.99 )-----------( 251      ( 16 Jan 2024 05:44 )             47.1       Hemoglobin: 15.2 g/dL (01-16 @ 05:44)  Hemoglobin: 14.5 g/dL (01-15 @ 05:37)  Hemoglobin: 14.9 g/dL (01-14 @ 06:16)  Hemoglobin: 14.3 g/dL (01-13 @ 07:35)  Hemoglobin: 13.9 g/dL (01-12 @ 06:39)      01-16    137  |  101  |  46<H>  ----------------------------<  96  4.1   |  23  |  1.30    Ca    6.6<L>      16 Jan 2024 05:44  Phos  3.4     01-16  Mg     1.8     01-16      Creatinine Trend: 1.30<--, 1.10<--, 1.12<--, 1.06<--, 0.97<--, 1.00<--    COAGS:           PHYSICAL EXAM:  T(C): 36.3 (01-16-24 @ 10:55), Max: 36.8 (01-16-24 @ 00:10)  HR: 67 (01-16-24 @ 10:55) (67 - 75)  BP: 95/62 (01-16-24 @ 10:55) (91/70 - 113/69)  RR: 18 (01-16-24 @ 10:55) (18 - 18)  SpO2: 93% (01-16-24 @ 10:55) (93% - 94%)  Wt(kg): --    I&O's Summary    15 Percy 2024 07:01  -  16 Jan 2024 07:00  --------------------------------------------------------  IN: 240 mL / OUT: 400 mL / NET: -160 mL        Gen: NAD  HEENT:  (-)icterus (-)pallor  CV: N S1 S2 1/6 DMITRIY (+)2 Pulses B/l  Resp:  Clear to auscultation B/L, normal effort  GI: (+) BS Soft, NT, ND  Lymph:  (-)Edema, (-)obvious lymphadenopathy  Skin: Warm to touch, Normal turgor  Psych: Appropriate mood and affect      TELEMETRY: AF 60-80	    ECG: AF RVR  	    RADIOLOGY:         CXR: < from: Xray Chest 1 View- PORTABLE-Urgent (Xray Chest 1 View- PORTABLE-Urgent .) (01.12.24 @ 13:10) >  IMPRESSION:    Clear lungs.    < end of copied text >    < from: TTE W or WO Ultrasound Enhancing Agent (01.11.24 @ 11:10) >  CONCLUSIONS:      1. Left ventricular cavity is normal. Left ventricular systolic function is normal. There are no regional wall motion abnormalities seen.   2. Mild pulmonary hypertension.   3. Device lead is visualized in the right heart.   4. No prior echocardiogram is available for comparison.    < end of copied text >      ASSESSMENT/PLAN: Patient is a 89 y/o female with PMH of CVA, YOVANI on CPAP, HTN, HLD, persistent afib on Eliquis s/p Medtronic PPM, and diastolic heart failure who presented with SOB, cough, and wheezing admitted with Influenza A and PNA. Cardiology consulted for afib RVR.    #Afib RVR  - In setting of infection now improved  - EP eval appreciated  - Continue rate control with metoprolol 100mg TID and Digoxin 0.125mg daily  - Continue Eliquis for stroke prevention if no contraindications  - TTE with normal LV function    #Influenza A/PNA  - Management and Abx per med/pulm  - BCx negative    #Chronic Diastolic Heart Failure  - Continue eplerenone  - Does not appear to be in heart failure - CXR with clear lungs  - Continue PO lasix as tolerated    #HTN  - Continue Losartan and Metoprolol    #HLD  - Continue Lipitor    - No further inpatient cardiac w/u planned  - D/C planning per primary team    Gagandeep Joya PA-C  Pager: [phone]

## 2024-01-16 NOTE — PROGRESS NOTE ADULT - SUBJECTIVE AND OBJECTIVE BOX
Follow-up Pulm Progress Note    No new respiratory events overnight.  Denies SOB/CP.     Medications:  MEDICATIONS  (STANDING):  albuterol/ipratropium for Nebulization 3 milliLiter(s) Nebulizer every 6 hours  apixaban 5 milliGRAM(s) Oral every 12 hours  atorvastatin 10 milliGRAM(s) Oral at bedtime  budesonide 160 MICROgram(s)/formoterol 4.5 MICROgram(s) Inhaler 2 Puff(s) Inhalation two times a day  busPIRone 15 milliGRAM(s) Oral two times a day  digoxin     Tablet 125 MICROGram(s) Oral daily  eplerenone 50 milliGRAM(s) Oral daily  furosemide    Tablet 40 milliGRAM(s) Oral daily  levothyroxine 25 MICROGram(s) Oral daily  losartan 25 milliGRAM(s) Oral daily  metoprolol tartrate 100 milliGRAM(s) Oral three times a day  ondansetron Injectable 4 milliGRAM(s) IV Push once  pantoprazole    Tablet 40 milliGRAM(s) Oral before breakfast  pantoprazole  Injectable 40 milliGRAM(s) IV Push two times a day  polyethylene glycol 3350 17 Gram(s) Oral daily  predniSONE   Tablet 20 milliGRAM(s) Oral daily  predniSONE   Tablet   Oral   pregabalin 25 milliGRAM(s) Oral two times a day            Vital Signs Last 24 Hrs  T(C): 36.3 (16 Jan 2024 10:55), Max: 36.8 (16 Jan 2024 00:10)  T(F): 97.4 (16 Jan 2024 10:55), Max: 98.2 (16 Jan 2024 00:10)  HR: 67 (16 Jan 2024 10:55) (67 - 75)  BP: 95/62 (16 Jan 2024 10:55) (91/70 - 113/69)  BP(mean): --  RR: 18 (16 Jan 2024 10:55) (18 - 18)  SpO2: 93% (16 Jan 2024 10:55) (93% - 94%)    Parameters below as of 16 Jan 2024 10:55  Patient On (Oxygen Delivery Method): room air              01-15 @ 07:01  -  01-16 @ 07:00  --------------------------------------------------------  IN: 240 mL / OUT: 400 mL / NET: -160 mL          LABS:                        15.2   22.99 )-----------( 251      ( 16 Jan 2024 05:44 )             47.1     01-16    137  |  101  |  46<H>  ----------------------------<  96  4.1   |  23  |  1.30    Ca    6.6<L>      16 Jan 2024 05:44  Phos  3.4     01-16  Mg     1.8     01-16            CAPILLARY BLOOD GLUCOSE          Urinalysis Basic - ( 16 Jan 2024 05:44 )    Color: x / Appearance: x / SG: x / pH: x  Gluc: 96 mg/dL / Ketone: x  / Bili: x / Urobili: x   Blood: x / Protein: x / Nitrite: x   Leuk Esterase: x / RBC: x / WBC x   Sq Epi: x / Non Sq Epi: x / Bacteria: x          Physical Examination:  PULM: CTA b/l, no wheeze   CVS: S1, S2 heard    RADIOLOGY REVIEWED  CXR: grossly clear

## 2024-01-17 ENCOUNTER — TRANSCRIPTION ENCOUNTER (OUTPATIENT)
Age: 89
End: 2024-01-17

## 2024-01-17 VITALS
OXYGEN SATURATION: 95 % | DIASTOLIC BLOOD PRESSURE: 64 MMHG | RESPIRATION RATE: 18 BRPM | SYSTOLIC BLOOD PRESSURE: 110 MMHG | TEMPERATURE: 98 F | HEART RATE: 64 BPM

## 2024-01-17 PROCEDURE — 84100 ASSAY OF PHOSPHORUS: CPT

## 2024-01-17 PROCEDURE — 84132 ASSAY OF SERUM POTASSIUM: CPT

## 2024-01-17 PROCEDURE — 86900 BLOOD TYPING SEROLOGIC ABO: CPT

## 2024-01-17 PROCEDURE — 94660 CPAP INITIATION&MGMT: CPT

## 2024-01-17 PROCEDURE — 83735 ASSAY OF MAGNESIUM: CPT

## 2024-01-17 PROCEDURE — 82803 BLOOD GASES ANY COMBINATION: CPT

## 2024-01-17 PROCEDURE — 85014 HEMATOCRIT: CPT

## 2024-01-17 PROCEDURE — 86901 BLOOD TYPING SEROLOGIC RH(D): CPT

## 2024-01-17 PROCEDURE — 85018 HEMOGLOBIN: CPT

## 2024-01-17 PROCEDURE — 87040 BLOOD CULTURE FOR BACTERIA: CPT

## 2024-01-17 PROCEDURE — 82947 ASSAY GLUCOSE BLOOD QUANT: CPT

## 2024-01-17 PROCEDURE — 93005 ELECTROCARDIOGRAM TRACING: CPT

## 2024-01-17 PROCEDURE — 74018 RADEX ABDOMEN 1 VIEW: CPT

## 2024-01-17 PROCEDURE — 84145 PROCALCITONIN (PCT): CPT

## 2024-01-17 PROCEDURE — 93306 TTE W/DOPPLER COMPLETE: CPT

## 2024-01-17 PROCEDURE — 82435 ASSAY OF BLOOD CHLORIDE: CPT

## 2024-01-17 PROCEDURE — 84295 ASSAY OF SERUM SODIUM: CPT

## 2024-01-17 PROCEDURE — 82746 ASSAY OF FOLIC ACID SERUM: CPT

## 2024-01-17 PROCEDURE — 83605 ASSAY OF LACTIC ACID: CPT

## 2024-01-17 PROCEDURE — 84443 ASSAY THYROID STIM HORMONE: CPT

## 2024-01-17 PROCEDURE — 96375 TX/PRO/DX INJ NEW DRUG ADDON: CPT

## 2024-01-17 PROCEDURE — 70450 CT HEAD/BRAIN W/O DYE: CPT

## 2024-01-17 PROCEDURE — 80048 BASIC METABOLIC PNL TOTAL CA: CPT

## 2024-01-17 PROCEDURE — 71250 CT THORAX DX C-: CPT | Mod: MA

## 2024-01-17 PROCEDURE — 96374 THER/PROPH/DIAG INJ IV PUSH: CPT

## 2024-01-17 PROCEDURE — 83880 ASSAY OF NATRIURETIC PEPTIDE: CPT

## 2024-01-17 PROCEDURE — 85027 COMPLETE CBC AUTOMATED: CPT

## 2024-01-17 PROCEDURE — 94640 AIRWAY INHALATION TREATMENT: CPT

## 2024-01-17 PROCEDURE — 85025 COMPLETE CBC W/AUTO DIFF WBC: CPT

## 2024-01-17 PROCEDURE — 85610 PROTHROMBIN TIME: CPT

## 2024-01-17 PROCEDURE — 97110 THERAPEUTIC EXERCISES: CPT

## 2024-01-17 PROCEDURE — 82330 ASSAY OF CALCIUM: CPT

## 2024-01-17 PROCEDURE — 82962 GLUCOSE BLOOD TEST: CPT

## 2024-01-17 PROCEDURE — 97162 PT EVAL MOD COMPLEX 30 MIN: CPT

## 2024-01-17 PROCEDURE — 87635 SARS-COV-2 COVID-19 AMP PRB: CPT

## 2024-01-17 PROCEDURE — 74177 CT ABD & PELVIS W/CONTRAST: CPT

## 2024-01-17 PROCEDURE — 97530 THERAPEUTIC ACTIVITIES: CPT

## 2024-01-17 PROCEDURE — 36415 COLL VENOUS BLD VENIPUNCTURE: CPT

## 2024-01-17 PROCEDURE — 86850 RBC ANTIBODY SCREEN: CPT

## 2024-01-17 PROCEDURE — 36600 WITHDRAWAL OF ARTERIAL BLOOD: CPT

## 2024-01-17 PROCEDURE — 87637 SARSCOV2&INF A&B&RSV AMP PRB: CPT

## 2024-01-17 PROCEDURE — 87449 NOS EACH ORGANISM AG IA: CPT

## 2024-01-17 PROCEDURE — 99285 EMERGENCY DEPT VISIT HI MDM: CPT | Mod: 25

## 2024-01-17 PROCEDURE — 82607 VITAMIN B-12: CPT

## 2024-01-17 PROCEDURE — 71045 X-RAY EXAM CHEST 1 VIEW: CPT

## 2024-01-17 PROCEDURE — 85730 THROMBOPLASTIN TIME PARTIAL: CPT

## 2024-01-17 PROCEDURE — 84439 ASSAY OF FREE THYROXINE: CPT

## 2024-01-17 PROCEDURE — 84484 ASSAY OF TROPONIN QUANT: CPT

## 2024-01-17 PROCEDURE — 80053 COMPREHEN METABOLIC PANEL: CPT

## 2024-01-17 RX ORDER — METOPROLOL TARTRATE 50 MG
1 TABLET ORAL
Refills: 0 | DISCHARGE
Start: 2024-01-17

## 2024-01-17 RX ORDER — DIGOXIN 250 MCG
1 TABLET ORAL
Qty: 0 | Refills: 0 | DISCHARGE
Start: 2024-01-17

## 2024-01-17 RX ORDER — POLYETHYLENE GLYCOL 3350 17 G/17G
17 POWDER, FOR SOLUTION ORAL
Qty: 0 | Refills: 0 | DISCHARGE
Start: 2024-01-17

## 2024-01-17 RX ORDER — PANTOPRAZOLE SODIUM 20 MG/1
1 TABLET, DELAYED RELEASE ORAL
Qty: 0 | Refills: 0 | DISCHARGE
Start: 2024-01-17

## 2024-01-17 RX ADMIN — EPLERENONE 50 MILLIGRAM(S): 50 TABLET, FILM COATED ORAL at 05:42

## 2024-01-17 RX ADMIN — APIXABAN 5 MILLIGRAM(S): 2.5 TABLET, FILM COATED ORAL at 05:43

## 2024-01-17 RX ADMIN — PANTOPRAZOLE SODIUM 40 MILLIGRAM(S): 20 TABLET, DELAYED RELEASE ORAL at 05:42

## 2024-01-17 RX ADMIN — LOSARTAN POTASSIUM 25 MILLIGRAM(S): 100 TABLET, FILM COATED ORAL at 05:42

## 2024-01-17 RX ADMIN — BUDESONIDE AND FORMOTEROL FUMARATE DIHYDRATE 2 PUFF(S): 160; 4.5 AEROSOL RESPIRATORY (INHALATION) at 05:43

## 2024-01-17 RX ADMIN — Medication 3 MILLILITER(S): at 05:48

## 2024-01-17 RX ADMIN — Medication 25 MILLIGRAM(S): at 05:41

## 2024-01-17 RX ADMIN — Medication 3 MILLILITER(S): at 00:36

## 2024-01-17 RX ADMIN — Medication 3 MILLILITER(S): at 11:20

## 2024-01-17 RX ADMIN — Medication 20 MILLIGRAM(S): at 05:42

## 2024-01-17 RX ADMIN — Medication 25 MICROGRAM(S): at 05:42

## 2024-01-17 RX ADMIN — Medication 100 MILLIGRAM(S): at 14:13

## 2024-01-17 RX ADMIN — Medication 125 MICROGRAM(S): at 05:42

## 2024-01-17 RX ADMIN — POLYETHYLENE GLYCOL 3350 17 GRAM(S): 17 POWDER, FOR SOLUTION ORAL at 11:20

## 2024-01-17 RX ADMIN — Medication 40 MILLIGRAM(S): at 05:42

## 2024-01-17 RX ADMIN — Medication 100 MILLIGRAM(S): at 05:42

## 2024-01-17 RX ADMIN — Medication 15 MILLIGRAM(S): at 05:42

## 2024-01-17 NOTE — PROGRESS NOTE ADULT - PROBLEM SELECTOR PLAN 2
-CT chest with b/l TIB opacities, may be viral +/- bacterial PNA   -S/p Tamiflu  -Steroids/bronchodilators as above.
-CT chest with b/l TIB opacities, may be viral +/- bacterial PNA   -Continue Tamiflu  -Steroids/bronchodilators as above.
-CT chest with b/l TIB opacities, may be viral +/- bacterial PNA   -Continue Tamiflu  -Steroids/bronchodilators as above.
-CT chest with b/l TIB opacities, may be viral +/- bacterial PNA   -S/p Tamiflu  -Steroids/bronchodilators as above.

## 2024-01-17 NOTE — PROGRESS NOTE ADULT - PROBLEM SELECTOR PLAN 1
2nd to Influenza A +/- bacterial PNA  -Continue Duoneb q6h. Change to albuterol MDI 2 puffs q6h PRN on discharge.   -Continue Symbicort 160/4.5 mcg 2 puffs BID (d/c Symbicort and resume home med Breo Ellipta on discharge)  -S/p Prednisone 40 mg PO qd x5 days. No wheezing on exam  -Normoxic, no c/o dyspnea.
2nd to Influenza A +/- bacterial PNA  -Continue Duoneb q6h. Change to albuterol MDI 2 puffs q6h PRN on discharge.   -Continue Symbicort 160/4.5 mcg 2 puffs BID (d/c Symbicort and resume home med Breo Ellipta on discharge)  -Prednisone 40 mg PO qd x5 days (to be completed tomorrow 1/10)  -Normoxic  -D/c planning per primary team, f/u in office 2 weeks
2nd to Influenza A +/- bacterial PNA  -Continue Duoneb q6h  -Continue Symbicort 160/4.5 mcg 2 puffs BID (home med Breo Ellipta)  -Prednisone 40 mg PO qd x5 days   -Keep sats >90% with O2 PRN, currently RA.
2nd to Influenza A +/- bacterial PNA  -Continue Duoneb q6h. Change to albuterol MDI 2 puffs q6h PRN on discharge.   -Continue Symbicort 160/4.5 mcg 2 puffs BID (d/c Symbicort and resume home med Breo Ellipta on discharge)  -S/p Prednisone 40 mg PO qd x5 days  -Normoxic, no c/o dyspnea.  -Pt now with b/l expiratory wheezes. Possible some component of volume overload. Pro-BNP elevated. S/p Lasix 20 mg IVP x1 1/12. Consider additional dose today.   -Still with b/l exp wheezes on exam today. Suggest start Prednisone 30 mg PO qd & continue through the weekend. Will reevaluate further taper Monday AM  -Continue bronchodilators.
2nd to Influenza A +/- bacterial PNA  -Continue Duoneb q6h. Change to albuterol MDI 2 puffs q6h PRN on discharge.   -Continue Symbicort 160/4.5 mcg 2 puffs BID (d/c Symbicort and resume home med Breo Ellipta on discharge)  -Prednisone 40 mg PO qd x5 days (to be completed today 1/10).
2nd to Influenza A +/- bacterial PNA  -Continue Duoneb q6h. Change to albuterol MDI 2 puffs q6h PRN on discharge.   -Continue Symbicort 160/4.5 mcg 2 puffs BID (d/c Symbicort and resume home med Breo Ellipta on discharge)  -Normoxic, no c/o dyspnea  -Had completed short course of Prednisone. Steroids resumed 1/13 due to wheezing (Prednisone 30 mg PO qd)   -Possible some component of volume overload. Pro-BNP elevated. S/p Lasix 20 mg IVP x1 1/12. Home dose Lasix now resumed per cards  -Wheezing now resolved   -Prednisone taper ordered: Prednisone 20 mg PO qd x1 more day, then 10 mg PO qd x3 days, then 5 mg PO qd x3 days then stop.   -Continue bronchodilators.
2nd to Influenza A +/- bacterial PNA  -Continue Duoneb q6h. Change to albuterol MDI 2 puffs q6h PRN on discharge.   -Continue Symbicort 160/4.5 mcg 2 puffs BID (d/c Symbicort and resume home med Breo Ellipta on discharge)  -Normoxic, no c/o dyspnea  -Had completed short course of Prednisone. Steroids resumed 1/13 due to wheezing (Prednisone 30 mg PO qd)   -Possible some component of volume overload. Pro-BNP elevated. S/p Lasix 20 mg IVP x1 1/12. Home dose Lasix now resumed per cards  -Wheezing improving today  -Prednisone taper ordered: Prednisone 20 mg PO qd x3 days, then 10 mg PO qd x3 days, then 5 mg PO qd x3 days then stop.   -Continue bronchodilators.
2nd to Influenza A +/- bacterial PNA  -Continue Duoneb q6h. Change to albuterol MDI 2 puffs q6h PRN on discharge.   -Continue Symbicort 160/4.5 mcg 2 puffs BID (d/c Symbicort and resume home med Breo Ellipta on discharge)  -Normoxic, no c/o dyspnea  -Had completed short course of Prednisone. Steroids resumed 1/13 due to wheezing (Prednisone 30 mg PO qd)   -Possible some component of volume overload. Pro-BNP elevated. S/p Lasix 20 mg IVP x1 1/12. Home dose Lasix now resumed per cards  -Wheezing improving today  -Prednisone taper ordered: Prednisone 20 mg PO qd x2 more days, then 10 mg PO qd x3 days, then 5 mg PO qd x3 days then stop.   -Continue bronchodilators.
2nd to Influenza A +/- bacterial PNA  -Continue Duoneb q6h. Change to albuterol MDI 2 puffs q6h PRN on discharge.   -Continue Symbicort 160/4.5 mcg 2 puffs BID (d/c Symbicort and resume home med Breo Ellipta on discharge)  -S/p Prednisone 40 mg PO qd x5 days.   -Normoxic, no c/o dyspnea.  -Pt now with b/l expiratory wheezes. Possible some component of volume overload - suggest d/c IVF, Lasix 20mg IVP x1. ProBNP added to AM labs. CXR ordered. Hold off on further steroids for now. Continue bronchodilators.

## 2024-01-17 NOTE — DISCHARGE NOTE NURSING/CASE MANAGEMENT/SOCIAL WORK - NSDCVIVACCINE_GEN_ALL_CORE_FT
Tdap; 23-Jan-2017 08:01; Ivelisse Carrera (RN); [employer]; [id number]; IntraMuscular; Deltoid Right.; 0.5 milliLiter(s); VIS (VIS Published: 09-May-2013, VIS Presented: 23-Jan-2017);   Tdap; 29-Apr-2019 12:26; Aga Pichardo (SCOTT); [employer]; [id number] (Exp. Date: 26-Feb-2021); IntraMuscular; Deltoid Right.; 0.5 milliLiter(s); VIS (VIS Published: 09-May-2013, VIS Presented: 29-Apr-2019);

## 2024-01-17 NOTE — PROGRESS NOTE ADULT - PROBLEM SELECTOR PROBLEM 4
YOVANI (obstructive sleep apnea)
YOVANI (obstructive sleep apnea)
Coffee ground emesis

## 2024-01-17 NOTE — DISCHARGE NOTE NURSING/CASE MANAGEMENT/SOCIAL WORK - NSTRANSFERBELONGINGSRESP_GEN_A_NUR
Pt would like an order for PT for left knee and right shoulder. OTW for Tuesday with Dr. Gloria Márquez returns. yes

## 2024-01-17 NOTE — PROGRESS NOTE ADULT - ASSESSMENT
89 y/o F with PMH of CVA, YOVANI on CPAP, HTN, HLD, Afib on Eliquis, CHF. Recent admission at [facility] 1 month ago for PNA, completed short course of ABX. Completed additional course of ABX last week for UTI. Presents to the ED with coughing, SOB, wheezing x several days. Found to be Flu +

## 2024-01-17 NOTE — PROGRESS NOTE ADULT - SUBJECTIVE AND OBJECTIVE BOX
Follow-up Pulm Progress Note    No new respiratory events overnight.  Denies SOB/CP.     Medications:  MEDICATIONS  (STANDING):  albuterol/ipratropium for Nebulization 3 milliLiter(s) Nebulizer every 6 hours  apixaban 5 milliGRAM(s) Oral every 12 hours  atorvastatin 10 milliGRAM(s) Oral at bedtime  budesonide 160 MICROgram(s)/formoterol 4.5 MICROgram(s) Inhaler 2 Puff(s) Inhalation two times a day  busPIRone 15 milliGRAM(s) Oral two times a day  digoxin     Tablet 125 MICROGram(s) Oral daily  eplerenone 50 milliGRAM(s) Oral daily  furosemide    Tablet 40 milliGRAM(s) Oral daily  levothyroxine 25 MICROGram(s) Oral daily  losartan 25 milliGRAM(s) Oral daily  metoprolol tartrate 100 milliGRAM(s) Oral three times a day  ondansetron Injectable 4 milliGRAM(s) IV Push once  pantoprazole    Tablet 40 milliGRAM(s) Oral two times a day  polyethylene glycol 3350 17 Gram(s) Oral daily  predniSONE   Tablet 20 milliGRAM(s) Oral daily  predniSONE   Tablet   Oral   pregabalin 25 milliGRAM(s) Oral two times a day      Vital Signs Last 24 Hrs  T(C): 36.3 (17 Jan 2024 04:48), Max: 36.5 (16 Jan 2024 21:24)  T(F): 97.4 (17 Jan 2024 04:48), Max: 97.7 (16 Jan 2024 21:24)  HR: 65 (17 Jan 2024 04:48) (65 - 79)  BP: 142/71 (17 Jan 2024 04:48) (107/69 - 142/71)  BP(mean): --  RR: 18 (17 Jan 2024 04:48) (18 - 18)  SpO2: 94% (17 Jan 2024 04:48) (93% - 94%)    Parameters below as of 17 Jan 2024 04:48  Patient On (Oxygen Delivery Method): room air      01-16 @ 07:01  -  01-17 @ 07:00  --------------------------------------------------------  IN: 380 mL / OUT: 650 mL / NET: -270 mL          LABS:                        15.2   22.99 )-----------( 251      ( 16 Jan 2024 05:44 )             47.1     01-16    137  |  101  |  46<H>  ----------------------------<  96  4.1   |  23  |  1.30    Ca    6.6<L>      16 Jan 2024 05:44  Phos  3.4     01-16  Mg     1.8     01-16      Urinalysis Basic - ( 16 Jan 2024 05:44 )    Color: x / Appearance: x / SG: x / pH: x  Gluc: 96 mg/dL / Ketone: x  / Bili: x / Urobili: x   Blood: x / Protein: x / Nitrite: x   Leuk Esterase: x / RBC: x / WBC x   Sq Epi: x / Non Sq Epi: x / Bacteria: x      Physical Examination:  PULM: CTA b/l, no wheeze   CVS: S1, S2 heard    RADIOLOGY REVIEWED  CXR: grossly clear

## 2024-01-17 NOTE — PROGRESS NOTE ADULT - PROBLEM SELECTOR PLAN 7
By hx - pt has not worn her CPAP in many years as she could not tolerate it  -Her VBG during this hospitalization does not show any CO2 retention of concern   -No indication for CPAP at this time  -ABG 1/10 with no CO2 retention.
By hx - pt has not worn her CPAP in many years as she could not tolerate it  -Her VBG during this hospitalization does not show any CO2 retention of concern   -No indication for CPAP at this time.
By hx - pt has not worn her CPAP in many years as she could not tolerate it  -Her VBG during this hospitalization does not show any CO2 retention of concern   -No indication for CPAP at this time  -ABG 1/10 with no CO2 retention.
By hx - pt has not worn her CPAP in many years as she could not tolerate it  -Her VBG during this hospitalization does not show any CO2 retention of concern   -No indication for CPAP at this time  -ABG 1/10 with no CO2 retention.

## 2024-01-17 NOTE — PROGRESS NOTE ADULT - SUBJECTIVE AND OBJECTIVE BOX
DATE OF SERVICE: 01-17-24 @ 13:12    Patient is a 89y old  Female who presents with a chief complaint of SOB (17 Jan 2024 11:17)      SUBJECTIVE / OVERNIGHT EVENTS:  No chest pain. No shortness of breath. No complaints. No events overnight.     MEDICATIONS  (STANDING):  albuterol/ipratropium for Nebulization 3 milliLiter(s) Nebulizer every 6 hours  apixaban 5 milliGRAM(s) Oral every 12 hours  atorvastatin 10 milliGRAM(s) Oral at bedtime  budesonide 160 MICROgram(s)/formoterol 4.5 MICROgram(s) Inhaler 2 Puff(s) Inhalation two times a day  busPIRone 15 milliGRAM(s) Oral two times a day  digoxin     Tablet 125 MICROGram(s) Oral daily  eplerenone 50 milliGRAM(s) Oral daily  furosemide    Tablet 40 milliGRAM(s) Oral daily  levothyroxine 25 MICROGram(s) Oral daily  losartan 25 milliGRAM(s) Oral daily  metoprolol tartrate 100 milliGRAM(s) Oral three times a day  ondansetron Injectable 4 milliGRAM(s) IV Push once  pantoprazole    Tablet 40 milliGRAM(s) Oral two times a day  polyethylene glycol 3350 17 Gram(s) Oral daily  predniSONE   Tablet 20 milliGRAM(s) Oral daily  predniSONE   Tablet   Oral   pregabalin 25 milliGRAM(s) Oral two times a day    MEDICATIONS  (PRN):      Vital Signs Last 24 Hrs  T(C): 36.7 (17 Jan 2024 12:23), Max: 36.7 (17 Jan 2024 12:23)  T(F): 98 (17 Jan 2024 12:23), Max: 98 (17 Jan 2024 12:23)  HR: 69 (17 Jan 2024 12:23) (65 - 79)  BP: 124/83 (17 Jan 2024 12:23) (107/69 - 142/71)  BP(mean): --  RR: 18 (17 Jan 2024 12:23) (18 - 18)  SpO2: 95% (17 Jan 2024 12:23) (93% - 95%)    Parameters below as of 17 Jan 2024 12:23  Patient On (Oxygen Delivery Method): room air      CAPILLARY BLOOD GLUCOSE        I&O's Summary    16 Jan 2024 07:01  -  17 Jan 2024 07:00  --------------------------------------------------------  IN: 380 mL / OUT: 650 mL / NET: -270 mL        PHYSICAL EXAM:  GENERAL: NAD, well-developed  HEAD:  Atraumatic, Normocephalic  EYES: EOMI, PERRLA, conjunctiva and sclera clear  NECK: Supple, No JVD  CHEST/LUNG: Clear to auscultation bilaterally; No wheeze  HEART: Regular rate and rhythm; No murmurs, rubs, or gallops  ABDOMEN: Soft, Nontender, Nondistended; Bowel sounds present  EXTREMITIES:  2+ Peripheral Pulses, No clubbing, cyanosis, or edema  PSYCH: AAOx3  NEUROLOGY: non-focal  SKIN: No rashes or lesions    LABS:                        15.2   22.99 )-----------( 251      ( 16 Jan 2024 05:44 )             47.1     01-16    137  |  101  |  46<H>  ----------------------------<  96  4.1   |  23  |  1.30    Ca    6.6<L>      16 Jan 2024 05:44  Phos  3.4     01-16  Mg     1.8     01-16            Urinalysis Basic - ( 16 Jan 2024 05:44 )    Color: x / Appearance: x / SG: x / pH: x  Gluc: 96 mg/dL / Ketone: x  / Bili: x / Urobili: x   Blood: x / Protein: x / Nitrite: x   Leuk Esterase: x / RBC: x / WBC x   Sq Epi: x / Non Sq Epi: x / Bacteria: x        RADIOLOGY & ADDITIONAL TESTS:    Imaging Personally Reviewed:    Consultant(s) Notes Reviewed:      Care Discussed with Consultants/Other Providers:

## 2024-01-17 NOTE — PROGRESS NOTE ADULT - PROBLEM SELECTOR PROBLEM 2
Influenza A

## 2024-01-17 NOTE — PROGRESS NOTE ADULT - PROBLEM SELECTOR PROBLEM 3
Leukocytosis
Lung nodule
Leukocytosis
Leukocytosis
Lung nodule
Leukocytosis

## 2024-01-17 NOTE — DISCHARGE NOTE NURSING/CASE MANAGEMENT/SOCIAL WORK - NSDCPEFALRISK_GEN_ALL_CORE
For information on Fall & Injury Prevention, visit: [email or web address] OR  [email or web address] OR  https://www.cdc.gov/steadi/patient.html

## 2024-01-17 NOTE — PROGRESS NOTE ADULT - REASON FOR ADMISSION
SOB

## 2024-01-17 NOTE — PROGRESS NOTE ADULT - PROBLEM SELECTOR PLAN 3
-S/p RRT 1/10 for coffee ground emesis, ? aspiration event.  -GI f/u  -Leukocytosis likely 2nd to steroids, continue to monitor trend  -CXR grossly clear   -Completed 7 days of Zosyn.

## 2024-01-17 NOTE — PROGRESS NOTE ADULT - SUBJECTIVE AND OBJECTIVE BOX
C A R D I O L O G Y  **********************************     DATE OF SERVICE: 01-17-24    Patient denies chest pain, palpitations, or shortness of breath.   Review of symptoms otherwise negative.    MEDICATIONS:  albuterol/ipratropium for Nebulization 3 milliLiter(s) Nebulizer every 6 hours  apixaban 5 milliGRAM(s) Oral every 12 hours  atorvastatin 10 milliGRAM(s) Oral at bedtime  budesonide 160 MICROgram(s)/formoterol 4.5 MICROgram(s) Inhaler 2 Puff(s) Inhalation two times a day  busPIRone 15 milliGRAM(s) Oral two times a day  digoxin     Tablet 125 MICROGram(s) Oral daily  eplerenone 50 milliGRAM(s) Oral daily  furosemide    Tablet 40 milliGRAM(s) Oral daily  levothyroxine 25 MICROGram(s) Oral daily  losartan 25 milliGRAM(s) Oral daily  metoprolol tartrate 100 milliGRAM(s) Oral three times a day  ondansetron Injectable 4 milliGRAM(s) IV Push once  pantoprazole    Tablet 40 milliGRAM(s) Oral two times a day  polyethylene glycol 3350 17 Gram(s) Oral daily  predniSONE   Tablet 20 milliGRAM(s) Oral daily  predniSONE   Tablet   Oral   pregabalin 25 milliGRAM(s) Oral two times a day      LABS:                        15.2   22.99 )-----------( 251      ( 16 Jan 2024 05:44 )             47.1       Hemoglobin: 15.2 g/dL (01-16 @ 05:44)  Hemoglobin: 14.5 g/dL (01-15 @ 05:37)  Hemoglobin: 14.9 g/dL (01-14 @ 06:16)  Hemoglobin: 14.3 g/dL (01-13 @ 07:35)      01-16    137  |  101  |  46<H>  ----------------------------<  96  4.1   |  23  |  1.30    Ca    6.6<L>      16 Jan 2024 05:44  Phos  3.4     01-16  Mg     1.8     01-16      Creatinine Trend: 1.30<--, 1.10<--, 1.12<--, 1.06<--, 0.97<--, 1.00<--    COAGS:           PHYSICAL EXAM:  T(C): 36.7 (01-17-24 @ 12:23), Max: 36.7 (01-17-24 @ 12:23)  HR: 69 (01-17-24 @ 12:23) (65 - 79)  BP: 124/83 (01-17-24 @ 12:23) (107/69 - 142/71)  RR: 18 (01-17-24 @ 12:23) (18 - 18)  SpO2: 95% (01-17-24 @ 12:23) (93% - 95%)  Wt(kg): --    I&O's Summary    16 Jan 2024 07:01  -  17 Jan 2024 07:00  --------------------------------------------------------  IN: 380 mL / OUT: 650 mL / NET: -270 mL        Gen: NAD  HEENT:  (-)icterus (-)pallor  CV: N S1 S2 1/6 DMITRIY (+)2 Pulses B/l  Resp:  Clear to auscultation B/L, normal effort  GI: (+) BS Soft, NT, ND  Lymph:  (-)Edema, (-)obvious lymphadenopathy  Skin: Warm to touch, Normal turgor  Psych: Appropriate mood and affect      TELEMETRY: AF,  50-70    ECG: AF RVR  	    RADIOLOGY:         CXR: < from: Xray Chest 1 View- PORTABLE-Urgent (Xray Chest 1 View- PORTABLE-Urgent .) (01.12.24 @ 13:10) >  IMPRESSION:    Clear lungs.    < end of copied text >    < from: TTE W or WO Ultrasound Enhancing Agent (01.11.24 @ 11:10) >  CONCLUSIONS:      1. Left ventricular cavity is normal. Left ventricular systolic function is normal. There are no regional wall motion abnormalities seen.   2. Mild pulmonary hypertension.   3. Device lead is visualized in the right heart.   4. No prior echocardiogram is available for comparison.    < end of copied text >      ASSESSMENT/PLAN: Patient is a 89 y/o female with PMH of CVA, YOVANI on CPAP, HTN, HLD, persistent afib on Eliquis s/p [employer] PPM, and diastolic heart failure who presented with SOB, cough, and wheezing admitted with Influenza A and PNA. Cardiology consulted for afib RVR.    #Afib RVR  - In setting of infection now improved  - EP eval appreciated  - Continue rate control with metoprolol 100mg TID and Digoxin 0.125mg daily  - Continue Eliquis for stroke prevention if no contraindications  - TTE with normal LV function    #Influenza A/PNA  - Management and Abx per med/pulm  - BCx negative    #Chronic Diastolic Heart Failure  - Continue eplerenone  - Does not appear to be in heart failure - CXR with clear lungs  - Continue PO lasix as tolerated    #HTN  - Continue Losartan and Metoprolol    #HLD  - Continue Lipitor    - No further inpatient cardiac w/u planned  - D/C planning to rehab per primary team    Gagandeep Joya PA-C  Pager: [phone]

## 2024-01-17 NOTE — PROGRESS NOTE ADULT - PROBLEM SELECTOR PLAN 5
w/ VALENTINA ALVARADO with Eliquis   -Cards f/u.
w/ VALENTINA  -S/p RRT 1/10  -AC with Eliquis   -Cards f/u.
w/ VALENTINA ALVARADO with Eliquis   -Cards f/u.
w/ VALENTINA ALVARADO with Eliquis   -Cards f/u.
w/ RVR  -AC with Eliquis   -CT A/P with ? clot in CORY, f/u TTE  -Cards f/u.
-CT chest with b/l TIB opacities, may be viral +/- bacterial PNA  -Leukocytosis downtrending  -Procalcitonin normal   -Continue ABX. Suggest 5 day course, when d/c planning can transition to PO Ceftin 500 mg PO BID.
-CT chest with b/l TIB opacities, may be viral +/- bacterial PNA  -Leukocytosis downtrending  -Procalcitonin normal   -S/p 5 day course of Ceftriaxone
w/ RVR  -AC with Eliquis   -CT A/P with ? clot in CORY, f/u TTE  -Cards f/u.
w/ RVR  -AC with Eliquis   -CT A/P with ? clot in CORY, f/u TTE  -Cards f/u.

## 2024-01-17 NOTE — PROGRESS NOTE ADULT - NS ATTEND AMEND GEN_ALL_CORE FT
Patient seen and examined. Agree with plan as detailed in PA/NP Note.     Rate controlled, lying flat on room air, c/w oral irisix    Cheri Espinoza MD  Pager: [phone]

## 2024-01-17 NOTE — PROGRESS NOTE ADULT - PROBLEM SELECTOR PROBLEM 7
YOVANI (obstructive sleep apnea)

## 2024-01-17 NOTE — PROGRESS NOTE ADULT - NS ATTEND OPT1 GEN_ALL_CORE
I attest my time as attending is greater than 50% of the total combined time spent on qualifying patient care activities by the PA/NP and attending.
I attest my time as attending is greater than 50% of the total combined time spent on qualifying patient care activities by the PA/NP and attending.
Self
I independently performed the documented:

## 2024-01-17 NOTE — PROGRESS NOTE ADULT - ASSESSMENT
89-year-old female PMH of CVA, YOVANI on CPAP, HTN, HLD, A-fib on Eliquis, heart failure on furosemide, presents to the ED complaining of several days of coughing, increased wheezing, shortness of breath, and found to be influenza positive. Patient admitted for sepsis management.     Upper GI bleed  - iv ppi bid  - GI consult saw pt  - conservative management  - advance diet  - cont  eliquis     Sepsis.   - Blood cultures drawn and NGTD  - completed  ceftriaxone   - Continue oseltamavir  - Monitor fever curve.  - now on zosyn empirically day 5 for poss aspiration PNA  - leukocytosis is chronic    Asthma exacerbation.   ·  Plan: 2nd to Influenza A +/- bacterial PNA  -Continue Duoneb q6h  -Continue Symbicort 160/4.5 mcg 2 puffs BID (home med Breo Ellipta)  -Prednisone 40 mg PO qd x5 days  completed  -Keep sats >90% with O2 PRN, currently RA.    Influenza A.    -CT chest with b/l TIB opacities, may be viral +/- bacterial PNA   -Continue Tamiflu  -Steroids/bronchodilators as above.    Chronic systolic congestive heart failure.   -Not currently in decompensated HF. TTE from 8/29/23 showing normal LVSF, EF 61%  - Will continue eplerenone, atenolol, losartan for now   - Will hold lasix for now given sepsis and current euvolemic status   - Monitor I/Os, daily weight  - BMP daily, monitor electrolytes while on diuretics.    Chronic atrial fibrillation.   - on eliquis  - on metoprolol  - cardiology to see    CORY clot on ct  -  tte done  - pt has been on a/c    adrenal mass  - to be evaluated as out pt as pt has active issues      YOVANI (obstructive sleep apnea).   - By hx  -  pt reports does not have CPAP at home   -VBG acceptable  -Suggest monitor off CPAP, order d/c'd.     Prophylactic measure.   ·  Plan: DVT ppx: eliquis  Sleep: on benadryl qhs prn  Diet: DASH  Dispo: cleared by pulm for discharge to [facility]  - dicussed with PCP Dr Parra  - d/w daughter  d/c planing to [facility]

## 2024-01-17 NOTE — PROGRESS NOTE ADULT - PROVIDER SPECIALTY LIST ADULT
Cardiology
Cardiology
Electrophysiology
Internal Medicine
Internal Medicine
Cardiology
Electrophysiology
Internal Medicine
Internal Medicine
Cardiology
Electrophysiology
Internal Medicine
Internal Medicine
Cardiology
Internal Medicine
Pulmonology
Pulmonology
Electrophysiology
Internal Medicine
Internal Medicine
Pulmonology
Gastroenterology
Internal Medicine
Pulmonology

## 2024-01-17 NOTE — PROGRESS NOTE ADULT - SUBJECTIVE AND OBJECTIVE BOX
EP ATTENDING    tele: AF better rate controlled, 70s at rest today.  DATE OF SERVICE - 01-17-24     Review of Systems:   Constitutional: [ ] fevers, [ ] chills.   Skin: [ ] dry skin. [ ] rashes.  Psychiatric: [ ] depression, [ ] anxiety.   Gastrointestinal: [ ] BRBPR, [ ] melena.   Neurological: [ ] confusion. [ ] seizures. [ ] shuffling gait.   Ears,Nose,Mouth and Throat: [ ] ear pain [ ] sore throat.   Eyes: [ ] diplopia.   Respiratory: [ ] hemoptysis. [ ] shortness of breath  Cardiovascular: See HPI above  Hematologic/Lymphatic: [ ] anemia. [ ] painful nodes. [ ] prolonged bleeding.   Genitourinary: [ ] hematuria. [ ] flank pain.   Endocrine: [ ] significant change in weight. [ ] intolerance to heat and cold.     albuterol/ipratropium for Nebulization 3 milliLiter(s) Nebulizer every 6 hours  apixaban 5 milliGRAM(s) Oral every 12 hours  atorvastatin 10 milliGRAM(s) Oral at bedtime  budesonide 160 MICROgram(s)/formoterol 4.5 MICROgram(s) Inhaler 2 Puff(s) Inhalation two times a day  busPIRone 15 milliGRAM(s) Oral two times a day  digoxin     Tablet 125 MICROGram(s) Oral daily  eplerenone 50 milliGRAM(s) Oral daily  furosemide    Tablet 40 milliGRAM(s) Oral daily  levothyroxine 25 MICROGram(s) Oral daily  losartan 25 milliGRAM(s) Oral daily  metoprolol tartrate 100 milliGRAM(s) Oral three times a day  ondansetron Injectable 4 milliGRAM(s) IV Push once  pantoprazole    Tablet 40 milliGRAM(s) Oral two times a day  polyethylene glycol 3350 17 Gram(s) Oral daily  predniSONE   Tablet 20 milliGRAM(s) Oral daily  predniSONE   Tablet   Oral   pregabalin 25 milliGRAM(s) Oral two times a day                       15.2   22.99 )-----------( 251      ( 16 Jan 2024 05:44 )             47.1       01-16    137  |  101  |  46<H>  ----------------------------<  96  4.1   |  23  |  1.30    Ca    6.6<L>      16 Jan 2024 05:44  Phos  3.4     01-16  Mg     1.8     01-16    T(C): 36.3 (01-17-24 @ 04:48), Max: 36.5 (01-16-24 @ 21:24)  HR: 65 (01-17-24 @ 04:48) (65 - 79)  BP: 142/71 (01-17-24 @ 04:48) (95/62 - 142/71)  RR: 18 (01-17-24 @ 04:48) (18 - 18)  SpO2: 94% (01-17-24 @ 04:48) (93% - 94%)  Wt(kg): --    I&O's Summary    16 Jan 2024 07:01  -  17 Jan 2024 07:00  --------------------------------------------------------  IN: 380 mL / OUT: 650 mL / NET: -270 mL    Review of systems [ x] otherwise negative, [ ] otherwise unable to obtain    FH: no family history of sudden cardiac death in first degree relatives    SH: [ ] tobacco, [ ] alcohol, [ ] drugs    General: Well nourished, no acute distress, alert and oriented x 3  Head: normocephalic, no trauma  Neck: no JVD, no bruit, supple, not enlarged  CV: irregular S1S2,  regular rate, rhythm is AFib, no murmurs.    Lungs: clear BL, no rales or wheezes  Abdomen: bowel sounds +, soft, nontender, nondistended  Extremities: no clubbing, cyanosis or edema  Neuro: Moves all 4 extremities, sensation intact x 4 extremities  Skin: warm and moist, normal turgor  Psych: Mood and affect are appropriate for circumstances  MSK: normal range of motion and strength x4 extremities.    < from: TTE W or WO Ultrasound Enhancing Agent (01.11.24 @ 11:10) >  CONCLUSIONS:      1. Left ventricular cavity is normal. Left ventricular systolic function is normal. There are no regional wall motion abnormalities seen.   2. Mild pulmonary hypertension.   3. Device lead is visualized in the right heart.   4. No prior echocardiogram is available for comparison.    ________________________________________________________________________________________  FINDINGS:     Left Ventricle:  The leftventricular cavity is normal. Left ventricular systolic function is normal with an ejection fraction visually estimated at 55 to 60%. There are no regional wall motion abnormalities seen. There is normal LV mass and concentric remodeling.     Right Ventricle:  The right ventricular cavity is normal in size and borderline reduced systolic function. Tricuspid annular plane systolic excursion (TAPSE) is 1.2 cm (normal >=1.7 cm). A device lead is visualized in the right heart.     Left Atrium:  The left atrium is severely dilated with an indexed volume of 64.80 ml/m².     Right Atrium:  The right atrium is severely dilated in size with an indexed volume of 80.20 ml/m².    < end of copied text >    A/P) She is a pleasant 89 y/o female PMH persistent AF, hypertension, hyperlipidemia, hypothyroidism and stroke who was implanted with a dual chamber pacemaker in 2009. Her last pulse generator change was around 2018. She has since developed persistent AF, and her PPM has been managed by Dr. Bill Napier at [facility]. She is now a/w rapid AF. Echo unremarkable. She denies syncope, but reports palpitations and dyspnea.     -no more amiodarone, she is a poor candidate for a rhythm control strategy  -increase metoprolol to 100 tid, continue dig 0.125mg daily.  thus far, effective.  cannot make her bradycardic, as he has a pacemaker.  -continue lipitor for hyperlipidemia  -continue synthroid for hypothyroidism  -continue losartan for hypertension  -continue eliquis 5 bid for lifelong a/c  -continue tele  -long term EP plan is rate control & anticoagulation +/- AV node ablation if HR not controlled.    Franc Bolaños M.D.  Cardiac Electrophysiology  [phone]

## 2024-01-17 NOTE — PROGRESS NOTE ADULT - PROBLEM SELECTOR PLAN 8
-CT chest with b/l TIB opacities, may be viral +/- bacterial PNA  -Procalcitonin normal   -S/p 5 day course of Ceftriaxone  -Suggest repeat CT chest as an outpatient in 4-6 weeks.  -Pt now with possible aspiration event. Continue abx.
-CT chest with b/l TIB opacities, may be viral +/- bacterial PNA  -Procalcitonin normal   -S/p 5 day course of Ceftriaxone  -Suggest repeat CT chest as an outpatient in 4-6 weeks.  -Possible aspiration event. S/p 7 day course of Zosyn.   -CXR grossly clear.
-CT chest with b/l TIB opacities, may be viral +/- bacterial PNA  -Procalcitonin normal   -S/p 5 day course of Ceftriaxone  -Suggest repeat CT chest as an outpatient in 4-6 weeks.
-CT chest with b/l TIB opacities, may be viral +/- bacterial PNA  -Procalcitonin normal   -S/p 5 day course of Ceftriaxone  -Suggest repeat CT chest as an outpatient in 4-6 weeks.  -Pt now with possible aspiration event. Continue abx.
-CT chest with b/l TIB opacities, may be viral +/- bacterial PNA  -Leukocytosis downtrending  -Procalcitonin normal   -S/p 5 day course of Ceftriaxone
-CT chest with b/l TIB opacities, may be viral +/- bacterial PNA  -Procalcitonin normal   -S/p 5 day course of Ceftriaxone  -Suggest repeat CT chest as an outpatient in 4-6 weeks.  -Pt now with possible aspiration event. Continue abx.   -F/u CXR ordered.
-CT chest with b/l TIB opacities, may be viral +/- bacterial PNA  -Procalcitonin normal   -S/p 5 day course of Ceftriaxone  -Suggest repeat CT chest as an outpatient in 4-6 weeks.  -Pt now with possible aspiration event. Continue abx.   -F/u CXR ordered.

## 2024-01-17 NOTE — DISCHARGE NOTE NURSING/CASE MANAGEMENT/SOCIAL WORK - PATIENT PORTAL LINK FT
You can access the FollowMyHealth Patient Portal offered by [facility] by registering at the following website: [email or web address]. By joining [employer]’s FollowMyHealth portal, you will also be able to view your health information using other applications (apps) compatible with our system.

## 2024-01-24 NOTE — ED PROVIDER NOTE - PROGRESS NOTE DETAILS
No
PT seen and reassessed.  Patient symptomatically improved.   AAOX3, NAD, VSS.  Discussed test results w/ patient, given copy of results. Patient verbalized understanding of hospital course and outpatient plans, has decisional making capacity.  Will follow-up with Primary care doctor in the next 5-7 days; patient will call for an appointment. Will return to the ED if there is any worsening of symptoms.  Patient able to ambulate w/o difficulty, is tolerating PO intake

## 2024-01-25 ENCOUNTER — TRANSCRIPTION ENCOUNTER (OUTPATIENT)
Age: 89
End: 2024-01-25

## 2024-01-31 ENCOUNTER — TRANSCRIPTION ENCOUNTER (OUTPATIENT)
Age: 89
End: 2024-01-31

## 2024-02-01 ENCOUNTER — TRANSCRIPTION ENCOUNTER (OUTPATIENT)
Age: 89
End: 2024-02-01

## 2024-02-08 ENCOUNTER — TRANSCRIPTION ENCOUNTER (OUTPATIENT)
Age: 89
End: 2024-02-08

## 2024-02-08 ENCOUNTER — OUTPATIENT (OUTPATIENT)
Dept: OUTPATIENT SERVICES | Facility: HOSPITAL | Age: 89
LOS: 1 days | Discharge: HOME | End: 2024-02-08

## 2024-02-08 DIAGNOSIS — Z90.710 ACQUIRED ABSENCE OF BOTH CERVIX AND UTERUS: Chronic | ICD-10-CM

## 2024-02-08 DIAGNOSIS — Z95.0 PRESENCE OF CARDIAC PACEMAKER: Chronic | ICD-10-CM

## 2024-02-08 DIAGNOSIS — C85.10 UNSPECIFIED B-CELL LYMPHOMA, UNSPECIFIED SITE: ICD-10-CM

## 2024-02-08 DIAGNOSIS — Z82.8 FAMILY HISTORY OF OTHER DISABILITIES AND CHRONIC DISEASES LEADING TO DISABLEMENT, NOT ELSEWHERE CLASSIFIED: Chronic | ICD-10-CM

## 2024-02-08 DIAGNOSIS — H26.9 UNSPECIFIED CATARACT: Chronic | ICD-10-CM

## 2024-02-08 DIAGNOSIS — T14.8 OTHER INJURY OF UNSPECIFIED BODY REGION: Chronic | ICD-10-CM

## 2024-02-08 DIAGNOSIS — Z84.89 FAMILY HISTORY OF OTHER SPECIFIED CONDITIONS: Chronic | ICD-10-CM

## 2024-02-15 ENCOUNTER — TRANSCRIPTION ENCOUNTER (OUTPATIENT)
Age: 89
End: 2024-02-15

## 2024-02-15 DIAGNOSIS — D47.9 NEOPLASM OF UNCERTAIN BEHAVIOR OF LYMPHOID, HEMATOPOIETIC AND RELATED TISSUE, UNSPECIFIED: ICD-10-CM

## 2024-03-08 DIAGNOSIS — I48.91 UNSPECIFIED ATRIAL FIBRILLATION: ICD-10-CM

## 2024-03-08 DIAGNOSIS — E03.9 HYPOTHYROIDISM, UNSPECIFIED: ICD-10-CM

## 2024-03-08 RX ORDER — LOSARTAN POTASSIUM 25 MG/1
25 TABLET, FILM COATED ORAL DAILY
Qty: 30 | Refills: 1 | Status: ACTIVE | COMMUNITY
Start: 2023-05-23 | End: 1900-01-01

## 2024-03-08 RX ORDER — METOPROLOL TARTRATE 100 MG/1
100 TABLET, FILM COATED ORAL
Qty: 60 | Refills: 0 | Status: ACTIVE | COMMUNITY
Start: 2024-03-08 | End: 1900-01-01

## 2024-03-08 RX ORDER — PREGABALIN 25 MG/1
25 CAPSULE ORAL TWICE DAILY
Qty: 60 | Refills: 0 | Status: ACTIVE | COMMUNITY
Start: 2023-08-01 | End: 1900-01-01

## 2024-03-08 RX ORDER — FLUTICASONE FUROATE AND VILANTEROL TRIFENATATE 200; 25 UG/1; UG/1
200-25 POWDER RESPIRATORY (INHALATION)
Qty: 1 | Refills: 5 | Status: ACTIVE | COMMUNITY
Start: 2023-09-22 | End: 1900-01-01

## 2024-03-08 RX ORDER — APIXABAN 5 MG/1
5 TABLET, FILM COATED ORAL
Qty: 60 | Refills: 0 | Status: ACTIVE | COMMUNITY
Start: 2019-03-20 | End: 1900-01-01

## 2024-03-08 RX ORDER — ALBUTEROL SULFATE 90 UG/1
108 (90 BASE) INHALANT RESPIRATORY (INHALATION)
Qty: 1 | Refills: 1 | Status: ACTIVE | COMMUNITY
Start: 2024-03-08 | End: 1900-01-01

## 2024-03-08 RX ORDER — LEVOTHYROXINE SODIUM 0.03 MG/1
25 TABLET ORAL DAILY
Qty: 30 | Refills: 0 | Status: ACTIVE | COMMUNITY
Start: 2023-02-28 | End: 1900-01-01

## 2024-03-08 RX ORDER — EPLERENONE 50 MG/1
50 TABLET, COATED ORAL DAILY
Qty: 30 | Refills: 0 | Status: ACTIVE | COMMUNITY
Start: 2020-01-27 | End: 1900-01-01

## 2024-03-08 RX ORDER — FUROSEMIDE 40 MG/1
40 TABLET ORAL
Qty: 30 | Refills: 0 | Status: ACTIVE | COMMUNITY
Start: 2024-03-08 | End: 1900-01-01

## 2024-03-08 RX ORDER — BUSPIRONE HYDROCHLORIDE 15 MG/1
15 TABLET ORAL
Qty: 60 | Refills: 0 | Status: ACTIVE | COMMUNITY
Start: 2020-01-27 | End: 1900-01-01

## 2024-04-01 ENCOUNTER — OUTPATIENT (OUTPATIENT)
Dept: OUTPATIENT SERVICES | Facility: HOSPITAL | Age: 89
LOS: 1 days | Discharge: HOME | End: 2024-04-01

## 2024-04-01 DIAGNOSIS — Z82.8 FAMILY HISTORY OF OTHER DISABILITIES AND CHRONIC DISEASES LEADING TO DISABLEMENT, NOT ELSEWHERE CLASSIFIED: Chronic | ICD-10-CM

## 2024-04-01 DIAGNOSIS — Z95.0 PRESENCE OF CARDIAC PACEMAKER: Chronic | ICD-10-CM

## 2024-04-01 DIAGNOSIS — H26.9 UNSPECIFIED CATARACT: Chronic | ICD-10-CM

## 2024-04-01 DIAGNOSIS — Z84.89 FAMILY HISTORY OF OTHER SPECIFIED CONDITIONS: Chronic | ICD-10-CM

## 2024-04-01 DIAGNOSIS — C85.10 UNSPECIFIED B-CELL LYMPHOMA, UNSPECIFIED SITE: ICD-10-CM

## 2024-04-01 DIAGNOSIS — T14.8 OTHER INJURY OF UNSPECIFIED BODY REGION: Chronic | ICD-10-CM

## 2024-04-01 DIAGNOSIS — Z90.710 ACQUIRED ABSENCE OF BOTH CERVIX AND UTERUS: Chronic | ICD-10-CM

## 2024-04-05 DIAGNOSIS — C82.90 FOLLICULAR LYMPHOMA, UNSPECIFIED, UNSPECIFIED SITE: ICD-10-CM

## 2024-04-09 ENCOUNTER — APPOINTMENT (OUTPATIENT)
Dept: HEMATOLOGY ONCOLOGY | Facility: CLINIC | Age: 89
End: 2024-04-09

## 2024-04-09 ENCOUNTER — EMERGENCY (EMERGENCY)
Facility: HOSPITAL | Age: 89
LOS: 1 days | Discharge: HOME | End: 2024-04-09
Attending: EMERGENCY MEDICINE | Admitting: EMERGENCY MEDICINE
Payer: MEDICARE

## 2024-04-09 VITALS
RESPIRATION RATE: 16 BRPM | HEART RATE: 102 BPM | SYSTOLIC BLOOD PRESSURE: 136 MMHG | DIASTOLIC BLOOD PRESSURE: 80 MMHG | TEMPERATURE: 97 F | OXYGEN SATURATION: 95 %

## 2024-04-09 DIAGNOSIS — Z84.89 FAMILY HISTORY OF OTHER SPECIFIED CONDITIONS: Chronic | ICD-10-CM

## 2024-04-09 DIAGNOSIS — H26.9 UNSPECIFIED CATARACT: Chronic | ICD-10-CM

## 2024-04-09 DIAGNOSIS — T14.8 OTHER INJURY OF UNSPECIFIED BODY REGION: Chronic | ICD-10-CM

## 2024-04-09 DIAGNOSIS — Z82.8 FAMILY HISTORY OF OTHER DISABILITIES AND CHRONIC DISEASES LEADING TO DISABLEMENT, NOT ELSEWHERE CLASSIFIED: Chronic | ICD-10-CM

## 2024-04-09 DIAGNOSIS — Z90.710 ACQUIRED ABSENCE OF BOTH CERVIX AND UTERUS: Chronic | ICD-10-CM

## 2024-04-09 DIAGNOSIS — Z95.0 PRESENCE OF CARDIAC PACEMAKER: Chronic | ICD-10-CM

## 2024-04-09 LAB
ALBUMIN SERPL ELPH-MCNC: 3.6 G/DL — SIGNIFICANT CHANGE UP (ref 3.3–5)
ALP SERPL-CCNC: 83 U/L — SIGNIFICANT CHANGE UP (ref 40–120)
ALT FLD-CCNC: 67 U/L — HIGH (ref 4–33)
ANION GAP SERPL CALC-SCNC: 14 MMOL/L — SIGNIFICANT CHANGE UP (ref 7–14)
APTT BLD: 31.8 SEC — SIGNIFICANT CHANGE UP (ref 24.5–35.6)
AST SERPL-CCNC: 29 U/L — SIGNIFICANT CHANGE UP (ref 4–32)
BASOPHILS # BLD AUTO: 0.07 K/UL — SIGNIFICANT CHANGE UP (ref 0–0.2)
BASOPHILS NFR BLD AUTO: 0.3 % — SIGNIFICANT CHANGE UP (ref 0–2)
BILIRUB SERPL-MCNC: 2.3 MG/DL — HIGH (ref 0.2–1.2)
BLOOD GAS VENOUS COMPREHENSIVE RESULT: SIGNIFICANT CHANGE UP
BUN SERPL-MCNC: 33 MG/DL — HIGH (ref 7–23)
CALCIUM SERPL-MCNC: 8.1 MG/DL — LOW (ref 8.4–10.5)
CHLORIDE SERPL-SCNC: 100 MMOL/L — SIGNIFICANT CHANGE UP (ref 98–107)
CO2 SERPL-SCNC: 29 MMOL/L — SIGNIFICANT CHANGE UP (ref 22–31)
CREAT SERPL-MCNC: 1.09 MG/DL — SIGNIFICANT CHANGE UP (ref 0.5–1.3)
EGFR: 48 ML/MIN/1.73M2 — LOW
EOSINOPHIL # BLD AUTO: 0.02 K/UL — SIGNIFICANT CHANGE UP (ref 0–0.5)
EOSINOPHIL NFR BLD AUTO: 0.1 % — SIGNIFICANT CHANGE UP (ref 0–6)
GLUCOSE SERPL-MCNC: 117 MG/DL — HIGH (ref 70–99)
HCT VFR BLD CALC: 52.1 % — HIGH (ref 34.5–45)
HGB BLD-MCNC: 16.4 G/DL — HIGH (ref 11.5–15.5)
IANC: 18.13 K/UL — HIGH (ref 1.8–7.4)
IMM GRANULOCYTES NFR BLD AUTO: 1.8 % — HIGH (ref 0–0.9)
INR BLD: 1.75 RATIO — HIGH (ref 0.85–1.18)
LYMPHOCYTES # BLD AUTO: 0.96 K/UL — LOW (ref 1–3.3)
LYMPHOCYTES # BLD AUTO: 4.6 % — LOW (ref 13–44)
MCHC RBC-ENTMCNC: 27.7 PG — SIGNIFICANT CHANGE UP (ref 27–34)
MCHC RBC-ENTMCNC: 31.5 GM/DL — LOW (ref 32–36)
MCV RBC AUTO: 88 FL — SIGNIFICANT CHANGE UP (ref 80–100)
MONOCYTES # BLD AUTO: 1.14 K/UL — HIGH (ref 0–0.9)
MONOCYTES NFR BLD AUTO: 5.5 % — SIGNIFICANT CHANGE UP (ref 2–14)
NEUTROPHILS # BLD AUTO: 18.13 K/UL — HIGH (ref 1.8–7.4)
NEUTROPHILS NFR BLD AUTO: 87.7 % — HIGH (ref 43–77)
NRBC # BLD: 0 /100 WBCS — SIGNIFICANT CHANGE UP (ref 0–0)
NRBC # FLD: 0 K/UL — SIGNIFICANT CHANGE UP (ref 0–0)
PLATELET # BLD AUTO: 110 K/UL — LOW (ref 150–400)
POTASSIUM SERPL-MCNC: 2.9 MMOL/L — CRITICAL LOW (ref 3.5–5.3)
POTASSIUM SERPL-SCNC: 2.9 MMOL/L — CRITICAL LOW (ref 3.5–5.3)
PROT SERPL-MCNC: 6 G/DL — SIGNIFICANT CHANGE UP (ref 6–8.3)
PROTHROM AB SERPL-ACNC: 19.3 SEC — HIGH (ref 9.5–13)
RBC # BLD: 5.92 M/UL — HIGH (ref 3.8–5.2)
RBC # FLD: 18.8 % — HIGH (ref 10.3–14.5)
SODIUM SERPL-SCNC: 143 MMOL/L — SIGNIFICANT CHANGE UP (ref 135–145)
WBC # BLD: 20.69 K/UL — HIGH (ref 3.8–10.5)
WBC # FLD AUTO: 20.69 K/UL — HIGH (ref 3.8–10.5)

## 2024-04-09 PROCEDURE — 99285 EMERGENCY DEPT VISIT HI MDM: CPT | Mod: 25,GC

## 2024-04-09 PROCEDURE — 36000 PLACE NEEDLE IN VEIN: CPT | Mod: GC

## 2024-04-09 RX ORDER — MAGNESIUM SULFATE 500 MG/ML
2 VIAL (ML) INJECTION ONCE
Refills: 0 | Status: COMPLETED | OUTPATIENT
Start: 2024-04-09 | End: 2024-04-09

## 2024-04-09 RX ORDER — ACETAMINOPHEN 500 MG
1000 TABLET ORAL ONCE
Refills: 0 | Status: COMPLETED | OUTPATIENT
Start: 2024-04-09 | End: 2024-04-09

## 2024-04-09 RX ORDER — SODIUM CHLORIDE 9 MG/ML
1000 INJECTION INTRAMUSCULAR; INTRAVENOUS; SUBCUTANEOUS ONCE
Refills: 0 | Status: COMPLETED | OUTPATIENT
Start: 2024-04-09 | End: 2024-04-09

## 2024-04-09 RX ORDER — ALBUTEROL 90 UG/1
1 AEROSOL, METERED ORAL ONCE
Refills: 0 | Status: COMPLETED | OUTPATIENT
Start: 2024-04-09 | End: 2024-04-10

## 2024-04-09 RX ORDER — PIPERACILLIN AND TAZOBACTAM 4; .5 G/20ML; G/20ML
3.38 INJECTION, POWDER, LYOPHILIZED, FOR SOLUTION INTRAVENOUS ONCE
Refills: 0 | Status: COMPLETED | OUTPATIENT
Start: 2024-04-09 | End: 2024-04-09

## 2024-04-09 RX ORDER — POTASSIUM CHLORIDE 20 MEQ
10 PACKET (EA) ORAL
Refills: 0 | Status: COMPLETED | OUTPATIENT
Start: 2024-04-09 | End: 2024-04-10

## 2024-04-09 RX ADMIN — Medication 1000 MILLIGRAM(S): at 23:00

## 2024-04-09 RX ADMIN — PIPERACILLIN AND TAZOBACTAM 200 GRAM(S): 4; .5 INJECTION, POWDER, LYOPHILIZED, FOR SOLUTION INTRAVENOUS at 22:23

## 2024-04-09 RX ADMIN — Medication 25 GRAM(S): at 23:53

## 2024-04-09 RX ADMIN — Medication 400 MILLIGRAM(S): at 22:26

## 2024-04-09 NOTE — ED ADULT NURSE NOTE - OBJECTIVE STATEMENT
RM RN: Pt=t received to room 24 a&ox2/3, ambulatory with walker at baseline, on room air coming to ED c/o rectal abscess. NSR on cardiac monitor. Pt daughter at bedside endorsing history. Pt history of afib on Eliquis, PPM, asthma, HTN, HLD. Pt arrives to Er after being seen by PCP for possible hemorrhoid. PCP noted rectal abscess, pt sent to ER for further eval. MD Esqueda with RN at bedside for rectal abscess assessment. Abscess noted to left buttock, tender to touch, blanchable redness to site. Pt respirations even and unlabored, chest rise and fall equal b/l. Pt denies chest pain, HA, SOB, dizziness, N/V/D, fever/chills. US guided IV placed to RAC, labs collected and sent. Blanchable redness noted to left hip. Stretcher in lowest position, call bell within reach, pt safety maintained. Report endorsed to primary RN Louise.

## 2024-04-09 NOTE — ED ADULT TRIAGE NOTE - MODE OF ARRIVAL
Goal Outcome Evaluation:  Plan of Care Reviewed With: patient  Progress: improving  Outcome Summary: Min A for feeding task. Sup-sit-sup-Mod A. UB bathing/ dressing-[place] assist. Grooming SBA w/set-up. LB bathing/dressing-Dep. Sit-stand-Mod A. Pt side stepped ~3-4' to HOB- Min/Mod of 1 w/ RW. No goals met this date. Cont OT POC.   Walk in

## 2024-04-09 NOTE — ED ADULT NURSE NOTE - CHIEF COMPLAINT QUOTE
Pt brought in by daughter for rectal abscess, reports saw PMD and was advised to come to ER for eval. 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

## 2024-04-10 VITALS
TEMPERATURE: 97 F | SYSTOLIC BLOOD PRESSURE: 140 MMHG | HEART RATE: 90 BPM | RESPIRATION RATE: 18 BRPM | OXYGEN SATURATION: 98 % | DIASTOLIC BLOOD PRESSURE: 80 MMHG

## 2024-04-10 LAB
ANION GAP SERPL CALC-SCNC: 16 MMOL/L — HIGH (ref 7–14)
BUN SERPL-MCNC: 31 MG/DL — HIGH (ref 7–23)
CALCIUM SERPL-MCNC: 7.2 MG/DL — LOW (ref 8.4–10.5)
CHLORIDE SERPL-SCNC: 103 MMOL/L — SIGNIFICANT CHANGE UP (ref 98–107)
CO2 SERPL-SCNC: 21 MMOL/L — LOW (ref 22–31)
CREAT SERPL-MCNC: 1 MG/DL — SIGNIFICANT CHANGE UP (ref 0.5–1.3)
EGFR: 54 ML/MIN/1.73M2 — LOW
GLUCOSE SERPL-MCNC: 119 MG/DL — HIGH (ref 70–99)
GRAM STN FLD: ABNORMAL
LACTATE BLDV-MCNC: 1.4 MMOL/L — SIGNIFICANT CHANGE UP (ref 0.5–2)
POTASSIUM SERPL-MCNC: 3.7 MMOL/L — SIGNIFICANT CHANGE UP (ref 3.5–5.3)
POTASSIUM SERPL-SCNC: 3.7 MMOL/L — SIGNIFICANT CHANGE UP (ref 3.5–5.3)
SODIUM SERPL-SCNC: 140 MMOL/L — SIGNIFICANT CHANGE UP (ref 135–145)
SPECIMEN SOURCE: SIGNIFICANT CHANGE UP

## 2024-04-10 PROCEDURE — 74177 CT ABD & PELVIS W/CONTRAST: CPT | Mod: 26,MC

## 2024-04-10 RX ADMIN — Medication 100 MILLIEQUIVALENT(S): at 00:51

## 2024-04-10 RX ADMIN — SODIUM CHLORIDE 1000 MILLILITER(S): 9 INJECTION INTRAMUSCULAR; INTRAVENOUS; SUBCUTANEOUS at 00:51

## 2024-04-10 RX ADMIN — Medication 100 MILLIEQUIVALENT(S): at 01:54

## 2024-04-10 RX ADMIN — Medication 100 MILLIEQUIVALENT(S): at 02:25

## 2024-04-10 RX ADMIN — ALBUTEROL 1 PUFF(S): 90 AEROSOL, METERED ORAL at 01:54

## 2024-04-10 NOTE — CONSULT NOTE ADULT - SUBJECTIVE AND OBJECTIVE BOX
HPI: 89F w/ PMH CVA, Non Hodgkins Lymphoma, Afib on Eliquis, CHF presents w/ 2d rectal pain.    In the ED, HDS. Labs significant for WBC 25. CT A/P w/ IV shows [id number] fluid collection in inferior gluteal fold. Daughter is HCP, at bedside. She endorses her mother began complaining of pain yesterday, but denies any discharge from region. She endorses she saw a colorectal surgeon in [place] who recommended that the patient be brought to the ED as she diagnosed her with a perianal abscess with extension to the perineum. The daughter endorses that she does not want any major surgeries at this time for her mother given everything she has been through- explained plan of care for perianal abscess that an incision and drainage is standard of care. At this time daughter endorses she would prefer a more minimally invasive method to cause less pain as well as bleeding. Last Eliquis dose today.    Incidentally noted is a 4.5cm L adrenal tumor which was also noted on CT 1/2024, but which has grown since. Family was not aware of the finding until today.    PMH: As above    PSH: As above    Allergies: Cardizem    Physical exam:    /77 HR 88 SPO2 96 T 97.4 RR 21     General- Older woman, AOX0.  Lungs- Comfortable on room air  - SAMUEL w/ no blood on glove. No masses palpated. No fistulous tracts noted. Large area of erythema and induration on L perineal region, without pinpoint region of fluctuance.    Imaging:    ACC: [id number] EXAM:  CT ABDOMEN AND PELVIS IC   ORDERED BY: NUSRAT TAFOYA     PROCEDURE DATE:  04/10/2024          INTERPRETATION:  CLINICAL INFORMATION: Bloating and pain. Painful   swelling in the peroneal region.    COMPARISON: 1/10/2024 CT abdomen pelvis.    CONTRAST/COMPLICATIONS:  IV Contrast: Omnipaque 350  95 cc administered   5 cc discarded  Oral Contrast: NONE  Complications: None reported at time of study completion    PROCEDURE:  CT of the Abdomen and Pelvis was performed.  Sagittal and coronal reformats were performed.    FINDINGS:  LOWER CHEST: Trace pleural effusions. Lung bases unremarkable.   Cardiomegaly and implanted cardiac device leads partially visible.    LIVER: No concerning hepatic lesions. Hepatic cysts similar to prior.  BILE DUCTS: Chronic compensatory dilation similar to prior.  GALLBLADDER: Cholecystectomy.  SPLEEN: Within normal limits.  PANCREAS: Within normal limits.  ADRENALS: Heterogenous left renal mass 4.3 x 4.5 x 4.4 cm TRV X AP X CC   increased from 3.6 x 3.6 x 3.2 cm. Right adrenal gland unremarkable.  KIDNEYS/URETERS: No hydronephrosis or renal stone or acute abnormality.   Cortical volume loss, renal cysts, and a few indeterminate density   lesions are similar to prior.    BLADDER: Within normal limits.  REPRODUCTIVE ORGANS: Hysterectomy.    BOWEL: No bowel obstruction. Appendix not identified, no evidence of   appendicitis. Left colonic diverticulosis, no diverticulitis.  PERITONEUM: No ascites.  VESSELS: Atherosclerotic changes. No abdominal aortic aneurysm.  RETROPERITONEUM/LYMPH NODES: No lymphadenopathy..  ABDOMINAL WALL: 3.2 x 5.8 x 5.1 cm TRV X AP X CC mildly complex and   thick-walled fluid collection in the subcutaneous fat of the right   paramedian buttocks. The cephalad aspect of this collection extends   toward the anus, not well visualized. Otherwise unremarkable.  BONES: No acute findings. Chronic T12 and L1 vertebral body compression   deformities similar to prior. Degenerative changes.    IMPRESSION:  5.1 cm in greatest dimension abscess in the subcutaneous fat of the right   paramedian buttocks. The cephalad aspect extends toward the anus, not   well visualized. Fistulous connection is possible.    Growing heterogenous left adrenal mass, 4.5 cm in diameter, compatible   with neoplasm and concerning for malignancy. Adrenal protocol MRI with   and without gadolinium would most definitively image this lesion.    Cardiomegaly and trace pleural effusions partially visible.        --- End of Report ---            DOROTHEA GRIFFIN MD; Attending Radiologist  This document has been electronically signed. Apr 10 2024  2:27AM

## 2024-04-10 NOTE — ED PROVIDER NOTE - CARE PROVIDER_API CALL
Grey Pickett  Surgery  [address] [place] [id number]  Phone: [phone]  Fax: [phone]  Follow Up Time:     Cj Mcdowell  Surgery  [address] [place] [id number]  Phone: [phone]  Fax: [phone]  Follow Up Time:

## 2024-04-10 NOTE — ED PROVIDER NOTE - ATTENDING CONTRIBUTION TO CARE
89F h/o CVA, YOVANI, HTN HLD Afib on eliquis, CHF on lasix p/w painful swelling in rectum x 3 days.  Seen by CRS and appreciated a fluctuant mass, sent for eval.  Pain with defection.  No fever, no abd pain, no vomiting, no other c/o.  On exam mild distress rectal pain, malaise, accompanied by DA.  On exam 5-6cm perirectal abscess R medial gluteal region extending into perirectal area.  SAMUEL deferred.  Plan check labs, CT, rx abx, surg consult for I & D.    VS:  unremarkable except tachycardia     GEN - malaise, mild distress rectal pain, laying on L side   A+O x3   HEAD - NC/AT     ENT - PEERL, EOMI, mucous membranes   dry, no discharge      NECK: Neck supple, non-tender without lymphadenopathy, no masses, no JVD  PULM - CTA b/l,  symmetric breath sounds  COR -  normal heart sounds    ABD - , ND, NT, soft,  BACK - no CVA tenderness, nontender spine      - R perirectal absces 5-6 cm, no active drainage or crepitus or extension into perineum.    EXTREMS - no edema, no deformity, warm and well perfused    SKIN - no rash    or bruising      NEUROLOGIC - alert, face symmetric, speech fluent, sensation nl, motor no focal deficit.

## 2024-04-10 NOTE — ED PROVIDER NOTE - PHYSICAL EXAMINATION
General: alert, oriented to person, time, place  Psych: mood appropriate  Head: normocephalic; atraumatic  Eyes: conjunctivae clear bilaterally, sclerae anicteric  ENT: no nasal flaring, patent nares  Cardio: Regular rate and rhythm; normal heart sounds  Resp: Clear to auscultation bilaterally  GI: 6 cm x 3 cm region of induration in the right medial gluteal region approximating the anal verge  Neuro: Strength 5/5 in upper and lower extremities; normal sensation  Skin: No rashes or bruising noted  MSK: Normal movement of extremities  Lymph/Vasc: No LE edema

## 2024-04-10 NOTE — CONSULT NOTE ADULT - ASSESSMENT
89F w/ extensive PMH presents w/ 3.5.x5x5cm perianal/gluteal abscess. Daughter, who is HCP, preferred aspiration rather than incision and drainage given bleeding risk as well as the fact that she prefers less pain for her mother and no open wounds.    -Aspiration done at bedside, see separate procedure note  -7d PO Clindamycin on d/c  -F/u in clinic to be evaluated    D/w Chief Resident on call    Gera Leach MD  PGY-3  A Team Surgery 89F w/ extensive PMH presents w/ 3.5.x5x5cm perianal/gluteal abscess. Daughter, who is HCP, preferred aspiration rather than incision and drainage given bleeding risk as well as the fact that she prefers less pain for her mother and no open wounds.    -Aspiration done at bedside, see separate procedure note  -Consent signed and witnessed, in chart  -7d PO Clindamycin on d/c  -F/u in clinic to be evaluated    D/w Chief Resident on call    Gera Leach MD  PGY-3  A Team Surgery

## 2024-04-10 NOTE — CONSULT NOTE ADULT - ASSESSMENT
89F w/ extensive PMH presents w/ 3.5.x5x5cm perianal/gluteal abscess as well as L adrenal mass, c/f malignancy. Daughter, who is HCP, preferred aspiration rather than incision and drainage given bleeding risk as well as the fact that she prefers less pain for her mother and no open wounds.    -Family elects for delay of any surgical procedure or workup given comorbidities and previous hx of cancer  -Can f/u in clinic for workup of Adrenal Mass  -Dispo per ED    D/w Dr. Jeremias Leach MD  PGY-3  E Team Surgery   89F w/ extensive PMH presents w/ 3.5.x5x5cm perianal/gluteal abscess as well as L adrenal mass, c/f malignancy. Daughter, who is HCP, prefers non operative management and minimal workup at this time.    -Family elects for delay of any surgical procedure or workup given comorbidities and previous hx of cancer  -Can f/u in clinic for workup of Adrenal Mass  -Dispo per ED    D/w Dr. Jeremias Leach MD  PGY-3  E Team Surgery

## 2024-04-10 NOTE — ED PROVIDER NOTE - PROGRESS NOTE DETAILS
Berlin Esteban MD: Labs reviewed, patient has white count of 20.7, potassium of 2.9, lactate of 3.1.  Patient given IV potassium and fluid bolus for repletion.  Will obtain repeat lactate for correlation. Berlin Esteban MD: CTAP showed collection that a fistula "could not be excluded". Surgery consulted, aspirated approx 40cc at bedside of pus. Pending final recommendations post-procedure. Repeat BMP sent given initial hypoK. Lactate normalized. RASHAUN Urbina: Patient received on signout.  Patient has been comfortably resting in the interim, daughter is at bedside.  Per surgery patient can be discharged home with 7 days of clindamycin, and can follow-up outpatient in clinic for the adrenal mass.  Will contact social work to help with transport back as patient will require an ambulance as cannot comfortably be seated as she is wheelchair-bound.  Discussed strict return precautions and prompt follow-up.  Patient and patient's daughter verbalized an understanding and agree with the plan.  IV removed.

## 2024-04-10 NOTE — PROCEDURE NOTE - ADDITIONAL PROCEDURE DETAILS
Per family preference, aspiration done. 1% lidocaine 10cc used for comfort. 40cc pus aspirated. 1% lidocaine 10cc used for comfort. Per family preference, aspiration done using 18g needle. 40cc pus aspirated and sent for culture.

## 2024-04-10 NOTE — CHART NOTE - NSCHARTNOTEFT_GEN_A_CORE
SW alerted by provider that pt is cleared for DC to her home. Due to medical concerns, pt requires BLS ambulance for DC home. XOCHITL arranged trip through [facility] for ASAP pickup (trip#[id number]). Non-emergent form in paper chart.    No other SW needs identified at this time.

## 2024-04-10 NOTE — CONSULT NOTE ADULT - SUBJECTIVE AND OBJECTIVE BOX
HPI: 89F w/ PMH CVA, Non Hodgkins Lymphoma, Afib on Eliquis, CHF presents w/ 2d rectal pain.    In the ED, HDS. Labs significant for WBC 25. CT A/P w/ IV shows [id number] fluid collection in inferior gluteal fold. Daughter is HCP, at bedside. She endorses her mother began complaining of pain yesterday, but denies any discharge from region. She endorses she saw a colorectal surgeon in [place] who recommended that the patient be brought to the ED as she diagnosed her with a perianal abscess with extension to the perineum. The daughter endorses that she does not want any major surgeries at this time for her mother given everything she has been through- explained plan of care for perianal abscess that an incision and drainage is standard of care. At this time daughter endorses she would prefer a more minimally invasive method to cause less pain as well as bleeding. Last Eliquis dose today.    Incidentally noted is a 4.5cm L adrenal tumor which was also noted on CT 1/2024, but which has grown since.    PMH: As above    PSH: As above    Allergies: Cardizem    Physical exam:    /77 HR 88 SPO2 96 T 97.4 RR 21     General- Older woman, AOX0.  Lungs- Comfortable on room air  - SAMUEL w/ no blood on glove. No masses palpated. No fistulous tracts noted. Large area of erythema and induration on L perineal region, without pinpoint region of fluctuance.    Imaging:    ACC: [id number] EXAM:  CT ABDOMEN AND PELVIS IC   ORDERED BY: NUSRAT TAFOYA     PROCEDURE DATE:  04/10/2024          INTERPRETATION:  CLINICAL INFORMATION: Bloating and pain. Painful   swelling in the peroneal region.    COMPARISON: 1/10/2024 CT abdomen pelvis.    CONTRAST/COMPLICATIONS:  IV Contrast: Omnipaque 350  95 cc administered   5 cc discarded  Oral Contrast: NONE  Complications: None reported at time of study completion    PROCEDURE:  CT of the Abdomen and Pelvis was performed.  Sagittal and coronal reformats were performed.    FINDINGS:  LOWER CHEST: Trace pleural effusions. Lung bases unremarkable.   Cardiomegaly and implanted cardiac device leads partially visible.    LIVER: No concerning hepatic lesions. Hepatic cysts similar to prior.  BILE DUCTS: Chronic compensatory dilation similar to prior.  GALLBLADDER: Cholecystectomy.  SPLEEN: Within normal limits.  PANCREAS: Within normal limits.  ADRENALS: Heterogenous left renal mass 4.3 x 4.5 x 4.4 cm TRV X AP X CC   increased from 3.6 x 3.6 x 3.2 cm. Right adrenal gland unremarkable.  KIDNEYS/URETERS: No hydronephrosis or renal stone or acute abnormality.   Cortical volume loss, renal cysts, and a few indeterminate density   lesions are similar to prior.    BLADDER: Within normal limits.  REPRODUCTIVE ORGANS: Hysterectomy.    BOWEL: No bowel obstruction. Appendix not identified, no evidence of   appendicitis. Left colonic diverticulosis, no diverticulitis.  PERITONEUM: No ascites.  VESSELS: Atherosclerotic changes. No abdominal aortic aneurysm.  RETROPERITONEUM/LYMPH NODES: No lymphadenopathy..  ABDOMINAL WALL: 3.2 x 5.8 x 5.1 cm TRV X AP X CC mildly complex and   thick-walled fluid collection in the subcutaneous fat of the right   paramedian buttocks. The cephalad aspect of this collection extends   toward the anus, not well visualized. Otherwise unremarkable.  BONES: No acute findings. Chronic T12 and L1 vertebral body compression   deformities similar to prior. Degenerative changes.    IMPRESSION:  5.1 cm in greatest dimension abscess in the subcutaneous fat of the right   paramedian buttocks. The cephalad aspect extends toward the anus, not   well visualized. Fistulous connection is possible.    Growing heterogenous left adrenal mass, 4.5 cm in diameter, compatible   with neoplasm and concerning for malignancy. Adrenal protocol MRI with   and without gadolinium would most definitively image this lesion.    Cardiomegaly and trace pleural effusions partially visible.        --- End of Report ---            DOROTHEA GRIFFIN MD; Attending Radiologist  This document has been electronically signed. Apr 10 2024  2:27AM     HPI: 89F w/ PMH CVA, Non Hodgkins Lymphoma, Afib on Eliquis, CHF presents w/ 2d rectal pain.    In the ED, HDS. Labs significant for WBC 25. CT A/P w/ IV shows [id number] fluid collection in inferior gluteal fold. Daughter is HCP, at bedside. She endorses her mother began complaining of pain yesterday, but denies any discharge from region. She endorses she saw a colorectal surgeon in [place] who recommended that the patient be brought to the ED as she diagnosed her with a perianal abscess with extension to the perineum. The daughter endorses that she does not want any major surgeries at this time for her mother given everything she has been through- explained plan of care for perianal abscess that an incision and drainage with ellipse skin incision is standard of care. At this time daughter endorses she would prefer a more minimally invasive method to cause less pain as well as bleeding given Eliquis usage. Last Eliquis dose today.    Incidentally noted is a 4.5cm L adrenal tumor which was also noted on CT 1/2024, but which has grown since.    PMH: As above    PSH: As above    Allergies: Cardizem    Physical exam:    /77 HR 88 SPO2 96 T 97.4 RR 21     General- Older woman, AOX0.  Lungs- Comfortable on room air  - SAMUEL w/ no blood on glove. No masses palpated. No fistulous tracts noted. Large area of erythema and induration on L perineal region, without pinpoint region of fluctuance.    Imaging:    ACC: [id number] EXAM:  CT ABDOMEN AND PELVIS IC   ORDERED BY: NUSRAT TAFOYA     PROCEDURE DATE:  04/10/2024          INTERPRETATION:  CLINICAL INFORMATION: Bloating and pain. Painful   swelling in the peroneal region.    COMPARISON: 1/10/2024 CT abdomen pelvis.    CONTRAST/COMPLICATIONS:  IV Contrast: Omnipaque 350  95 cc administered   5 cc discarded  Oral Contrast: NONE  Complications: None reported at time of study completion    PROCEDURE:  CT of the Abdomen and Pelvis was performed.  Sagittal and coronal reformats were performed.    FINDINGS:  LOWER CHEST: Trace pleural effusions. Lung bases unremarkable.   Cardiomegaly and implanted cardiac device leads partially visible.    LIVER: No concerning hepatic lesions. Hepatic cysts similar to prior.  BILE DUCTS: Chronic compensatory dilation similar to prior.  GALLBLADDER: Cholecystectomy.  SPLEEN: Within normal limits.  PANCREAS: Within normal limits.  ADRENALS: Heterogenous left renal mass 4.3 x 4.5 x 4.4 cm TRV X AP X CC   increased from 3.6 x 3.6 x 3.2 cm. Right adrenal gland unremarkable.  KIDNEYS/URETERS: No hydronephrosis or renal stone or acute abnormality.   Cortical volume loss, renal cysts, and a few indeterminate density   lesions are similar to prior.    BLADDER: Within normal limits.  REPRODUCTIVE ORGANS: Hysterectomy.    BOWEL: No bowel obstruction. Appendix not identified, no evidence of   appendicitis. Left colonic diverticulosis, no diverticulitis.  PERITONEUM: No ascites.  VESSELS: Atherosclerotic changes. No abdominal aortic aneurysm.  RETROPERITONEUM/LYMPH NODES: No lymphadenopathy..  ABDOMINAL WALL: 3.2 x 5.8 x 5.1 cm TRV X AP X CC mildly complex and   thick-walled fluid collection in the subcutaneous fat of the right   paramedian buttocks. The cephalad aspect of this collection extends   toward the anus, not well visualized. Otherwise unremarkable.  BONES: No acute findings. Chronic T12 and L1 vertebral body compression   deformities similar to prior. Degenerative changes.    IMPRESSION:  5.1 cm in greatest dimension abscess in the subcutaneous fat of the right   paramedian buttocks. The cephalad aspect extends toward the anus, not   well visualized. Fistulous connection is possible.    Growing heterogenous left adrenal mass, 4.5 cm in diameter, compatible   with neoplasm and concerning for malignancy. Adrenal protocol MRI with   and without gadolinium would most definitively image this lesion.    Cardiomegaly and trace pleural effusions partially visible.        --- End of Report ---            DOROTHEA GRIFFIN MD; Attending Radiologist  This document has been electronically signed. Apr 10 2024  2:27AM     HPI: 89F w/ PMH CVA, Non Hodgkins Lymphoma, Afib on Eliquis, CHF presents w/ 2d rectal pain.    In the ED, HDS. Labs significant for WBC 25. CT A/P w/ IV shows 3n1p5or fluid collection in inferior gluteal fold. Daughter is HCP, at bedside. She endorses her mother began complaining of pain yesterday, but denies any discharge from region. She endorses she saw a colorectal surgeon in [place] who recommended that the patient be brought to the ED as she diagnosed her with a perianal abscess with extension to the perineum. The daughter endorses that she does not want any major surgeries at this time for her mother given everything she has been through- explained plan of care for perianal abscess that an incision and drainage with ellipse skin incision is standard of care and can be done at. bedside At this time daughter endorses she would prefer a more minimally invasive method to cause less pain as well as bleeding given Eliquis usage. Last Eliquis dose today.    Incidentally noted is a 4.5cm L adrenal tumor which was also noted on CT 1/2024, but which has grown since.    PMH: As above    PSH: As above    Allergies: Cardizem    Physical exam:    /77 HR 88 SPO2 96 T 97.4 RR 21     General- Older woman, AOX0.  Lungs- Comfortable on room air  - SAMUEL w/ no blood on glove. No masses palpated. No fistulous tracts noted. Large area of erythema and induration on L perineal region, without pinpoint region of fluctuance.    Imaging:    ACC: [id number] EXAM:  CT ABDOMEN AND PELVIS IC   ORDERED BY: NUSRAT TAFOYA     PROCEDURE DATE:  04/10/2024          INTERPRETATION:  CLINICAL INFORMATION: Bloating and pain. Painful   swelling in the peroneal region.    COMPARISON: 1/10/2024 CT abdomen pelvis.    CONTRAST/COMPLICATIONS:  IV Contrast: Omnipaque 350  95 cc administered   5 cc discarded  Oral Contrast: NONE  Complications: None reported at time of study completion    PROCEDURE:  CT of the Abdomen and Pelvis was performed.  Sagittal and coronal reformats were performed.    FINDINGS:  LOWER CHEST: Trace pleural effusions. Lung bases unremarkable.   Cardiomegaly and implanted cardiac device leads partially visible.    LIVER: No concerning hepatic lesions. Hepatic cysts similar to prior.  BILE DUCTS: Chronic compensatory dilation similar to prior.  GALLBLADDER: Cholecystectomy.  SPLEEN: Within normal limits.  PANCREAS: Within normal limits.  ADRENALS: Heterogenous left renal mass 4.3 x 4.5 x 4.4 cm TRV X AP X CC   increased from 3.6 x 3.6 x 3.2 cm. Right adrenal gland unremarkable.  KIDNEYS/URETERS: No hydronephrosis or renal stone or acute abnormality.   Cortical volume loss, renal cysts, and a few indeterminate density   lesions are similar to prior.    BLADDER: Within normal limits.  REPRODUCTIVE ORGANS: Hysterectomy.    BOWEL: No bowel obstruction. Appendix not identified, no evidence of   appendicitis. Left colonic diverticulosis, no diverticulitis.  PERITONEUM: No ascites.  VESSELS: Atherosclerotic changes. No abdominal aortic aneurysm.  RETROPERITONEUM/LYMPH NODES: No lymphadenopathy..  ABDOMINAL WALL: 3.2 x 5.8 x 5.1 cm TRV X AP X CC mildly complex and   thick-walled fluid collection in the subcutaneous fat of the right   paramedian buttocks. The cephalad aspect of this collection extends   toward the anus, not well visualized. Otherwise unremarkable.  BONES: No acute findings. Chronic T12 and L1 vertebral body compression   deformities similar to prior. Degenerative changes.    IMPRESSION:  5.1 cm in greatest dimension abscess in the subcutaneous fat of the right   paramedian buttocks. The cephalad aspect extends toward the anus, not   well visualized. Fistulous connection is possible.    Growing heterogenous left adrenal mass, 4.5 cm in diameter, compatible   with neoplasm and concerning for malignancy. Adrenal protocol MRI with   and without gadolinium would most definitively image this lesion.    Cardiomegaly and trace pleural effusions partially visible.        --- End of Report ---            DOROTHEA GRIFFIN MD; Attending Radiologist  This document has been electronically signed. Apr 10 2024  2:27AM

## 2024-04-10 NOTE — ED PROVIDER NOTE - CLINICAL SUMMARY MEDICAL DECISION MAKING FREE TEXT BOX
89-year-old female here with region of induration and erythema in the perirectal area.  There is concern for an underlying abscess, we will obtain a CT abdomen and pelvis and consult surgery as needed.  Will obtain all relevant preoperative labs.

## 2024-04-10 NOTE — ED PROVIDER NOTE - OBJECTIVE STATEMENT
89-year-old female with past medical history of cerebrovascular accident, obstructive sleep apnea, hypertension, dyslipidemia, atrial fibrillation on apixaban, congestive heart failure on furosemide, presents to the emergency department due to a painful swelling in the perirectal region for several days.  She was seen by a colorectal surgeon outpatient who sent her to the emergency department due to concern for a rectal abscess.  She endorses pain with defecation leading to constipation.  She has no measured fevers at home.  No nausea or vomiting.

## 2024-04-10 NOTE — ED PROVIDER NOTE - NSFOLLOWUPCLINICS_GEN_ALL_ED_FT
[facility]  General Surgery  [address] [place] [address] - [address] [place] [address]  Phone: [phone]  Fax:

## 2024-04-10 NOTE — ED PROVIDER NOTE - PATIENT PORTAL LINK FT
You can access the FollowMyHealth Patient Portal offered by [facility] by registering at the following website: [email or web address]. By joining [facility]’s FollowMyHealth portal, you will also be able to view your health information using other applications (apps) compatible with our system.

## 2024-04-10 NOTE — ED ADULT NURSE REASSESSMENT NOTE - NS ED NURSE REASSESS COMMENT FT1
Pt observed alert and awake. Surgical provider at bedside assessing pt  for abscess to perianal area.  Pt observed incontinent of small brown soft stool. Pt provided with incontinent care. Prevafit  put in place.  Pt assisted to turn and reposition on side.   Pt observed falling to sleeping no s/s of distress noted.
Pt seen by surgery for aspiration of Perianal Abscess. Consent for procedure obtained  from pt daughter  Procedure performed at bedside  with use of local anesthetic   S/P procedure  no active bleeding observed. Pt noted sleeping  no s/s of pain or discomfort.  No s/s of adverse reaction noted.
Received patient from previous RN, A&O X2 , documentation as noted. Patient denies any pain or medical complaints at this time . Patient able to speak in clear sentences, respirations equal and unlabored. Patient pending transport home. Patient in no acute distress at this time, family member at bedside, will continue to monitor. VSS as noted in flowsheet.
Break RN note- Patient resting quietly in bed, breathing even and nonlabored. No acute distress. Patient denies any chest pain or SOB. Patient medicated as ordered. Cardiac monitor in place- sinus rhythm. Safety maintained. Patient stable upon exiting the room.

## 2024-04-15 LAB
-  AMOXICILLIN/CLAVULANIC ACID: SIGNIFICANT CHANGE UP
-  AMPICILLIN/SULBACTAM: SIGNIFICANT CHANGE UP
-  AMPICILLIN: SIGNIFICANT CHANGE UP
-  AZTREONAM: SIGNIFICANT CHANGE UP
-  CEFAZOLIN: SIGNIFICANT CHANGE UP
-  CEFEPIME: SIGNIFICANT CHANGE UP
-  CEFOXITIN: SIGNIFICANT CHANGE UP
-  CEFTRIAXONE: SIGNIFICANT CHANGE UP
-  CIPROFLOXACIN: SIGNIFICANT CHANGE UP
-  ERTAPENEM: SIGNIFICANT CHANGE UP
-  GENTAMICIN: SIGNIFICANT CHANGE UP
-  IMIPENEM: SIGNIFICANT CHANGE UP
-  LEVOFLOXACIN: SIGNIFICANT CHANGE UP
-  MEROPENEM: SIGNIFICANT CHANGE UP
-  PIPERACILLIN/TAZOBACTAM: SIGNIFICANT CHANGE UP
-  TOBRAMYCIN: SIGNIFICANT CHANGE UP
-  TRIMETHOPRIM/SULFAMETHOXAZOLE: SIGNIFICANT CHANGE UP
CULTURE RESULTS: ABNORMAL
CULTURE RESULTS: SIGNIFICANT CHANGE UP
CULTURE RESULTS: SIGNIFICANT CHANGE UP
METHOD TYPE: SIGNIFICANT CHANGE UP
ORGANISM # SPEC MICROSCOPIC CNT: ABNORMAL
SPECIMEN SOURCE: SIGNIFICANT CHANGE UP

## 2024-04-29 ENCOUNTER — NON-APPOINTMENT (OUTPATIENT)
Age: 89
End: 2024-04-29

## 2024-04-30 ENCOUNTER — NON-APPOINTMENT (OUTPATIENT)
Age: 89
End: 2024-04-30

## 2024-05-01 ENCOUNTER — RESULT REVIEW (OUTPATIENT)
Age: 89
End: 2024-05-01

## 2024-05-01 ENCOUNTER — INPATIENT (INPATIENT)
Facility: HOSPITAL | Age: 89
LOS: 1 days | Discharge: HOME HEALTH | End: 2024-05-03
Attending: HOSPITALIST | Admitting: HOSPITALIST
Payer: MEDICARE

## 2024-05-01 VITALS
RESPIRATION RATE: 18 BRPM | TEMPERATURE: 98 F | SYSTOLIC BLOOD PRESSURE: 105 MMHG | HEART RATE: 84 BPM | OXYGEN SATURATION: 94 % | DIASTOLIC BLOOD PRESSURE: 78 MMHG

## 2024-05-01 DIAGNOSIS — R53.1 WEAKNESS: ICD-10-CM

## 2024-05-01 DIAGNOSIS — T14.8 OTHER INJURY OF UNSPECIFIED BODY REGION: Chronic | ICD-10-CM

## 2024-05-01 DIAGNOSIS — Z82.8 FAMILY HISTORY OF OTHER DISABILITIES AND CHRONIC DISEASES LEADING TO DISABLEMENT, NOT ELSEWHERE CLASSIFIED: Chronic | ICD-10-CM

## 2024-05-01 DIAGNOSIS — Z90.710 ACQUIRED ABSENCE OF BOTH CERVIX AND UTERUS: Chronic | ICD-10-CM

## 2024-05-01 DIAGNOSIS — H26.9 UNSPECIFIED CATARACT: Chronic | ICD-10-CM

## 2024-05-01 DIAGNOSIS — Z84.89 FAMILY HISTORY OF OTHER SPECIFIED CONDITIONS: Chronic | ICD-10-CM

## 2024-05-01 DIAGNOSIS — Z95.0 PRESENCE OF CARDIAC PACEMAKER: Chronic | ICD-10-CM

## 2024-05-01 PROBLEM — L02.91 CUTANEOUS ABSCESS, UNSPECIFIED: Chronic | Status: ACTIVE | Noted: 2024-04-10

## 2024-05-01 LAB
ALBUMIN SERPL ELPH-MCNC: 3.5 G/DL — SIGNIFICANT CHANGE UP (ref 3.3–5)
ALP SERPL-CCNC: 66 U/L — SIGNIFICANT CHANGE UP (ref 40–120)
ALT FLD-CCNC: 39 U/L — HIGH (ref 4–33)
ANION GAP SERPL CALC-SCNC: 12 MMOL/L — SIGNIFICANT CHANGE UP (ref 7–14)
APPEARANCE UR: CLEAR — SIGNIFICANT CHANGE UP
APTT BLD: 39.1 SEC — HIGH (ref 24.5–35.6)
AST SERPL-CCNC: 28 U/L — SIGNIFICANT CHANGE UP (ref 4–32)
BACTERIA # UR AUTO: NEGATIVE /HPF — SIGNIFICANT CHANGE UP
BASE EXCESS BLDV CALC-SCNC: 8 MMOL/L — HIGH (ref -2–3)
BASOPHILS # BLD AUTO: 0 K/UL — SIGNIFICANT CHANGE UP (ref 0–0.2)
BASOPHILS NFR BLD AUTO: 0 % — SIGNIFICANT CHANGE UP (ref 0–2)
BILIRUB SERPL-MCNC: 1.1 MG/DL — SIGNIFICANT CHANGE UP (ref 0.2–1.2)
BILIRUB UR-MCNC: NEGATIVE — SIGNIFICANT CHANGE UP
BLOOD GAS VENOUS COMPREHENSIVE RESULT: SIGNIFICANT CHANGE UP
BUN SERPL-MCNC: 31 MG/DL — HIGH (ref 7–23)
CALCIUM SERPL-MCNC: 7.8 MG/DL — LOW (ref 8.4–10.5)
CAST: 1 /LPF — SIGNIFICANT CHANGE UP (ref 0–4)
CHLORIDE BLDV-SCNC: 101 MMOL/L — SIGNIFICANT CHANGE UP (ref 96–108)
CHLORIDE SERPL-SCNC: 104 MMOL/L — SIGNIFICANT CHANGE UP (ref 98–107)
CO2 BLDV-SCNC: 33.3 MMOL/L — HIGH (ref 22–26)
CO2 SERPL-SCNC: 25 MMOL/L — SIGNIFICANT CHANGE UP (ref 22–31)
COLOR SPEC: YELLOW — SIGNIFICANT CHANGE UP
CREAT SERPL-MCNC: 0.93 MG/DL — SIGNIFICANT CHANGE UP (ref 0.5–1.3)
DIFF PNL FLD: NEGATIVE — SIGNIFICANT CHANGE UP
EGFR: 58 ML/MIN/1.73M2 — LOW
EOSINOPHIL # BLD AUTO: 0.15 K/UL — SIGNIFICANT CHANGE UP (ref 0–0.5)
EOSINOPHIL NFR BLD AUTO: 0.9 % — SIGNIFICANT CHANGE UP (ref 0–6)
FLUAV AG NPH QL: SIGNIFICANT CHANGE UP
FLUBV AG NPH QL: SIGNIFICANT CHANGE UP
GAS PNL BLDV: 135 MMOL/L — LOW (ref 136–145)
GAS PNL BLDV: SIGNIFICANT CHANGE UP
GIANT PLATELETS BLD QL SMEAR: PRESENT — SIGNIFICANT CHANGE UP
GLUCOSE BLDV-MCNC: 73 MG/DL — SIGNIFICANT CHANGE UP (ref 70–99)
GLUCOSE SERPL-MCNC: 80 MG/DL — SIGNIFICANT CHANGE UP (ref 70–99)
GLUCOSE UR QL: NEGATIVE MG/DL — SIGNIFICANT CHANGE UP
HCO3 BLDV-SCNC: 32 MMOL/L — HIGH (ref 22–29)
HCT VFR BLD CALC: 48.9 % — HIGH (ref 34.5–45)
HCT VFR BLDA CALC: 48 % — HIGH (ref 34.5–46.5)
HGB BLD CALC-MCNC: 15.9 G/DL — SIGNIFICANT CHANGE UP (ref 11.7–16.1)
HGB BLD-MCNC: 15.5 G/DL — SIGNIFICANT CHANGE UP (ref 11.5–15.5)
IANC: 13.82 K/UL — HIGH (ref 1.8–7.4)
INR BLD: 1.48 RATIO — HIGH (ref 0.85–1.18)
KETONES UR-MCNC: NEGATIVE MG/DL — SIGNIFICANT CHANGE UP
LACTATE BLDV-MCNC: 1.8 MMOL/L — SIGNIFICANT CHANGE UP (ref 0.5–2)
LEUKOCYTE ESTERASE UR-ACNC: ABNORMAL
LYMPHOCYTES # BLD AUTO: 0.6 K/UL — LOW (ref 1–3.3)
LYMPHOCYTES # BLD AUTO: 3.5 % — LOW (ref 13–44)
MANUAL SMEAR VERIFICATION: SIGNIFICANT CHANGE UP
MCHC RBC-ENTMCNC: 28.8 PG — SIGNIFICANT CHANGE UP (ref 27–34)
MCHC RBC-ENTMCNC: 31.7 GM/DL — LOW (ref 32–36)
MCV RBC AUTO: 90.9 FL — SIGNIFICANT CHANGE UP (ref 80–100)
METAMYELOCYTES # FLD: 0.9 % — SIGNIFICANT CHANGE UP (ref 0–1)
MONOCYTES # BLD AUTO: 0.6 K/UL — SIGNIFICANT CHANGE UP (ref 0–0.9)
MONOCYTES NFR BLD AUTO: 3.5 % — SIGNIFICANT CHANGE UP (ref 2–14)
NEUTROPHILS # BLD AUTO: 15.49 K/UL — HIGH (ref 1.8–7.4)
NEUTROPHILS NFR BLD AUTO: 90.3 % — HIGH (ref 43–77)
NITRITE UR-MCNC: NEGATIVE — SIGNIFICANT CHANGE UP
NT-PROBNP SERPL-SCNC: 5417 PG/ML — HIGH
PCO2 BLDV: 41 MMHG — SIGNIFICANT CHANGE UP (ref 39–52)
PH BLDV: 7.5 — HIGH (ref 7.32–7.43)
PH UR: 7.5 — SIGNIFICANT CHANGE UP (ref 5–8)
PLAT MORPH BLD: NORMAL — SIGNIFICANT CHANGE UP
PLATELET # BLD AUTO: 82 K/UL — LOW (ref 150–400)
PLATELET COUNT - ESTIMATE: ABNORMAL
PO2 BLDV: 59 MMHG — HIGH (ref 25–45)
POTASSIUM BLDV-SCNC: 3.9 MMOL/L — SIGNIFICANT CHANGE UP (ref 3.5–5.1)
POTASSIUM SERPL-MCNC: 4.5 MMOL/L — SIGNIFICANT CHANGE UP (ref 3.5–5.3)
POTASSIUM SERPL-SCNC: 4.5 MMOL/L — SIGNIFICANT CHANGE UP (ref 3.5–5.3)
PROT SERPL-MCNC: 5.1 G/DL — LOW (ref 6–8.3)
PROT UR-MCNC: NEGATIVE MG/DL — SIGNIFICANT CHANGE UP
PROTHROM AB SERPL-ACNC: 16.4 SEC — HIGH (ref 9.5–13)
RBC # BLD: 5.38 M/UL — HIGH (ref 3.8–5.2)
RBC # FLD: 19.7 % — HIGH (ref 10.3–14.5)
RBC BLD AUTO: NORMAL — SIGNIFICANT CHANGE UP
RBC CASTS # UR COMP ASSIST: 0 /HPF — SIGNIFICANT CHANGE UP (ref 0–4)
RSV RNA NPH QL NAA+NON-PROBE: SIGNIFICANT CHANGE UP
SAO2 % BLDV: 90 % — HIGH (ref 67–88)
SARS-COV-2 RNA SPEC QL NAA+PROBE: SIGNIFICANT CHANGE UP
SODIUM SERPL-SCNC: 141 MMOL/L — SIGNIFICANT CHANGE UP (ref 135–145)
SP GR SPEC: 1.01 — SIGNIFICANT CHANGE UP (ref 1–1.03)
SQUAMOUS # UR AUTO: 3 /HPF — SIGNIFICANT CHANGE UP (ref 0–5)
UROBILINOGEN FLD QL: 0.2 MG/DL — SIGNIFICANT CHANGE UP (ref 0.2–1)
VARIANT LYMPHS # BLD: 0.9 % — SIGNIFICANT CHANGE UP (ref 0–6)
WBC # BLD: 17.15 K/UL — HIGH (ref 3.8–10.5)
WBC # FLD AUTO: 17.15 K/UL — HIGH (ref 3.8–10.5)
WBC UR QL: 5 /HPF — SIGNIFICANT CHANGE UP (ref 0–5)

## 2024-05-01 PROCEDURE — 71045 X-RAY EXAM CHEST 1 VIEW: CPT | Mod: 26

## 2024-05-01 PROCEDURE — 99285 EMERGENCY DEPT VISIT HI MDM: CPT | Mod: GC

## 2024-05-01 PROCEDURE — 70450 CT HEAD/BRAIN W/O DYE: CPT | Mod: 26,MC

## 2024-05-01 PROCEDURE — 74177 CT ABD & PELVIS W/CONTRAST: CPT | Mod: 26,MC

## 2024-05-01 RX ORDER — PIPERACILLIN AND TAZOBACTAM 4; .5 G/20ML; G/20ML
3.38 INJECTION, POWDER, LYOPHILIZED, FOR SOLUTION INTRAVENOUS ONCE
Refills: 0 | Status: COMPLETED | OUTPATIENT
Start: 2024-05-01 | End: 2024-05-02

## 2024-05-01 NOTE — ED ADULT TRIAGE NOTE - CHIEF COMPLAINT QUOTE
Pt. brought by daughter for increased AMS, weakness and LE Edema x 1wk. Recently had perianal abscess aspirated here. Edema also noted to left hand and foul smelling urine. Denies fevers.

## 2024-05-01 NOTE — ED PROVIDER NOTE - PROGRESS NOTE DETAILS
Favio PGY1: patient and family updated on results thus far.  Negative viral panel, chest, xray, UA , CT head still pending.  given white count elevated and no source, will obtain CT abd/pel to assess for intraabdominal infection, abscess recurrence. Lizzie Muir (Rodriguez) PGY3 discussed findings with daughter, LP offered as workup otherwise negative, she does not want to put her mother through that invasive procedure. mariely sent for admission.

## 2024-05-01 NOTE — ED ADULT TRIAGE NOTE - HEART RATE (BEATS/MIN)
Called patient, spoke with: Patient regarding the results of the patients most recent Labs. I advised Patient of Karen Betancourt recommendations.    Patient did voice understanding      Pt will begin taking lipitor   RX pending 84

## 2024-05-01 NOTE — ED PROVIDER NOTE - OBJECTIVE STATEMENT
89-year-old female, history of CHF, hypertension, hyperlipidemia, A-fib on Eliquis, NHL treated, presenting for generalized weakness and confusion for the past week brought in by daughter at bedside.  Also with mild lower extremity swelling bilaterally and swelling to left upper extremity.  Per daughter, patient had ultrasound to left upper extremity performed 1 week ago without signs of DVT.   patient was seen few weeks ago for drainage of the perianal abscess.  Daughter feels abscess resolved.  No fevers, chills, chest pain, shortness of breath, abdominal pain.  Daughter reports recent CT scan with adrenal nodules noted, no active treatment plans at this time.

## 2024-05-01 NOTE — ED PROVIDER NOTE - ATTENDING CONTRIBUTION TO CARE
GEN - NAD; lethargic though spontaneously awake and answers all questions, ao to name and place  HEAD - NC/AT   EYES- PERRL, EOMI  ENT: Airway patent, mmm, Oral cavity and pharynx normal. No inflammation, swelling, exudate, or lesions.    NECK: Neck supple  PULMONARY - CTA b/l, symmetric breath sounds, unlabored.   CARDIAC -s1s2, RRR, no M,G,R  ABDOMEN - +BS, ND, NT, soft, no guarding  Rectal chap by britt avendaño-nonthrombosed nontender ext hemorrhoid present, no bleeding, no regions of fluctuance, induration or tenderness  BACK - no CVA tenderness, Normal  spine   EXTREMITIES - FROM, no deformity, +lue nonpitting edema, no erythema/induration, well perfused with strong pulse and brisk cap refill, rue wnl, b/l le with pitting edema, L>R, well perfused, healed wounds without any surroundind induration/warmth, drainage  SKIN - no rash   NEUROLOGIC - alert, speech clear, 4/5 str b/l ue and le, silt, cn2-12 int  agree with above hpi-no localizing neuro def on exam, is lethargic though spontaneously awake. Daughter reports has been this way and moderately confused for approx 1.5 weeks, assoc with mildly odorous urine. no fevers, chills, cough, congestion, cp, sob, abd pain, vomiting, diarrhea, dysuria. Reports she had tx for perianal abscess which has been well healing with no further discomfort in the area. Exam as above. Plan for labs, ua, cx, ct brain, reports has been eating/drinking well so will hold off on fluids for now as does not appear dry and has h/o chf- do not want to overload. patient and daughter agreeable to plan.

## 2024-05-01 NOTE — ED ADULT NURSE NOTE - NSFALLHARMRISKINTERV_ED_ALL_ED

## 2024-05-01 NOTE — ED ADULT NURSE NOTE - OBJECTIVE STATEMENT
Pt received to room 22. Pt is currently A&Ox2, ambulatory with assistance, Hx of CHF, HTN, HLD, Afib on Eliquis, CVA, pacemaker. Pt presents to ED brought by daughter for increasing weakness/fatigue, AMS, and left hand edema x 1.5 weeks. Daughter reports patient recently had rectal abscess drained. Daughter is also concerned patient has UTI. no complaints of chest pain, SOB, headache, dizziness, abdominal pain, n/v/d, urinary symptoms, fevers/chills, numbness/tingling verbalized. breathing is even and unlabored. VS as noted. Placed on cardiac monitor - A.fib with paced beats noted. skin is intact. awaiting MD rebolledo. stretcher set in lowest position, call bell within reach, safety maintained.

## 2024-05-01 NOTE — ED PROVIDER NOTE - IV ALTEPLASE ADMIN OUTSIDE HIDDEN
Telephone Encounter by Julieta Johnston RN at 02/24/17 01:41 PM     Author:  Julieta Johnston RN Service:  (none) Author Type:  Registered Nurse     Filed:  02/24/17 01:42 PM Encounter Date:  2/23/2017 Status:  Signed     :  Julieta Johnston RN (Registered Nurse)            Ocuflox drops were ordered.[DM1.1M]       Revision History        User Key Date/Time User Provider Type Action    > DM1.1 02/24/17 01:42 PM Julieta Johnston RN Registered Nurse Sign    M - Manual             show

## 2024-05-01 NOTE — ED PROVIDER NOTE - PHYSICAL EXAMINATION
CONSTITUTIONAL: Tired appearing, awake, alert, and in no apparent distress.  HEAD: normocephalic, atraumatic  ENT: oral mucosa moist  CARDIAC: regular rate and rhythm; no murmurs, rubs, or gallops  RESPIRATORY: normal breath sounds, clear to ascultation bilaterally, no rales, rhonchi, wheezing  GASTROINTESTINAL: abdomen nondistended, soft, nontender, no guarding, rebound tenderness; rectal exam chaperoned by SCOTT avendaño, without any areas of erythema, tenderness, fluctuance, induration  MSK: Spine appears normal, range of motion is not limited, no muscle or joint tenderness;   NEURO: Alert and oriented, no focal deficits, no motor or sensory deficits.  SKIN: Skin normal color for race, warm, dry and intact. No evidence of rash.  PSYCH: appropriate mood and affect

## 2024-05-01 NOTE — ED PROVIDER NOTE - CLINICAL SUMMARY MEDICAL DECISION MAKING FREE TEXT BOX
89-year-old female, history of CHF, hypertension, hyperlipidemia, A-fib on Eliquis, NHL treated, presenting for generalized weakness and confusion for the past week brought in by daughter at bedside.  Also with mild lower extremity swelling bilaterally and swelling to left upper extremity.  Per daughter, patient had ultrasound to left upper extremity performed 1 week ago without signs of DVT.   patient was seen few weeks ago for drainage of the perianal abscess.  Daughter feels abscess resolved.  No fevers, chills, chest pain, shortness of breath, abdominal pain.  Daughter reports recent CT scan with adrenal nodules noted, no active treatment plans at this time. Vital signs stable.  Patient appears tired, no acute distress, heart regular rate and rhythm, lungs clear, abdomen soft nontender, rectal exam chaperoned by SCOTT Ying without any areas of erythema, tenderness, fluctuance, induration, neuro grossly intact, AOX2, minimal BL LE edema L>R, LUE with diffuse edema from elbow distally without erythema.  Will assess for infectious process-URI, pneumonia, UTI, intracranial pathology, DVT.  Will check basic labs, BNP, viral panel, chest x-ray, CT head Noncon, ECG, US LUE and LLE.

## 2024-05-02 DIAGNOSIS — I50.30 UNSPECIFIED DIASTOLIC (CONGESTIVE) HEART FAILURE: ICD-10-CM

## 2024-05-02 DIAGNOSIS — G47.33 OBSTRUCTIVE SLEEP APNEA (ADULT) (PEDIATRIC): ICD-10-CM

## 2024-05-02 DIAGNOSIS — E78.5 HYPERLIPIDEMIA, UNSPECIFIED: ICD-10-CM

## 2024-05-02 DIAGNOSIS — G93.41 METABOLIC ENCEPHALOPATHY: ICD-10-CM

## 2024-05-02 DIAGNOSIS — I50.33 ACUTE ON CHRONIC DIASTOLIC (CONGESTIVE) HEART FAILURE: ICD-10-CM

## 2024-05-02 DIAGNOSIS — I10 ESSENTIAL (PRIMARY) HYPERTENSION: ICD-10-CM

## 2024-05-02 DIAGNOSIS — Z29.9 ENCOUNTER FOR PROPHYLACTIC MEASURES, UNSPECIFIED: ICD-10-CM

## 2024-05-02 DIAGNOSIS — D75.1 SECONDARY POLYCYTHEMIA: ICD-10-CM

## 2024-05-02 DIAGNOSIS — C85.90 NON-HODGKIN LYMPHOMA, UNSPECIFIED, UNSPECIFIED SITE: ICD-10-CM

## 2024-05-02 DIAGNOSIS — E27.8 OTHER SPECIFIED DISORDERS OF ADRENAL GLAND: ICD-10-CM

## 2024-05-02 DIAGNOSIS — D69.6 THROMBOCYTOPENIA, UNSPECIFIED: ICD-10-CM

## 2024-05-02 DIAGNOSIS — I48.0 PAROXYSMAL ATRIAL FIBRILLATION: ICD-10-CM

## 2024-05-02 LAB
ADD ON TEST-SPECIMEN IN LAB: SIGNIFICANT CHANGE UP
ADD ON TEST-SPECIMEN IN LAB: SIGNIFICANT CHANGE UP
ANION GAP SERPL CALC-SCNC: 10 MMOL/L — SIGNIFICANT CHANGE UP (ref 7–14)
ANION GAP SERPL CALC-SCNC: 10 MMOL/L — SIGNIFICANT CHANGE UP (ref 7–14)
ANION GAP SERPL CALC-SCNC: 13 MMOL/L — SIGNIFICANT CHANGE UP (ref 7–14)
APTT BLD: 30.4 SEC — SIGNIFICANT CHANGE UP (ref 24.5–35.6)
BASE EXCESS BLDV CALC-SCNC: 6.8 MMOL/L — HIGH (ref -2–3)
BASOPHILS # BLD AUTO: 0.05 K/UL — SIGNIFICANT CHANGE UP (ref 0–0.2)
BASOPHILS NFR BLD AUTO: 0.4 % — SIGNIFICANT CHANGE UP (ref 0–2)
BUN SERPL-MCNC: 27 MG/DL — HIGH (ref 7–23)
BUN SERPL-MCNC: 32 MG/DL — HIGH (ref 7–23)
BUN SERPL-MCNC: 34 MG/DL — HIGH (ref 7–23)
CA-I BLD-SCNC: 0.98 MMOL/L — LOW (ref 1.15–1.29)
CA-I SERPL-SCNC: 1.07 MMOL/L — LOW (ref 1.15–1.33)
CALCIUM SERPL-MCNC: 5.1 MG/DL — CRITICAL LOW (ref 8.4–10.5)
CALCIUM SERPL-MCNC: 7.7 MG/DL — LOW (ref 8.4–10.5)
CALCIUM SERPL-MCNC: 7.7 MG/DL — LOW (ref 8.4–10.5)
CHLORIDE BLDV-SCNC: 103 MMOL/L — SIGNIFICANT CHANGE UP (ref 96–108)
CHLORIDE SERPL-SCNC: 103 MMOL/L — SIGNIFICANT CHANGE UP (ref 98–107)
CHLORIDE SERPL-SCNC: 105 MMOL/L — SIGNIFICANT CHANGE UP (ref 98–107)
CHLORIDE SERPL-SCNC: 117 MMOL/L — HIGH (ref 98–107)
CO2 BLDV-SCNC: 33.4 MMOL/L — HIGH (ref 22–26)
CO2 SERPL-SCNC: 18 MMOL/L — LOW (ref 22–31)
CO2 SERPL-SCNC: 26 MMOL/L — SIGNIFICANT CHANGE UP (ref 22–31)
CO2 SERPL-SCNC: 28 MMOL/L — SIGNIFICANT CHANGE UP (ref 22–31)
CREAT SERPL-MCNC: 0.64 MG/DL — SIGNIFICANT CHANGE UP (ref 0.5–1.3)
CREAT SERPL-MCNC: 1.05 MG/DL — SIGNIFICANT CHANGE UP (ref 0.5–1.3)
CREAT SERPL-MCNC: 1.09 MG/DL — SIGNIFICANT CHANGE UP (ref 0.5–1.3)
CULTURE RESULTS: SIGNIFICANT CHANGE UP
D DIMER BLD IA.RAPID-MCNC: <150 NG/ML DDU — SIGNIFICANT CHANGE UP
EGFR: 48 ML/MIN/1.73M2 — LOW
EGFR: 50 ML/MIN/1.73M2 — LOW
EGFR: 84 ML/MIN/1.73M2 — SIGNIFICANT CHANGE UP
EOSINOPHIL # BLD AUTO: 0.03 K/UL — SIGNIFICANT CHANGE UP (ref 0–0.5)
EOSINOPHIL NFR BLD AUTO: 0.2 % — SIGNIFICANT CHANGE UP (ref 0–6)
FOLATE SERPL-MCNC: 9.7 NG/ML — SIGNIFICANT CHANGE UP (ref 3.1–17.5)
GAS PNL BLDV: 136 MMOL/L — SIGNIFICANT CHANGE UP (ref 136–145)
GAS PNL BLDV: SIGNIFICANT CHANGE UP
GLUCOSE BLDV-MCNC: 110 MG/DL — HIGH (ref 70–99)
GLUCOSE SERPL-MCNC: 100 MG/DL — HIGH (ref 70–99)
GLUCOSE SERPL-MCNC: 102 MG/DL — HIGH (ref 70–99)
GLUCOSE SERPL-MCNC: 110 MG/DL — HIGH (ref 70–99)
HCO3 BLDV-SCNC: 32 MMOL/L — HIGH (ref 22–29)
HCT VFR BLD CALC: 49.9 % — HIGH (ref 34.5–45)
HCT VFR BLDA CALC: 48 % — HIGH (ref 34.5–46.5)
HGB BLD CALC-MCNC: 16.1 G/DL — SIGNIFICANT CHANGE UP (ref 11.7–16.1)
HGB BLD-MCNC: 15.6 G/DL — HIGH (ref 11.5–15.5)
IANC: 11.6 K/UL — HIGH (ref 1.8–7.4)
IMM GRANULOCYTES NFR BLD AUTO: 2.2 % — HIGH (ref 0–0.9)
INR BLD: 1.24 RATIO — HIGH (ref 0.85–1.18)
LACTATE BLDV-MCNC: 2.8 MMOL/L — HIGH (ref 0.5–2)
LYMPHOCYTES # BLD AUTO: 1.11 K/UL — SIGNIFICANT CHANGE UP (ref 1–3.3)
LYMPHOCYTES # BLD AUTO: 7.9 % — LOW (ref 13–44)
MAGNESIUM SERPL-MCNC: 1.5 MG/DL — LOW (ref 1.6–2.6)
MAGNESIUM SERPL-MCNC: 2.9 MG/DL — HIGH (ref 1.6–2.6)
MAGNESIUM SERPL-MCNC: 3.5 MG/DL — HIGH (ref 1.6–2.6)
MCHC RBC-ENTMCNC: 28.3 PG — SIGNIFICANT CHANGE UP (ref 27–34)
MCHC RBC-ENTMCNC: 31.3 GM/DL — LOW (ref 32–36)
MCV RBC AUTO: 90.4 FL — SIGNIFICANT CHANGE UP (ref 80–100)
MONOCYTES # BLD AUTO: 0.89 K/UL — SIGNIFICANT CHANGE UP (ref 0–0.9)
MONOCYTES NFR BLD AUTO: 6.4 % — SIGNIFICANT CHANGE UP (ref 2–14)
MRSA PCR RESULT.: SIGNIFICANT CHANGE UP
NEUTROPHILS # BLD AUTO: 11.6 K/UL — HIGH (ref 1.8–7.4)
NEUTROPHILS NFR BLD AUTO: 82.9 % — HIGH (ref 43–77)
NRBC # BLD: 0 /100 WBCS — SIGNIFICANT CHANGE UP (ref 0–0)
NRBC # FLD: 0 K/UL — SIGNIFICANT CHANGE UP (ref 0–0)
PCO2 BLDV: 46 MMHG — SIGNIFICANT CHANGE UP (ref 39–52)
PH BLDV: 7.45 — HIGH (ref 7.32–7.43)
PHOSPHATE SERPL-MCNC: 2 MG/DL — LOW (ref 2.5–4.5)
PHOSPHATE SERPL-MCNC: 3.3 MG/DL — SIGNIFICANT CHANGE UP (ref 2.5–4.5)
PHOSPHATE SERPL-MCNC: 3.5 MG/DL — SIGNIFICANT CHANGE UP (ref 2.5–4.5)
PLATELET # BLD AUTO: 84 K/UL — LOW (ref 150–400)
PO2 BLDV: 46 MMHG — HIGH (ref 25–45)
POTASSIUM BLDV-SCNC: 6.1 MMOL/L — HIGH (ref 3.5–5.1)
POTASSIUM SERPL-MCNC: 2.4 MMOL/L — CRITICAL LOW (ref 3.5–5.3)
POTASSIUM SERPL-MCNC: 4.8 MMOL/L — SIGNIFICANT CHANGE UP (ref 3.5–5.3)
POTASSIUM SERPL-MCNC: SIGNIFICANT CHANGE UP MMOL/L (ref 3.5–5.3)
POTASSIUM SERPL-SCNC: 2.4 MMOL/L — CRITICAL LOW (ref 3.5–5.3)
POTASSIUM SERPL-SCNC: 4.8 MMOL/L — SIGNIFICANT CHANGE UP (ref 3.5–5.3)
POTASSIUM SERPL-SCNC: SIGNIFICANT CHANGE UP MMOL/L (ref 3.5–5.3)
PROTHROM AB SERPL-ACNC: 13.9 SEC — HIGH (ref 9.5–13)
RBC # BLD: 5.52 M/UL — HIGH (ref 3.8–5.2)
RBC # FLD: 19.5 % — HIGH (ref 10.3–14.5)
S AUREUS DNA NOSE QL NAA+PROBE: SIGNIFICANT CHANGE UP
SAO2 % BLDV: 65.9 % — LOW (ref 67–88)
SODIUM SERPL-SCNC: 141 MMOL/L — SIGNIFICANT CHANGE UP (ref 135–145)
SODIUM SERPL-SCNC: 144 MMOL/L — SIGNIFICANT CHANGE UP (ref 135–145)
SODIUM SERPL-SCNC: 145 MMOL/L — SIGNIFICANT CHANGE UP (ref 135–145)
SPECIMEN SOURCE: SIGNIFICANT CHANGE UP
TSH SERPL-MCNC: 0.83 UIU/ML — SIGNIFICANT CHANGE UP (ref 0.27–4.2)
VIT B12 SERPL-MCNC: 1001 PG/ML — HIGH (ref 200–900)
WBC # BLD: 13.99 K/UL — HIGH (ref 3.8–10.5)
WBC # FLD AUTO: 13.99 K/UL — HIGH (ref 3.8–10.5)

## 2024-05-02 PROCEDURE — 93010 ELECTROCARDIOGRAM REPORT: CPT

## 2024-05-02 PROCEDURE — 99223 1ST HOSP IP/OBS HIGH 75: CPT

## 2024-05-02 PROCEDURE — 99233 SBSQ HOSP IP/OBS HIGH 50: CPT | Mod: GC

## 2024-05-02 RX ORDER — SODIUM,POTASSIUM PHOSPHATES 278-250MG
1 POWDER IN PACKET (EA) ORAL ONCE
Refills: 0 | Status: COMPLETED | OUTPATIENT
Start: 2024-05-02 | End: 2024-05-02

## 2024-05-02 RX ORDER — ATORVASTATIN CALCIUM 80 MG/1
10 TABLET, FILM COATED ORAL AT BEDTIME
Refills: 0 | Status: DISCONTINUED | OUTPATIENT
Start: 2024-05-02 | End: 2024-05-03

## 2024-05-02 RX ORDER — POTASSIUM CHLORIDE 20 MEQ
10 PACKET (EA) ORAL
Refills: 0 | Status: DISCONTINUED | OUTPATIENT
Start: 2024-05-02 | End: 2024-05-02

## 2024-05-02 RX ORDER — LOSARTAN POTASSIUM 100 MG/1
25 TABLET, FILM COATED ORAL DAILY
Refills: 0 | Status: DISCONTINUED | OUTPATIENT
Start: 2024-05-02 | End: 2024-05-02

## 2024-05-02 RX ORDER — EPLERENONE 50 MG/1
50 TABLET, FILM COATED ORAL DAILY
Refills: 0 | Status: DISCONTINUED | OUTPATIENT
Start: 2024-05-02 | End: 2024-05-02

## 2024-05-02 RX ORDER — MAGNESIUM SULFATE 500 MG/ML
2 VIAL (ML) INJECTION ONCE
Refills: 0 | Status: COMPLETED | OUTPATIENT
Start: 2024-05-02 | End: 2024-05-02

## 2024-05-02 RX ORDER — METOPROLOL TARTRATE 50 MG
100 TABLET ORAL
Refills: 0 | Status: DISCONTINUED | OUTPATIENT
Start: 2024-05-02 | End: 2024-05-03

## 2024-05-02 RX ORDER — ACETAMINOPHEN 500 MG
650 TABLET ORAL ONCE
Refills: 0 | Status: COMPLETED | OUTPATIENT
Start: 2024-05-02 | End: 2024-05-02

## 2024-05-02 RX ORDER — CALCIUM GLUCONATE 100 MG/ML
2 VIAL (ML) INTRAVENOUS ONCE
Refills: 0 | Status: DISCONTINUED | OUTPATIENT
Start: 2024-05-02 | End: 2024-05-02

## 2024-05-02 RX ORDER — CHLORHEXIDINE GLUCONATE 213 G/1000ML
1 SOLUTION TOPICAL DAILY
Refills: 0 | Status: DISCONTINUED | OUTPATIENT
Start: 2024-05-02 | End: 2024-05-03

## 2024-05-02 RX ORDER — LEVOTHYROXINE SODIUM 125 MCG
25 TABLET ORAL DAILY
Refills: 0 | Status: DISCONTINUED | OUTPATIENT
Start: 2024-05-02 | End: 2024-05-03

## 2024-05-02 RX ORDER — POTASSIUM CHLORIDE 20 MEQ
40 PACKET (EA) ORAL EVERY 4 HOURS
Refills: 0 | Status: DISCONTINUED | OUTPATIENT
Start: 2024-05-02 | End: 2024-05-02

## 2024-05-02 RX ORDER — CALCIUM GLUCONATE 100 MG/ML
1 VIAL (ML) INTRAVENOUS
Refills: 0 | Status: DISCONTINUED | OUTPATIENT
Start: 2024-05-02 | End: 2024-05-02

## 2024-05-02 RX ORDER — APIXABAN 2.5 MG/1
5 TABLET, FILM COATED ORAL EVERY 12 HOURS
Refills: 0 | Status: DISCONTINUED | OUTPATIENT
Start: 2024-05-02 | End: 2024-05-03

## 2024-05-02 RX ORDER — FUROSEMIDE 40 MG
40 TABLET ORAL DAILY
Refills: 0 | Status: DISCONTINUED | OUTPATIENT
Start: 2024-05-02 | End: 2024-05-02

## 2024-05-02 RX ADMIN — Medication 40 MILLIEQUIVALENT(S): at 06:54

## 2024-05-02 RX ADMIN — Medication 650 MILLIGRAM(S): at 09:05

## 2024-05-02 RX ADMIN — Medication 100 MILLIGRAM(S): at 05:16

## 2024-05-02 RX ADMIN — Medication 15 MILLIGRAM(S): at 17:34

## 2024-05-02 RX ADMIN — Medication 1 PACKET(S): at 07:46

## 2024-05-02 RX ADMIN — Medication 25 MILLIGRAM(S): at 05:16

## 2024-05-02 RX ADMIN — ATORVASTATIN CALCIUM 10 MILLIGRAM(S): 80 TABLET, FILM COATED ORAL at 21:42

## 2024-05-02 RX ADMIN — Medication 15 MILLIGRAM(S): at 07:46

## 2024-05-02 RX ADMIN — Medication 100 MILLIEQUIVALENT(S): at 06:56

## 2024-05-02 RX ADMIN — Medication 40 MILLIGRAM(S): at 05:15

## 2024-05-02 RX ADMIN — Medication 25 MILLIGRAM(S): at 17:08

## 2024-05-02 RX ADMIN — Medication 100 GRAM(S): at 12:32

## 2024-05-02 RX ADMIN — Medication 1 PACKET(S): at 09:05

## 2024-05-02 RX ADMIN — LOSARTAN POTASSIUM 25 MILLIGRAM(S): 100 TABLET, FILM COATED ORAL at 05:16

## 2024-05-02 RX ADMIN — Medication 100 MILLIGRAM(S): at 17:08

## 2024-05-02 RX ADMIN — CHLORHEXIDINE GLUCONATE 1 APPLICATION(S): 213 SOLUTION TOPICAL at 12:15

## 2024-05-02 RX ADMIN — PIPERACILLIN AND TAZOBACTAM 200 GRAM(S): 4; .5 INJECTION, POWDER, LYOPHILIZED, FOR SOLUTION INTRAVENOUS at 05:14

## 2024-05-02 RX ADMIN — Medication 100 MILLIEQUIVALENT(S): at 12:37

## 2024-05-02 RX ADMIN — Medication 25 MICROGRAM(S): at 05:15

## 2024-05-02 RX ADMIN — Medication 40 MILLIEQUIVALENT(S): at 09:55

## 2024-05-02 RX ADMIN — Medication 650 MILLIGRAM(S): at 10:05

## 2024-05-02 RX ADMIN — APIXABAN 5 MILLIGRAM(S): 2.5 TABLET, FILM COATED ORAL at 17:08

## 2024-05-02 RX ADMIN — Medication 25 GRAM(S): at 09:55

## 2024-05-02 NOTE — PATIENT PROFILE ADULT - IS THERE A SUSPICION OF ABUSE/NEGLIGENCE?
Assessment/Plan:    No problem-specific Assessment & Plan notes found for this encounter  6 weeks postop from scs implantation  No issues is happy with result  No restrictions  F/u PRN     Diagnoses and all orders for this visit:    Chronic pain syndrome          Subjective:      Patient ID: Swetha Qiu is a 48 y o  female  HPI  6 weeks postop doing well no issues, is satisfied with operative result    The following portions of the patient's history were reviewed and updated as appropriate: allergies, current medications, past family history, past medical history, past social history, past surgical history and problem list     Review of Systems   Constitutional: Negative  HENT: Negative  Eyes: Negative  Respiratory: Negative  Cardiovascular: Negative  Gastrointestinal: Negative  Endocrine: Negative  Genitourinary: Negative  Musculoskeletal: Positive for back pain (occasionally radiates to legs, improving) and gait problem (occasional, 2/2 pain)  Skin: Negative for wound  Allergic/Immunologic: Negative  Neurological: Negative for weakness  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   Psychiatric/Behavioral: Positive for dysphoric mood (controlled with meds) and sleep disturbance  All other systems reviewed and are negative  I have personally reviewed all aspects of the review of systems as documented    Objective:      /96 (BP Location: Right arm)   Pulse (!) 118   Temp 98 6 °F (37 °C) (Tympanic)   Resp 16   Ht 5' 2" (1 575 m)   Wt 96 6 kg (213 lb)   BMI 38 96 kg/m²          Physical Exam  Constitutional:       Appearance: Normal appearance  She is normal weight  Cardiovascular:      Rate and Rhythm: Normal rate  Neurological:      General: No focal deficit present  Mental Status: She is alert and oriented to person, place, and time  Mental status is at baseline         incision well healed no

## 2024-05-02 NOTE — PROVIDER CONTACT NOTE (OTHER) - REASON
/101
Patient's daughter Liz refused placing a second peripheral IV
to discuss with patient's daughter about the benefits of placing a second peripheral IV

## 2024-05-02 NOTE — H&P ADULT - ASSESSMENT
89 y/o F with pmh of AF on Eliquis, HTN, dCHF, non-hodgkin's lymphoma s/p chemotherapy, and asthma p/w confusion.

## 2024-05-02 NOTE — PROVIDER CONTACT NOTE (OTHER) - RECOMMENDATIONS
MD VILLEGAS to speak with daughter at the bedside to re-enforced education in the benefits of having a secong peripheral IV.
MD notified
MD notified
MD VILLEGAS to speak with daughter at the bedside to re-enforced education in the benefits of having a second peripheral IV.

## 2024-05-02 NOTE — PROGRESS NOTE ADULT - PROBLEM SELECTOR PLAN 2
Thrombocytopenia to 80s noted on admission lab, has been downtrending since 2023    Plan:  - Unclear etiology: BM from infection vs. ?MAHA vs. heme malignancy  - Heme recs appreciated  - Trend Plt, transfusion goal > 10 or > 20 if febrile or > 50 if bleeding or > 100 if ICH  - f/u hemolysis labs  - f/u infectious work up: HCV (pending), HIV (pending), HBV (pending), EBV (pending), CMV (pending)

## 2024-05-02 NOTE — PROGRESS NOTE ADULT - SUBJECTIVE AND OBJECTIVE BOX
Patient is a 89y old  Female who presents with a chief complaint of AMS (02 May 2024 12:11)      ======Overnight/Subjective======  Overnight  - No acute interval event from HPI    Subjective  - Feeling okay, no acute complaints    Brief daily plan  - Lytes/fluid as needed    ======Medications======  MEDICATIONS  (STANDING):  atorvastatin 10 milliGRAM(s) Oral at bedtime  busPIRone 15 milliGRAM(s) Oral two times a day  calcium gluconate IVPB 1 Gram(s) IV Intermittent every 1 hour  chlorhexidine 2% Cloths 1 Application(s) Topical daily  levothyroxine 25 MICROGram(s) Oral daily  losartan 25 milliGRAM(s) Oral daily  metoprolol tartrate 100 milliGRAM(s) Oral two times a day  pregabalin 25 milliGRAM(s) Oral two times a day    MEDICATIONS  (PRN):      ======Vital Signs======  T(C): 36.9 (24 @ 08:48), Max: 37.1 (24 @ 14:45)  T(F): 98.5 (24 @ 08:48), Max: 98.8 (24 @ 14:45)  HR: 67 (24 @ 08:48) (67 - 91)  BP: 131/65 (24 @ 08:48) (105/78 - 151/87)  BP(mean): --  RR: 18 (24 @ 08:48) (17 - 18)  SpO2: 100% (24 @ 08:48) (94% - 100%)    ======Physical exams======  GENERAL: NAD  Cardiovascular: RRR, S1 S2, no m/r/g, no JVD  LUNGS: Unlabored respiration, CTABL  ABDOMEN: Soft, slight distended, slightly tender to deep palpation  EXTREMITIES: Warm extremities, BUE/BLE 1+ edema, pulses palpable  NEURO: AAOx2, CN2-12 unremarkable, strength 5/5 throughout, sensation intact throughout    ======Labs======                15.6<H>  13.99<H> >----------< 84<L>  (MCV: 90.4)                49.9<H>   145 | 117<H> | 27<H>  -----------------------< 100<H>  2.4<LL> | 18<L> | 0.64    TPro: 5.1<L> / Alb: 3.5 / TBili: 1.1 / DBili: -- / AlkPhos: 66 / ALT: 39<H> / AST: 28 (24 @ 15:30)  Ca: 5.1<LL> / Phos: 2.0<L> / M.50<L> (24 @ 05:07)    Gas: 7.45<H> / 46 / 46<H> / 32<H> / 65.9<L>% / 6.8<H> (24 @ 12:00)  PT/INR - ( 02 May 2024 05:07 )   PT: 13.9 sec;   INR: 1.24 ratio         PTT - ( 02 May 2024 05:07 )  PTT:30.4 sec    ======Microbiology======  Urinalysis Basic - ( 02 May 2024 05:07 )    Color: x / Appearance: x / SG: x / pH: x  Gluc: 100 mg/dL / Ketone: x  / Bili: x / Urobili: x   Blood: x / Protein: x / Nitrite: x   Leuk Esterase: x / RBC: x / WBC x   Sq Epi: x / Non Sq Epi: x / Bacteria: x          ======I&O's======  I&O's Summary    01 May 2024 07:01  -  02 May 2024 07:00  --------------------------------------------------------  IN: 300 mL / OUT: 0 mL / NET: 300 mL    02 May 2024 07:  -  02 May 2024 12:52  --------------------------------------------------------  IN: 300 mL / OUT: 0 mL / NET: 300 mL

## 2024-05-02 NOTE — H&P ADULT - PROBLEM SELECTOR PLAN 6
- pt evaluated by Dr Mcdowell during last ED visit, however daughter opted to hold off on further workup at this time.

## 2024-05-02 NOTE — PHYSICAL THERAPY INITIAL EVALUATION ADULT - ADDITIONAL COMMENTS
Pt lives [place] home with 3 steps to enter, with her daughter has HHA x3 days a week 24hrs     Patients Current SpO2: 99%

## 2024-05-02 NOTE — PATIENT PROFILE ADULT - FALL HARM RISK - FALL HARM RISK
Faxed order to Trista at [facility] who will contact patient to schedule.   weakness/Age/Other

## 2024-05-02 NOTE — PATIENT PROFILE ADULT - FALL HARM RISK - HARM RISK INTERVENTIONS
Assistance with ambulation/Assistance OOB with selected safe patient handling equipment/Communicate Risk of Fall with Harm to all staff/Discuss with provider need for PT consult/Monitor gait and stability/Reinforce activity limits and safety measures with patient and family/Tailored Fall Risk Interventions/Visual Cue: Yellow wristband and red socks/Bed in lowest position, wheels locked, appropriate side rails in place/Call bell, personal items and telephone in reach/Instruct patient to call for assistance before getting out of bed or chair/Non-slip footwear when patient is out of bed/[place] to call system/Physically safe environment - no spills, clutter or unnecessary equipment/Purposeful Proactive Rounding/Room/bathroom lighting operational, light cord in reach

## 2024-05-02 NOTE — PROGRESS NOTE ADULT - PROBLEM SELECTOR PLAN 6
Home regimen: Lopressor 100mg BID, Losartan 25mg QD, Eplerenone 50 QD    Plan:  - c/w Lopressor 100mg BID  - r/s Losartan PRN Home regimen: Lopressor 100mg BID, Losartan 25mg QD, Eplerenone 50 QD    Plan:  - c/w Lopressor 100mg BID  - Will restart other medications based on BPs and clinical course

## 2024-05-02 NOTE — PROGRESS NOTE ADULT - PROBLEM SELECTOR PLAN 1
Presenting with 1-1.5 weeks of AMS, currently AAOx2    Plan:  >UA (5/2): negative, RVP (5/2): negative  >CTH, CTA/P (5/2): No acute actionable findings  - Unclear etiology: dehydration vs. infection vs. polypharmacy  - f/u Ucx, will monitor off antibiotics  - Reversible dementia w/u: B9 (pending), B12 (pending), TSH (wnl)  - IVF PRN  - Hold anticholinergic/opioid/benzo

## 2024-05-02 NOTE — CONSULT NOTE ADULT - ASSESSMENT
89 y/o woman w/ hx of follicular lymphoma s/p rituximab who presents to the hospital in the setting of AMS now found to have worsening thrombocytopenia.         #Follicular Lymphoma:  -S/p FNA of left axillary LN on 1/24/23 with path consistent with follicular lymphoma grade 1-2 follicular pattern with high proliferation index Ki67 of 60-70%. Was treated with four doses of rituximab weekly as an outpatient w/ last dose on   -No appreciable LN noted on the CT A/P from 5/1  -No current inpatient treatments planned  -Patient will need to f/u with Dr. Buitrago at [facility] as last time patient saw his team was Aug 2023 for surveillance of FL.   -Lytic lesions on imaging favored to be bony hemangiomas  -Can consider CT Chest to eval for any evidence of LAD in the thoracic region however if patient not having any B symptoms (weight loss, night sweats, fevers, chills) suspect that FL is not active at this time.       #Thrombocytopenia:  -CT A/P noted no evidence of hepatosplenomegaly to suggest sequestering of cells.   -Can check infectious panel with HIV, EBV, CMV, and acute hepatitis panel w/ Hep B core IgM and Hep B surface ab.   -Platelets in Jan 2024 noted to be 110-250. Prior to that in Aug 23 patient's platelets were WNL and May 23 as well wnl.   -Will check peripheral smear  -D-dimer normal suggests lower possibility of a consumptive process can check fibrinogen with AM coags as an add on as well.   -Can check LDH, haptoglobin, and reticulocyte as hemolysis markers, normal bilirubin makes it less likely,   -Suspect possible transient BM suppression in the setting of possible urinary tract infection (U culture pending) and possible AMS.     Plan to be d/w attending.     Armando Berkowitz M.D.  H/O PGY-4

## 2024-05-02 NOTE — PATIENT PROFILE ADULT - VISION (WITH CORRECTIVE LENSES IF THE PATIENT USUALLY WEARS THEM):
Pt is legally blind/Severely impaired: cannot locate objects without hearing or touching them or patient nonresponsive.

## 2024-05-02 NOTE — H&P ADULT - NSHPLABSRESULTS_GEN_ALL_CORE
15.6   13.99 )-----------( 84       ( 02 May 2024 05:07 )             49.9     145  |  117<H>  |  27<H>  ----------------------------<  100<H>     05  2.4<LL>   |  18<L>  |  0.64    Ca    5.1<LL>      02 May 2024 05:07  Phos  2.0       Mg     1.50         TPro  5.1<L>  /  Alb  3.5  /  TBili  1.1  /  DBili  x   /  AST  28  /  ALT  39<H>  /  AlkPhos  66      PT/INR: 13.9/1.24 (24 @ 05:07)  PTT: 30.4 (24 @ 05:07)  PT/INR: 16.4/1.48 (24 @ 15:30)  PTT: 39.1 (24 @ 15:30)      15:32 - VBG - pH: 7.50  | pCO2: 41    | pO2: 59    | Lactate: 1.8                Pro-BNP: 5417 (24 @ 15:30)          Urinalysis Basic - ( 01 May 2024 15:45 )  Color: Yellow / Appearance: Clear / S.012 / pH: 7.5  Gluc: Negative mg/dL / Ketone: Negative mg/dL  / Bili: Negative / Urobili: 0.2 mg/dL   Blood: Negative / Protein: Negative mg/dL / Nitrite: Negative   Leuk Esterase: Trace / RBC: 0 /HPF / WBC 5 /HPF   Sq Epi: x / Non Sq Epi: x / Bacteria: Negative /HPF        < from: CT Abdomen and Pelvis w/ IV Cont (24 @ 20:54) >    Interval resolution of fluid collection in the right paramedian gluteal   subcutaneous fat. No recurrent collection or inflammatory changes.    No significant interval change to left adrenal mass concerning for   neoplasm.    < end of copied text >    < from: CT Head No Cont (24 @ 20:51) >    Stable exam. No acute intracranial hemorrhage, vasogenic edema or   hydrocephalus. Chronic right parieto-occipital lobe, corona radiata and   bilateral thalamic infarcts. Chronic microvascular changes.    < end of copied text >

## 2024-05-02 NOTE — H&P ADULT - PROBLEM SELECTOR PLAN 1
Unclear cause.  Possibly 2/2 sepsis vs developing dementia with sundowning.  - will f/u blood cx  - hold off on further empiric abx  - possible neurology consult in am

## 2024-05-02 NOTE — CONSULT NOTE ADULT - SUBJECTIVE AND OBJECTIVE BOX
HISTORY OF PRESENT ILLNESS: HPI:    89 y/o F with pmh of CVA, YOVANI on CPAP, HTN, HLD, Persistent Afib on Eliquis, s/p MDT PPM, chronic diastolic HF, and follicular lymphoma s/p rituxan presenting with AMS.  Pt's daughter reports that pt has had intermittent confusion for the past 1-1.5 weeks.  Daughter states that pt is often "delirius" or "disoriented".  Daughter reports that pt asks nonsensical questions and at times is unaware of where she is.  Pt reports feeling chills, but no subjective fever, cough, dyspnea, dysuria, or n/v/d.  Daughter has noticed that pt has had swelling of both arms and legs with L>R.  She has also noticed that pt has had bruising of extremities and petechiae of abdomen.    Pt recently was in ED for drainage of perianal abscess, and completed a course of antibiotics. She had outpatient doppler of LUE last week which was negative for DVT.   Pt has also has adrenal mass discovered on recent imaging, but has not had the chance for further eval.      In the ED, pt was given empiric Zosyn (02 May 2024 04:02)      PAST MEDICAL & SURGICAL HISTORY:  Atrial fibrillation  dx 7/09 on coumadin      HTN (hypertension)      HLD (hyperlipidemia)      UTI (urinary tract infection)  frequent last was 1/15      Thyroid nodule  followed by endocrinologist      YOVANI on CPAP      OA (osteoarthritis)      Cerebrovascular accident (CVA)      Abscess      FHx: total knee replacement  left 2012      Cataract  b/l lense implant      S/P JAY-BSO  40 years ago      FHx: cholecystectomy  1993 laparoscopic      Fracture  ORIF right ankle 1986      Cardiac pacemaker  placed 2009      MEDICATIONS  (STANDING):  apixaban 5 milliGRAM(s) Oral every 12 hours  atorvastatin 10 milliGRAM(s) Oral at bedtime  busPIRone 15 milliGRAM(s) Oral two times a day  chlorhexidine 2% Cloths 1 Application(s) Topical daily  levothyroxine 25 MICROGram(s) Oral daily  metoprolol tartrate 100 milliGRAM(s) Oral two times a day  pregabalin 25 milliGRAM(s) Oral two times a day      Allergies  Cardizem (Urticaria)  sulfa drugs (Hives)    Intolerances  OxyContin (Sedation/Somnol; Drowsiness)      FAMILY HISTORY:  No pertinent family history in first degree relatives  Noncontributory for premature coronary disease or sudden cardiac death    SOCIAL HISTORY:    [x ] Non-smoker  [ ] Smoker  [ ] Alcohol    FLU VACCINE THIS YEAR STARTS IN AUGUST:  [ ] Yes    [ ] No    IF OVER 65 HAVE YOU EVER HAD A PNA VACCINE:  [ ] Yes    [ ] No       [ ] N/A      REVIEW OF SYSTEMS:  [ ]chest pain  [  ]shortness of breath  [  ]palpitations  [  ]syncope  [ ]near syncope [ ]upper extremity weakness   [ ] lower extremity weakness  [  ]diplopia  [  ]altered mental status   [  ]fevers  [ ]chills [ ]nausea  [ ]vomiting  [  ]dysphagia    [ ]abdominal pain  [ ]melena  [ ]BRBPR    [  ]epistaxis  [  ]rash    [ ]lower extremity edema        [x ] All others negative	  [ ] Unable to obtain      LABS:	 	               15.6   13.99 )-----------( 84       ( 02 May 2024 05:07 )             49.9     141  |  103  |  34<H>  ----------------------------<  102<H>  TNP   |  28  |  1.05    Ca    7.7<L>      02 May 2024 12:00  Phos  3.5     05-02  Mg     3.50     05-02    TPro  5.1<L>  /  Alb  3.5  /  TBili  1.1  /  DBili  x   /  AST  28  /  ALT  39<H>  /  AlkPhos  66  05-01    Creatinine Trend: 1.05<--, 0.64<--, 0.93<--, 1.00<--, 1.09<--    Coags:  PT/INR - ( 02 May 2024 05:07 )   PT: 13.9 sec;   INR: 1.24 ratio      PTT - ( 02 May 2024 05:07 )  PTT:30.4 sec    TSH: Thyroid Stimulating Hormone, Serum: 0.83 uIU/mL (05-02 @ 05:07)      PHYSICAL EXAM:  T(C): 36.8 (05-02-24 @ 15:15), Max: 36.9 (05-02-24 @ 08:48)  HR: 65 (05-02-24 @ 15:15) (65 - 91)  BP: 139/68 (05-02-24 @ 15:15) (109/98 - 151/87)  RR: 18 (05-02-24 @ 15:15) (17 - 18)  SpO2: 96% (05-02-24 @ 15:15) (96% - 100%)  Wt(kg): --   BMI (kg/m2): 32.1 (05-02-24 @ 06:26)  I&O's Summary    01 May 2024 07:01  -  02 May 2024 07:00  --------------------------------------------------------  IN: 300 mL / OUT: 0 mL / NET: 300 mL    02 May 2024 07:01  -  02 May 2024 15:33  --------------------------------------------------------  IN: 600 mL / OUT: 400 mL / NET: 200 mL      Gen: Appears well in NAD  HEENT:  (-)icterus (-)pallor  CV: N S1 S2 1/6 DMITRIY (+)2 Pulses B/l  Resp:  Clear to ausculatation B/L, normal effort  GI: (+) BS Soft, NT, ND  Lymph:  (-)Edema, (-)obvious lymphadenopathy  Skin: Warm to touch, Normal turgor  Psych: unable to eval	      ECG:  	Afib    < from: CT Head No Cont (05.01.24 @ 20:51) >  IMPRESSION:  Stable exam. No acute intracranial hemorrhage, vasogenic edema or   hydrocephalus. Chronic right parieto-occipital lobe, corona radiata and   bilateral thalamic infarcts. Chronic microvascular changes.    < end of copied text >    < from: CT Abdomen and Pelvis w/ IV Cont (05.01.24 @ 20:54) >    IMPRESSION:  Interval resolution of fluid collection in the right paramedian gluteal   subcutaneous fat. No recurrent collection or inflammatory changes.    No significant interval change to left adrenal mass concerning for   neoplasm.    < end of copied text >    < from: TTE W or WO Ultrasound Enhancing Agent (01.11.24 @ 11:10) >  CONCLUSIONS:      1. Left ventricular cavity is normal. Left ventricular systolic function is normal. There are no regional wall motion abnormalities seen.   2. Mild pulmonary hypertension.   3. Device lead is visualized in the right heart.   4. No prior echocardiogram is available for comparison.  < end of copied text >      ASSESSMENT/PLAN: 	    89 y/o F known to the office with PMH of CVA, YOVANI on CPAP, HTN, HLD, Persistent Afib on Eliquis, s/p MDT PPM, chronic diastolic HF, and follicular lymphoma s/p Rituxan presenting with AMS.    --CT Noted, no acute changes  --cont lifelong Ac with Eliquis for hx Afib given elevated [id number]  --BP stable, cont Metoprolol for HTN and rate control  --Losartan, Lasix and Inspra held, noted with K 6.1 on VBG  --infectious work up in progress  --monitor platelet count    Sheeba RODNEY  [phone]

## 2024-05-02 NOTE — H&P ADULT - NSICDXPASTMEDICALHX_GEN_ALL_CORE_FT
PAST MEDICAL HISTORY:  Abscess     Atrial fibrillation dx 7/09 on coumadin    Cerebrovascular accident (CVA)     HLD (hyperlipidemia)     HTN (hypertension)     OA (osteoarthritis)     YOVANI on CPAP     Thyroid nodule followed by endocrinologist    UTI (urinary tract infection) frequent last was 1/15

## 2024-05-02 NOTE — CONSULT NOTE ADULT - ATTENDING COMMENTS
Patient seen and examined during heme consult rounds, history reviewed with daughter at bedside.- Chart reviewed. H/O follicular lymphoma grade 1-2, treated with Rituxan x 4 doses last year, LTFU due to multiple infections admissions to rehab, admitted with AMS. She is awake and alert, with confusion- and then corrects herself. NAD, no F/C. Palpable left anterior axillary mobile non-tender LN, which was the prior biopsy site. Mild thrombocytopenia.   I agree with Dr Berkowitz's assessment and plan as stated above.   Robin Bautista MD  Hematology Attending  awaitng smear availability for review.

## 2024-05-02 NOTE — H&P ADULT - HISTORY OF PRESENT ILLNESS
89 y/o F with pmh of AF on Eliquis, HTN, dCHF, non-hodgkin's lymphoma s/p chemotherapy, and asthma p/w confusion.  Pt's daughter reports that pt has had intermittent confusion for the past 1-1.5 weeks.  Daughter states that pt is often "delirius" or "disoriented".  Daughter reports that pt asks nonsensical questions and at times is unaware of where she is.  Pt reports feeling chills, but no subjective fever, cough, dyspnea, dysuria, or n/v/d.  Daughter has noticed that pt has had swelling of both arms and legs with L>R.  She has also noticed that pt has had bruising of extremities and petechiae of abdomen.    Pt recently was in ED for drainage of perianal abscess, and completed a course of antibiotics. She had outpatient doppler of LUE last week which was negative for DVT.    89 y/o F with pmh of AF on Eliquis, HTN, dCHF, non-hodgkin's lymphoma s/p chemotherapy, and asthma p/w confusion.  Pt's daughter reports that pt has had intermittent confusion for the past 1-1.5 weeks.  Daughter states that pt is often "delirius" or "disoriented".  Daughter reports that pt asks nonsensical questions and at times is unaware of where she is.  Pt reports feeling chills, but no subjective fever, cough, dyspnea, dysuria, or n/v/d.  Daughter has noticed that pt has had swelling of both arms and legs with L>R.  She has also noticed that pt has had bruising of extremities and petechiae of abdomen.    Pt recently was in ED for drainage of perianal abscess, and completed a course of antibiotics. She had outpatient doppler of LUE last week which was negative for DVT.       In the ED, pt was given empiric Zosyn 89 y/o F with pmh of AF on Eliquis, HTN, dCHF, follicular  lymphoma s/p Rituxan, and asthma p/w confusion.  Pt's daughter reports that pt has had intermittent confusion for the past 1-1.5 weeks.  Daughter states that pt is often "delirius" or "disoriented".  Daughter reports that pt asks nonsensical questions and at times is unaware of where she is.  Pt reports feeling chills, but no subjective fever, cough, dyspnea, dysuria, or n/v/d.  Daughter has noticed that pt has had swelling of both arms and legs with L>R.  She has also noticed that pt has had bruising of extremities and petechiae of abdomen.    Pt recently was in ED for drainage of perianal abscess, and completed a course of antibiotics. She had outpatient doppler of LUE last week which was negative for DVT.   Pt has also has adrenal mass discovered on recent imaging, but has not had the chance for further eval.      In the ED, pt was given empiric Zosyn

## 2024-05-02 NOTE — PHYSICAL THERAPY INITIAL EVALUATION ADULT - PERTINENT HX OF CURRENT PROBLEM, REHAB EVAL
89 y/o F with pmh of AF on Eliquis, HTN, dCHF, follicular  lymphoma s/p Rituxan, and asthma p/w confusion.  Pt's daughter reports that pt has had intermittent confusion for the past 1-1.5 weeks.  Daughter states that pt is often "delirius" or "disoriented".  Daughter reports that pt asks nonsensical questions and at times is unaware of where she is.  Pt reports feeling chills, but no subjective fever, cough, dyspnea, dysuria, or n/v/d.  Daughter has noticed that pt has had swelling of both arms and legs with L>R.  She has also noticed that pt has had bruising of extremities and petechiae of abdomen.    Pt recently was in ED for drainage of perianal abscess, and completed a course of antibiotics. She had outpatient doppler of LUE last week which was negative for DVT.   Pt has also has adrenal mass discovered on recent imaging, but has not had the chance for further eval.

## 2024-05-02 NOTE — CONSULT NOTE ADULT - SUBJECTIVE AND OBJECTIVE BOX
HEMATOLOGY ONCOLOGY CONSULT     Patient is a 89y old  Female who presents with a chief complaint of AMS (02 May 2024 04:02)      HPI:  89 y/o F with pmh of AF on Eliquis, HTN, dCHF, follicular  lymphoma s/p Rituxan, and asthma p/w confusion.  Pt's daughter reports that pt has had intermittent confusion for the past 1-1.5 weeks.  Daughter states that pt is often "delirius" or "disoriented".  Daughter reports that pt asks nonsensical questions and at times is unaware of where she is.  Pt reports feeling chills, but no subjective fever, cough, dyspnea, dysuria, or n/v/d.  Daughter has noticed that pt has had swelling of both arms and legs with L>R.  She has also noticed that pt has had bruising of extremities and petechiae of abdomen.    Pt recently was in ED for drainage of perianal abscess, and completed a course of antibiotics. She had outpatient doppler of LUE last week which was negative for DVT.   Pt has also has adrenal mass discovered on recent imaging, but has not had the chance for further eval.      In the ED, pt was given empiric Zosyn (02 May 2024 04:02)       ROS:  Negative except for:    PAST MEDICAL & SURGICAL HISTORY:  Atrial fibrillation  dx 7/09 on coumadin      HTN (hypertension)      HLD (hyperlipidemia)      UTI (urinary tract infection)  frequent last was 1/15      Thyroid nodule  followed by endocrinologist      YOVANI on CPAP      OA (osteoarthritis)      Cerebrovascular accident (CVA)      Abscess      FHx: total knee replacement  left 2012      Cataract  b/l lense implant      S/P JAY-BSO  40 years ago      FHx: cholecystectomy  1993 laparoscopic      Fracture  ORIF right ankle 1986      Cardiac pacemaker  placed 2009          SOCIAL HISTORY:    FAMILY HISTORY:  No pertinent family history in first degree relatives        MEDICATIONS  (STANDING):  atorvastatin 10 milliGRAM(s) Oral at bedtime  busPIRone 15 milliGRAM(s) Oral two times a day  calcium gluconate IVPB 1 Gram(s) IV Intermittent every 1 hour  chlorhexidine 2% Cloths 1 Application(s) Topical daily  levothyroxine 25 MICROGram(s) Oral daily  losartan 25 milliGRAM(s) Oral daily  metoprolol tartrate 100 milliGRAM(s) Oral two times a day  potassium chloride   Powder 40 milliEquivalent(s) Oral every 4 hours  potassium chloride  10 mEq/100 mL IVPB 10 milliEquivalent(s) IV Intermittent every 1 hour  pregabalin 25 milliGRAM(s) Oral two times a day    MEDICATIONS  (PRN):      Allergies    Cardizem (Urticaria)  sulfa drugs (Hives)    Intolerances    OxyContin (Sedation/Somnol; Drowsiness)      Vital Signs Last 24 Hrs  T(C): 36.9 (02 May 2024 08:48), Max: 37.1 (01 May 2024 14:45)  T(F): 98.5 (02 May 2024 08:48), Max: 98.8 (01 May 2024 14:45)  HR: 67 (02 May 2024 08:48) (67 - 91)  BP: 131/65 (02 May 2024 08:48) (105/78 - 151/87)  BP(mean): --  RR: 18 (02 May 2024 08:48) (17 - 18)  SpO2: 100% (02 May 2024 08:48) (94% - 100%)    Parameters below as of 02 May 2024 08:48  Patient On (Oxygen Delivery Method): room air        PHYSICAL EXAM  General: adult in NAD  HEENT: clear oropharynx, anicteric sclera, pink conjunctiva  Neck: supple  CV: normal S1/S2 with no murmur rubs or gallops  Lungs: positive air movement b/l ant lungs,clear to auscultation, no wheezes, no rales  Abdomen: soft non-tender non-distended, no hepatosplenomegaly  Ext: no clubbing cyanosis or edema  Skin: no rashes and no petechiae  Neuro: alert and oriented X 4, no focal deficits      05-01-24 @ 07:01  -  05-02-24 @ 07:00  --------------------------------------------------------  IN: 300 mL / OUT: 0 mL / NET: 300 mL    05-02-24 @ 07:01  -  05-02-24 @ 12:13  --------------------------------------------------------  IN: 300 mL / OUT: 0 mL / NET: 300 mL      LABS:                          15.6   13.99 )-----------( 84       ( 02 May 2024 05:07 )             49.9         Mean Cell Volume : 90.4 fL  Mean Cell Hemoglobin : 28.3 pg  Mean Cell Hemoglobin Concentration : 31.3 gm/dL  Auto Neutrophil # : 11.60 K/uL  Auto Lymphocyte # : 1.11 K/uL  Auto Monocyte # : 0.89 K/uL  Auto Eosinophil # : 0.03 K/uL  Auto Basophil # : 0.05 K/uL  Auto Neutrophil % : 82.9 %  Auto Lymphocyte % : 7.9 %  Auto Monocyte % : 6.4 %  Auto Eosinophil % : 0.2 %  Auto Basophil % : 0.4 %      05-02    145  |  117<H>  |  27<H>  ----------------------------<  100<H>  2.4<LL>   |  18<L>  |  0.64    Ca    5.1<LL>      02 May 2024 05:07  Phos  2.0     05-02  Mg     1.50     05-02    TPro  5.1<L>  /  Alb  3.5  /  TBili  1.1  /  DBili  x   /  AST  28  /  ALT  39<H>  /  AlkPhos  66  05-01      PT/INR - ( 02 May 2024 05:07 )   PT: 13.9 sec;   INR: 1.24 ratio         PTT - ( 02 May 2024 05:07 )  PTT:30.4 sec                BLOOD SMEAR INTERPRETATION:       RADIOLOGY & ADDITIONAL STUDIES:       HEMATOLOGY ONCOLOGY CONSULT     Patient is a 89y old  Female who presents with a chief complaint of AMS (02 May 2024 04:02)      HPI:  89 y/o F with pmh of AF on Eliquis, HTN, dCHF, follicular  lymphoma s/p Rituxan, and asthma p/w confusion.  Pt's daughter reports that pt has had intermittent confusion for the past 1-1.5 weeks.  Daughter states that pt is often "delirius" or "disoriented".  Daughter reports that pt asks nonsensical questions and at times is unaware of where she is.  Pt reports feeling chills, but no subjective fever, cough, dyspnea, dysuria, or n/v/d.  Daughter has noticed that pt has had swelling of both arms and legs with L>R.  She has also noticed that pt has had bruising of extremities and petechiae of abdomen.    Pt recently was in ED for drainage of perianal abscess, and completed a course of antibiotics. She had outpatient doppler of LUE last week which was negative for DVT.   Pt has also has adrenal mass discovered on recent imaging, but has not had the chance for further eval.      In the ED, pt was given empiric Zosyn (02 May 2024 04:02)      Hematology History:    Follows w/ Dr. 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 Was lost to follow up after recurrent bouts of infections, rehab stay. Presents from home in the setting of AMS. Lives with her daughter at home, has a son as well. Since her rehab stay per daughter her mental status has waxed and waned.        PAST MEDICAL & SURGICAL HISTORY:  Atrial fibrillation  dx 7/09 on coumadin      HTN (hypertension)      HLD (hyperlipidemia)      UTI (urinary tract infection)  frequent last was 1/15      Thyroid nodule  followed by endocrinologist      YOVANI on CPAP      OA (osteoarthritis)      Cerebrovascular accident (CVA)      Abscess      FHx: total knee replacement  left 2012      Cataract  b/l lense implant      S/P JAY-BSO  40 years ago      FHx: cholecystectomy  1993 laparoscopic      Fracture  ORIF right ankle 1986      Cardiac pacemaker  placed 2009          SOCIAL HISTORY:    FAMILY HISTORY:  No pertinent family history in first degree relatives        MEDICATIONS  (STANDING):  atorvastatin 10 milliGRAM(s) Oral at bedtime  busPIRone 15 milliGRAM(s) Oral two times a day  calcium gluconate IVPB 1 Gram(s) IV Intermittent every 1 hour  chlorhexidine 2% Cloths 1 Application(s) Topical daily  levothyroxine 25 MICROGram(s) Oral daily  losartan 25 milliGRAM(s) Oral daily  metoprolol tartrate 100 milliGRAM(s) Oral two times a day  potassium chloride   Powder 40 milliEquivalent(s) Oral every 4 hours  potassium chloride  10 mEq/100 mL IVPB 10 milliEquivalent(s) IV Intermittent every 1 hour  pregabalin 25 milliGRAM(s) Oral two times a day    MEDICATIONS  (PRN):      Allergies    Cardizem (Urticaria)  sulfa drugs (Hives)    Intolerances    OxyContin (Sedation/Somnol; Drowsiness)      Vital Signs Last 24 Hrs  T(C): 36.9 (02 May 2024 08:48), Max: 37.1 (01 May 2024 14:45)  T(F): 98.5 (02 May 2024 08:48), Max: 98.8 (01 May 2024 14:45)  HR: 67 (02 May 2024 08:48) (67 - 91)  BP: 131/65 (02 May 2024 08:48) (105/78 - 151/87)  BP(mean): --  RR: 18 (02 May 2024 08:48) (17 - 18)  SpO2: 100% (02 May 2024 08:48) (94% - 100%)    Parameters below as of 02 May 2024 08:48  Patient On (Oxygen Delivery Method): room air        PHYSICAL EXAM  GENERAL: NAD  Cardiovascular: RRR, S1 S2, no m/r/g, no JVD  LUNGS: Unlabored respiration, CTABL  ABDOMEN: Soft, slight distended, slightly tender to deep palpation  EXTREMITIES: Warm extremities, BUE/BLE 1+ edema, pulses palpable  NEURO: AAOx2, CN2-12 unremarkable, strength 5/5 throughout, sensation intact throughout      05-01-24 @ 07:01  -  05-02-24 @ 07:00  --------------------------------------------------------  IN: 300 mL / OUT: 0 mL / NET: 300 mL    05-02-24 @ 07:01  -  05-02-24 @ 12:13  --------------------------------------------------------  IN: 300 mL / OUT: 0 mL / NET: 300 mL      LABS:                          15.6   13.99 )-----------( 84       ( 02 May 2024 05:07 )             49.9         Mean Cell Volume : 90.4 fL  Mean Cell Hemoglobin : 28.3 pg  Mean Cell Hemoglobin Concentration : 31.3 gm/dL  Auto Neutrophil # : 11.60 K/uL  Auto Lymphocyte # : 1.11 K/uL  Auto Monocyte # : 0.89 K/uL  Auto Eosinophil # : 0.03 K/uL  Auto Basophil # : 0.05 K/uL  Auto Neutrophil % : 82.9 %  Auto Lymphocyte % : 7.9 %  Auto Monocyte % : 6.4 %  Auto Eosinophil % : 0.2 %  Auto Basophil % : 0.4 %      05-02    145  |  117<H>  |  27<H>  ----------------------------<  100<H>  2.4<LL>   |  18<L>  |  0.64    Ca    5.1<LL>      02 May 2024 05:07  Phos  2.0     05-02  Mg     1.50     05-02    TPro  5.1<L>  /  Alb  3.5  /  TBili  1.1  /  DBili  x   /  AST  28  /  ALT  39<H>  /  AlkPhos  66  05-01      PT/INR - ( 02 May 2024 05:07 )   PT: 13.9 sec;   INR: 1.24 ratio         PTT - ( 02 May 2024 05:07 )  PTT:30.4 sec                BLOOD SMEAR INTERPRETATION:

## 2024-05-02 NOTE — PHYSICAL THERAPY INITIAL EVALUATION ADULT - NSPTDISCHREC_GEN_A_CORE
unable to determine. Family requesting to hold out of bed activity (fatigue).  Will continue to monitor patients functional abilities.

## 2024-05-02 NOTE — PROVIDER CONTACT NOTE (OTHER) - ASSESSMENT
Pt responding at baseline, no distress noted. vital signs stable.
Patient responding at baseline, no distress noted. No complaints at this time
Patient responding at baseline, no distress noted. Vital signs stable.
Patient responding at baseline, no distress noted. Vital signs stable. Patient's daughter Liz refused placing a second peripheral IV, education provided regarding the need of a second IV, teach back method used. Despite education, patient's daughter refused interventions. As per daughter, " I understand the risk, I don't want any other needle inside my mother at this time."

## 2024-05-02 NOTE — H&P ADULT - PROBLEM SELECTOR PLAN 2
Universal Safety Interventions Unclear cause.  ? chronic DIC vs related to sepsis or lymphoma.  Lower suspicion for TTP given no signs of MAHA  - will check fibrinogen and D dimer.  Repeat PT/INR and aPTT while off Eliquis  - hold eliquis  - Hematology consulted 2/2 recent h/o heme malignancy   - trend platelets

## 2024-05-02 NOTE — ED ADULT NURSE REASSESSMENT NOTE - NS ED NURSE REASSESS COMMENT FT1
Break RN: Pt received in spot 22A. Pt resting comfortably. pt offered no complains at present. respiration even and non-labored. in NAD. IV noted to be infiltrated, pt c/o pain at site, IV removed, MD Navarro made aware of needing of USIV. Afib on monitor.
Pt cleaned, turned, and repositioned, resting comfortably in stretcher, breathing even and unlabored. Offers no complaints at this time. Stretcher in lowest position, wheels locked, appropriate side rails in place, call bell in reach. Report given to ESS[facility] 2 RN, pending admitted bed assignment
Report receive from day RN: patient resting comfortably in stretcher, breathing even and unlabored. Offers no complaints at this time. Instructed to call for assistance. Stretcher in lowest position, wheels locked, appropriate side rails in place, call bell in reach.
Break RN: Pt received in stretcher in room 22. Pt resting comfortably. pt offered no complains at present. respiration even and non-labored. in NAD. Swelling noted to left upper extremities and bilateral lower extremities. RN was informed by CT Tech that the left forearm IV access cannot be flushed. Unable to flush, no positive blood return, IV removed by RN, unable to obtain IV access as this time x 2 attempts, MD Navarro made aware of needing USIV

## 2024-05-02 NOTE — PROVIDER CONTACT NOTE (OTHER) - SITUATION
Patient's daughter Liz refused placing a second peripheral IV.  Patient  is order for a series of repletions, which will be delayed due to having only one IV access.
to discuss with patient's daughter about the benefits of placing a second peripheral IV. Pt is order for a series of repletions, which will be delayed due to having only one IV access.
/101

## 2024-05-02 NOTE — PATIENT PROFILE ADULT - FUNCTIONAL ASSESSMENT - BASIC MOBILITY 6.
2-calculated by average/Not able to assess (calculate score using [facility] averaging method) FEVER

## 2024-05-02 NOTE — PHYSICAL THERAPY INITIAL EVALUATION ADULT - LEVEL OF INDEPENDENCE: SIT/STAND, REHAB EVAL
Pt requesting to old OOB activity at pt is to tired to stand at this time.  Pt engaged in Long Arc Quad knee extensions at the edge of the bed.  Patient performed 20 reps, bilaterally strengthening quadriceps musculature./unable to perform

## 2024-05-02 NOTE — PHYSICAL THERAPY INITIAL EVALUATION ADULT - LEVEL OF INDEPENDENCE: SCOOT/BRIDGE, REHAB EVAL
Bed mobility limited due to confusion.  Of note pt is legally blind.  Will continue to monitor patients functional abilities./moderate assist (50% patients effort)

## 2024-05-02 NOTE — PROGRESS NOTE ADULT - PROBLEM SELECTOR PLAN 4
History of HFpEF on Lasix 40mg QD, Eplerenone 50mg QD    Plan:  - Hold diureses for now as laboratory work up suggests dehydration

## 2024-05-02 NOTE — H&P ADULT - NSHPPHYSICALEXAM_GEN_ALL_CORE
Vital Signs Last 24 Hrs  T(C): 36.6 (02 May 2024 06:26), Max: 37.1 (01 May 2024 14:45)  T(F): 97.8 (02 May 2024 06:26), Max: 98.8 (01 May 2024 14:45)  HR: 76 (02 May 2024 06:26) (73 - 91)  BP: 127/73 (02 May 2024 06:26) (105/78 - 151/87)  BP(mean): --  RR: 17 (02 May 2024 06:26) (17 - 18)  SpO2: 99% (02 May 2024 06:26) (94% - 99%)    Parameters below as of 02 May 2024 06:26  Patient On (Oxygen Delivery Method): room air        PHYSICAL EXAM:  GENERAL: No Acute Distress  EYES: conjunctiva and sclera clear  ENMT: Moist mucous membranes   NECK: Supple  PULMONARY: Clear to auscultation bilaterally  CARDIAC: Regular rate and rhythm; No murmurs, rubs, or gallops  GASTROINTESTINAL: Abdomen soft, Nontender, Nondistended; Bowel sounds normal  EXTREMITIES:   No clubbing, cyanosis, or pedal edema  PSYCH: Normal Affect, Normal Behavior  NEUROLOGY:   - Mental status A&O x 2  SKIN: ecchymosis of UE's.  Small petechiae of abdomen  MUSCULOSKELETAL: No joint swelling

## 2024-05-02 NOTE — CONSULT NOTE ADULT - NS ATTEND AMEND GEN_ALL_CORE FT
Patient seen and examined. Agree with plan as detailed in PA/NP Note.     Cheri Espinoza MD  Pager: [phone]

## 2024-05-02 NOTE — PROVIDER CONTACT NOTE (OTHER) - BACKGROUND
Pt admitted for AMS /weakness and found to have low potassium and calcium
Patient admitted for AMS /weakness and found to have low potassium and calcium

## 2024-05-03 ENCOUNTER — TRANSCRIPTION ENCOUNTER (OUTPATIENT)
Age: 89
End: 2024-05-03

## 2024-05-03 VITALS
TEMPERATURE: 98 F | RESPIRATION RATE: 17 BRPM | HEART RATE: 100 BPM | OXYGEN SATURATION: 98 % | SYSTOLIC BLOOD PRESSURE: 145 MMHG | DIASTOLIC BLOOD PRESSURE: 85 MMHG

## 2024-05-03 LAB
ALBUMIN SERPL ELPH-MCNC: 3.2 G/DL — LOW (ref 3.3–5)
ALP SERPL-CCNC: 86 U/L — SIGNIFICANT CHANGE UP (ref 40–120)
ALT FLD-CCNC: 76 U/L — HIGH (ref 4–33)
ANION GAP SERPL CALC-SCNC: 14 MMOL/L — SIGNIFICANT CHANGE UP (ref 7–14)
AST SERPL-CCNC: 28 U/L — SIGNIFICANT CHANGE UP (ref 4–32)
BASE EXCESS BLDV CALC-SCNC: 5.4 MMOL/L — HIGH (ref -2–3)
BASOPHILS # BLD AUTO: 0.06 K/UL — SIGNIFICANT CHANGE UP (ref 0–0.2)
BASOPHILS NFR BLD AUTO: 0.5 % — SIGNIFICANT CHANGE UP (ref 0–2)
BILIRUB SERPL-MCNC: 0.7 MG/DL — SIGNIFICANT CHANGE UP (ref 0.2–1.2)
BLOOD GAS VENOUS COMPREHENSIVE RESULT: SIGNIFICANT CHANGE UP
BUN SERPL-MCNC: 35 MG/DL — HIGH (ref 7–23)
CALCIUM SERPL-MCNC: 7.4 MG/DL — LOW (ref 8.4–10.5)
CHLORIDE BLDV-SCNC: 104 MMOL/L — SIGNIFICANT CHANGE UP (ref 96–108)
CHLORIDE SERPL-SCNC: 107 MMOL/L — SIGNIFICANT CHANGE UP (ref 98–107)
CMV DNA CSF QL NAA+PROBE: SIGNIFICANT CHANGE UP IU/ML
CMV DNA SPEC NAA+PROBE-LOG#: SIGNIFICANT CHANGE UP LOG10IU/ML
CO2 BLDV-SCNC: 31.2 MMOL/L — HIGH (ref 22–26)
CO2 SERPL-SCNC: 22 MMOL/L — SIGNIFICANT CHANGE UP (ref 22–31)
CREAT SERPL-MCNC: 0.94 MG/DL — SIGNIFICANT CHANGE UP (ref 0.5–1.3)
EBV DNA SERPL NAA+PROBE-ACNC: SIGNIFICANT CHANGE UP IU/ML
EBVPCR LOG: SIGNIFICANT CHANGE UP LOG10IU/ML
EGFR: 58 ML/MIN/1.73M2 — LOW
EOSINOPHIL # BLD AUTO: 0.05 K/UL — SIGNIFICANT CHANGE UP (ref 0–0.5)
EOSINOPHIL NFR BLD AUTO: 0.4 % — SIGNIFICANT CHANGE UP (ref 0–6)
GAS PNL BLDV: 137 MMOL/L — SIGNIFICANT CHANGE UP (ref 136–145)
GLUCOSE BLDV-MCNC: 106 MG/DL — HIGH (ref 70–99)
GLUCOSE SERPL-MCNC: 105 MG/DL — HIGH (ref 70–99)
HAPTOGLOB SERPL-MCNC: 112 MG/DL — SIGNIFICANT CHANGE UP (ref 34–200)
HAV IGM SER-ACNC: SIGNIFICANT CHANGE UP
HBV CORE IGM SER-ACNC: SIGNIFICANT CHANGE UP
HBV SURFACE AG SER-ACNC: SIGNIFICANT CHANGE UP
HCO3 BLDV-SCNC: 30 MMOL/L — HIGH (ref 22–29)
HCT VFR BLD CALC: 49.3 % — HIGH (ref 34.5–45)
HCT VFR BLDA CALC: 45 % — SIGNIFICANT CHANGE UP (ref 34.5–46.5)
HCV AB S/CO SERPL IA: 0.05 S/CO — SIGNIFICANT CHANGE UP (ref 0–0.99)
HCV AB SERPL-IMP: SIGNIFICANT CHANGE UP
HGB BLD CALC-MCNC: 15.1 G/DL — SIGNIFICANT CHANGE UP (ref 11.7–16.1)
HGB BLD-MCNC: 15.5 G/DL — SIGNIFICANT CHANGE UP (ref 11.5–15.5)
HIV 1+2 AB+HIV1 P24 AG SERPL QL IA: SIGNIFICANT CHANGE UP
IANC: 10.93 K/UL — HIGH (ref 1.8–7.4)
IMM GRANULOCYTES NFR BLD AUTO: 2.7 % — HIGH (ref 0–0.9)
LACTATE BLDV-MCNC: 2.5 MMOL/L — HIGH (ref 0.5–2)
LDH SERPL L TO P-CCNC: 474 U/L — HIGH (ref 135–225)
LYMPHOCYTES # BLD AUTO: 1.07 K/UL — SIGNIFICANT CHANGE UP (ref 1–3.3)
LYMPHOCYTES # BLD AUTO: 8.1 % — LOW (ref 13–44)
MAGNESIUM SERPL-MCNC: 2.7 MG/DL — HIGH (ref 1.6–2.6)
MCHC RBC-ENTMCNC: 28.7 PG — SIGNIFICANT CHANGE UP (ref 27–34)
MCHC RBC-ENTMCNC: 31.4 GM/DL — LOW (ref 32–36)
MCV RBC AUTO: 91.3 FL — SIGNIFICANT CHANGE UP (ref 80–100)
MONOCYTES # BLD AUTO: 0.8 K/UL — SIGNIFICANT CHANGE UP (ref 0–0.9)
MONOCYTES NFR BLD AUTO: 6 % — SIGNIFICANT CHANGE UP (ref 2–14)
NEUTROPHILS # BLD AUTO: 10.93 K/UL — HIGH (ref 1.8–7.4)
NEUTROPHILS NFR BLD AUTO: 82.3 % — HIGH (ref 43–77)
NRBC # BLD: 0 /100 WBCS — SIGNIFICANT CHANGE UP (ref 0–0)
NRBC # FLD: 0 K/UL — SIGNIFICANT CHANGE UP (ref 0–0)
PCO2 BLDV: 42 MMHG — SIGNIFICANT CHANGE UP (ref 39–52)
PH BLDV: 7.46 — HIGH (ref 7.32–7.43)
PHOSPHATE SERPL-MCNC: 3.2 MG/DL — SIGNIFICANT CHANGE UP (ref 2.5–4.5)
PLATELET # BLD AUTO: 71 K/UL — LOW (ref 150–400)
PO2 BLDV: 58 MMHG — HIGH (ref 25–45)
POTASSIUM BLDV-SCNC: 4.2 MMOL/L — SIGNIFICANT CHANGE UP (ref 3.5–5.1)
POTASSIUM SERPL-MCNC: 4.6 MMOL/L — SIGNIFICANT CHANGE UP (ref 3.5–5.3)
POTASSIUM SERPL-SCNC: 4.6 MMOL/L — SIGNIFICANT CHANGE UP (ref 3.5–5.3)
PROT SERPL-MCNC: 4.9 G/DL — LOW (ref 6–8.3)
RBC # BLD: 5.4 M/UL — HIGH (ref 3.8–5.2)
RBC # BLD: 5.4 M/UL — HIGH (ref 3.8–5.2)
RBC # FLD: 19.9 % — HIGH (ref 10.3–14.5)
RETICS #: 156.6 K/UL — HIGH (ref 25–125)
RETICS/RBC NFR: 2.9 % — HIGH (ref 0.5–2.5)
SAO2 % BLDV: 87.8 % — SIGNIFICANT CHANGE UP (ref 67–88)
SODIUM SERPL-SCNC: 143 MMOL/L — SIGNIFICANT CHANGE UP (ref 135–145)
WBC # BLD: 13.27 K/UL — HIGH (ref 3.8–10.5)
WBC # FLD AUTO: 13.27 K/UL — HIGH (ref 3.8–10.5)

## 2024-05-03 PROCEDURE — 99239 HOSP IP/OBS DSCHRG MGMT >30: CPT

## 2024-05-03 RX ORDER — LOSARTAN POTASSIUM 100 MG/1
1 TABLET, FILM COATED ORAL
Qty: 0 | Refills: 0 | DISCHARGE

## 2024-05-03 RX ORDER — EPLERENONE 50 MG/1
50 TABLET, FILM COATED ORAL
Qty: 0 | Refills: 0 | DISCHARGE
Start: 2024-05-03

## 2024-05-03 RX ORDER — EPLERENONE 50 MG/1
1 TABLET, FILM COATED ORAL
Refills: 0 | DISCHARGE

## 2024-05-03 RX ORDER — FUROSEMIDE 40 MG
1 TABLET ORAL
Refills: 0 | DISCHARGE

## 2024-05-03 RX ORDER — DIPHENHYDRAMINE HCL 50 MG
1 CAPSULE ORAL
Refills: 0 | DISCHARGE

## 2024-05-03 RX ADMIN — Medication 25 MICROGRAM(S): at 05:31

## 2024-05-03 RX ADMIN — Medication 100 MILLIGRAM(S): at 05:31

## 2024-05-03 RX ADMIN — APIXABAN 5 MILLIGRAM(S): 2.5 TABLET, FILM COATED ORAL at 05:31

## 2024-05-03 RX ADMIN — Medication 15 MILLIGRAM(S): at 05:31

## 2024-05-03 RX ADMIN — Medication 25 MILLIGRAM(S): at 05:31

## 2024-05-03 NOTE — PROGRESS NOTE ADULT - SUBJECTIVE AND OBJECTIVE BOX
Patient is a 89y old  Female who presents with a chief complaint of AMS (02 May 2024 15:31)      ======Overnight/Subjective======  Overnight    Subjective    Brief daily plan    ======Medications======  MEDICATIONS  (STANDING):  apixaban 5 milliGRAM(s) Oral every 12 hours  atorvastatin 10 milliGRAM(s) Oral at bedtime  busPIRone 15 milliGRAM(s) Oral two times a day  chlorhexidine 2% Cloths 1 Application(s) Topical daily  levothyroxine 25 MICROGram(s) Oral daily  metoprolol tartrate 100 milliGRAM(s) Oral two times a day    MEDICATIONS  (PRN):      ======Vital Signs======  T(C): 36.4 (24 @ 05:29), Max: 36.9 (24 @ 08:48)  T(F): 97.6 (24 @ 05:29), Max: 98.5 (24 @ 08:48)  HR: 82 (24 @ 05:29) (65 - 93)  BP: 151/81 (24 @ 05:29) (121/84 - 151/81)  BP(mean): 82 (24 @ 21:26) (82 - 106)  RR: 17 (24 @ 05:29) (17 - 18)  SpO2: 99% (24 @ 05:29) (96% - 100%)    ======Physical exams======  GENERAL: NAD  Cardiovascular: RRR, S1 S2, no m/r/g, no JVD  LUNGS: Unlabored respiration, CTABL  ABDOMEN: Soft, NTND  EXTREMITIES: Warm extremities, no edema, peripheral pulses 2+ bilaterally  NEURO: AAOx3, PERRLA    ======Labs======                15.5  13.27<H> >----------< --  (MCV: 91.3)                49.3<H>   144 | 105 | 32<H>  -----------------------< 110<H>  4.8 | 26 | 1.09    TPro: 5.1<L> / Alb: 3.5 / TBili: 1.1 / DBili: -- / AlkPhos: 66 / ALT: 39<H> / AST: 28 (24 @ 15:30)  Ca: 7.7<L> / Phos: 3.3 / M.90<H> (24 @ 15:30)    Gas: 7.46<H> / 42 / 58<H> / 30<H> / 87.8% / 5.4<H> (24 @ 06:53)  PT/INR - ( 02 May 2024 05:07 )   PT: 13.9 sec;   INR: 1.24 ratio         PTT - ( 02 May 2024 05:07 )  PTT:30.4 sec    ======Microbiology======  Urinalysis Basic - ( 02 May 2024 15:30 )    Color: x / Appearance: x / SG: x / pH: x  Gluc: 110 mg/dL / Ketone: x  / Bili: x / Urobili: x   Blood: x / Protein: x / Nitrite: x   Leuk Esterase: x / RBC: x / WBC x   Sq Epi: x / Non Sq Epi: x / Bacteria: x        Culture - Urine (collected 01 May 2024 10:45)  Source: Clean Catch Clean Catch (Midstream)  Final Report (02 May 2024 17:42):    <10,000 CFU/mL Normal Urogenital Jesusita        ======I&O's======  I&O's Summary    02 May 2024 07:01  -  03 May 2024 07:00  --------------------------------------------------------  IN: 1100 mL / OUT: 1500 mL / NET: -400 mL         Patient is a 89y old  Female who presents with a chief complaint of AMS (02 May 2024 15:31)      ======Overnight/Subjective======  Overnight  - No acute event overnight    Subjective  - AAOx2-3, no acute complaints    Brief daily plan  - Pending PT dispo    ======Medications======  MEDICATIONS  (STANDING):  apixaban 5 milliGRAM(s) Oral every 12 hours  atorvastatin 10 milliGRAM(s) Oral at bedtime  busPIRone 15 milliGRAM(s) Oral two times a day  chlorhexidine 2% Cloths 1 Application(s) Topical daily  levothyroxine 25 MICROGram(s) Oral daily  metoprolol tartrate 100 milliGRAM(s) Oral two times a day    MEDICATIONS  (PRN):      ======Vital Signs======  T(C): 36.4 (24 @ 05:29), Max: 36.9 (24 @ 08:48)  T(F): 97.6 (24 @ 05:29), Max: 98.5 (24 @ 08:48)  HR: 82 (24 @ 05:29) (65 - 93)  BP: 151/81 (24 @ 05:29) (121/84 - 151/81)  BP(mean): 82 (24 @ 21:26) (82 - 106)  RR: 17 (24 @ 05:29) (17 - 18)  SpO2: 99% (24 @ 05:29) (96% - 100%)    ======Physical exams======  GENERAL: NAD  Cardiovascular: RRR, S1 S2, no m/r/g, no JVD  LUNGS: Unlabored respiration, CTABL  ABDOMEN: Soft, ND, slight TTP is all quardrants  EXTREMITIES: Warm extremities, no edema, peripheral pulses 2+ bilaterally  NEURO: AAOx2-3, PERRLA    ======Labs======                15.5  13.27<H> >----------< --  (MCV: 91.3)                49.3<H>   144 | 105 | 32<H>  -----------------------< 110<H>  4.8 | 26 | 1.09    TPro: 5.1<L> / Alb: 3.5 / TBili: 1.1 / DBili: -- / AlkPhos: 66 / ALT: 39<H> / AST: 28 (24 @ 15:30)  Ca: 7.7<L> / Phos: 3.3 / M.90<H> (24 @ 15:30)    Gas: 7.46<H> / 42 / 58<H> / 30<H> / 87.8% / 5.4<H> (24 @ 06:53)  PT/INR - ( 02 May 2024 05:07 )   PT: 13.9 sec;   INR: 1.24 ratio         PTT - ( 02 May 2024 05:07 )  PTT:30.4 sec    ======Microbiology======  Urinalysis Basic - ( 02 May 2024 15:30 )    Color: x / Appearance: x / SG: x / pH: x  Gluc: 110 mg/dL / Ketone: x  / Bili: x / Urobili: x   Blood: x / Protein: x / Nitrite: x   Leuk Esterase: x / RBC: x / WBC x   Sq Epi: x / Non Sq Epi: x / Bacteria: x        Culture - Urine (collected 01 May 2024 10:45)  Source: Clean Catch Clean Catch (Midstream)  Final Report (02 May 2024 17:42):    <10,000 CFU/mL Normal Urogenital Jesusita        ======I&O's======  I&O's Summary    02 May 2024 07:01  -  03 May 2024 07:00  --------------------------------------------------------  IN: 1100 mL / OUT: 1500 mL / NET: -400 mL

## 2024-05-03 NOTE — PROGRESS NOTE ADULT - PROBLEM SELECTOR PLAN 4
History of HFpEF on Lasix 40mg QD, Eplerenone 50mg QD    Plan:  - Hold diureses for now as laboratory work up suggests dehydration History of HFpEF on Lasix 40mg QD, Eplerenone 50mg QD    Plan:  - Hold diureses for now

## 2024-05-03 NOTE — PROGRESS NOTE ADULT - ATTENDING COMMENTS
89 year old woman w/ PMHx of AF s/p PMM (2009), HTN, HLD, CVA, HFpEF, YOVANI, NHL s/p chemo in 2022 currently in remission, asthma, recent ED visit 2 weeks ago for perianal abscess drainage admitted w/ encephalopathy (change from baseline per family) and thrombocytopenia. S/p CTH w/ noted chronic infarcts otherwise no acute findings.     CTH w/o acute findings  Imaging showing interval resolution of fluid collection in the right paramedian gluteal   subcutaneous fat and no recurrent collection or inflammatory changes. Also, No appreciable LN noted on the CT A/P from 5/1 and no significant interval change to left adrenal mass concerning for neoplasm. CXR, UA and RVP.     -monitor mental status  -Pt did not meet SIRS criteria on admission and no localizing source of infection clinically at this time. F/u urine cx. If spikes fever would obtain blood cx and consider starting abx. Trend WBC (leukocytosis improving). Can also rule out other potential infectious etiologies such as CMV, HIV, EBV, and acute hepatitis panel  -For thrombocytopenia no evidence of active bleeding. Lower suspicion for hemolytic/consumptive processes given no anemia and bili wnl and ddimer wnl. No evidence of sequestration. Would obtain smear. Trend plt levels. Would hold a/c if plts continue to decrease. Heme onc consulted. Will f/u final recs.        Dispo- pending further medical optimization
89 year old woman w/ PMHx of AF s/p PMM (2009), HTN, HLD, CVA, HFpEF, YOVANI, NHL s/p chemo in 2022 currently in remission, asthma, recent ED visit 2 weeks ago for perianal abscess drainage admitted w/ encephalopathy (change from baseline per family) and thrombocytopenia. S/p CTH w/ noted chronic infarcts otherwise no acute findings.     CTH w/o acute findings  Imaging showing interval resolution of fluid collection in the right paramedian gluteal   subcutaneous fat and no recurrent collection or inflammatory changes. Also, No appreciable LN noted on the CT A/P from 5/1 and no significant interval change to left adrenal mass concerning for neoplasm. CXR, UA and RVP.     -monitor mental status--> Improved compared to admission .   -Pt did not meet SIRS criteria on admission and no localizing source of infection clinically at this time. Urine cx neg. If spikes fever would obtain blood cx and consider starting abx. Trend WBC (leukocytosis continues to improve). W/u labs for underlying infectious etiologies thus far negative.   -For thrombocytopenia no evidence of active bleeding. Levels today mildly decrease but overal stable. Lower suspicion for hemolytic/consumptive processes given no anemia and bili wnl and ddimer wnl. No evidence of sequestration. Trend plt levels. Heme consulted and recs appreciated.   -Can resume a/c for afib given plts stable and pt w/o signs or symptoms of bleeding     Dispo- PT rec rehab however daughter wants home PT. Will f/u SW/CM. Pt will need outpt follow up for further eval of her thrombocytopenia.

## 2024-05-03 NOTE — PROGRESS NOTE ADULT - ASSESSMENT
89 year old woman w/ PMHx of AF s/p PMM (2009), HTN, HLD, CVA, HFpEF, YOVANI, NHL s/p chemo in 2022 currently in remission, asthma, recent ED visit 2 weeks ago for perianal abscess drainage. She now presents with 1-1.5 weeks of altered mental status.
89 year old woman w/ PMHx of AF s/p PMM (2009), HTN, HLD, CVA, HFpEF, YOVANI, NHL s/p chemo in 2022 currently in remission, asthma, recent ED visit 2 weeks ago for perianal abscess drainage. She now presents with 1-1.5 weeks of altered mental status.

## 2024-05-03 NOTE — DISCHARGE NOTE NURSING/CASE MANAGEMENT/SOCIAL WORK - NSDCPNINST_GEN_ALL_CORE
Patient alert and oriented x 2 with some confusion and forgetfulness. Skin with some ecchymotic areas.

## 2024-05-03 NOTE — DISCHARGE NOTE NURSING/CASE MANAGEMENT/SOCIAL WORK - NSDCFUADDAPPT_GEN_ALL_CORE_FT
APPTS ARE READY TO BE MADE: [x] YES    Best Family or Patient Contact (if needed): Daughter    Additional Information about above appointments (if needed):    1: PCP Dr. Parra within 1-2 weeks of discharge    Other comments or requests:

## 2024-05-03 NOTE — DISCHARGE NOTE PROVIDER - PROVIDER TOKENS
PROVIDER:[TOKEN:[[id number]],FOLLOWUP:[1 week],ESTABLISHEDPATIENT:[T]] PROVIDER:[TOKEN:[[id number]],SCHEDULEDAPPT:[05/07/2024],SCHEDULEDAPPTTIME:[11:15 AM],ESTABLISHEDPATIENT:[T]]

## 2024-05-03 NOTE — PROGRESS NOTE ADULT - PROBLEM SELECTOR PLAN 5
pAF s/p PMM on Eliquis and Lopressor    Plan:  - c/w Lopressor 100 BID  - c/w Eliquis
pAF s/p PMM on Eliquis and Lopressor    Plan:  - c/w Lopressor 100 BID  - c/w Eliquis

## 2024-05-03 NOTE — PROGRESS NOTE ADULT - PROBLEM SELECTOR PLAN 1
Presenting with 1-1.5 weeks of AMS, currently AAOx2    Plan:  >UA (5/2): negative, RVP (5/2): negative  >CTH, CTA/P (5/2): No acute actionable findings  - Unclear etiology: dehydration vs. infection vs. polypharmacy  - f/u Ucx, will monitor off antibiotics  - Reversible dementia w/u: B9 (pending), B12 (pending), TSH (wnl)  - IVF PRN  - Hold anticholinergic/opioid/benzo Presenting with 1-1.5 weeks of AMS, currently AAOx2    Plan:  >UA (5/2): negative, RVP (5/2): negative  >Ucx (5/2): <10k normal [facility]  >CTH, CTA/P (5/2): No acute actionable findings  - Unclear etiology: dehydration vs. polypharmacy vs. less likely infection  - Reversible dementia w/u: B9 (wnl) B12 (wnl), TSH (wnl)  - Hold anticholinergic/opioid/benzo

## 2024-05-03 NOTE — DISCHARGE NOTE PROVIDER - NSDCFUADDAPPT_GEN_ALL_CORE_FT
APPTS ARE READY TO BE MADE: [x] YES    Best Family or Patient Contact (if needed): Daughter    Additional Information about above appointments (if needed):    1: PCP Dr. Parra within 1-2 weeks of discharge    Other comments or requests:    APPTS ARE READY TO BE MADE: [x] YES    Best Family or Patient Contact (if needed): Daughter    Additional Information about above appointments (if needed):    1: PCP Dr. Parra within 1-2 weeks of discharge    Other comments or requests:     Patient secured an appointment on their own with the PCP.

## 2024-05-03 NOTE — PROGRESS NOTE ADULT - PROBLEM SELECTOR PLAN 11
Diet: Soft  DVT ppx: Eliquis  Disposition: Pending PT  Code: Full code Diet: Soft  DVT ppx: Eliquis  Disposition: Home PT  Code: Full code

## 2024-05-03 NOTE — DISCHARGE NOTE PROVIDER - CARE PROVIDER_API CALL
Arabella Parra E  Internal Medicine  [address] [place] [address]  Phone: [phone]  Fax: [phone]  Established Patient  Follow Up Time: 1 week   Arabella Parra E  Internal Medicine  [address] [place] [address]  Phone: [phone]  Fax: [phone]  Established Patient  Scheduled Appointment: 05/07/2024 11:15 AM

## 2024-05-03 NOTE — PROGRESS NOTE ADULT - PROBLEM SELECTOR PLAN 9
- pt evaluated by Dr Mcdowell during last ED visit, however daughter opted to hold off on further workup at this time.
- pt evaluated by Dr Mcdowell during last ED visit, however daughter opted to hold off on further workup at this time.

## 2024-05-03 NOTE — DISCHARGE NOTE PROVIDER - NSDCCPTREATMENT_GEN_ALL_CORE_FT
PRINCIPAL PROCEDURE  Procedure: CT head  Findings and Treatment: IMPRESSION:  Stable exam. No acute intracranial hemorrhage, vasogenic edema or   hydrocephalus. Chronic right parieto-occipital lobe, corona radiata and   bilateral thalamic infarcts. Chronic microvascular changes.        SECONDARY PROCEDURE  Procedure: CT abdomen and pelvis  Findings and Treatment: IMPRESSION:  Interval resolution of fluid collection in the right paramedian gluteal   subcutaneous fat. No recurrent collection or inflammatory changes.  No significant interval change to left adrenal mass concerning for   neoplasm.

## 2024-05-03 NOTE — DISCHARGE NOTE NURSING/CASE MANAGEMENT/SOCIAL WORK - NSDCVIVACCINE_GEN_ALL_CORE_FT
Tdap; 23-Jan-2017 08:01; Ivelisse Carrera (RN); [employer]; [id number]; IntraMuscular; Deltoid Right.; 0.5 milliLiter(s); VIS (VIS Published: 09-May-2013, VIS Presented: 23-Jan-2017);   Tdap; 29-Apr-2019 12:26; Aga Pichardo (SCOTT); [employer]; [id number] (Exp. Date: 26-Feb-2021); IntraMuscular; Deltoid Right.; 0.5 milliLiter(s); VIS (VIS Published: 09-May-2013, VIS Presented: 29-Apr-2019);

## 2024-05-03 NOTE — PROGRESS NOTE ADULT - PROBLEM SELECTOR PLAN 2
Thrombocytopenia to 80s noted on admission lab, has been downtrending since 2023    Plan:  - Unclear etiology: BM from infection vs. ?MAHA vs. heme malignancy  - Heme recs appreciated  - Trend Plt, transfusion goal > 10 or > 20 if febrile or > 50 if bleeding or > 100 if ICH  - f/u hemolysis labs  - f/u infectious work up: HCV (pending), HIV (pending), HBV (pending), EBV (pending), CMV (pending) Thrombocytopenia to 80s noted on admission lab, has been downtrending since 2023    Plan:  - Unclear etiology: possibly primary BM pathology  - Hemolysis labs unrevealing  - Heme recs appreciated  - Trend Plt, transfusion goal > 10 or > 20 if febrile or > 50 if bleeding or > 100 if ICH  - f/u infectious work up: HCV (pending), HIV (-), HBV (pending), EBV (pending), CMV (-)

## 2024-05-03 NOTE — PROGRESS NOTE ADULT - SUBJECTIVE AND OBJECTIVE BOX
DATE OF SERVICE: 05-03-24    States she feels better, Patient denies chest pain or shortness of breath.   Review of symptoms otherwise negative.    MEDICATIONS:  apixaban 5 milliGRAM(s) Oral every 12 hours  atorvastatin 10 milliGRAM(s) Oral at bedtime  busPIRone 15 milliGRAM(s) Oral two times a day  chlorhexidine 2% Cloths 1 Application(s) Topical daily  levothyroxine 25 MICROGram(s) Oral daily  metoprolol tartrate 100 milliGRAM(s) Oral two times a day      LABS:                        15.5   13.27 )-----------( 71       ( 03 May 2024 06:53 )             49.3       Hemoglobin: 15.5 g/dL (05-03 @ 06:53)  Hemoglobin: 15.6 g/dL (05-02 @ 05:07)  Hemoglobin: 15.5 g/dL (05-01 @ 15:30)      05-03    143  |  107  |  35<H>  ----------------------------<  105<H>  4.6   |  22  |  0.94    Ca    7.4<L>      03 May 2024 06:53  Phos  3.2     05-03  Mg     2.70     05-03    TPro  4.9<L>  /  Alb  3.2<L>  /  TBili  0.7  /  DBili  x   /  AST  28  /  ALT  76<H>  /  AlkPhos  86  05-03    Creatinine Trend: 0.94<--, 1.09<--, 1.05<--, 0.64<--, 0.93<--, 1.00<--    COAGS:           PHYSICAL EXAM:  T(C): 36.4 (05-03-24 @ 05:29), Max: 36.8 (05-02-24 @ 15:15)  HR: 82 (05-03-24 @ 05:29) (65 - 93)  BP: 151/81 (05-03-24 @ 05:29) (121/84 - 151/81)  RR: 17 (05-03-24 @ 05:29) (17 - 18)  SpO2: 99% (05-03-24 @ 05:29) (96% - 100%)  Wt(kg): --    I&O's Summary    02 May 2024 07:01  -  03 May 2024 07:00  --------------------------------------------------------  IN: 1100 mL / OUT: 1500 mL / NET: -400 mL    General:  NAD  Head: Normocephalic and atraumatic.   Neck: No JVD. No bruits. Supple. Does not appear to be enlarged.   Cardiovascular: + S1,S2 ; RRR Soft systolic murmur at the left lower sternal border. No rubs noted.    Lungs: CTA b/l. No rhonchi, rales or wheezes.   Abdomen: + BS, soft. Non tender. Non distended. No rebound. No guarding.   Extremities: No clubbing/cyanosis/edema.   Neurologic: Moves all four extremities. Full range of motion.   Skin: Warm and moist.   Psychiatric: deferred  Musculoskeletal: deferred    ECG:  	Afib    < from: CT Head No Cont (05.01.24 @ 20:51) >  IMPRESSION:  Stable exam. No acute intracranial hemorrhage, vasogenic edema or   hydrocephalus. Chronic right parieto-occipital lobe, corona radiata and   bilateral thalamic infarcts. Chronic microvascular changes.    < end of copied text >    < from: CT Abdomen and Pelvis w/ IV Cont (05.01.24 @ 20:54) >    IMPRESSION:  Interval resolution of fluid collection in the right paramedian gluteal   subcutaneous fat. No recurrent collection or inflammatory changes.    No significant interval change to left adrenal mass concerning for   neoplasm.    < end of copied text >    < from: TTE W or WO Ultrasound Enhancing Agent (01.11.24 @ 11:10) >  CONCLUSIONS:      1. Left ventricular cavity is normal. Left ventricular systolic function is normal. There are no regional wall motion abnormalities seen.   2. Mild pulmonary hypertension.   3. Device lead is visualized in the right heart.   4. No prior echocardiogram is available for comparison.  < end of copied text >      ASSESSMENT/PLAN: 	    89 y/o F known to the office with PMH of CVA, YOVANI on CPAP, HTN, HLD, Persistent Afib on Eliquis, s/p MDT PPM, chronic diastolic HF, and follicular lymphoma s/p Rituxan presenting with AMS.    --CT Noted, no acute changes  --cont lifelong Ac with Eliquis for hx Afib given elevated [id number], Platelet count of 71 today  --BP stable, cont Metoprolol for HTN and rate control  --Losartan, Lasix and Epleremome held, noted with K 6.1 on VBG  --infectious work up in progress  --monitor platelet count    Cheri Espinoza MD  Pager: [phone]

## 2024-05-03 NOTE — DISCHARGE NOTE PROVIDER - HOSPITAL COURSE
HPI:  89 y/o F with pmh of AF on Eliquis, HTN, dCHF, follicular  lymphoma s/p Rituxan, and asthma p/w confusion.  Pt's daughter reports that pt has had intermittent confusion for the past 1-1.5 weeks.  Daughter states that pt is often "delirius" or "disoriented".  Daughter reports that pt asks nonsensical questions and at times is unaware of where she is.  Pt reports feeling chills, but no subjective fever, cough, dyspnea, dysuria, or n/v/d.  Daughter has noticed that pt has had swelling of both arms and legs with L>R.  She has also noticed that pt has had bruising of extremities and petechiae of abdomen.    Pt recently was in ED for drainage of perianal abscess, and completed a course of antibiotics. She had outpatient doppler of LUE last week which was negative for DVT.   Pt has also has adrenal mass discovered on recent imaging, but has not had the chance for further eval.      In the ED, pt was given empiric Zosyn (02 May 2024 04:02)    Hospital Course:  Patient was found to have thrombocytopenia to 70-80s on admission which appears to be new from her baseline. However no overt signs of bleeding were noted during her hospitalization and it was deemed outpatient work up is appropriate for her thrombocytopenia. Her remaining laboratory and infectious work up was otherwise unremarkable, the etiology of her AMS was not entire clear by time of discharge but was deemed possibly due to polypharmacy, progression of dementia or dehydration given initial complaints of diarrhea per patient (however not noted since admission). Patient was deemed medically stable for discharge and continue further work up with PCP, heme/onc, neurology.      Important Medication Changes and Reason:  -     Active or Pending Issues Requiring Follow-up:  - Please follow up with your PCP, hematologist/oncologist, neurologist within 1-2 weeks after discharge    Advanced Directives:   [ x ] Full code  [ ] DNR  [ ] Hospice    Discharge Diagnoses:   - AMS         HPI:  89 y/o F with pmh of AF on Eliquis, HTN, dCHF, follicular  lymphoma s/p Rituxan, and asthma p/w confusion.  Pt's daughter reports that pt has had intermittent confusion for the past 1-1.5 weeks.  Daughter states that pt is often "delirius" or "disoriented".  Daughter reports that pt asks nonsensical questions and at times is unaware of where she is.  Pt reports feeling chills, but no subjective fever, cough, dyspnea, dysuria, or n/v/d.  Daughter has noticed that pt has had swelling of both arms and legs with L>R.  She has also noticed that pt has had bruising of extremities and petechiae of abdomen.    Pt recently was in ED for drainage of perianal abscess, and completed a course of antibiotics. She had outpatient doppler of LUE last week which was negative for DVT.   Pt has also has adrenal mass discovered on recent imaging, but has not had the chance for further eval.      In the ED, pt was given empiric Zosyn (02 May 2024 04:02)    Hospital Course:  Patient was found to have thrombocytopenia to 70-80s on admission which appears to be new from her baseline. However no overt signs of bleeding were noted during her hospitalization and it was deemed outpatient work up is appropriate for her thrombocytopenia. Her remaining laboratory and infectious work up was otherwise unremarkable, the etiology of her AMS was not entire clear by time of discharge but was deemed possibly due to polypharmacy, progression of dementia or dehydration given initial complaints of diarrhea per patient (however not noted since admission). Patient was deemed medically stable for discharge and continue further work up with PCP, heme/onc, neurology.      Important Medication Changes and Reason:  - Stop taking Spironolactone   - Stop taking Eplerenone  - Stop taking pregabalin  - Continue to take the rest of your medications as before, including lasix    Active or Pending Issues Requiring Follow-up:  - Please follow up with your PCP, hematologist/oncologist, neurologist within 1-2 weeks after discharge    Advanced Directives:   [ x ] Full code  [ ] DNR  [ ] Hospice    Discharge Diagnoses:   - AMS         HPI:  89 y/o F with pmh of AF on Eliquis, HTN, dCHF, follicular  lymphoma s/p Rituxan, and asthma p/w confusion.  Pt's daughter reports that pt has had intermittent confusion for the past 1-1.5 weeks.  Daughter states that pt is often "delirius" or "disoriented".  Daughter reports that pt asks nonsensical questions and at times is unaware of where she is.  Pt reports feeling chills, but no subjective fever, cough, dyspnea, dysuria, or n/v/d.  Daughter has noticed that pt has had swelling of both arms and legs with L>R.  She has also noticed that pt has had bruising of extremities and petechiae of abdomen.    Pt recently was in ED for drainage of perianal abscess, and completed a course of antibiotics. She had outpatient doppler of LUE last week which was negative for DVT.   Pt has also has adrenal mass discovered on recent imaging, but has not had the chance for further eval.      In the ED, pt was given empiric Zosyn (02 May 2024 04:02)    Hospital Course:  Patient was found to have thrombocytopenia to 70-80s on admission which appears to be new from her baseline. However no overt signs of bleeding were noted during her hospitalization and it was deemed outpatient work up is appropriate for her thrombocytopenia. Her remaining laboratory and infectious work up was otherwise unremarkable, the etiology of her AMS was not entire clear by time of discharge but was deemed possibly due to polypharmacy, progression of dementia or dehydration given initial complaints of diarrhea per patient (however not noted since admission). Patient was deemed medically stable for discharge and continue further work up with PCP, heme/onc, neurology.      Important Medication Changes and Reason:  - Stop taking Losartan  - Stop taking Eplerenone  - Stop taking pregabalin  - Avoid taking Benadryl or over the counter anti-histamine medications  - Continue to take the rest of your medications as before, including Lasix    Active or Pending Issues Requiring Follow-up:  - Please follow up with your PCP, hematologist/oncologist, neurologist within 1-2 weeks after discharge    Advanced Directives:   [ x ] Full code  [ ] DNR  [ ] Hospice    Discharge Diagnoses:   - AMS         HPI:  89 y/o F with pmh of AF on Eliquis, HTN, dCHF, follicular  lymphoma s/p Rituxan, and asthma p/w confusion.  Pt's daughter reports that pt has had intermittent confusion for the past 1-1.5 weeks.  Daughter states that pt is often "delirius" or "disoriented".  Daughter reports that pt asks nonsensical questions and at times is unaware of where she is.  Pt reports feeling chills, but no subjective fever, cough, dyspnea, dysuria, or n/v/d.  Daughter has noticed that pt has had swelling of both arms and legs with L>R.  She has also noticed that pt has had bruising of extremities and petechiae of abdomen.    Pt recently was in ED for drainage of perianal abscess, and completed a course of antibiotics. She had outpatient doppler of LUE last week which was negative for DVT.   Pt has also has adrenal mass discovered on recent imaging, but has not had the chance for further eval.      In the ED, pt was given empiric Zosyn (02 May 2024 04:02)    Hospital Course:  Patient was found to have thrombocytopenia to 70-80s on admission which appears to be new from her baseline. However no overt signs of bleeding were noted during her hospitalization and it was deemed outpatient work up is appropriate for her thrombocytopenia. Her remaining laboratory and infectious work up was otherwise unremarkable, the etiology of her AMS was not entire clear by time of discharge but was deemed possibly due to polypharmacy, progression of dementia or dehydration given initial complaints of diarrhea per patient (however not noted since admission). Patient was deemed medically stable for discharge and continue further work up with PCP, heme/onc, neurology.      Important Medication Changes and Reason:  - Stop taking Losartan  - Stop taking Eplerenone  - Stop taking pregabalin  - Stop taking Lasix  - Avoid taking Benadryl or over the counter anti-histamine medications  - Continue to take the rest of your medications as before  - If you notice any swelling, or shortness of breath, take a dose of Lasix and let your PCP know ASAP    Active or Pending Issues Requiring Follow-up:  - Please follow up with your PCP, hematologist/oncologist, neurologist within 1-2 weeks after discharge    Advanced Directives:   [ x ] Full code  [ ] DNR  [ ] Hospice    Discharge Diagnoses:   - AMS         HPI:  89 y/o F with pmh of AF on Eliquis, HTN, dCHF, follicular  lymphoma s/p Rituxan, and asthma p/w confusion.  Pt's daughter reports that pt has had intermittent confusion for the past 1-1.5 weeks.  Daughter states that pt is often "delirius" or "disoriented".  Daughter reports that pt asks nonsensical questions and at times is unaware of where she is.  Pt reports feeling chills, but no subjective fever, cough, dyspnea, dysuria, or n/v/d.  Daughter has noticed that pt has had swelling of both arms and legs with L>R.  She has also noticed that pt has had bruising of extremities and petechiae of abdomen.    Pt recently was in ED for drainage of perianal abscess, and completed a course of antibiotics. She had outpatient doppler of LUE last week which was negative for DVT.   Pt has also has adrenal mass discovered on recent imaging, but has not had the chance for further eval.      In the ED, pt was given empiric Zosyn (02 May 2024 04:02)    Hospital Course:  Patient was found to have thrombocytopenia to 70-80s on admission which appears to be new from her baseline. However no overt signs of bleeding were noted during her hospitalization and it was deemed outpatient work up is appropriate for her thrombocytopenia. Her remaining laboratory and infectious work up was otherwise unremarkable, the etiology of her AMS was not entire clear by time of discharge but was deemed possibly due to polypharmacy, progression of dementia or dehydration given initial complaints of diarrhea per patient (however not noted since admission). Patient was deemed medically stable for discharge and continue further work up with PCP, heme/onc, neurology.      Important Medication Changes and Reason:.  - Stop taking Losartan  - Stop taking Eplerenone  - Stop taking pregabalin  - Stop taking Lasix  - Avoid taking Benadryl or over the counter anti-histamine medications  - Continue to take the rest of your medications as before  - If you notice any swelling, or shortness of breath, take a dose of Lasix and let your PCP know ASAP    Active or Pending Issues Requiring Follow-up:  - Please follow up with your PCP, hematologist/oncologist, neurologist within 1-2 weeks after discharge    Advanced Directives:   [ x ] Full code  [ ] DNR  [ ] Hospice    Discharge Diagnoses:   - AMS

## 2024-05-03 NOTE — DISCHARGE NOTE PROVIDER - NSDCMRMEDTOKEN_GEN_ALL_CORE_FT
Albuterol (Eqv-Proventil HFA) 90 mcg/inh inhalation aerosol: 2 puff(s) inhaled every 6 hours as needed for  shortness of breath and/or wheezing  Breo Ellipta 200 mcg-25 mcg/inh inhalation powder: 1 puff(s) inhaled once a day  busPIRone 15 mg oral tablet: 1 tab(s) orally 2 times a day  diphenhydrAMINE 25 mg oral capsule: 1 cap(s) orally once a day (at bedtime) as needed for  insomnia  Eliquis 5 mg oral tablet: 1 tab(s) orally 2 times a day  eplerenone 50 mg oral tablet: 1 tab(s) orally once a day  Estrace Vaginal 0.1 mg/g vaginal cream: 0.5 gram(s) intravaginally 2 times a week  famotidine 10 mg oral tablet: 1 tab(s) orally once a day (at bedtime) as needed for  heartburn  furosemide 40 mg oral tablet: 1 tab(s) orally once a day  levalbuterol 0.63 mg/3 mL inhalation solution: 3 milliliter(s) by nebulizer every 6 hours as needed for  shortness of breath and/or wheezing  levothyroxine 25 mcg (0.025 mg) oral tablet: 1 tab(s) orally once a day  losartan 25 mg oral tablet: 1 tab(s) orally once a day  lovastatin 20 mg oral tablet: 1 tab(s) orally once a day  magnesium 200m tab(s) orally 3 to 4 times per week  metoprolol tartrate 100 mg oral tablet: 1 tab(s) orally 2 times a day  pregabalin 25 mg oral capsule: 1 cap(s) orally 2 times a day  Prolia 60 mg/mL subcutaneous solution: 60 milligram(s) subcutaneously every 6 months   Albuterol (Eqv-Proventil HFA) 90 mcg/inh inhalation aerosol: 2 puff(s) inhaled every 6 hours as needed for  shortness of breath and/or wheezing  Breo Ellipta 200 mcg-25 mcg/inh inhalation powder: 1 puff(s) inhaled once a day  busPIRone 15 mg oral tablet: 1 tab(s) orally 2 times a day  Eliquis 5 mg oral tablet: 1 tab(s) orally 2 times a day  eplerenone 50 mg oral tablet: 1 tab(s) orally once a day  Estrace Vaginal 0.1 mg/g vaginal cream: 0.5 gram(s) intravaginally 2 times a week  famotidine 10 mg oral tablet: 1 tab(s) orally once a day (at bedtime) as needed for  heartburn  furosemide 40 mg oral tablet: 1 tab(s) orally once a day  levothyroxine 25 mcg (0.025 mg) oral tablet: 1 tab(s) orally once a day  lovastatin 20 mg oral tablet: 1 tab(s) orally once a day  metoprolol tartrate 100 mg oral tablet: 1 tab(s) orally 2 times a day  Prolia 60 mg/mL subcutaneous solution: 60 milligram(s) subcutaneously every 6 months   Albuterol (Eqv-Proventil HFA) 90 mcg/inh inhalation aerosol: 2 puff(s) inhaled every 6 hours as needed for  shortness of breath and/or wheezing  Breo Ellipta 200 mcg-25 mcg/inh inhalation powder: 1 puff(s) inhaled once a day  busPIRone 15 mg oral tablet: 1 tab(s) orally 2 times a day  Eliquis 5 mg oral tablet: 1 tab(s) orally 2 times a day  Estrace Vaginal 0.1 mg/g vaginal cream: 0.5 gram(s) intravaginally 2 times a week  famotidine 10 mg oral tablet: 1 tab(s) orally once a day (at bedtime) as needed for  heartburn  furosemide 40 mg oral tablet: 1 tab(s) orally once a day  levothyroxine 25 mcg (0.025 mg) oral tablet: 1 tab(s) orally once a day  lovastatin 20 mg oral tablet: 1 tab(s) orally once a day  metoprolol tartrate 100 mg oral tablet: 1 tab(s) orally 2 times a day  Prolia 60 mg/mL subcutaneous solution: 60 milligram(s) subcutaneously every 6 months   Albuterol (Eqv-Proventil HFA) 90 mcg/inh inhalation aerosol: 2 puff(s) inhaled every 6 hours as needed for  shortness of breath and/or wheezing  Breo Ellipta 200 mcg-25 mcg/inh inhalation powder: 1 puff(s) inhaled once a day  busPIRone 15 mg oral tablet: 1 tab(s) orally 2 times a day  Eliquis 5 mg oral tablet: 1 tab(s) orally 2 times a day  Estrace Vaginal 0.1 mg/g vaginal cream: 0.5 gram(s) intravaginally 2 times a week  famotidine 10 mg oral tablet: 1 tab(s) orally once a day (at bedtime) as needed for  heartburn  levothyroxine 25 mcg (0.025 mg) oral tablet: 1 tab(s) orally once a day  lovastatin 20 mg oral tablet: 1 tab(s) orally once a day  metoprolol tartrate 100 mg oral tablet: 1 tab(s) orally 2 times a day  Prolia 60 mg/mL subcutaneous solution: 60 milligram(s) subcutaneously every 6 months   Albuterol (Eqv-Proventil HFA) 90 mcg/inh inhalation aerosol: 2 puff(s) inhaled every 6 hours as needed for  shortness of breath and/or wheezing  Breo Ellipta 200 mcg-25 mcg/inh inhalation powder: 1 puff(s) inhaled once a day  busPIRone 15 mg oral tablet: 1 tab(s) orally 2 times a day  Eliquis 5 mg oral tablet: 1 tab(s) orally 2 times a day  Estrace Vaginal 0.1 mg/g vaginal cream: 0.5 gram(s) intravaginally 2 times a week  famotidine 10 mg oral tablet: 1 tab(s) orally once a day (at bedtime) as needed for  heartburn  levothyroxine 25 mcg (0.025 mg) oral tablet: 1 tab(s) orally once a day  lovastatin 20 mg oral tablet: 1 tab(s) orally once a day  metoprolol tartrate 100 mg oral tablet: 1 tab(s) orally 2 times a day  Physical therapy: Outpatient physical therapy 3 times a week for deconditioned  Prolia 60 mg/mL subcutaneous solution: 60 milligram(s) subcutaneously every 6 months

## 2024-05-03 NOTE — DISCHARGE NOTE PROVIDER - NSDCCPCAREPLAN_GEN_ALL_CORE_FT
PRINCIPAL DISCHARGE DIAGNOSIS  Diagnosis: Altered mental state  Assessment and Plan of Treatment: You were seen and treated for altered mental status, which is when you become confused and disoriented. The exact etiology of your altered mental status is still unclear, but immediately dangerous causes that require acute intervention suche as infection have been ruled out. Upon discharge, please follow up with your PCP and neurologist to continue further work up. If you experience worsening symptoms of confusion, losing consciousness, severe headache, nausea, vomiting, facial droop, weakness, please let your doctors know and seek medical attention.

## 2024-05-03 NOTE — PROGRESS NOTE ADULT - PROBLEM SELECTOR PLAN 10
- s/p chemo 2022 currently in remission  - heme recs appreciated
- s/p chemo 2022 currently in remission  - heme recs appreciated

## 2024-05-03 NOTE — DISCHARGE NOTE NURSING/CASE MANAGEMENT/SOCIAL WORK - PATIENT PORTAL LINK FT
You can access the FollowMyHealth Patient Portal offered by [facility] by registering at the following website: [email or web address]. By joining [facility]’s FollowMyHealth portal, you will also be able to view your health information using other applications (apps) compatible with our system.

## 2024-05-03 NOTE — PROGRESS NOTE ADULT - PROBLEM SELECTOR PLAN 3
Slightly polycythemia to 15s on admission    Plan:  - Hemoconcentration vs. YOVANI  - Trend CBC
Slightly polycythemia to 15s on admission    Plan:  - Hemoconcentration vs. YOVANI  - Trend CBC

## 2024-05-03 NOTE — PROGRESS NOTE ADULT - PROBLEM SELECTOR PLAN 6
Home regimen: Lopressor 100mg BID, Losartan 25mg QD, Eplerenone 50 QD    Plan:  - c/w Lopressor 100mg BID  - Will restart other medications based on BPs and clinical course

## 2024-05-07 LAB — FIBRINOGEN AG PPP IA-MCNC: 258 MG/DL — SIGNIFICANT CHANGE UP (ref 233–496)

## 2024-05-20 ENCOUNTER — INPATIENT (INPATIENT)
Facility: HOSPITAL | Age: 89
LOS: 9 days | Discharge: NURSING, REHAB OR LONG-TERM CARE | DRG: 641 | End: 2024-05-30
Attending: INTERNAL MEDICINE | Admitting: INTERNAL MEDICINE
Payer: MEDICARE

## 2024-05-20 VITALS
HEART RATE: 76 BPM | TEMPERATURE: 97 F | HEIGHT: 63 IN | OXYGEN SATURATION: 96 % | DIASTOLIC BLOOD PRESSURE: 78 MMHG | WEIGHT: 169.98 LBS | SYSTOLIC BLOOD PRESSURE: 107 MMHG

## 2024-05-20 DIAGNOSIS — Z82.8 FAMILY HISTORY OF OTHER DISABILITIES AND CHRONIC DISEASES LEADING TO DISABLEMENT, NOT ELSEWHERE CLASSIFIED: Chronic | ICD-10-CM

## 2024-05-20 DIAGNOSIS — T14.8 OTHER INJURY OF UNSPECIFIED BODY REGION: Chronic | ICD-10-CM

## 2024-05-20 DIAGNOSIS — Z84.89 FAMILY HISTORY OF OTHER SPECIFIED CONDITIONS: Chronic | ICD-10-CM

## 2024-05-20 DIAGNOSIS — Z95.0 PRESENCE OF CARDIAC PACEMAKER: Chronic | ICD-10-CM

## 2024-05-20 DIAGNOSIS — H26.9 UNSPECIFIED CATARACT: Chronic | ICD-10-CM

## 2024-05-20 DIAGNOSIS — Z90.710 ACQUIRED ABSENCE OF BOTH CERVIX AND UTERUS: Chronic | ICD-10-CM

## 2024-05-20 LAB
ALBUMIN SERPL ELPH-MCNC: 3.6 G/DL — SIGNIFICANT CHANGE UP (ref 3.3–5)
ALP SERPL-CCNC: 73 U/L — SIGNIFICANT CHANGE UP (ref 40–120)
ALT FLD-CCNC: 25 U/L — SIGNIFICANT CHANGE UP (ref 10–45)
ANION GAP SERPL CALC-SCNC: 13 MMOL/L — SIGNIFICANT CHANGE UP (ref 5–17)
AST SERPL-CCNC: 15 U/L — SIGNIFICANT CHANGE UP (ref 10–40)
BILIRUB SERPL-MCNC: 1.4 MG/DL — HIGH (ref 0.2–1.2)
BUN SERPL-MCNC: 34 MG/DL — HIGH (ref 7–23)
CALCIUM SERPL-MCNC: 8.8 MG/DL — SIGNIFICANT CHANGE UP (ref 8.4–10.5)
CHLORIDE SERPL-SCNC: 99 MMOL/L — SIGNIFICANT CHANGE UP (ref 96–108)
CO2 SERPL-SCNC: 31 MMOL/L — SIGNIFICANT CHANGE UP (ref 22–31)
CREAT SERPL-MCNC: 1.13 MG/DL — SIGNIFICANT CHANGE UP (ref 0.5–1.3)
EGFR: 46 ML/MIN/1.73M2 — LOW
GLUCOSE SERPL-MCNC: 118 MG/DL — HIGH (ref 70–99)
HCT VFR BLD CALC: 52.6 % — HIGH (ref 34.5–45)
HGB BLD-MCNC: 16.9 G/DL — HIGH (ref 11.5–15.5)
MAGNESIUM SERPL-MCNC: 2.3 MG/DL — SIGNIFICANT CHANGE UP (ref 1.6–2.6)
MCHC RBC-ENTMCNC: 29.2 PG — SIGNIFICANT CHANGE UP (ref 27–34)
MCHC RBC-ENTMCNC: 32.1 GM/DL — SIGNIFICANT CHANGE UP (ref 32–36)
MCV RBC AUTO: 90.8 FL — SIGNIFICANT CHANGE UP (ref 80–100)
PHOSPHATE SERPL-MCNC: 2.7 MG/DL — SIGNIFICANT CHANGE UP (ref 2.5–4.5)
PLATELET # BLD AUTO: 105 K/UL — LOW (ref 150–400)
POTASSIUM SERPL-MCNC: 3.4 MMOL/L — LOW (ref 3.5–5.3)
POTASSIUM SERPL-SCNC: 3.4 MMOL/L — LOW (ref 3.5–5.3)
PROT SERPL-MCNC: 5.7 G/DL — LOW (ref 6–8.3)
RBC # BLD: 5.79 M/UL — HIGH (ref 3.8–5.2)
RBC # FLD: 19.5 % — HIGH (ref 10.3–14.5)
SODIUM SERPL-SCNC: 143 MMOL/L — SIGNIFICANT CHANGE UP (ref 135–145)
WBC # BLD: 16.24 K/UL — HIGH (ref 3.8–10.5)
WBC # FLD AUTO: 16.24 K/UL — HIGH (ref 3.8–10.5)

## 2024-05-20 PROCEDURE — 70450 CT HEAD/BRAIN W/O DYE: CPT | Mod: 26,MC

## 2024-05-20 PROCEDURE — 36000 PLACE NEEDLE IN VEIN: CPT

## 2024-05-20 PROCEDURE — 99285 EMERGENCY DEPT VISIT HI MDM: CPT | Mod: 25,GC

## 2024-05-20 PROCEDURE — 76937 US GUIDE VASCULAR ACCESS: CPT | Mod: 26

## 2024-05-20 PROCEDURE — 71045 X-RAY EXAM CHEST 1 VIEW: CPT | Mod: 26

## 2024-05-20 RX ORDER — PIPERACILLIN AND TAZOBACTAM 4; .5 G/20ML; G/20ML
3.38 INJECTION, POWDER, LYOPHILIZED, FOR SOLUTION INTRAVENOUS ONCE
Refills: 0 | Status: COMPLETED | OUTPATIENT
Start: 2024-05-20 | End: 2024-05-20

## 2024-05-20 RX ADMIN — PIPERACILLIN AND TAZOBACTAM 200 GRAM(S): 4; .5 INJECTION, POWDER, LYOPHILIZED, FOR SOLUTION INTRAVENOUS at 23:59

## 2024-05-20 NOTE — ED ADULT NURSE NOTE - OBJECTIVE STATEMENT
89 f presents to ED via EMS s/p assisted fall by daughter. Pt is blind and per daughter furniture was recently moved & pt reports that she missed the chair while sitting and was guided to ground by daughter. pt daughter endorses that she assisted fall by gently placing pt on ground.  pt currently denies pain. pt denies head trauma, LOC. Pt taking blood thinners. PMH of HTN, CVA with pacemaker, Afib taking blood thinners, HLD, cholecystectomy. Pt placed on portable cardiac monitoring showing Afib 60s in lead II. Pt is a&ox2 disoriented to time & place. Ultrasound guided IV placed. Pt placed in position of comfort. Bed in lowest position, wheels locked, appropriate side rails raised. Pt denies needs at this time.

## 2024-05-20 NOTE — ED PROVIDER NOTE - OBJECTIVE STATEMENT
89 y/o F with pmh of AF on Eliquis, HTN, dCHF, follicular lymphoma s/p Rituxan, dementia and asthma p/w progressively worsening weakness, confusion, lethargy x2 weeks. Recently treated for UTI with cefpodoxime but daughter was called a few days ago and told the culture grew another bacteria that the cefpodoxime did not cover so was told to start macrobid but she never got the prescription. PAtient herself states that she feels "better than ever" but daughter states that she is NOT reliable. OTherwise, no recent fevers, chills, chest pain, SOB, cough, LE edema above baseline, abdominal pain, nausea, vomiting, other complaints

## 2024-05-20 NOTE — ED PROCEDURE NOTE - PROCEDURE ADDITIONAL DETAILS
POCUS: Emergency Department Focused Ultrasound performed at patient's bedside.  The complete report may be available in PACS.   Peripheral IV access in the Emergency Department obtained under dynamic ultrasound guidance with dark nonpulsatile blood return.  Catheter was flushed afterwards without any resistance or resultant extravasation.  IV catheter confirmed in compressible vein after insertion.

## 2024-05-20 NOTE — ED PROVIDER NOTE - PHYSICAL EXAMINATION
PHYSICAL EXAM:  GENERAL: Sitting comfortable in bed, in no acute distress  HENMT: Atraumatic, moist mucous membranes, no oropharyngeal exudates or vesicles, uvula is midline EYES: Clear bilaterally, PERRL, EOMs intact b/l  HEART: RRR, S1/S2, no murmur/gallops/rubs  RESPIRATORY: Clear to auscultation bilaterally, no wheezes/rhonchi/rales  ABDOMEN: +BS, soft, nontender, nondistended, no rebound, no guarding  EXTREMITIES: No lower extremity edema, +2 radial pulses b/l  NEURO:  A&Ox4, CN II-XII intact, strength 5/5 x4, sensation grossly intact and symetric, gait deferred, no other focal deficits  Heme/LYMPH: No ecchymosis or bruising  SKIN:  Skin normal color, warm, dry and intact. No evidence of rash.

## 2024-05-20 NOTE — ED PROVIDER NOTE - ATTENDING CONTRIBUTION TO CARE
Attending Emergency Medicine Physician- Edmund Parada MD, FACEP: In this physician's medical judgement based on clinical history and physical exam the patient's signs and symptoms lead to differential diagnoses which includes but is not limited to: uti, failure to thrive, ams   Historical features, symptoms, and clinical exam not consistent with: sepsis  Labs were ordered and independently reviewed by me.  EKG was ordered and independently reviewed by me. Independent interpretation by myself: no stemi  Imaging was ordered and reviewed by me.  Independent interpretation by myself: 20 gauge to rue   Appropriate medications for the patient's presenting complaints were ordered, and effects were reassessed.   Patient's records including prior hospital visit, med and medical history were reviewed.   History assisted by daughter at bedside  Will follow up on labs, therapeutics, imaging, reassess and disposition as clinically indicated.  *The above represents an initial assessment/impression. Please refer to my progress notes below for potential changes in patient clinical course*  Escalation to admission/observation was considered.    Portions of this note have been documented using voice recognition software. As a result, errors may occur in the transcription process. Effort has been made to correct all grammatical and transcription error, but some may have been missed which may produce sporadic inaccurate transcription or nonsensical phrases. Patient with weakness and signs and symptoms of metabolic encephalopathy for the past few days. Will get iv, cbc, cmp, ct head, ekg, cxr, ua, urine culture and likely admit.  Attending Emergency Medicine Physician- Edmund Parada MD, FACEP: In this physician's medical judgement based on clinical history and physical exam the patient's signs and symptoms lead to differential diagnoses which includes but is not limited to: uti, failure to thrive, ams   Historical features, symptoms, and clinical exam not consistent with: sepsis  Labs were ordered and independently reviewed by me.  EKG was ordered and independently reviewed by me. Independent interpretation by myself: no stemi  Imaging was ordered and reviewed by me.  Independent interpretation by myself: 20 gauge to rue   Appropriate medications for the patient's presenting complaints were ordered, and effects were reassessed.   Patient's records including prior hospital visit, med and medical history were reviewed.   History assisted by daughter at bedside  Will follow up on labs, therapeutics, imaging, reassess and disposition as clinically indicated.  *The above represents an initial assessment/impression. Please refer to my progress notes below for potential changes in patient clinical course*  Escalation to admission/observation was considered.    Portions of this note have been documented using voice recognition software. As a result, errors may occur in the transcription process. Effort has been made to correct all grammatical and transcription error, but some may have been missed which may produce sporadic inaccurate transcription or nonsensical phrases.

## 2024-05-20 NOTE — ED ADULT NURSE NOTE - NSFALLHARMRISKINTERV_ED_ALL_ED

## 2024-05-20 NOTE — ED ADULT NURSE NOTE - NEURO BEHAVIOR
calm Patient asked questions/Simple: Patient demonstrates quick and easy understanding/Verbalized Understanding

## 2024-05-20 NOTE — ED PROVIDER NOTE - CLINICAL SUMMARY MEDICAL DECISION MAKING FREE TEXT BOX
89 y/o F with pmh of AF on Eliquis, HTN, dCHF, follicular  lymphoma s/p Rituxan, and asthma p/w worsening confusion and weakness. Vitals grossly wnl, exam nonfocal. Reported recent partially treated UTI, suspect metabolic encephalopathy i/s/o UTI vs electrolyte abnormality, vs progression of dementia, vs other etiologies not excluded. Will obtain CT head, UA/UCx, cbc/cmp

## 2024-05-20 NOTE — ED PROVIDER NOTE - PROGRESS NOTE DETAILS
Patient endorsed to Dr. Droothy Parada MD FACEP note of transfer at the usual time of sign out: Receiving team will follow up on labs, analgesia, any clinical imaging, reassess and disposition as clinically indicated.  Details of patient and plan conveyed to receiving physician and conveyed back for understanding.  There were no questions at this time about the patient's status, disposition, and plan. Patient's care to be taken over by receiving physician at this time, all decisions regarding the progression of care will be made at their discretion. Jocy ARMSTRONG PGY3: spoke to Dr. Plascencia who will admit for UTI and FTT

## 2024-05-20 NOTE — ED PROVIDER NOTE - NS ED ROS FT
Gen: see HPI  Eyes: No eye irritation or discharge  ENT: No ear pain, congestion, sore throat  Resp: No cough or trouble breathing  Cardiovascular: No chest pain or palpitation  Gastroenteric: No nausea/vomiting, diarrhea, constipation  :  see HPI  MS: No joint or muscle pain  Skin: No rashes  Neuro: see HPI  Remainder negative, except as per the HPI

## 2024-05-21 DIAGNOSIS — I50.32 CHRONIC DIASTOLIC (CONGESTIVE) HEART FAILURE: ICD-10-CM

## 2024-05-21 DIAGNOSIS — I48.0 PAROXYSMAL ATRIAL FIBRILLATION: ICD-10-CM

## 2024-05-21 DIAGNOSIS — R53.1 WEAKNESS: ICD-10-CM

## 2024-05-21 DIAGNOSIS — N39.0 URINARY TRACT INFECTION, SITE NOT SPECIFIED: ICD-10-CM

## 2024-05-21 DIAGNOSIS — F03.90 UNSPECIFIED DEMENTIA WITHOUT BEHAVIORAL DISTURBANCE: ICD-10-CM

## 2024-05-21 DIAGNOSIS — R09.89 OTHER SPECIFIED SYMPTOMS AND SIGNS INVOLVING THE CIRCULATORY AND RESPIRATORY SYSTEMS: ICD-10-CM

## 2024-05-21 DIAGNOSIS — R62.7 ADULT FAILURE TO THRIVE: ICD-10-CM

## 2024-05-21 DIAGNOSIS — Z29.9 ENCOUNTER FOR PROPHYLACTIC MEASURES, UNSPECIFIED: ICD-10-CM

## 2024-05-21 DIAGNOSIS — F41.1 GENERALIZED ANXIETY DISORDER: ICD-10-CM

## 2024-05-21 DIAGNOSIS — D72.829 ELEVATED WHITE BLOOD CELL COUNT, UNSPECIFIED: ICD-10-CM

## 2024-05-21 LAB
ALBUMIN SERPL ELPH-MCNC: 3.4 G/DL — SIGNIFICANT CHANGE UP (ref 3.3–5)
ALP SERPL-CCNC: 63 U/L — SIGNIFICANT CHANGE UP (ref 40–120)
ALT FLD-CCNC: 20 U/L — SIGNIFICANT CHANGE UP (ref 10–45)
ANION GAP SERPL CALC-SCNC: 14 MMOL/L — SIGNIFICANT CHANGE UP (ref 5–17)
ANISOCYTOSIS BLD QL: SLIGHT — SIGNIFICANT CHANGE UP
APPEARANCE UR: CLEAR — SIGNIFICANT CHANGE UP
APTT BLD: 31.4 SEC — SIGNIFICANT CHANGE UP (ref 24.5–35.6)
AST SERPL-CCNC: 17 U/L — SIGNIFICANT CHANGE UP (ref 10–40)
BACTERIA # UR AUTO: ABNORMAL /HPF
BASOPHILS # BLD AUTO: 0 K/UL — SIGNIFICANT CHANGE UP (ref 0–0.2)
BASOPHILS # BLD AUTO: 0.05 K/UL — SIGNIFICANT CHANGE UP (ref 0–0.2)
BASOPHILS NFR BLD AUTO: 0 % — SIGNIFICANT CHANGE UP (ref 0–2)
BASOPHILS NFR BLD AUTO: 0.4 % — SIGNIFICANT CHANGE UP (ref 0–2)
BILIRUB SERPL-MCNC: 1.5 MG/DL — HIGH (ref 0.2–1.2)
BILIRUB UR-MCNC: ABNORMAL
BUN SERPL-MCNC: 30 MG/DL — HIGH (ref 7–23)
CALCIUM SERPL-MCNC: 8.1 MG/DL — LOW (ref 8.4–10.5)
CAST: 7 /LPF — HIGH (ref 0–4)
CHLORIDE SERPL-SCNC: 99 MMOL/L — SIGNIFICANT CHANGE UP (ref 96–108)
CO2 SERPL-SCNC: 28 MMOL/L — SIGNIFICANT CHANGE UP (ref 22–31)
COLOR SPEC: SIGNIFICANT CHANGE UP
CREAT SERPL-MCNC: 1.06 MG/DL — SIGNIFICANT CHANGE UP (ref 0.5–1.3)
DIFF PNL FLD: NEGATIVE — SIGNIFICANT CHANGE UP
EGFR: 50 ML/MIN/1.73M2 — LOW
ELLIPTOCYTES BLD QL SMEAR: SLIGHT — SIGNIFICANT CHANGE UP
EOSINOPHIL # BLD AUTO: 0 K/UL — SIGNIFICANT CHANGE UP (ref 0–0.5)
EOSINOPHIL # BLD AUTO: 0.01 K/UL — SIGNIFICANT CHANGE UP (ref 0–0.5)
EOSINOPHIL NFR BLD AUTO: 0 % — SIGNIFICANT CHANGE UP (ref 0–6)
EOSINOPHIL NFR BLD AUTO: 0.1 % — SIGNIFICANT CHANGE UP (ref 0–6)
GIANT PLATELETS BLD QL SMEAR: PRESENT — SIGNIFICANT CHANGE UP
GLUCOSE SERPL-MCNC: 146 MG/DL — HIGH (ref 70–99)
GLUCOSE UR QL: NEGATIVE MG/DL — SIGNIFICANT CHANGE UP
HCT VFR BLD CALC: 50.4 % — HIGH (ref 34.5–45)
HGB BLD-MCNC: 15.9 G/DL — HIGH (ref 11.5–15.5)
IMM GRANULOCYTES NFR BLD AUTO: 2.7 % — HIGH (ref 0–0.9)
INR BLD: 1.4 RATIO — HIGH (ref 0.85–1.18)
KETONES UR-MCNC: ABNORMAL MG/DL
LACTATE SERPL-SCNC: 1.8 MMOL/L — SIGNIFICANT CHANGE UP (ref 0.5–2)
LEUKOCYTE ESTERASE UR-ACNC: ABNORMAL
LYMPHOCYTES # BLD AUTO: 0.57 K/UL — LOW (ref 1–3.3)
LYMPHOCYTES # BLD AUTO: 0.83 K/UL — LOW (ref 1–3.3)
LYMPHOCYTES # BLD AUTO: 3.5 % — LOW (ref 13–44)
LYMPHOCYTES # BLD AUTO: 5.9 % — LOW (ref 13–44)
MAGNESIUM SERPL-MCNC: 2.1 MG/DL — SIGNIFICANT CHANGE UP (ref 1.6–2.6)
MANUAL SMEAR VERIFICATION: SIGNIFICANT CHANGE UP
MCHC RBC-ENTMCNC: 28.9 PG — SIGNIFICANT CHANGE UP (ref 27–34)
MCHC RBC-ENTMCNC: 31.5 GM/DL — LOW (ref 32–36)
MCV RBC AUTO: 91.5 FL — SIGNIFICANT CHANGE UP (ref 80–100)
MICROCYTES BLD QL: SLIGHT — SIGNIFICANT CHANGE UP
MONOCYTES # BLD AUTO: 0.13 K/UL — SIGNIFICANT CHANGE UP (ref 0–0.9)
MONOCYTES # BLD AUTO: 0.69 K/UL — SIGNIFICANT CHANGE UP (ref 0–0.9)
MONOCYTES NFR BLD AUTO: 0.8 % — LOW (ref 2–14)
MONOCYTES NFR BLD AUTO: 4.9 % — SIGNIFICANT CHANGE UP (ref 2–14)
MYELOCYTES NFR BLD: 3.5 % — HIGH (ref 0–0)
NEUTROPHILS # BLD AUTO: 12.19 K/UL — HIGH (ref 1.8–7.4)
NEUTROPHILS # BLD AUTO: 14.97 K/UL — HIGH (ref 1.8–7.4)
NEUTROPHILS NFR BLD AUTO: 86 % — HIGH (ref 43–77)
NEUTROPHILS NFR BLD AUTO: 91.3 % — HIGH (ref 43–77)
NEUTS BAND # BLD: 0.9 % — SIGNIFICANT CHANGE UP (ref 0–8)
NITRITE UR-MCNC: NEGATIVE — SIGNIFICANT CHANGE UP
NRBC # BLD: 0 /100 WBCS — SIGNIFICANT CHANGE UP (ref 0–0)
OVALOCYTES BLD QL SMEAR: SLIGHT — SIGNIFICANT CHANGE UP
PH UR: 7.5 — SIGNIFICANT CHANGE UP (ref 5–8)
PLAT MORPH BLD: NORMAL — SIGNIFICANT CHANGE UP
PLATELET # BLD AUTO: 100 K/UL — LOW (ref 150–400)
POIKILOCYTOSIS BLD QL AUTO: SLIGHT — SIGNIFICANT CHANGE UP
POTASSIUM SERPL-MCNC: 3.3 MMOL/L — LOW (ref 3.5–5.3)
POTASSIUM SERPL-SCNC: 3.3 MMOL/L — LOW (ref 3.5–5.3)
PROT SERPL-MCNC: 5.2 G/DL — LOW (ref 6–8.3)
PROT UR-MCNC: 30 MG/DL
PROTHROM AB SERPL-ACNC: 14.6 SEC — HIGH (ref 9.5–13)
RBC # BLD: 5.51 M/UL — HIGH (ref 3.8–5.2)
RBC # FLD: 19.4 % — HIGH (ref 10.3–14.5)
RBC BLD AUTO: ABNORMAL
RBC CASTS # UR COMP ASSIST: 1 /HPF — SIGNIFICANT CHANGE UP (ref 0–4)
REVIEW: SIGNIFICANT CHANGE UP
SODIUM SERPL-SCNC: 141 MMOL/L — SIGNIFICANT CHANGE UP (ref 135–145)
SP GR SPEC: 1.02 — SIGNIFICANT CHANGE UP (ref 1–1.03)
SQUAMOUS # UR AUTO: 1 /HPF — SIGNIFICANT CHANGE UP (ref 0–5)
TSH SERPL-MCNC: 1.35 UIU/ML — SIGNIFICANT CHANGE UP (ref 0.27–4.2)
UROBILINOGEN FLD QL: 1 MG/DL — SIGNIFICANT CHANGE UP (ref 0.2–1)
WBC # BLD: 14.15 K/UL — HIGH (ref 3.8–10.5)
WBC # FLD AUTO: 14.15 K/UL — HIGH (ref 3.8–10.5)
WBC UR QL: 15 /HPF — HIGH (ref 0–5)

## 2024-05-21 PROCEDURE — 99223 1ST HOSP IP/OBS HIGH 75: CPT

## 2024-05-21 PROCEDURE — 99222 1ST HOSP IP/OBS MODERATE 55: CPT

## 2024-05-21 RX ORDER — EPLERENONE 50 MG/1
1 TABLET, FILM COATED ORAL
Refills: 0 | DISCHARGE

## 2024-05-21 RX ORDER — FAMOTIDINE 10 MG/ML
1 INJECTION INTRAVENOUS
Refills: 0 | DISCHARGE

## 2024-05-21 RX ORDER — LANOLIN ALCOHOL/MO/W.PET/CERES
3 CREAM (GRAM) TOPICAL AT BEDTIME
Refills: 0 | Status: DISCONTINUED | OUTPATIENT
Start: 2024-05-21 | End: 2024-05-30

## 2024-05-21 RX ORDER — APIXABAN 2.5 MG/1
5 TABLET, FILM COATED ORAL
Refills: 0 | Status: DISCONTINUED | OUTPATIENT
Start: 2024-05-21 | End: 2024-05-30

## 2024-05-21 RX ORDER — AMPICILLIN TRIHYDRATE 250 MG
CAPSULE ORAL
Refills: 0 | Status: DISCONTINUED | OUTPATIENT
Start: 2024-05-21 | End: 2024-05-21

## 2024-05-21 RX ORDER — AMPICILLIN TRIHYDRATE 250 MG
1 CAPSULE ORAL EVERY 8 HOURS
Refills: 0 | Status: DISCONTINUED | OUTPATIENT
Start: 2024-05-21 | End: 2024-05-21

## 2024-05-21 RX ORDER — FLUTICASONE FUROATE AND VILANTEROL TRIFENATATE 100; 25 UG/1; UG/1
1 POWDER RESPIRATORY (INHALATION)
Refills: 0 | DISCHARGE

## 2024-05-21 RX ORDER — POTASSIUM CHLORIDE 20 MEQ
40 PACKET (EA) ORAL ONCE
Refills: 0 | Status: COMPLETED | OUTPATIENT
Start: 2024-05-21 | End: 2024-05-21

## 2024-05-21 RX ORDER — APIXABAN 2.5 MG/1
1 TABLET, FILM COATED ORAL
Refills: 0 | DISCHARGE

## 2024-05-21 RX ORDER — ACETAMINOPHEN 500 MG
650 TABLET ORAL EVERY 6 HOURS
Refills: 0 | Status: DISCONTINUED | OUTPATIENT
Start: 2024-05-21 | End: 2024-05-30

## 2024-05-21 RX ORDER — ATORVASTATIN CALCIUM 80 MG/1
20 TABLET, FILM COATED ORAL AT BEDTIME
Refills: 0 | Status: DISCONTINUED | OUTPATIENT
Start: 2024-05-21 | End: 2024-05-30

## 2024-05-21 RX ORDER — LOVASTATIN 20 MG
1 TABLET ORAL
Refills: 0 | DISCHARGE

## 2024-05-21 RX ORDER — CALCIUM GLUCONATE 100 MG/ML
1 VIAL (ML) INTRAVENOUS ONCE
Refills: 0 | Status: COMPLETED | OUTPATIENT
Start: 2024-05-21 | End: 2024-05-21

## 2024-05-21 RX ORDER — AMPICILLIN TRIHYDRATE 250 MG
1 CAPSULE ORAL ONCE
Refills: 0 | Status: COMPLETED | OUTPATIENT
Start: 2024-05-21 | End: 2024-05-21

## 2024-05-21 RX ORDER — ONDANSETRON 8 MG/1
4 TABLET, FILM COATED ORAL ONCE
Refills: 0 | Status: DISCONTINUED | OUTPATIENT
Start: 2024-05-21 | End: 2024-05-30

## 2024-05-21 RX ORDER — LEVOTHYROXINE SODIUM 125 MCG
1 TABLET ORAL
Refills: 0 | DISCHARGE

## 2024-05-21 RX ORDER — ALBUTEROL 90 UG/1
2 AEROSOL, METERED ORAL
Refills: 0 | DISCHARGE

## 2024-05-21 RX ORDER — METOPROLOL TARTRATE 50 MG
100 TABLET ORAL
Refills: 0 | Status: DISCONTINUED | OUTPATIENT
Start: 2024-05-21 | End: 2024-05-30

## 2024-05-21 RX ORDER — ESTRADIOL 4 UG/1
0.5 INSERT VAGINAL
Refills: 0 | DISCHARGE

## 2024-05-21 RX ORDER — LEVOTHYROXINE SODIUM 125 MCG
25 TABLET ORAL DAILY
Refills: 0 | Status: DISCONTINUED | OUTPATIENT
Start: 2024-05-21 | End: 2024-05-30

## 2024-05-21 RX ADMIN — Medication 3 MILLIGRAM(S): at 21:25

## 2024-05-21 RX ADMIN — APIXABAN 5 MILLIGRAM(S): 2.5 TABLET, FILM COATED ORAL at 17:25

## 2024-05-21 RX ADMIN — Medication 100 GRAM(S): at 11:43

## 2024-05-21 RX ADMIN — ATORVASTATIN CALCIUM 20 MILLIGRAM(S): 80 TABLET, FILM COATED ORAL at 21:25

## 2024-05-21 RX ADMIN — APIXABAN 5 MILLIGRAM(S): 2.5 TABLET, FILM COATED ORAL at 06:36

## 2024-05-21 RX ADMIN — Medication 108 GRAM(S): at 06:46

## 2024-05-21 RX ADMIN — Medication 100 MILLIGRAM(S): at 17:26

## 2024-05-21 RX ADMIN — Medication 100 MILLIGRAM(S): at 06:36

## 2024-05-21 RX ADMIN — Medication 25 MICROGRAM(S): at 06:36

## 2024-05-21 RX ADMIN — Medication 108 GRAM(S): at 14:00

## 2024-05-21 RX ADMIN — Medication 40 MILLIEQUIVALENT(S): at 11:31

## 2024-05-21 RX ADMIN — Medication 15 MILLIGRAM(S): at 06:42

## 2024-05-21 RX ADMIN — Medication 15 MILLIGRAM(S): at 17:26

## 2024-05-21 NOTE — H&P ADULT - HISTORY OF PRESENT ILLNESS
89F w/ hx of afib on Eliquis, HFpEF, dementia, HTN, lymphoma s/p Rituxan, asthma p/w worsening weakness and lethargy x 2 weeks. Pt recently admitted to [facility] 2 weeks ago for weakness and discharged. Weakness not completely resolved but thought to be related to polypharmacy. Daughter endorses pt being treated for UTI ~7 days ago w/ cefpodoxime. Completed treatment yesterday. Was called and informed enterococcus also grew and was going to be prescribed Macrobid but pt never received Rx. Weakness has been getting worse and pt gradually lost strength while walking with HHA and daughter today and hand to be grabbed. Also more frequent episodes of delirium recently.    In ER: Given IV Zosyn

## 2024-05-21 NOTE — PHYSICAL THERAPY INITIAL EVALUATION ADULT - PERTINENT HX OF CURRENT PROBLEM, REHAB EVAL
89F w/ hx of afib on Eliquis, HFpEF, dementia, HTN, lymphoma s/p Rituxan, asthma p/w worsening weakness and lethargy x 2 weeks. Pt recently admitted to [facility] 2 weeks ago for weakness and discharged. Weakness not completely resolved but thought to be related to polypharmacy. Daughter endorses pt being treated for UTI ~7 days ago w/ cefpodoxime. Completed treatment yesterday. Was called and informed enterococcus also grew and was going to be prescribed Macrobid but pt never received Rx. Weakness has been getting worse and pt gradually lost strength while walking with HHA and daughter today and hand to be grabbed. Also more frequent episodes of delirium recently.

## 2024-05-21 NOTE — BH CONSULTATION LIAISON ASSESSMENT NOTE - NSSUICPROTFACT_PSY_ALL_CORE
Identifies reasons for living/Supportive social network of family or friends/Cultural, spiritual and/or moral attitudes against suicide/Positive therapeutic relationships

## 2024-05-21 NOTE — H&P ADULT - CONVERSATION DETAILS
Daughter states pt always wanted everything done and is Full [employer]. Also has son but less involved in care.

## 2024-05-21 NOTE — H&P ADULT - PROBLEM SELECTOR PLAN 4
AAOx1 at home, daughter states pt will often not recognize her. Legally blind. Note old strokes on CT head.  -Falls precautions  -PT consult, daughter prefers d/c to [facility]  -Neurology consult as per Dr. Plascencia (Daughter requests)  -Consider addition of Quetiapine to med regimen, will hold for now given nature of chief complaint and lack of agitation currently  -Daughter undecided regarding if she wants to further work up adrenal nodule.

## 2024-05-21 NOTE — H&P ADULT - ASSESSMENT
89F w/ hx of afib on Eliquis, HFpEF, dementia, HTN, lymphoma s/p Rituxan, asthma p/w worsening weakness and lethargy x 2 weeks w/ some concern for UTI as well

## 2024-05-21 NOTE — BH CONSULTATION LIAISON ASSESSMENT NOTE - CURRENT MEDICATION
MEDICATIONS  (STANDING):  ampicillin  IVPB 1 Gram(s) IV Intermittent every 8 hours  ampicillin  IVPB      apixaban 5 milliGRAM(s) Oral two times a day  atorvastatin 20 milliGRAM(s) Oral at bedtime  busPIRone 15 milliGRAM(s) Oral two times a day  levothyroxine 25 MICROGram(s) Oral daily  metoprolol tartrate 100 milliGRAM(s) Oral two times a day    MEDICATIONS  (PRN):  acetaminophen     Tablet .. 650 milliGRAM(s) Oral every 6 hours PRN Temp greater or equal to 38C (100.4F), Mild Pain (1 - 3)  melatonin 3 milliGRAM(s) Oral at bedtime PRN Insomnia  ondansetron Injectable 4 milliGRAM(s) IV Push once PRN Nausea and/or Vomiting

## 2024-05-21 NOTE — PHYSICAL THERAPY INITIAL EVALUATION ADULT - ASSISTIVE DEVICE, REHAB EVAL
Per appt note:     Medical records will be brought to the appointment and imaging is being mailed. Does not indicate where records are coming from so that I can follow-up.    bed rails

## 2024-05-21 NOTE — PHYSICAL THERAPY INITIAL EVALUATION ADULT - GENERAL OBSERVATIONS, REHAB EVAL
Patient encountered supine in bed, +purewick Patient encountered supine in bed, +purewick and daughter bedside

## 2024-05-21 NOTE — BH CONSULTATION LIAISON ASSESSMENT NOTE - NSICDXBHPRIMARYDX_PSY_ALL_CORE
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  The patient was identified by name and date of birth.  The correct procedure, and correct site identified.  Correct positioning.  There is availability of necessary equipment.  Patient states she is not allergic to latex.    PE:  /76 (BP Location: Left arm, Patient Position: Sitting, Cuff Size: Adult)   Ht 160 cm (63\")   Wt 88.8 kg (195 lb 12.8 oz)   LMP 2013 (Approximate)   BMI 34.68 kg/m²   External genitalia: Normal  Vagina: Atrophic  Cervix: Normal, stenotic internal os  TMZ: Visualized, WNL  Acetowhite lesions: None  Mosaicism: None  Abnormal vessels: None  Satisfactory colposcopy: Yes    A/P: Vijaya Lazaro is a 51 y.o.  with abnormal pap smear showing LSIL.  Colposcopy today consistent with cervicitis vs CIN1.  Discussed that some cellular changes can be related to atrophy as well.  - Colposcopy w/ E[facility]  - RTC PRN pending above results.  Suspect that she will need a repeat pap smear in 1 year w/ HPV co-testing.  - Reviewed instructions including no sex, tampons, or douching for at least 5 days.    This document has been electronically signed by Nadira Boyd MD on 2020 10:31.  " Mixed origin delirium   F05

## 2024-05-21 NOTE — BH CONSULTATION LIAISON ASSESSMENT NOTE - NSBHCONSULTRECOMMENDOTHER_PSY_A_CORE FT
1. C/w Buspar 15mg PO BID    2. Daughter would like to think about antipsychotic use.  If agreeable, can use PRN: Seroquel 12.5mg-25mg PO q6h PRN agitation; Zyprexa 1.25mg IM q6h PRN severe agitation.  Monitor for QTc<500.  Melatonin 3mg PO qHS PRN insomnia.    3. Minimize use of benzos, opioids, anticholinergics, or other deliriogenic agents when possible.  Maintain sleep wake cycle.  Provide frequent reorientation and redirection.  Family member at bedside if possible. Assess for need for glasses and hearing aid (if applicable).    4. Pt does not meet criteria for psychiatric hospitalization.  Recommend outpt psych f/u with own psychiatrist after d/c.

## 2024-05-21 NOTE — H&P ADULT - PROBLEM SELECTOR PLAN 2
UA borderline, possible false negative given recent treatment. HIE Ucx from 5/10 showing sensitive Enterococcus and e. Coli  -Will start IV ampicillin Q6hrs  -F/u UCx  -ID as per Dr. Plascencia  -Trend wbc and fever curve

## 2024-05-21 NOTE — BH CONSULTATION LIAISON ASSESSMENT NOTE - OTHER PAST PSYCHIATRIC HISTORY (INCLUDE DETAILS REGARDING ONSET, COURSE OF ILLNESS, INPATIENT/OUTPATIENT TREATMENT)
h/o anxiety, follows with Dr. Cope ([facility]), prescribed Buspar 15mg PO BID    no prior SA or psych admissions

## 2024-05-21 NOTE — H&P ADULT - PROBLEM SELECTOR PLAN 1
Unclear if purely from UTI or some component of advancing dementia. Daughter also endorses worsening deconditioning since Jan 2/2 multiple hospitalizations.  -Will cont. to Rx UTI as below  -F/u TSH, cont. levothyroxine for now  -Falls precautions  -PT consult, daughter prefers d/c to [facility]  -Daughter requests neurology consult

## 2024-05-21 NOTE — BH CONSULTATION LIAISON ASSESSMENT NOTE - NSBHCHARTREVIEWLAB_PSY_A_CORE FT
15.9   14.15 )-----------( 100      ( 21 May 2024 09:17 )             50.4     05-21    141  |  99  |  30<H>  ----------------------------<  146<H>  3.3<L>   |  28  |  1.06    Ca    8.1<L>      21 May 2024 09:17  Phos  2.7     05-20  Mg     2.1     05-21    TPro  5.2<L>  /  Alb  3.4  /  TBili  1.5<H>  /  DBili  x   /  AST  17  /  ALT  20  /  AlkPhos  63  05-21    Urinalysis Basic - ( 21 May 2024 09:17 )    Color: x / Appearance: x / SG: x / pH: x  Gluc: 146 mg/dL / Ketone: x  / Bili: x / Urobili: x   Blood: x / Protein: x / Nitrite: x   Leuk Esterase: x / RBC: x / WBC x   Sq Epi: x / Non Sq Epi: x / Bacteria: x

## 2024-05-21 NOTE — PHYSICAL THERAPY INITIAL EVALUATION ADULT - ADDITIONAL COMMENTS
Patient lives with her daughter in a private house and has 2 steps to enter. Patient has a HHA 3 days a week and daughter assists with all ADLs. She ambulates with a rolling walker.

## 2024-05-21 NOTE — BH CONSULTATION LIAISON ASSESSMENT NOTE - NSBHCHARTREVIEWINVESTIGATE_PSY_A_CORE FT
< from: 12 Lead ECG (05.02.24 @ 14:13) >      Ventricular Rate 87 BPM    QRS Duration 74 ms    Q-T Interval 378 ms    QTC Calculation(Bazett) 454 ms    R Axis 9 degrees    T Axis 140 degrees    Diagnosis Line Atrial fibrillation  Minimal voltage criteria for LVH, may be normal variant ( R in aVL )  Nonspecific ST and T wave abnormality  Abnormal ECG    < end of copied text >

## 2024-05-21 NOTE — H&P ADULT - PROBLEM SELECTOR PLAN 6
WBC 16 possibly related to infection but given hx of lymphoma may be related  -Trend wbc  -Possible outpt Heme f/u

## 2024-05-21 NOTE — BH CONSULTATION LIAISON ASSESSMENT NOTE - DESCRIPTION
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

## 2024-05-21 NOTE — H&P ADULT - NSHPADDITIONALINFOADULT_GEN_ALL_CORE
I was asked to see this patient by the hospitalist in charge. Dr. Plascencia to assume care for patient in AM and thereafter

## 2024-05-21 NOTE — BH CONSULTATION LIAISON ASSESSMENT NOTE - NSBHCHARTREVIEWVS_PSY_A_CORE FT
Vital Signs Last 24 Hrs  T(C): 36.5 (21 May 2024 05:52), Max: 36.9 (21 May 2024 00:00)  T(F): 97.7 (21 May 2024 05:52), Max: 98.5 (21 May 2024 00:00)  HR: 89 (21 May 2024 09:09) (76 - 91)  BP: 109/77 (21 May 2024 09:09) (100/81 - 168/99)  BP(mean): 99 (20 May 2024 21:00) (99 - 99)  RR: 19 (21 May 2024 05:52) (17 - 19)  SpO2: 90% (21 May 2024 05:52) (90% - 96%)    Parameters below as of 21 May 2024 05:52  Patient On (Oxygen Delivery Method): room air

## 2024-05-21 NOTE — BH CONSULTATION LIAISON ASSESSMENT NOTE - HPI (INCLUDE ILLNESS QUALITY, SEVERITY, DURATION, TIMING, CONTEXT, MODIFYING FACTORS, ASSOCIATED SIGNS AND SYMPTOMS)
89F living with daughter and dtr's boyfriend, has HHA 3d/week, retired, with PPHx anxiety on Buspar, sees OP psych Dr. Cope via [facility], no prior SA Or psych admissions, no active substance abuse, PMH significant for afib on Eliquis, HFpEF, HTN, lymphoma s/p Rituxan, asthma p/w worsening weakness and lethargy x 2 weeks, s/p recent tx for UTI, psychiatry consulted for management of agitation.    Pt presently pleasant, calm and cooperative, resting with daughter at bedside, oriented to self/season/year, states we are in a "hotel."  States her mood is depressed, but denies SI/HI or AVH, denies paranoia.  Denies substance abuse.  Reports her anxiety has been okay.  Limited historian, calling out for her daughter at times.    Collateral obtained from pt's daughter who states pt has not been well since being in [facility]; has become progressively weaker and has been falling, this prompted APS call from an outside nurse.  It has been harder to care for pt at home as she has grown weaker.  States pt can become mildly agitated at times but not unmanageable.  Has been more confused lately and they are concerned for onset of dementia.  Dtr unsure if she wants pt to receive antipsychotics, FDA BB warning for incr. mortality risk discussed.

## 2024-05-21 NOTE — H&P ADULT - NSHPPHYSICALEXAM_GEN_ALL_CORE
Vital Signs Last 24 Hrs  T(C): 36.9 (05-21-24 @ 00:00), Max: 36.9 (05-21-24 @ 00:00)  T(F): 98.5 (05-21-24 @ 00:00), Max: 98.5 (05-21-24 @ 00:00)  HR: 88 (05-21-24 @ 00:00) (76 - 88)  BP: 100/81 (05-21-24 @ 00:00) (100/81 - 123/86)  BP(mean): 99 (05-20-24 @ 21:00) (99 - 99)  RR: 17 (05-21-24 @ 00:00) (17 - 19)  SpO2: 95% (05-21-24 @ 00:00) (95% - 96%)

## 2024-05-22 ENCOUNTER — TRANSCRIPTION ENCOUNTER (OUTPATIENT)
Age: 89
End: 2024-05-22

## 2024-05-22 PROCEDURE — 99232 SBSQ HOSP IP/OBS MODERATE 35: CPT

## 2024-05-22 RX ADMIN — Medication 100 MILLIGRAM(S): at 06:57

## 2024-05-22 RX ADMIN — Medication 650 MILLIGRAM(S): at 01:28

## 2024-05-22 RX ADMIN — Medication 15 MILLIGRAM(S): at 17:42

## 2024-05-22 RX ADMIN — Medication 100 MILLIGRAM(S): at 17:42

## 2024-05-22 RX ADMIN — Medication 25 MICROGRAM(S): at 05:37

## 2024-05-22 RX ADMIN — Medication 15 MILLIGRAM(S): at 05:37

## 2024-05-22 RX ADMIN — APIXABAN 5 MILLIGRAM(S): 2.5 TABLET, FILM COATED ORAL at 05:37

## 2024-05-22 RX ADMIN — APIXABAN 5 MILLIGRAM(S): 2.5 TABLET, FILM COATED ORAL at 17:42

## 2024-05-22 RX ADMIN — Medication 650 MILLIGRAM(S): at 02:05

## 2024-05-22 RX ADMIN — ATORVASTATIN CALCIUM 20 MILLIGRAM(S): 80 TABLET, FILM COATED ORAL at 21:28

## 2024-05-22 NOTE — PROGRESS NOTE ADULT - SUBJECTIVE AND OBJECTIVE BOX
DATE OF SERVICE: 05-22-24 @ 16:39    Patient is a 89y old  Female who presents with a chief complaint of Weakness and UTI (22 May 2024 09:47)      SUBJECTIVE / OVERNIGHT EVENTS:  No chest pain. No shortness of breath. No complaints. No events overnight. confused    MEDICATIONS  (STANDING):  apixaban 5 milliGRAM(s) Oral two times a day  atorvastatin 20 milliGRAM(s) Oral at bedtime  busPIRone 15 milliGRAM(s) Oral two times a day  levothyroxine 25 MICROGram(s) Oral daily  metoprolol tartrate 100 milliGRAM(s) Oral two times a day    MEDICATIONS  (PRN):  acetaminophen     Tablet .. 650 milliGRAM(s) Oral every 6 hours PRN Temp greater or equal to 38C (100.4F), Mild Pain (1 - 3)  melatonin 3 milliGRAM(s) Oral at bedtime PRN Insomnia  ondansetron Injectable 4 milliGRAM(s) IV Push once PRN Nausea and/or Vomiting      Vital Signs Last 24 Hrs  T(C): 36.4 (22 May 2024 09:06), Max: 36.4 (22 May 2024 09:06)  T(F): 97.6 (22 May 2024 09:06), Max: 97.6 (22 May 2024 09:06)  HR: 82 (22 May 2024 09:06) (76 - 83)  BP: 164/90 (22 May 2024 09:06) (102/76 - 171/98)  BP(mean): --  RR: 18 (22 May 2024 09:06) (18 - 18)  SpO2: 92% (22 May 2024 09:06) (92% - 96%)    Parameters below as of 22 May 2024 09:06  Patient On (Oxygen Delivery Method): room air      CAPILLARY BLOOD GLUCOSE        I&O's Summary      PHYSICAL EXAM:  GENERAL: NAD, well-developed  HEAD:  Atraumatic, Normocephalic  EYES: EOMI, PERRLA, conjunctiva and sclera clear  NECK: Supple, No JVD  CHEST/LUNG: Clear to auscultation bilaterally; No wheeze  HEART: Regular rate and rhythm; No murmurs, rubs, or gallops  ABDOMEN: Soft, Nontender, Nondistended; Bowel sounds present  EXTREMITIES:  2+ Peripheral Pulses, No clubbing, cyanosis, or edema  PSYCH: AAOx1  NEUROLOGY: non-focal. generalized weakness  SKIN: No rashes or lesions    LABS:                        15.9   14.15 )-----------( 100      ( 21 May 2024 09:17 )             50.4     05-21    141  |  99  |  30<H>  ----------------------------<  146<H>  3.3<L>   |  28  |  1.06    Ca    8.1<L>      21 May 2024 09:17  Phos  2.7     05-20  Mg     2.1     05-21    TPro  5.2<L>  /  Alb  3.4  /  TBili  1.5<H>  /  DBili  x   /  AST  17  /  ALT  20  /  AlkPhos  63  05-21    PT/INR - ( 20 May 2024 22:57 )   PT: 14.6 sec;   INR: 1.40 ratio         PTT - ( 20 May 2024 22:57 )  PTT:31.4 sec      Urinalysis Basic - ( 21 May 2024 09:17 )    Color: x / Appearance: x / SG: x / pH: x  Gluc: 146 mg/dL / Ketone: x  / Bili: x / Urobili: x   Blood: x / Protein: x / Nitrite: x   Leuk Esterase: x / RBC: x / WBC x   Sq Epi: x / Non Sq Epi: x / Bacteria: x    < from: TTE W or WO Ultrasound Enhancing Agent (01.11.24 @ 11:10) >  CONCLUSIONS:      1. Left ventricular cavity is normal. Left ventricular systolic function is normal. There are no regional wall motion abnormalities seen.   2. Mild pulmonary hypertension.   3. Device lead is visualized in the right heart.   4. No prior echocardiogram is available for comparison.      < end of copied text >      RADIOLOGY & ADDITIONAL TESTS:    Imaging Personally Reviewed:    Consultant(s) Notes Reviewed:      Care Discussed with Consultants/Other Providers:

## 2024-05-22 NOTE — DISCHARGE NOTE PROVIDER - NSDCMRMEDTOKEN_GEN_ALL_CORE_FT
Albuterol (Eqv-Proventil HFA) 90 mcg/inh inhalation aerosol: 2 puff(s) inhaled every 6 hours as needed for  shortness of breath and/or wheezing  Breo Ellipta 200 mcg-25 mcg/inh inhalation powder: 1 puff(s) inhaled once a day  busPIRone 15 mg oral tablet: 1 tab(s) orally 2 times a day  Eliquis 5 mg oral tablet: 1 tab(s) orally 2 times a day  eplerenone 25 mg oral tablet: 1 tab(s) orally once a day  Estrace Vaginal 0.1 mg/g vaginal cream: 0.5 gram(s) intravaginally 2 times a week  levothyroxine 25 mcg (0.025 mg) oral tablet: 1 tab(s) orally once a day  lovastatin 20 mg oral tablet: 1 tab(s) orally once a day  metoprolol tartrate 100 mg oral tablet: 1 tab(s) orally 2 times a day  Prolia 60 mg/mL subcutaneous solution: 60 milligram(s) subcutaneously every 6 months   acetaminophen 325 mg oral tablet: 2 tab(s) orally every 6 hours As needed Temp greater or equal to 38C (100.4F), Mild Pain (1 - 3)  Albuterol (Eqv-Proventil HFA) 90 mcg/inh inhalation aerosol: 2 puff(s) inhaled every 6 hours as needed for  shortness of breath and/or wheezing  Breo Ellipta 200 mcg-25 mcg/inh inhalation powder: 1 puff(s) inhaled once a day  busPIRone 15 mg oral tablet: 1 tab(s) orally 2 times a day  Eliquis 5 mg oral tablet: 1 tab(s) orally 2 times a day  eplerenone 25 mg oral tablet: 1 tab(s) orally once a day  Estrace Vaginal 0.1 mg/g vaginal cream: 0.5 gram(s) intravaginally 2 times a week  levothyroxine 25 mcg (0.025 mg) oral tablet: 1 tab(s) orally once a day  losartan 25 mg oral tablet: 1 tab(s) orally once a day  lovastatin 20 mg oral tablet: 1 tab(s) orally once a day  metoprolol tartrate 100 mg oral tablet: 1 tab(s) orally 2 times a day  senna leaf extract oral tablet: 2 tab(s) orally once a day (at bedtime)

## 2024-05-22 NOTE — DISCHARGE NOTE PROVIDER - HOSPITAL COURSE
HPI:  89F w/ hx of afib on Eliquis, HFpEF, dementia, HTN, lymphoma s/p Rituxan, asthma p/w worsening weakness and lethargy x 2 weeks. Pt recently admitted to [facility] 2 weeks ago for weakness and discharged. Weakness not completely resolved but thought to be related to polypharmacy. Daughter endorses pt being treated for UTI ~7 days ago w/ cefpodoxime. Completed treatment yesterday. Was called and informed enterococcus also grew and was going to be prescribed Macrobid but pt never received Rx. Weakness has been getting worse and pt gradually lost strength while walking with HHA and daughter today and hand to be grabbed. Also more frequent episodes of delirium recently.    In ER: Given IV Zosyn (21 May 2024 02:07)    Hospital Course: Patient was admitted for further medical management.       Important Medication Changes and Reason:    Active or Pending Issues Requiring Follow-up:    Advanced Directives:   [ ] Full code  [ ] DNR  [ ] Hospice    Discharge Diagnoses:         HPI:  89F w/ hx of afib on Eliquis, HFpEF, dementia, HTN, lymphoma s/p Rituxan, asthma p/w worsening weakness and lethargy x 2 weeks. Pt recently admitted to [facility] 2 weeks ago for weakness and discharged. Weakness not completely resolved but thought to be related to polypharmacy. Daughter endorses pt being treated for UTI ~7 days ago w/ cefpodoxime. Completed treatment yesterday. Was called and informed enterococcus also grew and was going to be prescribed Macrobid but pt never received Rx. Weakness has been getting worse and pt gradually lost strength while walking with HHA and daughter today and hand to be grabbed. Also more frequent episodes of delirium recently.    In ER: Given IV Zosyn (21 May 2024 02:07)    Hospital Course: Patient was admitted for further medical management. U/A noted with minimal pyuria s/p completion of 7 days of abx recently. Labwork also noted with leucocytosis, but on chart review seems like pt runs high WBC count at baseline. ID was consulted recommending doubt real UTI - stop empiric abx and monitor off   Neurology and psych also evaluated patient. Weakness deemed likely multifactorial from recent infection but also deconditioning and multiple prior strokes      Discharge/Dispo/Med rec discussed with attending  ____. Patient medically cleared for discharge ____ with outpatient follow up         Important Medication Changes and Reason:    Active or Pending Issues Requiring Follow-up:  - EEG can be outpatient ; no concern for seizures at this time   - Outpatient psych follow up with Dr. Cope     Advanced Directives:   [ ] Full code  [ ] DNR  [ ] Hospice    Discharge Diagnoses:         HPI:  89F w/ hx of afib on Eliquis, HFpEF, dementia, HTN, lymphoma s/p Rituxan, asthma p/w worsening weakness and lethargy x 2 weeks. Pt recently admitted to [facility] 2 weeks ago for weakness and discharged. Weakness not completely resolved but thought to be related to polypharmacy. Daughter endorses pt being treated for UTI ~7 days ago w/ cefpodoxime. Completed treatment yesterday. Was called and informed enterococcus also grew and was going to be prescribed Macrobid but pt never received Rx. Weakness has been getting worse and pt gradually lost strength while walking with HHA and daughter today and hand to be grabbed. Also more frequent episodes of delirium recently.    In ER: Given IV Zosyn (21 May 2024 02:07)    Hospital Course: Patient was admitted for further medical management. U/A noted with minimal pyuria s/p completion of 7 days of abx recently. Labwork also noted with leucocytosis, but on chart review seems like pt runs high WBC count at baseline. ID was consulted recommending doubt real UTI - stop empiric abx and monitor off   Neurology and psych also evaluated patient. Weakness deemed likely multifactorial from recent infection but also deconditioning and multiple prior strokes      Discharge/Dispo/Med rec discussed with attending Dr. Soto. Patient medically cleared for dcscharge ____ with outpatient follow up         Important Medication Changes and Reason:    Active or Pending Issues Requiring Follow-up:  - EEG can be outpatient ; no concern for seizures at this time   - Outpatient psych follow up with Dr. Cope     Advanced Directives:   [ ] Full code  [ ] DNR  [ ] Hospice    Discharge Diagnoses:

## 2024-05-22 NOTE — DISCHARGE NOTE PROVIDER - NSDCFUADDAPPT_GEN_ALL_CORE_FT
APPTS ARE READY TO BE MADE: [x] YES    Best Family or Patient Contact (if needed):    Additional Information about above appointments (if needed):    1:   2:   3:     Other comments or requests:    APPTS ARE READY TO BE MADE: [x] YES    Best Family or Patient Contact (if needed):    Additional Information about above appointments (if needed):    1:   2:   3:     Other comments or requests:   Provided patient with provider referral information, however patient prefers to schedule the appointments on their own.

## 2024-05-22 NOTE — DISCHARGE NOTE PROVIDER - NSDCCPCAREPLAN_GEN_ALL_CORE_FT
PRINCIPAL DISCHARGE DIAGNOSIS  Diagnosis: FTT (failure to thrive) in adult  Assessment and Plan of Treatment: Weakness deemed likely multifactorial from recent infection but also deconditioning and multiple prior strokes  - EEG can be outpatient   Follow-up with your primary care physician within 1 week. Call for appointment.  Please bring all discharge paperwork and list of medications to all follow up appointments  Please call for follow up appointments one day after discharge        SECONDARY DISCHARGE DIAGNOSES  Diagnosis: AARON (generalized anxiety disorder)  Assessment and Plan of Treatment: Outpatient psych follow up with Dr. Cope is recommended

## 2024-05-22 NOTE — PATIENT PROFILE ADULT - NSPROMEDDEVPACEMAKER_GEN_A_NUR
Please soak foot in epsom salt twice a day. Apply medication as directed.   Continue to take your probiotics for your vaginal irritation and follow up with GYN   [employer] [id number], [facility]-[phone], {[id number]}

## 2024-05-22 NOTE — DISCHARGE NOTE PROVIDER - CARE PROVIDERS DIRECT ADDRESSES
[email or web address]

## 2024-05-22 NOTE — DISCHARGE NOTE PROVIDER - PROVIDER TOKENS
PROVIDER:[TOKEN:[[id number]],FOLLOWUP:[1 week]],PROVIDER:[TOKEN:[[id number]],FOLLOWUP:[1 week]]

## 2024-05-22 NOTE — PROGRESS NOTE ADULT - SUBJECTIVE AND OBJECTIVE BOX
89yPatient is a 89y old  Female who presents with a chief complaint of Weakness and UTI (22 May 2024 16:38)      Interval history:  Afebrile, denies pain.       Allergies:   sulfa drugs (Hives)  Cardizem (Urticaria)  OxyContin (Sedation/Somnol; Drowsiness)      Antimicrobials:      REVIEW OF SYSTEMS: ? reliability  No chest pain   No SOB  No N/V    Vital Signs Last 24 Hrs  T(C): 36.3 (05-22-24 @ 17:56), Max: 36.4 (05-22-24 @ 09:06)  T(F): 97.4 (05-22-24 @ 17:56), Max: 97.6 (05-22-24 @ 09:06)  HR: 101 (05-22-24 @ 17:56) (76 - 101)  BP: 170/82 (05-22-24 @ 17:56) (102/76 - 170/82)  BP(mean): --  RR: 18 (05-22-24 @ 17:56) (18 - 18)  SpO2: 93% (05-22-24 @ 17:56) (92% - 96%)      PHYSICAL EXAM:  Pt in no acute distress, awake, communicates  breathing comfortably   non distended abdomen  no edema LE   no phlebitis                             15.9   14.15 )-----------( 100      ( 21 May 2024 09:17 )             50.4   05-21    141  |  99  |  30<H>  ----------------------------<  146<H>  3.3<L>   |  28  |  1.06    Ca    8.1<L>      21 May 2024 09:17  Phos  2.7     05-20  Mg     2.1     05-21    TPro  5.2<L>  /  Alb  3.4  /  TBili  1.5<H>  /  DBili  x   /  AST  17  /  ALT  20  /  AlkPhos  63  05-21      LIVER FUNCTIONS - ( 21 May 2024 09:17 )  Alb: 3.4 g/dL / Pro: 5.2 g/dL / ALK PHOS: 63 U/L / ALT: 20 U/L / AST: 17 U/L / GGT: x               Culture - Urine (collected 21 May 2024 00:20)  Source: Catheterized Catheterized  Preliminary Report (22 May 2024 13:02):    >100,000 CFU/ml Enterococcus faecium

## 2024-05-22 NOTE — PATIENT PROFILE ADULT - FALL HARM RISK - HARM RISK INTERVENTIONS
Assistance with ambulation/Assistance OOB with selected safe patient handling equipment/Communicate Risk of Fall with Harm to all staff/Discuss with provider need for PT consult/Monitor gait and stability/Reinforce activity limits and safety measures with patient and family/Tailored Fall Risk Interventions/Visual Cue: Yellow wristband and red socks/Bed in lowest position, wheels locked, appropriate side rails in place/Call bell, personal items and telephone in reach/Instruct patient to call for assistance before getting out of bed or chair/Non-slip footwear when patient is out of bed/[place] to call system/Physically safe environment - no spills, clutter or unnecessary equipment/Purposeful Proactive Rounding/Room/bathroom lighting operational, light cord in reach

## 2024-05-22 NOTE — DISCHARGE NOTE PROVIDER - CARE PROVIDER_API CALL
Chantal Sosa  Psychiatry  [address]  [place] [address]  Phone: [phone]  Fax: [phone]  Follow Up Time: 1 week    Arabella Parra   Internal Medicine  [address] [place] [address]  Phone: [phone]  Fax: [phone]  Follow Up Time: 1 week

## 2024-05-23 ENCOUNTER — RESULT REVIEW (OUTPATIENT)
Age: 89
End: 2024-05-23

## 2024-05-23 LAB
-  AMPICILLIN: SIGNIFICANT CHANGE UP
-  CIPROFLOXACIN: SIGNIFICANT CHANGE UP
-  DAPTOMYCIN: SIGNIFICANT CHANGE UP
-  LEVOFLOXACIN: SIGNIFICANT CHANGE UP
-  LINEZOLID: SIGNIFICANT CHANGE UP
-  NITROFURANTOIN: SIGNIFICANT CHANGE UP
-  TETRACYCLINE: SIGNIFICANT CHANGE UP
-  VANCOMYCIN: SIGNIFICANT CHANGE UP
ANION GAP SERPL CALC-SCNC: 13 MMOL/L — SIGNIFICANT CHANGE UP (ref 5–17)
BUN SERPL-MCNC: 36 MG/DL — HIGH (ref 7–23)
CALCIUM SERPL-MCNC: 8.3 MG/DL — LOW (ref 8.4–10.5)
CHLORIDE SERPL-SCNC: 103 MMOL/L — SIGNIFICANT CHANGE UP (ref 96–108)
CO2 SERPL-SCNC: 25 MMOL/L — SIGNIFICANT CHANGE UP (ref 22–31)
CREAT SERPL-MCNC: 0.94 MG/DL — SIGNIFICANT CHANGE UP (ref 0.5–1.3)
CULTURE RESULTS: ABNORMAL
EGFR: 58 ML/MIN/1.73M2 — LOW
GLUCOSE SERPL-MCNC: 148 MG/DL — HIGH (ref 70–99)
METHOD TYPE: SIGNIFICANT CHANGE UP
ORGANISM # SPEC MICROSCOPIC CNT: ABNORMAL
ORGANISM # SPEC MICROSCOPIC CNT: ABNORMAL
POTASSIUM SERPL-MCNC: 3.7 MMOL/L — SIGNIFICANT CHANGE UP (ref 3.5–5.3)
POTASSIUM SERPL-SCNC: 3.7 MMOL/L — SIGNIFICANT CHANGE UP (ref 3.5–5.3)
SODIUM SERPL-SCNC: 141 MMOL/L — SIGNIFICANT CHANGE UP (ref 135–145)
SPECIMEN SOURCE: SIGNIFICANT CHANGE UP

## 2024-05-23 PROCEDURE — 93306 TTE W/DOPPLER COMPLETE: CPT | Mod: 26

## 2024-05-23 RX ORDER — LOSARTAN POTASSIUM 100 MG/1
25 TABLET, FILM COATED ORAL DAILY
Refills: 0 | Status: DISCONTINUED | OUTPATIENT
Start: 2024-05-23 | End: 2024-05-30

## 2024-05-23 RX ORDER — HYDRALAZINE HCL 50 MG
10 TABLET ORAL ONCE
Refills: 0 | Status: COMPLETED | OUTPATIENT
Start: 2024-05-23 | End: 2024-05-23

## 2024-05-23 RX ADMIN — Medication 15 MILLIGRAM(S): at 05:49

## 2024-05-23 RX ADMIN — APIXABAN 5 MILLIGRAM(S): 2.5 TABLET, FILM COATED ORAL at 18:03

## 2024-05-23 RX ADMIN — Medication 100 MILLIGRAM(S): at 18:03

## 2024-05-23 RX ADMIN — ATORVASTATIN CALCIUM 20 MILLIGRAM(S): 80 TABLET, FILM COATED ORAL at 22:01

## 2024-05-23 RX ADMIN — Medication 650 MILLIGRAM(S): at 03:57

## 2024-05-23 RX ADMIN — APIXABAN 5 MILLIGRAM(S): 2.5 TABLET, FILM COATED ORAL at 05:48

## 2024-05-23 RX ADMIN — Medication 650 MILLIGRAM(S): at 02:57

## 2024-05-23 RX ADMIN — Medication 15 MILLIGRAM(S): at 18:03

## 2024-05-23 RX ADMIN — Medication 100 MILLIGRAM(S): at 05:48

## 2024-05-23 RX ADMIN — Medication 3 MILLIGRAM(S): at 22:01

## 2024-05-23 RX ADMIN — LOSARTAN POTASSIUM 25 MILLIGRAM(S): 100 TABLET, FILM COATED ORAL at 13:57

## 2024-05-23 RX ADMIN — Medication 25 MICROGRAM(S): at 05:48

## 2024-05-23 RX ADMIN — Medication 10 MILLIGRAM(S): at 02:16

## 2024-05-23 NOTE — CONSULT NOTE ADULT - SUBJECTIVE AND OBJECTIVE BOX
Neurology Consult    Reason for Consult: Patient is a 89y old  Female who presents with a chief complaint of Weakness and UTI (21 May 2024 02:07)      HPI:  89F w/ hx of afib on Eliquis, HFpEF, dementia, HTN, lymphoma s/p Rituxan, asthma p/w worsening weakness and lethargy x 2 weeks. Pt recently admitted to [facility] 2 weeks ago for weakness and discharged. Weakness not completely resolved but thought to be related to polypharmacy. Daughter endorses pt being treated for UTI ~7 days ago w/ cefpodoxime. Completed treatment yesterday. Was called and informed enterococcus also grew and was going to be prescribed Macrobid but pt never received Rx. Weakness has been getting worse and pt gradually lost strength while walking with HHA and daughter today and hand to be grabbed. Also more frequent episodes of delirium recently.    In ER: Given IV Zosyn (21 May 2024 02:07)       PAST MEDICAL & SURGICAL HISTORY:  Atrial fibrillation  dx 7/09 on coumadin      HTN (hypertension)      HLD (hyperlipidemia)      UTI (urinary tract infection)  frequent last was 1/15      Thyroid nodule  followed by endocrinologist      YOVANI on CPAP      OA (osteoarthritis)      Cerebrovascular accident (CVA)      Abscess      FHx: total knee replacement  left 2012      Cataract  b/l lense implant      S/P JAY-BSO  40 years ago      FHx: cholecystectomy  1993 laparoscopic      Fracture  ORIF right ankle 1986      Cardiac pacemaker  placed 2009          Allergies: Allergies    sulfa drugs (Hives)  Cardizem (Urticaria)    Intolerances    OxyContin (Sedation/Somnol; Drowsiness)      Social History: Denies toxic habits including tobacco, ETOH or illicit drugs.    Family History: FAMILY HISTORY:  . No family history of strokes    Medications: MEDICATIONS  (STANDING):  ampicillin  IVPB      ampicillin  IVPB 1 Gram(s) IV Intermittent every 8 hours  apixaban 5 milliGRAM(s) Oral two times a day  busPIRone 15 milliGRAM(s) Oral two times a day  calcium gluconate IVPB 1 Gram(s) IV Intermittent once  levothyroxine 25 MICROGram(s) Oral daily  metoprolol tartrate 100 milliGRAM(s) Oral two times a day  potassium chloride    Tablet ER 40 milliEquivalent(s) Oral once    MEDICATIONS  (PRN):  acetaminophen     Tablet .. 650 milliGRAM(s) Oral every 6 hours PRN Temp greater or equal to 38C (100.4F), Mild Pain (1 - 3)  melatonin 3 milliGRAM(s) Oral at bedtime PRN Insomnia      Review of Systems:  CONSTITUTIONAL:  weakness   HEENT:  Eyes:  No visual loss, blurred vision, double vision or yellow sclera. Ears, Nose, Throat:  No hearing loss, sneezing, congestion, runny nose or sore throat.  SKIN:  No rash or itching.  CARDIOVASCULAR:  No chest pain, chest pressure or chest discomfort. No palpitations or edema.  RESPIRATORY:  No shortness of breath, cough or sputum.  GASTROINTESTINAL:  No anorexia, nausea, vomiting or diarrhea. No abdominal pain or blood.  GENITOURINARY:  No burning on urination or incontinence   NEUROLOGICAL:  _+ confusion at times No headache, dizziness, syncope, paralysis, ataxia, numbness or tingling in the extremities. No change in bowel or bladder control. no limb weakness. no vision changes.   MUSCULOSKELETAL:  No muscle, back pain, joint pain or stiffness.  HEMATOLOGIC:  No anemia, bleeding or bruising.  LYMPHATICS:  No enlarged nodes. No history of splenectomy.  PSYCHIATRIC:  No history of depression or anxiety.  ENDOCRINOLOGIC:  No reports of sweating, cold or heat intolerance. No polyuria or polydipsia.      Vitals:  Vital Signs Last 24 Hrs  T(C): 36.5 (21 May 2024 05:52), Max: 36.9 (21 May 2024 00:00)  T(F): 97.7 (21 May 2024 05:52), Max: 98.5 (21 May 2024 00:00)  HR: 89 (21 May 2024 09:09) (76 - 91)  BP: 109/77 (21 May 2024 09:09) (100/81 - 168/99)  BP(mean): 99 (20 May 2024 21:00) (99 - 99)  RR: 19 (21 May 2024 05:52) (17 - 19)  SpO2: 90% (21 May 2024 05:52) (90% - 96%)    Parameters below as of 21 May 2024 05:52  Patient On (Oxygen Delivery Method): room air        General Exam:   General Appearance: Appropriately dressed and in no acute distress       Head: Normocephalic, atraumatic and no dysmorphic features  Ear, Nose, and Throat: Moist mucous membranes  CVS: S1S2+  Resp: No SOB, no wheeze or rhonchi  GI: soft NT/ND  Extremities: No edema or cyanosis  Skin: No bruises or rashes     Neurological Exam:  Mental Status: Awake, alert and oriented x 2.  Able to follow simple  verbal commands. Able to name and repeat. fluent speech. No obvious aphasia or dysarthria noted.   Cranial Nerves: PERRL, EOMI, LHHA. poor VA,, sensation V1-V3 intact, L ptosis, equal elevation of palate, scm/trap 5/5, tongue is midline on protrusion. no obvious papilledema on fundoscopic exam. hearing is grossly intact.   Motor: Normal bulk, tone and strength throughout. Fine finger movements were intact and symmetric. no tremors or drift noted.    Sensation: Intact to light touch and pinprick throughout. no right/left confusion. no extinction to tactile on DSS.   Reflexes: 1+ throughout at biceps, brachioradialis, triceps, patellars and ankles bilaterally and equal. No clonus. R toe and L toe were both downgoing.  Coordination: No dysmetria on FNF    Gait: walker baseline     Data/Labs/Imaging which I personally reviewed.     Labs:     CBC Full  -  ( 21 May 2024 09:17 )  WBC Count : 14.15 K/uL  RBC Count : 5.51 M/uL  Hemoglobin : 15.9 g/dL  Hematocrit : 50.4 %  Platelet Count - Automated : 100 K/uL  Mean Cell Volume : 91.5 fl  Mean Cell Hemoglobin : 28.9 pg  Mean Cell Hemoglobin Concentration : 31.5 gm/dL  Auto Neutrophil # : 12.19 K/uL  Auto Lymphocyte # : 0.83 K/uL  Auto Monocyte # : 0.69 K/uL  Auto Eosinophil # : 0.01 K/uL  Auto Basophil # : 0.05 K/uL  Auto Neutrophil % : 86.0 %  Auto Lymphocyte % : 5.9 %  Auto Monocyte % : 4.9 %  Auto Eosinophil % : 0.1 %  Auto Basophil % : 0.4 %    05-21    141  |  99  |  30<H>  ----------------------------<  146<H>  3.3<L>   |  28  |  1.06    Ca    8.1<L>      21 May 2024 09:17  Phos  2.7     05-20  Mg     2.1     05-21    TPro  5.2<L>  /  Alb  3.4  /  TBili  1.5<H>  /  DBili  x   /  AST  17  /  ALT  20  /  AlkPhos  63  05-21    LIVER FUNCTIONS - ( 21 May 2024 09:17 )  Alb: 3.4 g/dL / Pro: 5.2 g/dL / ALK PHOS: 63 U/L / ALT: 20 U/L / AST: 17 U/L / GGT: x           PT/INR - ( 20 May 2024 22:57 )   PT: 14.6 sec;   INR: 1.40 ratio         PTT - ( 20 May 2024 22:57 )  PTT:31.4 sec  Urinalysis Basic - ( 21 May 2024 09:17 )    Color: x / Appearance: x / SG: x / pH: x  Gluc: 146 mg/dL / Ketone: x  / Bili: x / Urobili: x   Blood: x / Protein: x / Nitrite: x   Leuk Esterase: x / RBC: x / WBC x   Sq Epi: x / Non Sq Epi: x / Bacteria: x    < from: CT Head No Cont (05.20.24 @ 21:59) >    ACC: [id number] EXAM:  CT BRAIN   ORDERED BY: DOMENICO DIAZ     PROCEDURE DATE:  05/20/2024          INTERPRETATION:  CLINICAL INFORMATION: Altered mental status status post   fall..    COMPARISON: CT head 5/1/2024.    CONTRAST:  IV Contrast: NONE  Complications: None reported at time of study completion    TECHNIQUE:  Serial axial images were obtained from the skull base to the   vertex using multi-slice helical technique. Sagittal and coronal   reformats were obtained.    FINDINGS:    VENTRICLES AND SULCI: Ex vacuo dilatation of the right lateral ventricle.   Age-appropriate involutional changes.  INTRA-AXIAL: No acute hemorrhage, vasogenic edema or evidence for acute   territorial infarct. Chronic right PCA territory infarct. Small chronic  infarcts in the right corona radiata and bilateral thalami. There are   periventricular and subcortical white matter hypodensities, consistent   with microvascular type changes.  EXTRA-AXIAL: Stable small left parafalcine meningiomas, the more anterior   one heavily calcified. No fluid collection. Basal cisterns are clear.    VISUALIZED SINUSES:  Right maxillary sinus polyp versus retention cyst.  TYMPANOMASTOID CAVITIES:  Clear.  VISUALIZED ORBITS: Bilateral lens replacement.  CALVARIUM: Intact.    MISCELLANEOUS: None.      IMPRESSION:  Stable exam. No acute hemorrhage, vasogenic edema or hydrocephalus.   Chronic right PCA territory, small right corona radiata and bilateral   thalami infarcts. Stable small left parafalcine meningiomas.        --- End of Report ---           MORRIS BENAVIDEZ MD; Resident Radiologist  This document has been electronically signed.  MARTÍNEZ COBB MD; Attending Radiologist  This document has been electronically signed. May 20 2024 11:10PM    < end of copied text >        
Patient is a 89y old  Female who presents with a chief complaint of Weakness and UTI (21 May 2024 11:10)      HPI:  89F w/ hx of afib on Eliquis, HFpEF, dementia, HTN, lymphoma s/p Rituxan, asthma p/w worsening weakness and lethargy x 2 weeks. Pt recently admitted to [facility] 2 weeks ago for weakness and discharged. Weakness not completely resolved but thought to be related to polypharmacy. Daughter endorses pt being treated for UTI ~7 days ago w/ cefpodoxime. Completed treatment yesterday. Was called and informed enterococcus also grew and was going to be prescribed Macrobid but pt never received Rx. Weakness has been getting worse and pt gradually lost strength while walking with HHA and daughter today and hand to be grabbed. Also more frequent episodes of delirium recently.  In ER: Given IV Zosyn (21 May 2024 02:07)  Above reviewed:   Pt denies any dysuria, no abdominal pain, no N/V, no flank pain, no fever or chills. Called daughter to discuss and left voicemail.       PAST MEDICAL & SURGICAL HISTORY:  Atrial fibrillation  dx 7/09 on coumadin      HTN (hypertension)      HLD (hyperlipidemia)      UTI (urinary tract infection)  frequent last was 1/15      Thyroid nodule  followed by endocrinologist      YOVANI on CPAP      OA (osteoarthritis)      Cerebrovascular accident (CVA)      Abscess      FHx: total knee replacement  left 2012      Cataract  b/l lense implant      S/P JAY-BSO  40 years ago      FHx: cholecystectomy  1993 laparoscopic      Fracture  ORIF right ankle 1986      Cardiac pacemaker  placed 2009          REVIEW OF SYSTEMS: ? reliability due to underlying dementia.     General: No Fevers, no chills     Skin: No rash  	  Ophthalmologic: Denies any discharge, redness.  	  ENMT: No nasal congestion or throat pain.     Respiratory and Thorax: No cough, Denies shortness of breath.  	  Cardiovascular: No chest pain,     Gastrointestinal: No nausea, abdominal pain or diarrhea.    Genitourinary: No dysuria,     Musculoskeletal: No joint swelling     Neurological: No new extremity weakness.    Psychiatric: No hallucinations	    Extremities: No swelling     Endocrine: No abnormal heat or cold intolerance     Allergic/Immunologic:	No hives        Social history:  From home, no smoking       FAMILY HISTORY:  No family hx of lymphoma in first degree relative.         Allergies  sulfa drugs (Hives)  Cardizem (Urticaria)      Intolerances  OxyContin (Sedation/Somnol; Drowsiness)      Antimicrobials:  ampicillin  IVPB 1 Gram(s) IV Intermittent every 8 hours  ampicillin  IVPB            Vital Signs Last 24 Hrs  T(C): 37.1 (21 May 2024 15:14), Max: 37.1 (21 May 2024 15:14)  T(F): 98.7 (21 May 2024 15:14), Max: 98.7 (21 May 2024 15:14)  HR: 76 (21 May 2024 17:20) (76 - 95)  BP: 171/98 (21 May 2024 17:20) (100/81 - 171/98)  BP(mean): 99 (20 May 2024 21:00) (99 - 99)  RR: 18 (21 May 2024 15:14) (17 - 19)  SpO2: 95% (21 May 2024 15:14) (90% - 96%)    Parameters below as of 21 May 2024 15:14  Patient On (Oxygen Delivery Method): room air        PHYSICAL EXAM: Patient in no acute distress.    Constitutional: Comfortable. Awake and communicative     Eyes: No discharge or conjunctival injection    ENMT: No thrush. No pharyngeal erythema.    Neck: Supple,     Respiratory:  + air entry bilaterally.    Cardiovascular: S1 S2 wnl,     Gastrointestinal: Soft, no tenderness, non distended.    Genitourinary: No pepe    Extremities: No edema.    Vascular: peripheral pulses felt    Neurological: No new gross focal deficits.    Skin: No rash, scattered ecchymosis     Musculoskeletal: No joint swelling.    Psychiatric: Affect normal.                              15.9   14.15 )-----------( 100      ( 21 May 2024 09:17 )             50.4       05-21    141  |  99  |  30<H>  ----------------------------<  146<H>  3.3<L>   |  28  |  1.06    Ca    8.1<L>      21 May 2024 09:17  Phos  2.7     05-20  Mg     2.1     05-21    TPro  5.2<L>  /  Alb  3.4  /  TBili  1.5<H>  /  DBili  x   /  AST  17  /  ALT  20  /  AlkPhos  63  05-21        Urinalysis + Microscopic Examination (05.21.24 @ 00:20)   pH Urine: 7.5  Urine Appearance: Clear  Color: Dark Yellow  Specific Gravity: 1.025  Protein, Urine: 30 mg/dL  Glucose Qualitative, Urine: Negative mg/dL  Ketone - Urine: Trace mg/dL  Blood, Urine: Negative  Bilirubin: Small  Urobilinogen: 1.0 mg/dL  Leukocyte Esterase Concentration: Small  Nitrite: Negative  Review: Reviewed  White Blood Cell - Urine: 15 /HPF  Red Blood Cell - Urine: 1 /HPF  Bacteria: Few /HPF  Cast: 7 /LPF  Epithelial Cells: 1 /HPF    Radiology: Imaging reviewed and visualized personally [ x]    < from: Xray Chest 1 View- PORTABLE-Urgent (Xray Chest 1 View- PORTABLE-Urgent .) (05.20.24 @ 23:31) >    IMPRESSION:  Right midlung linear atelectasis.      < from: CT Head No Cont (05.20.24 @ 21:59) >  IMPRESSION:  Stable exam. No acute hemorrhage, vasogenic edema or hydrocephalus.   Chronic right PCA territory, small right corona radiata and bilateral   thalami infarcts. Stable small left parafalcine meningiomas.                
C A R D I O L O G Y  *********************    DATE OF SERVICE: 05-23-24    HISTORY OF PRESENT ILLNESS: HPI:  Patient is a 89 y/o female with PMH of CVA, YOVANI on CPAP, HTN, HLD, persistent afib on Eliquis s/p Medtronic PPM, and diastolic heart failure who presented with weakness. Cardiology consulted for hypertension. Patient feels fatigued. Denies chest pain, SOB, palpitations, dizziness, or syncope.    PAST MEDICAL & SURGICAL HISTORY:  Atrial fibrillation  dx 7/09 on coumadin      HTN (hypertension)      HLD (hyperlipidemia)      UTI (urinary tract infection)  frequent last was 1/15      Thyroid nodule  followed by endocrinologist      YOVANI on CPAP      OA (osteoarthritis)      Cerebrovascular accident (CVA)      Abscess      FHx: total knee replacement  left 2012      Cataract  b/l lense implant      S/P JAY-BSO  40 years ago      FHx: cholecystectomy  1993 laparoscopic      Fracture  ORIF right ankle 1986      Cardiac pacemaker  placed 2009              MEDICATIONS:  MEDICATIONS  (STANDING):  apixaban 5 milliGRAM(s) Oral two times a day  atorvastatin 20 milliGRAM(s) Oral at bedtime  busPIRone 15 milliGRAM(s) Oral two times a day  levothyroxine 25 MICROGram(s) Oral daily  losartan 25 milliGRAM(s) Oral daily  metoprolol tartrate 100 milliGRAM(s) Oral two times a day      Allergies    sulfa drugs (Hives)  Cardizem (Urticaria)    Intolerances    OxyContin (Sedation/Somnol; Drowsiness)      FAMILY HISTORY:    Non-contributary for premature coronary disease or sudden cardiac death    SOCIAL HISTORY:    [x ] Non-smoker  [ ] Smoker  [ ] Alcohol    FLU VACCINE THIS YEAR STARTS IN AUGUST:  [ ] Yes    [ ] No    IF OVER 65 HAVE YOU EVER HAD A PNA VACCINE:  [ ] Yes    [ ] No       [ ] N/A      REVIEW OF SYSTEMS:  [ ]chest pain  [  ]shortness of breath  [  ]palpitations  [  ]syncope  [ ]near syncope [ ]upper extremity weakness   [ ] lower extremity weakness  [  ]diplopia  [  ]altered mental status   [  ]fevers  [ ]chills [ ]nausea  [ ]vomiting  [  ]dysphagia    [ ]abdominal pain  [ ]melena  [ ]BRBPR    [  ]epistaxis  [  ]rash    [ ]lower extremity edema    +weakness/fatigue    [X] All others negative	  [ ] Unable to obtain      LABS:	 	    CARDIAC MARKERS:          Hb Trend:     05-23    141  |  103  |  36<H>  ----------------------------<  148<H>  3.7   |  25  |  0.94    Ca    8.3<L>      23 May 2024 11:51      Creatinine Trend: 0.94<--, 1.06<--, 1.13<--, 0.94<--, 1.09<--, 1.05<--    Coags:      proBNP:   Lipid Profile:   HgA1c:   TSH:         PHYSICAL EXAM:  T(C): 36.2 (05-23-24 @ 08:04), Max: 36.3 (05-22-24 @ 17:56)  HR: 68 (05-23-24 @ 08:04) (68 - 101)  BP: 152/95 (05-23-24 @ 08:04) (143/96 - 170/82)  RR: 18 (05-23-24 @ 08:04) (18 - 18)  SpO2: 94% (05-23-24 @ 08:04) (93% - 97%)  Wt(kg): --   BMI (kg/m2): 30.1 (05-20-24 @ 19:56)  I&O's Summary      Gen: NAD  HEENT:  (-)icterus (-)pallor  CV: N S1 S2 1/6 DMITRIY (+)2 Pulses B/l  Resp:  Clear to auscultation B/L, normal effort  GI: (+) BS Soft, NT, ND  Lymph:  (-)Edema, (-)obvious lymphadenopathy  Skin: Warm to touch, Normal turgor  Psych: Appropriate mood and affect      TELEMETRY: None	      ECG: Afib at 84 bpm 	    RADIOLOGY:         CXR:   < from: Xray Chest 1 View- PORTABLE-Urgent (Xray Chest 1 View- PORTABLE-Urgent .) (05.20.24 @ 23:31) >  IMPRESSION:  Right midlung linear atelectasis.    --- End of Report ---    < end of copied text >    ASSESSMENT/PLAN: Patient is a 89 y/o female with PMH of CVA, YOVANI on CPAP, HTN, HLD, persistent afib on Eliquis s/p Medtronic PPM, and diastolic heart failure who presented with weakness. Cardiology consulted for hypertension.    #HTN  - Start Losartan 25mg daily  - Continue metoprolol 100mg BID    #Persistent Afib  - Continue Eliquis for stroke prevention  - Rate control with metoprolol    #Chronic Diastolic CHF  - Patient with multiple echos in sunrise with normal LV function however noted with recent outpatient echo with EF 30-35 in chart  - Check repeat echo to reeval LV function    - Patient to f/u with her [facility] cardiologist Dr. Patrice Mullins and [place] [facility] Dr. Schilling after discharge    Gagandeep Joya PA-C  Pager: [phone]

## 2024-05-23 NOTE — CHART NOTE - NSCHARTNOTEFT_GEN_A_CORE
Patient was outreached but did not answer. A voicemail was left for the patient to return our call.
pt was seen and examined   spoke with daughter at length  pt with worsening weakness and confusion  - neurology and psych called  - likely early dementia
Writer spoke to Dr Jean and stated that MRI and EEG are not needed inpatient and can be done outpatient for dementia workup ; additionally would not change medical management . Will inform daughter.

## 2024-05-23 NOTE — PROGRESS NOTE ADULT - SUBJECTIVE AND OBJECTIVE BOX
DATE OF SERVICE: 05-23-24 @ 13:31    Patient is a 89y old  Female who presents with a chief complaint of Weakness and UTI (22 May 2024 18:07)      SUBJECTIVE / OVERNIGHT EVENTS:  No chest pain. No shortness of breath. No complaints. No events overnight.     MEDICATIONS  (STANDING):  apixaban 5 milliGRAM(s) Oral two times a day  atorvastatin 20 milliGRAM(s) Oral at bedtime  busPIRone 15 milliGRAM(s) Oral two times a day  levothyroxine 25 MICROGram(s) Oral daily  losartan 25 milliGRAM(s) Oral daily  metoprolol tartrate 100 milliGRAM(s) Oral two times a day    MEDICATIONS  (PRN):  acetaminophen     Tablet .. 650 milliGRAM(s) Oral every 6 hours PRN Temp greater or equal to 38C (100.4F), Mild Pain (1 - 3)  melatonin 3 milliGRAM(s) Oral at bedtime PRN Insomnia  ondansetron Injectable 4 milliGRAM(s) IV Push once PRN Nausea and/or Vomiting      Vital Signs Last 24 Hrs  T(C): 36.2 (23 May 2024 08:04), Max: 36.3 (22 May 2024 17:56)  T(F): 97.2 (23 May 2024 08:04), Max: 97.4 (22 May 2024 17:56)  HR: 68 (23 May 2024 08:04) (68 - 101)  BP: 152/95 (23 May 2024 08:04) (143/96 - 170/82)  BP(mean): --  RR: 18 (23 May 2024 08:04) (18 - 18)  SpO2: 94% (23 May 2024 08:04) (93% - 97%)    Parameters below as of 23 May 2024 08:04  Patient On (Oxygen Delivery Method): room air      CAPILLARY BLOOD GLUCOSE        I&O's Summary      PHYSICAL EXAM:  GENERAL: NAD, well-developed  HEAD:  Atraumatic, Normocephalic  EYES: EOMI, PERRLA, conjunctiva and sclera clear  NECK: Supple, No JVD  CHEST/LUNG: Clear to auscultation bilaterally; No wheeze  HEART: Regular rate and rhythm; No murmurs, rubs, or gallops  ABDOMEN: Soft, Nontender, Nondistended; Bowel sounds present  EXTREMITIES:  2+ Peripheral Pulses, No clubbing, cyanosis, or edema  PSYCH: AAOx1  NEUROLOGY: non-focal  SKIN: No rashes or lesions    LABS:    05-23    141  |  103  |  36<H>  ----------------------------<  148<H>  3.7   |  25  |  0.94    Ca    8.3<L>      23 May 2024 11:51            Urinalysis Basic - ( 23 May 2024 11:51 )    Color: x / Appearance: x / SG: x / pH: x  Gluc: 148 mg/dL / Ketone: x  / Bili: x / Urobili: x   Blood: x / Protein: x / Nitrite: x   Leuk Esterase: x / RBC: x / WBC x   Sq Epi: x / Non Sq Epi: x / Bacteria: x        RADIOLOGY & ADDITIONAL TESTS:    Imaging Personally Reviewed:    Consultant(s) Notes Reviewed:      Care Discussed with Consultants/Other Providers:

## 2024-05-23 NOTE — BH CONSULTATION LIAISON ASSESSMENT NOTE - SUMMARY
[age]F living with daughter and dtr's boyfriend, has HHA 3d/week, retired, with PPHx anxiety on Buspar, sees OP psych Dr. Cope via [facility], no prior SA Or psych admissions, no active substance abuse, PMH significant for afib on Eliquis, HFpEF, HTN, lymphoma s/p Rituxan, asthma p/w worsening weakness and lethargy x 2 weeks, s/p recent tx for UTI, psychiatry consulted for management of agitation.  Pt disoriented but pleasant, presently calm.  Daughter states pt can become mildly agitated at times, has not been at baseline mental status for some time following a rehab stay and becoming progressively weaker.  Dtr would like to think about antipsychotic use.  States pt's anxiety has been well-controlled with OP use of Buspar, has been very sensitive to other psychotropics.  Presentation c/w delirium 2/2 [facility] [place], r/o dementia.

## 2024-05-23 NOTE — CONSULT NOTE ADULT - ASSESSMENT
89F w/ afib on MD Anil, HFpEF, dementia, HTN, lymphoma s/p Rituxan, asthma p/w worsening weakness and lethargy x 2 weeks. Pt recently admitted to [facility] 2 weeks ago for weakness and discharged. Weakness not completely resolved but thought to be related to polypharmacy. Daughter endorses pt being treated for UTI ~7 days ago w/ cefpodoxime. Completed treatment day PTA. Was called and informed enterococcus also grew and was going to be prescribed Macrobid but pt never received Rx. Weakness has been getting worse and pt gradually lost strength while walking  Also more frequent episodes of delirium recently.  CTH 5/20: chronic R PCA infarc and R CR and B/L thalalmic infarcts as well as left parafalcine meningioma   + leuckocytosis   o/e L ptosis (? new last few weeks), LHHA, AAOx2, poor visual acuity     Imprssion:   1) Dementia with sundowning and episodes of delerium especially given her macular degeneration and poor vision   2) weakness likely multifactorial from recent infection but also deconditioning and multile prior strokes   3) multiple chronic strokes   4) meningioma     - check reversible causes of dementia . check B12, RPR, TSH (*WNL) if never checked   - zosyn in ED now on ampicillin  - c/w elqiuis for AF  - for secondary stroke prevention should also be on statin therpay with LDL goal < 70mg/dL if no objection.  start atorvastatin 20mg QHS  - EEG can be outpatient ; no concern for seiuzres at this time   - delerium precautions   - seroquel or zyprexa PRN for agitation/sundowning if QTC < 500   - if no improvement with treatent of infection can consider MRI brain as part of dementia workup but unlikely to change managemnt   - PT/OT    - Hemoglobin A1c and lipid panel   - telemetry  - PT/OT   - check FS, glucose control <180  - GI/DVT ppx  - Counseling on diet, exercise, and medication adherence was done  - Counseling on smoking cessation and alcohol consumption offered when appropriate.  - Pain assessed and judicious use of narcotics when appropriate was discussed.    - Stroke education given when appropriate.  - Importance of fall prevention discussed.   - Differential diagnosis and plan of care discussed with patient and/or family and primary team  - Thank you for allowing me to participate in the care of this patient. Call with questions.   - spoke with daughter at bedside   Narayan Jean MD  Vascular Neurology  Office: [phone] 
89F w/ hx of afib on Eliquis, HFpEF, dementia, HTN, lymphoma s/p Rituxan, asthma p/w worsening weakness and lethargy x 2 weeks.      No fever   U/A with minimal pyuria,   pt s/p completion of 7 days of abx recently.  pt with leucocytosis, but on chart review seems like pt runs high WBC count at baseline.     Overall Abnormal U/A, leucocytosis, lethargy.     Doubt real UTI, U/A with minimal pyuria.  Look for alternative etiology of weakness and lethargy.       PLAN:   Recommend stopping abx and monitor off   urine cx in lab, not planning to treat at this time even if urine cx negative unless change in clinical status.   if any change in clinical status can re-evaluate need for abx.       Plan discussed with Medicine ACP.     José Antonio Loza  Please contact through MS Teams   If no response or past 5 pm/weekend call [phone].           
Agree with above assessment and plan as outlined above.    - [facility] 2d echo  - cont elquis for now    Herman Shoemaker MD, [facility]  BEEPER [phone]      -

## 2024-05-24 RX ORDER — EPLERENONE 50 MG/1
25 TABLET, FILM COATED ORAL DAILY
Refills: 0 | Status: DISCONTINUED | OUTPATIENT
Start: 2024-05-24 | End: 2024-05-30

## 2024-05-24 RX ADMIN — Medication 3 MILLIGRAM(S): at 22:26

## 2024-05-24 RX ADMIN — Medication 15 MILLIGRAM(S): at 17:56

## 2024-05-24 RX ADMIN — Medication 15 MILLIGRAM(S): at 06:09

## 2024-05-24 RX ADMIN — LOSARTAN POTASSIUM 25 MILLIGRAM(S): 100 TABLET, FILM COATED ORAL at 06:09

## 2024-05-24 RX ADMIN — Medication 100 MILLIGRAM(S): at 17:56

## 2024-05-24 RX ADMIN — Medication 25 MICROGRAM(S): at 06:08

## 2024-05-24 RX ADMIN — Medication 100 MILLIGRAM(S): at 06:09

## 2024-05-24 RX ADMIN — ATORVASTATIN CALCIUM 20 MILLIGRAM(S): 80 TABLET, FILM COATED ORAL at 22:25

## 2024-05-24 RX ADMIN — APIXABAN 5 MILLIGRAM(S): 2.5 TABLET, FILM COATED ORAL at 06:08

## 2024-05-24 RX ADMIN — APIXABAN 5 MILLIGRAM(S): 2.5 TABLET, FILM COATED ORAL at 17:56

## 2024-05-24 NOTE — PROGRESS NOTE ADULT - SUBJECTIVE AND OBJECTIVE BOX
C A R D I O L O G Y  **********************************     DATE OF SERVICE: 05-24-24    Patient denies chest pain or shortness of breath. BP improving today. Review of systems otherwise negative.  	  MEDICATIONS:  MEDICATIONS  (STANDING):  apixaban 5 milliGRAM(s) Oral two times a day  atorvastatin 20 milliGRAM(s) Oral at bedtime  busPIRone 15 milliGRAM(s) Oral two times a day  eplerenone 25 milliGRAM(s) Oral daily  levothyroxine 25 MICROGram(s) Oral daily  losartan 25 milliGRAM(s) Oral daily  metoprolol tartrate 100 milliGRAM(s) Oral two times a day      LABS:	 	    CARDIAC MARKERS:            Hemoglobin: 15.9 g/dL (05-21 @ 09:17)  Hemoglobin: 16.9 g/dL (05-20 @ 22:57)      05-23    141  |  103  |  36<H>  ----------------------------<  148<H>  3.7   |  25  |  0.94    Ca    8.3<L>      23 May 2024 11:51      Creatinine Trend: 0.94<--, 1.06<--, 1.13<--, 0.94<--, 1.09<--, 1.05<--    COAGS:       proBNP:   Lipid Profile:   HgA1c:   TSH:       PHYSICAL EXAM:  T(C): 36.7 (05-24-24 @ 08:21), Max: 37.1 (05-24-24 @ 00:06)  HR: 76 (05-24-24 @ 08:21) (69 - 94)  BP: 128/89 (05-24-24 @ 08:21) (128/89 - 144/98)  RR: 18 (05-24-24 @ 08:21) (18 - 18)  SpO2: 96% (05-24-24 @ 08:21) (96% - 97%)  Wt(kg): --  I&O's Summary    23 May 2024 07:01  -  24 May 2024 07:00  --------------------------------------------------------  IN: 0 mL / OUT: 300 mL / NET: -300 mL          Gen: NAD  HEENT:  (-)icterus (-)pallor  CV: N S1 S2 1/6 DMITRIY (+)2 Pulses B/l  Resp:  Clear to auscultation B/L, normal effort  GI: (+) BS Soft, NT, ND  Lymph:  (-)Edema, (-)obvious lymphadenopathy  Skin: Warm to touch, Normal turgor  Psych: Appropriate mood and affect      TELEMETRY: None	        < from: TTE W or WO Ultrasound Enhancing Agent (05.23.24 @ 14:48) >  CONCLUSIONS:      1. Patient refused a complete exam hence this echo protocol was not finished.   2. Left ventricular cavity is small. Left ventricular wall thickness is normal. Left ventricular systolic function is normal. Thereare no regional wall motion abnormalities seen.   3. Normal left ventricular diastolic function, with normal filling pressure.   4. Normal right ventricular cavity size, with normal wall thickness, and normal systolic function.   5. The left atrium is severely dilated.   6. No significant valvular disease.   7. There is severe tricuspid regurgitation. Estimated pulmonary artery systolic pressure is 36 mmHg.   8. No pericardial effusion seen.   9. Compared to the transthoracic echocardiogram performed on 1/24/2024, there is severe tricuspid regurgitation.    < end of copied text >    ASSESSMENT/PLAN: Patient is a 89 y/o female with PMH of CVA, YOVANI on CPAP, HTN, HLD, persistent afib on Eliquis s/p Medtronic PPM, and diastolic heart failure who presented with weakness. Cardiology consulted for hypertension.    #HTN  - Continue metoprolol 100mg BID and Losartan 25mg daily. Will restart home Eplerenone 25mg daily.    #Persistent Afib  - Continue Eliquis for stroke prevention  - Rate control with metoprolol    #Chronic Diastolic CHF  - Patient with multiple echos in sunrise with normal LV function however noted with recent outpatient echo with EF 30-35 in chart  - Repeat echo with normal LV/RV function, severe TR  - Appears euvolemic    - No further inpatient cardiac w/u planned  - Patient to f/u with her [facility] cardiologist Dr. Patrice Mullins and [facility] Dr. Schilling after discharge    Gagandeep Joya PA-C  Pager: [phone]

## 2024-05-24 NOTE — PROGRESS NOTE ADULT - SUBJECTIVE AND OBJECTIVE BOX
DATE OF SERVICE: 05-24-24 @ 15:50    Patient is a 89y old  Female who presents with a chief complaint of Weakness and UTI (24 May 2024 14:24)      SUBJECTIVE / OVERNIGHT EVENTS:  No chest pain. No shortness of breath. No complaints. No events overnight.     MEDICATIONS  (STANDING):  apixaban 5 milliGRAM(s) Oral two times a day  atorvastatin 20 milliGRAM(s) Oral at bedtime  busPIRone 15 milliGRAM(s) Oral two times a day  eplerenone 25 milliGRAM(s) Oral daily  levothyroxine 25 MICROGram(s) Oral daily  losartan 25 milliGRAM(s) Oral daily  metoprolol tartrate 100 milliGRAM(s) Oral two times a day    MEDICATIONS  (PRN):  acetaminophen     Tablet .. 650 milliGRAM(s) Oral every 6 hours PRN Temp greater or equal to 38C (100.4F), Mild Pain (1 - 3)  melatonin 3 milliGRAM(s) Oral at bedtime PRN Insomnia  ondansetron Injectable 4 milliGRAM(s) IV Push once PRN Nausea and/or Vomiting      Vital Signs Last 24 Hrs  T(C): 36.7 (24 May 2024 08:21), Max: 37.1 (24 May 2024 00:06)  T(F): 98 (24 May 2024 08:21), Max: 98.7 (24 May 2024 00:06)  HR: 76 (24 May 2024 08:21) (69 - 94)  BP: 128/89 (24 May 2024 08:21) (128/89 - 144/98)  BP(mean): --  RR: 18 (24 May 2024 08:21) (18 - 18)  SpO2: 96% (24 May 2024 08:21) (96% - 97%)    Parameters below as of 24 May 2024 08:21  Patient On (Oxygen Delivery Method): room air      CAPILLARY BLOOD GLUCOSE        I&O's Summary    23 May 2024 07:01  -  24 May 2024 07:00  --------------------------------------------------------  IN: 0 mL / OUT: 300 mL / NET: -300 mL        PHYSICAL EXAM:  GENERAL: NAD, well-developed  HEAD:  Atraumatic, Normocephalic  EYES: EOMI, PERRLA, conjunctiva and sclera clear  NECK: Supple, No JVD  CHEST/LUNG: Clear to auscultation bilaterally; No wheeze  HEART: Regular rate and rhythm; No murmurs, rubs, or gallops  ABDOMEN: Soft, Nontender, Nondistended; Bowel sounds present  EXTREMITIES:  2+ Peripheral Pulses, No clubbing, cyanosis, or edema  PSYCH: AAOx1  NEUROLOGY: non-focal  SKIN: No rashes or lesions    LABS:    05-23    141  |  103  |  36<H>  ----------------------------<  148<H>  3.7   |  25  |  0.94    Ca    8.3<L>      23 May 2024 11:51            Urinalysis Basic - ( 23 May 2024 11:51 )    Color: x / Appearance: x / SG: x / pH: x  Gluc: 148 mg/dL / Ketone: x  / Bili: x / Urobili: x   Blood: x / Protein: x / Nitrite: x   Leuk Esterase: x / RBC: x / WBC x   Sq Epi: x / Non Sq Epi: x / Bacteria: x    < from: TTE W or WO Ultrasound Enhancing Agent (05.23.24 @ 14:48) >  CONCLUSIONS:      1. Patient refused a complete exam hence this echo protocol was not finished.   2. Left ventricular cavity is small. Left ventricular wall thickness is normal. Left ventricular systolic function is normal. Thereare no regional wall motion abnormalities seen.   3. Normal left ventricular diastolic function, with normal filling pressure.   4. Normal right ventricular cavity size, with normal wall thickness, and normal systolic function.   5. The left atrium is severely dilated.   6. No significant valvular disease.   7. There is severe tricuspid regurgitation. Estimated pulmonary artery systolic pressure is 36 mmHg.   8. No pericardial effusion seen.   9. Compared to the transthoracic echocardiogram performed on 1/24/2024, there is severe tricuspid regurgitation.      < end of copied text >      RADIOLOGY & ADDITIONAL TESTS:    Imaging Personally Reviewed:    Consultant(s) Notes Reviewed:      Care Discussed with Consultants/Other Providers:

## 2024-05-25 RX ORDER — POLYETHYLENE GLYCOL 3350 17 G/17G
17 POWDER, FOR SOLUTION ORAL DAILY
Refills: 0 | Status: DISCONTINUED | OUTPATIENT
Start: 2024-05-25 | End: 2024-05-25

## 2024-05-25 RX ORDER — SIMETHICONE 80 MG/1
80 TABLET, CHEWABLE ORAL ONCE
Refills: 0 | Status: COMPLETED | OUTPATIENT
Start: 2024-05-25 | End: 2024-05-25

## 2024-05-25 RX ORDER — POLYETHYLENE GLYCOL 3350 17 G/17G
17 POWDER, FOR SOLUTION ORAL DAILY
Refills: 0 | Status: COMPLETED | OUTPATIENT
Start: 2024-05-25 | End: 2024-05-27

## 2024-05-25 RX ADMIN — APIXABAN 5 MILLIGRAM(S): 2.5 TABLET, FILM COATED ORAL at 18:06

## 2024-05-25 RX ADMIN — POLYETHYLENE GLYCOL 3350 17 GRAM(S): 17 POWDER, FOR SOLUTION ORAL at 10:54

## 2024-05-25 RX ADMIN — EPLERENONE 25 MILLIGRAM(S): 50 TABLET, FILM COATED ORAL at 05:26

## 2024-05-25 RX ADMIN — Medication 15 MILLIGRAM(S): at 18:06

## 2024-05-25 RX ADMIN — Medication 100 MILLIGRAM(S): at 18:06

## 2024-05-25 RX ADMIN — SIMETHICONE 80 MILLIGRAM(S): 80 TABLET, CHEWABLE ORAL at 11:26

## 2024-05-25 RX ADMIN — Medication 25 MICROGRAM(S): at 05:27

## 2024-05-25 RX ADMIN — Medication 3 MILLIGRAM(S): at 22:06

## 2024-05-25 RX ADMIN — LOSARTAN POTASSIUM 25 MILLIGRAM(S): 100 TABLET, FILM COATED ORAL at 05:26

## 2024-05-25 RX ADMIN — APIXABAN 5 MILLIGRAM(S): 2.5 TABLET, FILM COATED ORAL at 05:26

## 2024-05-25 RX ADMIN — ATORVASTATIN CALCIUM 20 MILLIGRAM(S): 80 TABLET, FILM COATED ORAL at 22:06

## 2024-05-25 RX ADMIN — Medication 100 MILLIGRAM(S): at 05:26

## 2024-05-25 RX ADMIN — Medication 15 MILLIGRAM(S): at 05:26

## 2024-05-25 NOTE — PROGRESS NOTE ADULT - SUBJECTIVE AND OBJECTIVE BOX
DATE OF SERVICE: 05-25-24 @ 10:23    Patient is a 89y old  Female who presents with a chief complaint of Weakness and UTI (24 May 2024 15:50)        SUBJECTIVE / OVERNIGHT EVENTS:  No chest pain. No shortness of breath. No complaints. No events overnight.     MEDICATIONS  (STANDING):  apixaban 5 milliGRAM(s) Oral two times a day  atorvastatin 20 milliGRAM(s) Oral at bedtime  busPIRone 15 milliGRAM(s) Oral two times a day  eplerenone 25 milliGRAM(s) Oral daily  levothyroxine 25 MICROGram(s) Oral daily  losartan 25 milliGRAM(s) Oral daily  metoprolol tartrate 100 milliGRAM(s) Oral two times a day  polyethylene glycol 3350 17 Gram(s) Oral daily    MEDICATIONS  (PRN):  acetaminophen     Tablet .. 650 milliGRAM(s) Oral every 6 hours PRN Temp greater or equal to 38C (100.4F), Mild Pain (1 - 3)  melatonin 3 milliGRAM(s) Oral at bedtime PRN Insomnia  ondansetron Injectable 4 milliGRAM(s) IV Push once PRN Nausea and/or Vomiting      Vital Signs Last 24 Hrs  T(C): 36.4 (25 May 2024 08:04), Max: 36.4 (25 May 2024 08:04)  T(F): 97.5 (25 May 2024 08:04), Max: 97.5 (25 May 2024 08:04)  HR: 77 (25 May 2024 08:04) (65 - 97)  BP: 166/80 (25 May 2024 08:04) (154/96 - 166/80)  BP(mean): --  RR: 18 (25 May 2024 08:04) (18 - 18)  SpO2: 93% (25 May 2024 08:04) (93% - 95%)    Parameters below as of 25 May 2024 08:04  Patient On (Oxygen Delivery Method): room air      CAPILLARY BLOOD GLUCOSE        I&O's Summary      PHYSICAL EXAM:  GENERAL: NAD, well-developed  HEAD:  Atraumatic, Normocephalic  EYES: EOMI, PERRLA, conjunctiva and sclera clear  NECK: Supple, No JVD  CHEST/LUNG: Clear to auscultation bilaterally; No wheeze  HEART: Regular rate and rhythm; No murmurs, rubs, or gallops  ABDOMEN: Soft, Nontender, Nondistended; Bowel sounds present  EXTREMITIES:  2+ Peripheral Pulses, No clubbing, cyanosis, or edema  PSYCH: AAOx1  NEUROLOGY: non-focal  SKIN: No rashes or lesions    LABS:    05-23    141  |  103  |  36<H>  ----------------------------<  148<H>  3.7   |  25  |  0.94    Ca    8.3<L>      23 May 2024 11:51            Urinalysis Basic - ( 23 May 2024 11:51 )    Color: x / Appearance: x / SG: x / pH: x  Gluc: 148 mg/dL / Ketone: x  / Bili: x / Urobili: x   Blood: x / Protein: x / Nitrite: x   Leuk Esterase: x / RBC: x / WBC x   Sq Epi: x / Non Sq Epi: x / Bacteria: x        RADIOLOGY & ADDITIONAL TESTS:    Imaging Personally Reviewed:    Consultant(s) Notes Reviewed:      Care Discussed with Consultants/Other Providers:

## 2024-05-26 RX ORDER — SENNA PLUS 8.6 MG/1
2 TABLET ORAL AT BEDTIME
Refills: 0 | Status: DISCONTINUED | OUTPATIENT
Start: 2024-05-26 | End: 2024-05-30

## 2024-05-26 RX ADMIN — Medication 100 MILLIGRAM(S): at 17:56

## 2024-05-26 RX ADMIN — Medication 100 MILLIGRAM(S): at 05:47

## 2024-05-26 RX ADMIN — ATORVASTATIN CALCIUM 20 MILLIGRAM(S): 80 TABLET, FILM COATED ORAL at 21:15

## 2024-05-26 RX ADMIN — SENNA PLUS 2 TABLET(S): 8.6 TABLET ORAL at 21:15

## 2024-05-26 RX ADMIN — APIXABAN 5 MILLIGRAM(S): 2.5 TABLET, FILM COATED ORAL at 05:46

## 2024-05-26 RX ADMIN — APIXABAN 5 MILLIGRAM(S): 2.5 TABLET, FILM COATED ORAL at 17:55

## 2024-05-26 RX ADMIN — Medication 25 MICROGRAM(S): at 05:46

## 2024-05-26 RX ADMIN — Medication 15 MILLIGRAM(S): at 17:55

## 2024-05-26 RX ADMIN — LOSARTAN POTASSIUM 25 MILLIGRAM(S): 100 TABLET, FILM COATED ORAL at 05:46

## 2024-05-26 RX ADMIN — EPLERENONE 25 MILLIGRAM(S): 50 TABLET, FILM COATED ORAL at 05:46

## 2024-05-26 RX ADMIN — Medication 15 MILLIGRAM(S): at 05:46

## 2024-05-26 RX ADMIN — POLYETHYLENE GLYCOL 3350 17 GRAM(S): 17 POWDER, FOR SOLUTION ORAL at 12:13

## 2024-05-26 RX ADMIN — Medication 3 MILLIGRAM(S): at 21:15

## 2024-05-26 NOTE — PROGRESS NOTE ADULT - SUBJECTIVE AND OBJECTIVE BOX
DATE OF SERVICE: 05-26-24 @ 10:10    Patient is a 89y old  Female who presents with a chief complaint of Weakness and UTI (25 May 2024 10:23)      SUBJECTIVE / OVERNIGHT EVENTS:  confused, constipated per RN    MEDICATIONS  (STANDING):  apixaban 5 milliGRAM(s) Oral two times a day  atorvastatin 20 milliGRAM(s) Oral at bedtime  busPIRone 15 milliGRAM(s) Oral two times a day  eplerenone 25 milliGRAM(s) Oral daily  levothyroxine 25 MICROGram(s) Oral daily  losartan 25 milliGRAM(s) Oral daily  metoprolol tartrate 100 milliGRAM(s) Oral two times a day  polyethylene glycol 3350 17 Gram(s) Oral daily  senna 2 Tablet(s) Oral at bedtime    MEDICATIONS  (PRN):  acetaminophen     Tablet .. 650 milliGRAM(s) Oral every 6 hours PRN Temp greater or equal to 38C (100.4F), Mild Pain (1 - 3)  melatonin 3 milliGRAM(s) Oral at bedtime PRN Insomnia  ondansetron Injectable 4 milliGRAM(s) IV Push once PRN Nausea and/or Vomiting      Vital Signs Last 24 Hrs  T(C): 36.4 (26 May 2024 08:01), Max: 36.8 (25 May 2024 15:47)  T(F): 97.5 (26 May 2024 08:01), Max: 98.3 (25 May 2024 15:47)  HR: 90 (26 May 2024 08:01) (86 - 90)  BP: 139/85 (26 May 2024 08:01) (126/91 - 163/99)  BP(mean): --  RR: 18 (26 May 2024 08:01) (18 - 18)  SpO2: 93% (26 May 2024 08:01) (92% - 94%)    Parameters below as of 26 May 2024 08:01  Patient On (Oxygen Delivery Method): room air      CAPILLARY BLOOD GLUCOSE        I&O's Summary      PHYSICAL EXAM:  GENERAL: NAD, well-developed  HEAD:  Atraumatic, Normocephalic  EYES: EOMI, PERRLA, conjunctiva and sclera clear  NECK: Supple, No JVD  CHEST/LUNG: Clear to auscultation bilaterally; No wheeze  HEART: Regular rate and rhythm; No murmurs, rubs, or gallops  ABDOMEN: Soft, Nontender, Nondistended; Bowel sounds present  EXTREMITIES:  2+ Peripheral Pulses, No clubbing, cyanosis, or edema  PSYCH: AAOx1  NEUROLOGY: non-focal  SKIN: No rashes or lesions    LABS:                    RADIOLOGY & ADDITIONAL TESTS:    Imaging Personally Reviewed:    Consultant(s) Notes Reviewed:      Care Discussed with Consultants/Other Providers:

## 2024-05-27 LAB
ANION GAP SERPL CALC-SCNC: 12 MMOL/L — SIGNIFICANT CHANGE UP (ref 5–17)
BUN SERPL-MCNC: 34 MG/DL — HIGH (ref 7–23)
CALCIUM SERPL-MCNC: 8.1 MG/DL — LOW (ref 8.4–10.5)
CHLORIDE SERPL-SCNC: 104 MMOL/L — SIGNIFICANT CHANGE UP (ref 96–108)
CO2 SERPL-SCNC: 24 MMOL/L — SIGNIFICANT CHANGE UP (ref 22–31)
CREAT SERPL-MCNC: 0.9 MG/DL — SIGNIFICANT CHANGE UP (ref 0.5–1.3)
EGFR: 61 ML/MIN/1.73M2 — SIGNIFICANT CHANGE UP
GLUCOSE SERPL-MCNC: 103 MG/DL — HIGH (ref 70–99)
HCT VFR BLD CALC: 45.9 % — HIGH (ref 34.5–45)
HGB BLD-MCNC: 15.1 G/DL — SIGNIFICANT CHANGE UP (ref 11.5–15.5)
MCHC RBC-ENTMCNC: 29.8 PG — SIGNIFICANT CHANGE UP (ref 27–34)
MCHC RBC-ENTMCNC: 32.9 GM/DL — SIGNIFICANT CHANGE UP (ref 32–36)
MCV RBC AUTO: 90.5 FL — SIGNIFICANT CHANGE UP (ref 80–100)
NRBC # BLD: 0 /100 WBCS — SIGNIFICANT CHANGE UP (ref 0–0)
PLATELET # BLD AUTO: 69 K/UL — LOW (ref 150–400)
POTASSIUM SERPL-MCNC: 4.1 MMOL/L — SIGNIFICANT CHANGE UP (ref 3.5–5.3)
POTASSIUM SERPL-SCNC: 4.1 MMOL/L — SIGNIFICANT CHANGE UP (ref 3.5–5.3)
RBC # BLD: 5.07 M/UL — SIGNIFICANT CHANGE UP (ref 3.8–5.2)
RBC # FLD: 19.5 % — HIGH (ref 10.3–14.5)
SODIUM SERPL-SCNC: 140 MMOL/L — SIGNIFICANT CHANGE UP (ref 135–145)
WBC # BLD: 14.96 K/UL — HIGH (ref 3.8–10.5)
WBC # FLD AUTO: 14.96 K/UL — HIGH (ref 3.8–10.5)

## 2024-05-27 PROCEDURE — 71046 X-RAY EXAM CHEST 2 VIEWS: CPT | Mod: 26

## 2024-05-27 RX ADMIN — LOSARTAN POTASSIUM 25 MILLIGRAM(S): 100 TABLET, FILM COATED ORAL at 05:43

## 2024-05-27 RX ADMIN — Medication 25 MICROGRAM(S): at 05:43

## 2024-05-27 RX ADMIN — Medication 15 MILLIGRAM(S): at 17:02

## 2024-05-27 RX ADMIN — EPLERENONE 25 MILLIGRAM(S): 50 TABLET, FILM COATED ORAL at 05:43

## 2024-05-27 RX ADMIN — POLYETHYLENE GLYCOL 3350 17 GRAM(S): 17 POWDER, FOR SOLUTION ORAL at 11:12

## 2024-05-27 RX ADMIN — Medication 15 MILLIGRAM(S): at 05:43

## 2024-05-27 RX ADMIN — Medication 3 MILLIGRAM(S): at 21:31

## 2024-05-27 RX ADMIN — ATORVASTATIN CALCIUM 20 MILLIGRAM(S): 80 TABLET, FILM COATED ORAL at 21:31

## 2024-05-27 RX ADMIN — Medication 100 MILLIGRAM(S): at 17:01

## 2024-05-27 RX ADMIN — Medication 100 MILLIGRAM(S): at 05:43

## 2024-05-27 RX ADMIN — APIXABAN 5 MILLIGRAM(S): 2.5 TABLET, FILM COATED ORAL at 17:01

## 2024-05-27 RX ADMIN — SENNA PLUS 2 TABLET(S): 8.6 TABLET ORAL at 21:31

## 2024-05-27 RX ADMIN — APIXABAN 5 MILLIGRAM(S): 2.5 TABLET, FILM COATED ORAL at 05:43

## 2024-05-27 NOTE — PROGRESS NOTE ADULT - SUBJECTIVE AND OBJECTIVE BOX
C A R D I O L O G Y  **********************************     DATE OF SERVICE: 05-27-24    Patient feels tired. denies chest pain or shortness of breath.   Review of symptoms otherwise negative.    MEDICATIONS:  acetaminophen     Tablet .. 650 milliGRAM(s) Oral every 6 hours PRN  apixaban 5 milliGRAM(s) Oral two times a day  atorvastatin 20 milliGRAM(s) Oral at bedtime  busPIRone 15 milliGRAM(s) Oral two times a day  eplerenone 25 milliGRAM(s) Oral daily  levothyroxine 25 MICROGram(s) Oral daily  losartan 25 milliGRAM(s) Oral daily  melatonin 3 milliGRAM(s) Oral at bedtime PRN  metoprolol tartrate 100 milliGRAM(s) Oral two times a day  ondansetron Injectable 4 milliGRAM(s) IV Push once PRN  senna 2 Tablet(s) Oral at bedtime      LABS:                        15.1   14.96 )-----------( 69       ( 27 May 2024 06:46 )             45.9       Hemoglobin: 15.1 g/dL (05-27 @ 06:46)      05-27    140  |  104  |  34<H>  ----------------------------<  103<H>  4.1   |  24  |  0.90    Ca    8.1<L>      27 May 2024 06:44      Creatinine Trend: 0.90<--, 0.94<--, 1.06<--, 1.13<--, 0.94<--, 1.09<--    COAGS:           PHYSICAL EXAM:  T(C): 36.3 (05-27-24 @ 08:05), Max: 36.5 (05-26-24 @ 15:47)  HR: 84 (05-27-24 @ 08:05) (64 - 99)  BP: 167/95 (05-27-24 @ 08:05) (137/92 - 168/106)  RR: 18 (05-27-24 @ 08:05) (18 - 18)  SpO2: 93% (05-27-24 @ 08:05) (93% - 95%)  Wt(kg): --    I&O's Summary    26 May 2024 07:01  -  27 May 2024 07:00  --------------------------------------------------------  IN: 0 mL / OUT: 250 mL / NET: -250 mL          Gen: NAD  HEENT:  (-)icterus (-)pallor  CV: N S1 S2 1/6 DMITRIY (+)2 Pulses B/l  Resp:  Clear to auscultation B/L, normal effort  GI: (+) BS Soft, NT, ND  Lymph:  (-)Edema, (-)obvious lymphadenopathy  Skin: Warm to touch, Normal turgor  Psych: Appropriate mood and affect      TELEMETRY: None	        < from: TTE W or WO Ultrasound Enhancing Agent (05.23.24 @ 14:48) >  CONCLUSIONS:      1. Patient refused a complete exam hence this echo protocol was not finished.   2. Left ventricular cavity is small. Left ventricular wall thickness is normal. Left ventricular systolic function is normal. Thereare no regional wall motion abnormalities seen.   3. Normal left ventricular diastolic function, with normal filling pressure.   4. Normal right ventricular cavity size, with normal wall thickness, and normal systolic function.   5. The left atrium is severely dilated.   6. No significant valvular disease.   7. There is severe tricuspid regurgitation. Estimated pulmonary artery systolic pressure is 36 mmHg.   8. No pericardial effusion seen.   9. Compared to the transthoracic echocardiogram performed on 1/24/2024, there is severe tricuspid regurgitation.    < end of copied text >    ASSESSMENT/PLAN: Patient is a 89 y/o female with PMH of CVA, YOVANI on CPAP, HTN, HLD, persistent afib on Eliquis s/p Medtronic PPM, and diastolic heart failure who presented with weakness. Cardiology consulted for hypertension.    #HTN  - Continue metoprolol 100mg BID, Losartan 25mg daily, and Eplerenone 25mg daily.    #Persistent Afib  - Continue Eliquis for stroke prevention  - Rate control with metoprolol    #Chronic Diastolic CHF  - Patient with multiple echos in sunrise with normal LV function however noted with recent outpatient echo with EF 30-35 in chart  - Repeat echo with normal LV/RV function, severe TR  - Appears euvolemic    - No further inpatient cardiac w/u planned  - Patient to f/u with her [facility] cardiologist Dr. Patrice Mullins and [facility] Dr. Schilling after discharge    Gagandeep Joya PA-C  Pager: [phone]       C A R D I O L O G Y  **********************************     DATE OF SERVICE: 05-27-24    Patient feels tired. denies chest pain or shortness of breath.   Review of symptoms otherwise negative.    MEDICATIONS:  acetaminophen     Tablet .. 650 milliGRAM(s) Oral every 6 hours PRN  apixaban 5 milliGRAM(s) Oral two times a day  atorvastatin 20 milliGRAM(s) Oral at bedtime  busPIRone 15 milliGRAM(s) Oral two times a day  eplerenone 25 milliGRAM(s) Oral daily  levothyroxine 25 MICROGram(s) Oral daily  losartan 25 milliGRAM(s) Oral daily  melatonin 3 milliGRAM(s) Oral at bedtime PRN  metoprolol tartrate 100 milliGRAM(s) Oral two times a day  ondansetron Injectable 4 milliGRAM(s) IV Push once PRN  senna 2 Tablet(s) Oral at bedtime      LABS:                        15.1   14.96 )-----------( 69       ( 27 May 2024 06:46 )             45.9       Hemoglobin: 15.1 g/dL (05-27 @ 06:46)      05-27    140  |  104  |  34<H>  ----------------------------<  103<H>  4.1   |  24  |  0.90    Ca    8.1<L>      27 May 2024 06:44      Creatinine Trend: 0.90<--, 0.94<--, 1.06<--, 1.13<--, 0.94<--, 1.09<--    COAGS:           PHYSICAL EXAM:  T(C): 36.3 (05-27-24 @ 08:05), Max: 36.5 (05-26-24 @ 15:47)  HR: 84 (05-27-24 @ 08:05) (64 - 99)  BP: 167/95 (05-27-24 @ 08:05) (137/92 - 168/106)  RR: 18 (05-27-24 @ 08:05) (18 - 18)  SpO2: 93% (05-27-24 @ 08:05) (93% - 95%)  Wt(kg): --    I&O's Summary    26 May 2024 07:01  -  27 May 2024 07:00  --------------------------------------------------------  IN: 0 mL / OUT: 250 mL / NET: -250 mL          Gen: NAD  HEENT:  (-)icterus (-)pallor  CV: N S1 S2 1/6 DMITRIY (+)2 Pulses B/l  Resp:  Clear to auscultation B/L, normal effort  GI: (+) BS Soft, NT, ND  Lymph:  (-)Edema, (-)obvious lymphadenopathy  Skin: Warm to touch, Normal turgor  Psych: Appropriate mood and affect      TELEMETRY: None	        < from: TTE W or WO Ultrasound Enhancing Agent (05.23.24 @ 14:48) >  CONCLUSIONS:      1. Patient refused a complete exam hence this echo protocol was not finished.   2. Left ventricular cavity is small. Left ventricular wall thickness is normal. Left ventricular systolic function is normal. Thereare no regional wall motion abnormalities seen.   3. Normal left ventricular diastolic function, with normal filling pressure.   4. Normal right ventricular cavity size, with normal wall thickness, and normal systolic function.   5. The left atrium is severely dilated.   6. No significant valvular disease.   7. There is severe tricuspid regurgitation. Estimated pulmonary artery systolic pressure is 36 mmHg.   8. No pericardial effusion seen.   9. Compared to the transthoracic echocardiogram performed on 1/24/2024, there is severe tricuspid regurgitation.    < end of copied text >    ASSESSMENT/PLAN: Patient is a 89 y/o female with PMH of CVA, YOVANI on CPAP, HTN, HLD, persistent afib on Eliquis s/p Medtronic PPM, and diastolic heart failure who presented with weakness. Cardiology consulted for hypertension.    #HTN  - Continue metoprolol 100mg BID, Losartan 25mg daily, and Eplerenone 25mg daily. BP fluctuating, if remains hypertensive later today can increase Losartan to 50mg daily    #Persistent Afib  - Continue Eliquis for stroke prevention  - Rate control with metoprolol    #Chronic Diastolic CHF  - Patient with multiple echos in sunrise with normal LV function however noted with recent outpatient echo with EF 30-35 in chart  - Repeat echo with normal LV/RV function, severe TR  - Appears euvolemic    - No further inpatient cardiac w/u planned  - Patient to f/u with her [facility] cardiologist Dr. Patrice Mullins and [place] Dr. Schilling after discharge    Gagandeep Joya PA-C  Pager: [phone]

## 2024-05-27 NOTE — PROGRESS NOTE ADULT - SUBJECTIVE AND OBJECTIVE BOX
DATE OF SERVICE: 05-27-24 @ 10:18    Patient is a 89y old  Female who presents with a chief complaint of Weakness and UTI (26 May 2024 10:10)      SUBJECTIVE / OVERNIGHT EVENTS:  No chest pain. No shortness of breath. No complaints. No events overnight.     MEDICATIONS  (STANDING):  apixaban 5 milliGRAM(s) Oral two times a day  atorvastatin 20 milliGRAM(s) Oral at bedtime  busPIRone 15 milliGRAM(s) Oral two times a day  eplerenone 25 milliGRAM(s) Oral daily  levothyroxine 25 MICROGram(s) Oral daily  losartan 25 milliGRAM(s) Oral daily  metoprolol tartrate 100 milliGRAM(s) Oral two times a day  polyethylene glycol 3350 17 Gram(s) Oral daily  senna 2 Tablet(s) Oral at bedtime    MEDICATIONS  (PRN):  acetaminophen     Tablet .. 650 milliGRAM(s) Oral every 6 hours PRN Temp greater or equal to 38C (100.4F), Mild Pain (1 - 3)  melatonin 3 milliGRAM(s) Oral at bedtime PRN Insomnia  ondansetron Injectable 4 milliGRAM(s) IV Push once PRN Nausea and/or Vomiting      Vital Signs Last 24 Hrs  T(C): 36.3 (27 May 2024 08:05), Max: 36.5 (26 May 2024 15:47)  T(F): 97.3 (27 May 2024 08:05), Max: 97.7 (26 May 2024 15:47)  HR: 84 (27 May 2024 08:05) (64 - 99)  BP: 167/95 (27 May 2024 08:05) (137/92 - 168/106)  BP(mean): --  RR: 18 (27 May 2024 08:05) (18 - 18)  SpO2: 93% (27 May 2024 08:05) (93% - 95%)    Parameters below as of 27 May 2024 08:05  Patient On (Oxygen Delivery Method): room air      CAPILLARY BLOOD GLUCOSE        I&O's Summary    26 May 2024 07:01  -  27 May 2024 07:00  --------------------------------------------------------  IN: 0 mL / OUT: 250 mL / NET: -250 mL        PHYSICAL EXAM:  GENERAL: NAD, well-developed  HEAD:  Atraumatic, Normocephalic  EYES: EOMI, PERRLA, conjunctiva and sclera clear  NECK: Supple, No JVD  CHEST/LUNG: Clear to auscultation bilaterally; No wheeze  HEART: Regular rate and rhythm; No murmurs, rubs, or gallops  ABDOMEN: Soft, Nontender, Nondistended; Bowel sounds present  EXTREMITIES:  2+ Peripheral Pulses, No clubbing, cyanosis, or edema  PSYCH: AAOx1  NEUROLOGY: non-focal  SKIN: No rashes or lesions    LABS:                        15.1   14.96 )-----------( 69       ( 27 May 2024 06:46 )             45.9     05-27    140  |  104  |  34<H>  ----------------------------<  103<H>  4.1   |  24  |  0.90    Ca    8.1<L>      27 May 2024 06:44            Urinalysis Basic - ( 27 May 2024 06:44 )    Color: x / Appearance: x / SG: x / pH: x  Gluc: 103 mg/dL / Ketone: x  / Bili: x / Urobili: x   Blood: x / Protein: x / Nitrite: x   Leuk Esterase: x / RBC: x / WBC x   Sq Epi: x / Non Sq Epi: x / Bacteria: x        RADIOLOGY & ADDITIONAL TESTS:    Imaging Personally Reviewed:    Consultant(s) Notes Reviewed:      Care Discussed with Consultants/Other Providers:

## 2024-05-28 RX ADMIN — SENNA PLUS 2 TABLET(S): 8.6 TABLET ORAL at 21:48

## 2024-05-28 RX ADMIN — APIXABAN 5 MILLIGRAM(S): 2.5 TABLET, FILM COATED ORAL at 17:31

## 2024-05-28 RX ADMIN — Medication 100 MILLIGRAM(S): at 17:31

## 2024-05-28 RX ADMIN — Medication 100 MILLIGRAM(S): at 05:53

## 2024-05-28 RX ADMIN — Medication 15 MILLIGRAM(S): at 05:52

## 2024-05-28 RX ADMIN — ATORVASTATIN CALCIUM 20 MILLIGRAM(S): 80 TABLET, FILM COATED ORAL at 21:48

## 2024-05-28 RX ADMIN — Medication 3 MILLIGRAM(S): at 21:49

## 2024-05-28 RX ADMIN — Medication 25 MICROGRAM(S): at 05:52

## 2024-05-28 RX ADMIN — EPLERENONE 25 MILLIGRAM(S): 50 TABLET, FILM COATED ORAL at 05:52

## 2024-05-28 RX ADMIN — LOSARTAN POTASSIUM 25 MILLIGRAM(S): 100 TABLET, FILM COATED ORAL at 05:52

## 2024-05-28 RX ADMIN — Medication 15 MILLIGRAM(S): at 17:31

## 2024-05-28 RX ADMIN — APIXABAN 5 MILLIGRAM(S): 2.5 TABLET, FILM COATED ORAL at 05:52

## 2024-05-28 NOTE — DIETITIAN INITIAL EVALUATION ADULT - OTHER CALCULATIONS
Fluid needs deferred to team. Energy and protein needs based on adjusted IBW of 127lb in consideration of BMI >30 and Increased nutrient needs.

## 2024-05-28 NOTE — DIETITIAN INITIAL EVALUATION ADULT - ENERGY INTAKE
Pt reports appetite is ok in house, no documentation of PO intake in flowsheet, declines oral nutrition supplements when offered. Food preferences updated, prefers ice cream and muffin, will honor as able.

## 2024-05-28 NOTE — DIETITIAN INITIAL EVALUATION ADULT - REASON INDICATOR FOR ASSESSMENT
PROVIDER:[TOKEN:[[id number]]]
seen for length of stay. Information obtained from pt, RN, electronic medical record. Chart reviewed, events noted.

## 2024-05-28 NOTE — DIETITIAN INITIAL EVALUATION ADULT - PERTINENT LABORATORY DATA
05-27    140  |  104  |  34<H>  ----------------------------<  103<H>  4.1   |  24  |  0.90    Ca    8.1<L>      27 May 2024 06:44

## 2024-05-28 NOTE — DIETITIAN INITIAL EVALUATION ADULT - PHYSCIAL ASSESSMENT
Drug Dosing Weight  Height (cm): 160 (20 May 2024 19:56)  Weight (kg): 77.1 (20 May 2024 19:56)- stated wt   BMI (kg/m2): 30.1 (20 May 2024 19:56)    Daily wt (standing or bed scale): none documented thus far   Wt obtained by RD: 198lb (5/28 bed scale) - Question accuracy of bed scale     UBW: unable to obtain as pt is very fatigue and falling asleep during interview   Wt history from previous RD notes: no history   [facility] no [facility]      ** Question with accuracy of wt, pt also with Congestive Heart Failure might cause weight fluctuation. RD will continue to monitor wt trends as available/able.     IBW: 127lb, 134% IBW

## 2024-05-28 NOTE — PROGRESS NOTE ADULT - SUBJECTIVE AND OBJECTIVE BOX
C A R D I O L O G Y  **********************************     DATE OF SERVICE: 05-28-24    Patient seen earlier, feels weak but denies chest pain or shortness of breath.   Review of symptoms otherwise negative.    MEDICATIONS:  acetaminophen     Tablet .. 650 milliGRAM(s) Oral every 6 hours PRN  apixaban 5 milliGRAM(s) Oral two times a day  atorvastatin 20 milliGRAM(s) Oral at bedtime  busPIRone 15 milliGRAM(s) Oral two times a day  eplerenone 25 milliGRAM(s) Oral daily  levothyroxine 25 MICROGram(s) Oral daily  losartan 25 milliGRAM(s) Oral daily  melatonin 3 milliGRAM(s) Oral at bedtime PRN  metoprolol tartrate 100 milliGRAM(s) Oral two times a day  ondansetron Injectable 4 milliGRAM(s) IV Push once PRN  senna 2 Tablet(s) Oral at bedtime      LABS:                        15.1   14.96 )-----------( 69       ( 27 May 2024 06:46 )             45.9       Hemoglobin: 15.1 g/dL (05-27 @ 06:46)      05-27    140  |  104  |  34<H>  ----------------------------<  103<H>  4.1   |  24  |  0.90    Ca    8.1<L>      27 May 2024 06:44      Creatinine Trend: 0.90<--, 0.94<--, 1.06<--, 1.13<--, 0.94<--, 1.09<--    COAGS:           PHYSICAL EXAM:  T(C): 34.7 (05-28-24 @ 16:34), Max: 36.9 (05-27-24 @ 23:56)  HR: 85 (05-28-24 @ 16:34) (60 - 95)  BP: 151/97 (05-28-24 @ 16:34) (134/82 - 166/71)  RR: 18 (05-28-24 @ 16:34) (16 - 18)  SpO2: 94% (05-28-24 @ 16:34) (94% - 96%)  Wt(kg): --    I&O's Summary    27 May 2024 07:01  -  28 May 2024 07:00  --------------------------------------------------------  IN: 0 mL / OUT: 300 mL / NET: -300 mL        Gen: NAD  HEENT:  (-)icterus (-)pallor  CV: N S1 S2 1/6 DMITRIY (+)2 Pulses B/l  Resp:  Clear to auscultation B/L, normal effort  GI: (+) BS Soft, NT, ND  Lymph:  (-)Edema, (-)obvious lymphadenopathy  Skin: Warm to touch, Normal turgor  Psych: Appropriate mood and affect      TELEMETRY: None	        < from: TTE W or WO Ultrasound Enhancing Agent (05.23.24 @ 14:48) >  CONCLUSIONS:      1. Patient refused a complete exam hence this echo protocol was not finished.   2. Left ventricular cavity is small. Left ventricular wall thickness is normal. Left ventricular systolic function is normal. Thereare no regional wall motion abnormalities seen.   3. Normal left ventricular diastolic function, with normal filling pressure.   4. Normal right ventricular cavity size, with normal wall thickness, and normal systolic function.   5. The left atrium is severely dilated.   6. No significant valvular disease.   7. There is severe tricuspid regurgitation. Estimated pulmonary artery systolic pressure is 36 mmHg.   8. No pericardial effusion seen.   9. Compared to the transthoracic echocardiogram performed on 1/24/2024, there is severe tricuspid regurgitation.    < end of copied text >    ASSESSMENT/PLAN: Patient is a 89 y/o female with PMH of CVA, YOVANI on CPAP, HTN, HLD, persistent afib on Eliquis s/p Medtronic PPM, and diastolic heart failure who presented with weakness. Cardiology consulted for hypertension.    #HTN  - Continue metoprolol 100mg BID, Losartan 25mg daily, and Eplerenone 25mg daily    #Persistent Afib  - Continue Eliquis for stroke prevention  - Rate control with metoprolol    #Chronic Diastolic CHF  - Patient with multiple echos in sunrise with normal LV function however noted with recent outpatient echo with EF 30-35 in chart  - Repeat echo with normal LV/RV function, severe TR  - Appears euvolemic    - No further inpatient cardiac w/u planned  - Patient to f/u with her [facility] cardiologist Dr. Patrice Mullins and [facility] Dr. Schilling after discharge    Gagandeep Joya PA-C  Pager: [phone]

## 2024-05-28 NOTE — DIETITIAN INITIAL EVALUATION ADULT - DIET TYPE
Recommend to d/c DASH, change diet free of therapeutic restriction due to advance age, diet texture per SLP/team. RD remains available to adjust diet as needed./regular

## 2024-05-28 NOTE — DIETITIAN INITIAL EVALUATION ADULT - PERTINENT MEDS FT
MEDICATIONS  (STANDING):  apixaban 5 milliGRAM(s) Oral two times a day  atorvastatin 20 milliGRAM(s) Oral at bedtime  busPIRone 15 milliGRAM(s) Oral two times a day  eplerenone 25 milliGRAM(s) Oral daily  levothyroxine 25 MICROGram(s) Oral daily  losartan 25 milliGRAM(s) Oral daily  metoprolol tartrate 100 milliGRAM(s) Oral two times a day  senna 2 Tablet(s) Oral at bedtime    MEDICATIONS  (PRN):  acetaminophen     Tablet .. 650 milliGRAM(s) Oral every 6 hours PRN Temp greater or equal to 38C (100.4F), Mild Pain (1 - 3)  melatonin 3 milliGRAM(s) Oral at bedtime PRN Insomnia  ondansetron Injectable 4 milliGRAM(s) IV Push once PRN Nausea and/or Vomiting

## 2024-05-28 NOTE — DIETITIAN INITIAL EVALUATION ADULT - ADD RECOMMEND
Follow up in six months with Dr. Job Roberson, with an echocardiogram on the same day as scheduled. If she doesn't do well after stopping Northera, please call our office so that we can prescribe an alternate medication.
-- Will send [employer] with meals (200kcal, 6g proteins each) to provide additional calories and nutrients to encourage PO intake while in house.  -- Monitor PO intake, GI tolerance, skin integrity, labs, weight, and bowel movement regularity.   -- Will continue to honor food and beverage preferences PRN.  -- Assist with meals PRN.  -- Monitor risk for malnutrition.

## 2024-05-28 NOTE — PROGRESS NOTE ADULT - SUBJECTIVE AND OBJECTIVE BOX
DATE OF SERVICE: 05-28-24 @ 12:00    Patient is a 89y old  Female who presents with a chief complaint of Weakness and UTI (27 May 2024 13:18)      SUBJECTIVE / OVERNIGHT EVENTS:  No chest pain. No shortness of breath. No complaints. No events overnight.     MEDICATIONS  (STANDING):  apixaban 5 milliGRAM(s) Oral two times a day  atorvastatin 20 milliGRAM(s) Oral at bedtime  busPIRone 15 milliGRAM(s) Oral two times a day  eplerenone 25 milliGRAM(s) Oral daily  levothyroxine 25 MICROGram(s) Oral daily  losartan 25 milliGRAM(s) Oral daily  metoprolol tartrate 100 milliGRAM(s) Oral two times a day  senna 2 Tablet(s) Oral at bedtime    MEDICATIONS  (PRN):  acetaminophen     Tablet .. 650 milliGRAM(s) Oral every 6 hours PRN Temp greater or equal to 38C (100.4F), Mild Pain (1 - 3)  melatonin 3 milliGRAM(s) Oral at bedtime PRN Insomnia  ondansetron Injectable 4 milliGRAM(s) IV Push once PRN Nausea and/or Vomiting      Vital Signs Last 24 Hrs  T(C): 36.3 (28 May 2024 10:02), Max: 36.9 (27 May 2024 23:56)  T(F): 97.3 (28 May 2024 10:02), Max: 98.4 (27 May 2024 23:56)  HR: 80 (28 May 2024 10:02) (60 - 95)  BP: 134/82 (28 May 2024 10:02) (134/82 - 166/71)  BP(mean): --  RR: 16 (28 May 2024 10:02) (16 - 18)  SpO2: 94% (28 May 2024 10:02) (94% - 96%)    Parameters below as of 28 May 2024 10:02  Patient On (Oxygen Delivery Method): room air      CAPILLARY BLOOD GLUCOSE        I&O's Summary    27 May 2024 07:01  -  28 May 2024 07:00  --------------------------------------------------------  IN: 0 mL / OUT: 300 mL / NET: -300 mL        PHYSICAL EXAM:  GENERAL: NAD, well-developed  HEAD:  Atraumatic, Normocephalic  EYES: EOMI, PERRLA, conjunctiva and sclera clear  NECK: Supple, No JVD  CHEST/LUNG: Clear to auscultation bilaterally; No wheeze  HEART: Regular rate and rhythm; No murmurs, rubs, or gallops  ABDOMEN: Soft, Nontender, Nondistended; Bowel sounds present  EXTREMITIES:  2+ Peripheral Pulses, No clubbing, cyanosis, or edema  PSYCH: AAOx1  NEUROLOGY: non-focal  SKIN: No rashes or lesions    LABS:                        15.1   14.96 )-----------( 69       ( 27 May 2024 06:46 )             45.9     05-27    140  |  104  |  34<H>  ----------------------------<  103<H>  4.1   |  24  |  0.90    Ca    8.1<L>      27 May 2024 06:44            Urinalysis Basic - ( 27 May 2024 06:44 )    Color: x / Appearance: x / SG: x / pH: x  Gluc: 103 mg/dL / Ketone: x  / Bili: x / Urobili: x   Blood: x / Protein: x / Nitrite: x   Leuk Esterase: x / RBC: x / WBC x   Sq Epi: x / Non Sq Epi: x / Bacteria: x        RADIOLOGY & ADDITIONAL TESTS:    Imaging Personally Reviewed:    Consultant(s) Notes Reviewed:      Care Discussed with Consultants/Other Providers:

## 2024-05-28 NOTE — DIETITIAN INITIAL EVALUATION ADULT - ORAL INTAKE PTA/DIET HISTORY
Pt reports normal PO intake at home, states daughter and HHA prep foods for her. Denies difficulty chewing/swallowing. Denies GI distress. Confirms NKFA/intolerance  Micronutrient/Other supplementation: none  Protein-energy supplementation: none

## 2024-05-29 PROCEDURE — 93286 PERI-PX EVAL PM/LDLS PM IP: CPT | Mod: 26,76

## 2024-05-29 PROCEDURE — 70551 MRI BRAIN STEM W/O DYE: CPT | Mod: 26

## 2024-05-29 RX ADMIN — Medication 100 MILLIGRAM(S): at 17:22

## 2024-05-29 RX ADMIN — Medication 100 MILLIGRAM(S): at 05:58

## 2024-05-29 RX ADMIN — LOSARTAN POTASSIUM 25 MILLIGRAM(S): 100 TABLET, FILM COATED ORAL at 05:58

## 2024-05-29 RX ADMIN — Medication 650 MILLIGRAM(S): at 22:29

## 2024-05-29 RX ADMIN — APIXABAN 5 MILLIGRAM(S): 2.5 TABLET, FILM COATED ORAL at 05:59

## 2024-05-29 RX ADMIN — SENNA PLUS 2 TABLET(S): 8.6 TABLET ORAL at 21:45

## 2024-05-29 RX ADMIN — ATORVASTATIN CALCIUM 20 MILLIGRAM(S): 80 TABLET, FILM COATED ORAL at 21:45

## 2024-05-29 RX ADMIN — Medication 3 MILLIGRAM(S): at 21:45

## 2024-05-29 RX ADMIN — Medication 1 MILLIGRAM(S): at 12:15

## 2024-05-29 RX ADMIN — Medication 15 MILLIGRAM(S): at 17:22

## 2024-05-29 RX ADMIN — Medication 650 MILLIGRAM(S): at 21:45

## 2024-05-29 RX ADMIN — APIXABAN 5 MILLIGRAM(S): 2.5 TABLET, FILM COATED ORAL at 17:22

## 2024-05-29 RX ADMIN — EPLERENONE 25 MILLIGRAM(S): 50 TABLET, FILM COATED ORAL at 05:58

## 2024-05-29 RX ADMIN — Medication 25 MICROGRAM(S): at 05:58

## 2024-05-29 RX ADMIN — Medication 15 MILLIGRAM(S): at 05:58

## 2024-05-29 NOTE — PROCEDURE NOTE - ADDITIONAL PROCEDURE DETAILS
PRE MRI  1) Presenting rhythm: Afib in the 80's  2) Measured data WNL, NL PM function, Not PM dependent,  14.1% since 8/29/2023  3) Changes made: MRI SureScan mode turned on and pacing mode changed to ODO.  4) Call post MRI to reprogram pacemaker.     CECIL Holguin, NP-C  [id number]
Post MRI  1) Presenting rhythm: Afib in the 80's  2) Measured data WNL, NL PM function, Not PM dependent,  14.1% since 8/29/2023  3) Changes made: MRI SureScan mode turned off and pacing mode changed back to VVI 60bpm.    CECIL Holguin, NP-C  [phone]

## 2024-05-29 NOTE — PROGRESS NOTE ADULT - SUBJECTIVE AND OBJECTIVE BOX
DATE OF SERVICE: 05-29-24 @ 11:58    Patient is a 89y old  Female who presents with a chief complaint of Weakness and UTI (28 May 2024 16:51)      SUBJECTIVE / OVERNIGHT EVENTS:  nad    MEDICATIONS  (STANDING):  apixaban 5 milliGRAM(s) Oral two times a day  atorvastatin 20 milliGRAM(s) Oral at bedtime  busPIRone 15 milliGRAM(s) Oral two times a day  eplerenone 25 milliGRAM(s) Oral daily  levothyroxine 25 MICROGram(s) Oral daily  LORazepam   Injectable 1 milliGRAM(s) IV Push once  losartan 25 milliGRAM(s) Oral daily  metoprolol tartrate 100 milliGRAM(s) Oral two times a day  senna 2 Tablet(s) Oral at bedtime    MEDICATIONS  (PRN):  acetaminophen     Tablet .. 650 milliGRAM(s) Oral every 6 hours PRN Temp greater or equal to 38C (100.4F), Mild Pain (1 - 3)  melatonin 3 milliGRAM(s) Oral at bedtime PRN Insomnia  ondansetron Injectable 4 milliGRAM(s) IV Push once PRN Nausea and/or Vomiting      Vital Signs Last 24 Hrs  T(C): 36.3 (29 May 2024 05:50), Max: 36.3 (29 May 2024 00:03)  T(F): 97.4 (29 May 2024 05:50), Max: 97.4 (29 May 2024 05:50)  HR: 86 (29 May 2024 05:50) (83 - 86)  BP: 136/90 (29 May 2024 05:50) (136/90 - 156/98)  BP(mean): --  RR: 18 (29 May 2024 05:50) (18 - 18)  SpO2: 95% (29 May 2024 05:50) (94% - 95%)    Parameters below as of 29 May 2024 05:50  Patient On (Oxygen Delivery Method): room air      CAPILLARY BLOOD GLUCOSE        I&O's Summary      PHYSICAL EXAM:  GENERAL: NAD, well-developed  HEAD:  Atraumatic, Normocephalic  EYES: EOMI, PERRLA, conjunctiva and sclera clear  NECK: Supple, No JVD  CHEST/LUNG: Clear to auscultation bilaterally; No wheeze  HEART: Regular rate and rhythm; No murmurs, rubs, or gallops  ABDOMEN: Soft, Nontender, Nondistended; Bowel sounds present  EXTREMITIES:  2+ Peripheral Pulses, No clubbing, cyanosis, or edema  PSYCH: AAOx1  NEUROLOGY: non-focal  SKIN: No rashes or lesions    LABS:                    RADIOLOGY & ADDITIONAL TESTS:    Imaging Personally Reviewed:    Consultant(s) Notes Reviewed:      Care Discussed with Consultants/Other Providers:

## 2024-05-29 NOTE — PROGRESS NOTE ADULT - SUBJECTIVE AND OBJECTIVE BOX
C A R D I O L O G Y  **********************************     DATE OF SERVICE: 05-29-24    Patient seen earlier, feels tired. No reported chest pain or shortness of breath.   Review of symptoms otherwise negative.    MEDICATIONS:  acetaminophen     Tablet .. 650 milliGRAM(s) Oral every 6 hours PRN  apixaban 5 milliGRAM(s) Oral two times a day  atorvastatin 20 milliGRAM(s) Oral at bedtime  busPIRone 15 milliGRAM(s) Oral two times a day  eplerenone 25 milliGRAM(s) Oral daily  levothyroxine 25 MICROGram(s) Oral daily  losartan 25 milliGRAM(s) Oral daily  melatonin 3 milliGRAM(s) Oral at bedtime PRN  metoprolol tartrate 100 milliGRAM(s) Oral two times a day  ondansetron Injectable 4 milliGRAM(s) IV Push once PRN  senna 2 Tablet(s) Oral at bedtime      LABS:      Hemoglobin: 15.1 g/dL (05-27 @ 06:46)            Creatinine Trend: 0.90<--, 0.94<--, 1.06<--, 1.13<--, 0.94<--, 1.09<--    COAGS:           PHYSICAL EXAM:  T(C): 36.3 (05-29-24 @ 05:50), Max: 36.3 (05-29-24 @ 00:03)  HR: 86 (05-29-24 @ 05:50) (83 - 86)  BP: 136/90 (05-29-24 @ 05:50) (136/90 - 156/98)  RR: 18 (05-29-24 @ 05:50) (18 - 18)  SpO2: 95% (05-29-24 @ 05:50) (94% - 95%)  Wt(kg): --    I&O's Summary      Gen: NAD  HEENT:  (-)icterus (-)pallor  CV: N S1 S2 1/6 DMITRIY (+)2 Pulses B/l  Resp:  Clear to auscultation B/L, normal effort  GI: (+) BS Soft, NT, ND  Lymph:  (-)Edema, (-)obvious lymphadenopathy  Skin: Warm to touch, Normal turgor  Psych: Appropriate mood and affect      TELEMETRY: None	        < from: TTE W or WO Ultrasound Enhancing Agent (05.23.24 @ 14:48) >  CONCLUSIONS:      1. Patient refused a complete exam hence this echo protocol was not finished.   2. Left ventricular cavity is small. Left ventricular wall thickness is normal. Left ventricular systolic function is normal. Thereare no regional wall motion abnormalities seen.   3. Normal left ventricular diastolic function, with normal filling pressure.   4. Normal right ventricular cavity size, with normal wall thickness, and normal systolic function.   5. The left atrium is severely dilated.   6. No significant valvular disease.   7. There is severe tricuspid regurgitation. Estimated pulmonary artery systolic pressure is 36 mmHg.   8. No pericardial effusion seen.   9. Compared to the transthoracic echocardiogram performed on 1/24/2024, there is severe tricuspid regurgitation.    < end of copied text >    ASSESSMENT/PLAN: Patient is a 89 y/o female with PMH of CVA, YOVANI on CPAP, HTN, HLD, persistent afib on Eliquis s/p Medtronic PPM, and diastolic heart failure who presented with weakness. Cardiology consulted for hypertension.    #HTN  - Continue metoprolol 100mg BID, Losartan 25mg daily, and Eplerenone 25mg daily    #Persistent Afib  - Continue Eliquis for stroke prevention  - Rate control with metoprolol    #Chronic Diastolic CHF  - Patient with multiple echos in sunrise with normal LV function however noted with recent outpatient echo with EF 30-35 in chart  - Repeat echo with normal LV/RV function, severe TR  - Appears euvolemic    #Weakness  - Workup per primary team  - MRI Brain noted with no acute CVA    - No further inpatient cardiac w/u planned  - Patient to f/u with her [facility] cardiologist Dr. Patrice Mullins and [facility] Dr. Schilling after discharge    Gagandeep Joya PA-C  Pager: [phone]

## 2024-05-30 ENCOUNTER — TRANSCRIPTION ENCOUNTER (OUTPATIENT)
Age: 89
End: 2024-05-30

## 2024-05-30 VITALS
SYSTOLIC BLOOD PRESSURE: 153 MMHG | TEMPERATURE: 98 F | RESPIRATION RATE: 18 BRPM | OXYGEN SATURATION: 96 % | HEART RATE: 89 BPM | DIASTOLIC BLOOD PRESSURE: 72 MMHG

## 2024-05-30 PROCEDURE — 70450 CT HEAD/BRAIN W/O DYE: CPT | Mod: MC

## 2024-05-30 PROCEDURE — 97530 THERAPEUTIC ACTIVITIES: CPT

## 2024-05-30 PROCEDURE — 36415 COLL VENOUS BLD VENIPUNCTURE: CPT

## 2024-05-30 PROCEDURE — 84295 ASSAY OF SERUM SODIUM: CPT

## 2024-05-30 PROCEDURE — 87086 URINE CULTURE/COLONY COUNT: CPT

## 2024-05-30 PROCEDURE — 83605 ASSAY OF LACTIC ACID: CPT

## 2024-05-30 PROCEDURE — 97161 PT EVAL LOW COMPLEX 20 MIN: CPT

## 2024-05-30 PROCEDURE — 84100 ASSAY OF PHOSPHORUS: CPT

## 2024-05-30 PROCEDURE — 81001 URINALYSIS AUTO W/SCOPE: CPT

## 2024-05-30 PROCEDURE — 87186 SC STD MICRODIL/AGAR DIL: CPT

## 2024-05-30 PROCEDURE — 71046 X-RAY EXAM CHEST 2 VIEWS: CPT

## 2024-05-30 PROCEDURE — 99285 EMERGENCY DEPT VISIT HI MDM: CPT | Mod: 25

## 2024-05-30 PROCEDURE — 84132 ASSAY OF SERUM POTASSIUM: CPT

## 2024-05-30 PROCEDURE — 96375 TX/PRO/DX INJ NEW DRUG ADDON: CPT

## 2024-05-30 PROCEDURE — 84443 ASSAY THYROID STIM HORMONE: CPT

## 2024-05-30 PROCEDURE — 85025 COMPLETE CBC W/AUTO DIFF WBC: CPT

## 2024-05-30 PROCEDURE — 85014 HEMATOCRIT: CPT

## 2024-05-30 PROCEDURE — 93306 TTE W/DOPPLER COMPLETE: CPT

## 2024-05-30 PROCEDURE — 85027 COMPLETE CBC AUTOMATED: CPT

## 2024-05-30 PROCEDURE — 80053 COMPREHEN METABOLIC PANEL: CPT

## 2024-05-30 PROCEDURE — 80048 BASIC METABOLIC PNL TOTAL CA: CPT

## 2024-05-30 PROCEDURE — 85018 HEMOGLOBIN: CPT

## 2024-05-30 PROCEDURE — 82330 ASSAY OF CALCIUM: CPT

## 2024-05-30 PROCEDURE — 85610 PROTHROMBIN TIME: CPT

## 2024-05-30 PROCEDURE — 76937 US GUIDE VASCULAR ACCESS: CPT

## 2024-05-30 PROCEDURE — 85730 THROMBOPLASTIN TIME PARTIAL: CPT

## 2024-05-30 PROCEDURE — 96374 THER/PROPH/DIAG INJ IV PUSH: CPT

## 2024-05-30 PROCEDURE — 82435 ASSAY OF BLOOD CHLORIDE: CPT

## 2024-05-30 PROCEDURE — 87077 CULTURE AEROBIC IDENTIFY: CPT

## 2024-05-30 PROCEDURE — 82803 BLOOD GASES ANY COMBINATION: CPT

## 2024-05-30 PROCEDURE — 71045 X-RAY EXAM CHEST 1 VIEW: CPT

## 2024-05-30 PROCEDURE — 70551 MRI BRAIN STEM W/O DYE: CPT | Mod: MC

## 2024-05-30 PROCEDURE — 82947 ASSAY GLUCOSE BLOOD QUANT: CPT

## 2024-05-30 PROCEDURE — 83735 ASSAY OF MAGNESIUM: CPT

## 2024-05-30 RX ORDER — SENNA PLUS 8.6 MG/1
2 TABLET ORAL
Qty: 0 | Refills: 0 | DISCHARGE
Start: 2024-05-30

## 2024-05-30 RX ORDER — DENOSUMAB 60 MG/ML
60 INJECTION SUBCUTANEOUS
Refills: 0 | DISCHARGE

## 2024-05-30 RX ORDER — ACETAMINOPHEN 500 MG
2 TABLET ORAL
Qty: 0 | Refills: 0 | DISCHARGE
Start: 2024-05-30

## 2024-05-30 RX ORDER — LOSARTAN POTASSIUM 100 MG/1
1 TABLET, FILM COATED ORAL
Qty: 0 | Refills: 0 | DISCHARGE
Start: 2024-05-30

## 2024-05-30 RX ADMIN — EPLERENONE 25 MILLIGRAM(S): 50 TABLET, FILM COATED ORAL at 05:37

## 2024-05-30 RX ADMIN — Medication 100 MILLIGRAM(S): at 05:37

## 2024-05-30 RX ADMIN — Medication 15 MILLIGRAM(S): at 17:46

## 2024-05-30 RX ADMIN — Medication 100 MILLIGRAM(S): at 17:46

## 2024-05-30 RX ADMIN — APIXABAN 5 MILLIGRAM(S): 2.5 TABLET, FILM COATED ORAL at 17:45

## 2024-05-30 RX ADMIN — Medication 25 MICROGRAM(S): at 05:37

## 2024-05-30 RX ADMIN — LOSARTAN POTASSIUM 25 MILLIGRAM(S): 100 TABLET, FILM COATED ORAL at 05:37

## 2024-05-30 RX ADMIN — APIXABAN 5 MILLIGRAM(S): 2.5 TABLET, FILM COATED ORAL at 05:37

## 2024-05-30 RX ADMIN — Medication 15 MILLIGRAM(S): at 05:37

## 2024-05-30 NOTE — PROGRESS NOTE ADULT - REASON FOR ADMISSION
Weakness and UTI

## 2024-05-30 NOTE — PROGRESS NOTE ADULT - SUBJECTIVE AND OBJECTIVE BOX
Neurology      S: patient seen. MRI brain neg for acute findings       Medications: MEDICATIONS  (STANDING):  apixaban 5 milliGRAM(s) Oral two times a day  atorvastatin 20 milliGRAM(s) Oral at bedtime  busPIRone 15 milliGRAM(s) Oral two times a day  eplerenone 25 milliGRAM(s) Oral daily  levothyroxine 25 MICROGram(s) Oral daily  losartan 25 milliGRAM(s) Oral daily  metoprolol tartrate 100 milliGRAM(s) Oral two times a day  senna 2 Tablet(s) Oral at bedtime    MEDICATIONS  (PRN):  acetaminophen     Tablet .. 650 milliGRAM(s) Oral every 6 hours PRN Temp greater or equal to 38C (100.4F), Mild Pain (1 - 3)  melatonin 3 milliGRAM(s) Oral at bedtime PRN Insomnia  ondansetron Injectable 4 milliGRAM(s) IV Push once PRN Nausea and/or Vomiting       Vitals:  Vital Signs Last 24 Hrs  T(C): 36.4 (30 May 2024 16:51), Max: 36.4 (30 May 2024 16:51)  T(F): 97.5 (30 May 2024 16:51), Max: 97.5 (30 May 2024 16:51)  HR: 89 (30 May 2024 16:51) (81 - 89)  BP: 153/72 (30 May 2024 16:51) (153/72 - 163/92)  BP(mean): --  RR: 18 (30 May 2024 16:51) (18 - 18)  SpO2: 96% (30 May 2024 16:51) (96% - 96%)    Parameters below as of 30 May 2024 16:51  Patient On (Oxygen Delivery Method): room air         General Exam:   General Appearance: Appropriately dressed and in no acute distress       Head: Normocephalic, atraumatic and no dysmorphic features  Ear, Nose, and Throat: Moist mucous membranes  CVS: S1S2+  Resp: No SOB, no wheeze or rhonchi  GI: soft NT/ND  Extremities: No edema or cyanosis  Skin: No bruises or rashes     Neurological Exam:  Mental Status: Awake, alert and oriented x 2.  Able to follow simple  verbal commands. Able to name and repeat. fluent speech. No obvious aphasia or dysarthria noted.   Cranial Nerves: PERRL, EOMI, LHHA. poor VA,, sensation V1-V3 intact, L ptosis, equal elevation of palate, scm/trap 5/5, tongue is midline on protrusion. no obvious papilledema on fundoscopic exam. hearing is grossly intact.   Motor: Normal bulk, tone and strength throughout. Fine finger movements were intact and symmetric. no tremors or drift noted.    Sensation: Intact to light touch and pinprick throughout. no right/left confusion. no extinction to tactile on DSS.   Reflexes: 1+ throughout at biceps, brachioradialis, triceps, patellars and ankles bilaterally and equal. No clonus. R toe and L toe were both downgoing.  Coordination: No dysmetria on FNF    Gait: walker baseline     Data/Labs/Imaging which I personally reviewed.          LABS:  no new labs          < from: CT Head No Cont (05.20.24 @ 21:59) >    ACC: [id number] EXAM:  CT BRAIN   ORDERED BY: DOMENICO DIZA     PROCEDURE DATE:  05/20/2024          INTERPRETATION:  CLINICAL INFORMATION: Altered mental status status post   fall..    COMPARISON: CT head 5/1/2024.    CONTRAST:  IV Contrast: NONE  Complications: None reported at time of study completion    TECHNIQUE:  Serial axial images were obtained from the skull base to the   vertex using multi-slice helical technique. Sagittal and coronal   reformats were obtained.    FINDINGS:    VENTRICLES AND SULCI: Ex vacuo dilatation of the right lateral ventricle.   Age-appropriate involutional changes.  INTRA-AXIAL: No acute hemorrhage, vasogenic edema or evidence for acute   territorial infarct. Chronic right PCA territory infarct. Small chronic  infarcts in the right corona radiata and bilateral thalami. There are   periventricular and subcortical white matter hypodensities, consistent   with microvascular type changes.  EXTRA-AXIAL: Stable small left parafalcine meningiomas, the more anterior   one heavily calcified. No fluid collection. Basal cisterns are clear.    VISUALIZED SINUSES:  Right maxillary sinus polyp versus retention cyst.  TYMPANOMASTOID CAVITIES:  Clear.  VISUALIZED ORBITS: Bilateral lens replacement.  CALVARIUM: Intact.    MISCELLANEOUS: None.      IMPRESSION:  Stable exam. No acute hemorrhage, vasogenic edema or hydrocephalus.   Chronic right PCA territory, small right corona radiata and bilateral   thalami infarcts. Stable small left parafalcine meningiomas.        --- End of Report ---       < from: MR Head No Cont (05.29.24 @ 13:04) >    ACC: [id number] EXAM:  MR BRAIN   ORDERED BY: PAULA TAYLORJACE     PROCEDURE DATE:  05/29/2024          INTERPRETATION:  CLINICAL INDICATIONS: dementia    COMPARISON: Head CT dated 5/26/2024    TECHNIQUE: MRI brain: Multiplanar, multisequence MR imaging of the brain   are obtained without the administration of intravenous Gadavist contrast.    FINDINGS:    Motion artifact limits interpretation.  Chronic right occipital lobe infarction. Ex vacuo dilation of the right   lateral ventricle atria/occipital horn. Chronic bilateral parietal lobe   infarctions. Mild hemosiderin deposition in the right occipital lobe.   Chronic bilateral thalamic and basal ganglia lacunar infarctions.    There is no abnormal restricted diffusion to suggest acute infarction.   Scattered periventricular and subcortical white matter T2 /FLAIR   hyperintensities are seen without mass effect, nonspecific, likely   representing moderate chronic microvascular changes. Normal T2 flow-voids   are seen within  the intracranial vasculature. The lateral ventricles and   cortical sulci are age-appropriate in size and configuration. There is no   mass, mass effect, or extra-axial    The patient is status post bilateral ocular lens replacement surgery. 1.3   cm right maxillary sinus with. 1 cm left maxillary sinus is retention   cyst. Mild mucosal thickening left ethmoid sinus. The remaining   visualized paranasal sinuses, mastoid air cells and orbits are   unremarkable.      IMPRESSION: No acute infarction.    --- End of Report ---            ALIZA MALDONADO MD; Attending Radiologist  This document has been electronically signed. May 29 2024  1:40PM    < end of copied text >

## 2024-05-30 NOTE — PROGRESS NOTE ADULT - ASSESSMENT
89F w/ hx of afib on Eliquis, HFpEF, dementia, HTN, lymphoma s/p Rituxan, asthma p/w worsening weakness and lethargy x 2 weeks w/ some concern for UTI as well        Weakness.   - Unclear if purely from UTI or some component of advancing dementia. Daughter also endorses worsening deconditioning since Jan 2/2 multiple hospitalizations.  -no abx as per ID  -F/u TSH, cont. levothyroxine for now  -Falls precautions  -PT consult, daughter prefers d/c to [facility]       Urinary tract infection.   - UA borderline, possible false negative given recent treatment. HIE Ucx from 5/10 showing sensitive Enterococcus and e. Coli  -Will observe off abx  - ISD consult appreciated  -Trend wbc and fever curve.     Paroxysmal atrial fibrillation.   -Cont. Metoprolol BID  -Cont. Eliquis BID.    Dementia.    AAOx1 at home, daughter states pt will often not recognize her. Legally blind. Note old strokes on CT head.  -Falls precautions  -PT consult, daughter prefers d/c to [facility]  -Neurology consult appreciated  -Consider addition of Quetiapine to med regimen, will hold for now given nature of chief complaint and lack of agitation currently  -Daughter undecided regarding if she wants to further work up adrenal nodule.     Chronic heart failure with preserved ejection fraction (HFpEF).   -On Eplerenone at home  -Off ARB.     Leukocytosis.   -  WBC 16 not  related to infection   -  given hx of lymphoma may be related  -Trend wbc  -Possible outpt Heme f/u.     Prophylactic measure.   - DVT PPx  -Pt on Eliquis  -Full Code.  
89F w/ hx of afib on Eliquis, HFpEF, dementia, HTN, lymphoma s/p Rituxan, asthma p/w worsening weakness and lethargy x 2 weeks w/ some concern for UTI as well        Weakness.   - Unclear if purely from UTI or some component of advancing dementia. Daughter also endorses worsening deconditioning since Jan 2/2 multiple hospitalizations.  -no abx as per ID  -nl TSH, cont. levothyroxine for now  -Falls precautions  -PT consult, daughter prefers d/c to [facility]       Urinary tract infection.   - UA borderline, possible false negative given recent treatment. HIE Ucx from 5/10 showing sensitive Enterococcus and e. Coli  -Will observe off abx  - ID consult appreciated  -Trend wbc and fever curve.    HTN  - Continue metoprolol 100mg BID and Losartan 25mg daily. Will restart home Eplerenone 25mg daily.    Persistent Afib  - Continue Eliquis for stroke prevention  - Rate control with metoprolol    Chronic Diastolic CHF  - Patient with multiple echos in sunrise with normal LV function however noted with recent outpatient echo with EF 30-35 in chart  - Repeat echo with normal LV/RV function, severe TR  - Appears euvolemic    Dementia.    AAOx1 at home, daughter states pt will often not recognize her. Legally blind. Note old strokes on CT head.  -Falls precautions  -PT consult, daughter prefers d/c to [facility]  -Neurology consult appreciated  -Consider addition of Quetiapine to med regimen, will hold for now given nature of chief complaint and lack of agitation currently  -Daughter undecided regarding if she wants to further work up adrenal nodule.  - MR brain ordered ( can be done as out pt)     Leukocytosis. chronic  -  WBC 16 not  related to infection   -  given hx of lymphoma may be related  -Trend wbc  -Possible outpt Heme f/u.     Prophylactic measure.   - DVT PPx  -Pt on Eliquis  -Full Code.  - d/c planning
89F w/ hx of afib on Eliquis, HFpEF, dementia, HTN, lymphoma s/p Rituxan, asthma p/w worsening weakness and lethargy x 2 weeks w/ some concern for UTI as well        Weakness.   - Unclear if purely from UTI or some component of advancing dementia. Daughter also endorses worsening deconditioning since Jan 2/2 multiple hospitalizations.  -no abx as per ID  -nl TSH, cont. levothyroxine for now  -Falls precautions  -PT consult, daughter prefers d/c to [facility]       Urinary tract infection.   - UA borderline, possible false negative given recent treatment. HIE Ucx from 5/10 showing sensitive Enterococcus and e. Coli  -Will observe off abx  - ID consult appreciated  -Trend wbc and fever curve.    HTN  - Continue metoprolol 100mg BID and Losartan 25mg daily. Will restart home Eplerenone 25mg daily.    Persistent Afib  - Continue Eliquis for stroke prevention  - Rate control with metoprolol    Chronic Diastolic CHF  - Patient with multiple echos in sunrise with normal LV function however noted with recent outpatient echo with EF 30-35 in chart  - Repeat echo with normal LV/RV function, severe TR  - Appears euvolemic    Dementia.    AAOx1 at home, daughter states pt will often not recognize her. Legally blind. Note old strokes on CT head.  -Falls precautions  -PT consult, daughter prefers d/c to [facility]  -Neurology consult appreciated  -Consider addition of Quetiapine to med regimen, will hold for now given nature of chief complaint and lack of agitation currently  -Daughter undecided regarding if she wants to further work up adrenal nodule.  - MR brain ordered ( can be done as out pt)     Leukocytosis. chronic  -  WBC 16 not  related to infection   -  given hx of lymphoma may be related  -Trend wbc  -Possible outpt Heme f/u.    constipation  - senna and miralax     Prophylactic measure.   - DVT PPx  -Pt on Eliquis  -Full Code.  - d/c planning
89F w/ hx of afib on Eliquis, HFpEF, dementia, HTN, lymphoma s/p Rituxan, asthma p/w worsening weakness and lethargy x 2 weeks.      No fever   U/A with minimal pyuria,   pt s/p completion of 7 days of abx recently.  pt with leucocytosis, but on chart review seems like pt runs high WBC count at baseline.       Overall Abnormal U/A, leucocytosis, lethargy. Positive culture finding.      Doubt real UTI, U/A with minimal pyuria. Urine cx outpt with e coli and e fecalis, here e faecium.  Given different bacteria each time, ? significance.  Look for alternative etiology of weakness and lethargy.       PLAN:   monitoring off abx.   urine cx with E faecium, not planning to treat at this time unless change in clinical status.   if any change in clinical status can re-evaluate need for abx.       Plan discussed with Medicine Attending.     José Antonio Loza  Please contact through MS Teams   If no response or past 5 pm/weekend call [phone].           
89F w/ afib on MD Anil, HFpEF, dementia, HTN, lymphoma s/p Rituxan, asthma p/w worsening weakness and lethargy x 2 weeks. Pt recently admitted to [facility] weeks ago for weakness and discharged. Weakness not completely resolved but thought to be related to polypharmacy. Daughter endorses pt being treated for UTI ~7 days ago w/ cefpodoxime. Completed treatment day PTA. Was called and informed enterococcus also grew and was going to be prescribed Macrobid but pt never received Rx. Weakness has been getting worse and pt gradually lost strength while walking  Also more frequent episodes of delirium recently.  CTH 5/20: chronic R PCA infarc and R CR and B/L thalalmic infarcts as well as left parafalcine meningioma   + leuckocytosis   o/e L ptosis (? new last few weeks), LHHA, AAOx2, poor visual acuity   MRI brain neg for acute infarct     Imprssion:   1) Dementia with sundowning and episodes of delerium especially given her macular degeneration and poor vision   2) weakness likely multifactorial from recent infection but also deconditioning and multile prior strokes   3) multiple chronic strokes   4) meningioma     - check reversible causes of dementia . check B12 (WNL), RPR, TSH (WNL) if never checked   - zosyn in ED then on ampicillin; now off   - c/w elqiuis for AF  - for secondary stroke prevention should also be on statin therpay with LDL goal < 70mg/dL if no objection.   atorvastatin 20mg QHS  - EEG can be outpatient ; no concern for seiuzres at this time   - delerium precautions   - seroquel or zyprexa PRN for agitation/sundowning if QTC < 500   - PT/OT    - telemetry  - PT/OT   - check FS, glucose control <180  - GI/DVT ppx  no objection to d/c planning   Narayan Jean MD  Vascular Neurology  Office: [phone] 
89F w/ hx of afib on Eliquis, HFpEF, dementia, HTN, lymphoma s/p Rituxan, asthma p/w worsening weakness and lethargy x 2 weeks w/ some concern for UTI as well        Weakness.   - Unclear if purely from UTI or some component of advancing dementia. Daughter also endorses worsening deconditioning since Jan 2/2 multiple hospitalizations.  -no abx as per ID  -nl TSH, cont. levothyroxine for now  -Falls precautions  -PT consult, daughter prefers d/c to [facility]       Urinary tract infection.   - UA borderline, possible false negative given recent treatment. HIE Ucx from 5/10 showing sensitive Enterococcus and e. Coli  -Will observe off abx  - ID consult appreciated  -Trend wbc and fever curve.    HTN  - Continue metoprolol 100mg BID and Losartan 25mg daily. Will restart home Eplerenone 25mg daily.    Persistent Afib  - Continue Eliquis for stroke prevention  - Rate control with metoprolol    Chronic Diastolic CHF  - Patient with multiple echos in sunrise with normal LV function however noted with recent outpatient echo with EF 30-35 in chart  - Repeat echo with normal LV/RV function, severe TR  - Appears euvolemic    Dementia.    AAOx1 at home, daughter states pt will often not recognize her. Legally blind. Note old strokes on CT head.  -Falls precautions  -PT consult, daughter prefers d/c to [facility]  -Neurology consult appreciated  -Consider addition of Quetiapine to med regimen, will hold for now given nature of chief complaint and lack of agitation currently  -Daughter undecided regarding if she wants to further work up adrenal nodule.  - MR brain ordered ( can be done as out pt)     Leukocytosis. chronic  -  WBC 16 not  related to infection   -  given hx of lymphoma may be related  -Trend wbc  -Possible outpt Heme f/u.    constipation  - senna and miralax     Prophylactic measure.   - DVT PPx  -Pt on Eliquis  -Full Code.  - d/c planning
89F w/ hx of afib on Eliquis, HFpEF, dementia, HTN, lymphoma s/p Rituxan, asthma p/w worsening weakness and lethargy x 2 weeks w/ some concern for UTI as well        Weakness.   - Unclear if purely from UTI or some component of advancing dementia. Daughter also endorses worsening deconditioning since Jan 2/2 multiple hospitalizations.  -no abx as per ID  -nl TSH, cont. levothyroxine for now  -Falls precautions  -PT consult, daughter prefers d/c to [facility]       Urinary tract infection.   - UA borderline, possible false negative given recent treatment. HIE Ucx from 5/10 showing sensitive Enterococcus and e. Coli  -Will observe off abx  - ID consult appreciated  -Trend wbc and fever curve.    HTN  - Continue metoprolol 100mg BID and Losartan 25mg daily. Will restart home Eplerenone 25mg daily.    Persistent Afib  - Continue Eliquis for stroke prevention  - Rate control with metoprolol    Chronic Diastolic CHF  - Patient with multiple echos in sunrise with normal LV function however noted with recent outpatient echo with EF 30-35 in chart  - Repeat echo with normal LV/RV function, severe TR  - Appears euvolemic    Dementia.    AAOx1 at home, daughter states pt will often not recognize her. Legally blind. Note old strokes on CT head.  -Falls precautions  -PT consult, daughter prefers d/c to [facility]  -Neurology consult appreciated  -Consider addition of Quetiapine to med regimen, will hold for now given nature of chief complaint and lack of agitation currently  -Daughter undecided regarding if she wants to further work up adrenal nodule.  - MR brain ordered ( can be done as out pt)     Leukocytosis. chronic  -  WBC 16 not  related to infection   -  given hx of lymphoma may be related  -Trend wbc  -Possible outpt Heme f/u.    constipation  - senna and miralax     Prophylactic measure.   - DVT PPx  -Pt on Eliquis  -Full Code.  - d/c planning
89F w/ hx of afib on Eliquis, HFpEF, dementia, HTN, lymphoma s/p Rituxan, asthma p/w worsening weakness and lethargy x 2 weeks w/ some concern for UTI as well        Weakness.   - Unclear if purely from UTI or some component of advancing dementia. Daughter also endorses worsening deconditioning since Jan 2/2 multiple hospitalizations.  -no abx as per ID  -nl TSH, cont. levothyroxine for now  -Falls precautions  -PT consult, daughter prefers d/c to [facility]       Urinary tract infection.   - UA borderline, possible false negative given recent treatment. HIE Ucx from 5/10 showing sensitive Enterococcus and e. Coli  -Will observe off abx  - ID consult appreciated  -Trend wbc and fever curve.    HTN  - Continue metoprolol 100mg BID and Losartan 25mg daily. Will restart home Eplerenone 25mg daily.    Persistent Afib  - Continue Eliquis for stroke prevention  - Rate control with metoprolol    Chronic Diastolic CHF  - Patient with multiple echos in sunrise with normal LV function however noted with recent outpatient echo with EF 30-35 in chart  - Repeat echo with normal LV/RV function, severe TR  - Appears euvolemic    Dementia.    AAOx1 at home, daughter states pt will often not recognize her. Legally blind. Note old strokes on CT head.  -Falls precautions  -PT consult, daughter prefers d/c to [facility]  -Neurology consult appreciated  -Consider addition of Quetiapine to med regimen, will hold for now given nature of chief complaint and lack of agitation currently  -Daughter undecided regarding if she wants to further work up adrenal nodule.     Leukocytosis. chronic  -  WBC 16 not  related to infection   -  given hx of lymphoma may be related  -Trend wbc  -Possible outpt Heme f/u.     Prophylactic measure.   - DVT PPx  -Pt on Eliquis  -Full Code.  - d/c planning
89F w/ hx of afib on Eliquis, HFpEF, dementia, HTN, lymphoma s/p Rituxan, asthma p/w worsening weakness and lethargy x 2 weeks w/ some concern for UTI as well        Weakness.   - Unclear if purely from UTI or some component of advancing dementia. Daughter also endorses worsening deconditioning since Jan 2/2 multiple hospitalizations.  -no abx as per ID  -nl TSH, cont. levothyroxine for now  -Falls precautions  -PT consult, daughter prefers d/c to [facility]       Urinary tract infection.   - UA borderline, possible false negative given recent treatment. HIE Ucx from 5/10 showing sensitive Enterococcus and e. Coli  -Will observe off abx  - ID consult appreciated  -Trend wbc and fever curve.     Paroxysmal atrial fibrillation.   -Cont. Metoprolol BID  -Cont. Eliquis BID.    Dementia.    AAOx1 at home, daughter states pt will often not recognize her. Legally blind. Note old strokes on CT head.  -Falls precautions  -PT consult, daughter prefers d/c to [facility]  -Neurology consult appreciated  -Consider addition of Quetiapine to med regimen, will hold for now given nature of chief complaint and lack of agitation currently  -Daughter undecided regarding if she wants to further work up adrenal nodule.     Chronic heart failure with preserved ejection fraction (HFpEF).   -On Eplerenone at home  -Off ARB.     Leukocytosis. chronic  -  WBC 16 not  related to infection   -  given hx of lymphoma may be related  -Trend wbc  -Possible outpt Heme f/u.     Prophylactic measure.   - DVT PPx  -Pt on Eliquis  -Full Code.  - d/c planning
89F w/ hx of afib on Eliquis, HFpEF, dementia, HTN, lymphoma s/p Rituxan, asthma p/w worsening weakness and lethargy x 2 weeks w/ some concern for UTI as well        Weakness.   - Unclear if purely from UTI or some component of advancing dementia. Daughter also endorses worsening deconditioning since Jan 2/2 multiple hospitalizations.  -no abx as per ID  -nl TSH, cont. levothyroxine for now  -Falls precautions  -PT consult, daughter prefers d/c to [facility]       Urinary tract infection.   - UA borderline, possible false negative given recent treatment. HIE Ucx from 5/10 showing sensitive Enterococcus and e. Coli  -Will observe off abx  - ID consult appreciated  -Trend wbc and fever curve.    HTN  - Continue metoprolol 100mg BID and Losartan 25mg daily. Will restart home Eplerenone 25mg daily.    Persistent Afib  - Continue Eliquis for stroke prevention  - Rate control with metoprolol    Chronic Diastolic CHF  - Patient with multiple echos in sunrise with normal LV function however noted with recent outpatient echo with EF 30-35 in chart  - Repeat echo with normal LV/RV function, severe TR  - Appears euvolemic    Dementia.    AAOx1 at home, daughter states pt will often not recognize her. Legally blind. Note old strokes on CT head.  -Falls precautions  -PT consult, daughter prefers d/c to [facility]  -Neurology consult appreciated  -Consider addition of Quetiapine to med regimen, will hold for now given nature of chief complaint and lack of agitation currently  -Daughter undecided regarding if she wants to further work up adrenal nodule.  - MR brain ordered ( can be done as out pt)     Leukocytosis. chronic  -  WBC 16 not  related to infection   -  given hx of lymphoma may be related  -Trend wbc  -Possible outpt Heme f/u.    constipation  - senna and miralax     Prophylactic measure.   - DVT PPx  -Pt on Eliquis  -Full Code.  - d/c planning
89F w/ hx of afib on Eliquis, HFpEF, dementia, HTN, lymphoma s/p Rituxan, asthma p/w worsening weakness and lethargy x 2 weeks w/ some concern for UTI as well        Weakness.   - Unclear if purely from UTI or some component of advancing dementia. Daughter also endorses worsening deconditioning since Jan 2/2 multiple hospitalizations.  -no abx as per ID  -nl TSH, cont. levothyroxine for now  -Falls precautions  -PT consult, daughter prefers d/c to [facility]       Urinary tract infection.   - UA borderline, possible false negative given recent treatment. HIE Ucx from 5/10 showing sensitive Enterococcus and e. Coli  -Will observe off abx  - ID consult appreciated  -Trend wbc and fever curve.    HTN  - Continue metoprolol 100mg BID and Losartan 25mg daily. Will restart home Eplerenone 25mg daily.    Persistent Afib  - Continue Eliquis for stroke prevention  - Rate control with metoprolol    Chronic Diastolic CHF  - Patient with multiple echos in sunrise with normal LV function however noted with recent outpatient echo with EF 30-35 in chart  - Repeat echo with normal LV/RV function, severe TR  - Appears euvolemic    Dementia.    AAOx1 at home, daughter states pt will often not recognize her. Legally blind. Note old strokes on CT head.  -Falls precautions  -PT consult, daughter prefers d/c to [facility]  -Neurology consult appreciated  -Consider addition of Quetiapine to med regimen, will hold for now given nature of chief complaint and lack of agitation currently  -Daughter undecided regarding if she wants to further work up adrenal nodule.  - MR brain shows old strokes     Leukocytosis. chronic  -  WBC 16 not  related to infection   -  given hx of lymphoma may be related  -Trend wbc  -Possible outpt Heme f/u.    constipation  - senna and miralax     Prophylactic measure.   - DVT PPx  -Pt on Eliquis  -Full Code.  - d/c planning
Agree with above assessment and plan as outlined above.    -  No need for further inpatient cardiac work up.    Herman Shoemaker MD, [facility]  BEEPER [phone]  
Agree with above assessment and plan as outlined above.    - MRI noted  - No need for further inpatient cardiac work up.    Herman Shoemaker MD, [facility]  BEEPER [phone]  
Agree with above assessment and plan as outlined above.    - d/c planning for today    Herman Shoemaker MD, [facility]  BEEPER [phone]  
Patient seen and examined, agree with above assessment and plan as transcribed above.    - no clinical CHF  - cont meds as per primary cardio At [place] Dr. Shakir Shoemaker MD, [facility]  BEEPER [phone]

## 2024-05-30 NOTE — PROGRESS NOTE ADULT - SUBJECTIVE AND OBJECTIVE BOX
C A R D I O L O G Y  **********************************     DATE OF SERVICE: 05-30-24    Patient denies chest pain or shortness of breath. Feels tired. Awaiting discharge.  Review of symptoms otherwise negative.    MEDICATIONS:  acetaminophen     Tablet .. 650 milliGRAM(s) Oral every 6 hours PRN  apixaban 5 milliGRAM(s) Oral two times a day  atorvastatin 20 milliGRAM(s) Oral at bedtime  busPIRone 15 milliGRAM(s) Oral two times a day  eplerenone 25 milliGRAM(s) Oral daily  levothyroxine 25 MICROGram(s) Oral daily  losartan 25 milliGRAM(s) Oral daily  melatonin 3 milliGRAM(s) Oral at bedtime PRN  metoprolol tartrate 100 milliGRAM(s) Oral two times a day  ondansetron Injectable 4 milliGRAM(s) IV Push once PRN  senna 2 Tablet(s) Oral at bedtime      LABS:      Hemoglobin: 15.1 g/dL (05-27 @ 06:46)            Creatinine Trend: 0.90<--, 0.94<--, 1.06<--, 1.13<--, 0.94<--, 1.09<--    COAGS:           PHYSICAL EXAM:  T(C): 36.4 (05-30-24 @ 16:51), Max: 36.4 (05-30-24 @ 16:51)  HR: 89 (05-30-24 @ 16:51) (81 - 89)  BP: 153/72 (05-30-24 @ 16:51) (153/72 - 163/92)  RR: 18 (05-30-24 @ 16:51) (18 - 18)  SpO2: 96% (05-30-24 @ 16:51) (96% - 96%)  Wt(kg): --    I&O's Summary        Gen: NAD  HEENT:  (-)icterus (-)pallor  CV: N S1 S2 1/6 DMITRIY (+)2 Pulses B/l  Resp:  Clear to auscultation B/L, normal effort  GI: (+) BS Soft, NT, ND  Lymph:  (-)Edema, (-)obvious lymphadenopathy  Skin: Warm to touch, Normal turgor  Psych: Appropriate mood and affect      TELEMETRY: None	        < from: TTE W or WO Ultrasound Enhancing Agent (05.23.24 @ 14:48) >  CONCLUSIONS:      1. Patient refused a complete exam hence this echo protocol was not finished.   2. Left ventricular cavity is small. Left ventricular wall thickness is normal. Left ventricular systolic function is normal. Thereare no regional wall motion abnormalities seen.   3. Normal left ventricular diastolic function, with normal filling pressure.   4. Normal right ventricular cavity size, with normal wall thickness, and normal systolic function.   5. The left atrium is severely dilated.   6. No significant valvular disease.   7. There is severe tricuspid regurgitation. Estimated pulmonary artery systolic pressure is 36 mmHg.   8. No pericardial effusion seen.   9. Compared to the transthoracic echocardiogram performed on 1/24/2024, there is severe tricuspid regurgitation.    < end of copied text >    ASSESSMENT/PLAN: Patient is a 89 y/o female with PMH of CVA, YOVANI on CPAP, HTN, HLD, persistent afib on Eliquis s/p [employer] PPM, and diastolic heart failure who presented with weakness. Cardiology consulted for hypertension.    #HTN  - Continue metoprolol 100mg BID, Losartan 25mg daily, and Eplerenone 25mg daily    #Persistent Afib  - Continue Eliquis for stroke prevention  - Rate control with metoprolol    #Chronic Diastolic CHF  - Patient with multiple echos in sunrise with normal LV function however noted with recent outpatient echo with EF 30-35 in chart  - Repeat echo with normal LV/RV function, severe TR  - Appears euvolemic    #Weakness  - Workup per primary team  - MRI Brain noted with no acute CVA    - No further inpatient cardiac w/u planned  - D/C planning per primary team  - Patient to f/u with her [facility] cardiologist Dr. Patrice Mullins and [place] [facility] Dr. Schilling after discharge    LIAM BlakeC  Pager: [phone]

## 2024-05-30 NOTE — DISCHARGE NOTE NURSING/CASE MANAGEMENT/SOCIAL WORK - PATIENT PORTAL LINK FT
You can access the FollowMyHealth Patient Portal offered by [facility] by registering at the following website: [email or web address]. By joining [email or web address]’s FollowMyHealth portal, you will also be able to view your health information using other applications (apps) compatible with our system.

## 2024-05-30 NOTE — PROGRESS NOTE ADULT - PROVIDER SPECIALTY LIST ADULT
Cardiology
Cardiology
Internal Medicine
Internal Medicine
Cardiology
Infectious Disease
Internal Medicine
[FreeTextEntry1] : 33 y/o F presents for evaluation of Crohn's disease, referred by Dr. Bernard\par Not currently on any medications for Crohn's \par \par Colonoscopy completed 2/24/20\par The patient reports a history of proximally one year of symptomatology. She reports that significant weight loss over 50 pounds, with associated early satiety, lethargy, crampy abdominal pain intermittent, and change in bowel habits. She reports fecal urgency and hesitancy. She reports narrow stools 3-4 times per day with incomplete evacuation.\par \par A recent colonoscopy revealed a rectal stricture and notable granuloma with inflammation in the colon. MR enteroography was consistent with TI stricture and distal ileal involvement. There is notable thickening of the distal rectum as well.
Cardiology
Cardiology
Internal Medicine
Neurology
Internal Medicine
Internal Medicine

## 2024-05-30 NOTE — PROGRESS NOTE ADULT - SUBJECTIVE AND OBJECTIVE BOX
DATE OF SERVICE: 05-30-24 @ 12:48    Patient is a 89y old  Female who presents with a chief complaint of Weakness and UTI (29 May 2024 14:57)      SUBJECTIVE / OVERNIGHT EVENTS:  No chest pain. No shortness of breath. No complaints. No events overnight.     MEDICATIONS  (STANDING):  apixaban 5 milliGRAM(s) Oral two times a day  atorvastatin 20 milliGRAM(s) Oral at bedtime  busPIRone 15 milliGRAM(s) Oral two times a day  eplerenone 25 milliGRAM(s) Oral daily  levothyroxine 25 MICROGram(s) Oral daily  losartan 25 milliGRAM(s) Oral daily  metoprolol tartrate 100 milliGRAM(s) Oral two times a day  senna 2 Tablet(s) Oral at bedtime    MEDICATIONS  (PRN):  acetaminophen     Tablet .. 650 milliGRAM(s) Oral every 6 hours PRN Temp greater or equal to 38C (100.4F), Mild Pain (1 - 3)  melatonin 3 milliGRAM(s) Oral at bedtime PRN Insomnia  ondansetron Injectable 4 milliGRAM(s) IV Push once PRN Nausea and/or Vomiting      Vital Signs Last 24 Hrs  T(C): 36.1 (29 May 2024 23:30), Max: 36.3 (29 May 2024 16:23)  T(F): 97 (29 May 2024 23:30), Max: 97.4 (29 May 2024 16:23)  HR: 81 (30 May 2024 05:25) (81 - 90)  BP: 163/92 (30 May 2024 05:25) (134/73 - 163/92)  BP(mean): --  RR: 18 (29 May 2024 23:30) (18 - 18)  SpO2: 96% (29 May 2024 23:30) (94% - 96%)    Parameters below as of 29 May 2024 23:30  Patient On (Oxygen Delivery Method): room air      CAPILLARY BLOOD GLUCOSE        I&O's Summary      PHYSICAL EXAM:  GENERAL: NAD, well-developed  HEAD:  Atraumatic, Normocephalic  EYES: EOMI, PERRLA, conjunctiva and sclera clear  NECK: Supple, No JVD  CHEST/LUNG: Clear to auscultation bilaterally; No wheeze  HEART: Regular rate and rhythm; No murmurs, rubs, or gallops  ABDOMEN: Soft, Nontender, Nondistended; Bowel sounds present  EXTREMITIES:  2+ Peripheral Pulses, No clubbing, cyanosis, or edema  PSYCH: AAOx1  NEUROLOGY: non-focal  SKIN: No rashes or lesions    LABS:      < from: MR Head No Cont (05.29.24 @ 13:04) >  FINDINGS:    Motion artifact limits interpretation.  Chronic right occipital lobe infarction. Ex vacuo dilation of the right   lateral ventricle atria/occipital horn. Chronic bilateral parietal lobe   infarctions. Mild hemosiderin deposition in the right occipital lobe.   Chronic bilateral thalamic and basal ganglia lacunar infarctions.    There is no abnormal restricted diffusion to suggest acute infarction.   Scattered periventricular and subcortical white matter T2 /FLAIR   hyperintensities are seen without mass effect, nonspecific, likely   representing moderate chronic microvascular changes. Normal T2 flow-voids   are seen within  the intracranial vasculature. The lateral ventricles and   cortical sulci are age-appropriate in size and configuration. There is no   mass, mass effect, or extra-axial    The patient is status post bilateral ocular lens replacement surgery. 1.3   cm right maxillary sinus with. 1 cm left maxillary sinus is retention   cyst. Mild mucosal thickening left ethmoid sinus. The remaining   visualized paranasal sinuses, mastoid air cells and orbits are   unremarkable.      IMPRESSION: No acute infarction.    < end of copied text >                RADIOLOGY & ADDITIONAL TESTS:    Imaging Personally Reviewed:    Consultant(s) Notes Reviewed:      Care Discussed with Consultants/Other Providers:

## 2024-05-30 NOTE — DISCHARGE NOTE NURSING/CASE MANAGEMENT/SOCIAL WORK - NSDCFUADDAPPT_GEN_ALL_CORE_FT
APPTS ARE READY TO BE MADE: [x] YES    Best Family or Patient Contact (if needed):    Additional Information about above appointments (if needed):    1:   2:   3:     Other comments or requests:   Provided patient with provider referral information, however patient prefers to schedule the appointments on their own.

## 2024-05-30 NOTE — DISCHARGE NOTE NURSING/CASE MANAGEMENT/SOCIAL WORK - NSDCVIVACCINE_GEN_ALL_CORE_FT
Tdap; 23-Jan-2017 08:01; Ivelisse Carrera (RN); [employer]; [id number]; IntraMuscular; Deltoid Right.; 0.5 milliLiter(s); VIS (VIS Published: 09-May-2013, VIS Presented: 23-Jan-2017);   Tdap; 29-Apr-2019 12:26; Aga Pichardo (SCOTT); [employer]; [id number] (Exp. Date: 26-Feb-2021); IntraMuscular; Deltoid Right.; 0.5 milliLiter(s); VIS (VIS Published: 09-May-2013, VIS Presented: 29-Apr-2019);

## 2024-05-30 NOTE — DISCHARGE NOTE NURSING/CASE MANAGEMENT/SOCIAL WORK - NSDCPEELIQUISDIET_GEN_ALL_CORE
Eat healthy foods you enjoy. Apixaban/Eliquis DOES NOT have a special diet. Limit your alcohol intake. other

## 2024-06-03 NOTE — PATIENT PROFILE ADULT - NSPROMEDSBROUGHTTOHOSP_GEN_A_NUR
Clinic hours for Dr. Syed:  Monday 8:20am - 4:30pm  Tuesday 8:20am - 4:30pm  Wednesday 8:20am - 4:30pm  Thursday 8:20am - 4:30pm  Friday           Not in    If you need a refill on your prescription, please call your pharmacy and let them know. Please be proactive and call before your medication runs out. The pharmacy will then contact us for the refill. Please allow 24-48 hours for the refill to be processed.     If your physician has ordered additional laboratory or radiology testing as part of your ongoing plan of care, please allow 5-7 business days from the day of your lab draw or test for the results to be sent and reviewed by your provider. If your results are critical and require more immediate intervention, you will be contacted sooner. Your results will be conveyed to you via a phone call or letter.    You may be receiving a patient satisfaction survey in the mail or in your email. If you receive an email survey, please look for the subject line of: \" Your provider name\" would like your feedback\". Please take the time to complete your survey either via the mail or email, as your feedback is very important to us. We strive to make your experience exceptional and your comments help us with that goal. We look forward to hearing from you.    Last adrenal gland CT scan showed that the left adrenal nodule/adenoma was noted to be stable since you do not have any high blood pressure or other symptoms of the adrenal nodule making excessive adrenal hormone no further workup is needed.  Advise that you repeat thyroid blood test with your annual labs orders are in the system you will be notified of results and recommendations.  Follow-up as needed.   no

## 2024-08-01 NOTE — ED ADULT NURSE NOTE - DISCHARGE TEACHING
IV discontinued, cath removed intact
hold coumadin tonight and f/u with doctor for coumadin managment

## 2024-08-17 NOTE — ED ADULT NURSE NOTE - NSFALLRSKHARMRISK_ED_ALL_ED
[facility]  Progress Note  Name: Rowan Lazo I  MRN: [id number]  Unit/Bed#: E4 -01 I Date of Admission: 8/10/2024   Date of Service: 8/17/2024 I Hospital Day: 6    Assessment & Plan   * Intractable nausea and vomiting  Assessment & Plan  Patient presents with intractable nausea and vomiting  Patient reports no improvement but clinically appears more well at the bedside.  She did have a bowel movement today.  She tolerated some crackers and fluids.  With patient's new regional wall motion abnormalities, not safe for endoscopy at this time.  Can be pursued as an outpatient when patient is stable on goal-directed medical therapy  Did not tolerate gastric emptying study  Follow-up small bowel follow-through  Appreciate psychiatry recommendations-given severity of patient's anxiety, recommendation was given for addition of as needed benzodiazepines.    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, htn, DM, CKD stage 3, GERD, and multiple abdominal surgeries presented to the ED after developing intractable N/V since Thursday  Imaging consistent with pneumonia possibly due to aspiration in setting of nausea/vomiting  Resolved  Afebrile with no leukocytosis  Completed 5-day course of antibiotics    Moderate protein-calorie malnutrition (HCC)  Assessment & Plan  Malnutrition Findings:   Adult Malnutrition type: Chronic illness  Adult Degree of Malnutrition: Malnutrition of moderate degree  Malnutrition Characteristics: Inadequate energy, Muscle loss                  360 Statement: malnutrition of moderate degree in setting  "of chronic illness, weight loss, evidenced by muscle loss/protruding clavicles, poor po intake/<75% energy intake compared to estimated energy needs for > 1 month    BMI Findings:           Body mass index is 24.76 kg/m².   Continue nutrition supplementation    Blood in stool  Assessment & Plan  Pt reports that she has also been having episodes of diarrhea the last several days which have been \"streaked with blood\" after taking laxatives for episode of constipation  Hemoglobin stable  No further events  CEA level has increased and patient is aware  She does not want to pursue further workup    Hypertensive urgency  Assessment & Plan  Pt with elevated blood pressure of 208/93 while in the ED  Likely related to discomfort/stress response  Now improved  Continue amlodipine, metoprolol    Electrolyte abnormality  Assessment & Plan  In setting of poor oral intake patient has hypomagnesemia, hypokalemia, hypocalcemia, hypophosphatemia  Continue IV repletion of electrolytes   Hold IV fluid today and see how she does      Anxiety  Assessment & Plan  Suspected that anxiety was contributing to patient's intractable nausea and vomiting  Appreciate psychiatry recommendations  She may be able to be tapered to as needed benzodiazepines in the future but given severity of symptoms expect she will need 24-hour coverage.  Started on low-dose clonazepam twice a day  Significant improvement in mood as well as oral intake   Continue current regimen     Elevated troponin  Assessment & Plan  Echocardiogram with basal inferior and inferior lateral wall motion abnormalities.  Indicative of a prior myocardial infarction.  Given level troponin elevation, atypical chest pain, nonspecific ST segment abnormalities suspected that this was subacute.  Recommendation to hold off on endoscopic evaluation at this time and pursue an outpatient setting  Cardiology recommends the patient pursuing And goal-directed medical therapy  Continue aspirin, " statin, metoprolol    Stage 3b chronic kidney disease (HCC)  Assessment & Plan  CKD currently at baseline    Cancer of appendix ([facility])  Assessment & Plan  Appendiceal cancer with omental and ovarian mets s/p chemo   Continue outpatient follow-up to reestablish care with oncology    Type 2 diabetes mellitus with diabetic neuropathy, without long-term current use of insulin ([facility])  Assessment & Plan  Sliding scale insulin      Mild intermittent asthma  Assessment & Plan  No acute exacerbation  Continue home inhaler regimen         VTE Pharmacologic Prophylaxis: heparin     Patient Centered Rounds:  Patient care rounds were performed with nursing    Education and Discussions with Family / Patient: Patient, Called daughter for update approx 11 am    Time Spent for Care: I have spent a total time of 40 minutes on 24 in caring for this patient including Diagnostic results, Risks and benefits of tx options, Instructions for management, Patient and family education, Impressions, Counseling / Coordination of care, Documenting in the medical record, Reviewing / ordering tests, medicine, procedures  , and Communicating with other healthcare professionals .      Current Length of Stay: 6 day(s)    Current Patient Status: Inpatient   Certification Statement: The patient will continue to require additional inpatient hospital stay due to need for management of intractable nausea and vomiting     Discharge Plan: 24-48 hours     Code Status: Level 3 - DNAR and DNI      Subjective:   Patient seen and evaluated at bedside. Eating oatmeal this morning. Feels well.     Objective:     Vitals:   Temp (24hrs), Av.5 °F (36.9 °C), Min:98 °F (36.7 °C), Max:99.3 °F (37.4 °C)    Temp:  [98 °F (36.7 °C)-99.3 °F (37.4 °C)] 98.1 °F (36.7 °C)  HR:  [79-95] 83  Resp:  [18] 18  BP: (119-159)/(72-82) 119/73  SpO2:  [95 %-99 %] 96 %  Body mass index is 24.76 kg/m².     Input and Output Summary (last 24 hours):       Intake/Output Summary (Last  24 hours) at 8/17/2024 1058  Last data filed at 8/17/2024 0948  Gross per 24 hour   Intake 1660 ml   Output 1650 ml   Net 10 ml       Physical Exam:     Physical Exam    Additional Data:     Labs: I have reviewed pertinent results     Results from last 7 days   Lab Units 08/15/24  0744 08/13/24  0627 08/12/24  0553   WBC Thousand/uL 9.90   < > 11.82*   HEMOGLOBIN g/dL 13.7   < > 12.9   HEMATOCRIT % 40.5   < > 37.9   PLATELETS Thousands/uL 237   < > 236   SEGS PCT %  --   --  76*   LYMPHO PCT %  --   --  13*   MONO PCT %  --   --  10   EOS PCT %  --   --  0    < > = values in this interval not displayed.     Results from last 7 days   Lab Units 08/17/24  0550 08/14/24  0541 08/13/24  0627 08/12/24  0553   SODIUM mmol/L 136   < > 136 139   POTASSIUM mmol/L 4.1   < > 2.7* 3.2*   CHLORIDE mmol/L 107   < > 101 107   CO2 mmol/L 23   < > 24 22   BUN mg/dL 9   < > 10 12   CREATININE mg/dL 1.05   < > 1.09 1.19   ANION GAP mmol/L 6   < > 11 10   CALCIUM mg/dL 6.9*   < > 6.6* 7.7*   ALBUMIN g/dL  --   --  3.8 3.9   TOTAL BILIRUBIN mg/dL  --   --   --  0.53   ALK PHOS U/L  --   --   --  44   ALT U/L  --   --   --  14   AST U/L  --   --   --  60*   GLUCOSE RANDOM mg/dL 171*   < > 134 133    < > = values in this interval not displayed.     Results from last 7 days   Lab Units 08/10/24  2201   INR  1.02     Results from last 7 days   Lab Units 08/17/24  0730 08/16/24  2107 08/16/24  1604 08/16/24  1108 08/16/24  0738 08/15/24  2125 08/15/24  1611 08/15/24  1112 08/15/24  0757 08/14/24  2131 08/14/24  1621 08/14/24  1121   POC GLUCOSE mg/dl 147* 130 176* 112 165* 165* 183* 119 132 107 116 133     Results from last 7 days   Lab Units 08/11/24  1047   HEMOGLOBIN A1C % 6.3*     Results from last 7 days   Lab Units 08/13/24  0627 08/12/24  0553 08/11/24  0403 08/11/24  0159 08/10/24  2357 08/10/24  2201   LACTIC ACID mmol/L  --   --  1.6 3.3* 4.1* 6.8*   PROCALCITONIN ng/ml 0.10 0.13  --   --   --  0.07         Imaging: I have reviewed  pertinent imaging       Recent Cultures (last 7 days):     Results from last 7 days   Lab Units 08/11/24  1047   LEGIONELLA URINARY ANTIGEN  Negative       Last 24 Hours Medication List:   Current Facility-Administered Medications   Medication Dose Route Frequency Provider Last Rate    acetaminophen  650 mg Oral Q6H PRN Jose Escobar PA-C      albuterol  2 puff Inhalation TID PRN Jose Escobar PA-C      amLODIPine  2.5 mg Oral Daily Jose Escobar PA-C      aspirin  81 mg Oral Daily Rujul Sheikh, DO      atorvastatin  40 mg Oral Daily With Dinner Rujul Sheikh, DO      benzonatate  100 mg Oral TID PRN Jose Escobar PA-C      calcium gluconate  1 g Intravenous Once Raimundo Garcia, DO      clonazePAM  0.5 mg Oral BID Raimundo Garcia, DO      diphenhydramine, lidocaine, Al/Mg hydroxide, simethicone  10 mL Swish & Swallow Q4H PRN Raimundo Garcia, DO      heparin (porcine)  5,000 Units Subcutaneous Q8H [facility] Raimundo Garcia, DO      hydrOXYzine HCL  25 mg Oral Q6H PRN Raimundo Garcia, DO      insulin lispro  1-5 Units Subcutaneous TID AC Jose Escobar PA-C      insulin lispro  1-5 Units Subcutaneous HS Jose Escobar PA-C      lubiprostone  24 mcg Oral BID Jose Escobar PA-C      magnesium sulfate  2 g Intravenous Once Raimundo Garcia DO 2 g (08/17/24 0948)    methocarbamol  500 mg Oral HS PRN Jose Escobar PA-C      metoprolol succinate  50 mg Oral BID Raimundo Garcia,       montelukast  10 mg Oral Daily Jose Escobar PA-C      OLANZapine  10 mg Oral Daily Sonido Scherer MD      pantoprazole  40 mg Oral Q12H Jose Escobar PA-C      scopolamine  1 patch Transdermal Q72H Sonido Scherer MD      sodium phosphate  30 mmol Intravenous Once Raimundo Garcia, DO 30 mmol (08/17/24 0943)    theophylline  200 mg Oral BID Jose Escobar PA-C      trimethobenzamide  200 mg Intramuscular Q6H [facility] Raimundo Garcia, DO      umeclidinium  1 puff Inhalation Daily Jose Escobar PA-C       venlafaxine  37.5 mg Oral Daily Jose Escobar PA-C      zolpidem  5 mg Oral HS PRN Jose Escobar PA-C          Today, Patient Was Seen By: Raimundo Garcia DO    ** Please Note: Dictation voice to text software may have been used in the creation of this document. **      no

## 2024-08-19 NOTE — PHYSICAL EXAM
[FreeTextEntry1] : pap smear and HPV testing [FreeTextEntry1] : General exam \par \par Constitutional: The patient appears well-developed, well-nourished, and in no apparent distress. Patient is well-groomed.  \par \par Skin: The skin is warm and dry, with normal turgor. \par \par ENMT: Ears: Hearing is grossly within normal limits.  \par \par Neck: Supple: The neck is supple.  \par \par Respiratory: Inspection: Breathing unlabored.  \par \par Neurologic: Alert and oriented x 3. \par \par Psychiatric: Patient is cooperative and appropriate.  Mood and affect are normal.  Patient's insight is good, and memory and judgment are intact.\par \par CERVICAL EXAM\par \par APPEARANCE:\par No visible scars\par No gross deformity or malalignment\par No erythema, swelling or ecchymosis\par Normal temperature to touch\par Normal cervical lordosis\par No muscle atrophy of the left upper extremity\par No muscle atrophy of the right upper extremity\par No clubbing, cyanosis, swelling or erythema on the left upper extremity\par No clubbing, cyanosis, swelling or erythema on the right upper extremity\par \par TENDERNESS:\par +trigger points over bilateral trapezius muscles\par +over left cervical paraspinal muscles and Trapezius \par +over right cervical paraspinal muscles and Trapezius\par \par ROM:\par Functional A/PROM of the cervical spine\par +pain with flexion, extension of the cervical spine\par \par SPECIAL TESTS:\par Normal left Spurling test\par Normal right Spurling test\par \par SENSORY TESTING:\par Intact to light touch Left C3-T2\par Intact to light touch Right C3-T2\par \par MOTOR TESTING:\par Muscle tone of the left upper extremity is normal\par Muscle tone of the right upper extremity is normal\par \par Hand  strength Left 5/5\par Hand  strength Right 5/5\par \par Elbow flexion strength Left 5/5\par Elbow flexion strength Right 5/5\par \par Elbow extension strength Left 5/5\par Elbow extension strength Right 5/5\par \par Shoulder flexion strength Left 5/5\par Shoulder flexion strength Right 5/5\par \par Shoulder extension strength Left 5/5\par Shoulder extension strength Right 5/5\par \par Shoulder abduction strength Left 5/5\par Shoulder abduction strength Right 5/5\par \par REFLEXES:\par Barrera sign left +\par Barrera sign right +\par \par Biceps (C5) left 2+\par Biceps (C5) right 2+\par \par THORACIS/LUMBAR EXAM\par \par APPEARANCE:\par No visible scars\par No gross deformity or malalignment\par No erythema, swelling or ecchymosis\par Decreased lumbar lordosis\par \par TENDERNESS:\par +trigger points over bilateral lumbar paraspinal muscles\par +over left lumbar paraspinal muscles: erector spinae and quadratus lumborum\par +over right lumbar paraspinal muscles: erector spinae and quadratus lumborum\par \par \par ROM:\par Functional  A/PROM of the lumbar spine\par +pain with flexion, extension of the lumbar spine\par +hamstring tightness on the left\par +hamstring tightness on the right\par \par SENSORY TESTING:\par Intact to light touch Left L1-S2\par Intact to light touch Right L1-S2\par \par MOTOR TESTING:\par Muscle tone of the left lower extremity is normal\par Muscle tone of the right lower extremity is normal\par \par Left hip flexion strength is 5/5\par Right hip flexion strength is 5/5\par \par Left quadriceps strength is 5/5\par Right quadriceps strength is 5/5\par \par Left ankle dorsiflexion strength is 5/5\par Right ankle dorsiflexion strength is 5/5\par \par Left ankle plantar flexion strength is 5/5\par Right ankle plantar flexion strength is 5/5\par \par REFLEXES:\par Patella (L4) left 2+\par Patella (L4) right 2+\par \par \par GAIT:\par antalgic gait w assistance\par Balance fair with ambulation [de-identified] : Pt returns today for cervical cancer screening with pap smear and HPV. LMP was 8/14/24. Last pap smear said to be ~5-6yrs ago and normal. Pt with no new concerns on this visit. Recent Hep B ab immune titer with nonreactive result. pt would like Hep B booster vaccination today.

## 2024-08-20 NOTE — ED ADULT NURSE NOTE - NSFALLHARMRISKINTERV_ED_ALL_ED
Assistance OOB with selected safe patient handling equipment if applicable/Assistance with ambulation/Communicate risk of Fall with Harm to all staff, patient, and family/Monitor gait and stability/Monitor for mental status changes and reorient to person, place, and time, as needed/Move patient closer to nursing station/within visual sight of ED staff/Provide patient with walking aids/Provide visual cue: red socks, yellow wristband, yellow gown, etc/Reinforce activity limits and safety measures with patient and family/Toileting schedule using arm’s reach rule for commode and bathroom/Use of alarms - bed, stretcher, chair and/or video monitoring/Bed in lowest position, wheels locked, appropriate side rails in place/Call bell, personal items and telephone in reach/Instruct patient to call for assistance before getting out of bed/chair/stretcher/Non-slip footwear applied when patient is off stretcher/[place] to call system/Physically safe environment - no spills, clutter or unnecessary equipment/Purposeful Proactive Rounding/Room/bathroom lighting operational, light cord in reach

## 2024-08-20 NOTE — H&P ADULT - NSHPPHYSICALEXAM_GEN_ALL_CORE
T(C): 36.8 (08-20-24 @ 14:45), Max: 36.8 (08-20-24 @ 14:45)  HR: 101 (08-20-24 @ 18:20) (69 - 180)  BP: 91/56 (08-20-24 @ 18:20) (80/57 - 162/101)  RR: 18 (08-20-24 @ 18:20) (16 - 20)  SpO2: 100% (08-20-24 @ 18:20) (100% - 100%)    CONSTITUTIONAL: laying in bed, on BiPAP but not engaging in interview  EYES: PERRLA and symmetric, EOMI, No conjunctival or scleral injection, non-icteric  ENMT: unable to assess on BiPAP therapy   RESP: breathing labored on BiPAP therapy  CV: rapid and abnormal heart rate, bilateral lower extremity edema   GI: Soft, NT, ND, no rebound, no guarding; no palpable masses  MSK: moves extremities to painful stimuli   SKIN: bilateral upper extremities with skin sloughing and generalized ecchymosis   NEURO: AAO x 0 unable to assess iso altered mentation

## 2024-08-20 NOTE — ED PROVIDER NOTE - ATTENDING CONTRIBUTION TO CARE
Patient with hx of afib on Eliquis, HFpEF, dementia, HTN, lymphoma s/p Rituxan, asthma here for ams, normally communicative at facility and patient was found unable to be aroused. Patient has history of CHF, dementia, is DNR/DNI but daughter at beside states patient may try non-invasive ventilation. DNR/DNI status confirmed with Resident at bedside Dr. Chino.   mottled appearance, severe distress, not rousable to pain, edematous hands and lower extremities, obese abdomen, bilateral breath sounds, trachea midline, + pitting edema, right lower extremity with skin breakdown to anterior shin medially  Attending Emergency Medicine Physician- Edmund Parada MD, FACEP: In this physician's medical judgement based on clinical history and physical exam the patient's signs and symptoms lead to differential diagnoses which includes but is not limited to: respiratory failure, hypoxic respiratory failure, hypercapnic delirium, sepsis, tia/cva, mi  after discussion with daughter will obtain iv, cbc, cmp, ce, ekg, cxr, ua, urine culture, avaps for respiratory failure, and respiratory therapy called at 14:25 for initialization. Patient's daughter educated on risk of aspiration and accepts the risk. All risks, benefits and alternative therapies/imaging have been discussed with her in detail in layman terms to facilitate discussion. Time for questions given to daughter and no further questions asked to be answered. Daughter accepts the therapy/test and is aware of the potential consequences.

## 2024-08-20 NOTE — H&P ADULT - ATTENDING COMMENTS
89F Hx CVA, Advanced Dementia, A&O x 1, Bedbound, Minimally Verbal, HTN, YOVANI on CPAP, Asthma, Hypothyroid, A.Fib on ELQ s/p PPM, HFpEF, VRE UTI came from SNF to ED Altered Mental Status, Respiratory Distress, A.Fib RVR 180s, Hypotension and Shock s/p 1 Li IVF, placed on AVAPS, Emergent Cardioversion, Amiodarone Gtt and started on IV Pressor in ED. Daughter clarify code status as DNR/DNI, Non-aggressive intervention.  - A&O x 0 unresponsive to stimuli   - Respiratory Distress on AVAPS SpO2 100%   - Septic Shock presumed VRE Urosepsis and HCAP on IV Pressor   - A.Fib RVR s/p DCV and Amiodarone Bolus/Gtt for rate control   - Bedside POCUS and serail BAG, EKGs, Labs   - Hold home Lasix, ACE/ARB   - Empiric ABx Coverage with Zosyn and Daptomycin pending C&S   - NPO since on AVAPS and IV Pressor   - ARF/CKD and monitor I&O and Renal Function   - DVT PPx - SCD/SQH   - GOC - DNR/DNI - clarify with daughter     Patient seen and examined with ICU Resident/Fellow at bedside after lab data, medical records and radiology reports reviewed. I have read and agreeable in general with resident's Documented Note, Assessment and Management Plan which reflected my opinions from bedside round and discussion.   Total Critical Care Time = 45 Min excluding teaching and procedure activity. 89F Hx CVA, Lymphoma s/p CXT, Advanced Dementia, A&O x 1, Bedbound, Minimally Verbal, HTN, YOVANI on CPAP, Asthma, Hypothyroid, A.Fib on ELQ s/p PPM, HFpEF, VRE UTI came from SNF to ED Altered Mental Status, Respiratory Distress, A.Fib RVR 180s, Hypotension and Shock s/p 1 Li IVF, placed on AVAPS, Emergent Cardioversion, Amiodarone Gtt and started on IV Pressor in ED. Daughter clarify code status as DNR/DNI, Non-aggressive intervention.  - A&O x 0 unresponsive to stimuli   - Respiratory Distress on AVAPS SpO2 100%   - Septic Shock presumed VRE Urosepsis and HCAP on IV Pressor   - A.Fib RVR s/p DCV and Amiodarone Bolus/Gtt for rate control   - Bedside POCUS and serail BAG, EKGs, Labs   - Hold home Lasix, ACE/ARB   - Empiric ABx Coverage with Zosyn and Daptomycin pending C&S   - NPO since on AVAPS and IV Pressor   - ARF/CKD and monitor I&O and Renal Function   - DVT PPx - SCD/SQH   - GOC - DNR/DNI - clarify with daughter     Patient seen and examined with ICU Resident/Fellow at bedside after lab data, medical records and radiology reports reviewed. I have read and agreeable in general with resident's Documented Note, Assessment and Management Plan which reflected my opinions from bedside round and discussion.   Total Critical Care Time = 45 Min excluding teaching and procedure activity. 89F Hx CVA, Lymphoma s/p CXT, Advanced Dementia, A&O x 1, Bedbound, Minimally Verbal, HTN, YOVANI on CPAP, Asthma, Hypothyroid, A.Fib on ELQ s/p PPM, HFpEF, VRE UTI came from SNF to ED Altered Mental Status, Respiratory Distress, A.Fib RVR 180s, Hypotension and Shock s/p 1 Li IVF, placed on AVAPS, Amiodarone Bolus and started on IV Pressor in ED. Daughter clarify code status as DNR/DNI, Non-aggressive intervention.  - A&O x 0 unresponsive to stimuli   - Respiratory Distress on AVAPS SpO2 100%   - Septic Shock presumed VRE Urosepsis and HCAP on IV Pressor   - A.Fib RVR s/p Amiodarone Bolus/Gtt for rate control   - Bedside POCUS and serial BAG, EKGs, Labs   - Hold home Lasix, ACE/ARB   - Empiric ABx Coverage with Zosyn and Daptomycin pending C&S   - NPO since on AVAPS and IV Pressor   - ARF/CKD and monitor I&O and Renal Function   - DVT PPx - SCD/SQH   - GOC - DNR/DNI - clarify with daughter     Patient seen and examined with ICU Resident/Fellow at bedside after lab data, medical records and radiology reports reviewed. I have read and agreeable in general with resident's Documented Note, Assessment and Management Plan which reflected my opinions from bedside round and discussion.   Total Critical Care Time = 45 Min excluding teaching and procedure activity.

## 2024-08-20 NOTE — CHART NOTE - NSCHARTNOTEFT_GEN_A_CORE
: Og RODNEY    INDICATION: Shock    PROCEDURE:  [X] LIMITED ECHO  [X] LIMITED CHEST  [ ] LIMITED RETROPERITONEAL  [ ] LIMITED ABDOMINAL  [ ] LIMITED DVT  [ ] NEEDLE GUIDANCE VASCULAR  [ ] NEEDLE GUIDANCE THORACENTESIS  [ ] NEEDLE GUIDANCE PARACENTESIS  [ ] NEEDLE GUIDANCE PERICARDIOCENTESIS  [ ] OTHER    FINDINGS:  Lungs: A-line predominant pattern in anterior lung fields bilaterally. Lung sliding present. Small bilateral pleural effusions in bibasilar lobes with consolidation/atelectasis  Heart: Very limited views. Grossly normal LV fxn but hyperdynamic with noted atrial fibrillation. Indeterminate IVC.    INTERPRETATION:  Technically difficult cardiac study  >>> <<<    Images uploaded on [employer] : Og RODNEY    INDICATION: Shock    PROCEDURE:  [X] LIMITED ECHO  [X] LIMITED CHEST  [ ] LIMITED RETROPERITONEAL  [ ] LIMITED ABDOMINAL  [ ] LIMITED DVT  [ ] NEEDLE GUIDANCE VASCULAR  [ ] NEEDLE GUIDANCE THORACENTESIS  [ ] NEEDLE GUIDANCE PARACENTESIS  [ ] NEEDLE GUIDANCE PERICARDIOCENTESIS  [ ] OTHER    FINDINGS:  Lungs: A-line predominant pattern in anterior lung fields bilaterally. Lung sliding present. Small bilateral pleural effusions in bibasilar lobes with consolidation/atelectasis  Heart: Very limited views. Grossly normal LV fxn but hyperdynamic with noted atrial fibrillation. Indeterminate IVC.    INTERPRETATION:  Technically difficult cardiac study  >>> <<<    Images uploaded on [employer]      Attending Attestation:  I was present during the key portions of the procedure and immediately available during the entire procedure.     Andi Jolly MD   Critical Care Attending

## 2024-08-20 NOTE — ED ADULT NURSE REASSESSMENT NOTE - NS ED NURSE REASSESS COMMENT FT1
pt taken to MICU bed 38 on bipap, 0.02mcg/kg/min of levophed, zyvox drip, with EDT, MD, RN, RT at bedside, on zoll monitor with cardiac box, SCOTT Horne at bedside to receive pt, pt comfort and safety secured

## 2024-08-20 NOTE — ED PROVIDER NOTE - PROGRESS NOTE DETAILS
Patient signed out to me with no LOC no IV at the time ultrasound IV was being placed found to have very low blood pressures 40s over 20s started on levo fluids were hung with a collapsible IVC liter was ordered total IV antibiotics for septic shock patient is on AVAPS at this time had a discussion with the daughter around goals of care she currently still DNR/DNI but understands that she is in critical condition close monitoring at this time we will need a MICU consult. Also found to be tacky 150s history A-fib on EKG appears to have A-fib with RVR to hold off on rate control at this time is had supporting her blood pressure but will need some rate control at some point. Also patient had COVID several weeks ago without any significant respiratory symptoms and has been treated for it with p.o. antibiotics for cellulitis of the arm for the last several weeks likely source vbg clotted and redrawn but contradindication to full fluid bolus so given 1000cc bolus on levophed at this time.

## 2024-08-20 NOTE — H&P ADULT - HISTORY OF PRESENT ILLNESS
89M with history of CVA, dementia AAO x1 (bedbound and minimally verbal at baseline), YOVANI on CPAP, asthma, hypothyroid, HTN, HLD, persistent afib on Eliquis s/p [employer] PPM, HFpEF who presents from SNF with altered mental status. Daughter at bedside to provide collateral. Patient is communicative at her facility however minimally as she has known dementia with diminished mental status at baseline. Unable to state if patient was having any issues prior to presentation.    On presentation to ED, patient reportedly hypoxemic requiring 15L NRB but with increased WOB and placed on AVAPS TV 500cc, RR 16, EPAP 5, Max P 30, Min P 10, FiO2 40%. Also hypotensive with reported BPs 60/20s. Was given 1L fluids in ED and zosyn. Course c/b atrial fibrillation with RVR requiring lopressor 2.5 mg x 2 and amiodarone 150 mg for rate control.     Admitted to MICU for shock and acute hypoxemic respiratory failure.

## 2024-08-20 NOTE — ED ADULT NURSE NOTE - OBJECTIVE STATEMENT
89yo F aaox0 h/o afib on eliquis, chf, dementia, htn, presents to Ed via ems for facility, as per EMS pt was found unconscious, unable to arousal, hypoxic   82% Ra, uppon arrived pt BiPAP initiated on patient. BIPAP explained to patient's daughter t, pt's daughter  verbalizes understanding. Pt tolerating well and appears comfortable., pt found to be hypotensive ( MD Ching aware), hard stick 89yo F aaox0 h/o afib on eliquis, chf, dementia, htn, presents to Ed via ems for facility, as per EMS pt was found unconscious, unable to arousal, hypoxic   82% Ra, uppon arrived pt BiPAP initiated on patient. BIPAP explained to patient's daughter t, pt's daughter  verbalizes understanding. Pt tolerating well and appears comfortable., pt found to be hypotensive ( MD Ching aware), hard stick, awaiting for IV US team, as per pt's daughter pt come with b/l wrist, hand : skin tear bleeding , swelling , "as per daughter " wound care in the facility take care it"

## 2024-08-20 NOTE — ED ADULT NURSE NOTE - NS ED NURSE LEVEL OF CONSCIOUSNESS AFFECT
Calm attending- patient with eczema with overlying coxsackie rash and herpangina.  MMM.  Non toxic appearing.  No signs of HSV and no lesions near eyes.  Will treat with supportive care including benadryl to help control itching.  Aquaphor and steroid topically. Natividad Santiago MD

## 2024-08-20 NOTE — ED ADULT NURSE NOTE - ED STAT RN HANDOFF DETAILS
1900 Report given to receiving change of shift RN. Gagandeep HILL , plan of care explained., in golg

## 2024-08-20 NOTE — ED ADULT NURSE NOTE - MUSCULOSKELETAL ASSESSMENT
HISTORY and [address]       NAME:  Amanda Aguilar  MRN: [id number]   YOB: 1955   Date: 5/15/2019   Age: 59 y.o. Gender: male     H&P Update Note    H&P from  04/23/2018  reviewed and updated. Patient examined in [facility] . Says he was put on Lyrica by the [facility] the first week in May,  and that increased the itching in his lower legs and he developed cellulitis of left lower leg, He got admitted here for IV Vanco  5/11--5/13  He says his leg is improving, He still feels angry and wants to get into the [facility]  He was discharged from here 4/25/2019    Has 6x14 cm area of dull old appearing erythema on ant left lower extremity and has a 6x8 cm area of erythema ant rt lower extremity  Palpable pulses bilaterally  INTERVAL HISTORY:     Patient admitted due to ongoing homicidal thoughts and anger, Cannot get into [facility] in [facility]. No interval changes to past medical history, social history, family history. Review of systems as stated above and otherwise negative. GENERAL PHYSICAL EXAM:     Vitals: /64   Pulse 59   Temp 98.9 °F (37.2 °C) (Oral)   Resp 14   Ht 5' 9\" (1.753 m)   Wt 234 lb (106.1 kg)   BMI 34.56 kg/m²  Body mass index is 34.56 kg/m². GENERAL APPEARANCE:  Patient is alert and oriented. Does not appear to be distress or pain at this time.           SKIN:  Normal temperature, turgor and texture. No cyanosis or jaundice. HEAD:  Normocephalic, atraumatic. EYES:  Pupils equal, reactive to light and accomodation. Conjunctiva clear. EOMs intact bilaterally. THROAT:  Mucous membranes moist. No tonsillar erythema or exudates.     NECK:  No stiffness, trachea central.  No palpable masses.      HEART: Regular rate and rhythm. No murmurs.      LUNGS:  Equal on expansion. Clear to auscultation bilaterally with no adventitious sounds.     ABDOMEN:  Soft on palpation.  No localized tenderness, guarding or rigidity. No palpable organomegaly.     LYMPHATICS:  No cervical lymphadenopathy.      EXTREMITIES:  Symmetrical with no pretibial/pedal edema. No discoloration or ulcerations. No warmth, tenderness, erythema noted in lower legs bilaterally.     NEUROLOGIC:  The patient is conscious, alert, oriented. No apparent focal sensory deficits. No motor deficits, muscle strength equal bilaterally. No facial droop, tongue protrudes centrally, no slurring of the speech. Cranial nerve exam reveals no deficits. No interval changes. I concur with the findings. Michiel Olszewski, APRN - CNP on 5/15/2019 at 9:29 AM          HISTORY and [address]            NAME:  Wisam Linder  MRN: [id number]   YOB: 1955   Date: 4/23/2019   Age: 61 y.o. Gender: male         COMPLAINT AND PRESENT HISTORY:    Angel Quinteros is a 61 yr old male who was admitted from [facility] ED with depression, He said he had wanted to go to the [facility] in [place] part of the [facility]. He said he has been feeling stress, He has been feeling anger at no one in particular and he is feeling like he is \"close to the edge. \" He has been in [facility] in the past, He does not have a car and his wife does not drive so he had to go to [facility] and ask them to get him to [place], [place] had already agreed to accept him according to him, He said [facility] sent him here and we are supposed to get him to [facility] in [place], He also has been in [place] [id number], He has PTSD from [place] and it affects his entire life. He used to work but cannot due to the stress, He was last admitted to [facility] 3 yr ago,      Currently he and his wife are living in [facility] in [facility], They have been evicted due to non payment,  He has not worked, He has seen Dr Haydee Tamez as PCP and now needs a new Dr in his area.   He has seen Dr Zbigniew Dickerson in [place], He lives in [facility].     He and his wife had lived in a rental but he said it had black mold so they moved out because it is bad for his COPD and he could nto get the landlord to eliminate it,      He limps and has left hip pain      S/P Rt knee replacement,  S/P 2 vessel CABG in [place] several yrs ago.      He has DM Type 2 Glucose is 98mg/dl     Right now living in hotel, no car and having troubles,      DIAGNOSTIC RESULTS   Radiology:      EKG:     Labs:  CBC:   Recent Labs     04/21/19 1410 04/22/19 2020   WBC 5.3 6.9   HGB 15.1 14.9    204      BMP:         Recent Labs     04/21/19 1410 04/22/19 2020    137   K 4.3 4.6   CL 97* 97*   CO2 27 26   BUN 22 20   CREATININE 1.17 1.23*   GLUCOSE 96 98      Hepatic:        Recent Labs     04/21/19 1410 04/22/19 2020   AST 13 15   ALT 16 16   BILITOT 0.35 0.53   ALKPHOS 100 97      CARDIAC ENZY:        Recent Labs     04/21/19 1410 04/22/19 2020   TROPONINT <0.03 <0.03      INR:   Recent Labs     04/21/19 1410   INR 1.0            Lab Results   Component Value Date     COLORU YELLOW 04/22/2019     WBCUA NOT REPORTED 01/04/2019     RBCUA 0 TO 2 01/04/2019     MUCUS NOT REPORTED 01/04/2019     BACTERIA NOT REPORTED 01/04/2019     SPECGRAV 1.010 04/22/2019     LEUKOCYTESUR NEGATIVE 04/22/2019     GLUCOSEU NEGATIVE 04/22/2019     GLUCOSEU NEGATIVE 09/08/2011     AMORPHOUS NOT REPORTED 01/04/2019         PAST MEDICAL HISTORY      Past Medical History        Past Medical History:   Diagnosis Date    Arthritis       WENDY KNEE    Bronchitis with chronic airway obstruction (HCC)      CAD (coronary artery disease)      CHF (congestive heart failure) (HCC)      CTS (carpal tunnel syndrome)       RIGHT    Diabetes mellitus ([address]      Diabetic neuropathy associated with diabetes mellitus due to underlying condition ([facility]      Exposure to toxic chemical       [facility] [facility] [address] [address]-toxic water exposure    GERD (gastroesophageal reflux disease)      H/O cardiovascular stress test 07/08/2016     Abnormal. Moderate perfusion defect of mild to moderate intensity in the inferolateral,inferior and inferoapical regions during stress imaging. Ef 45%. with regional wall motion abnormalities.  H/O echocardiogram 2/17/16     EF >60%. LV wall thickness is mildly increased. Inferoseptal wall is abnormal in its motion not unusual status post open heart surgery. Normal aortic valve structure with ild aortic regurgitation.  Hip pain, left      Hx of cardiac cath 07/08/2016     LMCA: Normal 0% stenosis. LAD: Chronic occlusion 100%. Lesion on Mid LAD: 100% stenosis. LCx: single stenosis. Lesion on Mid CX: 80% stenosis. RCA: Lesion on Mid RCA: 70% stenosis.     Hyperlipidemia      Hypertension      MI (myocardial infarction) ([facility] 2015    Pneumonia      PTSD (post-traumatic stress disorder)      S/P CABG (coronary artery bypass graft) 10/23/15    Sleep apnea      Ventricular fibrillation ([facility] 10/18/2015     x 2 DURING HEART ATTACK            Pt denies any history of, stroke, heart disease, , HLD, Cancer, Seizures,Thyroid disease, Kidney Disease, Hepatitis, TB, Psychiatric Disorders or Substance abuse.     SURGICAL HISTORY        Past Surgical History         Past Surgical History:   Procedure Laterality Date    CARDIAC SURGERY         10/21/2015 3 vessel CABG    CARPAL TUNNEL RELEASE        CARPAL TUNNEL RELEASE Right      COLONOSCOPY   2012     [facility]    COLONOSCOPY   11/27/2017    CORONARY ARTERY BYPASS GRAFT   10/23/15     Dr. Sultana Black Left       TIP OF MIDDLE FINGER    FINGER SURGERY         left middle finger    FRACTURE SURGERY         left wrist    JOINT REPLACEMENT         right knee    PILONIDAL CYST EXCISION         1974    PILONIDAL CYST EXCISION        DC COLONOSCOPY W/BIOPSY SINGLE/MULTIPLE N/A 11/27/2017     COLONOSCOPY WITH BIOPSY/ polypectomy performed by Amber Ferrera MD at [address] [facility] EGD TRANSORAL BIOPSY SINGLE/MULTIPLE N/A 11/27/2017     EGD - - - BIOPSY performed by Carmen Chin MD at [address]         left wrist            FAMILY HISTORY        Family History   Family History   Adopted: Yes   Family history unknown: Yes            SOCIAL HISTORY        Social History   Social History            Socioeconomic History    Marital status:        Spouse name: None    Number of children: None    Years of education: None    Highest education level: None   Occupational History    None   Social Needs    Financial resource strain: None    Food insecurity:       Worry: None       Inability: None    Transportation needs:       Medical: None       Non-medical: None   Tobacco Use    Smoking status: Former Smoker       Packs/day: 1.00       Years: 19.00       Pack years: 19.00       Types: Cigarettes       Last attempt to quit: 2006       Years since quittin.6    Smokeless tobacco: Former User       Quit date: 2000   Substance and Sexual Activity    Alcohol use: No    Drug use: Yes       Frequency: 1.0 times per week       Types: Marijuana       Comment: Pt smokes marijuana therapeutically.     Sexual activity: None   Lifestyle    Physical activity:       Days per week: None       Minutes per session: None    Stress: None   Relationships    Social connections:       Talks on phone: None       Gets together: None       Attends Quaker service: None       Active member of club or organization: None       Attends meetings of clubs or organizations: None       Relationship status: None    Intimate partner violence:       Fear of current or ex partner: None       Emotionally abused: None       Physically abused: None       Forced sexual activity: None   Other Topics Concern    None   Social History Narrative    None               REVIEW OF SYSTEMS            Allergies   Allergen Reactions    Codeine Other (See Comments)    Hydrocodone      Pcn [Penicillins] Other (See Comments)    Sulfa normal heart sounds and intact distal pulses. No murmur heard. Pulmonary/Chest: Effort normal and breath sounds normal. No respiratory distress. Abdominal: Soft. Bowel sounds are normal. He exhibits no distension. There is no guarding. Musculoskeletal: Normal range of motion. He exhibits no edema or deformity. Neurological: He displays normal reflexes. No cranial nerve deficit. Skin: Capillary refill takes less than 2 seconds. Tibial erythema    Psychiatric: He has a normal mood and affect.         PROVISIONAL DIAGNOSES:     PTSD and has depression  Left hip pain   Principal Problem:    Post traumatic stress disorder  Resolved Problems:    * No resolved hospital problems.  *        DIANNA Cooley - CNP on 4/23/2019 at 2:17 PM                Cosigned by: Rosa Maria Sloan MD at 4/23/2019  6:43 PM

## 2024-08-20 NOTE — ED ADULT NURSE REASSESSMENT NOTE - NS ED NURSE REASSESS COMMENT FT1
Pt straight cathed using sterile technique. Second RN present to confirm sterility. Pt tolerated procedure well. Sterile specimens collected and sent to lab. Will cont to monitor. Approx  50cc concentrated yellow urine output noted to bag. Pt straight cathed using sterile technique. Second RN present to confirm sterility. Pt tolerated procedure well. Sterile specimens collected and sent to lab. Will cont to monitor. Approx  50cc concentrated yellow urine output noted to bag. cleaned: new diaper, new sheet

## 2024-08-20 NOTE — H&P ADULT - NSHPLABSRESULTS_GEN_ALL_CORE
Labs:  CAPILLARY BLOOD GLUCOSE      POCT Blood Glucose.: 168 mg/dL (20 Aug 2024 15:18)                          16.1   26.14 )-----------( 136      ( 20 Aug 2024 16:11 )             50.6       Auto Neutrophil %: 87.4 % (08-20-24 @ 16:11)  Band Neutrophils %: 1.8 % (08-20-24 @ 16:11)    08-20    139  |  96  |  71<H>  ----------------------------<  112<H>  5.6<H>   |  21<L>  |  1.54<H>      Calcium: 7.6 mg/dL (08-20-24 @ 16:11)      LFTs:             5.2  | 2.0  | 57       ------------------[94      ( 20 Aug 2024 16:11 )  2.6  | x    | 37          Lipase:x      Amylase:x             Coags:     15.5   ----< 1.49    ( 20 Aug 2024 16:36 )     34.5                Urinalysis Basic - ( 20 Aug 2024 16:11 )    Color: x / Appearance: x / SG: x / pH: x  Gluc: 112 mg/dL / Ketone: x  / Bili: x / Urobili: x   Blood: x / Protein: x / Nitrite: x   Leuk Esterase: x / RBC: x / WBC x   Sq Epi: x / Non Sq Epi: x / Bacteria: x

## 2024-08-20 NOTE — ED PROVIDER NOTE - CLINICAL SUMMARY MEDICAL DECISION MAKING FREE TEXT BOX
89-year-old female with history of CVA, dementia AAO x 1 to 2 at baseline minimally verbal and bedbound, YOVANI on CPAP, asthma, A-fib on Eliquis with PPM, HFpEF presents from SNF for altered mental status and hypoxia.  Daughter at bedside states that patient was found down at facility for an unknown amount of time, unable to be aroused when vitals were taken patient was hypoxic to the 70s–80s.  EMS was called, when patient arrived from EMS on 10 L NC satting 100% heart rate 69, unable to obtain BP in triage.  Blood pressure cycled on room showing BP 80/57 heart rate 165 with rhythm on monitor that looks to be A-fib RVR.  Rectal ctal temp was 98.2, despite patient's extremities being cool and mottled.  Patient minimally responsive to sternal rub and is clinically obtunded.   Patient extremities are edematous, including both upper extremities.  Bilateral wrist/forearms are wrapped for what daughter describes as underlying cellulitis secondary to skin tears that never healed well.  No other wounds present. Lungs CTAB. No melanotic stools on rectal exam. Presentation concerning for septic shock possibly secondary to cellulitis described.  Daughter denies any recent infectious symptoms including cough or fevers.  Will obtain immediate IV access, blood pressure control, EKG, evaluate IVC for fluids, sepsis workup with CT abdomen pelvis and chest to identify source if not secondary to cellulitis.  Likely MICU consult monitoring closely.

## 2024-08-20 NOTE — H&P ADULT - ASSESSMENT
89M with history of CVA, dementia AAO x1 (bedbound and minimally verbal at baseline), YOVANI on CPAP, asthma, hypothyroid, HTN, HLD, persistent afib on Eliquis s/p Medtronic PPM, HFpEF who presents from SNF with altered mental status. Admitted to MICU iso shock state with associated hypoxemic respiratory failure.     Neurology  #Metabolic Encephalopathy  - AAO x 1 at baseline  - now AAO x 0 and obtunded  - CT Head pending for eval for structural dysfunction     Cardiovascular  #Shock   - undifferentiated however likely iso sepsis state likely multifactorial with potential urinary source vs skin vs PNA given respiratory status  - s/p 1L IVF in ED  - on levophed   - MAP > 65    #HFpEF  - TTE 5/24 with evidence of severe TR  - hold home lasix 20 iso shock  - hold home eplerenone iso shock  - hold home losartan iso shock    #Atrial Fibrillation   - on home eliquis 5 BID and metoprolol tartrate 100 BID   - s/p lopressor 2.5 mg x 2 for rate control without adequate control  - s/p amiodarone 150 mg for rate control  - may need maintenance amiodarone for rate control and rhythm control   - heparin gtt for AC     Respiratory  #Acute Hypoxemic Respiratory Failure  - requiring BiPAP therapy on admission potentially driven by sepsis state vs PNA process  - pending CT chest to eval for thoracic pathology  - can wean off BiPAP therapy as tolerated     GI  #Diet and Nutrition  - NPO on BiPAP     Renal  #GLENDY   - potentially iso prerenal state 2/2 sepsis process  - s/p 1L IVF  - monitor UOP and consider additional fluid resuscitation as necessary pending US eval     Hematology/Oncology  #DVT Prophylaxis  - can convert home eliquis to hep gtt for AC for a fib process    ID  #SIRS  - unclear etiology at this time but patient with history of VRE UTI in the past  - s/p 1L IVF resuscitation   - s/p zosyn and linezolid in ED  - c/w zosyn (8/20-) for gram - coverage and daptomycin (8/20-)for VRE UTI history  - pending blood cultures and urine cultures, MRSA  - f/u CT Chest and A/P to eval for other potential sources of infection     Endocrine  #no active issues    Social/Ethics  #Code status: DNR/DNI

## 2024-08-21 ENCOUNTER — RESULT REVIEW (OUTPATIENT)
Age: 89
End: 2024-08-21

## 2024-08-21 NOTE — ADVANCED PRACTICE NURSE CONSULT - ASSESSMENT
Patient encountered on 5MIC. When wound care RN arrived on unit, patient was found lying in a low air loss pressure redistribution support surface style bed. Multiple skin tears noted on B/L upper extremities. Patient Tasia is unable to turn independently and staff assistance x 2 was provided. Once turned, the wound care RN was able to visualize an area of persistent nonblanchable hyperpigmentation with slight purple hues present over B/L buttocks/sacral skin, area measures approximately 5cm x 5cm x 0cm - cannot rule out a deep tissue injury present on admission. B/L heels with dark maroon discoloration, consistent with deep tissue injuries present on admission- left foot measures approximately 2cm x 2cm x 0cm on left lateral heel and right heel measures approximately 1cm x 1cm x 0cm. Left lateral foot including 5th toe with purple discoloration measuring approximately 4cm x 1.5cm x 0cm- consistent with a deep tissue injury present on admission. Once consult was complete, patient was placed in a left side-lying position utilizing pillow positioner assistive devices. Education regarding the need for routine turning and positioning to prevent pressure injuries not appropriate at this time.

## 2024-08-21 NOTE — CHART NOTE - NSCHARTNOTEFT_GEN_A_CORE
Indication:  SHOCK    Performed by: Dary Greenberg PGY1, Derek Lejeune    PROCEDURE:  [x] LIMITED ECHO  [ ] LIMITED CHEST  [ ] LIMITED RETROPERITONEAL  [ ] LIMITED ABDOMINAL  [ ] LIMITED DVT  [ ] NEEDLE GUIDANCE VASCULAR  [ ] NEEDLE GUIDANCE THORACENTESIS  [ ] NEEDLE GUIDANCE PARACENTESIS  [ ] NEEDLE GUIDANCE PERICARDIOCENTESIS  [ ] OTHER    FINDINGS:  ECHO: Cardiac rhythm uneven, consistent with the patient's atrial fibrillation. RV and LV systolic function grossly normal, possibly slightly reduced but difficult to tell in the setting of atrial fibrillation. On systole, left ventricular walls nearly touch, suggesting intravascular hypovolemia.    INTERPRETATION: Atrial fibrillation. Systolic function grossly preserved. Intravascularly hypovolemic. Indication:  SHOCK    Performed by: Dary Greenberg PGY1, Derek Lejeune    PROCEDURE:  [x] LIMITED ECHO  [ ] LIMITED CHEST  [ ] LIMITED RETROPERITONEAL  [ ] LIMITED ABDOMINAL  [ ] LIMITED DVT  [ ] NEEDLE GUIDANCE VASCULAR  [ ] NEEDLE GUIDANCE THORACENTESIS  [ ] NEEDLE GUIDANCE PARACENTESIS  [ ] NEEDLE GUIDANCE PERICARDIOCENTESIS  [ ] OTHER    FINDINGS:  ECHO: Cardiac rhythm uneven, consistent with the patient's atrial fibrillation. RV and LV systolic function normal, possibly slightly reduced but difficult to tell in the setting of atrial fibrillation. On systole, left ventricular walls nearly touch,  perhaps suggesting intravascular hypovolemia.    INTERPRETATION: Atrial fibrillation. Systolic function preserved. Intravascularly hypovolemic.    I have assisted and supervised entire procedure.       Monico Castillo MD.

## 2024-08-21 NOTE — DIETITIAN INITIAL EVALUATION ADULT - PERTINENT LABORATORY DATA
08-20    137  |  102  |  69<H>  ----------------------------<  229<H>  3.6   |  16<L>  |  1.49<H>    Ca    7.0<L>      20 Aug 2024 22:35  Phos  5.3     08-20  Mg     1.2     08-20    TPro  4.2<L>  /  Alb  2.5<L>  /  TBili  2.1<H>  /  DBili  x   /  AST  38  /  ALT  28  /  AlkPhos  79  08-20  POCT Blood Glucose.: 168 mg/dL (08-20-24 @ 15:18)

## 2024-08-21 NOTE — DIETITIAN INITIAL EVALUATION ADULT - REASON INDICATOR FOR ASSESSMENT
Consult for pressure injury stage 2 or >  Source: Medical record, Interdisciplinary Rounds, transfer papers, and pt's daughter at bedside (pt with dementia)

## 2024-08-21 NOTE — DIETITIAN INITIAL EVALUATION ADULT - OTHER INFO
Wt Hx:   Dosing wt 88.8 kG/195.7 lbs. Daily wt 88.8 kG/195.7 lbs (8/20).   UBW ~185 lbs and reports wt gain PTA secondary to sedentary, good po intake, fluid retention (on diuretic PTA)  Ht per daughter: 63 inches  IBW: 115 lbs    IBW%: 170%    Nutrition-Related Concerns:   - Shock. Pressor: norepinephrine at 0.02 micrograms/kG/min   - Acute Hypoxemic Respiratory Failure  - GLENDY; Elevated phosphorus noted   - On antibiotics  - IV levothyroxine

## 2024-08-21 NOTE — PATIENT PROFILE ADULT - FALL HARM RISK - HARM RISK INTERVENTIONS
Assistance with ambulation/Assistance OOB with selected safe patient handling equipment/Communicate Risk of Fall with Harm to all staff/Discuss with provider need for PT consult/Monitor gait and stability/Reinforce activity limits and safety measures with patient and family/Tailored Fall Risk Interventions/Visual Cue: Yellow wristband and red socks/Bed in lowest position, wheels locked, appropriate side rails in place/Call bell, personal items and telephone in reach/Instruct patient to call for assistance before getting out of bed or chair/Non-slip footwear when patient is out of bed/[place] to call system/Physically safe environment - no spills, clutter or unnecessary equipment/Purposeful Proactive Rounding/Room/bathroom lighting operational, light cord in reach

## 2024-08-21 NOTE — PHYSICAL THERAPY INITIAL EVALUATION ADULT - LEVEL OF INDEPENDENCE, REHAB EVAL
Pt unable to actively participate in session and unable to follow commands, further mobility deferred

## 2024-08-21 NOTE — DIETITIAN INITIAL EVALUATION ADULT - CONTINUE CURRENT NUTRITION CARE PLAN
Medical team to advance diet as medically feasible. Recommend low Na when advanced. Consistency deferred to speech-language pathology and provider.

## 2024-08-21 NOTE — CONSULT NOTE ADULT - ASSESSMENT
89 y/o female pt with bilateral foot DTIs  - Patient seen and evaluated  - Heel DTI noted with no open lesions, no signs of infection in feet  - Recommend z flow boots in bed at all times  - Ischemic changes noted bilat toes, rec vascular eval  - Podiatry signing off at this time, reconsult as needed

## 2024-08-21 NOTE — PHYSICAL THERAPY INITIAL EVALUATION ADULT - ADDITIONAL COMMENTS
Per care coordination note, pt lives in a pvt home with her family and was primarily bedbound and dependent with all ADLs and mobility. Pt also minimally verbal at baseline. Pt able to sit in a wheelchair. Per care coordination note, pt lives in a pvt home with her family and was primarily bedbound and dependent with all ADLs and mobility. Pt also minimally verbal at baseline. Pt able to sit in a wheelchair. Of note per chart review, pt is also legally blind.

## 2024-08-21 NOTE — DIETITIAN INITIAL EVALUATION ADULT - PERTINENT MEDS FT
yes MEDICATIONS  (STANDING):  DAPTOmycin IVPB 450 milliGRAM(s) IV Intermittent every 24 hours  heparin   Injectable 5000 Unit(s) SubCutaneous every 8 hours  levothyroxine Injectable 18.75 MICROGram(s) IV Push at bedtime  norepinephrine Infusion 0.05 MICROgram(s)/kG/Min (10.2 mL/Hr) IV Continuous <Continuous>  piperacillin/tazobactam IVPB.. 3.375 Gram(s) IV Intermittent every 8 hours    MEDICATIONS  (PRN):  HYDROmorphone  Injectable 0.25 milliGRAM(s) IV Push every 4 hours PRN Moderate Pain (4 - 6)

## 2024-08-21 NOTE — CONSULT NOTE ADULT - SUBJECTIVE AND OBJECTIVE BOX
Patient is a 89y old  Female who presents with a chief complaint of Sepsis     (21 Aug 2024 10:07)      HPI:  89M with history of CVA, dementia AAO x1 (bedbound and minimally verbal at baseline), YOVANI on CPAP, asthma, hypothyroid, HTN, HLD, persistent afib on Eliquis s/p Medtronic PPM, HFpEF who presents from SNF with altered mental status. Daughter at bedside to provide collateral. Patient is communicative at her facility however minimally as she has known dementia with diminished mental status at baseline. Unable to state if patient was having any issues prior to presentation.    On presentation to ED, patient reportedly hypoxemic requiring 15L NRB but with increased WOB and placed on AVAPS TV 500cc, RR 16, EPAP 5, Max P 30, Min P 10, FiO2 40%. Also hypotensive with reported BPs 60/20s. Was given 1L fluids in ED and zosyn. Course c/b atrial fibrillation with RVR requiring lopressor 2.5 mg x 2 and amiodarone 150 mg for rate control.     Admitted to MICU for shock and acute hypoxemic respiratory failure.  (20 Aug 2024 19:17)      PAST MEDICAL & SURGICAL HISTORY:  Atrial fibrillation  dx 7/09 on coumadin      HTN (hypertension)      HLD (hyperlipidemia)      UTI (urinary tract infection)  frequent last was 1/15      Thyroid nodule  followed by endocrinologist      YOVANI on CPAP      OA (osteoarthritis)      Cerebrovascular accident (CVA)      Abscess      FHx: total knee replacement  left 2012      Cataract  b/l lense implant      S/P JAY-BSO  40 years ago      FHx: cholecystectomy  1993 laparoscopic      Fracture  ORIF right ankle 1986      Cardiac pacemaker  placed 2009          MEDICATIONS  (STANDING):  DAPTOmycin IVPB 450 milliGRAM(s) IV Intermittent every 24 hours  heparin   Injectable 5000 Unit(s) SubCutaneous every 8 hours  levothyroxine Injectable 18.75 MICROGram(s) IV Push at bedtime  phenylephrine    Infusion 0.2 MICROgram(s)/kG/Min (6.66 mL/Hr) IV Continuous <Continuous>  piperacillin/tazobactam IVPB.. 3.375 Gram(s) IV Intermittent every 8 hours    MEDICATIONS  (PRN):  HYDROmorphone  Injectable 0.25 milliGRAM(s) IV Push every 4 hours PRN Moderate Pain (4 - 6)      Allergies    Cardizem (Urticaria)  sulfa drugs (Hives)    Intolerances    OxyContin (Sedation/Somnol; Drowsiness)      VITALS:    Vital Signs Last 24 Hrs  T(C): 36.9 (21 Aug 2024 16:00), Max: 36.9 (21 Aug 2024 16:00)  T(F): 98.4 (21 Aug 2024 16:00), Max: 98.4 (21 Aug 2024 16:00)  HR: 101 (21 Aug 2024 17:45) (91 - 143)  BP: 130/102 (20 Aug 2024 22:25) (91/56 - 136/103)  BP(mean): 113 (20 Aug 2024 22:25) (65 - 113)  RR: 21 (21 Aug 2024 17:45) (11 - 37)  SpO2: 100% (21 Aug 2024 17:45) (94% - 100%)    Parameters below as of 21 Aug 2024 08:00  Patient On (Oxygen Delivery Method): BiPAP/CPAP    O2 Concentration (%): 40    LABS:                          14.5   25.37 )-----------( 125      ( 20 Aug 2024 22:35 )             45.0       08-21    138  |  101  |  69<H>  ----------------------------<  123<H>  3.6   |  19<L>  |  1.67<H>    Ca    7.3<L>      21 Aug 2024 11:37  Phos  5.5     08-21  Mg     2.2     08-21    TPro  4.3<L>  /  Alb  2.3<L>  /  TBili  1.6<H>  /  DBili  x   /  AST  20  /  ALT  26  /  AlkPhos  79  08-21      CAPILLARY BLOOD GLUCOSE          PT/INR - ( 20 Aug 2024 22:35 )   PT: 17.0 sec;   INR: 1.64 ratio         PTT - ( 20 Aug 2024 22:35 )  PTT:28.9 sec    LOWER EXTREMITY PHYSICAL EXAM:    Vasular: DP/PT 0/4, B/L, CFT delayed to toes, Temperature gradient warm to cool  Neuro: intact to level of foot bilat   Musculoskeletal/Ortho: no gross deformities  Skin: bilateral heel and plantar lateral forefoot DTIs secondary to pressure present on admission , no open lesions, no signs of infection, bilat forefoot ischemic changes worst on hallux bilat

## 2024-08-21 NOTE — PROGRESS NOTE ADULT - ASSESSMENT
89M with history of CVA, dementia AAO x1 (bedbound and minimally verbal at baseline), YOVANI on CPAP, asthma, hypothyroid, HTN, HLD, persistent afib on Eliquis s/p Medtronic PPM, HFpEF who presents from SNF with altered mental status. Admitted to MICU iso shock state with associated hypoxemic respiratory failure.     Neurology  #Metabolic Encephalopathy  - AAO x 1 at baseline  - now AAO x 0 and obtunded  - CT Head pending for eval for structural dysfunction     Cardiovascular  #Shock   - undifferentiated however likely iso sepsis state likely multifactorial with potential urinary source vs skin vs PNA given respiratory status  - s/p 1L IVF in ED  - on levophed   - MAP > 65    #HFpEF  - TTE 5/24 with evidence of severe TR  - hold home lasix 20 iso shock  - hold home eplerenone iso shock  - hold home losartan iso shock    #Atrial Fibrillation   - on home eliquis 5 BID and metoprolol tartrate 100 BID   - s/p lopressor 2.5 mg x 2 for rate control without adequate control  - s/p amiodarone 150 mg for rate control  - may need maintenance amiodarone for rate control and rhythm control   - heparin gtt for AC     Respiratory  #Acute Hypoxemic Respiratory Failure  - requiring BiPAP therapy on admission potentially driven by sepsis state vs PNA process  - pending CT chest to eval for thoracic pathology  - can wean off BiPAP therapy as tolerated     GI  #Diet and Nutrition  - NPO on BiPAP     Renal  #GLENDY   - potentially iso prerenal state 2/2 sepsis process  - s/p 1L IVF  - monitor UOP and consider additional fluid resuscitation as necessary pending US eval     Hematology/Oncology  #DVT Prophylaxis  - can convert home eliquis to hep gtt for AC for a fib process    ID  #SIRS  - unclear etiology at this time but patient with history of VRE UTI in the past  - s/p 1L IVF resuscitation   - s/p zosyn and linezolid in ED  - c/w zosyn (8/20-) for gram - coverage and daptomycin (8/20-)for VRE UTI history  - pending blood cultures and urine cultures, MRSA  - f/u CT Chest and A/P to eval for other potential sources of infection     Endocrine  #no active issues    Social/Ethics  #Code status: DNR/DNI    89M with history of CVA, dementia AAO x1 (bedbound and minimally verbal at baseline), YOVANI on CPAP, asthma, hypothyroid, HTN, HLD, persistent afib on Eliquis s/p Medtronic PPM, HFpEF who presents from SNF with altered mental status. Admitted to MICU iso shock state with associated hypoxemic respiratory failure.     Neurology  #Metabolic Encephalopathy  - AAO x 1 at baseline  - now AAO x 0 and obtunded  - CT Head pending for eval for structural dysfunction     Cardiovascular  #Shock   - undifferentiated however likely iso sepsis state likely multifactorial with potential urinary source vs skin vs PNA given respiratory status  - s/p 1L IVF in ED  - on levophed   - MAP > 65  -can consider phenylephrine for pressors support instead of levo in setting of tachycardia    #HFpEF  - TTE 5/24 with evidence of severe TR  - hold home lasix 20 iso shock  - hold home eplerenone iso shock  - hold home losartan iso shock    #Atrial Fibrillation   - on home eliquis 5 BID and metoprolol tartrate 100 BID   - s/p lopressor 2.5 mg x 2 for rate control without adequate control  - s/p amiodarone 150 mg for rate control  - may need maintenance amiodarone for rate control and rhythm control   - heparin gtt for AC   -Can consider digoxin 0.25 for rhythm control if tachycardia refractory; holding metoprolol in setting of low BP    Respiratory  #Acute Hypoxemic Respiratory Failure  - requiring BiPAP therapy on admission potentially driven by sepsis state vs PNA process  - pending CT chest to eval for thoracic pathology  - can wean off BiPAP therapy as tolerated     GI  #Diet and Nutrition  - NPO on BiPAP     Renal  #GLENDY   - potentially iso ATN in setting of sepsis  - s/p 1L IVF  - monitor UOP and consider additional fluid resuscitation as necessary pending US eval     Hematology/Oncology  #DVT Prophylaxis  - can convert home eliquis to hep gtt for AC for a fib process    ID  #SIRS  - unclear etiology at this time but patient with history of VRE UTI in the past, U/A  - s/p 1L IVF resuscitation   - s/p zosyn and linezolid in ED  - c/w zosyn (8/20-) for gram - coverage and daptomycin (8/20-)for VRE UTI history  - pending blood cultures and urine cultures, MRSA  - f/u CT Chest and A/P to eval for other potential sources of infection     Endocrine  #no active issues    Social/Ethics  #Code status: DNR/DNI    89M with history of CVA, dementia AAO x1 (bedbound and minimally verbal at baseline), YOVANI on CPAP, asthma, hypothyroid, HTN, HLD, persistent afib on Eliquis s/p Medtronic PPM, HFpEF who presents from SNF with altered mental status. Admitted to MICU iso shock state with associated hypoxemic respiratory failure.     Neurology  #Metabolic Encephalopathy  - AAO x 1 at baseline  - now AAO x 0 and obtunded  - CT Head pending for eval for structural dysfunction     Cardiovascular  #Shock   - undifferentiated however likely iso sepsis state likely multifactorial with potential urinary source vs skin vs PNA given respiratory status  - s/p 1L IVF in ED  - on levophed   - MAP > 65  -can consider phenylephrine for pressors support instead of levo in setting of tachycardia    #HFpEF  - TTE 5/24 with evidence of severe TR  - hold home lasix 20 iso shock  - hold home eplerenone iso shock  - hold home losartan iso shock    #Atrial Fibrillation   - on home eliquis 5 BID and metoprolol tartrate 100 BID   - s/p lopressor 2.5 mg x 2 for rate control without adequate control  - s/p amiodarone 150 mg for rate control  - may need maintenance amiodarone for rate control and rhythm control   - heparin gtt for AC   -Can consider digoxin 0.25 for rhythm control if tachycardia refractory; holding metoprolol in setting of low BP    Respiratory  #Acute Hypoxemic Respiratory Failure  - requiring BiPAP therapy on admission potentially driven by sepsis state vs PNA process  - pending CT chest to eval for thoracic pathology  - can wean off BiPAP therapy as tolerated     GI  #Diet and Nutrition  - NPO on BiPAP     Renal  #GLENDY   - potentially iso ATN in setting of sepsis  - s/p 1L IVF  - monitor UOP and consider additional fluid resuscitation as necessary pending US eval     Hematology/Oncology  #DVT Prophylaxis  - can convert home eliquis to hep gtt for AC for a fib process    ID  #SIRS  - unclear etiology at this time but patient with history of VRE UTI in the past, U/A w/ increased turbidity and moderate leukocyte esterase c/w UTI    -MRSA negative  - s/p 1L IVF resuscitation   - s/p zosyn and linezolid in ED  - c/w zosyn (8/20-) for gram - coverage and daptomycin (8/20-)for VRE UTI history  - pending blood cultures and urine cultures      Endocrine  #no active issues    Social/Ethics  #Code status: DNR/DNI

## 2024-08-21 NOTE — ADVANCED PRACTICE NURSE CONSULT - RECOMMEDATIONS
Impression:     multiple skin tears on upper extremities   left lateral foot deep tissue injury present on admission   B/L heel hyperpigmentation, cannot rule out a deep tissue injury present on admission   B/L buttocks/sacral hyperpigmentation, cannot rule out a deep tissue injury present on admission  fecal incontinence   incontinence associated dermatitis    Recommendations:     1) turn and position q2 and PRN utilizing offloading assistive devices  2) routine pericare daily and PRN soiling  3) encourage optimal nutrition  4) waffle cushion when oob to chair  5) B/L LE complete cair air fluidized boots or danna-lock pillow to offload heels/feet  6) triad protective barrier cream to B/L buttocks/sacrum/ischium daily and PRN soiling  7) incontinence management - consider external urinary catheter to divert urine from skin if incontinent  8) podiatry for treatment recommendations regarding feet/heels  9) skin tears- cleanse skin and pat dry then apply cavilon to skin and place a single layer of adaptic silicone dressing over wound base and cover with allevyn foam dressing or leroy wrap (may leave with adaptic in place and purple pad under arm if copious amounts of weeping fluid present, please change purple pad as needed)    Plan discussed with SCOTT Boland at bedside     For questions/comments regarding the recommendations in this consult, please contact Dior at [phone]. Wound care will not actively follow, please place future consults for new concerns. Thank you!

## 2024-08-21 NOTE — DIETITIAN INITIAL EVALUATION ADULT - EDUCATION DIETARY MODIFICATIONS
Education not appropriate at this time as pt NPO. Pt made aware RD to remain available./not applicable

## 2024-08-21 NOTE — ADVANCED PRACTICE NURSE CONSULT - REASON FOR CONSULT
Wound care consult initiated by RN to assess patient's skin for a possible sacral deep tissue injury present on admission     Reason for Admission: septic shock and hypoxemic respiratory failure  History of Present Illness:   89M with history of CVA, dementia AAO x1 (bedbound and minimally verbal at baseline), YOVANI on CPAP, asthma, hypothyroid, HTN, HLD, persistent afib on Eliquis s/p Medtronic PPM, HFpEF who presents from SNF with altered mental status. Daughter at bedside to provide collateral. Patient is communicative at her facility however minimally as she has known dementia with diminished mental status at baseline. Unable to state if patient was having any issues prior to presentation.    On presentation to ED, patient reportedly hypoxemic requiring 15L NRB but with increased WOB and placed on AVAPS TV 500cc, RR 16, EPAP 5, Max P 30, Min P 10, FiO2 40%. Also hypotensive with reported BPs 60/20s. Was given 1L fluids in ED and zosyn. Course c/b atrial fibrillation with RVR requiring lopressor 2.5 mg x 2 and amiodarone 150 mg for rate control.     Admitted to MICU for shock and acute hypoxemic respiratory failure.

## 2024-08-21 NOTE — PROGRESS NOTE ADULT - SUBJECTIVE AND OBJECTIVE BOX
Patient is a 89y old  Female who presents with a chief complaint of septic shock and hypoxemic respiratory failure (20 Aug 2024 19:17)      Brief Hospital Course:    INTERVAL HPI/OVERNIGHT EVENTS:   No overnight events   Afebrile, hemodynamically stable     ICU Vital Signs Last 24 Hrs  T(C): 36 (21 Aug 2024 04:00), Max: 36.8 (20 Aug 2024 14:45)  T(F): 96.8 (21 Aug 2024 04:00), Max: 98.2 (20 Aug 2024 14:45)  HR: 104 (21 Aug 2024 07:15) (69 - 180)  BP: 130/102 (20 Aug 2024 22:25) (80/57 - 162/101)  BP(mean): 113 (20 Aug 2024 22:25) (52 - 113)  ABP: 102/59 (21 Aug 2024 07:15) (85/51 - 142/79)  ABP(mean): 76 (21 Aug 2024 07:15) (64 - 103)  RR: 16 (21 Aug 2024 07:15) (11 - 37)  SpO2: 100% (21 Aug 2024 07:15) (94% - 100%)    O2 Parameters below as of 20 Aug 2024 22:10  Patient On (Oxygen Delivery Method): BiPAP/CPAP              PHYSICAL EXAM:  GENERAL:   HEAD:  Atraumatic, Normocephalic  EYES: EOMI, PERRLA, conjunctiva and sclera clear  NECK: Supple, No JVD, Normal thyroid, no enlarged nodes  NERVOUS SYSTEM:  Alert & Awake.   CHEST/LUNG: B/L good air entry; No rales, rhonchi, or wheezing  HEART: S1S2 normal, no S3, Regular rate and rhythm; No murmurs  ABDOMEN: Soft, Nontender, Nondistended; Bowel sounds present  EXTREMITIES:  2+ Peripheral Pulses, No clubbing, cyanosis, or edema  LYMPH: No lymphadenopathy noted  SKIN: No rashes or lesions      LABS:                        14.5   25.37 )-----------( 125      ( 20 Aug 2024 22:35 )             45.0     08-20    137  |  102  |  69<H>  ----------------------------<  229<H>  3.6   |  16<L>  |  1.49<H>    Ca    7.0<L>      20 Aug 2024 22:35  Phos  5.3     08-20  Mg     1.2     08-20    TPro  4.2<L>  /  Alb  2.5<L>  /  TBili  2.1<H>  /  DBili  x   /  AST  38  /  ALT  28  /  AlkPhos  79  08-20 08-20-24 @ 07:01  -  08-21-24 @ 07:00  --------------------------------------------------------  IN: 579.7 mL / OUT: 50 mL / NET: 529.7 mL    08-21-24 @ 07:01  -  08-21-24 @ 07:21  --------------------------------------------------------  IN: 125 mL / OUT: 0 mL / NET: 125 mL      PT/INR - ( 20 Aug 2024 22:35 )   PT: 17.0 sec;   INR: 1.64 ratio         PTT - ( 20 Aug 2024 22:35 )  PTT:28.9 sec  Urinalysis Basic - ( 20 Aug 2024 22:35 )    Color: x / Appearance: x / SG: x / pH: x  Gluc: 229 mg/dL / Ketone: x  / Bili: x / Urobili: x   Blood: x / Protein: x / Nitrite: x   Leuk Esterase: x / RBC: x / WBC x   Sq Epi: x / Non Sq Epi: x / Bacteria: x      CAPILLARY BLOOD GLUCOSE      POCT Blood Glucose.: 168 mg/dL (20 Aug 2024 15:18)    ABG - ( 20 Aug 2024 22:24 )  pH, Arterial: 7.50  pH, Blood: x     /  pCO2: 25    /  pO2: 185   / HCO3: 20    / Base Excess: -1.9  /  SaO2: 99.4                        MEDICATIONS  (STANDING):  DAPTOmycin IVPB 450 milliGRAM(s) IV Intermittent every 24 hours  heparin   Injectable 5000 Unit(s) SubCutaneous every 8 hours  levothyroxine Injectable 18.75 MICROGram(s) IV Push at bedtime  norepinephrine Infusion 0.05 MICROgram(s)/kG/Min (10.2 mL/Hr) IV Continuous <Continuous>  piperacillin/tazobactam IVPB.. 3.375 Gram(s) IV Intermittent every 8 hours  potassium chloride  10 mEq/100 mL IVPB 10 milliEquivalent(s) IV Intermittent every 1 hour    MEDICATIONS  (PRN):      RADIOLOGY & ADDITIONAL TESTS:      Care Discussed with Attending [ x] YES  [ ] NO  Consultant(s) Notes Reviewed:  [x ] YES  [ ] NO    Plan:  NEURO: Monitor mental status closely, avoid neurosuppresants. Serial neurologic assessments. AAOx0   CV: Maintain goal MAP >65. Keep K~4 and Mg>2 for optimal arrhythmia suppression.  PULM: On ___ Will utilize supplemental O2 PRN to maintain goal SpO2 >88%. Incentive spirometry, Chest PT/Pulmonary toilet, frequent nasal suctioning, HOB >30'. Albuterol q6h.   GI: Diet. Protonix QD.   RENAL: Renal function currently __. Trend lytes/Scr daily with BMP, Strict I's and O's, goal UOP 0.5 cc/kg/hr, renal dose meds and avoid nephrotoxins.  ENDO: Glycemic control to limit FS glucose to <180mg/dl in critically ill patients   ID: Afebrile.   HEME: VTE ppx with __  Advanced Directives:    Patient is a 89y old  Female who presents with a chief complaint of septic shock and hypoxemic respiratory failure (20 Aug 2024 19:17)      Brief Hospital Course:    INTERVAL HPI/OVERNIGHT EVENTS:   Left axillary a-line placed ON. Patient was on BIPAP at the time of my assessment this morning. She groaned intermittently through, but did not respond verbally to questions. Her daughter was in the room at the time. She states that the patient is A&Ox1-2 at baseline and sometimes gets confused d/t her dementia. She expressed concern about her mother's hands, which she states appear to be infected.     Afebrile, hemodynamically unstable requiring levophed    ICU Vital Signs Last 24 Hrs  T(C): 36 (21 Aug 2024 04:00), Max: 36.8 (20 Aug 2024 14:45)  T(F): 96.8 (21 Aug 2024 04:00), Max: 98.2 (20 Aug 2024 14:45)  HR: 104 (21 Aug 2024 07:15) (69 - 180)  BP: 130/102 (20 Aug 2024 22:25) (80/57 - 162/101)  BP(mean): 113 (20 Aug 2024 22:25) (52 - 113)  ABP: 102/59 (21 Aug 2024 07:15) (85/51 - 142/79)  ABP(mean): 76 (21 Aug 2024 07:15) (64 - 103)  RR: 16 (21 Aug 2024 07:15) (11 - 37)  SpO2: 100% (21 Aug 2024 07:15) (94% - 100%)    O2 Parameters below as of 20 Aug 2024 22:10  Patient On (Oxygen Delivery Method): BiPAP/CPAP        CONSTITUTIONAL: laying in bed, on BiPAP but not engaging in interview  EYES: PERRLA and symmetric, EOMI, No conjunctival or scleral injection, non-icteric  ENMT: unable to assess on BiPAP therapy   RESP: breathing labored on BiPAP therapy  CV: rapid and abnormal heart rate, bilateral lower extremity edema    GI: Soft, NT, moderately distended, no rebound, no guarding; no palpable masses  MSK: moves extremities to painful stimuli   SKIN: bilateral upper extremities with skin sloughing and generalized ecchymosis. Right upper extremity with some weeping and serous drainage  NEURO: AAO x 0 unable to assess iso altered mentation        LABS:                        14.5   25.37 )-----------( 125      ( 20 Aug 2024 22:35 )             45.0     08-20    137  |  102  |  69<H>  ----------------------------<  229<H>  3.6   |  16<L>  |  1.49<H>    Ca    7.0<L>      20 Aug 2024 22:35  Phos  5.3     08-20  Mg     1.2     08-20    TPro  4.2<L>  /  Alb  2.5<L>  /  TBili  2.1<H>  /  DBili  x   /  AST  38  /  ALT  28  /  AlkPhos  79  08-20 08-20-24 @ 07:01  -  08-21-24 @ 07:00  --------------------------------------------------------  IN: 579.7 mL / OUT: 50 mL / NET: 529.7 mL    08-21-24 @ 07:01  -  08-21-24 @ 07:21  --------------------------------------------------------  IN: 125 mL / OUT: 0 mL / NET: 125 mL      PT/INR - ( 20 Aug 2024 22:35 )   PT: 17.0 sec;   INR: 1.64 ratio         PTT - ( 20 Aug 2024 22:35 )  PTT:28.9 sec  Urinalysis Basic - ( 20 Aug 2024 22:35 )    Color: x / Appearance: x / SG: x / pH: x  Gluc: 229 mg/dL / Ketone: x  / Bili: x / Urobili: x   Blood: x / Protein: x / Nitrite: x   Leuk Esterase: x / RBC: x / WBC x   Sq Epi: x / Non Sq Epi: x / Bacteria: x      CAPILLARY BLOOD GLUCOSE      POCT Blood Glucose.: 168 mg/dL (20 Aug 2024 15:18)    ABG - ( 20 Aug 2024 22:24 )  pH, Arterial: 7.50  pH, Blood: x     /  pCO2: 25    /  pO2: 185   / HCO3: 20    / Base Excess: -1.9  /  SaO2: 99.4                        MEDICATIONS  (STANDING):  DAPTOmycin IVPB 450 milliGRAM(s) IV Intermittent every 24 hours  heparin   Injectable 5000 Unit(s) SubCutaneous every 8 hours  levothyroxine Injectable 18.75 MICROGram(s) IV Push at bedtime  norepinephrine Infusion 0.05 MICROgram(s)/kG/Min (10.2 mL/Hr) IV Continuous <Continuous>  piperacillin/tazobactam IVPB.. 3.375 Gram(s) IV Intermittent every 8 hours  potassium chloride  10 mEq/100 mL IVPB 10 milliEquivalent(s) IV Intermittent every 1 hour    MEDICATIONS  (PRN):      RADIOLOGY & ADDITIONAL TESTS:      Care Discussed with Attending [ x] YES  [ ] NO  Consultant(s) Notes Reviewed:  [x ] YES  [ ] NO

## 2024-08-21 NOTE — DIETITIAN INITIAL EVALUATION ADULT - ADD RECOMMEND
As medically feasible, provide multivitamin, Vitamin C, and zinc for wound healing. Continue to trend labs, weight, skin integrity, and intake.

## 2024-08-22 NOTE — PROGRESS NOTE ADULT - SUBJECTIVE AND OBJECTIVE BOX
Patient is a 89y old  Female who presents with a chief complaint of septic shock and hypoxemic respiratory failure (21 Aug 2024 17:52)      Brief Hospital Course:    INTERVAL HPI/OVERNIGHT EVENTS:   No overnight events   Afebrile, hemodynamically stable     ICU Vital Signs Last 24 Hrs  T(C): 37.9 (22 Aug 2024 04:00), Max: 37.9 (22 Aug 2024 04:00)  T(F): 100.2 (22 Aug 2024 04:00), Max: 100.2 (22 Aug 2024 04:00)  HR: 95 (22 Aug 2024 06:45) (94 - 195)  BP: --  BP(mean): --  ABP: 118/56 (22 Aug 2024 06:45) (80/49 - 168/80)  ABP(mean): 79 (22 Aug 2024 06:45) (58 - 110)  RR: 15 (22 Aug 2024 06:45) (11 - 39)  SpO2: 100% (22 Aug 2024 06:45) (95% - 100%)    O2 Parameters below as of 21 Aug 2024 20:00  Patient On (Oxygen Delivery Method): nasal cannula              PHYSICAL EXAM:  GENERAL:   HEAD:  Atraumatic, Normocephalic  EYES: EOMI, PERRLA, conjunctiva and sclera clear  NECK: Supple, No JVD, Normal thyroid, no enlarged nodes  NERVOUS SYSTEM:  Alert & Awake.   CHEST/LUNG: B/L good air entry; No rales, rhonchi, or wheezing  HEART: S1S2 normal, no S3, Regular rate and rhythm; No murmurs  ABDOMEN: Soft, Nontender, Nondistended; Bowel sounds present  EXTREMITIES:  2+ Peripheral Pulses, No clubbing, cyanosis, or edema  LYMPH: No lymphadenopathy noted  SKIN: No rashes or lesions      LABS:                        15.8   24.43 )-----------( 114      ( 22 Aug 2024 03:50 )             50.0     08-21    139  |  102  |  74<H>  ----------------------------<  129<H>  4.1   |  17<L>  |  1.83<H>    Ca    7.7<L>      21 Aug 2024 21:39  Phos  6.2     08-21  Mg     1.9     08-21    TPro  4.4<L>  /  Alb  2.5<L>  /  TBili  1.4<H>  /  DBili  x   /  AST  29  /  ALT  32  /  AlkPhos  95  08-21 08-21-24 @ 07:01  -  08-22-24 @ 07:00  --------------------------------------------------------  IN: 3577.8 mL / OUT: 90 mL / NET: 3487.8 mL      PT/INR - ( 21 Aug 2024 21:39 )   PT: 14.0 sec;   INR: 1.28 ratio         PTT - ( 22 Aug 2024 03:50 )  PTT:36.0 sec  Urinalysis Basic - ( 21 Aug 2024 21:39 )    Color: x / Appearance: x / SG: x / pH: x  Gluc: 129 mg/dL / Ketone: x  / Bili: x / Urobili: x   Blood: x / Protein: x / Nitrite: x   Leuk Esterase: x / RBC: x / WBC x   Sq Epi: x / Non Sq Epi: x / Bacteria: x      CAPILLARY BLOOD GLUCOSE        ABG - ( 21 Aug 2024 21:34 )  pH, Arterial: 7.40  pH, Blood: x     /  pCO2: 31    /  pO2: 137   / HCO3: 19    / Base Excess: -4.4  /  SaO2: 98.9                        MEDICATIONS  (STANDING):  DAPTOmycin IVPB 450 milliGRAM(s) IV Intermittent every 24 hours  digoxin  Injectable 250 MICROGram(s) IV Push every 6 hours  heparin  Infusion.  Unit(s)/Hr (16 mL/Hr) IV Continuous <Continuous>  levothyroxine Injectable 18.75 MICROGram(s) IV Push at bedtime  phenylephrine    Infusion 0.2 MICROgram(s)/kG/Min (6.66 mL/Hr) IV Continuous <Continuous>  piperacillin/tazobactam IVPB.. 3.375 Gram(s) IV Intermittent every 8 hours    MEDICATIONS  (PRN):  HYDROmorphone  Injectable 0.25 milliGRAM(s) IV Push every 4 hours PRN Moderate Pain (4 - 6)      RADIOLOGY & ADDITIONAL TESTS:      Care Discussed with Attending [ x] YES  [ ] NO  Consultant(s) Notes Reviewed:  [x ] YES  [ ] NO    Plan:  NEURO: Monitor mental status closely, avoid neurosuppresants. Serial neurologic assessments. AAOx0   CV: Maintain goal MAP >65. Keep K~4 and Mg>2 for optimal arrhythmia suppression.  PULM: On ___ Will utilize supplemental O2 PRN to maintain goal SpO2 >88%. Incentive spirometry, Chest PT/Pulmonary toilet, frequent nasal suctioning, HOB >30'. Albuterol q6h.   GI: Diet. Protonix QD.   RENAL: Renal function currently __. Trend lytes/Scr daily with BMP, Strict I's and O's, goal UOP 0.5 cc/kg/hr, renal dose meds and avoid nephrotoxins.  ENDO: Glycemic control to limit FS glucose to <180mg/dl in critically ill patients   ID: Afebrile.   HEME: VTE ppx with __  Advanced Directives:    Patient is a 89y old  Female who presents with a chief complaint of septic shock and hypoxemic respiratory failure (21 Aug 2024 17:52)      Brief Hospital Course:    INTERVAL HPI/OVERNIGHT EVENTS:   No overnight events. Patient unresponsive.   Afebrile, hemodynamically stable     ICU Vital Signs Last 24 Hrs  T(C): 37.9 (22 Aug 2024 04:00), Max: 37.9 (22 Aug 2024 04:00)  T(F): 100.2 (22 Aug 2024 04:00), Max: 100.2 (22 Aug 2024 04:00)  HR: 95 (22 Aug 2024 06:45) (94 - 195)  BP: --  BP(mean): --  ABP: 118/56 (22 Aug 2024 06:45) (80/49 - 168/80)  ABP(mean): 79 (22 Aug 2024 06:45) (58 - 110)  RR: 15 (22 Aug 2024 06:45) (11 - 39)  SpO2: 100% (22 Aug 2024 06:45) (95% - 100%)    O2 Parameters below as of 21 Aug 2024 20:00  Patient On (Oxygen Delivery Method): nasal cannula              PHYSICAL EXAM:  GENERAL:   HEAD:  Atraumatic, Normocephalic  EYES: EOMI, PERRLA, conjunctiva and sclera clear  NECK: Supple, No JVD, Normal thyroid, no enlarged nodes  NERVOUS SYSTEM:  Alert & Awake.   CHEST/LUNG: B/L good air entry; No rales, rhonchi, or wheezing  HEART: S1S2 normal, no S3, Regular rate and rhythm; No murmurs  ABDOMEN: Soft, Nontender, Nondistended; Bowel sounds present  EXTREMITIES:  2+ Peripheral Pulses, No clubbing, cyanosis, or edema  LYMPH: No lymphadenopathy noted  SKIN: No rashes or lesions      LABS:                        15.8   24.43 )-----------( 114      ( 22 Aug 2024 03:50 )             50.0     08-21    139  |  102  |  74<H>  ----------------------------<  129<H>  4.1   |  17<L>  |  1.83<H>    Ca    7.7<L>      21 Aug 2024 21:39  Phos  6.2     08-21  Mg     1.9     08-21    TPro  4.4<L>  /  Alb  2.5<L>  /  TBili  1.4<H>  /  DBili  x   /  AST  29  /  ALT  32  /  AlkPhos  95  08-21 08-21-24 @ 07:01  -  08-22-24 @ 07:00  --------------------------------------------------------  IN: 3577.8 mL / OUT: 90 mL / NET: 3487.8 mL      PT/INR - ( 21 Aug 2024 21:39 )   PT: 14.0 sec;   INR: 1.28 ratio         PTT - ( 22 Aug 2024 03:50 )  PTT:36.0 sec  Urinalysis Basic - ( 21 Aug 2024 21:39 )    Color: x / Appearance: x / SG: x / pH: x  Gluc: 129 mg/dL / Ketone: x  / Bili: x / Urobili: x   Blood: x / Protein: x / Nitrite: x   Leuk Esterase: x / RBC: x / WBC x   Sq Epi: x / Non Sq Epi: x / Bacteria: x      CAPILLARY BLOOD GLUCOSE        ABG - ( 21 Aug 2024 21:34 )  pH, Arterial: 7.40  pH, Blood: x     /  pCO2: 31    /  pO2: 137   / HCO3: 19    / Base Excess: -4.4  /  SaO2: 98.9                        MEDICATIONS  (STANDING):  DAPTOmycin IVPB 450 milliGRAM(s) IV Intermittent every 24 hours  digoxin  Injectable 250 MICROGram(s) IV Push every 6 hours  heparin  Infusion.  Unit(s)/Hr (16 mL/Hr) IV Continuous <Continuous>  levothyroxine Injectable 18.75 MICROGram(s) IV Push at bedtime  phenylephrine    Infusion 0.2 MICROgram(s)/kG/Min (6.66 mL/Hr) IV Continuous <Continuous>  piperacillin/tazobactam IVPB.. 3.375 Gram(s) IV Intermittent every 8 hours    MEDICATIONS  (PRN):  HYDROmorphone  Injectable 0.25 milliGRAM(s) IV Push every 4 hours PRN Moderate Pain (4 - 6)      RADIOLOGY & ADDITIONAL TESTS:      Care Discussed with Attending [ x] YES  [ ] NO  Consultant(s) Notes Reviewed:  [x ] YES  [ ] NO    Plan:  NEURO: Monitor mental status closely, avoid neurosuppresants. Serial neurologic assessments. AAOx0   CV: Maintain goal MAP >65. Keep K~4 and Mg>2 for optimal arrhythmia suppression.  PULM: On ___ Will utilize supplemental O2 PRN to maintain goal SpO2 >88%. Incentive spirometry, Chest PT/Pulmonary toilet, frequent nasal suctioning, HOB >30'. Albuterol q6h.   GI: Diet. Protonix QD.   RENAL: Renal function currently __. Trend lytes/Scr daily with BMP, Strict I's and O's, goal UOP 0.5 cc/kg/hr, renal dose meds and avoid nephrotoxins.  ENDO: Glycemic control to limit FS glucose to <180mg/dl in critically ill patients   ID: Afebrile.   HEME: VTE ppx with __  Advanced Directives:

## 2024-08-22 NOTE — GOALS OF CARE CONVERSATION - ADVANCED CARE PLANNING - CONVERSATION DETAILS
Discussed the patient's poor prognosis and minimal likelihood of recovery in the setting of sepsis with multi-system organ dysfunction and increasing pressor requirements. Given this information, the family chose to pursue comfort measures for their loved one and withdrawal of life-sustaining measures.

## 2024-08-22 NOTE — PROGRESS NOTE ADULT - ATTENDING COMMENTS
1. Acute hypoxemic respiratory failure  due to septic shock from UTI and ? cellulitis. Off AVAPS on 5 liters NC.  2. ID. UA +. H/O VRE UTI . ? cellulitis. Continue Zosyn and Daptomycin . Follow up blood cx and UCX.  3.Chronic atrial fib. Now in RVR. Rate better controlled on phenylephrine and digoxin.    4. Severe sepsis  with shock due to UTI ? cellulitis. Phenylephrine at 10. Pt withe border line BP  5. Neuro. Severe dementia at baseline. Pupils fixed without corneal reflex this morning. + gag reflex  6. DVT prophylaxis; Sq heparin  7. GOC; DNR/DNI  Daughter and son called to come to hospital. At bedside, family agreed to withdraw pressors.
1. Acute hypoxemic respiratory failure  due to septic shock from UTI and ? cellulitis. Pt remains on AVAPS. Consider trial off AVAPS.  2. ID. UA +. H/O VRE UTI . ? cellulitis. Continue Zosyn and Daptomycin . Follow up blood cx and UCX.  3.Chronic atrial fib. Now in RVR. Change Levophed to phenylephrine. May need digoxin. Hold full AC.   4. Severe sepsis  with shock due to UTI ? cellulitis. Pressors as above. Titrate for MAP> 65  5. Neuro. Severe dementia at baseline.  6. DVT prophylaxis; Sq heparin  7. GOC; DNR/DNI

## 2024-08-22 NOTE — CHART NOTE - NSCHARTNOTEFT_GEN_A_CORE
DEATH NOTE    Called to bedside to evaluate the patient for asystole.     On physical exam, patient did not respond to verbal or noxious stimuli.  No spontaneous respirations.  Absent heart and breath sounds.  Absent radial and carotid pulses.   Pupils are fixed and dilated, no corneal reflex.  EKG rhythm strip shows asystole.   Patient pronounced dead at 11:05AM.  Attending notified.

## 2024-08-22 NOTE — PROGRESS NOTE ADULT - ASSESSMENT
89M with history of CVA, dementia AAO x1 (bedbound and minimally verbal at baseline), YOVANI on CPAP, asthma, hypothyroid, HTN, HLD, persistent afib on Eliquis s/p Med[employer] PPM, HFpEF who presents from SNF with altered mental status. Admitted to MICU iso shock state with associated hypoxemic respiratory failure.     Neurology  #Metabolic Encephalopathy  - AAO x 1 at baseline  - now AAO x 0 and obtunded  - CT Head pending for eval for structural dysfunction     Cardiovascular  #Shock   - undifferentiated however likely iso sepsis state likely multifactorial with potential urinary source vs skin vs PNA given respiratory status  - s/p 1L IVF in ED  - on levophed   - MAP > 65  -can consider phenylephrine for pressors support instead of levo in setting of tachycardia    #HFpEF  - TTE 5/24 with evidence of severe TR  - hold home lasix 20 iso shock  - hold home eplerenone iso shock  - hold home losartan iso shock    #Atrial Fibrillation   - on home eliquis 5 BID and metoprolol tartrate 100 BID   - s/p lopressor 2.5 mg x 2 for rate control without adequate control  - s/p amiodarone 150 mg for rate control  - may need maintenance amiodarone for rate control and rhythm control   - heparin gtt for AC   -Can consider digoxin 0.25 for rhythm control if tachycardia refractory; holding metoprolol in setting of low BP    Respiratory  #Acute Hypoxemic Respiratory Failure  - requiring BiPAP therapy on admission potentially driven by sepsis state vs PNA process  - pending CT chest to eval for thoracic pathology  - can wean off BiPAP therapy as tolerated     GI  #Diet and Nutrition  - NPO on BiPAP     Renal  #GLENDY   - potentially iso ATN in setting of sepsis  - s/p 1L IVF  - monitor UOP and consider additional fluid resuscitation as necessary pending US eval     Hematology/Oncology  #DVT Prophylaxis  - can convert home eliquis to hep gtt for AC for a fib process    ID  #SIRS  - unclear etiology at this time but patient with history of VRE UTI in the past, U/A w/ increased turbidity and moderate leukocyte esterase c/w UTI    -MRSA negative  - s/p 1L IVF resuscitation   - s/p zosyn and linezolid in ED  - c/w zosyn (8/20-) for gram - coverage and daptomycin (8/20-)for VRE UTI history  - pending blood cultures and urine cultures      Endocrine  #no active issues    Social/Ethics  #Code status: DNR/DNI

## 2024-08-22 NOTE — DISCHARGE NOTE FOR THE EXPIRED PATIENT - HOSPITAL COURSE
89M with history of CVA, dementia AAO x1 (bedbound and minimally verbal at baseline), YOVANI on CPAP, asthma, hypothyroid, HTN, HLD, persistent afib on Eliquis s/p Medtronic PPM, HFpEF who presents from SNF with altered mental status. Daughter at bedside to provide collateral. Patient is communicative at her facility however minimally as she has known dementia with diminished mental status at baseline. Unable to state if patient was having any issues prior to presentation.    On presentation to ED, patient reportedly hypoxemic requiring 15L NRB but with increased WOB and placed on AVAPS TV 500cc, RR 16, EPAP 5, Max P 30, Min P 10, FiO2 40%. Also hypotensive with reported BPs 60/20s. Was given 1L fluids in ED and zosyn. Course c/b atrial fibrillation with RVR requiring lopressor 2.5 mg x 2 and amiodarone 150 mg for rate control.     Admitted to MICU for shock and acute hypoxemic respiratory failure. During MICU stay, AVAPS was continued and patient was treated for septic shock process with broad spectrum antibiotics with zosyn and daptomycin. Course was complicated by episodes of atrial fibrillation with RVR requiring rate control with digoxin and amiodarone. Despite treatment of sepsis state, patient with rapidly increasing pressor requirements. Mental status also without recovery. Ultimately, patients children (Liz and Jamal) opted to keep patient comfortable. IV pressors were discontinued. Patient promptly went into cardiopulmonary arrest and  on 2024 at 11:05 AM. Family at bedside during this time.

## 2024-09-21 NOTE — ED ADULT TRIAGE NOTE - CADM TRG TX PRIOR TO ARRIVAL
Wrestling with friend, thrown backwards and hit head, no LOC, no vomiting. \"Disoriented and not making sense,\" \"keeps asking the same question and not remembering the answer.\"     Patient oriented to person, place, and time upon arrival.    none

## 2024-10-10 NOTE — PATIENT PROFILE ADULT - FUNCTIONAL ASSESSMENT - BASIC MOBILITY 2.
A catheter was exchanged for a (CATHETER 6FR JL3.5 CRV 100CM RADOPQ BRAID SLCT SUP TRQ ANGIO) catheter. 2 = A lot of assistance

## 2024-10-10 NOTE — ED CDU PROVIDER SUBSEQUENT DAY NOTE - ATTENDING CONTRIBUTION TO CARE
no rashes , no jaundice present , good turgor , no masses , no tenderness on palpation
Patient is a 84 yo F with hx of afib on Coumadin, s/p PM, HTN, hyperlipidemia, YOVANI here for tingling sensation in lower extremities, radiating up. Symptoms improved but returned. Patient seen by neuro, wanted a repeat CT Head in 24 hours. CT Head, CTA Head and Neck negative. Ambulatory with a cane.     VS noted  Gen. no acute distress, Non toxic   HEENT: EOMI, mmm  Lungs: CTAB/L no C/ W /R   CVS: RRR   Abd; Soft non tender, non distended   Ext: no edema  Skin: no rash  Neuro AAOx3, CN 2-12 intact, strength equal in UE/LE clear speech  a/p: numbness s/p neuro eval and neg CT. Per plan, patient to have repeat CT Head 24 hours from first. Neuro saw patient this AM, requesting CT C-spine be done as well. Patient otherwise with no complaints, still has numbness in left hand, face, nose. Will continue to follow.   Gene Baez MD

## 2024-12-19 NOTE — PROGRESS NOTE ADULT - PROBLEM SELECTOR PROBLEM 8
----- Message from Tammie ASHER sent at 12/19/2024  9:42 AM EST -----  Regarding: [facility] Appointment Request  [facility] Appointment Request    Patient needs appointment for [facility] Appointment Type: ED Follow-Up.    Patient Requested Dates(s): Any available Day  Patient Requested Time: Any available Time  Provider Name: Patient provider    Reason for Appointment Request: Established Patient - No appointments available during search  --------------------------------------------------------------------------------------------------------------------------    Relationship to Patient: Self     Call Back Information: OK to leave message on voicemail  Preferred Call Back Number: Phone [phone]  
R/O Pneumonia

## 2025-04-03 NOTE — H&P ADULT - HIV OFFER
Insurance covers accu check or one touch test strips  Pt new insurance no longer covers glucose monitor strips sent on 3/27/25    Please send new script to [facility]   Unable to answer due to medical condition/unresponsive/etc...

## 2025-05-13 NOTE — ED ADULT NURSE NOTE - TEMPLATE
Date of last visit:  3/12/2025  Date of next visit:  6/26/2025    Requested Prescriptions     Pending Prescriptions Disp Refills    metoprolol tartrate (LOPRESSOR) 50 MG tablet [Pharmacy Med Name: METOPROLOL TART 50MG TAB] 180 tablet 1     Sig: Take 1 tablet by mouth twice daily    amiodarone (CORDARONE) 200 MG tablet [Pharmacy Med Name: Amiodarone HCl 200 MG Oral Tablet] 90 tablet 1     Sig: Take 1 tablet by mouth once daily    atorvastatin (LIPITOR) 40 MG tablet [Pharmacy Med Name: Atorvastatin Calcium 40 MG Oral Tablet] 90 tablet 1     Sig: Take 1 tablet by mouth once daily    clopidogrel (PLAVIX) 75 MG tablet [Pharmacy Med Name: Clopidogrel Bisulfate 75 MG Oral Tablet] 90 tablet 1     Sig: Take 1 tablet by mouth once daily      General

## 2025-07-16 NOTE — ED ADULT NURSE NOTE - TEMPLATE
----- Message from Naomy DE SOUZA sent at 7/16/2025  3:30 PM EDT -----  Regarding: Specialty Message to Provider  Specialty Message to Provider    Relationship to Patient: Self     Patient Message: PATIENT WOULD LIKE TO THE COST OF THE ICE MACHINE FOR HER UPCOMING SX ON HER KNEE. REQ A CALL BACK.. PLEASE ADVISE   --------------------------------------------------------------------------------------------------------------------------    Call Back Information: OK to leave message on voicemail  Preferred Call Back Number: [phone]  
Lvm informing patient of cost of ice machine and that she can  at her convenience.   
General